# Patient Record
Sex: MALE | Race: WHITE | HISPANIC OR LATINO | Employment: OTHER | URBAN - METROPOLITAN AREA
[De-identification: names, ages, dates, MRNs, and addresses within clinical notes are randomized per-mention and may not be internally consistent; named-entity substitution may affect disease eponyms.]

---

## 2017-01-11 ENCOUNTER — ALLSCRIPTS OFFICE VISIT (OUTPATIENT)
Dept: OTHER | Facility: OTHER | Age: 72
End: 2017-01-11

## 2017-01-12 ENCOUNTER — ALLSCRIPTS OFFICE VISIT (OUTPATIENT)
Dept: OTHER | Facility: OTHER | Age: 72
End: 2017-01-12

## 2017-01-13 ENCOUNTER — ALLSCRIPTS OFFICE VISIT (OUTPATIENT)
Dept: OTHER | Facility: OTHER | Age: 72
End: 2017-01-13

## 2017-01-31 ENCOUNTER — ALLSCRIPTS OFFICE VISIT (OUTPATIENT)
Dept: OTHER | Facility: OTHER | Age: 72
End: 2017-01-31

## 2017-02-14 ENCOUNTER — APPOINTMENT (OUTPATIENT)
Dept: CARDIAC REHAB | Age: 72
End: 2017-02-14
Payer: MEDICARE

## 2017-02-14 PROCEDURE — 93797 PHYS/QHP OP CAR RHAB WO ECG: CPT

## 2017-02-20 ENCOUNTER — APPOINTMENT (OUTPATIENT)
Dept: CARDIAC REHAB | Age: 72
End: 2017-02-20
Payer: MEDICARE

## 2017-02-20 PROCEDURE — 93798 PHYS/QHP OP CAR RHAB W/ECG: CPT

## 2017-02-22 ENCOUNTER — APPOINTMENT (OUTPATIENT)
Dept: CARDIAC REHAB | Age: 72
End: 2017-02-22
Payer: MEDICARE

## 2017-02-22 PROCEDURE — 93798 PHYS/QHP OP CAR RHAB W/ECG: CPT

## 2017-02-23 ENCOUNTER — GENERIC CONVERSION - ENCOUNTER (OUTPATIENT)
Dept: OTHER | Facility: OTHER | Age: 72
End: 2017-02-23

## 2017-02-24 ENCOUNTER — APPOINTMENT (OUTPATIENT)
Dept: CARDIAC REHAB | Age: 72
End: 2017-02-24
Payer: MEDICARE

## 2017-02-24 PROCEDURE — 93798 PHYS/QHP OP CAR RHAB W/ECG: CPT

## 2017-02-27 ENCOUNTER — APPOINTMENT (OUTPATIENT)
Dept: CARDIAC REHAB | Age: 72
End: 2017-02-27
Payer: MEDICARE

## 2017-02-27 PROCEDURE — 93798 PHYS/QHP OP CAR RHAB W/ECG: CPT

## 2017-02-28 ENCOUNTER — ALLSCRIPTS OFFICE VISIT (OUTPATIENT)
Dept: OTHER | Facility: OTHER | Age: 72
End: 2017-02-28

## 2017-02-28 DIAGNOSIS — I10 ESSENTIAL (PRIMARY) HYPERTENSION: ICD-10-CM

## 2017-02-28 DIAGNOSIS — N50.812 PAIN IN LEFT TESTICLE: ICD-10-CM

## 2017-02-28 DIAGNOSIS — E11.65 TYPE 2 DIABETES MELLITUS WITH HYPERGLYCEMIA (HCC): ICD-10-CM

## 2017-03-01 ENCOUNTER — APPOINTMENT (OUTPATIENT)
Dept: CARDIAC REHAB | Age: 72
End: 2017-03-01
Payer: MEDICARE

## 2017-03-01 ENCOUNTER — ALLSCRIPTS OFFICE VISIT (OUTPATIENT)
Dept: OTHER | Facility: OTHER | Age: 72
End: 2017-03-01

## 2017-03-01 PROCEDURE — 93798 PHYS/QHP OP CAR RHAB W/ECG: CPT

## 2017-03-02 ENCOUNTER — LAB CONVERSION - ENCOUNTER (OUTPATIENT)
Dept: OTHER | Facility: OTHER | Age: 72
End: 2017-03-02

## 2017-03-02 LAB
A/G RATIO (HISTORICAL): 1.5 (CALC) (ref 1–2.5)
ALBUMIN SERPL BCP-MCNC: 4.2 G/DL (ref 3.6–5.1)
ALP SERPL-CCNC: 76 U/L (ref 40–115)
ALT SERPL W P-5'-P-CCNC: 13 U/L (ref 9–46)
AST SERPL W P-5'-P-CCNC: 16 U/L (ref 10–35)
BILIRUB SERPL-MCNC: 0.5 MG/DL (ref 0.2–1.2)
BUN SERPL-MCNC: 17 MG/DL (ref 7–25)
BUN/CREA RATIO (HISTORICAL): ABNORMAL (CALC) (ref 6–22)
CALCIUM SERPL-MCNC: 9.3 MG/DL (ref 8.6–10.3)
CHLORIDE SERPL-SCNC: 101 MMOL/L (ref 98–110)
CO2 SERPL-SCNC: 29 MMOL/L (ref 20–31)
CREAT SERPL-MCNC: 0.79 MG/DL (ref 0.7–1.25)
EGFR AFRICAN AMERICAN (HISTORICAL): 106 ML/MIN/1.73M2
EGFR-AMERICAN CALC (HISTORICAL): 92 ML/MIN/1.73M2
GAMMA GLOBULIN (HISTORICAL): 2.8 G/DL (CALC) (ref 1.9–3.7)
GLUCOSE (HISTORICAL): 140 MG/DL (ref 65–99)
HBA1C MFR BLD HPLC: 7.2 % OF TOTAL HGB
POTASSIUM SERPL-SCNC: 4.4 MMOL/L (ref 3.5–5.3)
SODIUM SERPL-SCNC: 138 MMOL/L (ref 135–146)
TOTAL PROTEIN (HISTORICAL): 7 G/DL (ref 6.1–8.1)

## 2017-03-03 ENCOUNTER — APPOINTMENT (OUTPATIENT)
Dept: CARDIAC REHAB | Age: 72
End: 2017-03-03
Payer: MEDICARE

## 2017-03-03 PROCEDURE — 93798 PHYS/QHP OP CAR RHAB W/ECG: CPT

## 2017-03-05 ENCOUNTER — GENERIC CONVERSION - ENCOUNTER (OUTPATIENT)
Dept: OTHER | Facility: OTHER | Age: 72
End: 2017-03-05

## 2017-03-06 ENCOUNTER — HOSPITAL ENCOUNTER (OUTPATIENT)
Dept: RADIOLOGY | Facility: HOSPITAL | Age: 72
Discharge: HOME | End: 2017-03-06
Payer: MEDICARE

## 2017-03-06 ENCOUNTER — APPOINTMENT (OUTPATIENT)
Dept: CARDIAC REHAB | Age: 72
End: 2017-03-06
Payer: MEDICARE

## 2017-03-06 DIAGNOSIS — N50.812 PAIN IN LEFT TESTICLE: ICD-10-CM

## 2017-03-06 PROCEDURE — 93798 PHYS/QHP OP CAR RHAB W/ECG: CPT

## 2017-03-06 PROCEDURE — 76870 US EXAM SCROTUM: CPT

## 2017-03-08 ENCOUNTER — APPOINTMENT (OUTPATIENT)
Dept: CARDIAC REHAB | Age: 72
End: 2017-03-08
Payer: MEDICARE

## 2017-03-08 PROCEDURE — 93798 PHYS/QHP OP CAR RHAB W/ECG: CPT

## 2017-03-10 ENCOUNTER — APPOINTMENT (OUTPATIENT)
Dept: CARDIAC REHAB | Age: 72
End: 2017-03-10
Payer: MEDICARE

## 2017-03-10 PROCEDURE — 93798 PHYS/QHP OP CAR RHAB W/ECG: CPT

## 2017-03-11 DIAGNOSIS — I25.10 ATHEROSCLEROTIC HEART DISEASE OF NATIVE CORONARY ARTERY WITHOUT ANGINA PECTORIS: ICD-10-CM

## 2017-03-11 DIAGNOSIS — E11.65 TYPE 2 DIABETES MELLITUS WITH HYPERGLYCEMIA (HCC): ICD-10-CM

## 2017-03-11 DIAGNOSIS — E11.9 TYPE 2 DIABETES MELLITUS WITHOUT COMPLICATIONS (HCC): ICD-10-CM

## 2017-03-13 ENCOUNTER — GENERIC CONVERSION - ENCOUNTER (OUTPATIENT)
Dept: OTHER | Facility: OTHER | Age: 72
End: 2017-03-13

## 2017-03-13 ENCOUNTER — APPOINTMENT (OUTPATIENT)
Dept: PHYSICAL THERAPY | Facility: REHABILITATION | Age: 72
End: 2017-03-13
Payer: MEDICARE

## 2017-03-13 ENCOUNTER — APPOINTMENT (OUTPATIENT)
Dept: CARDIAC REHAB | Age: 72
End: 2017-03-13
Payer: MEDICARE

## 2017-03-13 DIAGNOSIS — N50.812 PAIN IN LEFT TESTICLE: ICD-10-CM

## 2017-03-13 PROCEDURE — G8990 OTHER PT/OT CURRENT STATUS: HCPCS

## 2017-03-13 PROCEDURE — 93798 PHYS/QHP OP CAR RHAB W/ECG: CPT

## 2017-03-13 PROCEDURE — G8991 OTHER PT/OT GOAL STATUS: HCPCS

## 2017-03-13 PROCEDURE — 97110 THERAPEUTIC EXERCISES: CPT

## 2017-03-13 PROCEDURE — 97162 PT EVAL MOD COMPLEX 30 MIN: CPT

## 2017-03-15 ENCOUNTER — APPOINTMENT (OUTPATIENT)
Dept: CARDIAC REHAB | Age: 72
End: 2017-03-15
Payer: MEDICARE

## 2017-03-15 PROCEDURE — 93798 PHYS/QHP OP CAR RHAB W/ECG: CPT

## 2017-03-16 ENCOUNTER — APPOINTMENT (OUTPATIENT)
Dept: PHYSICAL THERAPY | Facility: REHABILITATION | Age: 72
End: 2017-03-16
Payer: MEDICARE

## 2017-03-16 PROCEDURE — 97112 NEUROMUSCULAR REEDUCATION: CPT

## 2017-03-16 PROCEDURE — 97110 THERAPEUTIC EXERCISES: CPT

## 2017-03-17 ENCOUNTER — APPOINTMENT (OUTPATIENT)
Dept: CARDIAC REHAB | Age: 72
End: 2017-03-17
Payer: MEDICARE

## 2017-03-17 LAB
CHOLEST SERPL-MCNC: 117 MG/DL (ref 125–200)
CHOLEST/HDLC SERPL: 2.4 (CALC)
HDLC SERPL-MCNC: 48 MG/DL
LDL CHOLESTEROL (HISTORICAL): 54 MG/DL (CALC)
NON-HDL-CHOL (CHOL-HDL) (HISTORICAL): 69 MG/DL (CALC)
TRIGL SERPL-MCNC: 74 MG/DL

## 2017-03-17 PROCEDURE — 93798 PHYS/QHP OP CAR RHAB W/ECG: CPT

## 2017-03-20 ENCOUNTER — APPOINTMENT (OUTPATIENT)
Dept: CARDIAC REHAB | Age: 72
End: 2017-03-20
Payer: MEDICARE

## 2017-03-20 PROCEDURE — 93798 PHYS/QHP OP CAR RHAB W/ECG: CPT

## 2017-03-21 ENCOUNTER — APPOINTMENT (OUTPATIENT)
Dept: PHYSICAL THERAPY | Facility: REHABILITATION | Age: 72
End: 2017-03-21
Payer: MEDICARE

## 2017-03-21 PROCEDURE — 97112 NEUROMUSCULAR REEDUCATION: CPT

## 2017-03-21 PROCEDURE — 97110 THERAPEUTIC EXERCISES: CPT

## 2017-03-22 ENCOUNTER — APPOINTMENT (OUTPATIENT)
Dept: CARDIAC REHAB | Age: 72
End: 2017-03-22
Payer: MEDICARE

## 2017-03-22 PROCEDURE — 93798 PHYS/QHP OP CAR RHAB W/ECG: CPT

## 2017-03-23 ENCOUNTER — APPOINTMENT (OUTPATIENT)
Dept: PHYSICAL THERAPY | Facility: REHABILITATION | Age: 72
End: 2017-03-23
Payer: MEDICARE

## 2017-03-23 PROCEDURE — 97110 THERAPEUTIC EXERCISES: CPT

## 2017-03-23 PROCEDURE — 97112 NEUROMUSCULAR REEDUCATION: CPT

## 2017-03-24 ENCOUNTER — APPOINTMENT (OUTPATIENT)
Dept: CARDIAC REHAB | Age: 72
End: 2017-03-24
Payer: MEDICARE

## 2017-03-24 ENCOUNTER — ALLSCRIPTS OFFICE VISIT (OUTPATIENT)
Dept: OTHER | Facility: OTHER | Age: 72
End: 2017-03-24

## 2017-03-24 PROCEDURE — 93798 PHYS/QHP OP CAR RHAB W/ECG: CPT

## 2017-03-27 ENCOUNTER — APPOINTMENT (OUTPATIENT)
Dept: CARDIAC REHAB | Age: 72
End: 2017-03-27
Payer: MEDICARE

## 2017-03-27 PROCEDURE — 93798 PHYS/QHP OP CAR RHAB W/ECG: CPT

## 2017-03-28 ENCOUNTER — APPOINTMENT (OUTPATIENT)
Dept: PHYSICAL THERAPY | Facility: REHABILITATION | Age: 72
End: 2017-03-28
Payer: MEDICARE

## 2017-03-28 PROCEDURE — 97110 THERAPEUTIC EXERCISES: CPT

## 2017-03-28 PROCEDURE — 97112 NEUROMUSCULAR REEDUCATION: CPT

## 2017-03-29 ENCOUNTER — APPOINTMENT (OUTPATIENT)
Dept: CARDIAC REHAB | Age: 72
End: 2017-03-29
Payer: MEDICARE

## 2017-03-29 PROCEDURE — 93798 PHYS/QHP OP CAR RHAB W/ECG: CPT

## 2017-03-30 ENCOUNTER — APPOINTMENT (OUTPATIENT)
Dept: PHYSICAL THERAPY | Facility: REHABILITATION | Age: 72
End: 2017-03-30
Payer: MEDICARE

## 2017-03-30 PROCEDURE — 97110 THERAPEUTIC EXERCISES: CPT

## 2017-03-31 ENCOUNTER — APPOINTMENT (OUTPATIENT)
Dept: CARDIAC REHAB | Age: 72
End: 2017-03-31
Payer: MEDICARE

## 2017-03-31 ENCOUNTER — ALLSCRIPTS OFFICE VISIT (OUTPATIENT)
Dept: OTHER | Facility: OTHER | Age: 72
End: 2017-03-31

## 2017-03-31 PROCEDURE — 93798 PHYS/QHP OP CAR RHAB W/ECG: CPT

## 2017-04-03 ENCOUNTER — APPOINTMENT (OUTPATIENT)
Dept: CARDIAC REHAB | Age: 72
End: 2017-04-03
Payer: MEDICARE

## 2017-04-03 ENCOUNTER — ALLSCRIPTS OFFICE VISIT (OUTPATIENT)
Dept: OTHER | Facility: OTHER | Age: 72
End: 2017-04-03

## 2017-04-03 PROCEDURE — 93798 PHYS/QHP OP CAR RHAB W/ECG: CPT

## 2017-04-04 ENCOUNTER — APPOINTMENT (OUTPATIENT)
Dept: PHYSICAL THERAPY | Facility: REHABILITATION | Age: 72
End: 2017-04-04
Payer: MEDICARE

## 2017-04-04 PROCEDURE — 97110 THERAPEUTIC EXERCISES: CPT

## 2017-04-04 PROCEDURE — 97112 NEUROMUSCULAR REEDUCATION: CPT

## 2017-04-05 ENCOUNTER — APPOINTMENT (OUTPATIENT)
Dept: CARDIAC REHAB | Age: 72
End: 2017-04-05
Payer: MEDICARE

## 2017-04-05 PROCEDURE — 93798 PHYS/QHP OP CAR RHAB W/ECG: CPT

## 2017-04-06 ENCOUNTER — APPOINTMENT (OUTPATIENT)
Dept: PHYSICAL THERAPY | Facility: REHABILITATION | Age: 72
End: 2017-04-06
Payer: MEDICARE

## 2017-04-06 PROCEDURE — 97112 NEUROMUSCULAR REEDUCATION: CPT

## 2017-04-06 PROCEDURE — 97110 THERAPEUTIC EXERCISES: CPT

## 2017-04-07 ENCOUNTER — APPOINTMENT (OUTPATIENT)
Dept: CARDIAC REHAB | Age: 72
End: 2017-04-07
Payer: MEDICARE

## 2017-04-07 ENCOUNTER — GENERIC CONVERSION - ENCOUNTER (OUTPATIENT)
Dept: OTHER | Facility: OTHER | Age: 72
End: 2017-04-07

## 2017-04-07 PROCEDURE — 93798 PHYS/QHP OP CAR RHAB W/ECG: CPT

## 2017-04-10 ENCOUNTER — APPOINTMENT (OUTPATIENT)
Dept: CARDIAC REHAB | Age: 72
End: 2017-04-10
Payer: MEDICARE

## 2017-04-10 PROCEDURE — 93798 PHYS/QHP OP CAR RHAB W/ECG: CPT

## 2017-04-11 ENCOUNTER — APPOINTMENT (OUTPATIENT)
Dept: PHYSICAL THERAPY | Facility: REHABILITATION | Age: 72
End: 2017-04-11
Payer: MEDICARE

## 2017-04-11 ENCOUNTER — ALLSCRIPTS OFFICE VISIT (OUTPATIENT)
Dept: OTHER | Facility: OTHER | Age: 72
End: 2017-04-11

## 2017-04-11 PROCEDURE — 97112 NEUROMUSCULAR REEDUCATION: CPT

## 2017-04-11 PROCEDURE — G8991 OTHER PT/OT GOAL STATUS: HCPCS

## 2017-04-11 PROCEDURE — 97110 THERAPEUTIC EXERCISES: CPT

## 2017-04-11 PROCEDURE — G8990 OTHER PT/OT CURRENT STATUS: HCPCS

## 2017-04-12 ENCOUNTER — APPOINTMENT (OUTPATIENT)
Dept: CARDIAC REHAB | Age: 72
End: 2017-04-12
Payer: MEDICARE

## 2017-04-12 PROCEDURE — 93798 PHYS/QHP OP CAR RHAB W/ECG: CPT

## 2017-04-13 ENCOUNTER — APPOINTMENT (OUTPATIENT)
Dept: PHYSICAL THERAPY | Facility: REHABILITATION | Age: 72
End: 2017-04-13
Payer: MEDICARE

## 2017-04-14 ENCOUNTER — APPOINTMENT (OUTPATIENT)
Dept: CARDIAC REHAB | Age: 72
End: 2017-04-14
Payer: MEDICARE

## 2017-04-14 PROCEDURE — 93798 PHYS/QHP OP CAR RHAB W/ECG: CPT

## 2017-04-17 ENCOUNTER — APPOINTMENT (OUTPATIENT)
Dept: CARDIAC REHAB | Age: 72
End: 2017-04-17
Payer: MEDICARE

## 2017-04-19 ENCOUNTER — APPOINTMENT (OUTPATIENT)
Dept: CARDIAC REHAB | Age: 72
End: 2017-04-19
Payer: MEDICARE

## 2017-04-21 ENCOUNTER — APPOINTMENT (OUTPATIENT)
Dept: CARDIAC REHAB | Age: 72
End: 2017-04-21
Payer: MEDICARE

## 2017-04-24 ENCOUNTER — APPOINTMENT (OUTPATIENT)
Dept: CARDIAC REHAB | Age: 72
End: 2017-04-24
Payer: MEDICARE

## 2017-04-26 ENCOUNTER — APPOINTMENT (OUTPATIENT)
Dept: CARDIAC REHAB | Age: 72
End: 2017-04-26
Payer: MEDICARE

## 2017-04-28 ENCOUNTER — APPOINTMENT (OUTPATIENT)
Dept: CARDIAC REHAB | Age: 72
End: 2017-04-28
Payer: MEDICARE

## 2017-05-01 ENCOUNTER — APPOINTMENT (OUTPATIENT)
Dept: CARDIAC REHAB | Age: 72
End: 2017-05-01
Payer: MEDICARE

## 2017-05-01 ENCOUNTER — GENERIC CONVERSION - ENCOUNTER (OUTPATIENT)
Dept: OTHER | Facility: OTHER | Age: 72
End: 2017-05-01

## 2017-05-01 PROCEDURE — 93798 PHYS/QHP OP CAR RHAB W/ECG: CPT

## 2017-05-02 LAB
A/G RATIO (HISTORICAL): 1.5 (CALC) (ref 1–2.5)
ALBUMIN SERPL BCP-MCNC: 4 G/DL (ref 3.6–5.1)
ALP SERPL-CCNC: 79 U/L (ref 40–115)
ALT SERPL W P-5'-P-CCNC: 19 U/L (ref 9–46)
AST SERPL W P-5'-P-CCNC: 19 U/L (ref 10–35)
BILIRUB SERPL-MCNC: 0.8 MG/DL (ref 0.2–1.2)
BUN SERPL-MCNC: 14 MG/DL (ref 7–25)
BUN/CREA RATIO (HISTORICAL): ABNORMAL (CALC) (ref 6–22)
CALCIUM SERPL-MCNC: 9.3 MG/DL (ref 8.6–10.3)
CHLORIDE SERPL-SCNC: 103 MMOL/L (ref 98–110)
CO2 SERPL-SCNC: 31 MMOL/L (ref 20–31)
CREAT SERPL-MCNC: 0.83 MG/DL (ref 0.7–1.25)
EGFR AFRICAN AMERICAN (HISTORICAL): 104 ML/MIN/1.73M2
EGFR-AMERICAN CALC (HISTORICAL): 90 ML/MIN/1.73M2
GAMMA GLOBULIN (HISTORICAL): 2.6 G/DL (CALC) (ref 1.9–3.7)
GLUCOSE (HISTORICAL): 113 MG/DL (ref 65–99)
POTASSIUM SERPL-SCNC: 4.4 MMOL/L (ref 3.5–5.3)
SODIUM SERPL-SCNC: 140 MMOL/L (ref 135–146)
TOTAL PROTEIN (HISTORICAL): 6.6 G/DL (ref 6.1–8.1)

## 2017-05-03 ENCOUNTER — APPOINTMENT (OUTPATIENT)
Dept: CARDIAC REHAB | Age: 72
End: 2017-05-03
Payer: MEDICARE

## 2017-05-03 ENCOUNTER — GENERIC CONVERSION - ENCOUNTER (OUTPATIENT)
Dept: OTHER | Facility: OTHER | Age: 72
End: 2017-05-03

## 2017-05-03 ENCOUNTER — LAB CONVERSION - ENCOUNTER (OUTPATIENT)
Dept: OTHER | Facility: OTHER | Age: 72
End: 2017-05-03

## 2017-05-03 LAB
25(OH)D3 SERPL-MCNC: 37 NG/ML (ref 30–100)
HBA1C MFR BLD HPLC: 7.3 % OF TOTAL HGB

## 2017-05-03 PROCEDURE — 93798 PHYS/QHP OP CAR RHAB W/ECG: CPT

## 2017-05-04 ENCOUNTER — APPOINTMENT (OUTPATIENT)
Dept: PHYSICAL THERAPY | Facility: REHABILITATION | Age: 72
End: 2017-05-04
Payer: MEDICARE

## 2017-05-04 PROCEDURE — 97110 THERAPEUTIC EXERCISES: CPT

## 2017-05-04 PROCEDURE — 97112 NEUROMUSCULAR REEDUCATION: CPT

## 2017-05-05 ENCOUNTER — APPOINTMENT (OUTPATIENT)
Dept: CARDIAC REHAB | Age: 72
End: 2017-05-05
Payer: MEDICARE

## 2017-05-05 PROCEDURE — 93798 PHYS/QHP OP CAR RHAB W/ECG: CPT

## 2017-05-08 ENCOUNTER — APPOINTMENT (OUTPATIENT)
Dept: CARDIAC REHAB | Age: 72
End: 2017-05-08
Payer: MEDICARE

## 2017-05-08 ENCOUNTER — ALLSCRIPTS OFFICE VISIT (OUTPATIENT)
Dept: OTHER | Facility: OTHER | Age: 72
End: 2017-05-08

## 2017-05-08 PROCEDURE — 93798 PHYS/QHP OP CAR RHAB W/ECG: CPT

## 2017-05-09 ENCOUNTER — APPOINTMENT (OUTPATIENT)
Dept: PHYSICAL THERAPY | Facility: REHABILITATION | Age: 72
End: 2017-05-09
Payer: MEDICARE

## 2017-05-09 ENCOUNTER — GENERIC CONVERSION - ENCOUNTER (OUTPATIENT)
Dept: OTHER | Facility: OTHER | Age: 72
End: 2017-05-09

## 2017-05-09 PROCEDURE — 97110 THERAPEUTIC EXERCISES: CPT

## 2017-05-09 PROCEDURE — G8992 OTHER PT/OT  D/C STATUS: HCPCS

## 2017-05-09 PROCEDURE — G8991 OTHER PT/OT GOAL STATUS: HCPCS

## 2017-05-10 ENCOUNTER — APPOINTMENT (OUTPATIENT)
Dept: CARDIAC REHAB | Age: 72
End: 2017-05-10
Payer: MEDICARE

## 2017-05-10 PROCEDURE — 93798 PHYS/QHP OP CAR RHAB W/ECG: CPT

## 2017-05-11 ENCOUNTER — APPOINTMENT (OUTPATIENT)
Dept: PHYSICAL THERAPY | Facility: REHABILITATION | Age: 72
End: 2017-05-11
Payer: MEDICARE

## 2017-05-12 ENCOUNTER — APPOINTMENT (OUTPATIENT)
Dept: CARDIAC REHAB | Age: 72
End: 2017-05-12
Payer: MEDICARE

## 2017-05-12 PROCEDURE — 93798 PHYS/QHP OP CAR RHAB W/ECG: CPT

## 2017-05-15 ENCOUNTER — APPOINTMENT (OUTPATIENT)
Dept: CARDIAC REHAB | Age: 72
End: 2017-05-15
Payer: MEDICARE

## 2017-05-15 PROCEDURE — 93798 PHYS/QHP OP CAR RHAB W/ECG: CPT

## 2017-05-17 ENCOUNTER — APPOINTMENT (OUTPATIENT)
Dept: CARDIAC REHAB | Age: 72
End: 2017-05-17
Payer: MEDICARE

## 2017-05-17 PROCEDURE — 93798 PHYS/QHP OP CAR RHAB W/ECG: CPT

## 2017-05-19 ENCOUNTER — APPOINTMENT (OUTPATIENT)
Dept: CARDIAC REHAB | Age: 72
End: 2017-05-19
Payer: MEDICARE

## 2017-05-19 PROCEDURE — 93798 PHYS/QHP OP CAR RHAB W/ECG: CPT

## 2017-05-22 ENCOUNTER — APPOINTMENT (OUTPATIENT)
Dept: CARDIAC REHAB | Age: 72
End: 2017-05-22
Payer: MEDICARE

## 2017-05-22 PROCEDURE — 93798 PHYS/QHP OP CAR RHAB W/ECG: CPT

## 2017-05-24 ENCOUNTER — APPOINTMENT (OUTPATIENT)
Dept: CARDIAC REHAB | Age: 72
End: 2017-05-24
Payer: MEDICARE

## 2017-05-24 PROCEDURE — 93798 PHYS/QHP OP CAR RHAB W/ECG: CPT

## 2017-05-26 ENCOUNTER — APPOINTMENT (OUTPATIENT)
Dept: CARDIAC REHAB | Age: 72
End: 2017-05-26
Payer: MEDICARE

## 2017-05-26 ENCOUNTER — GENERIC CONVERSION - ENCOUNTER (OUTPATIENT)
Dept: OTHER | Facility: OTHER | Age: 72
End: 2017-05-26

## 2017-06-09 ENCOUNTER — GENERIC CONVERSION - ENCOUNTER (OUTPATIENT)
Dept: OTHER | Facility: OTHER | Age: 72
End: 2017-06-09

## 2017-07-24 ENCOUNTER — ALLSCRIPTS OFFICE VISIT (OUTPATIENT)
Dept: OTHER | Facility: OTHER | Age: 72
End: 2017-07-24

## 2017-08-01 ENCOUNTER — LAB CONVERSION - ENCOUNTER (OUTPATIENT)
Dept: OTHER | Facility: OTHER | Age: 72
End: 2017-08-01

## 2017-08-01 LAB
A/G RATIO (HISTORICAL): 1.7 (CALC) (ref 1–2.5)
ALBUMIN SERPL BCP-MCNC: 4.4 G/DL (ref 3.6–5.1)
ALP SERPL-CCNC: 77 U/L (ref 40–115)
ALT SERPL W P-5'-P-CCNC: 30 U/L (ref 9–46)
AST SERPL W P-5'-P-CCNC: 25 U/L (ref 10–35)
BILIRUB SERPL-MCNC: 0.7 MG/DL (ref 0.2–1.2)
BUN SERPL-MCNC: 20 MG/DL (ref 7–25)
BUN/CREA RATIO (HISTORICAL): ABNORMAL (CALC) (ref 6–22)
CALCIUM SERPL-MCNC: 9.6 MG/DL (ref 8.6–10.3)
CHLORIDE SERPL-SCNC: 102 MMOL/L (ref 98–110)
CO2 SERPL-SCNC: 31 MMOL/L (ref 20–31)
CREAT SERPL-MCNC: 0.93 MG/DL (ref 0.7–1.18)
EGFR AFRICAN AMERICAN (HISTORICAL): 96 ML/MIN/1.73M2
EGFR-AMERICAN CALC (HISTORICAL): 83 ML/MIN/1.73M2
GAMMA GLOBULIN (HISTORICAL): 2.6 G/DL (CALC) (ref 1.9–3.7)
GLUCOSE (HISTORICAL): 142 MG/DL (ref 65–99)
POTASSIUM SERPL-SCNC: 4.6 MMOL/L (ref 3.5–5.3)
SODIUM SERPL-SCNC: 139 MMOL/L (ref 135–146)
TOTAL PROTEIN (HISTORICAL): 7 G/DL (ref 6.1–8.1)

## 2017-08-02 ENCOUNTER — LAB CONVERSION - ENCOUNTER (OUTPATIENT)
Dept: OTHER | Facility: OTHER | Age: 72
End: 2017-08-02

## 2017-08-02 LAB
25(OH)D3 SERPL-MCNC: 44 NG/ML (ref 30–100)
HBA1C MFR BLD HPLC: 6.9 % OF TOTAL HGB

## 2017-08-08 ENCOUNTER — ALLSCRIPTS OFFICE VISIT (OUTPATIENT)
Dept: OTHER | Facility: OTHER | Age: 72
End: 2017-08-08

## 2017-11-30 ENCOUNTER — ALLSCRIPTS OFFICE VISIT (OUTPATIENT)
Dept: OTHER | Facility: OTHER | Age: 72
End: 2017-11-30

## 2017-11-30 LAB — HBA1C MFR BLD HPLC: 7.4 %

## 2017-12-27 ENCOUNTER — TRANSCRIBE ORDERS (OUTPATIENT)
Dept: ADMINISTRATIVE | Facility: HOSPITAL | Age: 72
End: 2017-12-27

## 2017-12-27 DIAGNOSIS — G56.03 BILATERAL CARPAL TUNNEL SYNDROME: Primary | ICD-10-CM

## 2018-01-10 ENCOUNTER — ALLSCRIPTS OFFICE VISIT (OUTPATIENT)
Dept: OTHER | Facility: OTHER | Age: 73
End: 2018-01-10

## 2018-01-12 VITALS
BODY MASS INDEX: 22.85 KG/M2 | HEIGHT: 69 IN | SYSTOLIC BLOOD PRESSURE: 154 MMHG | RESPIRATION RATE: 16 BRPM | WEIGHT: 154.25 LBS | HEART RATE: 72 BPM | TEMPERATURE: 98.3 F | DIASTOLIC BLOOD PRESSURE: 74 MMHG

## 2018-01-12 VITALS
SYSTOLIC BLOOD PRESSURE: 120 MMHG | HEIGHT: 69 IN | HEART RATE: 64 BPM | BODY MASS INDEX: 22.7 KG/M2 | WEIGHT: 153.25 LBS | DIASTOLIC BLOOD PRESSURE: 84 MMHG

## 2018-01-12 VITALS
HEART RATE: 82 BPM | DIASTOLIC BLOOD PRESSURE: 70 MMHG | BODY MASS INDEX: 22.81 KG/M2 | HEIGHT: 69 IN | SYSTOLIC BLOOD PRESSURE: 120 MMHG | RESPIRATION RATE: 18 BRPM | TEMPERATURE: 99.1 F | WEIGHT: 154 LBS

## 2018-01-12 VITALS
HEIGHT: 69 IN | DIASTOLIC BLOOD PRESSURE: 55 MMHG | SYSTOLIC BLOOD PRESSURE: 111 MMHG | HEART RATE: 57 BPM | OXYGEN SATURATION: 96 % | WEIGHT: 156 LBS | TEMPERATURE: 97 F | BODY MASS INDEX: 23.11 KG/M2

## 2018-01-12 VITALS
HEART RATE: 80 BPM | HEIGHT: 69 IN | BODY MASS INDEX: 22.66 KG/M2 | SYSTOLIC BLOOD PRESSURE: 130 MMHG | DIASTOLIC BLOOD PRESSURE: 86 MMHG | WEIGHT: 153 LBS

## 2018-01-12 NOTE — PROGRESS NOTES
Assessment   Assessed    1  Coronary artery disease (414 00) (I25 10)   2  Diabetes mellitus type 2, uncontrolled (250 02) (E11 65)   3  Hyperlipidemia (272 4) (E78 5)   4  Mild aortic stenosis (424 1) (I35 0)    Plan   Coronary artery disease, Diabetes mellitus type 2, uncontrolled, Hyperlipidemia, Mild    aortic stenosis    · Follow-up visit in 6 months Evaluation and Treatment  Follow-up  Status: Complete     Done: [id number]   Ordered; For: Coronary artery disease, Diabetes mellitus type 2, uncontrolled, Hyperlipidemia, Mild aortic stenosis; Ordered By: Emely Rodgers Performed:  Due: 62UIM5999; Last Updated By: Melodie Molina; 1/10/2018 10:43:16 AM    Discussion/Summary   Cardiology Discussion Summary Free Text Note Form [facility]:    Pleasant 31-year-old gentleman with a history of diabetes, hypertension, coronary disease with several MIs in the past  Previously underwent stenting, and most recently December 2016 underwent bypass surgery with four-vessel bypass  LV function was low normal  He has mild aortic stenosis  coronary disease: Denies any angina  Continue aspirin  and high intensity atorvastatin  Continue current antihypertensives  No changes made today  hypertension: Blood pressure doing well with current medication doses  Hyperlipidemia: Most recent lipid profile well-controlled on current dose of atorvastatin 80 mg  Aortic stenosis: This is mild on his most recent echocardiogram In March 2016  Chief Complaint   Chief Complaint Free Text Note Form: Patient is here for a follow up  History of Present Illness   Cardiology HPI Free Text Note Form [facility]: 31-year-old gentleman presents to the office today for follow-up     has a history of coronary artery disease with prior MI in 2006 with a drug-eluting stent to the LAD at that time, subsequent MI in 2012 with stent to an RCA   In December 2016, recurrent MI in that setting was found to have multivessel coronary artery disease and subsequent four-vessel bypass surgery with LIMA to the LAD, vein grafts to the diagonal, OM1, PDA  LV function is low normal and his most recent echocardiogram did show mild inferior abnormality  There is mild aortic stenosis  This was in March 2016  does have diabetes and hypertension, these are under reasonable control  continues to exercise around the neighborhood and denies any symptoms with this  He denies any chest pain  He does notice some itching of his midline scar when it gets wet, but otherwise is not having any problems  any chest pain or shortness of breath  Gets an occasional symptom of palpitation at night, but this is quick and self-limited  To have an ultrasound of the testicle done tomorrow  Says he may need surgery  Review of Systems   Cardiology Male ROS:         Cardiac: No complaints of chest pain, no palpitations, no fainiting  Skin: No complaints of nonhealing sores or skin rash  Genitourinary: No complaints of recurrent urinary tract infections, frequent urination at night, difficult urination, blood in urine, kidney stones, loss of bladder control, no kidney or prostate problems, no erectile dysfunction  Psychological: No complaints of feeling depressed, anxiety, panic attacks, or difficulty concentrating  General: No complaints of trouble sleeping, lack of energy, fatigue, appetite changes, weight changes, fever, frequent infections, or night sweats  Respiratory: No complaints of shortness of breath, cough with sputum, or wheezing  HEENT: No complaints of serious problems, hearing problems, nose problems, throat problems, or snoring  Gastrointestinal: No complaints of liver problems, nausea, vomiting, heartburn, constipation, bloody stools, diarrhea, problems swallowing, adbominal pain, or rectal bleeding  Hematologic: No complaints of bleeding disorders, anemia, blood clots, or excessive brusing        Neurological: No complaints of numbness, tingling, dizziness, weakness, seizures, headaches, syncope or fainting, AM fatigue, daytime sleepiness, no witnessed apnea episodes  Musculoskeletal: No complaints of arthritis, back pain, or painfull swelling  ROS Reviewed:    ROS reviewed  Active Problems   Problems    1  Abdominal pain (789 00) (R10 9)   2  Acute maxillary sinusitis (461 0) (J01 00)   3  Anxiety (300 00) (F41 9)   4  Arthralgia of multiple joints (719 49) (M25 50)   5  Atherosclerotic heart disease of native coronary artery with other forms of angina pectoris     (414 01,413 9) (I25 118)   6  Balanitis (607 1) (N48 1)   7  CABG   8  Conjunctival hemorrhage (372 72) (H11 30)   9  Coronary artery disease (414 00) (I25 10)   10  Diabetes mellitus type 2, uncontrolled (250 02) (E11 65)   11  DMII (diabetes mellitus, type 2) (250 00) (E11 9)   12  Esophageal reflux (530 81) (K21 9)   13  Essential hypertriglyceridemia (272 1) (E78 1)   14  Eye discharge (379 93) (H57 8)   15  Foreign body granuloma of skin (709 4) (L92 3)   16  Gross hematuria (599 71) (R31 0)   17  Hamstring tightness of both lower extremities (728 9) (M62 9)   18  Hospital discharge follow-up (V67 59) (Z09)   19  Hyperlipidemia (272 4) (E78 5)   20  Hypertension (401 9) (I10)   21  Influenza (487 1) (J11 1)   22  Knee pain (719 46) (M25 569)   23  Mild aortic stenosis (424 1) (I35 0)   24  Mosquito-borne disease (088 9) (B88 2)   25  Multiple joint pain (719 49) (M25 50)   26  Myalgia (729 1) (M79 1)   27  Need for prophylactic vaccination and inoculation against influenza (V04 81) (Z23)   28  NSTEMI (non-ST elevated myocardial infarction) (410 70) (I21 4)   29  Numbness of upper limb (782 0) (R20 0)   30  Old myocardial infarction (412) (I25 2)   31  Organic impotence (607 84) (N52 9)   32  Pain in joint of right shoulder (719 41) (M25 511)   33  Pain, penile (607 9) (N48 89)   34  Palpitations (785 1) (R00 2)   35  Papule of skin (709 8) (R23 8)   36   Postop check (V67 00) (Z09)   37  Pulmonary nodule seen on imaging study (793 11) (R91 1)   38  S/P CABG x 4 (V45 81) (Z95 1)   39  Screening for genitourinary condition (V81 6) (Z13 89)   40  Skin cancer (173 90) (C44 90)   41  Skin lesion (709 9) (L98 9)   42  Sleep apnea (780 57) (G47 30)   43  Special screening examination for neoplasm of prostate (V76 44) (Z12 5)   44  Status post angioplasty (V45 89) (Z98 62)   45  Testicular hypogonadism (257 2) (E29 1)   46  Testicular pain, left (608 9) (N50 812)   47  Tinea cruris (110 3) (B35 6)   48  Tinea pedis (110 4) (B35 3)   49  Viral illness (079 99) (B34 9)   50  Viral syndrome (079 99) (B34 9)    Past Medical History   Problems    1  History of Diabetes Mellitus (250 00)   2  History of fatigue (V13 89) (Z87 898)   3  History of hypertension (V12 59) (Z86 79)   4  History of Joint pain (719 40) (M25 50)   5  Need for prophylactic vaccination and inoculation against influenza (V04 81) (Z23)   6  History of Palpitations (785 1) (R00 2)   7  History of Skin cancer of nose (173 30) (C44 301)   8  History of Stroke Syndrome (436)   9  History of Testicular pain, left (608 9) (B50 726)  Active Problems And Past Medical History Reviewed: The active problems and past medical history were reviewed and updated today  Surgical History   Problems    1  History of Cataract Surgery   2  History of Cath Stent Placement   3  History of Complete Colonoscopy   4  History of Umbilical Hernia Repair - Over Age 7  Surgical History Reviewed: The surgical history was reviewed and updated today  Family History   Mother    1  Family history of Heart Disease (V17 49)   2  Family history of Hypertension (V17 49)  Father    3  Family history of Nephrolithiasis   4  Family history of Renal Disease  Sister    5  Family history of Hypertension (V17 49)  Brother    6  Family history of Nephrolithiasis   7  Family history of Renal Disease  Family History    8   Family history of Microscopic hematuria (590 46) (R31 29)  Family History Reviewed: The family history was reviewed and updated today  Social History   Problems    · Denied: Alcohol   · Denied: Drug Use   · Never A Smoker   · Uses Safety Equipment - Seatbelts  Social History Reviewed: The social history was reviewed and is unchanged  Current Meds    1  Aspirin 81 MG Oral Tablet Delayed Release; ONE TAB DAILY  Requested for: 30Idt8729;     Last Rx:37Ezy9956 Ordered   2  Atorvastatin Calcium 80 MG Oral Tablet; TAKE 1 TABLET AT BEDTIME; Therapy: 42CYY9611 to (Evaluate:08Nxn3947)  Requested for: 08Aug2017; Last     Rx:08Aug2017 Ordered   3  Clotrimazole-Betamethasone 1-0 05 % External Cream; APPLY  AND RUB  IN A THIN     FILM TO AFFECTED AREAS TWICE DAILY  (AM AND PM); Therapy: 02Ecx5711 to (Ramon Herbert)  Requested for: 70SJD0358; Last     Rx:30Nov2017 Ordered   4  Glimepiride 4 MG Oral Tablet; TAKE 1 TABLET DAILY; Therapy: (Recorded:10Jan2018) to Recorded   5  Hydrocortisone 2 5 % External Cream; APPLY 2-3 TIMES DAILY TO AFFECTED AREA(S); Therapy: 41MPX1618 to (Last Rx:08Aug2017)  Requested for: 08Aug2017 Ordered   6  MetFORMIN HCl - 500 MG Oral Tablet; 2 tabs twice a day with meals; Therapy: 21OHU5399 to (Macy Del Valle)  Requested for: 80VPN1488; Last     YM:37VNX6329 Ordered   7  Metoprolol Tartrate 50 MG Oral Tablet; Take 1 tablet twice daily; Therapy: 21PLC9069 to (Evaluate:57Rto6277)  Requested for: 08Aug2017; Last     Rx:08Aug2017 Ordered   8  Omeprazole 20 MG Oral Capsule Delayed Release; TAKE 1 CAPSULE Daily Before     Meals  Requested for: 00Fgs4075; Last Rx:25Chp6124 Ordered   9  OneTouch Delica Lancets Fine Miscellaneous; 4 times USE AS DIRECTED ; Therapy: 62TKD7081 to (Macy Del Valle)  Requested for: 01DOV5516; Last     ZQ:47ZGV2908 Ordered   10  OneTouch Ultra Blue In [employer]; test 4 times daily;       Therapy: 70EVV3225 to (Evaluate:32Fpo8536)  Requested for: 81ZVW7250; Last      KW:22COM1996 Ordered   11  Tobramycin-Dexamethasone 0 3-0 1 % Ophthalmic Suspension; INSTILL 1 DROP IN      BOTH EYES BID; Therapy: 97SOS0759 to (Last Rx:30Nov2017)  Requested for: 38LHH6457 Ordered   12  Tranxene-T 7 5 MG Oral Tablet Recorded   13  Tylenol 325 MG Oral Tablet; TAKE 1 TO 2 TABLETS EVERY 6 HOURS AS NEEDED; Therapy: (Recorded:65Apt0347) to Recorded  Medication List Reviewed: The medication list was reviewed and updated today  Allergies   Medication    1  EPINEPHrine SOLN   2  Penicillins  Non-Medication    3  No Known Environmental Allergies   4  No Known Food Allergies    Vitals   Vital Signs    Recorded: 63BNY3497 10:17AM   Heart Rate 54   Systolic 635   Diastolic 70   Height 5 ft 9 in   Weight 160 lb    BMI Calculated 23 63   BSA Calculated 1 88     Physical Exam        Constitutional - General appearance: No acute distress, well appearing and well nourished  Eyes - Conjunctiva and Sclera examination: Conjunctiva pink, sclera anicteric  Neck - Normal, no JVD   Pulmonary - Respiratory effort: No signs of respiratory distress  -- Auscultation of lungs: Clear to auscultation  Cardiovascular - Auscultation of heart: Normal rate and rhythm, normal S1 and S2, no murmurs  -- Pedal pulses: Normal, 2+ bilaterally  -- Examination of extremities for edema and/or varicosities: Normal        Chest - midline scar, well healed  Abdomen - Soft  Musculoskeletal - Gait and station: Normal gait  Skin - Skin: Normal without rashes  Skin is warm and well perfused  Neurologic - Speech normal  No focal deficits  Psychiatric - Orientation to person, place, and time: Normal -- Mood and affect: Normal       Future Appointments      Date/Time Provider Specialty Site   04/03/2018 09:15 AM MARIBEL Pérez   Urology [address] [place] [address]     Signatures    Electronically signed by : Gerhardt Sabin, M D ; Larry 10 2018 11:24AM EST (Author)

## 2018-01-13 VITALS
BODY MASS INDEX: 22.44 KG/M2 | HEIGHT: 69 IN | DIASTOLIC BLOOD PRESSURE: 72 MMHG | SYSTOLIC BLOOD PRESSURE: 118 MMHG | HEART RATE: 64 BPM | WEIGHT: 151.5 LBS

## 2018-01-13 VITALS
DIASTOLIC BLOOD PRESSURE: 70 MMHG | BODY MASS INDEX: 23.11 KG/M2 | SYSTOLIC BLOOD PRESSURE: 130 MMHG | HEIGHT: 69 IN | OXYGEN SATURATION: 99 % | HEART RATE: 56 BPM | WEIGHT: 156 LBS

## 2018-01-13 VITALS
BODY MASS INDEX: 22.8 KG/M2 | RESPIRATION RATE: 16 BRPM | HEART RATE: 60 BPM | TEMPERATURE: 98.5 F | WEIGHT: 154.38 LBS | SYSTOLIC BLOOD PRESSURE: 116 MMHG | DIASTOLIC BLOOD PRESSURE: 78 MMHG

## 2018-01-13 VITALS
WEIGHT: 153 LBS | HEART RATE: 60 BPM | BODY MASS INDEX: 22.66 KG/M2 | SYSTOLIC BLOOD PRESSURE: 122 MMHG | HEIGHT: 69 IN | DIASTOLIC BLOOD PRESSURE: 70 MMHG

## 2018-01-13 VITALS
HEART RATE: 78 BPM | SYSTOLIC BLOOD PRESSURE: 130 MMHG | TEMPERATURE: 98.2 F | RESPIRATION RATE: 16 BRPM | BODY MASS INDEX: 23.55 KG/M2 | DIASTOLIC BLOOD PRESSURE: 70 MMHG | HEIGHT: 69 IN | WEIGHT: 159 LBS

## 2018-01-13 NOTE — PROGRESS NOTES
Surgery Scheduling Form   Pre-Operative Risk Assessment:      Date Last Seen: 1/10/2018      Date of Surgery: 1/18/18      Hospital: [facility]      Type of Surgery: Left scrotal orchiectomy      Surgeon Name: Dr Peterson Crowe Office Number: [phone] x [phone]  Fax Number: [phone]  Cardiac: No Cardiac Contraindication for planned surgical procedure      Anticoagulation: Yes, aspirin - ok to hold per surgeons instructions        Anesthesia: General      Patient Approved for Surgery: Yes      Clearing Doctor: Meli Bruner      Message    Date: 12 Jan 2018 3:30 PM EST, Recorded By: Rosenda Hernandez    Electronically signed by : Adam Baxter RN; Jan 12 2018  3:33PM EST                       (Author)     Electronically signed by : MARIBEL Gallagher ; Jan 12 2018  3:45PM EST                       (Author)

## 2018-01-14 VITALS
HEART RATE: 66 BPM | BODY MASS INDEX: 22.81 KG/M2 | SYSTOLIC BLOOD PRESSURE: 124 MMHG | DIASTOLIC BLOOD PRESSURE: 70 MMHG | HEIGHT: 69 IN | WEIGHT: 154 LBS

## 2018-01-14 VITALS
RESPIRATION RATE: 18 BRPM | DIASTOLIC BLOOD PRESSURE: 70 MMHG | BODY MASS INDEX: 22.81 KG/M2 | SYSTOLIC BLOOD PRESSURE: 114 MMHG | HEIGHT: 69 IN | HEART RATE: 84 BPM | TEMPERATURE: 98.5 F | WEIGHT: 154 LBS

## 2018-01-14 VITALS
HEIGHT: 69 IN | HEART RATE: 74 BPM | RESPIRATION RATE: 16 BRPM | TEMPERATURE: 98 F | SYSTOLIC BLOOD PRESSURE: 140 MMHG | BODY MASS INDEX: 22.66 KG/M2 | WEIGHT: 153 LBS | DIASTOLIC BLOOD PRESSURE: 80 MMHG

## 2018-01-15 NOTE — RESULT NOTES
Message   will d/w pt     Verified Results  (Q) LIPID PANEL WITH REFLEX TO DIRECT LDL 10KTV1960 06:46AM Orpha Less   REPORT COMMENT:  FASTING:YES     Test Name Result Flag Reference   CHOLESTEROL, TOTAL 270 mg/dL H 125-200   HDL CHOLESTEROL 48 mg/dL  > OR = 40   TRIGLICERIDES 457 mg/dL H <150   LDL-CHOLESTEROL 181 mg/dL (calc) H <130   Desirable range <100 mg/dL for patients with CHD or  diabetes and <70 mg/dL for diabetic patients with  known heart disease  CHOL/HDLC RATIO 5 6 (calc) H < OR = 5 0   NON HDL CHOLESTEROL 222 mg/dL (calc) H    Target for non-HDL cholesterol is 30 mg/dL higher than   LDL cholesterol target  (Q) HEMOGLOBIN A1c 11EUD0354 06:46AM Orpha Less   REPORT COMMENT:  FASTING:YES     Test Name Result Flag Reference   HEMOGLOBIN A1c 8 0 % of total Hgb H <5 7   According to ADA guidelines, hemoglobin A1c <7 0%  represents optimal control in non-pregnant diabetic  patients  Different metrics may apply to specific  patient populations  Standards of Medical Care in    Diabetes Care  2013;36:s11-s66     For the purpose of screening for the presence of  diabetes  <5 7%       Consistent with the absence of diabetes  5 7-6 4%    Consistent with increased risk for diabetes              (prediabetes)  >or=6 5%    Consistent with diabetes     This assay result is consistent with diabetes  mellitus  Currently, no consensus exists for use of hemoglobin  A1c for diagnosis of diabetes for children       (Q) MICROALBUMIN, RANDOM URINE (W/CREATININE) 49PEA7887 06:46AM Orpha Less   REPORT COMMENT:  FASTING:YES     Test Name Result Flag Reference   CREATININE, RANDOM URINE 155 mg/dL     MICROALBUMIN 1 0 mg/dL     Reference Range  Not established   MICROALBUMIN/CREATININE$RATIO, RANDOM URINE 6 mcg/mg creat  <30   The ADA defines abnormalities in albumin  excretion as follows:     Category         Result (mcg/mg creatinine)     Normal                    <30  Microalbuminuria    Clinical albuminuria   > OR = 300     The ADA recommends that at least two of three  specimens collected within a 3-6 month period be  abnormal before considering a patient to be  within a diagnostic category

## 2018-01-16 NOTE — RESULT NOTES
Message   A1C 7 2 almost at goal, send over f s log in 2 weeks      Verified Results  (1) COMPREHENSIVE METABOLIC PANEL 21KCU2641 58:79WX Kevin Graham     Test Name Result Flag Reference   GLUCOSE 140 mg/dL H 65-99   Fasting reference interval   UREA NITROGEN (BUN) 17 mg/dL  7-25   CREATININE 0 79 mg/dL  0 70-1 25   For patients >52years of age, the reference limit  for Creatinine is approximately 13% higher for people  identified as -American  eGFR NON-AFR  AMERICAN 92 mL/min/1 73m2  > OR = 60   eGFR AFRICAN AMERICAN 106 mL/min/1 73m2  > OR = 60   BUN/CREATININE RATIO   3-93   NOT APPLICABLE (calc)   SODIUM 138 mmol/L  135-146   POTASSIUM 4 4 mmol/L  3 5-5 3   CHLORIDE 101 mmol/L     CARBON DIOXIDE 29 mmol/L  20-31   CALCIUM 9 3 mg/dL  8 6-10 3   PROTEIN, TOTAL 7 0 g/dL  6 1-8 1   ALBUMIN 4 2 g/dL  3 6-5 1   GLOBULIN 2 8 g/dL (calc)  1 9-3 7   ALBUMIN/GLOBULIN RATIO 1 5 (calc)  1 0-2 5   BILIRUBIN, TOTAL 0 5 mg/dL  0 2-1 2   ALKALINE PHOSPHATASE 76 U/L     AST 16 U/L  10-35   ALT 13 U/L  9-46     (Q) HEMOGLOBIN A1c 10RZB3778 06:36AM Kevin Graham   REPORT COMMENT:  FASTING:YES     Test Name Result Flag Reference   HEMOGLOBIN A1c 7 2 % of total Hgb H <5 7   According to ADA guidelines, hemoglobin A1c <7 0%  represents optimal control in non-pregnant diabetic  patients  Different metrics may apply to specific  patient populations  Standards of Medical Care in  [phone]  Diabetes Care  2013;36:s11-s66     For the purpose of screening for the presence of  diabetes  <5 7%       Consistent with the absence of diabetes  5 7-6 4%    Consistent with increased risk for diabetes              (prediabetes)  >or=6 5%    Consistent with diabetes     This assay result is consistent with diabetes  mellitus  Currently, no consensus exists for use of hemoglobin  A1c for diagnosis of diabetes for children

## 2018-01-16 NOTE — MISCELLANEOUS
Provider Comments  Provider Comments:   pt was a no show for appt today      Signatures   Electronically signed by : Bassam Crowe, ; Jan 13 2017 11:15AM EST                       (Author)

## 2018-01-23 VITALS
BODY MASS INDEX: 23.7 KG/M2 | WEIGHT: 160 LBS | HEART RATE: 54 BPM | HEIGHT: 69 IN | SYSTOLIC BLOOD PRESSURE: 144 MMHG | DIASTOLIC BLOOD PRESSURE: 70 MMHG

## 2018-01-26 ENCOUNTER — TELEPHONE (OUTPATIENT)
Dept: FAMILY MEDICINE CLINIC | Facility: CLINIC | Age: 73
End: 2018-01-26

## 2018-02-08 ENCOUNTER — HOSPITAL ENCOUNTER (OUTPATIENT)
Dept: NEUROLOGY | Facility: AMBULATORY SURGERY CENTER | Age: 73
Discharge: HOME | End: 2018-02-08
Payer: MEDICARE

## 2018-02-08 DIAGNOSIS — G56.03 BILATERAL CARPAL TUNNEL SYNDROME: ICD-10-CM

## 2018-02-08 PROCEDURE — 95886 MUSC TEST DONE W/N TEST COMP: CPT | Performed by: PSYCHIATRY & NEUROLOGY

## 2018-02-08 PROCEDURE — 95911 NRV CNDJ TEST 9-10 STUDIES: CPT | Performed by: PSYCHIATRY & NEUROLOGY

## 2018-02-13 ENCOUNTER — TELEPHONE (OUTPATIENT)
Dept: FAMILY MEDICINE CLINIC | Facility: CLINIC | Age: 73
End: 2018-02-13

## 2018-02-13 NOTE — PROGRESS NOTES
Patient contacted, prefers to speak with Dr Maxine Juarez before making any decisions  Message sent to physician

## 2018-02-13 NOTE — TELEPHONE ENCOUNTER
Patient was called by Elva Oliveros with results of EMG  She explained that he should see Dr Chemo Hwang in regards to treatment  He would like to speak with you before making any decisions

## 2018-02-16 ENCOUNTER — TELEPHONE (OUTPATIENT)
Dept: CARDIOLOGY CLINIC | Facility: CLINIC | Age: 73
End: 2018-02-16

## 2018-02-16 NOTE — TELEPHONE ENCOUNTER
Pt's daughter called to let you know that the urologist has prescribed testosterone intramuscular injections for pt  Daughter wants to know if you think injections are safe from a cardiac standpoint  Please advise

## 2018-04-05 ENCOUNTER — OFFICE VISIT (OUTPATIENT)
Dept: FAMILY MEDICINE CLINIC | Facility: CLINIC | Age: 73
End: 2018-04-05
Payer: MEDICARE

## 2018-04-05 VITALS
HEART RATE: 72 BPM | RESPIRATION RATE: 14 BRPM | BODY MASS INDEX: 25.46 KG/M2 | WEIGHT: 162.2 LBS | DIASTOLIC BLOOD PRESSURE: 70 MMHG | SYSTOLIC BLOOD PRESSURE: 132 MMHG | HEIGHT: 67 IN | TEMPERATURE: 98.1 F

## 2018-04-05 DIAGNOSIS — E11.22 HYPERTENSION ASSOCIATED WITH STAGE 2 CHRONIC KIDNEY DISEASE DUE TO TYPE 2 DIABETES MELLITUS (HCC): Chronic | ICD-10-CM

## 2018-04-05 DIAGNOSIS — E55.9 VITAMIN D DEFICIENCY: ICD-10-CM

## 2018-04-05 DIAGNOSIS — N18.2 HYPERTENSION ASSOCIATED WITH STAGE 2 CHRONIC KIDNEY DISEASE DUE TO TYPE 2 DIABETES MELLITUS (HCC): Chronic | ICD-10-CM

## 2018-04-05 DIAGNOSIS — I12.9 HYPERTENSION ASSOCIATED WITH STAGE 2 CHRONIC KIDNEY DISEASE DUE TO TYPE 2 DIABETES MELLITUS (HCC): Chronic | ICD-10-CM

## 2018-04-05 DIAGNOSIS — G56.03 BILATERAL CARPAL TUNNEL SYNDROME: Primary | ICD-10-CM

## 2018-04-05 DIAGNOSIS — E11.9 TYPE 2 DIABETES MELLITUS WITHOUT COMPLICATION, WITHOUT LONG-TERM CURRENT USE OF INSULIN (HCC): ICD-10-CM

## 2018-04-05 LAB — SL AMB POCT HEMOGLOBIN AIC: ABNORMAL

## 2018-04-05 PROCEDURE — 99213 OFFICE O/P EST LOW 20 MIN: CPT | Performed by: FAMILY MEDICINE

## 2018-04-05 PROCEDURE — 83036 HEMOGLOBIN GLYCOSYLATED A1C: CPT | Performed by: FAMILY MEDICINE

## 2018-04-05 NOTE — PROGRESS NOTES
Jaida Ge [birth year] male MRN: [id number]    [facility] Medicine Follow-up Visit    ASSESSMENT/PLAN  Jaida Ge is a 81 y o  male with significant PmhX of DM type 2, HTN, HLDCAD w/ severe MIS s/p 12/16 bypass surgery w/ 4 vessel disease, 2006 drug eluting stent to the LAD presents to the office for     1)Bilateral carpal tunnel syndrome  - Patient continue to have symptoms of b/l hand weakness with pain  - 2/8 EMG consistent with B/L median neuropathy across the wrist (moderate on the right; moderate on the left)  - At this time in the office we had one sample cock up for left hand  Education given on usage at night time  Script given for right hand  - Referred to Dr Jos Larkin for steroid injections/ discussion     -     Ambulatory referral to Orthopedic Surgery; Future  -     Vitamin B12; Future  -     Cock Up Wrist Splint x1    2)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)  -BP at goal today 132/70  - Continue on current medications per cardiology  -     Basic metabolic panel; Future    3) Type 2 diabetes mellitus without complication, without long-term current use of insulin (HCC)  - A1c 7 2%  - Metformin 1000mg BID   - hx of sulfaureas with hypoglycemia  If A1c continues to increase will consider starting the patient on Januvia  - DM labs ordered  -     POCT hemoglobin A1c  -     Diabetic Shoe  -     Microalbumin / creatinine urine ratio  -     Lipid panel; Future    4) Vitamin D deficiency  - Repeat today  Will consider starting Vit d supplement if continue to be low  -     Vitamin D 25 hydroxy; Future        - Urology following: S/p Left Orchiectomy :  q 1 week Testerone  Disposition: Return to the clinic in 3 months    Future Appointments  Date Time Provider Tino Kumar   4/17/2018 11:00 AM Edwin Garces MD [facility]          SUBJECTIVE  CC: Follow-up; Diabetes; and Hypertension      HPI:  Jaida Ge is a 81 y o  male with significant PmhX of DM type 2, HTN, HLDCAD w/ severe MIS s/p 12/16 bypass surgery w/ 4 vessel disease, 2006 drug eluting stent to the LAD presents to the office for a 3 month follow up    Diabetes: Continues to take metformin 1000 mg bid  Has had a poor diet for the last month, given that he was in [place] on vacation  Patient just returned this week  The patient states that his Sugars have been 126-170s  He denies any hypoglycemic events  Denies any new visual concerns  HTN: Has upcoming appointment with Cardio  Currently has no CP/ SOB on exertion  B/L hand weakness: He states that the pain is worsening, and that he is having more numbness then before  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  When given the carpal tunnel dx  Patient was requesting to see a specialist for a cure given that it is interfering with his life  Review of Systems   Constitutional: Negative for activity change and appetite change  HENT: Negative for congestion and sore throat  Respiratory: Negative for cough, chest tightness and shortness of breath  Cardiovascular: Negative for chest pain  Gastrointestinal: Negative for abdominal distention and abdominal pain  Musculoskeletal: Negative for arthralgias and back pain  Skin: Negative for rash  Neurological: Positive for weakness (b/l handness) and numbness (b/l hands)  Negative for dizziness  All other systems reviewed and are negative        Historical Information   The patient history was reviewed as follows:    Past Medical History:   Diagnosis Date    AS (aortic stenosis)     Balanitis     Biceps tendonitis on right     CAD (coronary artery disease)     Cancer (HCC)     skin    Complete rotator cuff tear or rupture of right shoulder, not specified as traumatic     Diabetes mellitus ([facility] [address] )     Esophageal reflux     High cholesterol     Hypertension     Hypertriglyceridemia     Hypogonadism in male     Myalgia     Myocardial infarction     Palpitations  Shoulder pain     Sinus problem     Skin cancer of nose     Sleep apnea     Stroke ([facility] [address] 2047    Systolic murmur     recently found    Tinea cruris     Tinea pedis      Past Surgical History:   Procedure Laterality Date    CATARACT EXTRACTION Left     COLONOSCOPY      CORONARY ANGIOPLASTY WITH STENT PLACEMENT      CORONARY ARTERY BYPASS GRAFT N/A 12/12/2016    Procedure: INTRAOP CECILLE; BILATERAL LEG EVH; CORONARY ARTERY BYPASS GRAFT X 4 SVG TO PDA, OM1,  AND DIAGONAL1, LIMA TO LAD;  Surgeon: Rosalee Medina MD;  [facility];  Service:     EYE SURGERY      HERNIA REPAIR      NH ARTHROSCOPY SHOULDER SURGICAL BICEPS TENODESIS Right 5/6/2016    Procedure: ARTHROSCOPIC BICEPS TENODESIS;  Surgeon: Tanner eD Leon MD;  [facility];  Service: Orthopedics    NH SHLDR ARTHROSCOP,SURG,W/ROTAT CUFF REPR Right 5/6/2016    Procedure: REPAIR ROTATOR CUFF  ARTHROSCOPIC; SUBACROMIAL DECOMPRESSION; PARTIAL SYNOVECTOMY;  Surgeon: Tanner De Leon MD;  [facility];  Service: Orthopedics    UMBILICAL HERNIA REPAIR  2009    over age 13     Family History   Problem Relation Age of Onset    Heart disease Mother     Hypertension Mother     Nephrolithiasis Father     Other Father      Renal disease    Hypertension Sister     Nephrolithiasis Brother     Other Brother      Renal disease    Hematuria Family      Microscopic      Social History   History   Alcohol Use No     History   Drug Use No     History   Smoking Status    Never Smoker   Smokeless Tobacco    Never Used       Medications:     Current Outpatient Prescriptions:     acetaminophen (TYLENOL) 325 mg tablet, Take 1-2 tablets by mouth every 6 (six) hours as needed, Disp: , Rfl:     atorvastatin (LIPITOR) 80 mg tablet, daily  Atorvastatin Calcium 80 MG Oral Tablet TAKE 1 TABLET AT BEDTIME    Quantity: 90;  Refills: 1    Armond Rodriguez MD;  Started 28-Mar-2012 Active , Disp: , Rfl:     clorazepate (TRANXENE) 7 5 mg tablet, , Disp: , Rfl: 0    clotrimazole-betamethasone (LOTRISONE) 1-0 05 % cream, Apply topically 2 (two) times a day as needed, Disp: , Rfl:     dicyclomine (BENTYL) 20 mg tablet, Take 20 mg by mouth daily with breakfast, Disp: , Rfl:     fluticasone (FLONASE) 50 mcg/act nasal spray, 1 spray into each nostril daily, Disp: , Rfl:     glimepiride (AMARYL) 4 mg tablet, Take 1 tablet by mouth daily, Disp: , Rfl:     hydrocortisone 2 5 % cream, Apply topically 3 (three) times a day, Disp: , Rfl:     Insulin Glargine (LANTUS SOLOSTAR) 100 UNIT/ML SOPN, Inject 0 3 mL under the skin daily at bedtime, Disp: 1 pen, Rfl: 2    Insulin Pen Needle 32G X 4 MM MISC, Inject Lantus and Novolog as directed, Disp: 100 each, Rfl: 2    metFORMIN (GLUCOPHAGE) 500 mg tablet, , Disp: , Rfl: 0    metoprolol succinate (TOPROL-XL) 50 mg 24 hr tablet, , Disp: , Rfl: 0    metoprolol tartrate (LOPRESSOR) 25 mg tablet, Take 1 tablet by mouth every 12 (twelve) hours, Disp: 60 tablet, Rfl: 2    mirtazapine (REMERON) 15 mg tablet, , Disp: , Rfl: 0    omeprazole (PriLOSEC) 20 mg delayed release capsule, , Disp: , Rfl: 0    oxyCODONE-acetaminophen (PERCOCET) 5-325 mg per tablet, Take 1 tablet every 6 hours, as needed for moderate pain  Take 2 tablets every 6 hours, as needed for severe pain  Ongoing Therapy  , Disp: 60 tablet, Rfl: 0    tadalafil (CIALIS) 20 MG tablet, Take 20 mg by mouth daily as needed for erectile dysfunction, Disp: , Rfl:     tobramycin-dexamethasone (TOBRADEX) ophthalmic suspension, , Disp: , Rfl: 0    aspirin 325 mg tablet, Take 1 tablet by mouth daily for 30 days, Disp: 30 tablet, Rfl: 0    docusate sodium (COLACE) 100 mg capsule, Take 1 capsule by mouth 2 (two) times a day for 30 days, Disp: 60 capsule, Rfl: 0    ferrous sulfate 325 (65 Fe) mg tablet, Take 1 tablet by mouth daily with breakfast for 30 days, Disp: 30 tablet, Rfl: 0    insulin lispro (HumaLOG) 100 units/mL injection, Inject 8 Units under the skin 3 (three) times a day before meals for 30 days Dispense as quick pen, Disp: 7 2 mL, Rfl: 2    ondansetron (ZOFRAN) 4 mg tablet, Take 1 tablet by mouth every 8 (eight) hours as needed for nausea or vomiting for up to 30 days, Disp: 30 tablet, Rfl: 0    polyethylene glycol (GLYCOLAX) powder, Take 17 g by mouth daily for 30 days, Disp: 510 g, Rfl: 0    potassium chloride (K-DUR,KLOR-CON) 20 mEq tablet, Take 1 tablet by mouth daily for 7 days, Disp: 60 tablet, Rfl: 2    torsemide (DEMADEX) 20 mg tablet, Take 1 tablet by mouth daily for 7 days, Disp: 30 tablet, Rfl: 2  Allergies   Allergen Reactions    Epinephrine Hives and Anxiety    Penicillins Hives and Anxiety       OBJECTIVE    Vitals:   Vitals:    04/05/18 1356   BP: 132/70   BP Location: Left arm   Patient Position: Sitting   Cuff Size: Large   Pulse: 72   Resp: 14   Temp: 98 1 °F (36 7 °C)   TempSrc: Tympanic   Weight: 73 6 kg (162 lb 3 2 oz)   Height: 5' 7" (1 702 m)           Physical Exam   Constitutional: He is oriented to person, place, and time  He appears well-developed and well-nourished  HENT:   Head: Normocephalic and atraumatic  Eyes: Conjunctivae and EOM are normal  Pupils are equal, round, and reactive to light  Neck: Normal range of motion  Neck supple  Cardiovascular: Normal rate, regular rhythm, normal heart sounds and intact distal pulses  No murmur heard  Pulmonary/Chest: Effort normal and breath sounds normal  No respiratory distress  He has no wheezes  Abdominal: Soft  Bowel sounds are normal  He exhibits no distension  There is no tenderness  Musculoskeletal: Normal range of motion  He exhibits no edema    (+) Phalen test b/l   Neurological: He is alert and oriented to person, place, and time  Skin: Skin is warm and dry  No rash noted  Psychiatric: He has a normal mood and affect  His behavior is normal  Judgment and thought content normal    Vitals reviewed           Ciarra Champagne MD, PGY-3  [facility] Medicine   4/5/2018

## 2018-04-30 DIAGNOSIS — E11.9 TYPE 2 DIABETES MELLITUS WITHOUT COMPLICATION, WITHOUT LONG-TERM CURRENT USE OF INSULIN (HCC): Primary | ICD-10-CM

## 2018-05-08 DIAGNOSIS — I10 ESSENTIAL HYPERTENSION: Primary | ICD-10-CM

## 2018-07-02 ENCOUNTER — OFFICE VISIT (OUTPATIENT)
Dept: CARDIOLOGY CLINIC | Facility: CLINIC | Age: 73
End: 2018-07-02
Payer: MEDICARE

## 2018-07-02 VITALS
SYSTOLIC BLOOD PRESSURE: 156 MMHG | BODY MASS INDEX: 24.64 KG/M2 | HEART RATE: 44 BPM | DIASTOLIC BLOOD PRESSURE: 70 MMHG | WEIGHT: 157 LBS | HEIGHT: 67 IN

## 2018-07-02 DIAGNOSIS — N18.2 HYPERTENSION ASSOCIATED WITH STAGE 2 CHRONIC KIDNEY DISEASE DUE TO TYPE 2 DIABETES MELLITUS (HCC): Chronic | ICD-10-CM

## 2018-07-02 DIAGNOSIS — I25.10 CORONARY ARTERY DISEASE INVOLVING NATIVE CORONARY ARTERY OF NATIVE HEART WITHOUT ANGINA PECTORIS: Primary | ICD-10-CM

## 2018-07-02 DIAGNOSIS — I35.0 NON-RHEUMATIC AORTIC STENOSIS: ICD-10-CM

## 2018-07-02 DIAGNOSIS — I12.9 HYPERTENSION ASSOCIATED WITH STAGE 2 CHRONIC KIDNEY DISEASE DUE TO TYPE 2 DIABETES MELLITUS (HCC): Chronic | ICD-10-CM

## 2018-07-02 DIAGNOSIS — E11.22 HYPERTENSION ASSOCIATED WITH STAGE 2 CHRONIC KIDNEY DISEASE DUE TO TYPE 2 DIABETES MELLITUS (HCC): Chronic | ICD-10-CM

## 2018-07-02 PROCEDURE — 93000 ELECTROCARDIOGRAM COMPLETE: CPT | Performed by: INTERNAL MEDICINE

## 2018-07-02 PROCEDURE — 99214 OFFICE O/P EST MOD 30 MIN: CPT | Performed by: INTERNAL MEDICINE

## 2018-07-02 RX ORDER — ASPIRIN 81 MG/1
81 TABLET ORAL DAILY
COMMUNITY
End: 2018-07-06 | Stop reason: SDUPTHER

## 2018-07-02 RX ORDER — LOSARTAN POTASSIUM 25 MG/1
25 TABLET ORAL DAILY
Qty: 90 TABLET | Refills: 3 | Status: SHIPPED | OUTPATIENT
Start: 2018-07-02 | End: 2018-07-06 | Stop reason: SDUPTHER

## 2018-07-02 RX ORDER — METOPROLOL SUCCINATE 50 MG/1
50 TABLET, EXTENDED RELEASE ORAL DAILY
Refills: 0
Start: 2018-07-02 | End: 2018-07-06 | Stop reason: ALTCHOICE

## 2018-07-02 NOTE — PROGRESS NOTES
Cardiology Follow Up    Brisa Haywood  [birth year]  [id number]  [address] [facility]  [address] [place] [address] [address]    1  Coronary artery disease involving native coronary artery of native heart without angina pectoris     2  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)  losartan (COZAAR) 25 mg tablet    metoprolol succinate (TOPROL-XL) 50 mg 24 hr tablet   3  Non-rheumatic aortic stenosis         Discussion/Summary    1  CAD:  Without angina  Feels well  Continue current regimen  Lipid panel to be checked with upcoming labs with his PCP  2  Hypertension:  Blood pressure elevated in the office today  He is diabetic  I have started him on losartan 25 mg daily  3    Bradycardia:  Asymptomatic, but heart rates currently in the 40s  He is on 50 mg twice a day of Toprol-XL  I recommended decreasing this to 50 mg once a day and start him on losartan as noted above for better blood pressure control  Previous History:  History of coronary artery disease with MI in 2006 with a drug-eluting stent to the LAD  Subsequently with an MI in 2012 with stent to the RCA  December 2016, and other NSTEMI at that time multivessel disease and underwent 4 vessel bypass with LIMA to the LAD, vein grafts to the diagonal, OM1, PDA  Low-normal LV function with inferior wall motion abnormality  Mild aortic stenosis    Interval History:   comes today for regularly scheduled visit  Overall, he has been feeling quite well  He has undergone left orchiectomy  He is having  Testosterone injections  He was also given a prescription for Cialis, but has not started taking this because he wanted to check with us 1st whether this was safe  Has been feeling well  Denies any chest pain or shortness of breath  No dizziness or lightheadedness    No presyncope or syncope  Problem List     Biceps tendonitis on right    Complete rotator cuff tear or rupture of right shoulder, not specified as traumatic (Chronic)    NSTEMI (non-ST elevated myocardial infarction) ([place] [address] )    S/P drug eluting coronary stent placement (Chronic)    H/O non-ST elevation myocardial infarction (NSTEMI) (Chronic)    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) (Chronic)    Lab Results   Component Value Date    HGBA1C 7 2% 04/05/2018       No results for input(s): POCGLU in the last 72 hours  Blood Sugar Average: Last 72 hrs:          Polyarthralgia (Chronic)    Hematuria, microscopic (Chronic)    H/O tinea cruris (Chronic)    Erectile dysfunction associated with type 2 diabetes mellitus (HCC) (Chronic)    Lab Results   Component Value Date    HGBA1C 7 2% 04/05/2018       No results for input(s): POCGLU in the last 72 hours  Blood Sugar Average: Last 72 hrs: Anxiety    Type 2 diabetes mellitus ([place] [address] )    Lab Results   Component Value Date    HGBA1C 7 2% 04/05/2018       No results for input(s): POCGLU in the last 72 hours      Blood Sugar Average: Last 72 hrs:          Gastroesophageal reflux disease    Bilateral carpal tunnel syndrome    Non-rheumatic aortic stenosis    Coronary artery disease involving native coronary artery of native heart without angina pectoris        Past Medical History:   Diagnosis Date    AS (aortic stenosis)     Balanitis     Biceps tendonitis on right     CAD (coronary artery disease)     Cancer (HCC)     skin    Complete rotator cuff tear or rupture of right shoulder, not specified as traumatic     Diabetes mellitus ([place] [address] )     Esophageal reflux     High cholesterol     Hypertension     Hypertriglyceridemia     Hypogonadism in male     Myalgia     Myocardial infarction ([place] [address] )     Palpitations     Shoulder pain     Sinus problem     Skin cancer of nose     Sleep apnea     Stroke ([place] [address] ) 2006    Systolic murmur     recently found    Tinea cruris     Tinea pedis      Social History     Social History    Marital status: /Civil Union     Spouse name: N/A    Number of children: N/A    Years of education: N/A     Occupational History    Not on file       Social History Main Topics    Smoking status: Never Smoker    Smokeless tobacco: Never Used    Alcohol use No    Drug use: No    Sexual activity: Not on file     Other Topics Concern    Not on file     Social History Narrative    Uses [employer]      Family History   Problem Relation Age of Onset    Heart disease Mother     Hypertension Mother     Nephrolithiasis Father     Other Father         Renal disease    Hypertension Sister     Nephrolithiasis Brother     Other Brother         Renal disease    Hematuria Family         Microscopic     Past Surgical History:   Procedure Laterality Date    CATARACT EXTRACTION Left     COLONOSCOPY      CORONARY ANGIOPLASTY WITH STENT PLACEMENT      CORONARY ARTERY BYPASS GRAFT N/A 12/12/2016    Procedure: INTRAOP CECILLE; BILATERAL LEG EVH; CORONARY ARTERY BYPASS GRAFT X 4 SVG TO PDA, OM1,  AND DIAGONAL1, LIMA TO LAD;  Surgeon: Carline Askew MD;  [facility];  Service:     EYE SURGERY      HERNIA REPAIR      CT ARTHROSCOPY SHOULDER SURGICAL BICEPS TENODESIS Right 5/6/2016    Procedure: ARTHROSCOPIC BICEPS TENODESIS;  Surgeon: Dorys Pedraza MD;  [facility];  Service: Orthopedics    CT SHLDR ARTHROSCOP,SURG,W/ROTAT CUFF REPR Right 5/6/2016    Procedure: REPAIR ROTATOR CUFF  ARTHROSCOPIC; SUBACROMIAL DECOMPRESSION; PARTIAL SYNOVECTOMY;  Surgeon: Dorys Pedraza MD;  [facility];  Service: Orthopedics    UMBILICAL HERNIA REPAIR  2009    over age 13       Current Outpatient Prescriptions:     acetaminophen (TYLENOL) 325 mg tablet, Take 1-2 tablets by mouth every 6 (six) hours as needed, Disp: , Rfl:     aspirin (ECOTRIN LOW STRENGTH) 81 mg EC tablet, Take 81 mg by mouth daily, Disp: , Rfl:     atorvastatin (LIPITOR) 80 mg tablet, daily  Atorvastatin Calcium 80 MG Oral Tablet TAKE 1 TABLET AT BEDTIME  Quantity: 90;  Refills: 1    Orpha Ayanna KO;  Started 28-Mar-2012 Active , Disp: , Rfl:     clorazepate (TRANXENE) 7 5 mg tablet, , Disp: , Rfl: 0    clotrimazole-betamethasone (LOTRISONE) 1-0 05 % cream, Apply topically 2 (two) times a day as needed, Disp: , Rfl:     hydrocortisone 2 5 % cream, Apply topically 3 (three) times a day, Disp: , Rfl:     metFORMIN (GLUCOPHAGE) 500 mg tablet, take 2 tablets by mouth twice a day with meals, Disp: 360 tablet, Rfl: 3    metoprolol succinate (TOPROL-XL) 50 mg 24 hr tablet, Take 1 tablet (50 mg total) by mouth daily, Disp: , Rfl: 0    omeprazole (PriLOSEC) 20 mg delayed release capsule, , Disp: , Rfl: 0    ONE TOUCH ULTRA TEST test strip, , Disp: , Rfl: 0    ONETOUCH DELICA LANCETS FINE MISC, , Disp: , Rfl: 1    tobramycin-dexamethasone (TOBRADEX) ophthalmic suspension, , Disp: , Rfl: 0    fluticasone (FLONASE) 50 mcg/act nasal spray, 1 spray into each nostril daily, Disp: , Rfl:     losartan (COZAAR) 25 mg tablet, Take 1 tablet (25 mg total) by mouth daily, Disp: 90 tablet, Rfl: 3    mirtazapine (REMERON) 15 mg tablet, , Disp: , Rfl: 0    tadalafil (CIALIS) 20 MG tablet, Take 20 mg by mouth daily as needed for erectile dysfunction, Disp: , Rfl:   Allergies   Allergen Reactions    Epinephrine Hives and Anxiety    Penicillins Hives and Anxiety       Vitals:    07/02/18 0752   BP: 156/70   BP Location: Right arm   Patient Position: Sitting   Cuff Size: Standard   Pulse: (!) 44   Weight: 71 2 kg (157 lb)   Height: 5' 7" (1 702 m)     Vitals:    07/02/18 0752   Weight: 71 2 kg (157 lb)      Height: 5' 7" (170 2 cm)   Body mass index is 24 59 kg/m²      Physical Exam:   GENERAL: Alert, well appearing, and in no distress  HEENT:  PERRL, EOMI, no scleral icterus, no conjunctival pallor  NECK:  Supple, No elevated JVP, no thyromegaly, no carotid bruits  HEART:  Bradycardic, S1, S2  2/6 MARTHA  LUNGS:  Clear to auscultation bilaterally  ABDOMEN:  Soft, non-tender, positive bowel sounds, no rebound or guarding  EXTREMITIES:  No edema  VASCULAR:  Normal pedal pulses   NEURO: No focal deficits,  SKIN: Normal without suspicious lesions on exposed skin    ROS:  Positive for erectile dysfunction, numbness and weakness in hands,   Except as noted in HPI, is otherwise reviewed in detail and a 12 point review of systems is negative  Labs:  Lab Results   Component Value Date     08/01/2017    K 4 6 08/01/2017     08/01/2017    CREATININE 0 93 08/01/2017    BUN 20 08/01/2017    CO2 31 08/01/2017    ALT 30 08/01/2017    AST 25 08/01/2017    INR 1 40 (H) 12/12/2016    HGBA1C 7 2% 04/05/2018    WBC 10 30 (H) 12/22/2016    HGB 12 4 12/22/2016    HCT 37 9 12/22/2016     12/22/2016       Lab Results   Component Value Date    CHOL 117 (L) 03/17/2017    CHOL 183 12/06/2016    CHOL 270 (H) 03/09/2016     Lab Results   Component Value Date    LDLCALC 95 12/06/2016     Lab Results   Component Value Date    HDL 48 03/17/2017    HDL 55 12/06/2016    HDL 48 03/09/2016     Lab Results   Component Value Date    TRIG 74 03/17/2017    TRIG 166 (H) 12/06/2016    TRIG 203 (H) 03/09/2016       Testing:  Echo 12/2016:  LEFT VENTRICLE:  Systolic function was at the lower limits of normal  Ejection fraction was  estimated to be 53 %  There was severe hypokinesis of the basal inferior and  basal inferolateral wall(s)  Wall thickness was mildly to moderately increased  There was mild concentric hypertrophy      RIGHT VENTRICLE:  The size was at the upper limits of normal      LEFT ATRIUM:  The atrium was mildly dilated      MITRAL VALVE:  There was mild regurgitation      AORTIC VALVE:  There was mild stenosis  There was mild regurgitation  Systolic area of 1 7 cm squared by planimetry    Estimated aortic valve area (by VTI) was 1 97 cm squared  Aortic valve obstructive index (by Vmax) was 0 5  Estimated aortic valve area (by Vmax) was 1 73 cm squared      TRICUSPID VALVE:  There was trace regurgitation  EKG:    Sinus bradycardia  44 beats per minute

## 2018-07-03 LAB
25(OH)D3+25(OH)D2 SERPL-MCNC: 33.2 NG/ML (ref 30–100)
AMBIG ABBREV DEFAULT: NORMAL
AMBIG ABBREV DEFAULT: NORMAL
BUN SERPL-MCNC: 13 MG/DL (ref 8–27)
BUN/CREAT SERPL: 17 (ref 10–24)
CALCIUM SERPL-MCNC: 9.4 MG/DL (ref 8.6–10.2)
CHLORIDE SERPL-SCNC: 98 MMOL/L (ref 96–106)
CHOLEST SERPL-MCNC: 165 MG/DL (ref 100–199)
CO2 SERPL-SCNC: 25 MMOL/L (ref 20–29)
CREAT SERPL-MCNC: 0.75 MG/DL (ref 0.76–1.27)
GLUCOSE SERPL-MCNC: 143 MG/DL (ref 65–99)
HDLC SERPL-MCNC: 58 MG/DL
LDLC SERPL CALC-MCNC: 87 MG/DL (ref 0–99)
POTASSIUM SERPL-SCNC: 4.6 MMOL/L (ref 3.5–5.2)
SL AMB EGFR AFRICAN AMERICAN: 107 ML/MIN/1.73
SL AMB EGFR NON AFRICAN AMERICAN: 92 ML/MIN/1.73
SL AMB VLDL CHOLESTEROL CALC: 20 MG/DL (ref 5–40)
SODIUM SERPL-SCNC: 138 MMOL/L (ref 134–144)
TRIGL SERPL-MCNC: 99 MG/DL (ref 0–149)
VIT B12 SERPL-MCNC: 784 PG/ML (ref 232–1245)

## 2018-07-06 ENCOUNTER — OFFICE VISIT (OUTPATIENT)
Dept: FAMILY MEDICINE CLINIC | Facility: CLINIC | Age: 73
End: 2018-07-06
Payer: MEDICARE

## 2018-07-06 VITALS
TEMPERATURE: 98 F | SYSTOLIC BLOOD PRESSURE: 118 MMHG | HEIGHT: 66 IN | DIASTOLIC BLOOD PRESSURE: 76 MMHG | WEIGHT: 157.2 LBS | HEART RATE: 76 BPM | BODY MASS INDEX: 25.26 KG/M2 | RESPIRATION RATE: 16 BRPM

## 2018-07-06 DIAGNOSIS — G47.00 INSOMNIA, UNSPECIFIED TYPE: ICD-10-CM

## 2018-07-06 DIAGNOSIS — I12.9 HYPERTENSION ASSOCIATED WITH STAGE 2 CHRONIC KIDNEY DISEASE DUE TO TYPE 2 DIABETES MELLITUS (HCC): Chronic | ICD-10-CM

## 2018-07-06 DIAGNOSIS — E11.22 HYPERTENSION ASSOCIATED WITH STAGE 2 CHRONIC KIDNEY DISEASE DUE TO TYPE 2 DIABETES MELLITUS (HCC): Chronic | ICD-10-CM

## 2018-07-06 DIAGNOSIS — K21.9 GASTROESOPHAGEAL REFLUX DISEASE, ESOPHAGITIS PRESENCE NOT SPECIFIED: ICD-10-CM

## 2018-07-06 DIAGNOSIS — N18.2 HYPERTENSION ASSOCIATED WITH STAGE 2 CHRONIC KIDNEY DISEASE DUE TO TYPE 2 DIABETES MELLITUS (HCC): Chronic | ICD-10-CM

## 2018-07-06 DIAGNOSIS — G56.03 BILATERAL CARPAL TUNNEL SYNDROME: Primary | ICD-10-CM

## 2018-07-06 DIAGNOSIS — E11.9 TYPE 2 DIABETES MELLITUS WITHOUT COMPLICATION, WITHOUT LONG-TERM CURRENT USE OF INSULIN (HCC): ICD-10-CM

## 2018-07-06 DIAGNOSIS — J30.9 ALLERGIC RHINITIS, UNSPECIFIED SEASONALITY, UNSPECIFIED TRIGGER: ICD-10-CM

## 2018-07-06 DIAGNOSIS — E78.5 HYPERLIPIDEMIA, UNSPECIFIED HYPERLIPIDEMIA TYPE: ICD-10-CM

## 2018-07-06 LAB
CREAT UR-MCNC: 107 MG/DL
MICROALBUMIN UR-MCNC: 6.8 MG/L (ref 0–20)
MICROALBUMIN/CREAT 24H UR: 6 MG/G CREATININE (ref 0–30)
SL AMB POCT HEMOGLOBIN AIC: 7.7

## 2018-07-06 PROCEDURE — 99213 OFFICE O/P EST LOW 20 MIN: CPT | Performed by: FAMILY MEDICINE

## 2018-07-06 PROCEDURE — 82043 UR ALBUMIN QUANTITATIVE: CPT | Performed by: FAMILY MEDICINE

## 2018-07-06 PROCEDURE — 82570 ASSAY OF URINE CREATININE: CPT | Performed by: FAMILY MEDICINE

## 2018-07-06 PROCEDURE — 83036 HEMOGLOBIN GLYCOSYLATED A1C: CPT | Performed by: FAMILY MEDICINE

## 2018-07-06 RX ORDER — MIRTAZAPINE 15 MG/1
15 TABLET, FILM COATED ORAL
Qty: 90 TABLET | Refills: 0 | Status: SHIPPED | OUTPATIENT
Start: 2018-07-06 | End: 2018-09-23 | Stop reason: SDUPTHER

## 2018-07-06 RX ORDER — GLIMEPIRIDE 4 MG/1
TABLET ORAL
Refills: 0 | COMMUNITY
Start: 2018-06-29 | End: 2018-09-03 | Stop reason: SDUPTHER

## 2018-07-06 RX ORDER — OMEPRAZOLE 20 MG/1
20 CAPSULE, DELAYED RELEASE ORAL DAILY
Qty: 28 CAPSULE | Refills: 0 | Status: SHIPPED | OUTPATIENT
Start: 2018-07-06 | End: 2018-09-23 | Stop reason: SDUPTHER

## 2018-07-06 RX ORDER — LOSARTAN POTASSIUM 25 MG/1
25 TABLET ORAL DAILY
Qty: 90 TABLET | Refills: 0 | Status: SHIPPED | OUTPATIENT
Start: 2018-07-06 | End: 2018-09-23 | Stop reason: SDUPTHER

## 2018-07-06 RX ORDER — ASPIRIN 81 MG/1
81 TABLET ORAL DAILY
Qty: 90 TABLET | Refills: 0 | Status: SHIPPED | OUTPATIENT
Start: 2018-07-06 | End: 2018-08-27 | Stop reason: SDUPTHER

## 2018-07-06 RX ORDER — FLUTICASONE PROPIONATE 50 MCG
1 SPRAY, SUSPENSION (ML) NASAL DAILY
Qty: 16 G | Refills: 0 | Status: SHIPPED | OUTPATIENT
Start: 2018-07-06 | End: 2018-08-27 | Stop reason: SDUPTHER

## 2018-07-06 RX ORDER — ATORVASTATIN CALCIUM 80 MG/1
80 TABLET, FILM COATED ORAL DAILY
Qty: 90 TABLET | Refills: 0 | Status: SHIPPED | OUTPATIENT
Start: 2018-07-06 | End: 2018-09-23 | Stop reason: SDUPTHER

## 2018-07-06 NOTE — PATIENT INSTRUCTIONS
Diabetes tipo 2 en adultos   CUIDADO AMBULATORIO:   La diabetes tipo 2  es barron enfermedad que afecta la forma en que el cuerpo utiliza la glucosa (azúcar)  Generalmente, cuando el nivel de azúcar en la bren[place], el páncreas produce más insulina  La insulina ayuda al cuerpo a extraer el azúcar de la brennon con el fin de usarla seth daria de [place]  La diabetes mellitus tipo 2 se desarrolla ya sea porque el cuerpo no puede producir suficiente insulina, o es incapaz de usarla adecuadamente  Después de [employer], li páncreas podría dejar de producir insulina  Los síntomas más comunes incluyen los siguientes:   · Más hambre o sed de lo usual     · Orina con frecuencia     · Pérdida de peso involuntaria     · Visión borrosa  Llame al 911 en fouzia de presentar alguno de los siguientes:   · Usted tiene alguno de los siguientes signos de derrame cerebral:      ¨ Adormecimiento o caída de un lado de li joanne     ¨ Debilidad en un brazo o barron pierna    ¨ Confusión o debilidad para hablar    ¨ Mareos o dolor de sharath intenso, o pérdida de la visión  · Usted tiene alguno de los siguientes signos de un ataque cardíaco:      ¨ Estornudos, presión, o dolor en li pecho que dura mas de 5 minutos o regresa  ¨ Malestar o dolor en li espalda, loraine, mandíbula, abdomen, o brazo     ¨ Dificultad para respirar    ¨ Náuseas o vómito    ¨ Siente un desvanecimiento o tiene sudores fríos especialmente en el pecho o dificultad para respirar  Busque atención médica de inmediato si:   · Usted tiene dolor abdominal intenso, o el dolor se extiende a li espalda  Es posible que también esté vomitando  · Usted tiene dificultad para permanecer despierto o concentrarse  · Usted está temblando o sudando  · Usted tiene visión borrosa o doble  · Li aliento huele a fruta o komal  · Li respiración es profunda y [place], o rápida y superficial      · Li ritmo cardíaco es acelerado y débil    Pregúntele a li [employer] vitaminas y minerales son adecuados para usted  · Tiene vómitos o diarrea  · Tiene malestar estomacal y no puede ingerir los alimentos de li plan de comidas  · Usted se siente débil o más cansado de lo habitual      · Usted tiene [place], esthela de [place] o se irrita con facilidad  · Li piel está lavon, tibia, seca o inflamada  · Usted tiene barron herida que no cicatriza  · Usted tiene entumecimiento en los brazos o piernas  · Usted tiene problemas para sobrellevar li enfermedad o se siente ansioso o deprimido  · Usted tiene preguntas o inquietudes acerca de li condición o cuidado  El tratamiento para la diabetes tipo 2  incluye el mantener el azúcar en la brennon a un nivel normal  Usted debe comer los alimentos adecuados y ejercitarse regularmente  Es posible que necesite medicamentos si no puede controlar li nivel de azúcar en la brennon con nutrición y ejercicios  Es posible que también necesite un medicamento para prevenir enfermedades del corazón, barron complicación de la diabetes tipo 2  Es posible que usted necesite alguno de los siguientes:  · Pueden recetarse medicamentos hipoglucémicos o la insulina  se puede administrar para disminuir la cantidad de azúcar en li brennon  · Medicamentos para la presión arterial  podrían administrarse para disminuir li presión arterial  Li presión arterial debería estar a menos de 140/90  · El medicamento para disminuir el colesterol  podría administrarse para evitar barron enfermedad cardíaca  · Los medicamentos antiplaquetarios , seth la aspirina, [facility] a prevenir coágulos de [place]  [place] antiplaquetarios exactamente seth se le haya indicado  Estos medicamentos podrían causar mas probabilidad para sangrado y para desarrollar moretones  Si le murphy indicado el uso de aspirina, no tome acetaminofén o ibuprofeno en li lugar  · [place] medicamentos seth se le haya indicado    Consulte con li médico si usted bernard que li medicamento no le está ayudando o si presenta efectos secundarios  Infórmele si es alérgico a cualquier medicamento  Mantenga barron lista actualizada de los [place], las vitaminas y los productos herbales que jameson  Incluya los siguientes datos de los medicamentos: cantidad, frecuencia y motivo de administración  Traiga con usted la lista o los envases de la píldoras a quinton citas de seguimiento  Lleve la lista de los medicamentos con usted en fouzia de barron emergencia  Revise li nivel de azúcar en la brennon  A usted le enseñarán cómo revisar barron pequeña gota de brennon en un monitor de glucosa (glucómetro)  Usted tendrá que revisar li nivel de azúcar en la brennon por lo menos 3 veces al día si está usando insulina  Pregunte a li médico cuándo y con qué frecuencia revisarlo monica el día  Si usted revisa li nivel de azúcar en la brennon  antes de barron comida , debe estar entre 80 y 130 mg/dL  Si usted revisa li nivel de azúcar en la brennon de 1 a 2 horas  después de barron comida , debe estar por debajo de 180 mg/dL  Pregúntele a li médico si estas son buenas metas para usted  Anote quinton resultados y muéstreselos a li médico  Él podría usar los resultados para hacer cambios en quinton medicamentos, alimentación o programa de ejercicios  En fouzia que li nivel de azúcar esté demasiado bajo: Li nivel de azúcar en brennon está demasiado bajo si llega a menos de 70 mg/dL  Si el nivel es demasiado bajo, coma o rekha 15 gramos de carbohidratos de acción rápida  Estos se encuentran naturalmente en las frutas  Los carbohidratos de acción rápida aumentarán li nivel de azúcar en la brennon enseguida  Ejemplos de 15 gramos de carbohidrato de acción rápida son 4 onzas (1/2 taza) de jugo de fruta o 4 onzas de gaseosa regular  Otros ejemplos son 2 cucharadas de pasas de uva o entre 3 y 4 tabletas de glucosa  Revise li nivel de azúcar en la brennon 15 minutos más tarde   Si el nivel es todavía bajo (menos de 100 mg/dL), ingiera otros 15 gramos de carbohidratos  Cuando el nivel vuelva a 100 mg/dL, coma un refrigerio o alimento que contenga carbohidratos  [place] ayudará a evitar otra caída de li nivel de azúcar en la brennon  Siempre siga cuidadosamente las instrucciones de li médico acerca de seth tratar los niveles bajos de azúcar en la brennon  Revise quinton pies todos los días para reese si tienen llagas:  Use zapatos y calcetines que le queden [place]  No se recorte las [place] [employer]  Consulte con li médico para obtener más información acerca del cuidado de los pies  Siga li plan de comidas:  Un dietista le ayudará a diseñar un plan de alimentación para mantener estable li nivel de azúcar en la brennon  No se salte ninguna comida  Li nivel de azúcar en la brennon puede bajar demasiado si usted ha tomado medicamento para la diabetes y no ha comido  · Mantenga un registro de los carbohidratos (alimentos dulces o de almidón)  Li nivel de azúcar en la brennon puede aumentar demasiado si usted consume demasiados carbohidratos  Li dietista puede ayudarlo a diseñar comidas y meriendas que tengan la cantidad [place] de carbohidratos  · Consuma alimentos bajos en grasa , seth por ejemplo presas de kadeem sin piel y [place] baja en grasa  · Sawyer menos sodio (sal)  Limite los alimentos altos en sodio seth por ejemplo la salsa de soya, rito tostadas y sopas  No añada sal a la comida que usted prepare  Restrinja el uso de sal de [place]  Usted debe consumir menos de 2,300 mg de sodio por día  · Consuma alimentos altos en fibra seth verduras, pan integral y frijoles  · Limite el consumo de alcohol  El alcohol afecta li azúcar en la brennon y puede hacer más difícil el control de li diabetes  Limite el consumo de alcohol a 1 trago por día si usted es rayshawn  Si usted es hombre, limite el consumo de alcohol a 2 tragos por día  Un trago equivale a 12 onzas de cerveza, 5 onzas de vino o 1 onza y ½ de licor    Mantenga un peso saludable:  Consulte con li médico cuánto debería pesar  Un peso saludable puede ayudarlo a controlar li diabetes  Solicite a li médico que le ayude a crear un plan para perder peso de barron forma bernard si usted tiene sobrepeso  Juntos podrán fijar metas de pérdida de peso alcanzables  Ejercítese según indicaciones:  La actividad física puede ayudarlo a mantener el nivel de glucosa en la brennon estable, disminuir li riesgo de insuficiencia cardíaca y Thurnell Richchris a bajar de [place]  Abida Sid estiramiento antes y después de [address] [place] [employer]  Realice barron actividad física al menos 150 minutos cada semana  Reparta esta cantidad de ejercicio monica al menos 3 días a la semana  No deje de ejercitar por más de 2 días seguidos  Incluya ejercicios para fortalecer los músculos de 2 a 3 días a la semana  Los [employer] deben incluir entrenamiento del equilibrio de 2 a 3 veces a la semana  Actividades que ayudan a aumentar el equilibrio incluyen yoga y anny chi  Colabore con li médico para elaborar un plan de ejercicios  · Revise li nivel de azúcar en la brennon antes y después de hacer barron [place]  Puede que los [employer] sugieran que cambie la cantidad de insulina que usted jameson o los alimentos que consume  Si li nivel de azúcar en la brennon es alto, revise li brennon u [facility] para reese si hay cetonas y hágalo antes de ejercitarse  No arleen ejercicio si li nivel de azúcar en la brennon es alto y usted tiene cetonas  · Si li nivel de azúcar en la brennon está por debajo de los 100 mg/dL  , coma barron merienda con carbohidratos antes de hacer ejercicio  Por ejemplo 4 a 6 galletas de soda, 1/2 banano, 1 taza (8 onzas) de [place], medio vaso (4 onzas) de jugo  Pala agua o líquidos que no contengan azúcar antes, monica y [address]  Pregúntele a li dietista o médico cuáles líquidos debería kavno cuando se ejercite  · No se siente por más de 30 minutos  Si usted no puede caminar, al [employer]   Peter le ayudará a permanecer activo y mantener la circulación sanguínea  No fume:  La nicotina puede perjudicar los vasos sanguíneos e impedir que pueda controlar li diabetes  Pida información a li médico si usted actualmente fuma y necesita ayuda para dejar de fumar  No use cigarrillos electrónicos o tabaco sin humo en vez de cigarrillos o para tratar de dejar de fumar  Todos estos aún contienen nicotina  Tómese la presión arterial charlette seth le indicaron:  Pregúntele a li médico cuál debería ser li presión arterial  La mayoría de los adultos con diabetes y presión arterial chandler deben tener barron presión arterial sistólica (primer número) inferior a 140  La presión arterial diastólica (murray número) debe ser inferior a 90  Use identificación de alerta médica:  Use un brazalete o collar de alerta médica o lleve consigo barron tarjeta que indique que tiene diabetes  Pregúntele a li médico dónde conseguir estos artículos  Pregunte sobre las vacunas:  Usted corre un mayor riesgo de presentar enfermedades graves si usted contrae la gripe, neumonía o hepatitis  Pregúntele a li médico si usted debe ponerse la vacuna contra la gripe, neumonía o hepatitis B y cuándo debe vacunarse  Acuda a quinton consultas de control con li médico según le indicaron  Necesitará regresar para que le realicen el examen de hemoglobina A1c cada 3 meses  Tendrá que regresar por lo menos barron vez al año para que le revisen los pies  Necesitará de un examen de la vista barron vez al año para revisar si tiene retinopatía  También necesitará exámenes de orina anuales para revisar si hay problemas renales  Es posible que usted necesite más exámenes para determinar si hay enfermedad cardíaca seth un electrocardiograma, un examen de estrés, monitoreo de li presión arterial y exámenes de [place]  Anote quinton preguntas para que se acuerde de hacerlas monica quinton visitas     © 2017 [address] Information is for End User's use only and may not be sold, redistributed or otherwise used for commercial purposes  All illustrations and images included in CareNotes® are the copyrighted property of A D A M , Inc  or Dustin Lamar  Esta información es sólo para uso en educación  Li intención no es darle un consejo médico sobre enfermedades o tratamientos  Colsulte con li Shahrzad Every farmacéutico antes de seguir cualquier régimen médico para saber si es seguro y efectivo para usted

## 2018-07-06 NOTE — PROGRESS NOTES
Chet Retana [birth year] male MRN: [id number]    Family Medicine Follow-up Visit    ASSESSMENT/PLAN  Diagnoses and all orders for this visit:    Bilateral carpal tunnel syndrome  -     Cock Up Wrist Splint: Ordered B/L wrist splints per patient request    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)  -     metoprolol tartrate (LOPRESSOR) 25 mg tablet; Take 1 tablet (25 mg total) by mouth every 12 (twelve) hours for 90 days  -     losartan (COZAAR) 25 mg tablet; Take 1 tablet (25 mg total) by mouth daily  -     aspirin (ECOTRIN LOW STRENGTH) 81 mg EC tablet; Take 1 tablet (81 mg total) by mouth daily for 90 days    Type 2 diabetes mellitus without complication, without long-term current use of insulin ([facility])  -     POCT hemoglobin A1c: 7 7%, fair control, discussed diet improvements to lower A1C  -     Microalbumin / creatinine urine ratio  -     ONETOUCH DELICA LANCETS FINE MISC; Test blood sugar twice weekly, or as needed when symptomatic of hypoglycemia or hyperglycemia  -     ONE TOUCH ULTRA TEST test strip; Test blood sugar twice weekly, or as needed when symptomatic of hypoglycemia or hyperglycemia  -     metFORMIN (GLUCOPHAGE) 500 mg tablet; Take 1 tablet (500 mg total) by mouth 2 (two) times a day with meals for 90 days  -     glimepiride (AMARYL) 4 mg tablet; TAKE 1/2 TABLET BY MOUTH IN THE MORNING AND 1 TABLET AT NIGHT  -     Medicare DM supplies paperwork completed today and faxed to number listed on forms    Allergic rhinitis, unspecified seasonality, unspecified trigger  -     fluticasone (FLONASE) 50 mcg/act nasal spray; 1 spray into each nostril daily    Anxiety, Insomnia  -     mirtazapine (REMERON) 15 mg tablet; Take 1 tablet (15 mg total) by mouth daily at bedtime for 90 days    Gastroesophageal reflux disease, esophagitis presence not specified  -     omeprazole (PriLOSEC) 20 mg delayed release capsule;  Take 1 capsule (20 mg total) by mouth daily for 28 days    Hyperlipidemia, unspecified hyperlipidemia type  -     atorvastatin (LIPITOR) 80 mg tablet; Take 1 tablet (80 mg total) by mouth daily for 90 days    Follow up in 3 months  Future Appointments  Date Time Provider Tino Kumar   7/11/2018 2:30 PM Blessing Sullivan MD [facility]   10/8/2018 1:00 PM Rah Mae MD [facility]          SUBJECTIVE  CC: Follow-up (pt  in office for well visit and to have paperwork fill out)      HPI:  Torsten Nunn is a 72 y o  male who presents for paperwork completion for DM supplies, as well as follow up on blood pressure  Also follows with Cardiology for CAD, HTN, bradycardia  Today, BP is 118/76, HR 76, says he is currently taking metoprolol 25 mg BID (although Cardio note 7/2/18 says toprol XL 50 mg QD), cozaar 25 mg QD  Also has T2DM with metformin 500 mg BID, glimepride 4 mg tablet (2 mg in am, 4 mg QHS), no insulin, BS in am usually running in 130s, HS BS around 160s, last HbA1c 7 2% on 4/5/18, needs repeat today  Otherwise, had labs done 6/30/18 showing T  Chol 165, TG 99, HDL 58, LDL 87, Cr 0 75,   No other complaints, feels well  Asks for wrist braces for his carpal tunnel syndrome that Dr Fara Martin previously prescribed  Defers colonoscopy today  Review of Systems   Constitutional: Negative for activity change, appetite change, fatigue and fever  HENT: Negative for congestion  Eyes: Negative for visual disturbance  Respiratory: Negative for cough, chest tightness and shortness of breath  Cardiovascular: Negative for chest pain, palpitations and leg swelling  Gastrointestinal: Negative for abdominal pain, blood in stool, diarrhea, nausea and vomiting  Genitourinary: Negative for decreased urine volume and dysuria  Musculoskeletal: Negative for joint swelling  Neurological: Negative for dizziness, syncope, weakness, light-headedness and headaches     All other systems reviewed and are negative        Historical Information   The patient history was reviewed as follows:    Past Medical History:   Diagnosis Date    AS (aortic stenosis)     Balanitis     Biceps tendonitis on right     CAD (coronary artery disease)     Cancer (HCC)     skin    Complete rotator cuff tear or rupture of right shoulder, not specified as traumatic     Diabetes mellitus [facility]     Esophageal reflux     High cholesterol     Hypertension     Hypertriglyceridemia     Hypogonadism in male     Myalgia     Myocardial infarction ([facility]     Palpitations     Shoulder pain     Sinus problem     Skin cancer of nose     Sleep apnea     Stroke ([facility] [address] ) 1627    Systolic murmur     recently found    Tinea cruris     Tinea pedis      Past Surgical History:   Procedure Laterality Date    CATARACT EXTRACTION Left     COLONOSCOPY      CORONARY ANGIOPLASTY WITH STENT PLACEMENT      CORONARY ARTERY BYPASS GRAFT N/A 12/12/2016    Procedure: INTRAOP CECILLE; BILATERAL LEG EVH; CORONARY ARTERY BYPASS GRAFT X 4 SVG TO PDA, OM1,  AND DIAGONAL1, LIMA TO LAD;  Surgeon: Kelby Corona MD;  [facility];  Service:     EYE SURGERY      HERNIA REPAIR      IL ARTHROSCOPY SHOULDER SURGICAL BICEPS TENODESIS Right 5/6/2016    Procedure: ARTHROSCOPIC BICEPS TENODESIS;  Surgeon: Del Orantes MD;  Location[facility];  Service: Orthopedics    IL SHLDR ARTHROSCOP,SURG,W/ROTAT CUFF REPR Right 5/6/2016    Procedure: REPAIR ROTATOR CUFF  ARTHROSCOPIC; SUBACROMIAL DECOMPRESSION; PARTIAL SYNOVECTOMY;  Surgeon: Del Orantes MD;  [facility];  Service: Orthopedics    UMBILICAL HERNIA REPAIR  2009    over age 13     Family History   Problem Relation Age of Onset    Heart disease Mother     Hypertension Mother     Nephrolithiasis Father     Other Father         Renal disease    Hypertension Sister     Nephrolithiasis Brother     Other Brother         Renal disease    Hematuria Family         Microscopic      Social History   History   Alcohol Use No     History   Drug Use No     History   Smoking Status    Never Smoker   Smokeless Tobacco    Never Used       Medications:     Current Outpatient Prescriptions:     acetaminophen (TYLENOL) 325 mg tablet, Take 1-2 tablets by mouth every 6 (six) hours as needed, Disp: , Rfl:     aspirin (ECOTRIN LOW STRENGTH) 81 mg EC tablet, Take 81 mg by mouth daily, Disp: , Rfl:     atorvastatin (LIPITOR) 80 mg tablet, daily  Atorvastatin Calcium 80 MG Oral Tablet TAKE 1 TABLET AT BEDTIME    Quantity: 90;  Refills: 1    Inocente Frederick MD;  Started 28-Mar-2012 Active , Disp: , Rfl:     clorazepate (TRANXENE) 7 5 mg tablet, , Disp: , Rfl: 0    clotrimazole-betamethasone (LOTRISONE) 1-0 05 % cream, Apply topically 2 (two) times a day as needed, Disp: , Rfl:     fluticasone (FLONASE) 50 mcg/act nasal spray, 1 spray into each nostril daily, Disp: , Rfl:     hydrocortisone 2 5 % cream, Apply topically 3 (three) times a day, Disp: , Rfl:     losartan (COZAAR) 25 mg tablet, Take 1 tablet (25 mg total) by mouth daily, Disp: 90 tablet, Rfl: 3    metFORMIN (GLUCOPHAGE) 500 mg tablet, take 2 tablets by mouth twice a day with meals, Disp: 360 tablet, Rfl: 3    metoprolol succinate (TOPROL-XL) 50 mg 24 hr tablet, Take 1 tablet (50 mg total) by mouth daily, Disp: , Rfl: 0    mirtazapine (REMERON) 15 mg tablet, , Disp: , Rfl: 0    omeprazole (PriLOSEC) 20 mg delayed release capsule, , Disp: , Rfl: 0    ONE TOUCH ULTRA TEST test strip, , Disp: , Rfl: 0    ONETOUCH DELICA LANCETS FINE MISC, , Disp: , Rfl: 1    tadalafil (CIALIS) 20 MG tablet, Take 20 mg by mouth daily as needed for erectile dysfunction, Disp: , Rfl:     tobramycin-dexamethasone (TOBRADEX) ophthalmic suspension, , Disp: , Rfl: 0  Allergies   Allergen Reactions    Epinephrine Hives and Anxiety    Penicillins Hives and Anxiety       OBJECTIVE    Vitals:   Vitals:    07/06/18 1634   BP: 118/76   Pulse: 76   Resp: 16   Temp: 98 °F (36 7 °C)   TempSrc: Tympanic   Weight: 71 3 kg (157 lb 3 2 oz)   Height: 5' 5 7" (1 669 m)           Physical Exam   Constitutional: He is oriented to person, place, and time  He appears well-developed and well-nourished  No distress  HENT:   Head: Normocephalic and atraumatic  Right Ear: External ear normal    Left Ear: External ear normal    Nose: Nose normal    Mouth/Throat: Oropharynx is clear and moist  No oropharyngeal exudate  Eyes: Conjunctivae and EOM are normal  Pupils are equal, round, and reactive to light  Right eye exhibits no discharge  Left eye exhibits no discharge  Neck: Normal range of motion  Neck supple  Cardiovascular: Normal rate, regular rhythm and intact distal pulses  Murmur heard  Audbile AS murmur heard best at R sternal border   Pulmonary/Chest: Effort normal and breath sounds normal  No respiratory distress  Abdominal: Soft  Bowel sounds are normal  He exhibits no distension  There is no tenderness  Musculoskeletal: Normal range of motion  He exhibits no edema  Phalen test (+) B/L   Neurological: He is alert and oriented to person, place, and time  Skin: Skin is warm and dry  Psychiatric: He has a normal mood and affect  His behavior is normal    Nursing note and vitals reviewed           POCT HbA1C: 7 7 %        Miguel Figueroa MD, PGY-2  [facility]   7/6/2018

## 2018-07-09 ENCOUNTER — TELEPHONE (OUTPATIENT)
Dept: FAMILY MEDICINE CLINIC | Facility: CLINIC | Age: 73
End: 2018-07-09

## 2018-07-09 NOTE — TELEPHONE ENCOUNTER
Pt  Had an appt on 07/06/18 with Dr Satish Sears  Test Strips and Lancets were written for 2-3 weekly, pt states he tests 2-3 time a day  He also asked about the order for the colonoscopy to be able to schedule

## 2018-07-09 NOTE — TELEPHONE ENCOUNTER
I know you stated no need for even daily glucose checks with this patient  Will you want to explain to him on the phone  Please review   Thank you

## 2018-07-11 ENCOUNTER — TELEPHONE (OUTPATIENT)
Dept: FAMILY MEDICINE CLINIC | Facility: CLINIC | Age: 73
End: 2018-07-11

## 2018-07-11 ENCOUNTER — OFFICE VISIT (OUTPATIENT)
Dept: OBGYN CLINIC | Facility: HOSPITAL | Age: 73
End: 2018-07-11
Payer: MEDICARE

## 2018-07-11 VITALS
WEIGHT: 156 LBS | BODY MASS INDEX: 25.07 KG/M2 | HEART RATE: 54 BPM | DIASTOLIC BLOOD PRESSURE: 74 MMHG | SYSTOLIC BLOOD PRESSURE: 151 MMHG | HEIGHT: 66 IN

## 2018-07-11 DIAGNOSIS — G56.03 BILATERAL CARPAL TUNNEL SYNDROME: ICD-10-CM

## 2018-07-11 PROCEDURE — 99214 OFFICE O/P EST MOD 30 MIN: CPT | Performed by: ORTHOPAEDIC SURGERY

## 2018-07-11 RX ORDER — TESTOSTERONE CYPIONATE 200 MG/ML
INJECTION INTRAMUSCULAR
Refills: 0 | COMMUNITY
Start: 2018-07-09 | End: 2019-02-09 | Stop reason: HOSPADM

## 2018-07-11 NOTE — TELEPHONE ENCOUNTER
Pt walked in  requesting an update regarding his lancets and test strips  Pt had an appt with Dr Beck Gastelum on 07/06/18

## 2018-07-11 NOTE — PROGRESS NOTES
Assessment:       1  Bilateral carpal tunnel syndrome          Plan:        Explained my current clinical findings and reviewed EMG findings with Michelle Morris today  He may benefit from a trial of carpal tunnel cortisone injection and I will see him back next week for ultrasound-guided right wrist carpal tunnel injection  If he does have benefit from this, we can then perform a left-sided carpal tunnel cortisone injection  In the interim, he may continue to wear his carpal tunnel wrist braces  I did explain that if he does not have any significant improvement with these measures, he may be a candidate for carpal tunnel release surgery  Subjective:     Patient ID: Amarilis Gomes is a 72 y o  male  Chief Complaint:    HPI  Mr  Stuart Lima is a 72 yr old right-hand-dominant gentleman who is here today for evaluation of bilateral hand pain  Symptoms have been present for over an year duration  Describes pain as aching and stiffness along with intermittent numbness of bilateral hand mostly affecting the middle and ring fingers  Also reports associated nighttime pain and cramping of both his hands  Symptoms are moderate intensity in both hands     He denies any significant weakness of  strength bilaterally  There is no radiation of his hand pain and denies any significant radicular neck pain at this time  He has had an EMG study of his bilateral upper extremity in February, 2018 which revealed moderate carpal tunnel syndrome of bilateral wrists  So far, he has tried wearing bilateral carpal tunnel braces which do help him to some extent  Social History     Occupational History    Not on file  Social History Main Topics    Smoking status: Never Smoker    Smokeless tobacco: Never Used    Alcohol use No    Drug use: No    Sexual activity: Not on file      Review of Systems   Constitutional: Negative  HENT: Negative  Eyes: Negative  Respiratory: Negative  Cardiovascular: Negative  Gastrointestinal: Negative  Endocrine: Negative  Genitourinary: Negative  Skin: Negative  Allergic/Immunologic: Negative  Neurological: Positive for numbness  Psychiatric/Behavioral: Negative  Objective:     Ortho ExamPhysical Exam   Constitutional: He is oriented to person, place, and time  He appears well-developed and well-nourished  HENT:   Head: Normocephalic and atraumatic  Eyes: Conjunctivae are normal    Cardiovascular: Normal rate and regular rhythm  Pulmonary/Chest: Effort normal  No respiratory distress  Neurological: He is alert and oriented to person, place, and time  Skin: Skin is warm  No erythema  Psychiatric: He has a normal mood and affect  His behavior is normal  Judgment and thought content normal      right wrist and hand examination:  No obvious muscle wasting noted  Good  strength  Grade 5/5 thumb opposition and abduction  No significant hypoesthesia to light touch in the median nerve distribution  Positive carpal tunnel compression test and positive Phalen's test   Negative Tinel sign at the carpal tunnel    Left wrist and hand examination:  No obvious muscle wasting noted  Good  strength  Grade 5/5 thumb opposition and abduction  No significant hypoesthesia to light touch in the median nerve distribution    Positive carpal tunnel compression test and equivocal Phalen's test   Negative Tinel sign at the carpal tunnel

## 2018-07-16 NOTE — TELEPHONE ENCOUNTER
Patient still has not gotten refill on Lancets and test stript  I spoke to pharmacist at [facility] [phone], he's sending over a special Medicare form called a DWO (Detailed Written Order form) that can be duplicated for the office use  The form will require specific orders otherwise it will not be received  Apparently the last order frequency was sent over as 2 times weekly when in fact the patient uses twice daily

## 2018-07-17 ENCOUNTER — OFFICE VISIT (OUTPATIENT)
Dept: OBGYN CLINIC | Facility: OTHER | Age: 73
End: 2018-07-17
Payer: MEDICARE

## 2018-07-17 VITALS
HEIGHT: 66 IN | DIASTOLIC BLOOD PRESSURE: 67 MMHG | WEIGHT: 157 LBS | SYSTOLIC BLOOD PRESSURE: 132 MMHG | HEART RATE: 55 BPM | BODY MASS INDEX: 25.23 KG/M2

## 2018-07-17 DIAGNOSIS — G56.03 BILATERAL CARPAL TUNNEL SYNDROME: Primary | ICD-10-CM

## 2018-07-17 PROCEDURE — 99213 OFFICE O/P EST LOW 20 MIN: CPT | Performed by: ORTHOPAEDIC SURGERY

## 2018-07-17 PROCEDURE — 76942 ECHO GUIDE FOR BIOPSY: CPT | Performed by: ORTHOPAEDIC SURGERY

## 2018-07-17 PROCEDURE — 20526 THER INJECTION CARP TUNNEL: CPT | Performed by: ORTHOPAEDIC SURGERY

## 2018-07-17 RX ORDER — BETAMETHASONE SODIUM PHOSPHATE AND BETAMETHASONE ACETATE 3; 3 MG/ML; MG/ML
6 INJECTION, SUSPENSION INTRA-ARTICULAR; INTRALESIONAL; INTRAMUSCULAR; SOFT TISSUE
Status: COMPLETED | OUTPATIENT
Start: 2018-07-17 | End: 2018-07-17

## 2018-07-17 RX ORDER — LIDOCAINE HYDROCHLORIDE 10 MG/ML
1 INJECTION, SOLUTION INFILTRATION; PERINEURAL
Status: COMPLETED | OUTPATIENT
Start: 2018-07-17 | End: 2018-07-17

## 2018-07-17 RX ADMIN — BETAMETHASONE SODIUM PHOSPHATE AND BETAMETHASONE ACETATE 6 MG: 3; 3 INJECTION, SUSPENSION INTRA-ARTICULAR; INTRALESIONAL; INTRAMUSCULAR; SOFT TISSUE at 13:51

## 2018-07-17 RX ADMIN — LIDOCAINE HYDROCHLORIDE 1 ML: 10 INJECTION, SOLUTION INFILTRATION; PERINEURAL at 13:51

## 2018-07-17 NOTE — PROGRESS NOTES
Assessment:     No diagnosis found  Plan:        Explain my current clinical findings to [place] today  He also received ultrasound-guided right carpal tunnel cortisone injection on today's visit without any immediate complications  I have advised him to regularly monitor his blood sugar levels in consult his family physician if his sugar levels are very high  I will see him back in 3 weeks time for clinical reassessment and to possibly perform a left carpal tunnel cortisone injection if he does have good symptomatic relief on the right side  Subjective:     Patient ID: Brisa Haywood is a 72 y o  male  Chief Complaint:    HPI  Mr Bryson Nuñez is here today for a follow-up of his bilateral carpal tunnel syndrome and received right-sided ultrasound-guided carpal tunnel cortisone injection  He was last seen in this regard 6 days ago by myself and was instructed to monitor his blood sugar levels regularly due to his history of type 2 diabetes  Today, informs that his fasting blood sugar levels ranged around 140  His hand symptoms including tingling and pain has not changed since the last office visit  No other acute interval development of symptoms in this regard since the last visit  Social History     Occupational History    Not on file  Social History Main Topics    Smoking status: Never Smoker    Smokeless tobacco: Never Used    Alcohol use No    Drug use: No    Sexual activity: Not on file      Review of Systems   Constitutional: Negative  HENT: Negative  Eyes: Negative  Respiratory: Negative  Cardiovascular: Negative  Gastrointestinal: Negative  Endocrine: Negative  Genitourinary: Negative  Skin: Negative  Allergic/Immunologic: Negative  Neurological: Positive for numbness  Psychiatric/Behavioral: Negative  Objective:     Ortho ExamPhysical Exam   Constitutional: He is oriented to person, place, and time   He appears well-developed and well-nourished  HENT:   Head: Normocephalic and atraumatic  Eyes: Conjunctivae are normal    Cardiovascular: Normal rate and regular rhythm  Pulmonary/Chest: Effort normal  No respiratory distress  Neurological: He is alert and oriented to person, place, and time  Skin: Skin is warm  No erythema  Psychiatric: He has a normal mood and affect  His behavior is normal  Judgment and thought content normal      right hand and wrist examination:  No muscle wasting  Good  strength  Grade 5/5 thumb opposition and abduction  No significant hypoesthesia to light touch in the median nerve distribution  Positive carpal tunnel compression test and positive Phalen's test   Negative Tinel sign at the carpal tunnel      Hand/upper extremity injection  Date/Time: 7/17/2018 1:51 PM  Consent given by: patient  Site marked: site marked  Timeout: Immediately prior to procedure a time out was called to verify the correct patient, procedure, equipment, support staff and site/side marked as required   Supporting Documentation  Indications: pain and therapeutic   Procedure Details  Condition:carpal tunnel syndrome Site: R carpal tunnel   Needle size: 22 G  Ultrasound guidance: yes  Approach: ulnar  Medications administered: 1 mL lidocaine 1 %; 6 mg betamethasone acetate-betamethasone sodium phosphate 6 (3-3) mg/mL  Patient tolerance: patient tolerated the procedure well with no immediate complications  Dressing:  Sterile dressing applied

## 2018-08-07 ENCOUNTER — OFFICE VISIT (OUTPATIENT)
Dept: OBGYN CLINIC | Facility: OTHER | Age: 73
End: 2018-08-07
Payer: MEDICARE

## 2018-08-07 VITALS
DIASTOLIC BLOOD PRESSURE: 66 MMHG | SYSTOLIC BLOOD PRESSURE: 120 MMHG | WEIGHT: 160 LBS | HEART RATE: 55 BPM | BODY MASS INDEX: 26.05 KG/M2

## 2018-08-07 DIAGNOSIS — G56.03 BILATERAL CARPAL TUNNEL SYNDROME: Primary | ICD-10-CM

## 2018-08-07 PROCEDURE — 20526 THER INJECTION CARP TUNNEL: CPT | Performed by: ORTHOPAEDIC SURGERY

## 2018-08-07 PROCEDURE — 99213 OFFICE O/P EST LOW 20 MIN: CPT | Performed by: ORTHOPAEDIC SURGERY

## 2018-08-07 PROCEDURE — 76942 ECHO GUIDE FOR BIOPSY: CPT | Performed by: ORTHOPAEDIC SURGERY

## 2018-08-07 RX ORDER — BETAMETHASONE SODIUM PHOSPHATE AND BETAMETHASONE ACETATE 3; 3 MG/ML; MG/ML
6 INJECTION, SUSPENSION INTRA-ARTICULAR; INTRALESIONAL; INTRAMUSCULAR; SOFT TISSUE
Status: COMPLETED | OUTPATIENT
Start: 2018-08-07 | End: 2018-08-07

## 2018-08-07 RX ORDER — LIDOCAINE HYDROCHLORIDE 10 MG/ML
2 INJECTION, SOLUTION INFILTRATION; PERINEURAL
Status: COMPLETED | OUTPATIENT
Start: 2018-08-07 | End: 2018-08-07

## 2018-08-07 RX ADMIN — LIDOCAINE HYDROCHLORIDE 2 ML: 10 INJECTION, SOLUTION INFILTRATION; PERINEURAL at 13:38

## 2018-08-07 RX ADMIN — BETAMETHASONE SODIUM PHOSPHATE AND BETAMETHASONE ACETATE 6 MG: 3; 3 INJECTION, SUSPENSION INTRA-ARTICULAR; INTRALESIONAL; INTRAMUSCULAR; SOFT TISSUE at 13:38

## 2018-08-07 NOTE — PROGRESS NOTES
Assessment:       1  Bilateral carpal tunnel syndrome          Plan:        I explained my current clinical findings to Michelle Morris  He had good improvement with the right carpal tunnel cortisone injection and underwent a left wrist ultrasound-guided carpal tunnel injection on today's office visit with no immediate complications  He is advised not to handle heavy objects or hard objects with his left hand since he may experience some numbness of his left hand post injection  I will see him back in 6 weeks time for clinical reassessment in this regard  Subjective:     Patient ID: Amarilis Gomes is a 72 y o  male  Chief Complaint:    HPI  Michelle Morris is here today for a follow-up of his bilateral carpal tunnel syndrome  He was seen in this regard 3 weeks ago and received a right wrist ultrasound-guided carpal tunnel cortisone injection  Today he reports that his right hand and wrist pain has significantly improved and he does not complain of any further pain tingling numbness of his right hand on today's office visit  However, he does persist with his left hand and wrist symptoms of similar nature and would like to proceed with a left wrist ultrasound-guided carpal tunnel cortisone injection  It is explained to him that ultrasound guidance for needle placement does increase the accuracy of the procedure and hence the potential benefit  Social History     Occupational History    Not on file  Social History Main Topics    Smoking status: Never Smoker    Smokeless tobacco: Never Used    Alcohol use No    Drug use: No    Sexual activity: Not on file      Review of Systems   Constitutional: Negative  HENT: Negative  Eyes: Negative  Respiratory: Negative  Cardiovascular: Negative  Gastrointestinal: Negative  Endocrine: Negative  Genitourinary: Negative  Skin: Negative  Allergic/Immunologic: Negative  Neurological: Negative  Psychiatric/Behavioral: Negative  Objective:     Ortho ExamPhysical Exam   Constitutional: He is oriented to person, place, and time  He appears well-developed and well-nourished  HENT:   Head: Normocephalic and atraumatic  Eyes: Conjunctivae are normal    Cardiovascular: Normal rate and regular rhythm  Pulmonary/Chest: Effort normal  No respiratory distress  Neurological: He is alert and oriented to person, place, and time  Skin: Skin is warm  No erythema  Psychiatric: He has a normal mood and affect  His behavior is normal  Judgment and thought content normal        Right hand and wrist examination:  No swelling or deformity noted  No muscle wasting  Good  strength  Grade 5/5 thumb opposition and abduction  No hypoesthesia to light touch in the median nerve distribution  Negative carpal tunnel compression test and negative Phalen's test on today's office visit  Negative Tinel sign at the carpal tunnel    Left hand and wrist examination:  No obvious muscle wasting  Good good strength  Grade 5/5 thumb opposition and abduction  No significant hypoesthesia to light touch in the median nerve distribution  Positive carpal tunnel compression test and equivocal Phalen's test   Negative Tinel sign at the carpal tunnel    Hand/upper extremity injection  Date/Time: 8/7/2018 1:38 PM  Consent given by: patient  Site marked: site marked  Timeout: Immediately prior to procedure a time out was called to verify the correct patient, procedure, equipment, support staff and site/side marked as required   Supporting Documentation  Indications: pain and therapeutic   Procedure Details  Condition:carpal tunnel syndrome Site: L carpal tunnel   Needle size: 22 G  Ultrasound guidance: yes  Approach: ulnar (A 16 megahertz linear hockey-stick ultrasound probe was used with sterile cover for needle guidance using a 22 gauge 1 5 in needle   Sterile field was maintained both at the injection site as well as by using a sterile ultrasound gel and sterile probe cover)  Medications administered: 2 mL lidocaine 1 %; 6 mg betamethasone acetate-betamethasone sodium phosphate 6 (3-3) mg/mL  Patient tolerance: patient tolerated the procedure well with no immediate complications  Dressing:  Sterile dressing applied

## 2018-08-23 ENCOUNTER — TELEPHONE (OUTPATIENT)
Dept: FAMILY MEDICINE CLINIC | Facility: CLINIC | Age: 73
End: 2018-08-23

## 2018-08-23 DIAGNOSIS — E11.9 TYPE 2 DIABETES MELLITUS WITHOUT COMPLICATION, WITHOUT LONG-TERM CURRENT USE OF INSULIN (HCC): ICD-10-CM

## 2018-08-23 NOTE — TELEPHONE ENCOUNTER
Pt came in and said he changed his pharmacy to [facility],  I changed it in the system, he said that the pharmacist is requesting all scripts for medications to be sent there and also they would like a list of medications as well  I printed out his medication list, not sure if that is good enough for pharmacy  Pt also said he needs refill for OneTouch Delica Lancets and test strips sent to new pharmacy  The number to [facility] is [phone] and fax number is [phone]    Pt also mentioned he needs an order put in for a colonoscopy,      Thanks    Pt was last seen by Dr Brandy Elliott on 7/6/18 and is following up with her on 10/8

## 2018-08-27 DIAGNOSIS — E11.22 HYPERTENSION ASSOCIATED WITH STAGE 2 CHRONIC KIDNEY DISEASE DUE TO TYPE 2 DIABETES MELLITUS (HCC): Chronic | ICD-10-CM

## 2018-08-27 DIAGNOSIS — I12.9 HYPERTENSION ASSOCIATED WITH STAGE 2 CHRONIC KIDNEY DISEASE DUE TO TYPE 2 DIABETES MELLITUS (HCC): Chronic | ICD-10-CM

## 2018-08-27 DIAGNOSIS — N18.2 HYPERTENSION ASSOCIATED WITH STAGE 2 CHRONIC KIDNEY DISEASE DUE TO TYPE 2 DIABETES MELLITUS (HCC): Chronic | ICD-10-CM

## 2018-08-27 DIAGNOSIS — J30.9 ALLERGIC RHINITIS, UNSPECIFIED SEASONALITY, UNSPECIFIED TRIGGER: ICD-10-CM

## 2018-08-28 DIAGNOSIS — N18.2 HYPERTENSION ASSOCIATED WITH STAGE 2 CHRONIC KIDNEY DISEASE DUE TO TYPE 2 DIABETES MELLITUS (HCC): Chronic | ICD-10-CM

## 2018-08-28 DIAGNOSIS — E11.9 TYPE 2 DIABETES MELLITUS WITHOUT COMPLICATION, WITHOUT LONG-TERM CURRENT USE OF INSULIN (HCC): Primary | ICD-10-CM

## 2018-08-28 DIAGNOSIS — I12.9 HYPERTENSION ASSOCIATED WITH STAGE 2 CHRONIC KIDNEY DISEASE DUE TO TYPE 2 DIABETES MELLITUS (HCC): Chronic | ICD-10-CM

## 2018-08-28 DIAGNOSIS — E11.22 HYPERTENSION ASSOCIATED WITH STAGE 2 CHRONIC KIDNEY DISEASE DUE TO TYPE 2 DIABETES MELLITUS (HCC): Chronic | ICD-10-CM

## 2018-08-28 DIAGNOSIS — I25.10 CORONARY ARTERY DISEASE INVOLVING NATIVE CORONARY ARTERY OF NATIVE HEART WITHOUT ANGINA PECTORIS: ICD-10-CM

## 2018-08-28 RX ORDER — ASPIRIN 81 MG/1
81 TABLET ORAL DAILY
Qty: 90 TABLET | Refills: 1 | Status: SHIPPED | OUTPATIENT
Start: 2018-08-28 | End: 2019-01-24 | Stop reason: SDUPTHER

## 2018-08-28 RX ORDER — FLUTICASONE PROPIONATE 50 MCG
1 SPRAY, SUSPENSION (ML) NASAL DAILY
Qty: 16 G | Refills: 2 | Status: SHIPPED | OUTPATIENT
Start: 2018-08-28 | End: 2018-11-19 | Stop reason: SDUPTHER

## 2018-08-28 NOTE — TELEPHONE ENCOUNTER
Calling from [facility] needing new scripts for patient who now is using them ,  There will be 5 meds metoprolol mg's are unknown, asprin 81 mg, tobradex for the eyes, fluticasone nasal spray, glimepiride 4mg    Please make this his primary pharmacy

## 2018-09-03 RX ORDER — GLIMEPIRIDE 4 MG/1
TABLET ORAL
Qty: 180 TABLET | Refills: 0 | Status: SHIPPED | OUTPATIENT
Start: 2018-09-03 | End: 2018-09-23 | Stop reason: SDUPTHER

## 2018-09-11 ENCOUNTER — TRANSCRIBE ORDERS (OUTPATIENT)
Dept: LAB | Facility: HOSPITAL | Age: 73
End: 2018-09-11

## 2018-09-11 ENCOUNTER — APPOINTMENT (OUTPATIENT)
Dept: LAB | Facility: HOSPITAL | Age: 73
End: 2018-09-11
Payer: MEDICARE

## 2018-09-11 DIAGNOSIS — N18.2 HYPERTENSION ASSOCIATED WITH STAGE 2 CHRONIC KIDNEY DISEASE DUE TO TYPE 2 DIABETES MELLITUS (HCC): Chronic | ICD-10-CM

## 2018-09-11 DIAGNOSIS — G56.03 BILATERAL CARPAL TUNNEL SYNDROME: ICD-10-CM

## 2018-09-11 DIAGNOSIS — E11.9 TYPE 2 DIABETES MELLITUS WITHOUT COMPLICATION, WITHOUT LONG-TERM CURRENT USE OF INSULIN (HCC): ICD-10-CM

## 2018-09-11 DIAGNOSIS — E11.22 HYPERTENSION ASSOCIATED WITH STAGE 2 CHRONIC KIDNEY DISEASE DUE TO TYPE 2 DIABETES MELLITUS (HCC): Chronic | ICD-10-CM

## 2018-09-11 DIAGNOSIS — I12.9 HYPERTENSION ASSOCIATED WITH STAGE 2 CHRONIC KIDNEY DISEASE DUE TO TYPE 2 DIABETES MELLITUS (HCC): Chronic | ICD-10-CM

## 2018-09-11 DIAGNOSIS — F33.1 MAJOR DEPRESSIVE DISORDER, RECURRENT EPISODE, MODERATE (HCC): Primary | ICD-10-CM

## 2018-09-11 DIAGNOSIS — F33.1 MAJOR DEPRESSIVE DISORDER, RECURRENT EPISODE, MODERATE (HCC): ICD-10-CM

## 2018-09-11 DIAGNOSIS — E55.9 VITAMIN D DEFICIENCY: ICD-10-CM

## 2018-09-11 LAB
25(OH)D3 SERPL-MCNC: 30.3 NG/ML (ref 30–100)
ALBUMIN SERPL BCP-MCNC: 3.3 G/DL (ref 3.5–5)
ALP SERPL-CCNC: 64 U/L (ref 46–116)
ALT SERPL W P-5'-P-CCNC: 57 U/L (ref 12–78)
ANION GAP SERPL CALCULATED.3IONS-SCNC: 5 MMOL/L (ref 4–13)
AST SERPL W P-5'-P-CCNC: 45 U/L (ref 5–45)
BASOPHILS # BLD AUTO: 0.05 THOUSANDS/ΜL (ref 0–0.1)
BASOPHILS NFR BLD AUTO: 1 % (ref 0–1)
BILIRUB SERPL-MCNC: 0.68 MG/DL (ref 0.2–1)
BUN SERPL-MCNC: 16 MG/DL (ref 5–25)
CALCIUM SERPL-MCNC: 8.2 MG/DL (ref 8.3–10.1)
CHLORIDE SERPL-SCNC: 105 MMOL/L (ref 100–108)
CHOLEST SERPL-MCNC: 105 MG/DL (ref 50–200)
CO2 SERPL-SCNC: 28 MMOL/L (ref 21–32)
CREAT SERPL-MCNC: 0.8 MG/DL (ref 0.6–1.3)
CREAT UR-MCNC: 137 MG/DL
EOSINOPHIL # BLD AUTO: 0.21 THOUSAND/ΜL (ref 0–0.61)
EOSINOPHIL NFR BLD AUTO: 3 % (ref 0–6)
ERYTHROCYTE [DISTWIDTH] IN BLOOD BY AUTOMATED COUNT: 13.5 % (ref 11.6–15.1)
EST. AVERAGE GLUCOSE BLD GHB EST-MCNC: 154 MG/DL
FERRITIN SERPL-MCNC: 221 NG/ML (ref 8–388)
FOLATE SERPL-MCNC: 18.6 NG/ML (ref 3.1–17.5)
GFR SERPL CREATININE-BSD FRML MDRD: 90 ML/MIN/1.73SQ M
GGT SERPL-CCNC: 192 U/L (ref 5–85)
GLUCOSE P FAST SERPL-MCNC: 126 MG/DL (ref 65–99)
HAV IGM SER QL: NORMAL
HBA1C MFR BLD: 7 % (ref 4.2–6.3)
HBV CORE IGM SER QL: NORMAL
HBV SURFACE AG SER QL: NORMAL
HCT VFR BLD AUTO: 42.5 % (ref 36.5–49.3)
HCV AB SER QL: NORMAL
HDLC SERPL-MCNC: 50 MG/DL (ref 40–60)
HGB BLD-MCNC: 13.1 G/DL (ref 12–17)
IMM GRANULOCYTES # BLD AUTO: 0.03 THOUSAND/UL (ref 0–0.2)
IMM GRANULOCYTES NFR BLD AUTO: 1 % (ref 0–2)
IRON SERPL-MCNC: 49 UG/DL (ref 65–175)
LDLC SERPL CALC-MCNC: 45 MG/DL (ref 0–100)
LYMPHOCYTES # BLD AUTO: 1.94 THOUSANDS/ΜL (ref 0.6–4.47)
LYMPHOCYTES NFR BLD AUTO: 31 % (ref 14–44)
MCH RBC QN AUTO: 28.7 PG (ref 26.8–34.3)
MCHC RBC AUTO-ENTMCNC: 30.8 G/DL (ref 31.4–37.4)
MCV RBC AUTO: 93 FL (ref 82–98)
MICROALBUMIN UR-MCNC: 140 MG/L (ref 0–20)
MICROALBUMIN/CREAT 24H UR: 102 MG/G CREATININE (ref 0–30)
MONOCYTES # BLD AUTO: 0.6 THOUSAND/ΜL (ref 0.17–1.22)
MONOCYTES NFR BLD AUTO: 10 % (ref 4–12)
NEUTROPHILS # BLD AUTO: 3.41 THOUSANDS/ΜL (ref 1.85–7.62)
NEUTS SEG NFR BLD AUTO: 54 % (ref 43–75)
NONHDLC SERPL-MCNC: 55 MG/DL
NRBC BLD AUTO-RTO: 0 /100 WBCS
PLATELET # BLD AUTO: 186 THOUSANDS/UL (ref 149–390)
PMV BLD AUTO: 11.5 FL (ref 8.9–12.7)
POTASSIUM SERPL-SCNC: 4.2 MMOL/L (ref 3.5–5.3)
PROT SERPL-MCNC: 6.9 G/DL (ref 6.4–8.2)
RBC # BLD AUTO: 4.57 MILLION/UL (ref 3.88–5.62)
RETICS # AUTO: NORMAL 10*3/UL (ref 14356–105094)
RETICS # CALC: 1.7 % (ref 0.37–1.87)
SODIUM SERPL-SCNC: 138 MMOL/L (ref 136–145)
T3 SERPL-MCNC: 1.2 NG/ML (ref 0.6–1.8)
T4 FREE SERPL-MCNC: 0.9 NG/DL (ref 0.76–1.46)
TIBC SERPL-MCNC: 245 UG/DL (ref 250–450)
TRANSFERRIN SERPL-MCNC: 199 MG/DL (ref 200–400)
TRIGL SERPL-MCNC: 48 MG/DL
TSH SERPL DL<=0.05 MIU/L-ACNC: 2.06 UIU/ML (ref 0.36–3.74)
VIT B12 SERPL-MCNC: 620 PG/ML (ref 100–900)
WBC # BLD AUTO: 6.24 THOUSAND/UL (ref 4.31–10.16)

## 2018-09-11 PROCEDURE — 36415 COLL VENOUS BLD VENIPUNCTURE: CPT

## 2018-09-11 PROCEDURE — 80053 COMPREHEN METABOLIC PANEL: CPT

## 2018-09-11 PROCEDURE — 80307 DRUG TEST PRSMV CHEM ANLYZR: CPT

## 2018-09-11 PROCEDURE — 84480 ASSAY TRIIODOTHYRONINE (T3): CPT

## 2018-09-11 PROCEDURE — 82977 ASSAY OF GGT: CPT

## 2018-09-11 PROCEDURE — 82570 ASSAY OF URINE CREATININE: CPT | Performed by: FAMILY MEDICINE

## 2018-09-11 PROCEDURE — 85045 AUTOMATED RETICULOCYTE COUNT: CPT

## 2018-09-11 PROCEDURE — 82746 ASSAY OF FOLIC ACID SERUM: CPT

## 2018-09-11 PROCEDURE — 84439 ASSAY OF FREE THYROXINE: CPT

## 2018-09-11 PROCEDURE — 82607 VITAMIN B-12: CPT

## 2018-09-11 PROCEDURE — 84466 ASSAY OF TRANSFERRIN: CPT

## 2018-09-11 PROCEDURE — 82043 UR ALBUMIN QUANTITATIVE: CPT | Performed by: FAMILY MEDICINE

## 2018-09-11 PROCEDURE — 83036 HEMOGLOBIN GLYCOSYLATED A1C: CPT

## 2018-09-11 PROCEDURE — 82728 ASSAY OF FERRITIN: CPT

## 2018-09-11 PROCEDURE — 85025 COMPLETE CBC W/AUTO DIFF WBC: CPT

## 2018-09-11 PROCEDURE — 82306 VITAMIN D 25 HYDROXY: CPT

## 2018-09-11 PROCEDURE — 83540 ASSAY OF IRON: CPT

## 2018-09-11 PROCEDURE — 80074 ACUTE HEPATITIS PANEL: CPT

## 2018-09-11 PROCEDURE — 84443 ASSAY THYROID STIM HORMONE: CPT

## 2018-09-11 PROCEDURE — 80061 LIPID PANEL: CPT

## 2018-09-11 PROCEDURE — 83550 IRON BINDING TEST: CPT

## 2018-09-18 ENCOUNTER — OFFICE VISIT (OUTPATIENT)
Dept: OBGYN CLINIC | Facility: OTHER | Age: 73
End: 2018-09-18
Payer: MEDICARE

## 2018-09-18 VITALS
WEIGHT: 159 LBS | DIASTOLIC BLOOD PRESSURE: 66 MMHG | HEART RATE: 54 BPM | SYSTOLIC BLOOD PRESSURE: 143 MMHG | BODY MASS INDEX: 25.89 KG/M2

## 2018-09-18 DIAGNOSIS — M65.342 TRIGGER FINGER, LEFT RING FINGER: ICD-10-CM

## 2018-09-18 DIAGNOSIS — M25.649 STIFFNESS OF FINGER JOINT, UNSPECIFIED LATERALITY: ICD-10-CM

## 2018-09-18 DIAGNOSIS — R29.898 DECREASED GRIP STRENGTH: ICD-10-CM

## 2018-09-18 DIAGNOSIS — G56.03 BILATERAL CARPAL TUNNEL SYNDROME: Primary | ICD-10-CM

## 2018-09-18 LAB
AMPHETAMINES UR QL SCN: NEGATIVE NG/ML
BARBITURATES UR QL SCN: NEGATIVE NG/ML
BENZODIAZ UR QL SCN: POSITIVE
BZE UR QL: NEGATIVE NG/ML
CANNABINOIDS UR QL SCN: NEGATIVE NG/ML
METHADONE UR QL SCN: NEGATIVE NG/ML
OPIATES UR QL: NEGATIVE NG/ML
PCP UR QL: NEGATIVE NG/ML
PROPOXYPH UR QL: NEGATIVE NG/ML

## 2018-09-18 PROCEDURE — 99213 OFFICE O/P EST LOW 20 MIN: CPT | Performed by: STUDENT IN AN ORGANIZED HEALTH CARE EDUCATION/TRAINING PROGRAM

## 2018-09-18 NOTE — PROGRESS NOTES
Assessment/Plan:    Diagnoses and all orders for this visit:    Bilateral carpal tunnel syndrome  -     Ambulatory referral to Orthopedic Surgery; Future    Stiffness of finger joint, unspecified laterality  -     Ambulatory referral to Occupational Therapy; Future    Decreased  strength  -     Ambulatory referral to Occupational Therapy; Future    Trigger finger, left ring finger  -     Ambulatory referral to Orthopedic Surgery; Future      Elaine Corona has not had significant improvement in his bilateral carpal tunnel s/p bilateral CSIs  In addition, he did exhibit some finger stiffness, decreased  strength and triggering of 4th digit of left hand  We will refer him to orthopaedics for evaluation of carpal tunnel release and finger triggering  We will also prescribe a course of OT for his finger stiffness and  strengthening  Follow up PRN  Subjective:     HPI    Elaine Corona is a 37-year-old male that comes to the office today to follow-up on bilateral carpal tunnel  This is a 6 week follow-up after receiving bilateral CSIs of carpal tunnel bilaterally (Right 7/17/18, Left 8/7/18)  He reports that he is not experiencing any significant relief in his symptoms  He experiences pain as well as numbness and tingling when sleeping at night  He has had an EMG performed in the past which showed 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  Also reports finger stiffness and pain of the right hand over the MCP joints of the 3rd 4th and 5th digits  He is right-hand dominant  Review of Systems   Constitutional: Negative for chills and fever  HENT: Negative for congestion, rhinorrhea and sore throat  Eyes: Negative for visual disturbance  Respiratory: Negative for shortness of breath  Cardiovascular: Negative for chest pain  Gastrointestinal: Negative for abdominal pain  Musculoskeletal: Positive for arthralgias (bilateral hand pain)  Skin: Negative for rash           Objective:    Vitals: 09/18/18 1121   BP: 143/66   Pulse: (!) 54       Physical Exam   Constitutional: He is oriented to person, place, and time  He appears well-developed and well-nourished  No distress  HENT:   Head: Normocephalic and atraumatic  Right Ear: External ear normal    Left Ear: External ear normal    Nose: Nose normal    Eyes: EOM are normal  No scleral icterus  Neck: Neck supple  Pulmonary/Chest: Effort normal  No respiratory distress  Abdominal: Soft  Neurological: He is alert and oriented to person, place, and time  Skin: Skin is warm  He is not diaphoretic  Psychiatric: He has a normal mood and affect  Left Hand/Wrist Exam   Sensation: Normal    Tenderness   None    Range of Motion   Normal left wrist ROM  Wrist  Pronation: Normal  Supination: Normal  Flexion:       Extension:     Muscle Strength   Wrist Extension:   5/5  Wrist Flexion:       5/5  :                      4/5    Tests   Phalens Sign: Negative  Tinels Sign (Medial Nerve): Negative    Comments:  Negative Phalen's  Mild triggering of 4th digit  Positive cubital tunnel compression test    Right Hand/Wrist Exam   Sensation: Normal    Tenderness   The patient is experiencing tenderness in the 3rd-5th MCP joints      Range of Motion   Wrist  Pronation: Normal  Supination: Normal  Flexion:      Extension:     Muscle Strength   Wrist Extension:   5/5  Wrist Flexion:       5/5  :                      4/5    Tests   Phalens Sign: Negative  Tinels Sign (Medial Nerve): Negative    Comments:  Negative Phalen's  Positive cubital tunnel compression test

## 2018-09-20 ENCOUNTER — APPOINTMENT (OUTPATIENT)
Dept: RADIOLOGY | Facility: CLINIC | Age: 73
End: 2018-09-20
Payer: MEDICARE

## 2018-09-20 ENCOUNTER — OFFICE VISIT (OUTPATIENT)
Dept: OBGYN CLINIC | Facility: CLINIC | Age: 73
End: 2018-09-20
Payer: MEDICARE

## 2018-09-20 VITALS
BODY MASS INDEX: 26.49 KG/M2 | SYSTOLIC BLOOD PRESSURE: 156 MMHG | DIASTOLIC BLOOD PRESSURE: 62 MMHG | HEIGHT: 65 IN | HEART RATE: 52 BPM | WEIGHT: 159 LBS

## 2018-09-20 DIAGNOSIS — M79.642 PAIN IN BOTH HANDS: Primary | ICD-10-CM

## 2018-09-20 DIAGNOSIS — M65.342 TRIGGER FINGER, LEFT RING FINGER: ICD-10-CM

## 2018-09-20 DIAGNOSIS — M79.641 PAIN IN BOTH HANDS: Primary | ICD-10-CM

## 2018-09-20 DIAGNOSIS — M25.641 STIFFNESS OF RIGHT HAND JOINT: ICD-10-CM

## 2018-09-20 DIAGNOSIS — G56.03 BILATERAL CARPAL TUNNEL SYNDROME: ICD-10-CM

## 2018-09-20 DIAGNOSIS — M79.642 PAIN IN BOTH HANDS: ICD-10-CM

## 2018-09-20 DIAGNOSIS — G56.23 CUBITAL TUNNEL SYNDROME, BILATERAL: ICD-10-CM

## 2018-09-20 DIAGNOSIS — M25.642 STIFFNESS OF LEFT HAND JOINT: ICD-10-CM

## 2018-09-20 DIAGNOSIS — M79.641 PAIN IN BOTH HANDS: ICD-10-CM

## 2018-09-20 PROCEDURE — 73130 X-RAY EXAM OF HAND: CPT

## 2018-09-20 PROCEDURE — 99213 OFFICE O/P EST LOW 20 MIN: CPT | Performed by: SURGERY

## 2018-09-20 NOTE — LETTER
September 20, 2018     Alban Gould MD  [phone] [facility] [address]    Patient: Cara Palmer   YOB: 1945   Date of Visit: 9/20/2018       Dear Dr Melissa Becerril:    Thank you for referring Cara Palmer to me for evaluation  Below are my notes for this consultation  If you have questions, please do not hesitate to call me  I look forward to following your patient along with you  Sincerely,        Floridalma Naqvi MD        CC: No Recipients    CHIEF COMPLAINT:  Chief Complaint   Patient presents with    Left Wrist - Pain    Right Wrist - Pain       SUBJECTIVE:  Cara Palmer is a 72y o  year old RHD male who presents to the office today for bilateral carpal tunnel syndrome  The patient was referred to the office by Dr Melissa Becerril had given the patient bilateral carpal tunnel injection with no relief of his symptoms  The patient states he has been experiencing pain, numbness and tingling for aprox  7/8 months  The patient notes the numbness and tingling that is intermittent to both of his hands and affect all of the fingers  The patient notes worsening of his symptoms at night  The patient has been wearing bilateral wrist braces at night which the patient states gives him partial relief of his pain but states it does not help with the numbness and tingling  The patient had bilateral carpal tunnel injection which gave him no relief of his symptoms  The patient states that the numbness and tingling wakes him up from sleep at night  The patient has not tried pt/ot at this time  The patient also notes stiffness to his ring ring finger  Pt denies any clocking or locking of the fingers  I have personally reviewed all the relevant PMH, PSH, SH, FH, Medications and allergies       PAST MEDICAL HISTORY:  Past Medical History:   Diagnosis Date    AS (aortic stenosis)     Balanitis     Biceps tendonitis on right     CAD (coronary artery disease)     Cancer (HCC) skin    Complete rotator cuff tear or rupture of right shoulder, not specified as traumatic     Diabetes mellitus ([facility] [address] )     Esophageal reflux     High cholesterol     Hypertension     Hypertriglyceridemia     Hypogonadism in male     Myalgia     Myocardial infarction ([facility] [address] [facility]     Palpitations     Shoulder pain     Sinus problem     Skin cancer of nose     Sleep apnea     Stroke ([facility] [address] [facility] [address]    Systolic murmur     recently found    Tinea cruris     Tinea pedis        PAST SURGICAL HISTORY:  Past Surgical History:   Procedure Laterality Date    CATARACT EXTRACTION Left     COLONOSCOPY      CORONARY ANGIOPLASTY WITH STENT PLACEMENT      CORONARY ARTERY BYPASS GRAFT N/A 12/12/2016    Procedure: INTRAOP CECILLE; BILATERAL LEG EVH; CORONARY ARTERY BYPASS GRAFT X 4 SVG TO PDA, OM1,  AND DIAGONAL1, LIMA TO LAD;  Surgeon: Karyna Vallecillo MD;  Location: [facility];  Service:     EYE SURGERY      HERNIA REPAIR      OK ARTHROSCOPY SHOULDER SURGICAL BICEPS TENODESIS Right 5/6/2016    Procedure: ARTHROSCOPIC BICEPS TENODESIS;  Surgeon: Alta Adams MD;  Location: [facility];  Service: Orthopedics    OK SHLDR ARTHROSCOP,SURG,W/ROTAT CUFF REPR Right 5/6/2016    Procedure: REPAIR ROTATOR CUFF  ARTHROSCOPIC; SUBACROMIAL DECOMPRESSION; PARTIAL SYNOVECTOMY;  Surgeon: Alta Adams MD;  Location: [facility];  Service: Orthopedics    UMBILICAL HERNIA REPAIR  2009    over age 13       FAMILY HISTORY:  Family History   Problem Relation Age of Onset    Heart disease Mother     Hypertension Mother     Nephrolithiasis Father     Other Father         Renal disease    Hypertension Sister     Nephrolithiasis Brother     Other Brother         Renal disease    Hematuria Family         Microscopic       SOCIAL HISTORY:  Social History   Substance Use Topics    Smoking status: Never Smoker    Smokeless tobacco: Never Used    Alcohol use No       MEDICATIONS:    Current Outpatient Prescriptions:    acetaminophen (TYLENOL) 325 mg tablet, Take 1-2 tablets by mouth every 6 (six) hours as needed, Disp: , Rfl:     aspirin (ECOTRIN LOW STRENGTH) 81 mg EC tablet, Take 1 tablet (81 mg total) by mouth daily for 90 days, Disp: 90 tablet, Rfl: 1    atorvastatin (LIPITOR) 80 mg tablet, Take 1 tablet (80 mg total) by mouth daily for 90 days, Disp: 90 tablet, Rfl: 0    clorazepate (TRANXENE) 7 5 mg tablet, , Disp: , Rfl: 0    fluticasone (FLONASE) 50 mcg/act nasal spray, 1 spray into each nostril daily, Disp: 16 g, Rfl: 2    glimepiride (AMARYL) 4 mg tablet, TAKE 1/2 TABLET BY MOUTH IN THE MORNING AND 1 TABLET AT NIGHT, Disp: 180 tablet, Rfl: 0    hydrocortisone 2 5 % cream, Apply topically 3 (three) times a day, Disp: , Rfl:     losartan (COZAAR) 25 mg tablet, Take 1 tablet (25 mg total) by mouth daily, Disp: 90 tablet, Rfl: 0    metFORMIN (GLUCOPHAGE) 500 mg tablet, Take 1 tablet (500 mg total) by mouth 2 (two) times a day with meals for 90 days, Disp: 180 tablet, Rfl: 0    metoprolol tartrate (LOPRESSOR) 25 mg tablet, Take 1 tablet (25 mg total) by mouth every 12 (twelve) hours for 90 days, Disp: 180 tablet, Rfl: 0    mirtazapine (REMERON) 15 mg tablet, Take 1 tablet (15 mg total) by mouth daily at bedtime for 90 days, Disp: 90 tablet, Rfl: 0    omeprazole (PriLOSEC) 20 mg delayed release capsule, Take 1 capsule (20 mg total) by mouth daily for 28 days, Disp: 28 capsule, Rfl: 0    ONE TOUCH ULTRA TEST test strip, Test blood sugar twice daily, or as needed when symptomatic of hypoglycemia or hyperglycemia, Disp: 100 each, Rfl: 5    ONETOUCH DELICA LANCETS FINE MISC, Test blood sugar twice daily, or as needed when symptomatic of hypoglycemia or hyperglycemia, Disp: 100 each, Rfl: 5    testosterone cypionate (DEPO-TESTOSTERONE) 200 mg/mL SOLN, , Disp: , Rfl: 0    ALLERGIES:  Allergies   Allergen Reactions    Epinephrine Hives and Anxiety    Penicillins Hives and Anxiety       REVIEW OF SYSTEMS:  Review of Systems   Constitutional: Negative for chills, fever and unexpected weight change  HENT: Negative for hearing loss, nosebleeds and sore throat  Eyes: Negative for pain, redness and visual disturbance  Respiratory: Negative for cough, shortness of breath and wheezing  Cardiovascular: Negative for chest pain, palpitations and leg swelling  Gastrointestinal: Negative for abdominal pain, nausea and vomiting  Endocrine: Negative for polydipsia and polyuria  Genitourinary: Negative for difficulty urinating and hematuria  Musculoskeletal: Negative for arthralgias, joint swelling and myalgias  Skin: Negative for rash and wound  Neurological: Negative for dizziness, numbness and headaches  Psychiatric/Behavioral: Negative for decreased concentration, dysphoric mood and suicidal ideas  The patient is not nervous/anxious  VITALS:  Vitals:    09/20/18 1040   BP: 156/62   Pulse: (!) 52       LABS:  HgA1c:   Lab Results   Component Value Date    HGBA1C 7 0 (H) 09/11/2018     BMP:   Lab Results   Component Value Date    GLUCOSE 121 12/12/2016    CALCIUM 8 2 (L) 09/11/2018     09/11/2018    K 4 2 09/11/2018    CO2 28 09/11/2018     09/11/2018    BUN 16 09/11/2018    CREATININE 0 80 09/11/2018       _____________________________________________________  PHYSICAL EXAMINATION:  General: well developed and well nourished, alert, oriented times 3 and appears comfortable  Psychiatric: Normal  HEENT: Normocephalic, Atraumatic Trachea Midline, No torticollis  Pulmonary: No audible wheezing or respiratory distress   Cardiovascular: No pitting edema, 2+ radial pulse   Skin: No masses, erthema, lacerations, fluctation, ulcerations  Neurovascular: grossly intact to touch M/U/R    MUSCULOSKELETAL EXAMINATION:  Bilateral Carpal Tunnel Exam:  Negative thenar atrophy  Minimally phalen's test  Negative carpal tunnel compression   Negative tinels over median nerve at the wrist      5/5 APB strength, no thenar atrophy noted  2 point discrimination is 7mm throughout the right thumb, 5mm throughout the right index finger, 4mm throughout the right long finger, 5mm throughout the right ring finger, and 7 mm throughout the right small finger  2 point discrimination is 8mm throughout the left thumb, 4mm throughout the left index finger, 6mm throughout the left long finger, 4mm throughout the left ring finger, and 4mm throughout the left small finger  Left Ulnar Nerve Exam:  Negative intrinsic atrophy  Negative  deformity at the elbow  Near Full range of motion with flexion and extension of the elbow  Positive ulnar nerve compression test at the elbow  Positive tinels over the ulnar nerve at the elbow  Right Ulnar Nerve Exam:  Negative intrinsic atrophy  Negative  deformity at the elbow  Full range of motion with flexion and extension of the elbow  Negative ulnar nerve compression test at the elbow  Positive tinels over the ulnar nerve at the elbow  Patient can make composite fist on the left, on the right he lacks flexion of the IP joints, particularly of the long and index finger to make a tight fist    ___________________________________________________  STUDIES REVIEWED:    AP lateral and oblique radiographs are reviewed personally by me a bilateral hands unless he does demonstrate some arthritic changes at the PIP and DIP joint of most of the fingers the small finger is not involved  On the right appears that he does have osteoarthritis of the particulars of the DIP joint of the index finger and again of the small finger and all of his joints look that they have early signs of osteoarthritic changes  I have personally reviewed his EMG  He does demonstrate some changes associated with bilateral median nerves at the wrist including decreased conduction velocity and decreased amplitude  However there is no evidence of denervation or axonal loss    No evidence of ulnar nerve compression at the elbow otherwise is noted  PROCEDURES PERFORMED:  Procedures  No Procedures performed today    _____________________________________________________  ASSESSMENT/PLAN:    74-year-old male with complaints of bilateral hand pain stiffness as well as paresthesias  His clinical exam is somewhat confusing as describes paresthesias of all 5 digits which occur both during the day and night  On exam there is no muscle atrophy either of the intrinsics of the hand or of the abductor pollicis brevis bilaterally  In addition it appears that on provocative testing his ulnar nerve is more irritable at this time that his median nerve  I do think he has some underlying osteoarthritic changes would like him to start occupational therapy  In addition because he did not get any relief from carpal tunnel injections and I tried the surgery at this time would help relieve all of his symptoms  I would like him to do nerve gliding exercises particularly for the ulnar nerve bilaterally in the carpal tunnel  In addition would like him to use all modalities to increases in range of motion because I think that he is experiencing is actually stiffness which is causing him pain rather than a nerve related issue  I will see him back in 6 weeks time to re-evaluate his status  Patient cannot be started on NSAIDs due to his diabetes as well as previous cardiac surgeries and multiple medications that he is on     * Start OT 2x a week for 6 weeks with median and ulna nerve gliding as well as ROM for bilateral hand stiffness    * Follow up in 6 weeks     Diagnoses and all orders for this visit:    Pain in both hands  -     XR hand 3+ vw left;  Future  -     XR hand 3+ vw right; Future    Bilateral carpal tunnel syndrome  -     Ambulatory referral to Orthopedic Surgery    Trigger finger, left ring finger  -     Ambulatory referral to Orthopedic Surgery        Follow Up:  Return in about 6 weeks (around 11/1/2018) for Recheck  To Do Next Visit:  Re-evaluation of current issue    General Discussions:  Carpal Tunnel Syndrome: The anatomy and physiology of carpal tunnel syndrome was discussed with the patient today  Increase pressure localized under the transverse carpal ligament can cause pain, numbness, tingling, or dysesthesias within the median nerve distribution as well as feelings of fatigue, clumsiness, or awkwardness  These symptoms typically occur at night and worse in the morning upon waking  Eventually, untreated carpal tunnel syndrome can result in weakness and permanent loss of muscle within the thenar compartment of the hand  Treatment options were discussed with the patient  Conservative treatment includes nocturnal resting splints to keep the nerve in a neutral position, ergonomic changes within the work or home environment, activity modification, and tendon gliding exercises  Vitamin B6 one tablet daily over the counter may helpful to reduce symptoms  Steroid injections within the carpal canal can help a majority of patients, however this is often self-limited in a majority of patients  Surgical intervention to divide the transverse carpal ligament typically results in a long-lasting relief of the patient's complaints, with the recurrence rate of less than 1%  Cubital Tunnel Syndrome: The anatomy and physiology of cubital tunnel syndrome were discussed with the patient today in the office  Typically, increased elbow flexion activities decrease blood flow within the intraneural spaces, resulting in a feeling of numbness, tingling, weakness, or clumsiness within the hand and fingers    Occasionally, anatomic structures such as medial elbow osteophytes, the medial head of the triceps, were subluxing ulnar nerve may result in increased pressure or aggravation at the cubital tunnel  Typical signs and symptoms usually include numbness and tingling within the ring and small finger, weakness with , and weakness with pinch  Conservative treatment and includes nocturnal bracing to keep the elbow in a semi-extended position, activity modification, therapy, and avoiding excessive elbow flexion activities  Vitamin B6 one tablet daily over the counter may helpful to reduce symptoms  A majority of patients typically respond to conservative treatment over a period of approximately 3-6 months  EMG/NCV testing of the ulnar nerve at the elbow is not as reliable as carpal tunnel syndrome  Surgical intervention in the form of in situ release of the ulnar nerve at the elbow or ulnar nerve transposition may be required in up to 20% of patients  Scribe Attestation    I,:   Evette Tian am acting as a scribe while in the presence of the attending physician :        I,:   Radha Bowers MD personally performed the services described in this documentation    as scribed in my presence :                  Attestation signed by Radha Bowers MD at 9/20/2018 12:52 PM:  I saw and examined the patient personally and agree with my physician extender's note and plan   I formulated  the plan and gave  explicit instructions to the patient

## 2018-09-20 NOTE — PROGRESS NOTES
CHIEF COMPLAINT:  Chief Complaint   Patient presents with    Left Wrist - Pain    Right Wrist - Pain       SUBJECTIVE:  Meir Byrne is a 72y o  year old RHD male who presents to the office today for bilateral carpal tunnel syndrome  The patient was referred to the office by Dr Veronica Greer had given the patient bilateral carpal tunnel injection with no relief of his symptoms  The patient states he has been experiencing pain, numbness and tingling for aprox  7/8 months  The patient notes the numbness and tingling that is intermittent to both of his hands and affect all of the fingers  The patient notes worsening of his symptoms at night  The patient has been wearing bilateral wrist braces at night which the patient states gives him partial relief of his pain but states it does not help with the numbness and tingling  The patient had bilateral carpal tunnel injection which gave him no relief of his symptoms  The patient states that the numbness and tingling wakes him up from sleep at night  The patient has not tried pt/ot at this time  The patient also notes stiffness to his ring ring finger  Pt denies any clocking or locking of the fingers  I have personally reviewed all the relevant PMH, PSH, SH, FH, Medications and allergies       PAST MEDICAL HISTORY:  Past Medical History:   Diagnosis Date    AS (aortic stenosis)     Balanitis     Biceps tendonitis on right     CAD (coronary artery disease)     Cancer (HCC)     skin    Complete rotator cuff tear or rupture of right shoulder, not specified as traumatic     Diabetes mellitus ([address] )     Esophageal reflux     High cholesterol     Hypertension     Hypertriglyceridemia     Hypogonadism in male     Myalgia     Myocardial infarction ([address] )     Palpitations     Shoulder pain     Sinus problem     Skin cancer of nose     Sleep apnea     Stroke ([facility] [address] 0258    Systolic murmur     recently found    Tinea cruris     Tinea pedis PAST SURGICAL HISTORY:  Past Surgical History:   Procedure Laterality Date    CATARACT EXTRACTION Left     COLONOSCOPY      CORONARY ANGIOPLASTY WITH STENT PLACEMENT      CORONARY ARTERY BYPASS GRAFT N/A 12/12/2016    Procedure: INTRAOP CECILLE; BILATERAL LEG EVH; CORONARY ARTERY BYPASS GRAFT X 4 SVG TO PDA, OM1,  AND DIAGONAL1, LIMA TO LAD;  Surgeon: Nikolas Vega MD;  Location: [facility];  Service:     EYE SURGERY      HERNIA REPAIR      MT ARTHROSCOPY SHOULDER SURGICAL BICEPS TENODESIS Right 5/6/2016    Procedure: ARTHROSCOPIC BICEPS TENODESIS;  Surgeon: Li Pisano MD;  Location: [facility];  Service: Orthopedics    MT SHLDR ARTHROSCOP,SURG,W/ROTAT CUFF REPR Right 5/6/2016    Procedure: REPAIR ROTATOR CUFF  ARTHROSCOPIC; SUBACROMIAL DECOMPRESSION; PARTIAL SYNOVECTOMY;  Surgeon: Li Pisano MD;  Location: [facility];  Service: Orthopedics    UMBILICAL HERNIA REPAIR  2009    over age 13       FAMILY HISTORY:  Family History   Problem Relation Age of Onset    Heart disease Mother     Hypertension Mother     Nephrolithiasis Father     Other Father         Renal disease    Hypertension Sister     Nephrolithiasis Brother     Other Brother         Renal disease    Hematuria Family         Microscopic       SOCIAL HISTORY:  Social History   Substance Use Topics    Smoking status: Never Smoker    Smokeless tobacco: Never Used    Alcohol use No       MEDICATIONS:    Current Outpatient Prescriptions:     acetaminophen (TYLENOL) 325 mg tablet, Take 1-2 tablets by mouth every 6 (six) hours as needed, Disp: , Rfl:     aspirin (ECOTRIN LOW STRENGTH) 81 mg EC tablet, Take 1 tablet (81 mg total) by mouth daily for 90 days, Disp: 90 tablet, Rfl: 1    atorvastatin (LIPITOR) 80 mg tablet, Take 1 tablet (80 mg total) by mouth daily for 90 days, Disp: 90 tablet, Rfl: 0    clorazepate (TRANXENE) 7 5 mg tablet, , Disp: , Rfl: 0    fluticasone (FLONASE) 50 mcg/act nasal spray, 1 spray into each nostril daily, Disp: 16 g, Rfl: 2    glimepiride (AMARYL) 4 mg tablet, TAKE 1/2 TABLET BY MOUTH IN THE MORNING AND 1 TABLET AT NIGHT, Disp: 180 tablet, Rfl: 0    hydrocortisone 2 5 % cream, Apply topically 3 (three) times a day, Disp: , Rfl:     losartan (COZAAR) 25 mg tablet, Take 1 tablet (25 mg total) by mouth daily, Disp: 90 tablet, Rfl: 0    metFORMIN (GLUCOPHAGE) 500 mg tablet, Take 1 tablet (500 mg total) by mouth 2 (two) times a day with meals for 90 days, Disp: 180 tablet, Rfl: 0    metoprolol tartrate (LOPRESSOR) 25 mg tablet, Take 1 tablet (25 mg total) by mouth every 12 (twelve) hours for 90 days, Disp: 180 tablet, Rfl: 0    mirtazapine (REMERON) 15 mg tablet, Take 1 tablet (15 mg total) by mouth daily at bedtime for 90 days, Disp: 90 tablet, Rfl: 0    omeprazole (PriLOSEC) 20 mg delayed release capsule, Take 1 capsule (20 mg total) by mouth daily for 28 days, Disp: 28 capsule, Rfl: 0    ONE TOUCH ULTRA TEST test strip, Test blood sugar twice daily, or as needed when symptomatic of hypoglycemia or hyperglycemia, Disp: 100 each, Rfl: 5    ONETOUCH DELICA LANCETS FINE MISC, Test blood sugar twice daily, or as needed when symptomatic of hypoglycemia or hyperglycemia, Disp: 100 each, Rfl: 5    testosterone cypionate (DEPO-TESTOSTERONE) 200 mg/mL SOLN, , Disp: , Rfl: 0    ALLERGIES:  Allergies   Allergen Reactions    Epinephrine Hives and Anxiety    Penicillins Hives and Anxiety       REVIEW OF SYSTEMS:  Review of Systems   Constitutional: Negative for chills, fever and unexpected weight change  HENT: Negative for hearing loss, nosebleeds and sore throat  Eyes: Negative for pain, redness and visual disturbance  Respiratory: Negative for cough, shortness of breath and wheezing  Cardiovascular: Negative for chest pain, palpitations and leg swelling  Gastrointestinal: Negative for abdominal pain, nausea and vomiting  Endocrine: Negative for polydipsia and polyuria     Genitourinary: Negative for difficulty urinating and hematuria  Musculoskeletal: Negative for arthralgias, joint swelling and myalgias  Skin: Negative for rash and wound  Neurological: Negative for dizziness, numbness and headaches  Psychiatric/Behavioral: Negative for decreased concentration, dysphoric mood and suicidal ideas  The patient is not nervous/anxious  VITALS:  Vitals:    09/20/18 1040   BP: 156/62   Pulse: (!) 52       LABS:  HgA1c:   Lab Results   Component Value Date    HGBA1C 7 0 (H) 09/11/2018     BMP:   Lab Results   Component Value Date    GLUCOSE 121 12/12/2016    CALCIUM 8 2 (L) 09/11/2018     09/11/2018    K 4 2 09/11/2018    CO2 28 09/11/2018     09/11/2018    BUN 16 09/11/2018    CREATININE 0 80 09/11/2018       _____________________________________________________  PHYSICAL EXAMINATION:  General: well developed and well nourished, alert, oriented times 3 and appears comfortable  Psychiatric: Normal  HEENT: Normocephalic, Atraumatic Trachea Midline, No torticollis  Pulmonary: No audible wheezing or respiratory distress   Cardiovascular: No pitting edema, 2+ radial pulse   Skin: No masses, erthema, lacerations, fluctation, ulcerations  Neurovascular: grossly intact to touch M/U/R    MUSCULOSKELETAL EXAMINATION:  Bilateral Carpal Tunnel Exam:  Negative thenar atrophy  Minimally phalen's test  Negative carpal tunnel compression  Negative tinels over median nerve at the wrist      5/5 APB strength, no thenar atrophy noted  2 point discrimination is 7mm throughout the right thumb, 5mm throughout the right index finger, 4mm throughout the right long finger, 5mm throughout the right ring finger, and 7 mm throughout the right small finger  2 point discrimination is 8mm throughout the left thumb, 4mm throughout the left index finger, 6mm throughout the left long finger, 4mm throughout the left ring finger, and 4mm throughout the left small finger       Left Ulnar Nerve Exam:  Negative intrinsic atrophy  Negative  deformity at the elbow  Near Full range of motion with flexion and extension of the elbow  Positive ulnar nerve compression test at the elbow  Positive tinels over the ulnar nerve at the elbow  Right Ulnar Nerve Exam:  Negative intrinsic atrophy  Negative  deformity at the elbow  Full range of motion with flexion and extension of the elbow  Negative ulnar nerve compression test at the elbow  Positive tinels over the ulnar nerve at the elbow  Patient can make composite fist on the left, on the right he lacks flexion of the IP joints, particularly of the long and index finger to make a tight fist    ___________________________________________________  STUDIES REVIEWED:    AP lateral and oblique radiographs are reviewed personally by me a bilateral hands unless he does demonstrate some arthritic changes at the PIP and DIP joint of most of the fingers the small finger is not involved  On the right appears that he does have osteoarthritis of the particulars of the DIP joint of the index finger and again of the small finger and all of his joints look that they have early signs of osteoarthritic changes  I have personally reviewed his EMG  He does demonstrate some changes associated with bilateral median nerves at the wrist including decreased conduction velocity and decreased amplitude  However there is no evidence of denervation or axonal loss  No evidence of ulnar nerve compression at the elbow otherwise is noted  PROCEDURES PERFORMED:  Procedures  No Procedures performed today    _____________________________________________________  ASSESSMENT/PLAN:    81-year-old male with complaints of bilateral hand pain stiffness as well as paresthesias  His clinical exam is somewhat confusing as describes paresthesias of all 5 digits which occur both during the day and night    On exam there is no muscle atrophy either of the intrinsics of the hand or of the abductor pollicis brevis bilaterally  In addition it appears that on provocative testing his ulnar nerve is more irritable at this time that his median nerve  I do think he has some underlying osteoarthritic changes would like him to start occupational therapy  In addition because he did not get any relief from carpal tunnel injections and I tried the surgery at this time would help relieve all of his symptoms  I would like him to do nerve gliding exercises particularly for the ulnar nerve bilaterally in the carpal tunnel  In addition would like him to use all modalities to increases in range of motion because I think that he is experiencing is actually stiffness which is causing him pain rather than a nerve related issue  I will see him back in 6 weeks time to re-evaluate his status  Patient cannot be started on NSAIDs due to his diabetes as well as previous cardiac surgeries and multiple medications that he is on     * Start OT 2x a week for 6 weeks with median and ulna nerve gliding as well as ROM for bilateral hand stiffness    * Follow up in 6 weeks     Diagnoses and all orders for this visit:    Pain in both hands  -     XR hand 3+ vw left; Future  -     XR hand 3+ vw right; Future    Bilateral carpal tunnel syndrome  -     Ambulatory referral to Orthopedic Surgery    Trigger finger, left ring finger  -     Ambulatory referral to Orthopedic Surgery        Follow Up:  Return in about 6 weeks (around 11/1/2018) for Recheck  To Do Next Visit:  Re-evaluation of current issue    General Discussions:  Carpal Tunnel Syndrome: The anatomy and physiology of carpal tunnel syndrome was discussed with the patient today  Increase pressure localized under the transverse carpal ligament can cause pain, numbness, tingling, or dysesthesias within the median nerve distribution as well as feelings of fatigue, clumsiness, or awkwardness    These symptoms typically occur at night and worse in the morning upon waking  Eventually, untreated carpal tunnel syndrome can result in weakness and permanent loss of muscle within the thenar compartment of the hand  Treatment options were discussed with the patient  Conservative treatment includes nocturnal resting splints to keep the nerve in a neutral position, ergonomic changes within the work or home environment, activity modification, and tendon gliding exercises  Vitamin B6 one tablet daily over the counter may helpful to reduce symptoms  Steroid injections within the carpal canal can help a majority of patients, however this is often self-limited in a majority of patients  Surgical intervention to divide the transverse carpal ligament typically results in a long-lasting relief of the patient's complaints, with the recurrence rate of less than 1%  Cubital Tunnel Syndrome: The anatomy and physiology of cubital tunnel syndrome were discussed with the patient today in the office  Typically, increased elbow flexion activities decrease blood flow within the intraneural spaces, resulting in a feeling of numbness, tingling, weakness, or clumsiness within the hand and fingers  Occasionally, anatomic structures such as medial elbow osteophytes, the medial head of the triceps, were subluxing ulnar nerve may result in increased pressure or aggravation at the cubital tunnel  Typical signs and symptoms usually include numbness and tingling within the ring and small finger, weakness with , and weakness with pinch  Conservative treatment and includes nocturnal bracing to keep the elbow in a semi-extended position, activity modification, therapy, and avoiding excessive elbow flexion activities  Vitamin B6 one tablet daily over the counter may helpful to reduce symptoms    A majority of patients typically respond to conservative treatment over a period of approximately 3-6 months  EMG/NCV testing of the ulnar nerve at the elbow is not as reliable as carpal tunnel syndrome  Surgical intervention in the form of in situ release of the ulnar nerve at the elbow or ulnar nerve transposition may be required in up to 20% of patients           Scribe Attestation    I,:   Mark Marrero am acting as a scribe while in the presence of the attending physician :        I,:   Sheree Epps MD personally performed the services described in this documentation    as scribed in my presence :

## 2018-09-21 ENCOUNTER — OFFICE VISIT (OUTPATIENT)
Dept: FAMILY MEDICINE CLINIC | Facility: CLINIC | Age: 73
End: 2018-09-21
Payer: MEDICARE

## 2018-09-21 VITALS
WEIGHT: 160.6 LBS | HEART RATE: 54 BPM | TEMPERATURE: 98 F | SYSTOLIC BLOOD PRESSURE: 122 MMHG | BODY MASS INDEX: 26.76 KG/M2 | RESPIRATION RATE: 16 BRPM | DIASTOLIC BLOOD PRESSURE: 64 MMHG | HEIGHT: 65 IN

## 2018-09-21 DIAGNOSIS — E11.22 HYPERTENSION ASSOCIATED WITH STAGE 2 CHRONIC KIDNEY DISEASE DUE TO TYPE 2 DIABETES MELLITUS (HCC): Chronic | ICD-10-CM

## 2018-09-21 DIAGNOSIS — Z12.11 ENCOUNTER FOR SCREENING COLONOSCOPY: ICD-10-CM

## 2018-09-21 DIAGNOSIS — I12.9 HYPERTENSION ASSOCIATED WITH STAGE 2 CHRONIC KIDNEY DISEASE DUE TO TYPE 2 DIABETES MELLITUS (HCC): Chronic | ICD-10-CM

## 2018-09-21 DIAGNOSIS — I25.10 CORONARY ARTERY DISEASE INVOLVING NATIVE CORONARY ARTERY OF NATIVE HEART WITHOUT ANGINA PECTORIS: ICD-10-CM

## 2018-09-21 DIAGNOSIS — E78.5 HYPERLIPIDEMIA, UNSPECIFIED HYPERLIPIDEMIA TYPE: ICD-10-CM

## 2018-09-21 DIAGNOSIS — N18.2 HYPERTENSION ASSOCIATED WITH STAGE 2 CHRONIC KIDNEY DISEASE DUE TO TYPE 2 DIABETES MELLITUS (HCC): Chronic | ICD-10-CM

## 2018-09-21 DIAGNOSIS — G47.00 INSOMNIA, UNSPECIFIED TYPE: ICD-10-CM

## 2018-09-21 DIAGNOSIS — M54.50 TENDERNESS OF RIGHT LUMBAR REGION: Primary | ICD-10-CM

## 2018-09-21 DIAGNOSIS — Z23 NEED FOR INFLUENZA VACCINATION: ICD-10-CM

## 2018-09-21 DIAGNOSIS — E11.9 TYPE 2 DIABETES MELLITUS WITHOUT COMPLICATION, WITHOUT LONG-TERM CURRENT USE OF INSULIN (HCC): ICD-10-CM

## 2018-09-21 DIAGNOSIS — K21.9 GASTROESOPHAGEAL REFLUX DISEASE, ESOPHAGITIS PRESENCE NOT SPECIFIED: ICD-10-CM

## 2018-09-21 DIAGNOSIS — M62.830 SPASM OF LUMBAR PARASPINOUS MUSCLE: ICD-10-CM

## 2018-09-21 PROCEDURE — 90662 IIV NO PRSV INCREASED AG IM: CPT | Performed by: FAMILY MEDICINE

## 2018-09-21 PROCEDURE — G0008 ADMIN INFLUENZA VIRUS VAC: HCPCS | Performed by: FAMILY MEDICINE

## 2018-09-21 PROCEDURE — 99213 OFFICE O/P EST LOW 20 MIN: CPT | Performed by: FAMILY MEDICINE

## 2018-09-21 RX ORDER — HYDROCORTISONE 0.5 %
CREAM (GRAM) TOPICAL
Qty: 30 G | Refills: 0 | Status: SHIPPED | OUTPATIENT
Start: 2018-09-21 | End: 2019-02-08

## 2018-09-21 RX ORDER — METHOCARBAMOL 750 MG/1
750 TABLET, FILM COATED ORAL
Qty: 30 TABLET | Refills: 0 | Status: SHIPPED | OUTPATIENT
Start: 2018-09-21 | End: 2019-02-08

## 2018-09-21 NOTE — PROGRESS NOTES
Karma Christensen [birth year] male MRN: [id number]    Family Medicine Follow-up Visit    ASSESSMENT/PLAN  Diagnoses and all orders for this visit:    Tenderness of right lumbar region, Spasm of lumbar paraspinous muscle  -     Possibly lumbar spinal stenosis vs  muscular etiology  -     Start methocarbamol (ROBAXIN) 750 mg tablet; Take 1 tablet (750 mg total) by mouth daily at bedtime as needed for muscle spasms  -     Ambulatory referral to Physical Therapy; Future  -     Advised patient to try OTC lidocaine patches if desired  -     Discussed heating pad application for acute flares  -     Menthol-Methyl Salicylate (ELMIRA FIGUEREDO GREASELESS) 10-15 % greaseless cream; Apply topically daily at bedtime  -      Advised patient to make OMT appointment at next available  -     If back pain persists despite multi-modal treatment, or patient exhibits weight loss, night sweats, fatigue, new anemia, change in calcium or renal function, hematuria or changes to urine habits, will pursue malignancy workup    Encounter for screening colonoscopy  -     Per patient request, last colonoscopy 2013 w/ polypectomy  -     Ambulatory referral to Gastroenterology; Future    Need for influenza vaccination  -     Given today: influenza vaccine, [id number], high-dose, PF 0 5 mL, for patients 65 yr+ (FLUZONE HIGH-DOSE)    Hyperlipidemia, unspecified hyperlipidemia type  -     Refilled atorvastatin (LIPITOR) 80 mg tablet; Take 1 tablet (80 mg total) by mouth daily for 270 days    Type 2 diabetes mellitus without complication, without long-term current use of insulin (HCC)  -     Refilled glimepiride (AMARYL) 4 mg tablet; TAKE 1/2 TABLET BY MOUTH IN THE MORNING AND 1 TABLET AT NIGHT  -     Refilled metFORMIN (GLUCOPHAGE) 500 mg tablet;  Take 1 tablet (500 mg total) by mouth 2 (two) times a day with meals for 270 days    Hypertension associated with stage 1 chronic kidney disease due to type 2 diabetes mellitus ([facility] [address] ), Hx of CAD without angina pectoris  - /64, at goal today  -     Refilled losartan (COZAAR) 25 mg tablet; Take 1 tablet (25 mg total) by mouth daily for 270 days  -     Refilled metoprolol tartrate (LOPRESSOR) 25 mg tablet; Take 1 tablet (25 mg total) by mouth every 12 (twelve) hours for 270 days    Insomnia, unspecified type  -     Despite higher dose than typically used for insomnia, patient states 15 mg dose working well for him  -     Refilled mirtazapine (REMERON) 15 mg tablet; Take 1 tablet (15 mg total) by mouth daily at bedtime for 270 days    Gastroesophageal reflux disease, esophagitis presence not specified  -     Refilled omeprazole (PriLOSEC) 20 mg delayed release capsule; Take 1 capsule (20 mg total) by mouth daily for 28 days  -     Sent trial of zantac 150 mg BID PRN to pharmacy  -     Encouraged patient to consider stopping PPI in future  -     Discussed anti-reflux measures:   · Raise the head of the bed 4 to 6 inches  · Avoid excess coffee, tea or other caffeinated beverages  · Avoid spicy or acidic foods  · Avoid garments that fit tightly through the abdomen  · Avoid eating before bed  Follow up in 2 months      Future Appointments  Date Time Provider Tino Kumar   9/24/2018 9:00 AM Iram Infante OT AN MOB OT AN HOSP MOB   10/9/2018 9:30 AM Mila Schumacher DO S [facility]Com   11/1/2018 10:30 AM Dayana Gan MD ORTHO AND Practice-Ort   11/16/2018 2:20 PM Tresa Gomez MD [facility]          SUBJECTIVE  CC: Follow-up      HPI:  Kwesi Sorenson is a 72 y o  male who presents for follow up on:    · T2DM:  Lab Results   Component Value Date    HGBA1C 7 0 (H) 09/11/2018   -Last urine microalbumin: 9/11/18, abnormal, but patient requests waiting on Nephrology referral if possible  -Adherent to current treatment regimen, which includes metformin 500 mg BID and glimepiride 4 mg (2 mg QAM, 4 mg QHS); refills  -No symptoms of hypoglycemia  -Patient continues to decline referral to Ophthalmology  -Requests we defer DM foot exam today  -Reports balanced diet, limits glycemic intake; gets regular exercise  -Up to date on flu shot: Accepts flu shot today  -Received PPSV: Yes    · Back pain: R sided lumbar back pain worse for past 7-8 months, no incontinence of bowel or bladder, no urine habit changes including BPH or hematuria, no radicular symptoms or numbness  Pain is worse with sitting and better with walking and bending forward  No trauma or known injury  Taking tylenol but doesn't help much, sometimes does help pain but only slightly, no topical treatments or NSAIDs, told he can't take NSAIDs due to cardiac history    · HLD: Needs atorvastatin refill, last lipid panel 9/11/18 showed T  Chol 105, HDL 50, LDL 45, TG 48, last LFTs WNL 9/11/18    · HTN, Hx of CAD: Needs refill on losart and metoprolol, follows with Cardiology, no hypotensive episodes, last Cr 0 80 and last GFR 90 on 9/11/18    · Insomnia: Needs remeron refill, takes 15 mg but says it works well, no changes needed, declines melatonin    · HM: Asks for colonoscopy referral, last colonoscopy 2013 w/ polypectomy    Review of Systems   Constitutional: Negative for activity change, appetite change, chills, fatigue, fever and unexpected weight change  Eyes: Negative for visual disturbance  Respiratory: Negative for chest tightness and shortness of breath  Gastrointestinal: Negative for abdominal pain, anal bleeding, blood in stool, constipation, nausea and vomiting  Genitourinary: Negative for decreased urine volume, difficulty urinating, dysuria, flank pain, frequency and hematuria  Musculoskeletal: Positive for arthralgias and back pain  Negative for gait problem  Skin: Negative for rash  Neurological: Negative for seizures, syncope, weakness and numbness  Psychiatric/Behavioral: Positive for sleep disturbance  All other systems reviewed and are negative        Historical Information   The patient history was reviewed as follows:    Past Medical History:   Diagnosis Date    AS (aortic stenosis)     Balanitis     Biceps tendonitis on right     CAD (coronary artery disease)     Cancer (HCC)     skin    Complete rotator cuff tear or rupture of right shoulder, not specified as traumatic     Diabetes mellitus ([facility] [address] )     Esophageal reflux     High cholesterol     Hypertension     Hypertriglyceridemia     Hypogonadism in male     Myalgia     Myocardial infarction ([facility] [address] )     Palpitations     Shoulder pain     Sinus problem     Skin cancer of nose     Sleep apnea     [facility] 5907    Systolic murmur     recently found    Tinea cruris     Tinea pedis      Past Surgical History:   Procedure Laterality Date    CATARACT EXTRACTION Left     COLONOSCOPY      CORONARY ANGIOPLASTY WITH STENT PLACEMENT      CORONARY ARTERY BYPASS GRAFT N/A 12/12/2016    Procedure: INTRAOP CECILLE; BILATERAL LEG EVH; CORONARY ARTERY BYPASS GRAFT X 4 SVG TO PDA, OM1,  AND DIAGONAL1, LIMA TO LAD;  Surgeon: Valentina Donald MD;  Location[facility];  Service:     EYE SURGERY      HERNIA REPAIR      LA ARTHROSCOPY SHOULDER SURGICAL BICEPS TENODESIS Right 5/6/2016    Procedure: ARTHROSCOPIC BICEPS TENODESIS;  Surgeon: Lyric Tomas MD;  Location[facility];  Service: Orthopedics    LA SHLDR ARTHROSCOP,SURG,W/ROTAT CUFF REPR Right 5/6/2016    Procedure: REPAIR ROTATOR CUFF  ARTHROSCOPIC; SUBACROMIAL DECOMPRESSION; PARTIAL SYNOVECTOMY;  Surgeon: Lyric Tomas MD;  Location[facility];  Service: Orthopedics    UMBILICAL HERNIA REPAIR  2009    over age 13     Family History   Problem Relation Age of Onset    Heart disease Mother     Hypertension Mother     Nephrolithiasis Father     Other Father         Renal disease    Hypertension Sister     Nephrolithiasis Brother     Other Brother         Renal disease    Hematuria Family         Microscopic      Social History   History   Alcohol Use No     History   Drug Use No History   Smoking Status    Never Smoker   Smokeless Tobacco    Never Used       Medications:     Current Outpatient Prescriptions:     acetaminophen (TYLENOL) 325 mg tablet, Take 1-2 tablets by mouth every 6 (six) hours as needed, Disp: , Rfl:     aspirin (ECOTRIN LOW STRENGTH) 81 mg EC tablet, Take 1 tablet (81 mg total) by mouth daily for 90 days, Disp: 90 tablet, Rfl: 1    atorvastatin (LIPITOR) 80 mg tablet, Take 1 tablet (80 mg total) by mouth daily for 90 days, Disp: 90 tablet, Rfl: 0    clorazepate (TRANXENE) 7 5 mg tablet, , Disp: , Rfl: 0    fluticasone (FLONASE) 50 mcg/act nasal spray, 1 spray into each nostril daily, Disp: 16 g, Rfl: 2    glimepiride (AMARYL) 4 mg tablet, TAKE 1/2 TABLET BY MOUTH IN THE MORNING AND 1 TABLET AT NIGHT, Disp: 180 tablet, Rfl: 0    hydrocortisone 2 5 % cream, Apply topically 3 (three) times a day, Disp: , Rfl:     losartan (COZAAR) 25 mg tablet, Take 1 tablet (25 mg total) by mouth daily, Disp: 90 tablet, Rfl: 0    metFORMIN (GLUCOPHAGE) 500 mg tablet, Take 1 tablet (500 mg total) by mouth 2 (two) times a day with meals for 90 days, Disp: 180 tablet, Rfl: 0    metoprolol tartrate (LOPRESSOR) 25 mg tablet, Take 1 tablet (25 mg total) by mouth every 12 (twelve) hours for 90 days, Disp: 180 tablet, Rfl: 0    mirtazapine (REMERON) 15 mg tablet, Take 1 tablet (15 mg total) by mouth daily at bedtime for 90 days, Disp: 90 tablet, Rfl: 0    omeprazole (PriLOSEC) 20 mg delayed release capsule, Take 1 capsule (20 mg total) by mouth daily for 28 days, Disp: 28 capsule, Rfl: 0    ONE TOUCH ULTRA TEST test strip, Test blood sugar twice daily, or as needed when symptomatic of hypoglycemia or hyperglycemia, Disp: 100 each, Rfl: 5    ONETOUCH DELICA LANCETS FINE MISC, Test blood sugar twice daily, or as needed when symptomatic of hypoglycemia or hyperglycemia, Disp: 100 each, Rfl: 5    testosterone cypionate (DEPO-TESTOSTERONE) 200 mg/mL SOLN, , Disp: , Rfl: 0  Allergies   Allergen Reactions    Epinephrine Hives and Anxiety    Penicillins Hives and Anxiety       OBJECTIVE    Vitals:   Vitals:    09/21/18 1430   BP: 122/64   Pulse: (!) 54   Resp: 16   Temp: 98 °F (36 7 °C)   Weight: 72 8 kg (160 lb 9 6 oz)   Height: 5' 5" (1 651 m)     Physical Exam   Constitutional: He is oriented to person, place, and time  He appears well-developed and well-nourished  HENT:   Head: Normocephalic and atraumatic  Nose: Nose normal    Mouth/Throat: Oropharynx is clear and moist    Eyes: Conjunctivae and EOM are normal  Right eye exhibits no discharge  Neck: Normal range of motion  Neck supple  Cardiovascular: Normal rate, regular rhythm and intact distal pulses  Grade 2/6 systolic ejection murmur, unchanged from last exam   Pulmonary/Chest: Effort normal and breath sounds normal    Abdominal: Soft  Bowel sounds are normal  He exhibits no distension  There is no tenderness  Musculoskeletal: He exhibits tenderness  He exhibits no deformity  R lumbar paravertebral musculature tenderness & hypertonicity suggestive of spasm, mild tenderness L lumbar paravertebral musculature    Neurological: He is alert and oriented to person, place, and time  Skin: Skin is warm and dry  Psychiatric: He has a normal mood and affect  His behavior is normal  Judgment and thought content normal    Vitals reviewed         Britt Dhillon MD, PGY-2  [facility]   9/21/2018

## 2018-09-21 NOTE — PATIENT INSTRUCTIONS
Espasmo muscular   LO QUE NECESITA SABER:   Un espasmo muscular es barron contracción convulsiva involuntaria de cualquier músculo o de un karen de músculos  Un calambre muscular es un espasmo muscular muy doloroso  Los calambres musculares generalmente ocurren después del ejercicio intenso o monica el [facility]  Estos también pueden ser provocados por ciertos medicamentos, la deshidratación, bajos niveles de calcio o magnesio u otras condiciones de Jillian  Vaibhav José [facility]:   Medicamentos:  Usted podría  necesitar lo siguiente:  · AINEs (Analgésicos antiinflamatorios no esteroides)  pueden disminuir la inflamación y el dolor o la fiebre  [place] medicamento esta disponible con o sin barron receta médica  Los AINEs pueden causar sangrado estomacal o problemas renales en ciertas personas  Si usted jameson un medicamento anticoagulante, siempre pregúntele a li médico si los DACIA son seguros para usted  Siempre luzma la etiqueta de [place] medicamento y [place] instrucciones  · [place] quinton medicamentos seth se le haya indicado  Consulte con li médico si usted bernard que li medicamento no le está ayudando o si presenta efectos secundarios  Infórmele si es alérgico a algún medicamento  Mantenga barron lista actualizada de los [place], las vitaminas y los productos herbales que jameson  Incluya los siguientes datos de los medicamentos: cantidad, frecuencia y motivo de administración  Traiga con usted la lista o los envases de la píldoras a quinton citas de seguimiento  Lleve la lista de los medicamentos con usted en fouzia de barron emergencia  Acuda a quinton consultas de control con li médico según le indicaron  Usted puede necesitar otros exámenes o tratamientos  También es posible que lo refieran a un fisioterapeuta u otro especialista  Anote quinton preguntas para que se acuerde de hacerlas monica quinton visitas  Cuidados personales:   · El estiramiento  de li músculo ayuda a aliviar el calambre   También puede ser de Xavier Kohli mantener el músculo estirado hasta que el calambre desaparezca  · Aplique calor  para ayudar a disminuir el dolor y el espasmo muscular  Aplíquese calor en el área lesionada monica 20 a 30 minutos cada 2 horas monica la cantidad de [employer] indiquen  · Aplique hielo  para ayudar a disminuir la inflamación y el dolor  El hielo también puede contribuir a evitar el daño de los tejidos  Use un paquete de hielo o ponga hielo molido dentro de [employer]  Envuelva la compresa o bolsa con barron toalla y colóquela sobre li músculo por 15 a 20 minutos cada hora o seth se lo indicaron  · Consuma más líquidos  para ayudar a prevenir espasmos musculares causados por la deshidratación  Las bebidas atléticas pueden ayudar a reemplazar los electrolitos que pierde monica el ejercicio por la sudoración  Pregunte a li médico sobre la cantidad de líquido que necesita kavon todos los días y cuáles le recomienda  · Consuma alimentos saludables , seth frutas, verduras, granos integrales, productos lácteos bajos en grasa y proteínas bajas en grasa (carne Guadelupe Fulling, legumbres y pescado)  Si está embarazada, pregúntele a li médico qué alimentos son ricos en magnesio y [place]  Estos pueden ayudar para [employer] calambres monica el [facility]  · Dé masajes suaves sobre el músculo  para aliviar el calambre  · Respire profundo varias veces  hasta que el calambre se mejore  Acuéstese mientras respira profundo para que no sufra un mareo o desvanecimiento  Pregúntele a li Claryce Levans vitaminas y minerales son adecuados para usted  · Usted presenta signos de deshidratación, seth dolor de [place] de color amarillo oscuro, ojos o boca secos o latidos cardíacos rápidos  · Usted tiene preguntas o inquietudes acerca de li condición o cuidado  Regrese a la abraham de emergencias si:   · El músculo con el calambre está caliente al tacto, inflamado o enrojecido       · Usted sufre con frecuencia y sin alivio de calambres musculares en varios músculos diferentes  · Usted presenta calambres musculares con entumecimiento, cosquilleo y sensación quemante en quinton hunter y pies  © 2017 [address] [employer] Information is for End User's use only and may not be sold, redistributed or otherwise used for commercial purposes  All illustrations and images included in CareNotes® are the copyrighted property of [employer] Inc  or Dustin Lamar  Esta información es sólo para uso en educación  Dyson intención no es darle un consejo médico sobre enfermedades o tratamientos  Colsulte con dyson Suzen Uneeda farmacéutico antes de seguir cualquier régimen médico para saber si es seguro y efectivo para usted

## 2018-09-22 DIAGNOSIS — E11.9 TYPE 2 DIABETES MELLITUS WITHOUT COMPLICATION, WITHOUT LONG-TERM CURRENT USE OF INSULIN (HCC): ICD-10-CM

## 2018-09-22 RX ORDER — GLIMEPIRIDE 4 MG/1
TABLET ORAL
Qty: 180 TABLET | Refills: 3 | Status: CANCELLED | OUTPATIENT
Start: 2018-09-22

## 2018-09-23 RX ORDER — OMEPRAZOLE 20 MG/1
20 CAPSULE, DELAYED RELEASE ORAL DAILY
Qty: 28 CAPSULE | Refills: 0 | Status: SHIPPED | OUTPATIENT
Start: 2018-09-23 | End: 2018-10-09 | Stop reason: SDUPTHER

## 2018-09-23 RX ORDER — ATORVASTATIN CALCIUM 80 MG/1
80 TABLET, FILM COATED ORAL DAILY
Qty: 90 TABLET | Refills: 2 | Status: SHIPPED | OUTPATIENT
Start: 2018-09-23 | End: 2019-03-15 | Stop reason: SDUPTHER

## 2018-09-23 RX ORDER — MIRTAZAPINE 15 MG/1
15 TABLET, FILM COATED ORAL
Qty: 90 TABLET | Refills: 2 | Status: SHIPPED | OUTPATIENT
Start: 2018-09-23 | End: 2019-03-15 | Stop reason: SDUPTHER

## 2018-09-23 RX ORDER — GLIMEPIRIDE 4 MG/1
TABLET ORAL
Qty: 180 TABLET | Refills: 2 | Status: SHIPPED | OUTPATIENT
Start: 2018-09-23 | End: 2019-03-15 | Stop reason: SDUPTHER

## 2018-09-23 RX ORDER — RANITIDINE 150 MG/1
150 CAPSULE ORAL 2 TIMES DAILY PRN
Qty: 120 CAPSULE | Refills: 0 | Status: SHIPPED | OUTPATIENT
Start: 2018-09-23 | End: 2018-11-14 | Stop reason: SDUPTHER

## 2018-09-23 RX ORDER — LOSARTAN POTASSIUM 25 MG/1
25 TABLET ORAL DAILY
Qty: 90 TABLET | Refills: 2 | Status: ON HOLD | OUTPATIENT
Start: 2018-09-23 | End: 2019-02-09

## 2018-09-24 ENCOUNTER — EVALUATION (OUTPATIENT)
Dept: OCCUPATIONAL THERAPY | Facility: CLINIC | Age: 73
End: 2018-09-24
Payer: MEDICARE

## 2018-09-24 DIAGNOSIS — M25.649 STIFFNESS OF FINGER JOINT, UNSPECIFIED LATERALITY: ICD-10-CM

## 2018-09-24 DIAGNOSIS — R29.898 DECREASED GRIP STRENGTH: ICD-10-CM

## 2018-09-24 PROCEDURE — G8991 OTHER PT/OT GOAL STATUS: HCPCS | Performed by: OCCUPATIONAL THERAPIST

## 2018-09-24 PROCEDURE — 97140 MANUAL THERAPY 1/> REGIONS: CPT | Performed by: OCCUPATIONAL THERAPIST

## 2018-09-24 PROCEDURE — 97166 OT EVAL MOD COMPLEX 45 MIN: CPT | Performed by: OCCUPATIONAL THERAPIST

## 2018-09-24 PROCEDURE — G8990 OTHER PT/OT CURRENT STATUS: HCPCS | Performed by: OCCUPATIONAL THERAPIST

## 2018-09-24 NOTE — PROGRESS NOTES
Daily Note     Today's date: 2018  Patient name: Ofe Montoya  :   MRN: [id number]  Referring provider: Juan José Ceballos MD  Dx:   Encounter Diagnosis     ICD-10-CM    1  Stiffness of finger joint, unspecified laterality M25 649 Ambulatory referral to Occupational Therapy   2  Decreased  strength R29 048 Ambulatory referral to Occupational Therapy                  Subjective: See eval        Objective: See treatment diary below      Assessment: Tolerated treatment fair  Patient would benefit from continued OT      Plan: Continue per plan of care       Specialty Daily Treatment Diary     Manual         IASTM R hand flexor tendons/ R RF FDS 10'       Supine MNG        Supine SARAH                            Exercise Diary         CARRILLO Lamar Issued                                                                                                                                                                   Modalities        MHP R hand

## 2018-09-24 NOTE — PROGRESS NOTES
OT Evaluation     Today's date: 2018  Patient name: Sacha Fay  :   MRN: [id number]  Referring provider: Jada Dow MD  Dx:   Encounter Diagnosis     ICD-10-CM    1  Stiffness of finger joint, unspecified laterality M25 649 Ambulatory referral to Occupational Therapy   2  Decreased  strength R29 898 Ambulatory referral to Occupational Therapy                  Assessment  Impairments: abnormal or restricted ROM, activity intolerance, impaired physical strength, lacks appropriate home exercise program and pain with function    Assessment details: Referred with a formal diagnosis of B/L CTS (moderate per EMG)  He also experiences hand stiffness and pain (primarily in the R hand)  He demonstrates 75% composite fist in the R hand, and 90% fist in the L hand  His passive motion is > than active motion  Pain is localized to the palmar area in the R hand, close to the A1 pulley  There is no evidence of a trigger finger in the ring finger  There is neural tension bilaterally, however UE ROM is impaired by shoulder deficits  See below for a detailed assessment  Understanding of Dx/Px/POC: fair   Prognosis: good    Goals  STG: Patient will be compliant with home exercise program in 2 weeks  STG: Pain in the R hand will be reduced by 25% in 4 weeks  STG: Subjective reports of n/t are improved by 25% in 4 weeks  STG: Range of motion of R hand will be improved by 25% in 4 weeks  STG: Strength will be improved by 25% in 4 weeks  LTG: Range of motion of R hand hand will be improved by 75% in 6-8 weeks  LTG: Strength will be improved by 50% in 6-8 weeks  LTG: Performance in ADLs and IADLS will be improved to prior level of function with the affected extremity within 6 weeks  LTG: FOTO score increase by 15 points within 6 weeks           Plan  Patient would benefit from: skilled OT and OT eval  Planned modality interventions: thermotherapy: hydrocollator packs and thermotherapy: paraffin bath  Planned therapy interventions: functional ROM exercises, home exercise program, joint mobilization, manual therapy, therapeutic exercise, patient education, sensory integrative techniques and strengthening  Other planned therapy interventions: Nerve gliding   Frequency: 2x week  Duration in weeks: 6  Treatment plan discussed with: patient  Plan details: Treatment to include modalities, manual therapy, PRE's, HEP, and orthotics as appropriate  Subjective Evaluation    History of Present Illness  Mechanism of injury: Referred for B/L hand stiffness, pain, and numbness and tingling  Reports that numbness in the digits is worse at night  He has been using wrist braces to manage this which provide some moderate relief  States that stiffness and pain are worse in the R hand and have persisted for >6 months  Recurrent probem    Pain  At worst pain ratin  Location: R palm, centralized to the RF  Quality: sharp    Hand dominance: right      Diagnostic Tests  X-ray: abnormal (OA in IP's)  EMG: abnormal (Moderate CTS B/L)  Treatments  Previous treatment: injection treatment  Patient Goals  Patient goals for therapy: decreased pain, increased motion, increased strength and independence with ADLs/IADLs          Objective     Observations     Left Wrist/Hand   Negative for atrophy  Right Wrist/Hand   Negative for atrophy  Palpation     Right Tenderness of the flexor digitorum superficialis  Right Wrist/Hand Comments  Right flexor digitorum superficialis: to ring finger  Additional Palpation Details  No locking or clicking of the R RF       Neurological Testing     Sensation     Wrist/Hand   Left   Diminished: light touch  Paresthesia: light touch    Right   Diminished: light touch  Paresthesia: light touch    Additional Neurological Details  R:  Thumb=4 31  IF=3 61  All others=2 83  L:  Thumb=3 61  All others=2 83    Active Range of Motion   Left Shoulder   Flexion: with pain    Right Shoulder   Flexion: with pain    Left Digits    Flexion   Index     MCP: 60    PIP: 92  Middle     MCP: 75    PIP: 95  Ring     MCP: 82    PIP: 100  Little     MCP: 80    PIP: 93    Right Digits   Flexion   Index     MCP: 65    PIP: 90  Middle     MCP: 70    PIP: 92  Ring     MCP: 70    PIP: 90  Little     MCP: 68    PIP: 80    Additional Active Range of Motion Details  Previous R RTC surgery  Strength/Myotome Testing     Left Wrist/Hand      (2nd hand position)     Trial 1: 55    Thumb Strength  Key/Lateral Pinch     [place] 1: 11  Palmar/Three-Point Pinch     Trial 1: 9    Right Wrist/Hand      (2nd hand position)     Trial 1: 40    Thumb Strength   Key/Lateral Pinch     Trial 1: 11  Palmar/Three-Point Pinch     Trial 1: 11    Tests     Left Shoulder   Positive ULTT1  Right Shoulder   Positive ULTT1  Left Elbow   Negative Tinel's sign (cubital tunnel)  Right Elbow   Positive Tinel's sign (cubital tunnel)  Left Wrist/Hand   Negative intrinsic muscle tightness, oblique retinacular ligament tightness, Phalen's sign and Tinel's sign (medial nerve)  Right Wrist/Hand   Negative intrinsic muscle tightness, oblique retinacular ligament tightness, Phalen's sign and Tinel's sign (medial nerve)

## 2018-09-27 ENCOUNTER — OFFICE VISIT (OUTPATIENT)
Dept: OCCUPATIONAL THERAPY | Facility: CLINIC | Age: 73
End: 2018-09-27
Payer: MEDICARE

## 2018-09-27 DIAGNOSIS — M25.649 STIFFNESS OF FINGER JOINT, UNSPECIFIED LATERALITY: Primary | ICD-10-CM

## 2018-09-27 PROCEDURE — 97140 MANUAL THERAPY 1/> REGIONS: CPT | Performed by: OCCUPATIONAL THERAPIST

## 2018-09-27 PROCEDURE — 97530 THERAPEUTIC ACTIVITIES: CPT | Performed by: OCCUPATIONAL THERAPIST

## 2018-09-27 PROCEDURE — 97110 THERAPEUTIC EXERCISES: CPT | Performed by: OCCUPATIONAL THERAPIST

## 2018-09-27 NOTE — PROGRESS NOTES
Daily Note     Today's date: 2018  Patient name: Jose Hinojosa  :   MRN: [id number]  Referring provider: Clearance Bosworth, MD  Dx:   Encounter Diagnosis     ICD-10-CM    1  Stiffness of finger joint, unspecified laterality M25 649                   Subjective: "I'm better, my finger bends more"      Objective: See treatment diary below      Assessment: IF MP the most stiff on the R hand  Neural tension present with shoulders abducted beyond 90 degrees  R>L  Tolerated treatment fair  Patient would benefit from continued OT  Plan: Continue per plan of care       Specialty Daily Treatment Diary     Manual        IASTM R hand flexor tendons/ R RF FDS 10' 5'      Supine MNG  10'      Supine SARAH        Digit Stretching R  5'                  Exercise Diary        HEP: CARRILLO Girard Issued       Velcro pinch board R  1x      Digiflex R  Red/green 30x each       BPW grasping R  30x       Dowels in putty pressing  Medium and small dowel, orange putty, 20x each       Pinch ring   R/G 1x                                                                                                                          Modalities       MHP R hand  R hand 10'

## 2018-10-09 DIAGNOSIS — K21.9 GASTROESOPHAGEAL REFLUX DISEASE, ESOPHAGITIS PRESENCE NOT SPECIFIED: ICD-10-CM

## 2018-10-10 RX ORDER — OMEPRAZOLE 20 MG/1
CAPSULE, DELAYED RELEASE ORAL
Qty: 90 CAPSULE | Refills: 3 | Status: SHIPPED | OUTPATIENT
Start: 2018-10-10 | End: 2019-02-08

## 2018-10-26 DIAGNOSIS — K21.9 GASTROESOPHAGEAL REFLUX DISEASE, ESOPHAGITIS PRESENCE NOT SPECIFIED: ICD-10-CM

## 2018-10-28 RX ORDER — OMEPRAZOLE 20 MG/1
20 CAPSULE, DELAYED RELEASE ORAL DAILY
Qty: 28 CAPSULE | Refills: 1 | Status: SHIPPED | OUTPATIENT
Start: 2018-10-28 | End: 2019-01-07 | Stop reason: SDUPTHER

## 2018-11-14 DIAGNOSIS — K21.9 GASTROESOPHAGEAL REFLUX DISEASE, ESOPHAGITIS PRESENCE NOT SPECIFIED: ICD-10-CM

## 2018-11-14 RX ORDER — RANITIDINE 150 MG/1
150 CAPSULE ORAL 2 TIMES DAILY PRN
Qty: 120 CAPSULE | Refills: 2 | Status: SHIPPED | OUTPATIENT
Start: 2018-11-14 | End: 2019-02-09 | Stop reason: HOSPADM

## 2018-11-19 DIAGNOSIS — J30.9 ALLERGIC RHINITIS, UNSPECIFIED SEASONALITY, UNSPECIFIED TRIGGER: ICD-10-CM

## 2018-11-20 RX ORDER — FLUTICASONE PROPIONATE 50 MCG
1 SPRAY, SUSPENSION (ML) NASAL DAILY
Qty: 1 BOTTLE | Refills: 4 | Status: SHIPPED | OUTPATIENT
Start: 2018-11-20 | End: 2019-03-01 | Stop reason: SDUPTHER

## 2018-11-28 DIAGNOSIS — E11.9 TYPE 2 DIABETES MELLITUS WITHOUT COMPLICATION, WITHOUT LONG-TERM CURRENT USE OF INSULIN (HCC): ICD-10-CM

## 2018-11-28 RX ORDER — BLOOD-GLUCOSE METER
EACH MISCELLANEOUS
Qty: 100 EACH | Refills: 11 | Status: SHIPPED | OUTPATIENT
Start: 2018-11-28 | End: 2019-02-08

## 2018-12-05 ENCOUNTER — OFFICE VISIT (OUTPATIENT)
Dept: FAMILY MEDICINE CLINIC | Facility: CLINIC | Age: 73
End: 2018-12-05
Payer: MEDICARE

## 2018-12-05 VITALS
HEART RATE: 78 BPM | BODY MASS INDEX: 27.16 KG/M2 | SYSTOLIC BLOOD PRESSURE: 122 MMHG | RESPIRATION RATE: 14 BRPM | DIASTOLIC BLOOD PRESSURE: 80 MMHG | TEMPERATURE: 98.1 F | HEIGHT: 65 IN | WEIGHT: 163 LBS

## 2018-12-05 DIAGNOSIS — G56.03 BILATERAL CARPAL TUNNEL SYNDROME: ICD-10-CM

## 2018-12-05 DIAGNOSIS — Z86.19 H/O TINEA CRURIS: Primary | Chronic | ICD-10-CM

## 2018-12-05 DIAGNOSIS — H40.9 GLAUCOMA, UNSPECIFIED GLAUCOMA TYPE, UNSPECIFIED LATERALITY: ICD-10-CM

## 2018-12-05 PROCEDURE — 99213 OFFICE O/P EST LOW 20 MIN: CPT | Performed by: FAMILY MEDICINE

## 2018-12-05 RX ORDER — CLOTRIMAZOLE AND BETAMETHASONE DIPROPIONATE 10; .64 MG/G; MG/G
CREAM TOPICAL 2 TIMES DAILY
Qty: 30 G | Refills: 0 | Status: SHIPPED | OUTPATIENT
Start: 2018-12-05 | End: 2018-12-15 | Stop reason: SDUPTHER

## 2018-12-05 RX ORDER — TOBRAMYCIN AND DEXAMETHASONE 3; 1 MG/ML; MG/ML
1 SUSPENSION/ DROPS OPHTHALMIC 2 TIMES DAILY
Qty: 5 ML | Refills: 0 | Status: SHIPPED | OUTPATIENT
Start: 2018-12-05 | End: 2018-12-24 | Stop reason: SDUPTHER

## 2018-12-05 NOTE — ASSESSMENT & PLAN NOTE
Currently using night splints and Voltaren gel  No resolution of pain and numbness  EMG (2/2018): There is electrophysiological evidence consistent with bilateral median neuropathy across the wrist (moderate on the right; moderate on the left)  These electrophysiological findings are consistent with a clinical diagnosis of carpal tunnel syndrome       Treatment: Injections:   July and August of 2018

## 2018-12-05 NOTE — PROGRESS NOTES
Jeremy Snow [birth year] male MRN: [id number]    Family Medicine Acute Visit    ASSESSMENT/PLAN   Problem List Items Addressed This Visit     H/O tinea cruris - Primary (Chronic)     Refilled Lotrisone     1  Discuss PCP         Relevant Medications    clotrimazole-betamethasone (LOTRISONE) 1-0 05 % cream    Bilateral carpal tunnel syndrome     Currently using night splints and Voltaren gel  No resolution of pain and numbness  EMG (2/2018): There is electrophysiological evidence consistent with bilateral median neuropathy across the wrist (moderate on the right; moderate on the left)  These electrophysiological findings are consistent with a clinical diagnosis of carpal tunnel syndrome  Treatment: Injections:   July and August of 2018         Relevant Orders    Ambulatory referral to Hand Surgery    Ambulatory referral to Hand Surgery    Glaucoma     Refilled tobradex    Discuss with PCP         Relevant Medications    tobramycin-dexamethasone (TOBRADEX) ophthalmic suspension            Future Appointments  Date Time Provider Tino Kumar   1/14/2019 2:20 PM Riley Arevalo MD [facility]   3/6/2019 9:20 AM DO CHETNA [facility]dez [facility]          SUBJECTIVE  CC: Hand Pain      HPI:  Jeremy Snow is a 72 y o  male who presents for acute visit:      1  Bilateral hand pain with known carpel tunnel  Complaints of continued digit numbness and pain of both hands  Was going to therapy in September: Occupational therapy for finger stiffness and decreased  strength  Was seen 2x but did not follow up bc of family emergency and had to travel  Returns today wanting a new referral to orthopedics  Currently using night splints and Voltaren gel  No resolution of pain and numbness  EMG (2/2018): There is electrophysiological evidence consistent with bilateral median neuropathy across the wrist (moderate on the right; moderate on the left)   These electrophysiological findings are consistent with a clinical diagnosis of carpal tunnel syndrome  Treatment: Injections:   July and August of 2018      Review of Systems   Constitutional: Positive for activity change and fatigue  Negative for appetite change, chills, diaphoresis, fever and unexpected weight change  HENT: Negative for congestion  Eyes: Negative for visual disturbance  Respiratory: Negative for cough, choking, chest tightness, shortness of breath, wheezing and stridor  Cardiovascular: Negative for chest pain, palpitations and leg swelling  Gastrointestinal: Negative for abdominal pain, diarrhea, nausea and vomiting  Genitourinary: Negative for difficulty urinating  Musculoskeletal: Negative for arthralgias  Skin: Negative for color change  Allergic/Immunologic: Negative for immunocompromised state  Neurological: Negative for dizziness, tremors, facial asymmetry, weakness, light-headedness and headaches  Hematological: Negative for adenopathy  Does not bruise/bleed easily  Psychiatric/Behavioral: The patient is not nervous/anxious          Historical Information   The patient history was reviewed and updated as follows:  Past Medical History:   Diagnosis Date    AS (aortic stenosis)     Balanitis     Biceps tendonitis on right     CAD (coronary artery disease)     Cancer (HCC)     skin    Complete rotator cuff tear or rupture of right shoulder, not specified as traumatic     Diabetes mellitus ([address] )     Esophageal reflux     High cholesterol     Hypertension     Hypertriglyceridemia     Hypogonadism in male     Myalgia     Myocardial infarction ([address] )     Palpitations     Shoulder pain     Sinus problem     Skin cancer of nose     Sleep apnea     Stroke ([address] ) 1888    Systolic murmur     recently found    Tinea cruris     Tinea pedis          Past Surgical History:   Procedure Laterality Date    CATARACT EXTRACTION Left     COLONOSCOPY      CORONARY ANGIOPLASTY WITH STENT PLACEMENT  CORONARY ARTERY BYPASS GRAFT N/A 12/12/2016    Procedure: INTRAOP CECILLE; BILATERAL LEG EVH; CORONARY ARTERY BYPASS GRAFT X 4 SVG TO PDA, OM1,  AND DIAGONAL1, LIMA TO LAD;  Surgeon: Froy Colorado MD;  [facility]: [facility];  Service:     EYE SURGERY      HERNIA REPAIR      VA ARTHROSCOPY SHOULDER SURGICAL BICEPS TENODESIS Right 5/6/2016    Procedure: ARTHROSCOPIC BICEPS TENODESIS;  Surgeon: Harlene Kanner, MD;  [facility];  Service: Orthopedics    VA SHLDR ARTHROSCOP,SURG,W/ROTAT CUFF REPR Right 5/6/2016    Procedure: REPAIR ROTATOR CUFF  ARTHROSCOPIC; SUBACROMIAL DECOMPRESSION; PARTIAL SYNOVECTOMY;  Surgeon: Harlene Kanner, MD;  Location[facility];  Service: Orthopedics    UMBILICAL HERNIA REPAIR  2009    over age 13     Family History   Problem Relation Age of Onset    Heart disease Mother     Hypertension Mother     Nephrolithiasis Father     Other Father         Renal disease    Hypertension Sister     Nephrolithiasis Brother     Other Brother         Renal disease    Hematuria Family         Microscopic      Social History   History   Alcohol Use No     History   Drug Use No     History   Smoking Status    Never Smoker   Smokeless Tobacco    Never Used       Medications:     Current Outpatient Prescriptions:     acetaminophen (TYLENOL) 325 mg tablet, Take 1-2 tablets by mouth every 6 (six) hours as needed, Disp: , Rfl:     atorvastatin (LIPITOR) 80 mg tablet, Take 1 tablet (80 mg total) by mouth daily for 270 days, Disp: 90 tablet, Rfl: 2    clorazepate (TRANXENE) 7 5 mg tablet, , Disp: , Rfl: 0    EASY COMFORT LANCETS MISC, TEST BLOOD SUGAR TWICE DAILY OR AS NEEDED, Disp: 100 each, Rfl: 11    fluticasone (FLONASE) 50 mcg/act nasal spray, 1 spray into each nostril daily, Disp: 1 Bottle, Rfl: 4    glimepiride (AMARYL) 4 mg tablet, TAKE 1/2 TABLET BY MOUTH IN THE MORNING AND 1 TABLET AT NIGHT, Disp: 180 tablet, Rfl: 2    losartan (COZAAR) 25 mg tablet, Take 1 tablet (25 mg total) by mouth daily for 270 days, Disp: 90 tablet, Rfl: 2    Menthol-Methyl Salicylate (ELMIRA FIGUEREDO GREASELESS) 10-15 % greaseless cream, Apply topically daily at bedtime, Disp: 30 g, Rfl: 0    metFORMIN (GLUCOPHAGE) 500 mg tablet, Take 1 tablet (500 mg total) by mouth 2 (two) times a day with meals for 270 days, Disp: 180 tablet, Rfl: 2    methocarbamol (ROBAXIN) 750 mg tablet, Take 1 tablet (750 mg total) by mouth daily at bedtime as needed for muscle spasms, Disp: 30 tablet, Rfl: 0    metoprolol tartrate (LOPRESSOR) 25 mg tablet, Take 1 tablet (25 mg total) by mouth every 12 (twelve) hours for 270 days, Disp: 180 tablet, Rfl: 2    mirtazapine (REMERON) 15 mg tablet, Take 1 tablet (15 mg total) by mouth daily at bedtime for 270 days, Disp: 90 tablet, Rfl: 2    omeprazole (PriLOSEC) 20 mg delayed release capsule, take 1 capsule by mouth once daily BEFORE A MEAL, Disp: 90 capsule, Rfl: 3    ONE TOUCH ULTRA TEST test strip, Test blood sugar twice daily, or as needed when symptomatic of hypoglycemia or hyperglycemia, Disp: 100 each, Rfl: 5    ranitidine (ZANTAC) 150 MG capsule, Take 1 capsule (150 mg total) by mouth 2 (two) times a day as needed for indigestion or heartburn, Disp: 120 capsule, Rfl: 2    testosterone cypionate (DEPO-TESTOSTERONE) 200 mg/mL SOLN, , Disp: , Rfl: 0    aspirin (ECOTRIN LOW STRENGTH) 81 mg EC tablet, Take 1 tablet (81 mg total) by mouth daily for 90 days, Disp: 90 tablet, Rfl: 1    clotrimazole-betamethasone (LOTRISONE) 1-0 05 % cream, Apply topically 2 (two) times a day, Disp: 30 g, Rfl: 0    omeprazole (PriLOSEC) 20 mg delayed release capsule, Take 1 capsule (20 mg total) by mouth daily for 28 days, Disp: 28 capsule, Rfl: 1    tobramycin-dexamethasone (TOBRADEX) ophthalmic suspension, Administer 1 drop to both eyes 2 (two) times a day, Disp: 5 mL, Rfl: 0    Allergies   Allergen Reactions    Epinephrine Hives and Anxiety    Penicillins Hives and Anxiety OBJECTIVE  Vitals:   Vitals:    18 1524   BP: 122/80   BP Location: Left arm   Patient Position: Sitting   Cuff Size: Large   Pulse: 78   Resp: 14   Temp: 98 1 °F (36 7 °C)   TempSrc: Tympanic   Weight: 73 9 kg (163 lb)   Height: 5' 5" (1 651 m)         Physical Exam   Constitutional: He is oriented to person, place, and time  He appears well-developed and well-nourished  No distress  HENT:   Head: Normocephalic  Eyes: Pupils are equal, round, and reactive to light  Neck: Normal range of motion  Cardiovascular: Normal rate  Pulmonary/Chest: Effort normal    Abdominal: Soft  Musculoskeletal:   B/L hands:    Ph[place] +   Neurological: He is alert and oriented to person, place, and time  Skin: Skin is warm  No rash noted  He is not diaphoretic  No erythema  No pallor  Psychiatric: He has a normal mood and affect   His behavior is normal  Judgment and thought content normal           Roy Apgar, MD, PGY-3  [facility]   2018

## 2018-12-05 NOTE — PATIENT INSTRUCTIONS
Ejercicios para el síndrome del túnel saharaiano   LO QUE NECESITA SABER:   ¿Qué necesito saber sobre los ejercicios para síndrome del túnel carpiano? Los ejercicios para el síndrome del túnel carpiano podrían ayudar a aliviar el dolor y aumentar lisa arco de movilidad  Lisa médico o terapeuta físico le puede indicar con que frecuencia necesita hacer los ejercicios  ¿Qué ejercicios puedo hacer? · Extensión de los dedos:  Junte el pulgar y el rika de quinton dedos  Manténgalos rectos  Coloque barron goma elástica alrededor [employer] dedos y pulgar  Separe los dedos  Luego júntelos lentamente sin permitir que la goma elástica se caiga  Repita 40 veces  · Estiramiento del flexor de la philip:  Mantenga el [place] recto  Agarre quinton dedos con la otra mano  Lentamente doble los dedos hacia atrás (con la adams hacia fuera) hasta que sienta un estiramiento en la [place] [address]  Sostenga está posición por 10 segundos  Repita 5 veces  · Estiramiento del extensor de la philip:  Mantenga el [place] recto  Agarre quinton dedos con la otra mano  Lentamente doble los dedos y la mano hacia abajo (con la adams hacia usted) hasta que siente el estiramiento encima de la [place]  Sostenga está posición por 10 segundos  Repita 5 veces  · Rotación de la philip sin usar pesas:  Siéntese en barron silla con lisa antebrazo apoyado sobre lisa muslo o sobre barron blankenship  Con la adams de lisa mano hacia abajo, flexione la [place] [address] 3 pulgadas hacia arriba y luego bájela lentamente  Voltee el antebrazo y repita con la adams [place] arriba  Ulises cada ejercicio 20 veces  · Encogimiento de hombros:  Párese con los brazos a los lados  Levante quinton hombros hacia quinton oídos y sosténgalos monica 1 murray  Luego mueva quinton hombros hacia atrás, seth si fuera a juntar los omóplatos  Mantenga esta posición monica 1 murray  Luego relaje los hombros  Repita 20 veces  ACUERDOS SOBRE LISA CUIDADO:   Usted tiene el derecho de ayudar a planear lisa cuidado   Jahaira Bent todo lo que pueda sobre dyson condición y seth darle tratamiento  Discuta quinton opciones de tratamiento con quinton médicos para decidir el cuidado que usted desea recibir  Usted siempre tiene el derecho de rechazar el tratamiento  Esta información es sólo para uso en educación  Dyson intención no es darle un consejo médico sobre enfermedades o tratamientos  Colsulte con dyson [employer] farmacéutico antes de seguir cualquier régimen médico para saber si es seguro y efectivo para usted  © 2017 [address] Information is for End User's use only and may not be sold, redistributed or otherwise used for commercial purposes  All illustrations and images included in CareNotes® are the copyrighted property of A D A M , Inc  or Dustin Lamar  Ejercicios para el síndrome del túnel carpiano   CUIDADO AMBULATORIO:   Lo que usted necesita saber sobre los ejercicios para el síndrome del túnel carpiano:  Los ejercicios para el síndrome del túnel carpiano podrían ayudar a aliviar el dolor y aumentar dyson arco de movilidad  Dyson médico o terapeuta físico le puede indicar con que frecuencia necesita hacer los ejercicios  Los ejercicios para síndrome del túnel carpiano:   · Extensión de los dedos:  Junte el pulgar y el rika de quinton dedos  Manténgalos rectos  Coloque barron goma elástica alredjung [employer] dedos y pulgar  Separe los dedos  Luego júntelos lentamente sin permitir que la goma elástica se caiga  Repita 40 veces  · Estiramiento del flexor de la philip:  Mantenga el Jade Jules recto  Agarre quinton dedos con la otra mano  Lentamente doble los dedos hacia atrás (con la adams hacia fuera) hasta que sienta un estiramiento en la [place] [address]  Sostenga está posición por 10 segundos  Repita 5 veces  · Estiramiento del extensor de la philip:  Mantenga el Jade Jules recto  Agarre quinton dedos con la otra mano   Lentamente doble los dedos y la mano hacia abajo (con la adams hacia usted) hasta que siente el estiramiento encima de la mano  Sostenga está posición por 10 segundos  Repita 5 veces  · Rotación de la philip sin usar pesas:  Siéntese en barron silla con dyson antebrazo apoyado sobre dyson muslo o sobre barron blankenship  Con la adams de dyson mano hacia abajo, flexione la [place] 3 pulgadas hacia arriba y luego bájela lentamente  [place] el antebrazo y repita con la adams [employer] arriba  Ulises cada ejercicio 20 veces  · Encogimiento de hombros:  Párese con los brazos a los lados  Levante quinton hombros hacia quinton oídos y sosténgalos monica 1 murray  Luego mueva quinton hombros hacia atrás, seth si fuera a juntar los omóplatos  Mantenga esta posición monica 1 murray  Luego relaje los hombros  Repita 20 veces  © 2017 [address] Information is for End User's use only and may not be sold, redistributed or otherwise used for commercial purposes  All illustrations and images included in CareNotes® are the copyrighted property of A D A M , Inc  or Dustin Lamar  Esta información es sólo para uso en educación  Dyson intención no es darle un consejo médico sobre enfermedades o tratamientos  Colsulte con dyson [facility] farmacéutico antes de seguir cualquier régimen médico para saber si es seguro y efectivo para usted  Síndrome del túnel carpiano   LO QUE USTED DEBE SABER:   El síndrome de túnel carpiano (STC) es barron condición que ocurre cuando hay un aumento de la presión sobre el nervio mediano en la [place] [address]  El nervio mediano controla los músculos y la sensación en la [place]  El uso excesivo e inflamación en los ligamentos en la philip podría ser barron causa de la presión  INSTRUCCIONES:   Medicamentos:   · Medicamento antiinflamatorio no esteroideo (DACIA): Estos medicamentos reducen la inflamación y el dolor  Los [place] están disponibles sin [place]  Consulte con dyson médico para determinar cuál medicamento y qué dosis es [place] para usted  Tómelos seth es indicado   Los [employer] causar sangrado estomacal o problemas renales si no se mike correctamente  · [place] quinton medicamentos seth se le haya indicado  Llame a li proveedor de jillian si piensa que li medicamento no le está ayudando o tiene efectos secundarios  Infórmele si es alérgico a algún medicamento  Mantenga barron lista de quinton medicamentos, vitaminas, y hierbas que está tomando  Incluya la cantidad que jameson, la [place], y por qué las jameson  Traiga la lista o las botellas de las píldoras a quinton visitas de seguimiento  Lleve siempre consigo barron lista de quinton medicamentos en fouzia de emergencia  Programe barron monty con li proveedor de [employer] se le haya indicado: Anote quinton preguntas para que se acuerde de hacerlas monica quinton visitas  Cómo manejar quinton síntomas:   · Utilice hielo:  El hielo ayuda a reducir la inflamación y el dolor en li philip  El hielo también podría ayudar a prevenir daño al tejido  Utilice barron compresa de hielo o ponga hielo claudia en barron bolsa de plástico  Cubra la compresa de hielo con barron toalla y colóquela sobre li philip por 15 a 20 minutos cada hora  · Arleth Sandifer y ocupacional:  Un terapeuta físico le demostrará maneras de realizar ejercicios y fortalecer li philip  Un terapeuta ocupacional le demostrará maneras seguras de usar li philip mientras realiza quinton [place]  · Descanse quinton hunter:  Permita que quinton hunter descansen por un tiempo cuando hace movimientos repetitivos seth la mecanografía  Suspenda lo que está haciendo cuando usted sienta dolor en li philip y suavemente realice un masaje en li philip y [place]  · Use barron férula para la philip: Esta mantiene li philip recta o en barron posición ligeramente flexionada  Barron férula para la philip reduce la presión en el nervio mediano y de esta manera descansa la [place] [address]  Es probable que usted necesite utilizar barron férula por un marie de 8 semanas  Podría ser necesario usar la férula para la philip en la noche      · Evalúe quinton hábitos laborales: Pida información acerca de maneras que usted puede modificar dyson trabajo para ayudar a reducir janice síntomas  Comuníquese con dyson médico de cabecera si:   · Janice síntomas empeoran  · Usted tiene preguntas o inquietudes acerca de dyson condición o cuidado  Regrese a la abraham de emergencia si:   · Repentinamente usted pierde la sensibilidad en dyson mano o dedos y no puede moverlos  · Repentinamente dyson mano cambia de color  © 2014 [address] is for End User's use only and may not be sold, redistributed or otherwise used for commercial purposes  All illustrations and images included in CareNotes® are the copyrighted property of A D A M , Inc  or Dustin Lamar  Esta información es sólo para uso en educación  Dyson intención no es darle un consejo médico sobre enfermedades o tratamientos  Colsulte con dyson Mayen Espino farmacéutico antes de seguir cualquier régimen médico para saber si es seguro y efectivo para usted  Cirugía del túnel carpiano   CUIDADO AMBULATORIO:   Lo que usted necesita saber acerca de la cirugía del túnel carpiano:  La cirugía del túnel carpiano, o descompresión, se [place] para quitar la presión del nervio mediano de dyson philip  El nervio mediano controla los músculos y la sensación en la [place]  La cirugía podría realizarse a través de barron abertura en dyson adams  A esto se le llama cirugía abierta  O en dyson lugar, el cirujano podría colocar un endoscopio e instrumentos en 1 o 2 incisiones de dyson philip o de dyson adams  A esto se le conoce seth cirugía endoscópica  La forma para prepararse para la cirugía: Dyson médico le indicará cómo prepararse para la [place]  Le puede indicar que no consuma ningún alimento ni bebida después de la medianoche del día de la [facility]  Nicolás Jr cuáles medicamentos kavon y cuáles no kavon en el día de dyson cirugía  Ulises arreglos para que alguien lo lleve a dyson casa después de la [facility]   Es posible que le administren antibióticos antes de la cirugía para evitar barron infección  Informe a dyson médico si usted alguna vez tuvo barron reacción alérgica a los antibióticos  Qué sucederá monica la cirugía:   · Es posible que le administren anestesia local o regional para ayudarlo a evitar el dolor monica la [facility]  La anestesia local entumecerá solamente dyson philip  La anestesia regional entumecerá dyson Corey Hortencia y Karishma Basket  Es posible que en cambio le administren anestesia general para mantenerlo dormido y sin dolor  Usted podría necesitar esta anestesia si dyson cirujano piensa que la [facility] va a durar mucho tiempo o si involucra barron parte manisha de dyson [place] [address]  · Para barron cirugía abierta, dyson cirujano hará barron incisión en la adams de dyson mano  La incisión podría extenderse hacia dyson philip  Se cortará un ligamento para liberar la presión del nervio  Galilea ligamento es barron irwin de tejido que conecta a las articulaciones de dyson philip  Dyson cirujano también podría quitar el tejido cicatricial o cualquier cosa que pudiera estar presionando el nervio  · Para barron cirugía endoscópica, dyson cirujano hará 1 o 2 incisiones en dyson philip o adams  Él introducirá el endoscopio con la cámara a través de barron incisión para ayudar a guiarlo monica la cirugía  Se podrían usar instrumentos en dyson philip para ayudar a [employer] nervios  Después él cortará el ligamento que está presionando el nervio  · La incisión será cerrada con puntos de sutura y [place] con vendajes  Qué sucederá después de la cirugía:  Es posible que le coloquen barron férula en dyson philip para evitar que se [place]  A usted lo llevarán a barron habitación donde descansará hasta que esté completamente despierto y tenga sensibilidad en dyson Karishma Basket  Dyson médico podría pedirle que mueva quinton dedos poco después de la [place]  No  intente levantarse de la cama hasta que dyson médico se lo autorice  Riesgos de [facility] del lois dunne:  Usted podría sangrar más de lo esperado o contraer barron infección   Dyson piel podría presentar moretones  Podría formarse barron cicatriz gruesa y dolorosa en donde le hicieron la cirugía  Usted podría desarrollar dedo en gatillo (dedos bloqueados en posición doblada)  La cirugía podría provocar entumecimiento o debilidad a abran plazo en janice dedos, mano o philip  Janice síntomas podrían no desaparecer, y usted podría necesitar cirugía nuevamente  Busque atención médica de inmediato si:   · Se desprenden los puntos de sutura  · La brennon empapa el vendaje  · Usted no puede sentir o  janice hunter o dedos  · Usted siente un bulto o inflamación en li philip  Comuníquese con li médico o especialista de la mano si:   · Usted tiene fiebre o escalofríos  · Usted se siente débil o dolorido  · Usted tiene dolor, aun después de kavon [place]  · Usted tiene inflamación, rigidez o entumecimiento en los dedos de las hunter  · Los dedos de janice hunter se quedan rígidos en la misma posición  · Usted tiene preguntas o inquietudes acerca de li condición o cuidado  Medicamentos:  Es posible que usted necesite alguno de los siguientes:  · Antibióticos  sirven para prevenir o combatir la infección bacteriana  · Un medicamento con receta para el dolor  podrían ser Kathy Alexandra  Pregunte cómo kavon estos medicamentos de barron forma bernard  · Spicer janice medicamentos seth se le haya indicado  Consulte con li médico si usted bernard que li medicamento no le está ayudando o si presenta efectos secundarios  Infórmele si es alérgico a cualquier medicamento  Mantenga barron lista actualizada de los [place], las vitaminas y los productos herbales que jameson  Incluya los siguientes datos de los medicamentos: cantidad, frecuencia y motivo de administración  Traiga con usted la lista o los envases de la píldoras a janice citas de seguimiento  Lleve la lista de los medicamentos con usted en fouzia de barron emergencia    Acuda a terapia física u ocupacional, si se lo indican:  Un fisioterapeuta puede enseñarle ejercicios para ayudar a mejorar el movimiento y la fuerza  La fisioterapia también puede ayudar a disminuir el dolor o pérdida de función  Un terapeuta ocupacional puede ayudarlo a encontrar maneras de hacer trabajo y Mabeline Neth actividades para reducir la tensión en dyson philip  Aplique hielo en dyson philip:  El hielo ayuda a disminuir la inflamación y el dolor  El hielo también puede contribuir a evitar el daño de los tejidos  Use barron bolsa con hielo o ponga hielo triturado en barron bolsa de plástico  Anna Nadeem el paquete o bolsa con hielo con barron toalla  Colóquela en dyson hpilip por 15 a 20 minutos cada hora, o seth se le indique  Limite quinton actividades según le indicaron:  No jale, levante ni mueva objetos pesados hasta que dyson médico le indique que puede [place]  Pregunte cuándo puede regresar al [place]  Tómese tiempo para descansar dyson mano  Si usted trabaja con barron computadora, descanse quinton hunter con frecuencia  Es posible que usted necesite elevar dyson brazo varias veces al día  Mertzon ayuda a disminuir el dolor y la inflamación  Programe barron monty con dyson médico o especialista de la mano seth se le indique:  Es posible que usted necesite regresar para que le quiten los [facility] [place]  Pregunte por cuánto tiempo usted necesita usar la férula  Anote quinton preguntas para que se acuerde de hacerlas monica quinton visitas  © 2017 [address] Information is for End User's use only and may not be sold, redistributed or otherwise used for commercial purposes  All illustrations and images included in CareNotes® are the copyrighted property of A D A M , Inc  or Dustin Willard  Esta información es sólo para uso en educación  Dyson intención no es darle un consejo médico sobre enfermedades o tratamientos  Colsulte con dyson Yoselin Bjornstad farmacéutico antes de seguir cualquier régimen médico para saber si es seguro y efectivo para usted

## 2018-12-11 ENCOUNTER — TELEPHONE (OUTPATIENT)
Dept: FAMILY MEDICINE CLINIC | Facility: CLINIC | Age: 73
End: 2018-12-11

## 2018-12-15 DIAGNOSIS — Z86.19 H/O TINEA CRURIS: Chronic | ICD-10-CM

## 2018-12-18 ENCOUNTER — TELEPHONE (OUTPATIENT)
Dept: FAMILY MEDICINE CLINIC | Facility: CLINIC | Age: 73
End: 2018-12-18

## 2018-12-18 NOTE — TELEPHONE ENCOUNTER
SHAMEKA I HAD DONE A TELEPHONE ENCOUNTER FOR THE SAME REASON, IS  [facility] ABLE TO RESPOND AT THIS TIME  [place] PREVIOUS MESSAGE WAS SENT TO HER  [place] PATIENT IS [place] SPEAKING AND PREFERS TO BE CALLED IN [place] IF POSSIBLE

## 2018-12-19 DIAGNOSIS — G56.03 BILATERAL CARPAL TUNNEL SYNDROME: Primary | ICD-10-CM

## 2018-12-19 RX ORDER — CLOTRIMAZOLE AND BETAMETHASONE DIPROPIONATE 10; .64 MG/G; MG/G
CREAM TOPICAL 2 TIMES DAILY
Qty: 30 G | Refills: 0 | Status: SHIPPED | OUTPATIENT
Start: 2018-12-19 | End: 2018-12-28 | Stop reason: SDUPTHER

## 2018-12-21 NOTE — TELEPHONE ENCOUNTER
Called patient and explained the situation  Patient understood  I gave him the central scheduling number to call  Referral will be sent via mail

## 2018-12-24 DIAGNOSIS — H40.9 GLAUCOMA, UNSPECIFIED GLAUCOMA TYPE, UNSPECIFIED LATERALITY: ICD-10-CM

## 2018-12-28 DIAGNOSIS — Z86.19 H/O TINEA CRURIS: Chronic | ICD-10-CM

## 2018-12-28 RX ORDER — TOBRAMYCIN AND DEXAMETHASONE 3; 1 MG/ML; MG/ML
1 SUSPENSION/ DROPS OPHTHALMIC 2 TIMES DAILY
Qty: 5 ML | Refills: 0 | Status: SHIPPED | OUTPATIENT
Start: 2018-12-28 | End: 2019-01-09 | Stop reason: SDUPTHER

## 2018-12-28 RX ORDER — CLOTRIMAZOLE AND BETAMETHASONE DIPROPIONATE 10; .64 MG/G; MG/G
CREAM TOPICAL 2 TIMES DAILY
Qty: 30 G | Refills: 0 | Status: SHIPPED | OUTPATIENT
Start: 2018-12-28 | End: 2019-01-09 | Stop reason: SDUPTHER

## 2018-12-31 DIAGNOSIS — Z86.19 H/O TINEA CRURIS: Chronic | ICD-10-CM

## 2019-01-07 DIAGNOSIS — Z86.19 H/O TINEA CRURIS: Chronic | ICD-10-CM

## 2019-01-07 DIAGNOSIS — K21.9 GASTROESOPHAGEAL REFLUX DISEASE, ESOPHAGITIS PRESENCE NOT SPECIFIED: ICD-10-CM

## 2019-01-08 PROBLEM — F33.1 MAJOR DEPRESSIVE DISORDER, RECURRENT EPISODE, MODERATE (HCC): Status: ACTIVE | Noted: 2019-01-08

## 2019-01-08 RX ORDER — OMEPRAZOLE 20 MG/1
20 CAPSULE, DELAYED RELEASE ORAL DAILY
Qty: 28 CAPSULE | Refills: 1 | Status: SHIPPED | OUTPATIENT
Start: 2019-01-08 | End: 2019-02-23 | Stop reason: SDUPTHER

## 2019-01-09 DIAGNOSIS — Z86.19 H/O TINEA CRURIS: Chronic | ICD-10-CM

## 2019-01-09 DIAGNOSIS — H40.9 GLAUCOMA, UNSPECIFIED GLAUCOMA TYPE, UNSPECIFIED LATERALITY: ICD-10-CM

## 2019-01-09 RX ORDER — CLOTRIMAZOLE AND BETAMETHASONE DIPROPIONATE 10; .64 MG/G; MG/G
CREAM TOPICAL 2 TIMES DAILY
Qty: 30 G | Refills: 0 | Status: SHIPPED | OUTPATIENT
Start: 2019-01-09 | End: 2019-01-24 | Stop reason: SDUPTHER

## 2019-01-09 RX ORDER — TOBRAMYCIN AND DEXAMETHASONE 3; 1 MG/ML; MG/ML
1 SUSPENSION/ DROPS OPHTHALMIC 2 TIMES DAILY
Qty: 5 ML | Refills: 3 | Status: SHIPPED | OUTPATIENT
Start: 2019-01-09 | End: 2019-03-15 | Stop reason: SDUPTHER

## 2019-01-10 DIAGNOSIS — H40.9 GLAUCOMA, UNSPECIFIED GLAUCOMA TYPE, UNSPECIFIED LATERALITY: ICD-10-CM

## 2019-01-12 DIAGNOSIS — E11.9 TYPE 2 DIABETES MELLITUS WITHOUT COMPLICATION, WITHOUT LONG-TERM CURRENT USE OF INSULIN (HCC): Primary | ICD-10-CM

## 2019-01-12 RX ORDER — UBIQUINOL 100 MG
CAPSULE ORAL
Qty: 100 EACH | Refills: 3 | Status: SHIPPED | OUTPATIENT
Start: 2019-01-12 | End: 2019-02-08

## 2019-01-14 ENCOUNTER — OFFICE VISIT (OUTPATIENT)
Dept: FAMILY MEDICINE CLINIC | Facility: CLINIC | Age: 74
End: 2019-01-14

## 2019-01-14 VITALS
BODY MASS INDEX: 27.39 KG/M2 | HEIGHT: 65 IN | TEMPERATURE: 98.8 F | SYSTOLIC BLOOD PRESSURE: 150 MMHG | WEIGHT: 164.4 LBS | DIASTOLIC BLOOD PRESSURE: 90 MMHG | HEART RATE: 52 BPM | RESPIRATION RATE: 16 BRPM

## 2019-01-14 DIAGNOSIS — E11.9 TYPE 2 DIABETES MELLITUS WITHOUT COMPLICATION, WITHOUT LONG-TERM CURRENT USE OF INSULIN (HCC): Primary | ICD-10-CM

## 2019-01-14 DIAGNOSIS — Z87.891 HISTORY OF TOBACCO USE: ICD-10-CM

## 2019-01-14 DIAGNOSIS — K02.9 DENTAL CARIES: ICD-10-CM

## 2019-01-14 LAB — SL AMB POCT HEMOGLOBIN AIC: 7.8 (ref ?–6.5)

## 2019-01-14 PROCEDURE — G0439 PPPS, SUBSEQ VISIT: HCPCS | Performed by: FAMILY MEDICINE

## 2019-01-14 PROCEDURE — 83036 HEMOGLOBIN GLYCOSYLATED A1C: CPT | Performed by: FAMILY MEDICINE

## 2019-01-14 NOTE — PROGRESS NOTES
Last Medicare Wellness visit information reviewed, patient interviewed and updates made to the record today  Health Risk Assessment:  Patient rates overall health as very good  Patient feels that their physical health rating is Much better  Eyesight was rated as Slightly worse  Hearing was rated as Same  Pain experienced by patient in the last 7 days has been A lot  Patient's pain rating has been 8/10  Patient states that he has experienced weight loss or gain in last 6 months  Broken Bones/Falls: Fall Risk Assessment:    In the past year, patient has experienced: No history of falling in past year          Bladder/Bowel:  Patient has not leaked urine accidently in the last six months  Patient reports no loss of bowel control  Immunizations:  Patient has had a flu vaccination within the last year  Patient has received a pneumonia shot  Patient has received a shingles shot  Patient has received tetanus/diphtheria shot  Date of tetanus/diphtheria shot: 10/12/2018    Home Safety:  Patient does not have trouble with stairs inside or outside of their home  Patient currently reports that there are no safety hazards present in home, working smoke alarms, working carbon monoxide detectors  Preventative Screenings:   no prostate cancer screen performed, no colon cancer screen completed, cholesterol screen completed, no glaucoma eye exam completed(Additional Comments: Last eye exam 5 years ago, colonoscopy scheduled March 6, 2019)    Nutrition:  Current diet: Diabetic, Low Cholesterol, Low Saturated Fat, Low Carb and Limited junk food with servings of the following:    Medications:  Patient is not currently taking any over-the-counter supplements  Patient is able to manage medications  Lifestyle Choices:  Patient reports no tobacco use  Patient has not smoked or used tobacco in the past   Patient reports no alcohol use  Patient drives a vehicle  Patient wears seat belt          Activities of Daily Living:  Can get out of bed by his or her self, able to dress self, able to make own meals, able to do own shopping, able to bathe self, can do own laundry/housekeeping, unable to manage own money and other related tasksAdditional Comments: Daughter takes care of managing bills  Previous Hospitalizations:  No hospitalization or ED visit in past 12 months        Preventative Screening/Counseling:      Cardiovascular:      General: Screening Current      Counseling: Healthy Diet, Healthy Weight and Improve Blood Pressure      Comments: Lipid panel 9/2018 WNL, EKG up to date  12/6/16 Echo EF 53%, mild LVH, severe hypokinesis of basal inferior and inferolateral wall  12/7/16 Echo limited: Mild AS w/ SIMON 1 73, mild AR, trace TR   -Follows with Cardiology, jhas appt with Dr Kelli Gamboa 1/16/19        Diabetes:      General: Screening Current      Counseling: Healthy Diet, Healthy Weight and Improve Physical Activity      Comments: Last HbA1C 7 0% 9/11/18, takes metformin and amaryl  Repeat HbA1C 7 8% today        Colorectal Cancer:      General: Risks and Benefits Discussed      Counseling: high fiber diet      Due for studies: Colonoscopy - Low Risk      Comments: Colonoscopy scheduled 3/2019 with Dr Lydia Silva:      General: Risks and Benefits Discussed and Screening Not Indicated          Osteoporosis:      General: Risks and Benefits Discussed and Patient Declines      Counseling: Calcium and Vitamin D Intake and Regular Weightbearing Exercise          AAA:  Male patient with age over [de-identified] years        General: Risks and Benefits Discussed      Study: Screening US      Comments: Smoked 21 years ago, not since then, patient accepts screening        Glaucoma:      General: Risks and Benefits Discussed      Referrals: Ophthalmology      Comments: Needs referral to new Ophthalmologist         HIV:      General: Patient Declines and Risks and Benefits Discussed          Hepatitis C:      General: Screening Current      Additional Comments: HCV ab neg 9/2018    Advanced Directives:   has durable POA for healthcare, Additional Comments: Daughter, Aarti Joseph, is a RN at [employer] in [place], apparently she is POA with paperwork per patient    Immunizations:  Patient reviewed and up to date      Influenza: Influenza UTD This Year and Influenza Recommended Annually      Pneumococcal: Lifetime Vaccine Completed      Hepatitis B (Low risk patients): Series Not Indicated      Zostavax: Zostavax Vaccine UTD  Additional Comments: Tdap reportedly given in [place] when patient cut his leg October 2017 per patient    Other Preventative Counseling (Non-Medicare):  Alcohol Use, Fall Prevention, Increase physical activity, Nutrition Counseling, Car/seat belt/driving safety reviewed, Skin self-exam, Sunscreen use and Weight reduction discussed  Additional Comments: Denies any gait problems or falls  Repeat HbA1C 7 8% today, slightly above goal, no medication changes at this time, counseled patient on healthy diet with limited carb intake    Referrals:  Referral(s) to: Dentist  Additional Comments: Needs dental referral for multiple dental caries (asymptomatic but patient noticed them in the mirror)       MARIBEL Zuniga  PGY-2  [facility]

## 2019-01-14 NOTE — PATIENT INSTRUCTIONS
Visita de bienestar para los adultos   CUIDADO AMBULATORIO:   Barron visita de bienestar  es cuando usted acude con un médico para que le sascha exámenes de detección de problemas de [place]  También puede obtener asesoramiento sobre cómo mantenerse saludable  Anote quinton preguntas para que se acuerde de hacerlas  Pregunte a li médico con qué frecuencia debería realizarse barron visita de bienestar  Lo que sucede en barron visita de bienestar:  Li médico le preguntará sobre li jillian y li historia familiar [address]  [place] incluye presión arterial chandler, enfermedades del corazón y cáncer  El médico le preguntará si tiene síntomas que le preocupen, si [facility] y [place] de ánimo  También se le preguntará acerca del uso de medicamentos, suplementos, alimentos y alcohol  Es posible que le sascha cualquiera de lo siguiente:  · Li peso  será revisado  Es posible que [facility] midan li altura para calcular li índice de masa corporal [facility]  [facility] indica si tiene un peso saludable  · Se verificarán li presión arterial  y frecuencia cardíaca  También pueden revisar li temperatura  · Exámenes de [place] y [facility]  se podría realizar  Se podrían realizar exámenes de brennon para revisar los niveles de [facility]  Los niveles anormales de colesterol aumentan el riesgo de enfermedad del corazón y accidente cerebrovascular  Puede que también necesite barron prueba de brennon u orina para revisar si tiene diabetes si usted está en mayor riesgo  Las pruebas de orina pueden hacerse para buscar signos de barron infección o barron enfermedad renal      · Un examen físico  incluye la comprobación de quinton latidos del corazón y los pulmones con un estetoscopio  Li médico también podría revisarle la piel para buscar daños causados por el sol  · Pruebas de detección  podría recomendarse  Se realiza un examen de detección para detectar enfermedades que pueden no causar síntomas   Los exámenes de detección que necesite dependen de li edad, género, antecedentes familiares y hábitos de ana  Por ejemplo, podrían recomendarle la exploración selectiva colorrectal si tiene 48 años o más  Si es [place], necesita los siguientes exámenes de detección:   · El examen de Papanicolaou  se utiliza para detectar cáncer de loraine uterino  El examen del Papanicolaou por lo general se realiza entre 3 a 5 años dependiendo de li edad  Puede necesitarlo más a menudo si usted ha tenido [employer] de la prueba de Papanicolaou en el pasado  Pregunte a li médico con qué frecuencia debería realizarse un examen de Papanicolaou  · Barron mamografía  es barron radiografía de quinton senos para detectar cáncer de mama  Los expertos recomiendan [address] cada 2 años empezando a los 48 años de [place]  Es probable que usted necesite [place] [address] a los 52 años o antes si tiene riesgo alto de cáncer de seno  Hable con li médico sobre cuándo debe empezar con quinton mamografías y con cuánta frecuencia las necesita  Vacunas que podría necesitar:   · Debe recibir barron vacuna contra la influenza  todos los [place]  La vacuna contra la influenza protege de la gripe  Varios tipos de virus causan la influenza  Debido a que los virus [place] con el [place], es necesaria la producción de nuevas vacunas cada año  · Debe recibir [facility] vacuna de refuerzo contra el tétanos-difteria (Td)  cada 10 años  Esta vacuna protege contra el tétanos y la difteria  El tétanos es barron infección severa que puede causar trismo y espasmos musculares dolorosos  La difteria es barron infección bacterial grave que produce barron cubierta gruesa en la parte de atrás de la boca y garganta  · Debe recibir la vacuna contra el virus del papiloma humano (VPH)  si usted es rayshawn y Corona 19 y 32 años o es hombre y [place] 23 y 24 años y nunca la recibió  Esta vacuna protege contra la infección por VPH   El virus del papiloma humano o VPH es la infección más común que se transmite por contacto sexual  El virus del papiloma humano también podría provocar cáncer vaginal, del pene y del ano  · Debe recibir la vacuna antineumocócica  si tiene más de 72 años  La vacuna antineumocócica es barron inyección que se administra para protegerlo contra barron enfermedad neumocócica  La enfermedad neumocócica es barron infección causada por la bacteria neumocócica  La infección puede causar neumonía, meningitis o barron infección del oído  · Debe recibir la vacuna contra la culebrilla  si tiene [address] [place] [address],[address] [place] [address] o [place], incluso si murphy tenido culebrilla antes  La vacuna contra la culebrilla (herpes zóster) es barron inyección usada para proteger contra el virus zóster que causa la varicela  [place] es el mismo virus que causa la varicela  La culebrilla es un sarpullido doloroso que se desarrolla en personas que tuvieron varicela o murphy estado expuestas al virus  Cómo comer saludable:  [place] es un modelo para planear comidas sanas  Muestra los tipos y cantidades de alimentos que deberían ir en un plato  Mateo Rudy y verduras representan alrededor de la mitad de li plato y los granos y proteínas representan la otra mitad  Se incluye barron porción de productos lácteos al lado del plato  La cantidad de calorías y [address] de las porciones que usted necesita dependen de li edad, [place], peso y altura  Los ejemplos de alimentos saludables son:  · Consuma barron variedad de verduras  seth las de color luz oscuro, plasencia y [place]  Usted también puede incluir verduras enlatadas bajas en sodio (sal) y verduras congeladas sin mantequilla ni salsas KHEPHPKA  · Consuma barron variedad de fruta frescas , las frutas enlatadas en 100% de jugo , fruta [place] y juanito secos  · Incluya granos enteros  Por lo menos la mitad de los granos que usted consume deben ser granos de anthony integral  Por ejemplo, panes de grano entero, pasta integral, arroz integral y cereales de grano entero seth la mack      · Consuma barron variedad de alimentos con proteínas seth mariscos (pescado y crustáceos), carne magra y carne de ave sin piel (pavo y kadeem)  Ejemplos de tamica magras incluyen pierna, paleta o lomo de puerco y leah, solomillo o, lomo de res y carne [facility] de res extra [place]  Otros alimentos ricos en proteínas son los huevos y sustitutos de [place], frijoles, chícharos, productos de soya, nueces y semillas  · Elija productos lácteos bajos en grasa [employer] o del 1% o yogur, queso y requesón bajos en grasa  · Limite las grasas poco saludables,  seth la [place], la margarina dura y la [place]  Ejercicio:  Realice barron actividad física por lo menos 30 minutos al día, la mayoría de los días de la [place]  Algunos de los ejercicios incluyen caminar, montar en bicicleta, bailar y nadar  Usted también puede realizar más actividad física usando las escaleras en vez de los ascensores o estacionarse más lejos cuando [place] a las tiendas  Incluya ejercicios para fortalecer los músculos 2 días a la semana  El ejercicio regular proporciona muchos beneficios para la jillian  Donnia Griffins a controlar li peso y [employer] riesgo de diabetes tipo 2, presión arterial chandler, enfermedad del corazón y accidente cerebrovascular  El ejercicio [employer] puede ayudar a mejorar li estado de ánimo  Pregunte a li médico acerca del mejor plan de ejercicio para usted  Pautas generales de jillian y seguridad:   · No fume  La nicotina y otras sustancias químicas que contienen los cigarrillos y cigarros pueden dañar los pulmones  Pida información a li médico si usted actualmente fuma y necesita ayuda para dejar de fumar  Los cigarrillos electrónicos o tabaco sin humo todavía contienen nicotina  Consulte con li médico antes de [employer]  · Limite el consumo de alcohol  Un trago equivale a 12 onzas de cerveza, 5 onzas de vino o 1 onza y ½ de licor  · Baje de peso, si es necesario  El sobrepeso aumenta el riesgo de ciertas condiciones de [place]   Estos incluyen enfermedad del corazón, presión arterial chandler, diabetes tipo 2 y ciertos tipos de cáncer  · Proteja li piel  No tome el sol ni use jules de bronceado  Use protector solar con un SPF 13 o mayor  Aplíquese el bloqueador por lo menos 15 minutos antes de que vaya a estar al [employer]  Vuelva a aplicarse la crema solar cada 2 horas  Use ropa protectora, sombrero y lentes para el sol cuando se encuentre afuera  · Conduzca con seguridad  Use siempre li cinturón de seguridad  Asegúrese que todos en el amina usan el cinturón de seguridad  Un cinturón de seguridad puede salvar li ana en fouzia de un accidente automovilístico  No use el celular cuando esté manejando  [place] puede hacer que se distraiga y causar un accidente  Es mejor que pare y se orille si necesita hacer barron Guadlupe Dire un texto  · Practique el sexo seguro  Use condones de látex si es sexualmente [place] y tiene más de [place]  Li médico puede recomendar exámenes de detección de infecciones de transmisión sexual (ITS)  · Use un rosales, un chaleco salvavidas y unos implementos de protección  Siempre use un rosales al [employer] en bicicleta o motocicleta, esquiar o jugar deportes que podrían causar barron lesión en la sharath  Use implementos de protección cuando practique deportes  Use un chaleco salvavidas cuando esté en un bote o practicando actividades acuáticas  © 2017 [address] [employer] Information is for End User's use only and may not be sold, redistributed or otherwise used for commercial purposes  All illustrations and images included in CareNotes® are the copyrighted property of [employer]  or Dustin Lamar  Esta información es sólo para uso en educación  Li intención no es darle un consejo médico sobre enfermedades o tratamientos  Colsulte con li Marcia Seals farmacéutico antes de seguir cualquier régimen médico para saber si es seguro y efectivo para usted

## 2019-01-15 PROBLEM — Z95.1 S/P CABG (CORONARY ARTERY BYPASS GRAFT): Status: ACTIVE | Noted: 2019-01-15

## 2019-01-16 ENCOUNTER — OFFICE VISIT (OUTPATIENT)
Dept: CARDIOLOGY CLINIC | Facility: CLINIC | Age: 74
End: 2019-01-16
Payer: MEDICARE

## 2019-01-16 VITALS
DIASTOLIC BLOOD PRESSURE: 72 MMHG | WEIGHT: 163 LBS | BODY MASS INDEX: 27.16 KG/M2 | SYSTOLIC BLOOD PRESSURE: 140 MMHG | HEART RATE: 53 BPM | HEIGHT: 65 IN

## 2019-01-16 DIAGNOSIS — I25.10 CORONARY ARTERY DISEASE INVOLVING NATIVE CORONARY ARTERY OF NATIVE HEART WITHOUT ANGINA PECTORIS: Primary | ICD-10-CM

## 2019-01-16 DIAGNOSIS — I35.0 MILD AORTIC STENOSIS: ICD-10-CM

## 2019-01-16 DIAGNOSIS — I25.2 H/O NON-ST ELEVATION MYOCARDIAL INFARCTION (NSTEMI): Chronic | ICD-10-CM

## 2019-01-16 DIAGNOSIS — Z95.1 S/P CABG (CORONARY ARTERY BYPASS GRAFT): ICD-10-CM

## 2019-01-16 DIAGNOSIS — E11.9 TYPE 2 DIABETES MELLITUS WITHOUT COMPLICATION, WITHOUT LONG-TERM CURRENT USE OF INSULIN (HCC): ICD-10-CM

## 2019-01-16 DIAGNOSIS — Z95.5 S/P DRUG ELUTING CORONARY STENT PLACEMENT: Chronic | ICD-10-CM

## 2019-01-16 PROCEDURE — 99214 OFFICE O/P EST MOD 30 MIN: CPT | Performed by: INTERNAL MEDICINE

## 2019-01-16 NOTE — PROGRESS NOTES
Cardiology Follow Up    Abdi Grant  [birth year]  [id number]  [address] [facility]  [address] [place] [address] [address]    1  Coronary artery disease involving native coronary artery of native heart without angina pectoris     2  Mild aortic stenosis     3  S/P CABG (coronary artery bypass graft)     4  S/P drug eluting coronary stent placement     5  H/O non-ST elevation myocardial infarction (NSTEMI)     6  Type 2 diabetes mellitus without complication, without long-term current use of insulin ([facility])         Discussion/Summary    1  CAD:  No angina  I do not suspect that his symptoms of diaphoresis at night are related to a cardiac etiology  Review testing which was done in September  Continue current medication regimen  2  Hypertension:  Continue current medication regimen as ordered  3    Bradycardia:  Heart rate still in the 50s, but no symptoms  Continue the current dose of metoprolol, which was decreased from before  Previously, heart rate was in the 40s  Previous History:  History of coronary artery disease with MI in 2006 with a drug-eluting stent to the LAD  Subsequently with an MI in 2012 with stent to the RCA  December 2016, and other NSTEMI at that time multivessel disease and underwent 4 vessel bypass with LIMA to the LAD, vein grafts to the diagonal, OM1, PDA  Low-normal LV function with inferior wall motion abnormality  Mild aortic stenosis    Interval History:  Comes for regular follow-up  Overall, he has been feeling pretty well, but for the last 2 months he has been noticing at times that he wakes up in the middle of the night feeling sweaty  Takes his blood pressure and glucose at these times, it has been [employer]  When he stands up, sweating goes away  No other associated symptoms with this    He has no shortness of breath, PND, orthopnea, leg edema, chest pain, dizziness or lightheadedness  Does not have the symptoms during the day  Has been less active in the colder weather, but during the summer was exerting himself without any symptoms  Problem List     Biceps tendonitis on right    Complete rotator cuff tear or rupture of right shoulder, not specified as traumatic (Chronic)    NSTEMI (non-ST elevated myocardial infarction) ([facility] )    S/P drug eluting coronary stent placement (Chronic)    H/O non-ST elevation myocardial infarction (NSTEMI) (Chronic)    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) (Chronic)    Lab Results   Component Value Date    HGBA1C 7 8 (A) 01/14/2019       No results for input(s): POCGLU in the last 72 hours  Blood Sugar Average: Last 72 hrs:          Polyarthralgia (Chronic)    Hematuria, microscopic (Chronic)    H/O tinea cruris (Chronic)    Erectile dysfunction associated with type 2 diabetes mellitus (HCC) (Chronic)    Lab Results   Component Value Date    HGBA1C 7 8 (A) 01/14/2019       No results for input(s): POCGLU in the last 72 hours  Blood Sugar Average: Last 72 hrs: Anxiety    Type 2 diabetes mellitus (HCC)    Lab Results   Component Value Date    HGBA1C 7 8 (A) 01/14/2019       No results for input(s): POCGLU in the last 72 hours      Blood Sugar Average: Last 72 hrs:          Gastroesophageal reflux disease    Bilateral carpal tunnel syndrome    Non-rheumatic aortic stenosis    Coronary artery disease involving native coronary artery of native heart without angina pectoris        Past Medical History:   Diagnosis Date    AS (aortic stenosis)     Balanitis     Biceps tendonitis on right     CAD (coronary artery disease)     Cancer (HCC)     skin    Complete rotator cuff tear or rupture of right shoulder, not specified as traumatic     Diabetes mellitus ([place] [id number] )     Esophageal reflux     High cholesterol     Hypertension     Hypertriglyceridemia     Hypogonadism in male  Myalgia     Myocardial infarction (HCC)     Palpitations     Shoulder pain     Sinus problem     Skin cancer of nose     Sleep apnea     Stroke [facility]) 1679    Systolic murmur     recently found    Tinea cruris     Tinea pedis      Social History     Social History    Marital status: /Civil Union     Spouse name: N/A    Number of children: N/A    Years of education: N/A     Occupational History    Not on file       Social History Main Topics    Smoking status: Never Smoker    Smokeless tobacco: Never Used    Alcohol use No    Drug use: No    Sexual activity: Not on file     Other Topics Concern    Not on file     Social History Narrative    Uses [employer]      Family History   Problem Relation Age of Onset    Heart disease Mother     Hypertension Mother     Nephrolithiasis Father     Other Father         Renal disease    Hypertension Sister     Nephrolithiasis Brother     Other Brother         Renal disease    Hematuria Family         Microscopic     Past Surgical History:   Procedure Laterality Date    CATARACT EXTRACTION Left     COLONOSCOPY      CORONARY ANGIOPLASTY WITH STENT PLACEMENT      CORONARY ARTERY BYPASS GRAFT N/A 12/12/2016    Procedure: INTRAOP CECILLE; BILATERAL LEG EVH; CORONARY ARTERY BYPASS GRAFT X 4 SVG TO PDA, OM1,  AND DIAGONAL1, LIMA TO LAD;  Surgeon: Savana Davis MD;  [facility];  Service:     EYE SURGERY      HERNIA REPAIR      DE ARTHROSCOPY SHOULDER SURGICAL BICEPS TENODESIS Right 5/6/2016    Procedure: ARTHROSCOPIC BICEPS TENODESIS;  Surgeon: Rosy Brown MD;  [facility];  Service: Orthopedics    DE SHLDR ARTHROSCOP,SURG,W/ROTAT CUFF REPR Right 5/6/2016    Procedure: REPAIR ROTATOR CUFF  ARTHROSCOPIC; SUBACROMIAL DECOMPRESSION; PARTIAL SYNOVECTOMY;  Surgeon: Rosy Brown MD;  [facility];  Service: Orthopedics    UMBILICAL HERNIA REPAIR  2009    over age 13       Current Outpatient Prescriptions:     acetaminophen (TYLENOL) 325 mg tablet, Take 1-2 tablets by mouth every 6 (six) hours as needed, Disp: , Rfl:     Alcohol Swabs (ALCOHOL PREP) 70 % PADS, USE TWICE DAILY, Disp: 100 each, Rfl: 3    aspirin (ECOTRIN LOW STRENGTH) 81 mg EC tablet, Take 1 tablet (81 mg total) by mouth daily for 90 days, Disp: 90 tablet, Rfl: 1    atorvastatin (LIPITOR) 80 mg tablet, Take 1 tablet (80 mg total) by mouth daily for 270 days, Disp: 90 tablet, Rfl: 2    clorazepate (TRANXENE) 7 5 mg tablet, , Disp: , Rfl: 0    clotrimazole-betamethasone (LOTRISONE) 1-0 05 % cream, Apply topically 2 (two) times a day, Disp: 30 g, Rfl: 0    EASY COMFORT LANCETS MISC, TEST BLOOD SUGAR TWICE DAILY OR AS NEEDED, Disp: 100 each, Rfl: 11    fluticasone (FLONASE) 50 mcg/act nasal spray, 1 spray into each nostril daily, Disp: 1 Bottle, Rfl: 4    glimepiride (AMARYL) 4 mg tablet, TAKE 1/2 TABLET BY MOUTH IN THE MORNING AND 1 TABLET AT NIGHT, Disp: 180 tablet, Rfl: 2    hydrocortisone 2 5 % cream, Apply topically 3 (three) times a day, Disp: 30 g, Rfl: 1    losartan (COZAAR) 25 mg tablet, Take 1 tablet (25 mg total) by mouth daily for 270 days, Disp: 90 tablet, Rfl: 2    Menthol-Methyl Salicylate (ELMIRA FIGUEREDO GREASELESS) 10-15 % greaseless cream, Apply topically daily at bedtime, Disp: 30 g, Rfl: 0    metFORMIN (GLUCOPHAGE) 500 mg tablet, Take 1 tablet (500 mg total) by mouth 2 (two) times a day with meals for 270 days, Disp: 180 tablet, Rfl: 2    metoprolol tartrate (LOPRESSOR) 25 mg tablet, Take 1 tablet (25 mg total) by mouth every 12 (twelve) hours for 270 days, Disp: 180 tablet, Rfl: 2    mirtazapine (REMERON) 15 mg tablet, Take 1 tablet (15 mg total) by mouth daily at bedtime for 270 days, Disp: 90 tablet, Rfl: 2    omeprazole (PriLOSEC) 20 mg delayed release capsule, Take 1 capsule (20 mg total) by mouth daily for 28 days, Disp: 28 capsule, Rfl: 1    testosterone cypionate (DEPO-TESTOSTERONE) 200 mg/mL SOLN, , Disp: , Rfl: 0    tobramycin-dexamethasone (TOBRADEX) ophthalmic suspension, Administer 1 drop to both eyes 2 (two) times a day, Disp: 5 mL, Rfl: 3    methocarbamol (ROBAXIN) 750 mg tablet, Take 1 tablet (750 mg total) by mouth daily at bedtime as needed for muscle spasms (Patient not taking: Reported on 1/16/2019 ), Disp: 30 tablet, Rfl: 0    omeprazole (PriLOSEC) 20 mg delayed release capsule, take 1 capsule by mouth once daily BEFORE A MEAL, Disp: 90 capsule, Rfl: 3    ONE TOUCH ULTRA TEST test strip, Test blood sugar twice daily, or as needed when symptomatic of hypoglycemia or hyperglycemia, Disp: 100 each, Rfl: 5    ranitidine (ZANTAC) 150 MG capsule, Take 1 capsule (150 mg total) by mouth 2 (two) times a day as needed for indigestion or heartburn (Patient not taking: Reported on 1/14/2019 ), Disp: 120 capsule, Rfl: 2  Allergies   Allergen Reactions    Epinephrine Hives and Anxiety    Penicillins Hives and Anxiety       Vitals:    01/16/19 0811   BP: 140/72   BP Location: Right arm   Patient Position: Sitting   Cuff Size: Large   Pulse: (!) 53   Weight: 73 9 kg (163 lb)   Height: 5' 5" (1 651 m)     Vitals:    01/16/19 0811   Weight: 73 9 kg (163 lb)      Height: 5' 5" (165 1 cm)   Body mass index is 27 12 kg/m²      Physical Exam:  GEN: Genny Lee appears well, alert and oriented x 3, pleasant and cooperative   HEENT: pupils equal, round, and reactive to light; extraocular muscles intact  NECK: supple, no carotid bruits   HEART: Bradycardic, normal S1 and S2, 2/6 MARTHA,  LUNGS: clear to auscultation bilaterally; no wheezes, rales, or rhonchi   ABDOMEN: normal bowel sounds, soft, no tenderness, no distention  EXTREMITIES: peripheral pulses normal; no clubbing, cyanosis, or edema  NEURO: no focal findings   SKIN: normal without suspicious lesions on exposed skin    ROS:  Positive for erectile dysfunction, numbness and weakness in hands,   Except as noted in HPI, is otherwise reviewed in detail and a 12 point review of systems is negative  Labs:  Lab Results   Component Value Date     08/01/2017    K 4 2 09/11/2018     09/11/2018    CREATININE 0 80 09/11/2018    BUN 16 09/11/2018    CO2 28 09/11/2018    ALT 57 09/11/2018    AST 45 09/11/2018    INR 1 40 (H) 12/12/2016    GLUF 126 (H) 09/11/2018    HGBA1C 7 8 (A) 01/14/2019    WBC 6 24 09/11/2018    HGB 13 1 09/11/2018    HCT 42 5 09/11/2018     09/11/2018       Lab Results   Component Value Date    CHOL 117 (L) 03/17/2017    CHOL 270 (H) 03/09/2016    CHOL 176 03/10/2014     Lab Results   Component Value Date    LDLCALC 45 09/11/2018    [address] 12/06/2016     Lab Results   Component Value Date    HDL 50 09/11/2018    HDL 58 06/30/2018    HDL 48 03/17/2017     Lab Results   Component Value Date    TRIG 48 09/11/2018    TRIG 99 06/30/2018    TRIG 74 03/17/2017       Testing:  Echo 12/2016:  LEFT VENTRICLE:  Systolic function was at the lower limits of normal  Ejection fraction was  estimated to be 53 %  There was severe hypokinesis of the basal inferior and  basal inferolateral wall(s)  Wall thickness was mildly to moderately increased  There was mild concentric hypertrophy      RIGHT VENTRICLE:  The size was at the upper limits of normal      LEFT ATRIUM:  The atrium was mildly dilated      MITRAL VALVE:  There was mild regurgitation      AORTIC VALVE:  There was mild stenosis  There was mild regurgitation  Systolic area of 1 7 cm squared by planimetry  Estimated aortic valve area (by VTI) was 1 97 cm squared  Aortic valve obstructive index (by Vmax) was 0 5  Estimated aortic valve area (by Vmax) was 1 73 cm squared      TRICUSPID VALVE:  There was trace regurgitation

## 2019-01-20 RX ORDER — TOBRAMYCIN AND DEXAMETHASONE 3; 1 MG/ML; MG/ML
1 SUSPENSION/ DROPS OPHTHALMIC 2 TIMES DAILY
Qty: 5 ML | Refills: 1 | Status: SHIPPED | OUTPATIENT
Start: 2019-01-20 | End: 2019-02-08

## 2019-01-23 ENCOUNTER — HOSPITAL ENCOUNTER (OUTPATIENT)
Dept: RADIOLOGY | Facility: HOSPITAL | Age: 74
Discharge: HOME | End: 2019-01-23
Payer: MEDICARE

## 2019-01-23 DIAGNOSIS — Z87.891 HISTORY OF TOBACCO USE: ICD-10-CM

## 2019-01-23 PROCEDURE — 76706 US ABDL AORTA SCREEN AAA: CPT

## 2019-01-24 DIAGNOSIS — E11.22 HYPERTENSION ASSOCIATED WITH STAGE 2 CHRONIC KIDNEY DISEASE DUE TO TYPE 2 DIABETES MELLITUS (HCC): Chronic | ICD-10-CM

## 2019-01-24 DIAGNOSIS — I12.9 HYPERTENSION ASSOCIATED WITH STAGE 2 CHRONIC KIDNEY DISEASE DUE TO TYPE 2 DIABETES MELLITUS (HCC): Chronic | ICD-10-CM

## 2019-01-24 DIAGNOSIS — N18.2 HYPERTENSION ASSOCIATED WITH STAGE 2 CHRONIC KIDNEY DISEASE DUE TO TYPE 2 DIABETES MELLITUS (HCC): Chronic | ICD-10-CM

## 2019-01-24 DIAGNOSIS — Z86.19 H/O TINEA CRURIS: Chronic | ICD-10-CM

## 2019-01-26 DIAGNOSIS — Z86.19 H/O TINEA CRURIS: Chronic | ICD-10-CM

## 2019-01-28 RX ORDER — ASPIRIN 81 MG/1
81 TABLET ORAL DAILY
Qty: 90 TABLET | Refills: 3 | Status: SHIPPED | OUTPATIENT
Start: 2019-01-28 | End: 2019-07-13 | Stop reason: SDUPTHER

## 2019-01-28 RX ORDER — CLOTRIMAZOLE AND BETAMETHASONE DIPROPIONATE 10; .64 MG/G; MG/G
CREAM TOPICAL 2 TIMES DAILY
Qty: 30 G | Refills: 2 | Status: SHIPPED | OUTPATIENT
Start: 2019-01-28 | End: 2019-02-06 | Stop reason: ALTCHOICE

## 2019-01-29 DIAGNOSIS — Z86.19 H/O TINEA CRURIS: Chronic | ICD-10-CM

## 2019-01-30 ENCOUNTER — OFFICE VISIT (OUTPATIENT)
Dept: OBGYN CLINIC | Facility: HOSPITAL | Age: 74
End: 2019-01-30
Payer: MEDICARE

## 2019-01-30 VITALS
HEART RATE: 62 BPM | SYSTOLIC BLOOD PRESSURE: 127 MMHG | WEIGHT: 168.2 LBS | DIASTOLIC BLOOD PRESSURE: 78 MMHG | HEIGHT: 65 IN | BODY MASS INDEX: 28.02 KG/M2

## 2019-01-30 DIAGNOSIS — M65.331 TRIGGER FINGER OF ALL DIGITS OF BOTH HANDS: Primary | ICD-10-CM

## 2019-01-30 DIAGNOSIS — M65.342 TRIGGER FINGER OF ALL DIGITS OF BOTH HANDS: Primary | ICD-10-CM

## 2019-01-30 DIAGNOSIS — M65.351 TRIGGER FINGER OF ALL DIGITS OF BOTH HANDS: Primary | ICD-10-CM

## 2019-01-30 DIAGNOSIS — M65.312 TRIGGER FINGER OF ALL DIGITS OF BOTH HANDS: Primary | ICD-10-CM

## 2019-01-30 DIAGNOSIS — M65.352 TRIGGER FINGER OF ALL DIGITS OF BOTH HANDS: Primary | ICD-10-CM

## 2019-01-30 DIAGNOSIS — M65.332 TRIGGER FINGER OF ALL DIGITS OF BOTH HANDS: Primary | ICD-10-CM

## 2019-01-30 DIAGNOSIS — M65.321 TRIGGER FINGER OF ALL DIGITS OF BOTH HANDS: Primary | ICD-10-CM

## 2019-01-30 DIAGNOSIS — M65.322 TRIGGER FINGER OF ALL DIGITS OF BOTH HANDS: Primary | ICD-10-CM

## 2019-01-30 DIAGNOSIS — G56.03 BILATERAL CARPAL TUNNEL SYNDROME: ICD-10-CM

## 2019-01-30 DIAGNOSIS — M65.341 TRIGGER FINGER OF ALL DIGITS OF BOTH HANDS: Primary | ICD-10-CM

## 2019-01-30 DIAGNOSIS — M65.311 TRIGGER FINGER OF ALL DIGITS OF BOTH HANDS: Primary | ICD-10-CM

## 2019-01-30 PROCEDURE — 99214 OFFICE O/P EST MOD 30 MIN: CPT | Performed by: ORTHOPAEDIC SURGERY

## 2019-01-30 RX ORDER — CEFAZOLIN SODIUM 2 G/50ML
2000 SOLUTION INTRAVENOUS ONCE
Status: CANCELLED | OUTPATIENT
Start: 2019-01-30 | End: 2019-01-30

## 2019-01-30 RX ORDER — CLOTRIMAZOLE AND BETAMETHASONE DIPROPIONATE 10; .64 MG/G; MG/G
CREAM TOPICAL 2 TIMES DAILY
Qty: 30 G | Refills: 1 | Status: SHIPPED | OUTPATIENT
Start: 2019-01-30 | End: 2019-03-05 | Stop reason: SDUPTHER

## 2019-01-30 NOTE — H&P
ASSESSMENT/PLAN:    Assessment:   Symptomatic Trigger fingers of all digits except the left index and thumb and right thumb  Bilateral carpal tunnel syndrome    Plan:   Given severity of symptoms, failure to improve with tendon gliding exercises and failure of corticosteroid injections to his bilateral carpal tunnels, as well as medical morbidity of undergoing corticosteroid injections, will schedule patient for Right endoscopic carpal tunnel release; trigger finger release of index, long, ring, small digits of right hand with IV sedation and local injection to be given intra-operatively  Risks, benefits, alternatives, complications explained to patient in Slovenian and he is agreeable to scheduling surgery  He will pursue surgery for his left side after he recovers form his right sided surgery which would involve left endoscopic carpal tunnel release and trigger finger releases of the left long, ring, and small finger    To Do Next Visit:  Suture removal post-oepratively    General Discussions:     Carpal Tunnel Syndrome: The anatomy and physiology of carpal tunnel syndrome was discussed with the patient today  Increase pressure localized under the transverse carpal ligament can cause pain, numbness, tingling, or dysesthesias within the median nerve distribution as well as feelings of fatigue, clumsiness, or awkwardness  These symptoms typically occur at night and worse in the morning upon waking  Eventually, untreated carpal tunnel syndrome can result in weakness and permanent loss of muscle within the thenar compartment of the hand  Treatment options were discussed with the patient  Conservative treatment includes nocturnal resting splints to keep the nerve in a neutral position, ergonomic changes within the work or home environment, activity modification, and tendon gliding exercises    Steroid injections within the carpal canal can help a majority of patients, however this is often self-limited in a majority of patients  Surgical intervention to divide the transverse carpal ligament typically results in a long-lasting relief of the patient's complaints, with the recurrence rate of less than 1%      _____________________________________________________  CHIEF COMPLAINT:  Chief Complaint   Patient presents with    Left Hand - Pain    Right Hand - Pain         SUBJECTIVE:  Meir Christian is a 72y o  year old retired male who presents with pain of both hands and triggering of all digits except his left index and thumb  He has significant difficulty making a fist   He states that his pain is worse and the A1 pulley area of his digits in the morning  He states that the numbness and tingling in his radial 3 digits of bilateral upper extremities wakes him up from sleep and he has also begun to drop objects  He has had an EMG of bilateral upper extremities which confirms findings of median nerve neuropathy  He had corticosteroid injections to his bilateral carpal tunnels which provided minimal relief of symptoms  He does seem to be bothered more today by the Pain of the trigger fingers rather than the numbness and tingling in his radial 3 digits bilaterally  He did have a significant spike in his blood glucose up to the 300s with injections to the carpal tunnels  He was taught home with tendon gliding exercises by Hand therapy for trigger fingers which she states did not improve his symptoms  He has not had his trigger fingers injected with corticosteroid at this time      PAST MEDICAL HISTORY:  Past Medical History:   Diagnosis Date    AS (aortic stenosis)     Balanitis     Biceps tendonitis on right     CAD (coronary artery disease)     Cancer (HCC)     skin    Complete rotator cuff tear or rupture of right shoulder, not specified as traumatic     Diabetes mellitus ([facility] [address] )     Esophageal reflux     High cholesterol     Hypertension     Hypertriglyceridemia     Hypogonadism in male     Myalgia     Myocardial infarction (HCC)     Palpitations     Shoulder pain     Sinus problem     Skin cancer of nose     Sleep apnea     Stroke ([facility] [address] 3439    Systolic murmur     recently found    Tinea cruris     Tinea pedis        PAST SURGICAL HISTORY:  Past Surgical History:   Procedure Laterality Date    CATARACT EXTRACTION Left     COLONOSCOPY      CORONARY ANGIOPLASTY WITH STENT PLACEMENT      CORONARY ARTERY BYPASS GRAFT N/A 12/12/2016    Procedure: INTRAOP CECILLE; BILATERAL LEG EVH; CORONARY ARTERY BYPASS GRAFT X 4 SVG TO PDA, OM1,  AND DIAGONAL1, LIMA TO LAD;  Surgeon: Keagan Mendez MD;  [facility];  Service:     EYE SURGERY      HERNIA REPAIR      NM ARTHROSCOPY SHOULDER SURGICAL BICEPS TENODESIS Right 5/6/2016    Procedure: ARTHROSCOPIC BICEPS TENODESIS;  Surgeon: Anna Rios MD;  [facility];  Service: Orthopedics    NM SHLDR ARTHROSCOP,SURG,W/ROTAT CUFF REPR Right 5/6/2016    Procedure: REPAIR ROTATOR CUFF  ARTHROSCOPIC; SUBACROMIAL DECOMPRESSION; PARTIAL SYNOVECTOMY;  Surgeon: Anna Rios MD;  [facility];  Service: Orthopedics    UMBILICAL HERNIA REPAIR  2009    over age 13       FAMILY HISTORY:  Family History   Problem Relation Age of Onset    Heart disease Mother     Hypertension Mother     Nephrolithiasis Father     Other Father         Renal disease    Hypertension Sister     Nephrolithiasis Brother     Other Brother         Renal disease    Hematuria Family         Microscopic       SOCIAL HISTORY:  Social History   Substance Use Topics    Smoking status: Never Smoker    Smokeless tobacco: Never Used    Alcohol use No       MEDICATIONS:    Current Outpatient Prescriptions:     acetaminophen (TYLENOL) 325 mg tablet, Take 1-2 tablets by mouth every 6 (six) hours as needed, Disp: , Rfl:     Alcohol Swabs (ALCOHOL PREP) 70 % PADS, USE TWICE DAILY, Disp: 100 each, Rfl: 3    aspirin (ECOTRIN LOW STRENGTH) 81 mg EC tablet, Take 1 tablet (81 mg total) by mouth daily for 90 days, Disp: 90 tablet, Rfl: 3    atorvastatin (LIPITOR) 80 mg tablet, Take 1 tablet (80 mg total) by mouth daily for 270 days, Disp: 90 tablet, Rfl: 2    clorazepate (TRANXENE) 7 5 mg tablet, , Disp: , Rfl: 0    clotrimazole-betamethasone (LOTRISONE) 1-0 05 % cream, Apply topically 2 (two) times a day, Disp: 30 g, Rfl: 2    EASY COMFORT LANCETS MISC, TEST BLOOD SUGAR TWICE DAILY OR AS NEEDED, Disp: 100 each, Rfl: 11    fluticasone (FLONASE) 50 mcg/act nasal spray, 1 spray into each nostril daily, Disp: 1 Bottle, Rfl: 4    glimepiride (AMARYL) 4 mg tablet, TAKE 1/2 TABLET BY MOUTH IN THE MORNING AND 1 TABLET AT NIGHT, Disp: 180 tablet, Rfl: 2    hydrocortisone 2 5 % cream, Apply topically 3 (three) times a day, Disp: 30 g, Rfl: 0    losartan (COZAAR) 25 mg tablet, Take 1 tablet (25 mg total) by mouth daily for 270 days, Disp: 90 tablet, Rfl: 2    Menthol-Methyl Salicylate (ELMIRA FIGUEREDO GREASELESS) 10-15 % greaseless cream, Apply topically daily at bedtime, Disp: 30 g, Rfl: 0    metFORMIN (GLUCOPHAGE) 500 mg tablet, Take 1 tablet (500 mg total) by mouth 2 (two) times a day with meals for 270 days, Disp: 180 tablet, Rfl: 2    methocarbamol (ROBAXIN) 750 mg tablet, Take 1 tablet (750 mg total) by mouth daily at bedtime as needed for muscle spasms, Disp: 30 tablet, Rfl: 0    metoprolol tartrate (LOPRESSOR) 25 mg tablet, Take 1 tablet (25 mg total) by mouth every 12 (twelve) hours for 270 days, Disp: 180 tablet, Rfl: 2    mirtazapine (REMERON) 15 mg tablet, Take 1 tablet (15 mg total) by mouth daily at bedtime for 270 days, Disp: 90 tablet, Rfl: 2    omeprazole (PriLOSEC) 20 mg delayed release capsule, take 1 capsule by mouth once daily BEFORE A MEAL, Disp: 90 capsule, Rfl: 3    omeprazole (PriLOSEC) 20 mg delayed release capsule, Take 1 capsule (20 mg total) by mouth daily for 28 days, Disp: 28 capsule, Rfl: 1    ONE TOUCH ULTRA TEST test strip, Test blood sugar twice daily, or as needed when symptomatic of hypoglycemia or hyperglycemia, Disp: 100 each, Rfl: 5    ranitidine (ZANTAC) 150 MG capsule, Take 1 capsule (150 mg total) by mouth 2 (two) times a day as needed for indigestion or heartburn, Disp: 120 capsule, Rfl: 2    testosterone cypionate (DEPO-TESTOSTERONE) 200 mg/mL SOLN, , Disp: , Rfl: 0    tobramycin-dexamethasone (TOBRADEX) ophthalmic suspension, Administer 1 drop to both eyes 2 (two) times a day, Disp: 5 mL, Rfl: 3    tobramycin-dexamethasone (TOBRADEX) ophthalmic suspension, Administer 1 drop to both eyes 2 (two) times a day, Disp: 5 mL, Rfl: 1    ALLERGIES:  Allergies   Allergen Reactions    Epinephrine Hives and Anxiety    Penicillins Hives and Anxiety       REVIEW OF SYSTEMS:  Pertinent items are noted in HPI  A comprehensive review of systems was negative      LABS:  HgA1c:   Lab Results   Component Value Date    HGBA1C 7 8 (A) 01/14/2019     BMP:   Lab Results   Component Value Date    GLUCOSE 121 12/12/2016    CALCIUM 8 2 (L) 09/11/2018     08/01/2017    K 4 2 09/11/2018    CO2 28 09/11/2018     09/11/2018    BUN 16 09/11/2018    CREATININE 0 80 09/11/2018       _____________________________________________________  PHYSICAL EXAMINATION:  General: well developed and well nourished, alert, oriented times 3 and appears comfortable  Psychiatric: Normal  HEENT: Trachea Midline, No torticollis  Cardiovascular: No discernable arrhythmia  Pulmonary: No wheezing or stridor  Skin: No masses, erthema, lacerations, fluctation, ulcerations  Neurovascular: Sensation Intact to the Median, Ulnar, Radial Nerve, Motor Intact to the Median, Ulnar, Radial Nerve and Pulses Intact    MUSCULOSKELETAL EXAMINATION:  Extremities:  Bilateral hands:  Tenderness to the A1 pulley area of all digits except left index and thumb and right thumb, crepitus felt at the tendon area worst in right index, long, ring digits  Positive Durkan's compression test bilaterally producing numbness in the radial 3 digits bilaterally  Positive tinel's at carpal tunnel bilaterally  4/5 thumb opposition strength bilaterally  5/5 hand intrinsic strength  Hand warm well perfused  No thenar atrophy noted    Left hand small finger click  Thumb and index on left hand have nodules but no tenderness      _____________________________________________________  STUDIES REVIEWED:  EMG of bilateral uppe rextremities reviewed in ofice today and reveal bilateral median nerve neuropathy      PROCEDURES PERFORMED:  Procedures  No Procedures performed today    Rafia Delfino  01/30/19      I interviewed, took the history and examined the patient  I discuss the case with the resident and reviewed the resident's note  I supervised the resident and I agree with the resident management plan as it was presented to me  I was present in the clinic and examined the patient

## 2019-01-30 NOTE — PROGRESS NOTES
ASSESSMENT/PLAN:    Assessment:   Symptomatic Trigger fingers of all digits except the left index and thumb and right thumb  Bilateral carpal tunnel syndrome    Plan:   Given severity of symptoms, failure to improve with tendon gliding exercises and failure of corticosteroid injections to his bilateral carpal tunnels, as well as medical morbidity of undergoing corticosteroid injections, will schedule patient for Right endoscopic carpal tunnel release; trigger finger release of index, long, ring, small digits of right hand with IV sedation and local injection to be given intra-operatively  Risks, benefits, alternatives, complications explained to patient in Croatian and he is agreeable to scheduling surgery  He will pursue surgery for his left side after he recovers form his right sided surgery which would involve left endoscopic carpal tunnel release and trigger finger releases of the left long, ring, and small finger    To Do Next Visit:  Suture removal post-oepratively    General Discussions:     Carpal Tunnel Syndrome: The anatomy and physiology of carpal tunnel syndrome was discussed with the patient today  Increase pressure localized under the transverse carpal ligament can cause pain, numbness, tingling, or dysesthesias within the median nerve distribution as well as feelings of fatigue, clumsiness, or awkwardness  These symptoms typically occur at night and worse in the morning upon waking  Eventually, untreated carpal tunnel syndrome can result in weakness and permanent loss of muscle within the thenar compartment of the hand  Treatment options were discussed with the patient  Conservative treatment includes nocturnal resting splints to keep the nerve in a neutral position, ergonomic changes within the work or home environment, activity modification, and tendon gliding exercises    Steroid injections within the carpal canal can help a majority of patients, however this is often self-limited in a majority of patients  Surgical intervention to divide the transverse carpal ligament typically results in a long-lasting relief of the patient's complaints, with the recurrence rate of less than 1%      _____________________________________________________  CHIEF COMPLAINT:  Chief Complaint   Patient presents with    Left Hand - Pain    Right Hand - Pain         SUBJECTIVE:  Breana Jon is a 72y o  year old retired male who presents with pain of both hands and triggering of all digits except his left index and thumb  He has significant difficulty making a fist   He states that his pain is worse and the A1 pulley area of his digits in the morning  He states that the numbness and tingling in his radial 3 digits of bilateral upper extremities wakes him up from sleep and he has also begun to drop objects  He has had an EMG of bilateral upper extremities which confirms findings of median nerve neuropathy  He had corticosteroid injections to his bilateral carpal tunnels which provided minimal relief of symptoms  He does seem to be bothered more today by the Pain of the trigger fingers rather than the numbness and tingling in his radial 3 digits bilaterally  He did have a significant spike in his blood glucose up to the 300s with injections to the carpal tunnels  He was taught home with tendon gliding exercises by Hand therapy for trigger fingers which she states did not improve his symptoms  He has not had his trigger fingers injected with corticosteroid at this time      PAST MEDICAL HISTORY:  Past Medical History:   Diagnosis Date    AS (aortic stenosis)     Balanitis     Biceps tendonitis on right     CAD (coronary artery disease)     Cancer (HCC)     skin    Complete rotator cuff tear or rupture of right shoulder, not specified as traumatic     Diabetes mellitus ([facility] [address] )     Esophageal reflux     High cholesterol     Hypertension     Hypertriglyceridemia     Hypogonadism in male     Myalgia     Myocardial infarction (HCC)     Palpitations     Shoulder pain     Sinus problem     Skin cancer of nose     Sleep apnea     Stroke ([facility] [address] 9543    Systolic murmur     recently found    Tinea cruris     Tinea pedis        PAST SURGICAL HISTORY:  Past Surgical History:   Procedure Laterality Date    CATARACT EXTRACTION Left     COLONOSCOPY      CORONARY ANGIOPLASTY WITH STENT PLACEMENT      CORONARY ARTERY BYPASS GRAFT N/A 12/12/2016    Procedure: INTRAOP CECILLE; BILATERAL LEG EVH; CORONARY ARTERY BYPASS GRAFT X 4 SVG TO PDA, OM1,  AND DIAGONAL1, LIMA TO LAD;  Surgeon: Toya Adams MD;  [facility];  Service:     EYE SURGERY      HERNIA REPAIR      LA ARTHROSCOPY SHOULDER SURGICAL BICEPS TENODESIS Right 5/6/2016    Procedure: ARTHROSCOPIC BICEPS TENODESIS;  Surgeon: Neeta Craig MD;  [facility];  Service: Orthopedics    LA SHLDR ARTHROSCOP,SURG,W/ROTAT CUFF REPR Right 5/6/2016    Procedure: REPAIR ROTATOR CUFF  ARTHROSCOPIC; SUBACROMIAL DECOMPRESSION; PARTIAL SYNOVECTOMY;  Surgeon: Neeta Craig MD;  [facility];  Service: Orthopedics    UMBILICAL HERNIA REPAIR  2009    over age 13       FAMILY HISTORY:  Family History   Problem Relation Age of Onset    Heart disease Mother     Hypertension Mother     Nephrolithiasis Father     Other Father         Renal disease    Hypertension Sister     Nephrolithiasis Brother     Other Brother         Renal disease    Hematuria Family         Microscopic       SOCIAL HISTORY:  Social History   Substance Use Topics    Smoking status: Never Smoker    Smokeless tobacco: Never Used    Alcohol use No       MEDICATIONS:    Current Outpatient Prescriptions:     acetaminophen (TYLENOL) 325 mg tablet, Take 1-2 tablets by mouth every 6 (six) hours as needed, Disp: , Rfl:     Alcohol Swabs (ALCOHOL PREP) 70 % PADS, USE TWICE DAILY, Disp: 100 each, Rfl: 3    aspirin (ECOTRIN LOW STRENGTH) 81 mg EC tablet, Take 1 tablet (81 mg total) by mouth daily for 90 days, Disp: 90 tablet, Rfl: 3    atorvastatin (LIPITOR) 80 mg tablet, Take 1 tablet (80 mg total) by mouth daily for 270 days, Disp: 90 tablet, Rfl: 2    clorazepate (TRANXENE) 7 5 mg tablet, , Disp: , Rfl: 0    clotrimazole-betamethasone (LOTRISONE) 1-0 05 % cream, Apply topically 2 (two) times a day, Disp: 30 g, Rfl: 2    EASY COMFORT LANCETS MISC, TEST BLOOD SUGAR TWICE DAILY OR AS NEEDED, Disp: 100 each, Rfl: 11    fluticasone (FLONASE) 50 mcg/act nasal spray, 1 spray into each nostril daily, Disp: 1 Bottle, Rfl: 4    glimepiride (AMARYL) 4 mg tablet, TAKE 1/2 TABLET BY MOUTH IN THE MORNING AND 1 TABLET AT NIGHT, Disp: 180 tablet, Rfl: 2    hydrocortisone 2 5 % cream, Apply topically 3 (three) times a day, Disp: 30 g, Rfl: 0    losartan (COZAAR) 25 mg tablet, Take 1 tablet (25 mg total) by mouth daily for 270 days, Disp: 90 tablet, Rfl: 2    Menthol-Methyl Salicylate (ELMIRA FIGUEREDO GREASELESS) 10-15 % greaseless cream, Apply topically daily at bedtime, Disp: 30 g, Rfl: 0    metFORMIN (GLUCOPHAGE) 500 mg tablet, Take 1 tablet (500 mg total) by mouth 2 (two) times a day with meals for 270 days, Disp: 180 tablet, Rfl: 2    methocarbamol (ROBAXIN) 750 mg tablet, Take 1 tablet (750 mg total) by mouth daily at bedtime as needed for muscle spasms, Disp: 30 tablet, Rfl: 0    metoprolol tartrate (LOPRESSOR) 25 mg tablet, Take 1 tablet (25 mg total) by mouth every 12 (twelve) hours for 270 days, Disp: 180 tablet, Rfl: 2    mirtazapine (REMERON) 15 mg tablet, Take 1 tablet (15 mg total) by mouth daily at bedtime for 270 days, Disp: 90 tablet, Rfl: 2    omeprazole (PriLOSEC) 20 mg delayed release capsule, take 1 capsule by mouth once daily BEFORE A MEAL, Disp: 90 capsule, Rfl: 3    omeprazole (PriLOSEC) 20 mg delayed release capsule, Take 1 capsule (20 mg total) by mouth daily for 28 days, Disp: 28 capsule, Rfl: 1    ONE TOUCH ULTRA TEST test strip, Test blood sugar twice daily, or as needed when symptomatic of hypoglycemia or hyperglycemia, Disp: 100 each, Rfl: 5    ranitidine (ZANTAC) 150 MG capsule, Take 1 capsule (150 mg total) by mouth 2 (two) times a day as needed for indigestion or heartburn, Disp: 120 capsule, Rfl: 2    testosterone cypionate (DEPO-TESTOSTERONE) 200 mg/mL SOLN, , Disp: , Rfl: 0    tobramycin-dexamethasone (TOBRADEX) ophthalmic suspension, Administer 1 drop to both eyes 2 (two) times a day, Disp: 5 mL, Rfl: 3    tobramycin-dexamethasone (TOBRADEX) ophthalmic suspension, Administer 1 drop to both eyes 2 (two) times a day, Disp: 5 mL, Rfl: 1    ALLERGIES:  Allergies   Allergen Reactions    Epinephrine Hives and Anxiety    Penicillins Hives and Anxiety       REVIEW OF SYSTEMS:  Pertinent items are noted in HPI  A comprehensive review of systems was negative      LABS:  HgA1c:   Lab Results   Component Value Date    HGBA1C 7 8 (A) 01/14/2019     BMP:   Lab Results   Component Value Date    GLUCOSE 121 12/12/2016    CALCIUM 8 2 (L) 09/11/2018     08/01/2017    K 4 2 09/11/2018    CO2 28 09/11/2018     09/11/2018    BUN 16 09/11/2018    CREATININE 0 80 09/11/2018       _____________________________________________________  PHYSICAL EXAMINATION:  General: well developed and well nourished, alert, oriented times 3 and appears comfortable  Psychiatric: Normal  HEENT: Trachea Midline, No torticollis  Cardiovascular: No discernable arrhythmia  Pulmonary: No wheezing or stridor  Skin: No masses, erthema, lacerations, fluctation, ulcerations  Neurovascular: Sensation Intact to the Median, Ulnar, Radial Nerve, Motor Intact to the Median, Ulnar, Radial Nerve and Pulses Intact    MUSCULOSKELETAL EXAMINATION:  Extremities:  Bilateral hands:  Tenderness to the A1 pulley area of all digits except left index and thumb and right thumb, crepitus felt at the tendon area worst in right index, long, ring digits  Positive Durkan's compression test bilaterally producing numbness in the radial 3 digits bilaterally  Positive tinel's at carpal tunnel bilaterally  4/5 thumb opposition strength bilaterally  5/5 hand intrinsic strength  Hand warm well perfused  No thenar atrophy noted    Left hand small finger click  Thumb and index on left hand have nodules but no tenderness      _____________________________________________________  STUDIES REVIEWED:  EMG of bilateral uppe rextremities reviewed in ofice today and reveal bilateral median nerve neuropathy      PROCEDURES PERFORMED:  Procedures  No Procedures performed today    Blaire Dia  01/30/19      I interviewed, took the history and examined the patient  I discuss the case with the resident and reviewed the resident's note  I supervised the resident and I agree with the resident management plan as it was presented to me  I was present in the clinic and examined the patient

## 2019-01-31 DIAGNOSIS — Z86.19 H/O TINEA CRURIS: Chronic | ICD-10-CM

## 2019-02-01 DIAGNOSIS — Z86.19 H/O TINEA CRURIS: Chronic | ICD-10-CM

## 2019-02-06 DIAGNOSIS — Z86.19 H/O TINEA CRURIS: Chronic | ICD-10-CM

## 2019-02-08 ENCOUNTER — HOSPITAL ENCOUNTER (OUTPATIENT)
Facility: HOSPITAL | Age: 74
Setting detail: OBSERVATION
Discharge: HOME | End: 2019-02-09
Attending: EMERGENCY MEDICINE | Admitting: FAMILY MEDICINE
Payer: MEDICARE

## 2019-02-08 ENCOUNTER — APPOINTMENT (EMERGENCY)
Dept: RADIOLOGY | Facility: HOSPITAL | Age: 74
End: 2019-02-08
Payer: MEDICARE

## 2019-02-08 DIAGNOSIS — Z95.5 S/P DRUG ELUTING CORONARY STENT PLACEMENT: Chronic | ICD-10-CM

## 2019-02-08 DIAGNOSIS — R42 DIZZINESS: ICD-10-CM

## 2019-02-08 DIAGNOSIS — N18.2 HYPERTENSION ASSOCIATED WITH STAGE 2 CHRONIC KIDNEY DISEASE DUE TO TYPE 2 DIABETES MELLITUS (HCC): Chronic | ICD-10-CM

## 2019-02-08 DIAGNOSIS — I12.9 HYPERTENSION ASSOCIATED WITH STAGE 2 CHRONIC KIDNEY DISEASE DUE TO TYPE 2 DIABETES MELLITUS (HCC): Chronic | ICD-10-CM

## 2019-02-08 DIAGNOSIS — I35.0 MILD AORTIC STENOSIS: ICD-10-CM

## 2019-02-08 DIAGNOSIS — E11.22 HYPERTENSION ASSOCIATED WITH STAGE 2 CHRONIC KIDNEY DISEASE DUE TO TYPE 2 DIABETES MELLITUS (HCC): Chronic | ICD-10-CM

## 2019-02-08 DIAGNOSIS — I25.2 H/O NON-ST ELEVATION MYOCARDIAL INFARCTION (NSTEMI): Chronic | ICD-10-CM

## 2019-02-08 DIAGNOSIS — I10 HYPERTENSION: Primary | ICD-10-CM

## 2019-02-08 DIAGNOSIS — Z95.1 S/P CABG (CORONARY ARTERY BYPASS GRAFT): ICD-10-CM

## 2019-02-08 LAB
ALBUMIN SERPL BCP-MCNC: 3.7 G/DL (ref 3.5–5)
ALP SERPL-CCNC: 65 U/L (ref 46–116)
ALT SERPL W P-5'-P-CCNC: 37 U/L (ref 12–78)
ANION GAP SERPL CALCULATED.3IONS-SCNC: 4 MMOL/L (ref 4–13)
AST SERPL W P-5'-P-CCNC: 19 U/L (ref 5–45)
ATRIAL RATE: 61 BPM
BASOPHILS # BLD AUTO: 0.06 THOUSANDS/ΜL (ref 0–0.1)
BASOPHILS NFR BLD AUTO: 1 % (ref 0–1)
BILIRUB SERPL-MCNC: 0.64 MG/DL (ref 0.2–1)
BUN SERPL-MCNC: 17 MG/DL (ref 5–25)
CALCIUM SERPL-MCNC: 8.9 MG/DL (ref 8.3–10.1)
CHLORIDE SERPL-SCNC: 106 MMOL/L (ref 100–108)
CO2 SERPL-SCNC: 27 MMOL/L (ref 21–32)
CREAT SERPL-MCNC: 0.83 MG/DL (ref 0.6–1.3)
EOSINOPHIL # BLD AUTO: 0.13 THOUSAND/ΜL (ref 0–0.61)
EOSINOPHIL NFR BLD AUTO: 2 % (ref 0–6)
ERYTHROCYTE [DISTWIDTH] IN BLOOD BY AUTOMATED COUNT: 13.2 % (ref 11.6–15.1)
GFR SERPL CREATININE-BSD FRML MDRD: 89 ML/MIN/1.73SQ M
GLUCOSE SERPL-MCNC: 144 MG/DL (ref 65–140)
GLUCOSE SERPL-MCNC: 150 MG/DL (ref 65–140)
GLUCOSE SERPL-MCNC: 156 MG/DL (ref 65–140)
HCT VFR BLD AUTO: 41.3 % (ref 36.5–49.3)
HGB BLD-MCNC: 13.3 G/DL (ref 12–17)
IMM GRANULOCYTES # BLD AUTO: 0.02 THOUSAND/UL (ref 0–0.2)
IMM GRANULOCYTES NFR BLD AUTO: 0 % (ref 0–2)
LYMPHOCYTES # BLD AUTO: 2.23 THOUSANDS/ΜL (ref 0.6–4.47)
LYMPHOCYTES NFR BLD AUTO: 35 % (ref 14–44)
MCH RBC QN AUTO: 29.3 PG (ref 26.8–34.3)
MCHC RBC AUTO-ENTMCNC: 32.2 G/DL (ref 31.4–37.4)
MCV RBC AUTO: 91 FL (ref 82–98)
MONOCYTES # BLD AUTO: 0.59 THOUSAND/ΜL (ref 0.17–1.22)
MONOCYTES NFR BLD AUTO: 9 % (ref 4–12)
NEUTROPHILS # BLD AUTO: 3.35 THOUSANDS/ΜL (ref 1.85–7.62)
NEUTS SEG NFR BLD AUTO: 53 % (ref 43–75)
NRBC BLD AUTO-RTO: 0 /100 WBCS
P AXIS: 42 DEGREES
PLATELET # BLD AUTO: 191 THOUSANDS/UL (ref 149–390)
PMV BLD AUTO: 11.2 FL (ref 8.9–12.7)
POTASSIUM SERPL-SCNC: 4.2 MMOL/L (ref 3.5–5.3)
PR INTERVAL: 150 MS
PROT SERPL-MCNC: 6.9 G/DL (ref 6.4–8.2)
QRS AXIS: 41 DEGREES
QRSD INTERVAL: 92 MS
QT INTERVAL: 440 MS
QTC INTERVAL: 442 MS
RBC # BLD AUTO: 4.54 MILLION/UL (ref 3.88–5.62)
SODIUM SERPL-SCNC: 137 MMOL/L (ref 136–145)
T WAVE AXIS: 96 DEGREES
TROPONIN I SERPL-MCNC: 0.02 NG/ML
TROPONIN I SERPL-MCNC: 0.02 NG/ML
TROPONIN I SERPL-MCNC: 0.03 NG/ML
TROPONIN I SERPL-MCNC: 0.04 NG/ML
VENTRICULAR RATE: 61 BPM
WBC # BLD AUTO: 6.38 THOUSAND/UL (ref 4.31–10.16)

## 2019-02-08 PROCEDURE — 84484 ASSAY OF TROPONIN QUANT: CPT | Performed by: FAMILY MEDICINE

## 2019-02-08 PROCEDURE — 99223 1ST HOSP IP/OBS HIGH 75: CPT | Performed by: INTERNAL MEDICINE

## 2019-02-08 PROCEDURE — 71046 X-RAY EXAM CHEST 2 VIEWS: CPT

## 2019-02-08 PROCEDURE — 82948 REAGENT STRIP/BLOOD GLUCOSE: CPT

## 2019-02-08 PROCEDURE — 85025 COMPLETE CBC W/AUTO DIFF WBC: CPT | Performed by: EMERGENCY MEDICINE

## 2019-02-08 PROCEDURE — 99285 EMERGENCY DEPT VISIT HI MDM: CPT

## 2019-02-08 PROCEDURE — 93010 ELECTROCARDIOGRAM REPORT: CPT | Performed by: INTERNAL MEDICINE

## 2019-02-08 PROCEDURE — 93005 ELECTROCARDIOGRAM TRACING: CPT

## 2019-02-08 PROCEDURE — 36415 COLL VENOUS BLD VENIPUNCTURE: CPT | Performed by: EMERGENCY MEDICINE

## 2019-02-08 PROCEDURE — 80053 COMPREHEN METABOLIC PANEL: CPT | Performed by: EMERGENCY MEDICINE

## 2019-02-08 PROCEDURE — 94762 N-INVAS EAR/PLS OXIMTRY CONT: CPT

## 2019-02-08 PROCEDURE — 84484 ASSAY OF TROPONIN QUANT: CPT | Performed by: EMERGENCY MEDICINE

## 2019-02-08 RX ORDER — LOSARTAN POTASSIUM 25 MG/1
25 TABLET ORAL DAILY
Status: CANCELLED | OUTPATIENT
Start: 2019-02-08

## 2019-02-08 RX ORDER — FLUTICASONE PROPIONATE 50 MCG
1 SPRAY, SUSPENSION (ML) NASAL DAILY
Status: DISCONTINUED | OUTPATIENT
Start: 2019-02-09 | End: 2019-02-09 | Stop reason: HOSPADM

## 2019-02-08 RX ORDER — TOBRAMYCIN AND DEXAMETHASONE 3; 1 MG/ML; MG/ML
1 SUSPENSION/ DROPS OPHTHALMIC 2 TIMES DAILY
Status: DISCONTINUED | OUTPATIENT
Start: 2019-02-08 | End: 2019-02-09 | Stop reason: HOSPADM

## 2019-02-08 RX ORDER — ATORVASTATIN CALCIUM 80 MG/1
80 TABLET, FILM COATED ORAL DAILY
Status: DISCONTINUED | OUTPATIENT
Start: 2019-02-09 | End: 2019-02-09 | Stop reason: HOSPADM

## 2019-02-08 RX ORDER — ASPIRIN 81 MG/1
81 TABLET ORAL DAILY
Status: DISCONTINUED | OUTPATIENT
Start: 2019-02-09 | End: 2019-02-09 | Stop reason: HOSPADM

## 2019-02-08 RX ORDER — PANTOPRAZOLE SODIUM 40 MG/1
40 TABLET, DELAYED RELEASE ORAL
Status: DISCONTINUED | OUTPATIENT
Start: 2019-02-09 | End: 2019-02-09 | Stop reason: HOSPADM

## 2019-02-08 RX ORDER — MIRTAZAPINE 15 MG/1
15 TABLET, FILM COATED ORAL
Status: DISCONTINUED | OUTPATIENT
Start: 2019-02-08 | End: 2019-02-09 | Stop reason: HOSPADM

## 2019-02-08 RX ORDER — LORAZEPAM 0.5 MG/1
0.5 TABLET ORAL
Status: DISCONTINUED | OUTPATIENT
Start: 2019-02-08 | End: 2019-02-09 | Stop reason: HOSPADM

## 2019-02-08 RX ORDER — LOSARTAN POTASSIUM 50 MG/1
50 TABLET ORAL DAILY
Status: DISCONTINUED | OUTPATIENT
Start: 2019-02-08 | End: 2019-02-09 | Stop reason: HOSPADM

## 2019-02-08 RX ADMIN — TOBRAMYCIN AND DEXAMETHASONE 1 DROP: 3; 1 SUSPENSION/ DROPS OPHTHALMIC at 22:10

## 2019-02-08 RX ADMIN — LOSARTAN POTASSIUM 50 MG: 50 TABLET, FILM COATED ORAL at 22:11

## 2019-02-08 RX ADMIN — MIRTAZAPINE 15 MG: 15 TABLET, FILM COATED ORAL at 21:29

## 2019-02-08 NOTE — ASSESSMENT & PLAN NOTE
Episode of Nausea w/o vomiting yesterday  - Continue PTA protonix daily  - Patient no longer takes Zantac

## 2019-02-08 NOTE — ASSESSMENT & PLAN NOTE
Stable  - likely 2/2 elevated blood pressures  - Onset of 6 days without syncope  - self resolved  - No focal neuro deficits  - Cardiology consulted  - neuro checks q4  - Consider noncon CT of head if dizziness returns with accompanying neuro deficits

## 2019-02-08 NOTE — ASSESSMENT & PLAN NOTE
Stable  Patient denies angina or MOORE  Troponin: neg x1  Follow up with repeat troponin  EKG: NSR w/o ST elevations, occasional PVC's, no change from previous  CXR: Findings suggest mild/moderate CHF   Consider echo

## 2019-02-08 NOTE — ASSESSMENT & PLAN NOTE
Lab Results   Component Value Date    HGBA1C 7 8 (A) 01/14/2019       No results for input(s): POCGLU in the last 72 hours      Blood Sugar Average: Last 72 hrs:  - Hold metformin and glimiperide  - start on SSI

## 2019-02-08 NOTE — CONSULTS
Consultation - Cardiology Team One  Meir Arriaza 72 y o  male MRN: [id number]  Unit/Bed#: ED 21 Encounter: [id number]    Inpatient consult to Cardiology  Consult performed by: Canelo Driscoll ordered by: Alonzo Downing        Physician Requesting Consult: Jovanni Boyer MD  Reason for Consult / Principal Problem:  Hypertension, dizziness    Assessment:  Hypertension:  Uncontrolled, with pressures in the 752-698 systolic x1 week with 1 episode of dizziness  Last blood pressure 145/65 without any intervention  Home regimen includes losartan 25 mg daily and Lopressor 25 mg b i d  Dizziness:  1 episode on Saturday with no recurrence in the setting of elevated blood pressure  CAD: s/p CABG x4 LIMA-lad, SVG-diagonal, SVG-OM1, SVG-PDA   · MI 2006 status post EYAL to LAD  MI 2012 EYAL to RCA  · On aspirin, statin and low-dose beta-blocker  Preserved Biventricular systolic function:  EF 70% on echo from 2016  Diabetes:  A1c 7 8 in January 2019  Management per primary team  Bradycardia:  Has a history of asymptomatic bradycardia  Heart rates known to be in the 50s on low-dose beta-blocker  Currently heart rate remains in the 50s however patient to double dose of beta-blocker last evening and this morning in order to improve blood pressure  Remains asymptomatic with heart rates in the 50s  NSVT:  One 3 beat run of nonsustained VT noted on telemetry       Plan/Recommendations:  · Continue to monitor on telemetry  · Continue metoprolol at 25 mg b i d  · Up titrate losartan to 50 mg daily  · Continue aspirin and statin  · Echocardiogram to evaluate heart function and valvular status  · EKG    _____________________________________________________    CC:  Hypertension, dizziness    History of Present Illness  Daughter at bedside translated    HPI: Meir Arriaza is a 72y o  year old Serbian-speaking male who has a history of hypertension, diabetes, bradycardia, CAD with MI in 2006 status post EYAL to the LAD  He subsequently had an MI in 2012 with stenting to the RCA  12/2016 patient had an NSTEMI and was noted to have multivessel disease and underwent CABG x4 with LIMA-lad, SVG-diet, SVG-OM1, SVG-PDA who follows with cardiologist Dr Zachary Valdez  He was last seen 01/16/2019 at which point he had complained of waking up in the middle night feeling sweaty  This was not felt to be cardiac  His heart rates were in the 50s in the office and he was advised to continue on his current dose of metoprolol tartrate 25 mg b i d  Along with losartan 25 mg daily, aspirin and atorvastatin  He now presents to the emergency room with complaints of high blood pressure x1 week and 1 episode of dizziness that occurred Saturday  He has noted systolic blood pressures in the 180-190  He had doubled up on his beta-blocker dose last evening and this morning in attempts to improve his blood pressure  As blood pressures did not improve he presented to the emergency room  In the ER his blood pressure was noted be 181/73  His heart rates are in the 50s  Chest x-ray showed mild-moderate CHF  Labs revealed stable CBC and CMP with negative troponin x1  He denies any chest pain, shortness of breath, palpitation, lightheadedness, current dizziness, syncope, near syncope, orthopnea, PND or lower extremity edema  He had 1 single episode of dizziness that occurred on Saturday and nothing since  Limited Echocardiogram 12/2016:  Mild mitral regurgitation, mild aortic stenosis, mild aortic insufficiency, trace tricuspid regurgitation  EKG reviewed personally: Will order EKG      Telemetry reviewed personally:  Sinus bradycardia with heart rates 47-50's  One 3 beat run of NSVT and PVCs noted  Review of Systems   Constitution: Negative  Negative for chills and weakness  Cardiovascular: Negative for chest pain, dyspnea on exertion, leg swelling, near-syncope, orthopnea, palpitations and paroxysmal nocturnal dyspnea  Respiratory: Negative  Negative for shortness of breath  Gastrointestinal: Negative for diarrhea, nausea and vomiting  Neurological: Positive for dizziness  Psychiatric/Behavioral: Negative for altered mental status  All other systems reviewed and are negative      Historical Information   Past Medical History:   Diagnosis Date    AS (aortic stenosis)     Balanitis     Biceps tendonitis on right     CAD (coronary artery disease)     Cancer (HCC)     skin    Complete rotator cuff tear or rupture of right shoulder, not specified as traumatic     Diabetes mellitus [facility]     Esophageal reflux     High cholesterol     Hypertension     Hypertriglyceridemia     Hypogonadism in male     Myalgia     Myocardial infarction ([facility]     Palpitations     Shoulder pain     Sinus problem     Skin cancer of nose     Sleep apnea     [facility] [address]    Systolic murmur     recently found    Tinea cruris     Tinea pedis      Past Surgical History:   Procedure Laterality Date    CATARACT EXTRACTION Left     COLONOSCOPY      CORONARY ANGIOPLASTY WITH STENT PLACEMENT      CORONARY ARTERY BYPASS GRAFT N/A 12/12/2016    Procedure: INTRAOP CECILLE; BILATERAL LEG EVH; CORONARY ARTERY BYPASS GRAFT X 4 SVG TO PDA, OM1,  AND DIAGONAL1, LIMA TO LAD;  Surgeon: Savana Davis MD;  [facility];  Service:     EYE SURGERY      HERNIA REPAIR      CO ARTHROSCOPY SHOULDER SURGICAL BICEPS TENODESIS Right 5/6/2016    Procedure: ARTHROSCOPIC BICEPS TENODESIS;  Surgeon: Rosy Brown MD;  [facility];  Service: Orthopedics    CO SHLDR ARTHROSCOP,SURG,W/ROTAT CUFF REPR Right 5/6/2016    Procedure: REPAIR ROTATOR CUFF  ARTHROSCOPIC; SUBACROMIAL DECOMPRESSION; PARTIAL SYNOVECTOMY;  Surgeon: Rosy Brown MD;  [facility];  Service: Orthopedics    UMBILICAL HERNIA REPAIR  2009    over age 13     History   Alcohol Use No     History   Drug Use No     History   Smoking Status    Never Smoker Smokeless Tobacco    Never Used     Family History:   Family History   Problem Relation Age of Onset    Heart disease Mother     Hypertension Mother     Nephrolithiasis Father     Other Father         Renal disease    Hypertension Sister     Nephrolithiasis Brother     Other Brother         Renal disease    Hematuria Family         Microscopic       Meds/Allergies   all current active meds have been reviewed, current meds:   No current facility-administered medications for this encounter  and PTA meds:   Prior to Admission Medications   Prescriptions Last Dose Informant Patient Reported?  Taking?   aspirin (ECOTRIN LOW STRENGTH) 81 mg EC tablet   No Yes   Sig: Take 1 tablet (81 mg total) by mouth daily for 90 days   atorvastatin (LIPITOR) 80 mg tablet  Self No Yes   Sig: Take 1 tablet (80 mg total) by mouth daily for 270 days   clorazepate (TRANXENE) 7 5 mg tablet  Self Yes Yes   clotrimazole-betamethasone (LOTRISONE) 1-0 05 % cream   No Yes   Sig: Apply topically 2 (two) times a day   fluticasone (FLONASE) 50 mcg/act nasal spray  Self No Yes   Si spray into each nostril daily   glimepiride (AMARYL) 4 mg tablet  Self No Yes   Sig: TAKE 1/2 TABLET BY MOUTH IN THE MORNING AND 1 TABLET AT NIGHT   hydrocortisone 2 5 % cream   No Yes   Sig: Apply topically 3 (three) times a day   losartan (COZAAR) 25 mg tablet  Self No Yes   Sig: Take 1 tablet (25 mg total) by mouth daily for 270 days   metFORMIN (GLUCOPHAGE) 500 mg tablet  Self No Yes   Sig: Take 1 tablet (500 mg total) by mouth 2 (two) times a day with meals for 270 days   metoprolol tartrate (LOPRESSOR) 25 mg tablet  Self No Yes   Sig: Take 1 tablet (25 mg total) by mouth every 12 (twelve) hours for 270 days   mirtazapine (REMERON) 15 mg tablet  Self No Yes   Sig: Take 1 tablet (15 mg total) by mouth daily at bedtime for 270 days   omeprazole (PriLOSEC) 20 mg delayed release capsule  Self No Yes   Sig: Take 1 capsule (20 mg total) by mouth daily for 28 days   ranitidine (ZANTAC) 150 MG capsule  Self No Yes   Sig: Take 1 capsule (150 mg total) by mouth 2 (two) times a day as needed for indigestion or heartburn   testosterone cypionate (DEPO-TESTOSTERONE) 200 mg/mL SOLN  Self Yes Yes   tobramycin-dexamethasone (TOBRADEX) ophthalmic suspension  Self No Yes   Sig: Administer 1 drop to both eyes 2 (two) times a day      Facility-Administered Medications: None       No current facility-administered medications for this encounter  Allergies   Allergen Reactions    Epinephrine Hives and Anxiety    Penicillins Hives and Anxiety       Objective   Vitals: Blood pressure 145/65, pulse (!) 47, temperature 97 9 °F (36 6 °C), temperature source Oral, resp  rate 16, height 5' 5" (1 651 m), weight 77 kg (169 lb 12 1 oz), SpO2 96 %  ,     Body mass index is 28 25 kg/m²  ,     Systolic (58FHU), AKC:804 , Min:133 , MARILEE:568     Diastolic (54PAZ), RY:12, Min:63, Max:73      No intake or output data in the 24 hours ending 02/08/19 1519  Weight (last 2 days)     Date/Time   Weight    02/08/19 1014  77 (169 75)            Invasive Devices     Peripheral Intravenous Line            Peripheral IV 12/22/16 Left Antecubital 778 days              Physical Exam   Constitutional: He is oriented to person, place, and time  He appears well-developed and well-nourished  HENT:   Head: Normocephalic and atraumatic  Neck: Normal range of motion  Neck supple  No JVD present  Cardiovascular: Normal rate, regular rhythm and normal heart sounds  Pulmonary/Chest: Effort normal and breath sounds normal  No respiratory distress  He has no wheezes  He has no rales  Abdominal: Soft  Bowel sounds are normal    Musculoskeletal: Normal range of motion  He exhibits no edema  Neurological: He is alert and oriented to person, place, and time  Skin: Skin is warm and dry  Psychiatric: He has a normal mood and affect   His behavior is normal  Judgment and thought content normal    Nursing note and vitals reviewed        LABORATORY RESULTS:    Results from last 7 days  Lab Units 19  1144   TROPONIN I ng/mL 0 02     CBC with diff:   Results from last 7 days  Lab Units 19  1144   WBC Thousand/uL 6 38   HEMOGLOBIN g/dL 13 3   HEMATOCRIT % 41 3   MCV fL 91   PLATELETS Thousands/uL 191   MCH pg 29 3   MCHC g/dL 32 2   RDW % 13 2   MPV fL 11 2   NRBC AUTO /100 WBCs 0     CMP:  Results from last 7 days  Lab Units 19  1144   POTASSIUM mmol/L 4 2   CHLORIDE mmol/L 106   CO2 mmol/L 27   BUN mg/dL 17   CREATININE mg/dL 0 83   CALCIUM mg/dL 8 9   AST U/L 19   ALT U/L 37   ALK PHOS U/L 65   EGFR ml/min/1 73sq m 89       BMP:  Results from last 7 days  Lab Units 19  1144   POTASSIUM mmol/L 4 2   CHLORIDE mmol/L 106   CO2 mmol/L 27   BUN mg/dL 17   CREATININE mg/dL 0 83   CALCIUM mg/dL 8 9        No results found for: NTBNP        Lipid Profile:   Lab Results   Component Value Date    CHOL 117 (L) 2017    CHOL 270 (H) 2016    CHOL 176 03/10/2014     Lab Results   Component Value Date    HDL 50 2018    HDL 58 2018    HDL 48 2017     Lab Results   Component Value Date    LDLCALC 45 2018    [facility] 95 2016     Lab Results   Component Value Date    TRIG 48 2018    TRIG 99 2018    TRIG 74 2017       Cardiac testing:   Results for orders placed during the hospital encounter of 16   Echo complete with contrast if indicated    Narrative [address]  [phone]    Transthoracic Echocardiogram  2D, M-mode, Doppler, and Color Doppler    Study date:  08-Mar-2016    Patient: Sonya Morrison  MR number: [id number]  Account number: [de-identified]  : 1945  Age: 78 years  Gender: Male  Status: Outpatient  [facility] [address] [place] [address] [facility]  Height: 69 in  Weight: 163 lb  BP: 146/ 78 mmHg    Indications: Coronary artery disease    Diagnoses: I25 10 - Atherosclerotic heart disease of native coronary artery  without angina pectoris    Sonographer:  Camelia Costa  Primary Physician:  Cristi Kathleen DO  Referring Physician:  Teodora Dubin, MD  Group:  [facility]  Cardiology Fellow:  Lucas Wang MD  Interpreting Physician:  John Barry MD    SUMMARY    LEFT VENTRICLE:  Systolic function was normal  Ejection fraction was estimated to be 60 %  Although no diagnostic regional wall motion abnormality was identified, this  possibility cannot be completely excluded on the basis of this study  Wall thickness was mildly increased  There was mild concentric hypertrophy  AORTIC VALVE:  There was probably very mild stenosis versus sclerosis  There was mild regurgitation  Valve mean gradient was 8 mmHg  TRICUSPID VALVE:  The findings suggest mild pulmonary hypertension  COMPARISONS:  The previous study was not available for direct comparison, however, there have  been changes from the report of that study  Aortic valve velocities have not  changed significantly, but there is now mild pulmonary HTN  Comparison was made  with the previous study of 23-Apr-2013  HISTORY: PRIOR HISTORY: Hypertension, hyperlipidemia, CAD, MI, stent, stroke  syndrome, DM2, reflux    PROCEDURE: The study was performed in the [address] [facility] [place]  This was a routine study  The transthoracic approach was used  The study  included complete 2D imaging, M-mode, complete spectral Doppler, and color  Doppler  Image quality was adequate  LEFT VENTRICLE: Size was normal  Systolic function was normal  Ejection  fraction was estimated to be 60 %  Although no diagnostic regional wall motion  abnormality was identified, this possibility cannot be completely excluded on  the basis of this study  Wall thickness was mildly increased  There was mild  concentric hypertrophy   DOPPLER: The ratio of early ventricular filling to  atrial contraction velocities was within the normal range  RIGHT VENTRICLE: The size was normal  Systolic function was normal  Wall  thickness was normal     LEFT ATRIUM: The atrium was mildly dilated  RIGHT ATRIUM: Size was normal     MITRAL VALVE: There was mild annular calcification  Valve structure was normal   There was mild diffuse thickening  There was normal leaflet separation  DOPPLER: The transmitral velocity was within the normal range  There was no  evidence for stenosis  There was mild regurgitation  AORTIC VALVE: The valve was trileaflet  Leaflets exhibited mildly increased  thickness, mild calcification, and normal cuspal separation  DOPPLER: There was  probably very mild stenosis versus sclerosis  There was mild regurgitation  TRICUSPID VALVE: The valve structure was normal  There was normal leaflet  separation  DOPPLER: The transtricuspid velocity was within the normal range  There was no evidence for stenosis  There was mild regurgitation  Estimated  peak PA pressure was 40 mmHg  The findings suggest mild pulmonary hypertension  PULMONIC VALVE: Not well visualized  DOPPLER: The transpulmonic velocity was  within the normal range  There was trace regurgitation  PERICARDIUM: There was no pericardial effusion  AORTA: The root exhibited normal size and fibrocalcific change  SYSTEMIC VEINS: IVC: The inferior vena cava was normal in size and course    Respirophasic changes were normal     MEASUREMENT TABLES    2D MEASUREMENTS  LVOT   (Reference normals)  Diam   20 mm   (--)    DOPPLER MEASUREMENTS  LVOT   (Reference normals)  Peak kristian   110 cm/s   (--)  VTI   27 cm   (--)  Stroke vol   84 82 ml   (--)  Stroke index   0 52 ml/m squared   (--)  Aortic valve   (Reference normals)  Peak kristian   200 cm/s   (--)  VTI   48 cm   (--)  Mean gradient   8 mmHg   (--)  Obstr index, VTI   0 56    (--)  Obstr index, Vmax   0 55    (--)    SYSTEM MEASUREMENT TABLES    2D  %FS: 27 58 %  AV Diam: 3 36 cm  EDV(Teich): 115 56 ml  EF(Cube): 62 02 %  EF(Teich): 53 36 %  ESV(Cube): 46 08 ml  ESV(Teich): 53 9 ml  IVSd: 1 24 cm  LA Area: 21 61 cm2  LA Diam: 4 39 cm  LVEDV MOD A4C: 160 84 ml  LVEF MOD A4C: 53 95 %  LVESV MOD A4C: 74 07 ml  LVIDd: 4 95 cm  LVIDs: 3 59 cm  LVLd A4C: 7 73 cm  LVLs A4C: 6 57 cm  LVOT Diam: 2 21 cm  LVPWd: 1 21 cm  RA Area: 16 81 cm2  RV Diam : 4 06 cm  SV MOD A4C: 86 77 ml  SV(Cube): 75 26 ml  SV(Teich): 61 66 ml    CW  AR Dec [place]: 1 9 m/s2  AR Dec Time: 2189 05 ms  AR PHT: 634 82 ms  AR Vmax: 4 17 m/s  AR maxP 45 mmHg  AV Env  Ti: 370 92 ms  AV SV: 411 88 ml  AV VTI: 46 37 cm  AV Vmax: 2 m/s  AV Vmean: 1 25 m/s  AV maxPG: 15 99 mmHg  AV meanP 37 mmHg  TR Vmax: 2 94 m/s  TR maxP 68 mmHg    MM  TAPSE: 1 85 cm    PW  SIMON (VTI): 2 32 cm2  SIMON Vmax: 2 16 cm2  E': 0 04 m/s  E/E': 20 26  LVOT Env  Ti: 396 8 ms  LVOT VTI: 27 89 cm  LVOT Vmax: 1 12 m/s  LVOT Vmean: 0 7 m/s  LVOT maxP 05 mmHg  LVOT meanP 37 mmHg  LVSV Dopp: 107 4 ml  MV A Tai: 0 6 m/s  MV Dec [place]: 4 96 m/s2  MV DecT: 180 49 ms  MV E Tai: 0 9 m/s  MV E/A Ratio: 1 48    [employer] Accredited Echocardiography Laboratory    Prepared and electronically signed by    Carl Okeefe MD  Signed 08-Mar-2016 15:29:11       Imaging: I have personally reviewed pertinent reports  Us Abdominal Aorta Screening Aaa    Result Date: 2019  Narrative: ABDOMINAL AORTIC ULTRASOUND INDICATION:   [id number]: Personal history of nicotine dependence  COMPARISON: CT dated 2015 TECHNIQUE: Ultrasound of the abdominal aorta was performed in longitudinal and transverse planes with a curvilinear transducer  FINDINGS: Proximal aorta:  2 0 x 2 2 cm Mid aorta:    1 8 x 1 8 cm Distal aorta:    1 5 x 1 5 cm Right common iliac origin:  0 7 x 0 8 cm Left common iliac origin:  0 7 x 0 8 cm Scattered calcified atherosclerotic plaque  No periaortic collections or adenopathy detected  Impression: No evidence for abdominal aortic aneurysm  Workstation performed: [id number]     Counseling / Coordination of Care  Total floor / unit time spent today 45 minutes  Greater than 50% of total time was spent with the patient and / or family counseling and / or coordination of care  A description of the counseling / coordination of care: Review of history, current assessment, development of a plan  Code Status: Prior    ** Please Note: Dragon 360 Dictation voice to text software may have been used in the creation of this document   **

## 2019-02-08 NOTE — ED ATTENDING ATTESTATION
Ana María Nye MD, saw and evaluated the patient  I have discussed the patient with the resident/non-physician practitioner and agree with the resident's/non-physician practitioner's findings, Plan of Care, and MDM as documented in the resident's/non-physician practitioner's note, except where noted  All available labs and Radiology studies were reviewed  At this point I agree with the current assessment done in the Emergency Department  I have conducted an independent evaluation of this patient a history and physical is as follows: This is evaluation of a 74-year-old male who presents with elevated blood pressures x1 week at home  Patient does have a significant cardiac history and states that over the past week he noted that his blood pressure was elevated with systolics to [address]  Patient states that he initially had some dizziness at onset of symptoms which is since resolved  No unsteadiness or lightheadedness at this time  He has continue to check his blood pressure over the past week and has remained elevated  Last night the patient noted that he was extremely diaphoretic and nauseous however he denies any associated chest pain or shortness of breath  Patient did take a double dose of his metoprolol both last night and this morning in an attempt to see if this would help his symptoms  He has not called his cardiologist or his PCP in regard to the symptoms  He states he has never had issues with elevated blood pressures like this in the past   He otherwise denies any recent travel change in medication or illnesses  On physical exam patient is currently in no acute distress and resting comfortably  His current blood pressure is within normal limits while in the room it is 130s over 60s  HEENT is normocephalic and atraumatic  Pupils equal round reactive to light no hemotympanum  Heart is regular rate  Lungs are clear auscultation bilaterally    Abdomen is soft nontender nondistended with positive bowel sounds and no rebound or guarding  No lower extremity edema or calf tenderness to palpation  Intact distal pulses and capillary refill less than 2 seconds  Awake alert oriented  Assessment and plan given history obtained during my evaluation of hypertension with nausea and diaphoresis will eval with cardiac workup  Will do EKG cardiac labs monitor, re-evaluate all and treat accordingly  Portions of the record may have been created with voice recognition software  Occasional wrong word or sound-a-like" substitutions may have occurred due to the inherent limitations of voice recognition software  Review chart carefully and recognize, using context, where substitutions have occurred      Critical Care Time  Procedures

## 2019-02-08 NOTE — ED PROVIDER NOTES
History  Chief Complaint   Patient presents with    High Blood Pressure     patient comes to ER with report of having high blood pressure  HPI  53-year-old man presents for evaluation high blood pressure  He has got significant medical history notable for quadruple bypass  He follows with Cardiology  He states that he has been having high systolic blood pressures in the 180s to 190s  Denies any chest pain however he does endorse intermittent dizziness x1 week which she describes as vertigo  Last night, he was in bed when he had acute onset of diaphoresis  At this time, patient is asymptomatic  Patient has taken extra dose of his metoprolol today  He otherwise denies fevers chills chest pain abdominal pain nausea vomiting  Prior to Admission Medications   Prescriptions Last Dose Informant Patient Reported?  Taking?   aspirin (ECOTRIN LOW STRENGTH) 81 mg EC tablet   No Yes   Sig: Take 1 tablet (81 mg total) by mouth daily for 90 days   atorvastatin (LIPITOR) 80 mg tablet  Self No Yes   Sig: Take 1 tablet (80 mg total) by mouth daily for 270 days   clorazepate (TRANXENE) 7 5 mg tablet  Self Yes Yes   clotrimazole-betamethasone (LOTRISONE) 1-0 05 % cream   No Yes   Sig: Apply topically 2 (two) times a day   fluticasone (FLONASE) 50 mcg/act nasal spray  Self No Yes   Si spray into each nostril daily   glimepiride (AMARYL) 4 mg tablet  Self No Yes   Sig: TAKE 1/2 TABLET BY MOUTH IN THE MORNING AND 1 TABLET AT NIGHT   hydrocortisone 2 5 % cream   No Yes   Sig: Apply topically 3 (three) times a day   losartan (COZAAR) 25 mg tablet  Self No Yes   Sig: Take 1 tablet (25 mg total) by mouth daily for 270 days   metFORMIN (GLUCOPHAGE) 500 mg tablet  Self No Yes   Sig: Take 1 tablet (500 mg total) by mouth 2 (two) times a day with meals for 270 days   metoprolol tartrate (LOPRESSOR) 25 mg tablet  Self No Yes   Sig: Take 1 tablet (25 mg total) by mouth every 12 (twelve) hours for 270 days   mirtazapine (REMERON) 15 mg tablet  Self No Yes   Sig: Take 1 tablet (15 mg total) by mouth daily at bedtime for 270 days   omeprazole (PriLOSEC) 20 mg delayed release capsule  Self No Yes   Sig: Take 1 capsule (20 mg total) by mouth daily for 28 days   ranitidine (ZANTAC) 150 MG capsule  Self No Yes   Sig: Take 1 capsule (150 mg total) by mouth 2 (two) times a day as needed for indigestion or heartburn   testosterone cypionate (DEPO-TESTOSTERONE) 200 mg/mL SOLN  Self Yes Yes   tobramycin-dexamethasone (TOBRADEX) ophthalmic suspension  Self No Yes   Sig: Administer 1 drop to both eyes 2 (two) times a day      Facility-Administered Medications: None       Past Medical History:   Diagnosis Date    AS (aortic stenosis)     Balanitis     Biceps tendonitis on right     CAD (coronary artery disease)     Cancer (HCC)     skin    Complete rotator cuff tear or rupture of right shoulder, not specified as traumatic     Diabetes mellitus (HCC)     Esophageal reflux     High cholesterol     Hypertension     Hypertriglyceridemia     Hypogonadism in male     Myalgia     Myocardial infarction ([facility] [address] )     Palpitations     Shoulder pain     Sinus problem     Skin cancer of nose     Sleep apnea     Stroke ([facility] [address] ) 0217    Systolic murmur     recently found    Tinea cruris     Tinea pedis        Past Surgical History:   Procedure Laterality Date    CATARACT EXTRACTION Left     COLONOSCOPY      CORONARY ANGIOPLASTY WITH STENT PLACEMENT      CORONARY ARTERY BYPASS GRAFT N/A 12/12/2016    Procedure: INTRAOP CECILLE; BILATERAL LEG EVH; CORONARY ARTERY BYPASS GRAFT X 4 SVG TO PDA, OM1,  AND DIAGONAL1, LIMA TO LAD;  Surgeon: Ivanna Worrell MD;  [facility]: [facility];  Service:     EYE SURGERY      HERNIA REPAIR      WI ARTHROSCOPY SHOULDER SURGICAL BICEPS TENODESIS Right 5/6/2016    Procedure: ARTHROSCOPIC BICEPS TENODESIS;  Surgeon: Viridiana Jenkins MD;  [facility]: [facility];  Service: Orthopedics    WI SHLDR ARTHROSCOP,SURG,W/ROTAT CUFF REPR Right 5/6/2016    Procedure: REPAIR ROTATOR CUFF  ARTHROSCOPIC; SUBACROMIAL DECOMPRESSION; PARTIAL SYNOVECTOMY;  Surgeon: Jeri Irby MD;  Location: [facility];  Service: Orthopedics    UMBILICAL HERNIA REPAIR  2009    over age 13       Family History   Problem Relation Age of Onset    Heart disease Mother     Hypertension Mother     Nephrolithiasis Father     Other Father         Renal disease    Hypertension Sister     Nephrolithiasis Brother     Other Brother         Renal disease    Hematuria Family         Microscopic     I have reviewed and agree with the history as documented  Social History   Substance Use Topics    Smoking status: Never Smoker    Smokeless tobacco: Never Used    Alcohol use No        Review of Systems   Constitutional: Negative  Negative for chills and fever  HENT: Negative  Negative for ear pain and sore throat  Eyes: Negative  Negative for pain and discharge  Respiratory: Negative  Negative for chest tightness and shortness of breath  Cardiovascular: Negative  Negative for chest pain and palpitations  Gastrointestinal: Negative  Negative for abdominal pain, nausea and vomiting  Endocrine: Negative  Negative for polyphagia and polyuria  Genitourinary: Negative  Negative for dysuria and flank pain  Musculoskeletal: Negative  Negative for arthralgias and back pain  Skin: Negative  Negative for color change and wound  Allergic/Immunologic: Negative  Negative for food allergies and immunocompromised state  Neurological: Positive for dizziness  Negative for weakness and headaches  Hematological: Negative  Negative for adenopathy  Does not bruise/bleed easily  Psychiatric/Behavioral: Negative  Negative for suicidal ideas  The patient is not nervous/anxious          Physical Exam  ED Triage Vitals [02/08/19 1014]   Temperature Pulse Respirations Blood Pressure SpO2   97 9 °F (36 6 °C) 66 18 (!) 181/73 99 %      Temp Source Heart Rate Source Patient Position - Orthostatic VS BP Location FiO2 (%)   Oral Monitor Sitting Left arm --      Pain Score       No Pain           Orthostatic Vital Signs  Vitals:    02/08/19 1455 02/08/19 1545 02/08/19 1704 02/08/19 1715   BP: 145/65  120/60 120/60   Pulse: (!) 47 (!) 54 (!) 50 (!) 50   Patient Position - Orthostatic VS:   Lying        Physical Exam   Constitutional: He is oriented to person, place, and time  He appears well-developed and well-nourished  No distress  HENT:   Head: Normocephalic and atraumatic  Right Ear: External ear normal    Left Ear: External ear normal    Mouth/Throat: Oropharynx is clear and moist    Eyes: Pupils are equal, round, and reactive to light  Conjunctivae and EOM are normal  Right eye exhibits no discharge  Left eye exhibits no discharge  No scleral icterus  Neck: Normal range of motion  Neck supple  No tracheal deviation present  No thyromegaly present  Cardiovascular: Normal rate, regular rhythm and intact distal pulses  Exam reveals no gallop and no friction rub  No murmur heard  Pulmonary/Chest: Effort normal and breath sounds normal  No stridor  No respiratory distress  He has no wheezes  He has no rales  Abdominal: Soft  Bowel sounds are normal  He exhibits no distension  There is no tenderness  There is no rebound and no guarding  Musculoskeletal: Normal range of motion  He exhibits no edema or deformity  Neurological: He is alert and oriented to person, place, and time  No cranial nerve deficit  Skin: Skin is warm and dry  No rash noted  He is not diaphoretic  No erythema  Psychiatric: He has a normal mood and affect  His behavior is normal  Thought content normal    Nursing note and vitals reviewed        ED Medications  Medications   aspirin (ECOTRIN LOW STRENGTH) EC tablet 81 mg (not administered)   atorvastatin (LIPITOR) tablet 80 mg (not administered)   fluticasone (FLONASE) 50 mcg/act nasal spray 1 spray (not administered)   metoprolol tartrate (LOPRESSOR) tablet 25 mg (not administered)   mirtazapine (REMERON) tablet 15 mg (not administered)   pantoprazole (PROTONIX) EC tablet 40 mg (not administered)   tobramycin-dexamethasone (TOBRADEX) 0 3-0 1 % ophthalmic suspension 1 drop (not administered)   enoxaparin (LOVENOX) subcutaneous injection 40 mg (not administered)   insulin lispro (HumaLOG) 100 units/mL subcutaneous injection 1-6 Units (not administered)   losartan (COZAAR) tablet 50 mg (not administered)   LORazepam (ATIVAN) tablet 0 5 mg (not administered)       Diagnostic Studies  Results Reviewed     Procedure Component Value Units Date/Time    Troponin I [[id number]]  (Normal) Collected:  02/08/19 1552    Lab Status:  Final result Specimen:  Blood from Arm, Left Updated:  02/08/19 1630     Troponin I 0 02 ng/mL     Troponin I [[id number]]     Lab Status:  No result Specimen:  Blood     Troponin I [[id number]]  (Normal) Collected:  02/08/19 1144    Lab Status:  Final result Specimen:  Blood from Arm, Left Updated:  02/08/19 1215     Troponin I 0 02 ng/mL     Comprehensive metabolic panel [[id number]]  (Abnormal) Collected:  02/08/19 1144    Lab Status:  Final result Specimen:  Blood from Arm, Left Updated:  02/08/19 1214     Sodium 137 mmol/L      Potassium 4 2 mmol/L      Chloride 106 mmol/L      CO2 27 mmol/L      ANION GAP 4 mmol/L      BUN 17 mg/dL      Creatinine 0 83 mg/dL      Glucose 144 (H) mg/dL      Calcium 8 9 mg/dL      AST 19 U/L      ALT 37 U/L      Alkaline Phosphatase 65 U/L      Total Protein 6 9 g/dL      Albumin 3 7 g/dL      Total Bilirubin 0 64 mg/dL      eGFR 89 ml/min/1 73sq m     Narrative:         National Kidney Disease Education Program recommendations are as follows:  GFR calculation is accurate only with a steady state creatinine  Chronic Kidney disease less than 60 ml/min/1 73 sq  meters  Kidney failure less than 15 ml/min/1 73 sq  meters      CBC and differential [[id number]] Collected:  02/08/19 1144    Lab Status: Final result Specimen:  Blood from Arm, Left Updated:  02/08/19 1155     WBC 6 38 Thousand/uL      RBC 4 54 Million/uL      Hemoglobin 13 3 g/dL      Hematocrit 41 3 %      MCV 91 fL      MCH 29 3 pg      MCHC 32 2 g/dL      RDW 13 2 %      MPV 11 2 fL      Platelets 314 Thousands/uL      nRBC 0 /100 WBCs      Neutrophils Relative 53 %      Immat GRANS % 0 %      Lymphocytes Relative 35 %      Monocytes Relative 9 %      Eosinophils Relative 2 %      Basophils Relative 1 %      Neutrophils Absolute 3 35 Thousands/µL      Immature Grans Absolute 0 02 Thousand/uL      Lymphocytes Absolute 2 23 Thousands/µL      Monocytes Absolute 0 59 Thousand/µL      Eosinophils Absolute 0 13 Thousand/µL      Basophils Absolute 0 06 Thousands/µL                  XR chest 2 views   Final Result by Aubrey Francisco MD (02/08 8151)      Findings suggest mild/moderate CHF  The study was marked in [facility] for immediate notification  Workstation performed: [id number]               Procedures  Procedures      Phone Consults  ED Phone Contact    ED Course         HEART Risk Score      Most Recent Value   History  0 Filed at: 02/08/2019 1243   ECG  1 Filed at: 02/08/2019 1243   Age  2 Filed at: 02/08/2019 1243   Risk Factors  2 Filed at: 02/08/2019 1243   Troponin  0 Filed at: 02/08/2019 1243   Heart Score Risk Calculator   History  0 Filed at: 02/08/2019 1243   ECG  1 Filed at: 02/08/2019 1243   Age  2 Filed at: 02/08/2019 1243   Risk Factors  2 Filed at: 02/08/2019 1243   Troponin  0 Filed at: 02/08/2019 1243   HEART Score  5 Filed at: 02/08/2019 1243   HEART Score  5 Filed at: 02/08/2019 1243        Identification of Seniors at Risk      Most Recent Value   (ISAR) Identification of Seniors at Risk   Before the illness or injury that brought you to the Emergency, did you need someone to help you on a regular basis?   0 Filed at: 02/08/2019 1015   In the last 24 hours, have you needed more help than usual?  0 Filed at: 02/08/2019 1015 Have you been hospitalized for one or more nights during the past 6 months? 0 Filed at: 02/08/2019 1015   In general, do you see well?  0 Filed at: 02/08/2019 1015   In general, do you have serious problems with your memory? 0 Filed at: 02/08/2019 1015   Do you take more than three different medications every day? 1 Filed at: 02/08/2019 1015   ISAR Score  1 Filed at: 02/08/2019 1015                          MDM  Number of Diagnoses or Management Options  Dizziness:   Hypertension:   Diagnosis management comments: Senna 5year-old man presents with dizziness concern for high blood pressure  Patient heart score 5  Will get a cardiac evaluation, and Will admit the patient        Disposition  Final diagnoses:   Hypertension   Dizziness     Time reflects when diagnosis was documented in both MDM as applicable and the Disposition within this note     Time User Action Codes Description Comment    2/8/2019  2:05 PM Charles Girt Add [I10] Hypertension     2/8/2019  2:05 PM Charles Girt Add [R42] Dizziness     2/8/2019  3:09 PM Sakdiponephong, Kevin Add [Z95 5] S/P drug eluting coronary stent placement     2/8/2019  3:09 PM Sakdiponephong, Kevin Modify [Z95 5] S/P drug eluting coronary stent placement     2/8/2019  3:09 PM Sakdiponephong, Kevin Modify [Z95 5] S/P drug eluting coronary stent placement     2/8/2019  3:09 PM Sakdiponephong, Kevin Add [I25 2] H/O non-ST elevation myocardial infarction (NSTEMI)     2/8/2019  3:09 PM Sakdiponephong, Kevin Modify [I25 2] H/O non-ST elevation myocardial infarction (NSTEMI)     2/8/2019  3:09 PM Sakdiponephong, Kevin Modify [I25 2] H/O non-ST elevation myocardial infarction (NSTEMI)     2/8/2019  3:09 PM Sakdiponephong, Kevin Add [Z95 1] S/P CABG (coronary artery bypass graft)     2/8/2019  3:09 PM Sakdiponephong, Kevin Modify [Z95 1] S/P CABG (coronary artery bypass graft)       ED Disposition     ED Disposition Condition Date/Time Comment    Admit  Fri Feb 8, 2019  2:06 PM Case was discussed with FM and the patient's admission status was agreed to be Admission Status: observation status to the service of Dr Adelso Corral   Follow-up Information    None         Patient's Medications   Discharge Prescriptions    No medications on file     No discharge procedures on file  ED Provider  Attending physically available and evaluated Radha Hudson I managed the patient along with the ED Attending      Electronically Signed by         Boston Arias MD  02/08/19 9236

## 2019-02-08 NOTE — ASSESSMENT & PLAN NOTE
Improving  - 6 days of elevated pressures of 887-290 systolic   - Pt checks bp BID and takes medications as prescribed  - self increased dose of metoprolol to 50mg BID for past 2 days  - Continue PTA Metoprolol Tartrate 25mg and Losartan 25mg daily   - Cardiology consult

## 2019-02-08 NOTE — H&P
H&P- Simone Dillon [birth year], 72 y o  male MRN: [id number]    Unit/Bed#: ED 21 Encounter: [id number]    Primary Care Provider: Vern Steve MD   Date and time admitted to hospital: 2/8/2019 10:17 AM        Essential hypertension   Assessment & Plan    Improving  - 6 days of elevated pressures of 412-714 systolic   - Pt checks bp BID and takes medications as prescribed  - self increased dose of metoprolol to 50mg BID for past 2 days  - Continue PTA Metoprolol Tartrate 25mg and Losartan 25mg daily   - Cardiology consult     * Dizziness   Assessment & Plan    Stable  - likely 2/2 elevated blood pressures  - Onset of 6 days without syncope  - self resolved  - No focal neuro deficits  - Cardiology consulted  - neuro checks q4  - Consider noncon CT of head if dizziness returns with accompanying neuro deficits  Coronary artery disease involving native coronary artery of native heart without angina pectoris   Assessment & Plan    Stable  Patient denies angina or MOORE  Troponin: neg x1  Follow up with repeat troponin  EKG: NSR w/o ST elevations, occasional PVC's, no change from previous  CXR: Findings suggest mild/moderate CHF  Consider echo     Gastroesophageal reflux disease   Assessment & Plan    Episode of Nausea w/o vomiting yesterday  - Continue PTA protonix daily  - Patient no longer takes Zantac     Type 2 diabetes mellitus ([facility] [address] )   Assessment & Plan    Lab Results   Component Value Date    HGBA1C 7 8 (A) 01/14/2019       No results for input(s): POCGLU in the last 72 hours  Blood Sugar Average: Last 72 hrs:  - Hold metformin and glimiperide  - start on SSI     Anxiety   Assessment & Plan    Continue ativan 0 5mg qHS PRN         DATE: 2/8/2019  TIME: 4:52 PM  Primary Care Physician: Dr Raymond Warner  Admitting Provider: Larry Patel MD    Diet: carb control #2  DVT Prophylaxis: SCD's B/l with Lovenox  Code: Level 1 full code  Dispo: discussed patient with attending, Dr Crystal Garcia, and Senior resident   Will admit patient to observation, med surg with telemetry for ACS rule out and dizziness work up  History of Present Illness     HPI:  Gladis Lazo is a 72 y o  male with PMH of CAD status post stent x2 and CABG, hypertension, T2DM, and anxiety who presents with 6 days of elevated blood pressures of 404W to 498 systolic and 1 day of dizziness, diaphoresis, and nausea  Patient follows with cardiologist,  Dr Guille Lynch  Patient denies unsteadiness or lightheadedness without syncopal episodes  Patient also denies chest pain or pressure, shortness of breath, dyspnea on exertion, cough, fever, nausea, vomiting, diarrhea, or any other complaints  Course high blood pressure, patient has been taking double his dose of metoprolol the past 2 days  Patient has a history of bradycardia in the 50s without symptoms  These elevated blood pressures are new to him and he has never had this before in the past     ED Management:  Troponin, chest x-ray, EKG, CBC, CMP    Review of Systems   Constitutional: Negative for activity change, chills, diaphoresis, fatigue and fever  HENT: Negative for congestion, sinus pain and sore throat  Eyes: Negative for visual disturbance  Respiratory: Negative for cough, chest tightness, shortness of breath and wheezing  Cardiovascular: Negative for chest pain, palpitations and leg swelling  Gastrointestinal: Negative for abdominal pain, blood in stool, constipation, diarrhea, nausea and vomiting  Genitourinary: Negative for difficulty urinating, dysuria, flank pain, frequency and hematuria  Musculoskeletal: Negative for back pain and neck pain  Neurological: Negative for dizziness, syncope, light-headedness, numbness and headaches         Historical Information   Past Medical History:   Diagnosis Date    AS (aortic stenosis)     Balanitis     Biceps tendonitis on right     CAD (coronary artery disease)     Cancer (HCC)     skin    Complete rotator cuff tear or rupture of right shoulder, not specified as traumatic     Diabetes mellitus ([facility] [address] )     Esophageal reflux     High cholesterol     Hypertension     Hypertriglyceridemia     Hypogonadism in male     Myalgia     Myocardial infarction (HCC)     Palpitations     Shoulder pain     Sinus problem     Skin cancer of nose     Sleep apnea     Stroke ([facility] [address] ) 4962    Systolic murmur     recently found    Tinea cruris     Tinea pedis      Past Surgical History:   Procedure Laterality Date    CATARACT EXTRACTION Left     COLONOSCOPY      CORONARY ANGIOPLASTY WITH STENT PLACEMENT      CORONARY ARTERY BYPASS GRAFT N/A 12/12/2016    Procedure: INTRAOP CECILLE; BILATERAL LEG EVH; CORONARY ARTERY BYPASS GRAFT X 4 SVG TO PDA, OM1,  AND DIAGONAL1, LIMA TO LAD;  Surgeon: Froy Colorado MD;  Location: [facility];  Service:     EYE SURGERY      HERNIA REPAIR      MS ARTHROSCOPY SHOULDER SURGICAL BICEPS TENODESIS Right 5/6/2016    Procedure: ARTHROSCOPIC BICEPS TENODESIS;  Surgeon: Harlene Kanner, MD;  Location: [facility];  Service: Orthopedics    MS SHLDR ARTHROSCOP,SURG,W/ROTAT CUFF REPR Right 5/6/2016    Procedure: REPAIR ROTATOR CUFF  ARTHROSCOPIC; SUBACROMIAL DECOMPRESSION; PARTIAL SYNOVECTOMY;  Surgeon: Harlene Kanner, MD;  Location: [facility];  Service: Orthopedics    UMBILICAL HERNIA REPAIR  2009    over age 13     Social History   History   Alcohol Use No     History   Drug Use No     History   Smoking Status    Never Smoker   Smokeless Tobacco    Never Used     Family History:   Family History   Problem Relation Age of Onset    Heart disease Mother     Hypertension Mother     Nephrolithiasis Father     Other Father         Renal disease    Hypertension Sister     Nephrolithiasis Brother     Other Brother         Renal disease    Hematuria Family         Microscopic       Meds/Allergies   PTA meds:   Prior to Admission Medications   Prescriptions Last Dose Informant Patient Reported?  Taking?   aspirin (Chana Holy LOW STRENGTH) 81 mg EC tablet   No Yes   Sig: Take 1 tablet (81 mg total) by mouth daily for 90 days   atorvastatin (LIPITOR) 80 mg tablet  Self No Yes   Sig: Take 1 tablet (80 mg total) by mouth daily for 270 days   clorazepate (TRANXENE) 7 5 mg tablet  Self Yes Yes   clotrimazole-betamethasone (LOTRISONE) 1-0 05 % cream   No Yes   Sig: Apply topically 2 (two) times a day   fluticasone (FLONASE) 50 mcg/act nasal spray  Self No Yes   Si spray into each nostril daily   glimepiride (AMARYL) 4 mg tablet  Self No Yes   Sig: TAKE 1/2 TABLET BY MOUTH IN THE MORNING AND 1 TABLET AT NIGHT   hydrocortisone 2 5 % cream   No Yes   Sig: Apply topically 3 (three) times a day   losartan (COZAAR) 25 mg tablet  Self No Yes   Sig: Take 1 tablet (25 mg total) by mouth daily for 270 days   metFORMIN (GLUCOPHAGE) 500 mg tablet  Self No Yes   Sig: Take 1 tablet (500 mg total) by mouth 2 (two) times a day with meals for 270 days   metoprolol tartrate (LOPRESSOR) 25 mg tablet  Self No Yes   Sig: Take 1 tablet (25 mg total) by mouth every 12 (twelve) hours for 270 days   mirtazapine (REMERON) 15 mg tablet  Self No Yes   Sig: Take 1 tablet (15 mg total) by mouth daily at bedtime for 270 days   omeprazole (PriLOSEC) 20 mg delayed release capsule  Self No Yes   Sig: Take 1 capsule (20 mg total) by mouth daily for 28 days   ranitidine (ZANTAC) 150 MG capsule  Self No Yes   Sig: Take 1 capsule (150 mg total) by mouth 2 (two) times a day as needed for indigestion or heartburn   testosterone cypionate (DEPO-TESTOSTERONE) 200 mg/mL SOLN  Self Yes Yes   tobramycin-dexamethasone (TOBRADEX) ophthalmic suspension  Self No Yes   Sig: Administer 1 drop to both eyes 2 (two) times a day      Facility-Administered Medications: None     Allergies   Allergen Reactions    Epinephrine Hives and Anxiety    Penicillins Hives and Anxiety       Objective   Vitals: Blood pressure 145/65, pulse (!) 54, temperature 97 9 °F (36 6 °C), temperature source Oral, resp  rate (!) 27, height 5' 5" (1 651 m), weight 77 kg (169 lb 12 1 oz), SpO2 94 %  No intake or output data in the 24 hours ending 02/08/19 1652    Invasive Devices     Peripheral Intravenous Line            Peripheral IV 12/22/16 Left Antecubital 778 days                Physical Exam   Constitutional: He is oriented to person, place, and time  He appears well-developed and well-nourished  No distress  HENT:   Head: Normocephalic and atraumatic  Mouth/Throat: Oropharynx is clear and moist  No oropharyngeal exudate  Eyes: Pupils are equal, round, and reactive to light  Conjunctivae and EOM are normal  No scleral icterus  Neck: Normal range of motion  Neck supple  No JVD present  No tracheal deviation present  No thyromegaly present  Cardiovascular: Normal rate, regular rhythm and intact distal pulses  Exam reveals no gallop and no friction rub  Murmur heard  Pulmonary/Chest: Breath sounds normal  No respiratory distress  He has no wheezes  He has no rales  He exhibits no tenderness  Abdominal: Soft  Bowel sounds are normal  He exhibits no distension  There is no tenderness  There is no rebound and no guarding  Musculoskeletal: He exhibits no edema  Neurological: He is alert and oriented to person, place, and time  No cranial nerve deficit  He exhibits normal muscle tone  Coordination normal    Skin: Skin is warm and dry  He is not diaphoretic  No erythema  Psychiatric: He has a normal mood and affect  His behavior is normal  Thought content normal        Lab Results:   I have personally reviewed pertinent reports        Recent Results (from the past 24 hour(s))   CBC and differential    Collection Time: 02/08/19 11:44 AM   Result Value Ref Range    WBC 6 38 4 31 - 10 16 Thousand/uL    RBC 4 54 3 88 - 5 62 Million/uL    Hemoglobin 13 3 12 0 - 17 0 g/dL    Hematocrit 41 3 36 5 - 49 3 %    MCV 91 82 - 98 fL    MCH 29 3 26 8 - 34 3 pg    MCHC 32 2 31 4 - 37 4 g/dL    RDW 13 2 11 6 - 15 1 % MPV 11 2 8 9 - 12 7 fL    Platelets 963 129 - 476 Thousands/uL    nRBC 0 /100 WBCs    Neutrophils Relative 53 43 - 75 %    Immat GRANS % 0 0 - 2 %    Lymphocytes Relative 35 14 - 44 %    Monocytes Relative 9 4 - 12 %    Eosinophils Relative 2 0 - 6 %    Basophils Relative 1 0 - 1 %    Neutrophils Absolute 3 35 1 85 - 7 62 Thousands/µL    Immature Grans Absolute 0 02 0 00 - 0 20 Thousand/uL    Lymphocytes Absolute 2 23 0 60 - 4 47 Thousands/µL    Monocytes Absolute 0 59 0 17 - 1 22 Thousand/µL    Eosinophils Absolute 0 13 0 00 - 0 61 Thousand/µL    Basophils Absolute 0 06 0 00 - 0 10 Thousands/µL   Comprehensive metabolic panel    Collection Time: 02/08/19 11:44 AM   Result Value Ref Range    Sodium 137 136 - 145 mmol/L    Potassium 4 2 3 5 - 5 3 mmol/L    Chloride 106 100 - 108 mmol/L    CO2 27 21 - 32 mmol/L    ANION GAP 4 4 - 13 mmol/L    BUN 17 5 - 25 mg/dL    Creatinine 0 83 0 60 - 1 30 mg/dL    Glucose 144 (H) 65 - 140 mg/dL    Calcium 8 9 8 3 - 10 1 mg/dL    AST 19 5 - 45 U/L    ALT 37 12 - 78 U/L    Alkaline Phosphatase 65 46 - 116 U/L    Total Protein 6 9 6 4 - 8 2 g/dL    Albumin 3 7 3 5 - 5 0 g/dL    Total Bilirubin 0 64 0 20 - 1 00 mg/dL    eGFR 89 ml/min/1 73sq m   Troponin I    Collection Time: 02/08/19 11:44 AM   Result Value Ref Range    Troponin I 0 02 <=0 04 ng/mL   Troponin I    Collection Time: 02/08/19  3:52 PM   Result Value Ref Range    Troponin I 0 02 <=0 04 ng/mL     Blood Culture: No results found for: BLOODCX,   Urinalysis:   Lab Results   Component Value Date    COLORU Yellow 12/06/2016    COLORU Yellow 09/22/2016    CLARITYU Clear 12/06/2016    CLARITYU Transparent 09/22/2016    SPECGRAV 1 009 12/06/2016    SPECGRAV 1 020 09/22/2016    PHUR 6 5 12/06/2016    PHUR 5 09/22/2016    LEUKOCYTESUR Negative 12/06/2016    LEUKOCYTESUR trace 09/22/2016    NITRITE Negative 12/06/2016    NITRITE - 09/22/2016    PROTEINUA - 09/22/2016    GLUCOSEU Negative 12/06/2016    KETONESU Negative 12/06/2016 Jael Lola - 09/22/2016    BILIRUBINUR Negative 12/06/2016    BILIRUBINUR - 07/14/2016    BLOODU Negative 12/06/2016    BLOODU - 09/22/2016   ,   Urine Culture:   Lab Results   Component Value Date    URINECX No Growth <1000 cfu/mL 12/05/2016   ,   Wound Culure: No results found for: WOUNDCULT    Imaging:   EKG, Pathology, and Other Studies: I have personally reviewed pertinent reports  EKG: unchanged from previous tracings, nonspecific ST and T waves changes, frequent PVC's noted      Code Status: Level 1 - Full Code  POLST:    VTE Prophylaxis: Enoxaparin (Lovenox) sequential compression device  Admission Status: [address], [address] [facility]  PGY-1

## 2019-02-09 VITALS
OXYGEN SATURATION: 95 % | HEART RATE: 53 BPM | HEIGHT: 65 IN | TEMPERATURE: 98.1 F | BODY MASS INDEX: 28.28 KG/M2 | RESPIRATION RATE: 18 BRPM | WEIGHT: 169.75 LBS | DIASTOLIC BLOOD PRESSURE: 50 MMHG | SYSTOLIC BLOOD PRESSURE: 110 MMHG

## 2019-02-09 PROBLEM — R42 DIZZINESS: Status: RESOLVED | Noted: 2019-02-08 | Resolved: 2019-02-09

## 2019-02-09 LAB
ANION GAP SERPL CALCULATED.3IONS-SCNC: 3 MMOL/L (ref 4–13)
ATRIAL RATE: 48 BPM
BUN SERPL-MCNC: 13 MG/DL (ref 5–25)
CALCIUM SERPL-MCNC: 8.9 MG/DL (ref 8.3–10.1)
CHLORIDE SERPL-SCNC: 106 MMOL/L (ref 100–108)
CO2 SERPL-SCNC: 30 MMOL/L (ref 21–32)
CREAT SERPL-MCNC: 0.9 MG/DL (ref 0.6–1.3)
GFR SERPL CREATININE-BSD FRML MDRD: 86 ML/MIN/1.73SQ M
GLUCOSE SERPL-MCNC: 106 MG/DL (ref 65–140)
GLUCOSE SERPL-MCNC: 110 MG/DL (ref 65–140)
GLUCOSE SERPL-MCNC: 178 MG/DL (ref 65–140)
MAGNESIUM SERPL-MCNC: 2.1 MG/DL (ref 1.6–2.6)
P AXIS: 9 DEGREES
PHOSPHATE SERPL-MCNC: 4 MG/DL (ref 2.3–4.1)
POTASSIUM SERPL-SCNC: 4.3 MMOL/L (ref 3.5–5.3)
PR INTERVAL: 166 MS
QRS AXIS: 6 DEGREES
QRSD INTERVAL: 106 MS
QT INTERVAL: 446 MS
QTC INTERVAL: 398 MS
SODIUM SERPL-SCNC: 139 MMOL/L (ref 136–145)
T WAVE AXIS: 98 DEGREES
TROPONIN I SERPL-MCNC: 0.04 NG/ML
TROPONIN I SERPL-MCNC: 0.05 NG/ML
VENTRICULAR RATE: 48 BPM

## 2019-02-09 PROCEDURE — NC001 PR NO CHARGE: Performed by: FAMILY MEDICINE

## 2019-02-09 PROCEDURE — 94762 N-INVAS EAR/PLS OXIMTRY CONT: CPT

## 2019-02-09 PROCEDURE — 99235 HOSP IP/OBS SAME DATE MOD 70: CPT | Performed by: FAMILY MEDICINE

## 2019-02-09 PROCEDURE — 84484 ASSAY OF TROPONIN QUANT: CPT | Performed by: FAMILY MEDICINE

## 2019-02-09 PROCEDURE — 84100 ASSAY OF PHOSPHORUS: CPT | Performed by: FAMILY MEDICINE

## 2019-02-09 PROCEDURE — 93010 ELECTROCARDIOGRAM REPORT: CPT | Performed by: INTERNAL MEDICINE

## 2019-02-09 PROCEDURE — 93005 ELECTROCARDIOGRAM TRACING: CPT

## 2019-02-09 PROCEDURE — 80048 BASIC METABOLIC PNL TOTAL CA: CPT | Performed by: FAMILY MEDICINE

## 2019-02-09 PROCEDURE — 83735 ASSAY OF MAGNESIUM: CPT | Performed by: FAMILY MEDICINE

## 2019-02-09 PROCEDURE — 82948 REAGENT STRIP/BLOOD GLUCOSE: CPT

## 2019-02-09 RX ORDER — LOSARTAN POTASSIUM 25 MG/1
50 TABLET ORAL DAILY
Qty: 90 TABLET | Refills: 0 | Status: SHIPPED | OUTPATIENT
Start: 2019-02-09 | End: 2019-03-09 | Stop reason: SDUPTHER

## 2019-02-09 RX ADMIN — TOBRAMYCIN AND DEXAMETHASONE 1 DROP: 3; 1 SUSPENSION/ DROPS OPHTHALMIC at 09:13

## 2019-02-09 RX ADMIN — PANTOPRAZOLE SODIUM 40 MG: 40 TABLET, DELAYED RELEASE ORAL at 06:33

## 2019-02-09 RX ADMIN — FLUTICASONE PROPIONATE 1 SPRAY: 50 SPRAY, METERED NASAL at 09:14

## 2019-02-09 RX ADMIN — LOSARTAN POTASSIUM 50 MG: 50 TABLET, FILM COATED ORAL at 09:13

## 2019-02-09 RX ADMIN — ATORVASTATIN CALCIUM 80 MG: 80 TABLET, FILM COATED ORAL at 09:14

## 2019-02-09 RX ADMIN — ASPIRIN 81 MG: 81 TABLET, COATED ORAL at 09:14

## 2019-02-09 RX ADMIN — METOPROLOL TARTRATE 25 MG: 25 TABLET, FILM COATED ORAL at 12:39

## 2019-02-09 NOTE — DISCHARGE INSTRUCTIONS

## 2019-02-09 NOTE — ASSESSMENT & PLAN NOTE
Lab Results   Component Value Date    HGBA1C 7 8 (A) 01/14/2019       Recent Labs      02/08/19   1823  02/08/19   2113  02/09/19   0637   POCGLU  150*  156*  110       Blood Sugar Average: Last 72 hrs:  Restart home dose metformin and glimiperide after discharge

## 2019-02-09 NOTE — ASSESSMENT & PLAN NOTE
Pt presents with dizziness likely secondary to eleveated bp  Pt denies any symptoms at this time  Neuro checks q4 overnight unremarkable  Pt does not have any focal neurological deficits

## 2019-02-09 NOTE — DISCHARGE SUMMARY
Discharge- John Cabot 5/03/1854, 72 y o  male MRN: [id number]    [facility]counter: [id number]    Primary Care Provider: Shaan Dennis MD   Date and time admitted to hospital: 2/8/2019 10:17 AM        * Dizziness   Assessment & Plan    Pt presents with dizziness likely secondary to eleveated bp  Pt denies any symptoms at this time  Neuro checks q4 overnight unremarkable  Pt does not have any focal neurological deficits  Coronary artery disease involving native coronary artery of native heart without angina pectoris   Assessment & Plan    Stable, pt denies any chest pain at this time  Troponin 0 02 -> 0 02-> 0 04-> 0 05 -> 0 04  EKG: NSR w/o significant change from EKG in 12/22/16  Echo cardiogram outpatient     Gastroesophageal reflux disease   Assessment & Plan    Continue PTA Protonix      Type 2 diabetes mellitus [facility])   Assessment & Plan    Lab Results   Component Value Date    HGBA1C 7 8 (A) 01/14/2019       Recent Labs      02/08/19   1823  02/08/19   2113  02/09/19   0637   POCGLU  150*  156*  110       Blood Sugar Average: Last 72 hrs:  Restart home dose metformin and glimiperide after discharge     Anxiety   Assessment & Plan    Continue ativan 0 5mg qHS PRN     Essential hypertension   Assessment & Plan    Improving  -bp 114/47 - 146/61, most currently 114/47 this morning  -continue checking bp twice a day  -continue metoprolol 25mg BID and increase Losartan to 50mg daily per cardiology recommendation    -f/u outpatient                 Resolved Problems  Date Reviewed: 1/15/2019    None          Admission Date:   Admission Orders     Ordered        02/08/19 1407  Inpatient Admission (expected length of stay for this patient Order details is greater than two midnights)  Once               Admitting Diagnosis: Dizziness [R42]  High blood pressure [I10]  Hypertension [I10]  S/P CABG (coronary artery bypass graft) [Z95 1]  H/O non-ST elevation myocardial infarction (NSTEMI) [I25 2]  S/P drug eluting coronary stent placement [Z95 5]    HPI and Summary of Hospital Course: 73 yo male presents for 6 day history of dizziness with elevated blood pressure with sbp in 180s to 190s  Pt has PMHx significant for CAD status post stent x2, CABG, htn, T2DM and anxiety  Pt follows with cardiology outpatient with Dr Rose  In addition to elevated BP and dizziness, pt also experienced 1 day history of diaphoresis and nausea  Pt denied any unsteadiness, lightheadedness or syncopal episodes  Pt did not have any chest pain or pressure, sob, dypsnea on exertion, cough, fever, diarrhea or any other symptoms  Pt doubled his dose of PTA  Metoprolol (baseline 25mg BID) due to his elevated bp  PT has never had problem with elevated blood pressure in the past  Pt was admitted for ACS rule out with treatment for elevated bp and cardiac work up  Troponin initially was 0 02 but did trend up to 0 05 at one point  Afterwards, troponin plateau at 7 05  Pt's EKG did not show any significant change from EKG done in 12/22/16  Cardiology was consulted and recommendation was given to continue PTA medication with metoprolol 25mg BID and increase dose of losartan to 50mg daily  Per cardiology, a routine echocardiogram can be completed outpatient  Pt was seen on the day of discharge  The  phone was used since pt is predominantly [place] speaking  On day of discharge, pt states he is feeling at his baseline  Pt denies any chest pain, shortness or breath or dizziness  Pt is in good spirit and states he is ready to go home  The plan to discharge pt home with the above mentioned change in medication and out patient echocardiogram was discussed with the patient and his family member who were present  Pt verbalizes understanding and agrees with the current plan      Significant Findings, Care, Treatment and Services Provided:   EKG 2/8/19: sinus rhythm with T wave abnormality, consider lateral ischemia, no significant change compared to ECG on 12/22/16  CXR 2/8/19: findings suggest mild/moderate CHF    Physical Exam   Constitutional: He appears well-developed and well-nourished  No distress  HENT:   Head: Normocephalic and atraumatic  Eyes: EOM are normal  Right eye exhibits no discharge  Left eye exhibits no discharge  Neck: Neck supple  Cardiovascular: Exam reveals no gallop and no friction rub  Murmur heard  Bradycardic at 54 bpm  Systolic murmur appreciated   Pulmonary/Chest: Effort normal and breath sounds normal  No respiratory distress  He has no wheezes  He has no rales  Abdominal: Soft  Bowel sounds are normal  He exhibits no distension  There is no tenderness  There is no guarding  Musculoskeletal: He exhibits no edema, tenderness or deformity  Neurological: He is alert  Skin: Skin is warm and dry  Capillary refill takes less than 2 seconds  He is not diaphoretic  No erythema  No pallor  Psychiatric: He has a normal mood and affect  Complications: none    Condition at Discharge: good     Discharge instructions/Information to patient and family:   See after visit summary for information provided to patient and family  Provisions for Follow-Up Care:  See after visit summary for information related to follow-up care and any pertinent home health orders  PCP: Tucker Scott MD    Disposition: Home    Planned Readmission: No    Discharge Statement   I spent 30 minutes discharging the patient  This time was spent on the day of discharge  I had direct contact with the patient on the day of discharge  Additional documentation is required if more than 30 minutes were spent on discharge  Discharge Medications:  See after visit summary for reconciled discharge medications provided to patient and family  MARIBEL Conn    Family Medicine, PGY-1

## 2019-02-09 NOTE — PHYSICIAN ADVISOR
Current patient class: Observation  The patient is currently on Hospital Day: 2 at [facility]      The patient was admitted to the hospital  on 2/8/19 at 1407 for the following diagnosis:  Dizziness [R42]  High blood pressure [I10]  Hypertension [I10]  S/P CABG (coronary artery bypass graft) [Z95 1]  H/O non-ST elevation myocardial infarction (NSTEMI) [I25 2]  S/P drug eluting coronary stent placement [Z95 5]     After review of the relevant documentation, labs, vital signs and test results, the patient  is most appropriate for OBSERVATION STATUS  In this particular case the patient was admitted to the hospital as an inpatient  The patient however would fail to satisfy the 2 midnight benchmark and closer scrutiny of the case ws warranted as observation was intended  After review of the patient presentation and relevant labs the patient was most appropriate for observation status on admission  Rationale is as follows: The patient is a 53-year-old male who presented to the hospital on February 8, 2019 with elevated blood pressure, diaphoresis, and dizziness  The plan was for hospital observation to control blood pressure and exclude acute coronary syndrome  A less than two midnight hospitalization was anticipated  An inpatient order was placed in error  Utilization Management discussed this with the family medicine team     The patient was evaluated by Cardiology and his medications were adjusted  The patient stabilized for discharge within 26 hours  I agree that observation level care was most appropriate for this patient, as was intended  The order was corrected to observation care prior to discharge      The patients vitals on arrival were ED Triage Vitals [02/08/19 1014]   Temperature Pulse Respirations Blood Pressure SpO2   97 9 °F (36 6 °C) 66 18 (!) 181/73 99 %      Temp Source Heart Rate Source Patient Position - Orthostatic VS BP Location FiO2 (%) Oral Monitor Sitting Left arm --      Pain Score       No Pain           Past Medical History:   Diagnosis Date    AS (aortic stenosis)     Balanitis     Biceps tendonitis on right     CAD (coronary artery disease)     Cancer (HCC)     skin    Complete rotator cuff tear or rupture of right shoulder, not specified as traumatic     [facility] mellitus [facility]     Esophageal reflux     High cholesterol     Hypertension     Hypertriglyceridemia     Hypogonadism in male     Myalgia     Myocardial infarction [facility]     Palpitations     Shoulder pain     Sinus problem     Skin cancer of nose     Sleep apnea     Stroke ([facility] ) 2613    Systolic murmur     recently found    Tinea cruris     Tinea pedis      Past Surgical History:   Procedure Laterality Date    CATARACT EXTRACTION Left     COLONOSCOPY      CORONARY ANGIOPLASTY WITH STENT PLACEMENT      CORONARY ARTERY BYPASS GRAFT N/A 12/12/2016    Procedure: INTRAOP CECILLE; BILATERAL LEG EVH; CORONARY ARTERY BYPASS GRAFT X 4 SVG TO PDA, OM1,  AND DIAGONAL1, LIMA TO LAD;  Surgeon: Patel Flores MD;  [facility];  Service:     EYE SURGERY      HERNIA REPAIR      WA ARTHROSCOPY SHOULDER SURGICAL BICEPS TENODESIS Right 5/6/2016    Procedure: ARTHROSCOPIC BICEPS TENODESIS;  Surgeon: Jrei Irby MD;  [facility];  Service: Orthopedics    [place] SHLDR ARTHROSCOP,SURG,W/ROTAT CUFF REPR Right 5/6/2016    Procedure: REPAIR ROTATOR CUFF  ARTHROSCOPIC; SUBACROMIAL DECOMPRESSION; PARTIAL SYNOVECTOMY;  Surgeon: Jeri Irby MD;  [facility];  Service: Orthopedics    [facility] 71  2009    over age 13           Consults have been placed to:   IP CONSULT TO CARDIOLOGY    Vitals:    02/09/19 0303 02/09/19 0738 02/09/19 0753 02/09/19 1203   BP: 146/61  (!) 114/47 110/50   BP Location: Left arm  Left arm Left arm   Pulse: (!) 50  60 (!) 53   Resp: 18  18    Temp:       TempSrc:       SpO2: 97% 96% 95%    Weight:       Height: Most recent labs:    Recent Labs      02/08/19   1144   02/09/19   0253  02/09/19   0529   WBC  6 38   --    --    --    HGB  13 3   --    --    --    HCT  41 3   --    --    --    PLT  191   --    --    --    K  4 2   --    --   4 3   CALCIUM  8 9   --    --   8 9   BUN  17   --    --   13   CREATININE  0 83   --    --   0 90   TROPONINI  0 02   < >  0 04   --    AST  19   --    --    --    ALT  37   --    --    --    ALKPHOS  65   --    --    --     < > = values in this interval not displayed  Scheduled Meds:  Current Facility-Administered Medications:  aspirin 81 mg Oral Daily Michaelle Quirk, DO   atorvastatin 80 mg Oral Daily Michaelle Quirk, DO   enoxaparin 40 mg Subcutaneous Daily Kevin Phandiponephocamilla, DO   fluticasone 1 spray Nasal Daily Michaelle Quirgeovani, DO   insulin lispro 1-6 Units Subcutaneous TID AC Kevinsebas Choudhuryponephong, DO   LORazepam 0 5 mg Oral HS PRN Kevin Choudhuryponephong, DO   losartan 50 mg Oral Daily Catalina Walsh PA-C   metoprolol tartrate 25 mg Oral Q12H Michaelle Quirk, DO   mirtazapine 15 mg Oral HS Kevin Choudhuryponephong, DO   pantoprazole 40 mg Oral Early Morning Keivn Choudhuryponephong, DO   tobramycin-dexamethasone 1 drop Both Eyes BID Kevin Choudhuryponephocamilla, DO     Continuous Infusions:   PRN Meds:  LORazepam    Surgical procedures (if appropriate):

## 2019-02-09 NOTE — UTILIZATION REVIEW
Initial Clinical Review    Admission:  2/8/19 @ 1407     Orders Placed This Encounter   Procedures    Inpatient Admission (expected length of stay for this patient Order details is greater than two midnights)     Standing Status:   Standing     Number of Occurrences:   1     Order Specific Question:   Admitting Physician     Answer:   Gisselle No     Order Specific Question:   Level of Care     Answer:   Level 2 Stepdown / HOT [14]     Order Specific Question:   Estimated length of stay     Answer:   More than 2 Midnights     Order Specific Question:   Certification     Answer:   I certify that inpatient services are medically necessary for this patient for a duration of greater than two midnights  See H&P and MD Progress Notes for additional information about the patient's course of treatment  ED: Date/Time/Mode of Arrival:   ED Arrival Information     Expected Arrival Acuity Means of Arrival Escorted By Service Admission Type    - 2/8/2019 10:10 Urgent Walk-In Self General Medicine Urgent    Arrival Complaint    High Blood Pressure        Chief Complaint:   Chief Complaint   Patient presents with    High Blood Pressure     patient comes to ER with report of having high blood pressure  History of Illness: 72 y o  male with PMH of CAD status post stent x2 and CABG, hypertension, T2DM, and anxiety who presents with 6 days of elevated blood pressures of 379J to 878 systolic and 1 day of dizziness, diaphoresis, and nausea     - Pt checks bp BID and takes medications as prescribed  - self increased dose of metoprolol to 50mg BID for past 2 days  - history of bradycardia in the 50s without symptoms       02/08/19 1100  --   52  17  133/63  95 %  --  --   02/08/19 1024  --  60  16  148/70  98 %  --  --   02/08/19 1014  97 9 °F (36 6 °C)  66  18   181/73  99 %  None (Room air)       77 kg (169 lb 12 1 oz)    Pertinent Labs/Diagnostic Test Results:  Serum glucose  144  Trop  0 02   0 03   0 04   0 05 0 04  ekg - SR w/T wave abnormality    cxr - Interstitial prominence noted with fullness of the hilar vasculature, new from the prior exam   Findings suggest mild/moderate CHF    Past Medical/Surgical History:    Active Ambulatory Problems     Diagnosis Date Noted    Biceps tendonitis on right     Complete rotator cuff tear or rupture of right shoulder, not specified as traumatic     S/P drug eluting coronary stent placement 12/06/2016    H/O non-ST elevation myocardial infarction (NSTEMI) 12/06/2016    Essential hypertension 12/06/2016    Polyarthralgia 12/06/2016    Hematuria, microscopic 12/06/2016    H/O tinea cruris 12/06/2016    [facility] associated with [facility] 12/06/2016    Anxiety 12/13/2016    Type 2 diabetes mellitus [facility] 12/13/2016    Gastroesophageal reflux disease 12/13/2016    Bilateral carpal tunnel syndrome 04/05/2018    Mild aortic stenosis 07/02/2018    Coronary artery disease involving native coronary artery of native heart without angina pectoris 07/02/2018    Pain in both hands 09/20/2018    Glaucoma 12/05/2018    Major depressive disorder, recurrent episode, moderate ([facility] 01/08/2019    History of tobacco use 01/14/2019    Dental caries 01/14/2019    S/P CABG (coronary artery bypass graft) 01/15/2019    Trigger finger of all digits of both hands 01/30/2019     Resolved Ambulatory Problems     Diagnosis Date Noted    NSTEMI (non-ST elevated [facility] infarction) [facility] 12/06/2016     Past Medical History:   Diagnosis Date    AS (aortic stenosis)     Balanitis     Biceps tendonitis on right     CAD (coronary artery disease)     Cancer (HCC)     Complete rotator cuff tear or rupture of right shoulder, not specified as traumatic     Diabetes mellitus ([facility]     Esophageal reflux     High cholesterol     Hypertension     Hypertriglyceridemia     Hypogonadism in male     Myalgia     Myocardial infarction ([facility]     Palpitations  Shoulder pain     Sinus problem     Skin cancer of nose     Sleep apnea     Stroke ([facility] 1806    Systolic murmur     Tinea cruris     Tinea pedis      Admitting Diagnosis: Dizziness [R42]  High blood pressure [I10]  Hypertension [I10]  S/P CABG (coronary artery bypass graft) [Z95 1]  H/O non-ST elevation myocardial infarction (NSTEMI) [I25 2]  S/P drug eluting coronary stent placement [Z95 5]    Assessment/Plan:  HTN  -  Continue PTA Metoprolol Tartrate 25mg and Losartan 25mg daily   - Cardiology consult  Dizziness, likely related to HTN, no syncope -  Neuro cks q 4 hr;  Neuro consult  CAD , Stable -  Serial trops  GERD -  Cont protonix  T2DM -  Hold metformin and glimiperide, start sliding scale insulin w/accucks qid  Anxiety -  Cont ativan prn       Admission Orders:  Admit telemetry w/continuous pulse ox  Serial trops  Neuro cks q 4 hr  Lo carb diet  Out of bed as tolerated  Sequential compression device to b/l LE  accucks w/sliding scale insulin  Cardiology consult    2/8  CARDIOLOGY  Hypertension:  Continue same dose of metoprolol at 25 mg twice daily  Cannot up titrate further due to borderline heart rates    Can increase losartan to 50 daily  Coronary artery disease:  Prior PCI/stenting as well as CABG -  continue aspirin and statin and current dose of beta-blocker    Diabetes and dyslipidemia:  Last A1c of 7 8, LDL in the 40s, continue high potency statin    Murmur of aortic stenosis:  Was mild in 2016, can perform a routine echocardiogram which can be done as an outpatient also      2/9/2019  98 1   47 - 50    18    132/53    146/61     Scheduled Meds:   Current Facility-Administered Medications:  aspirin 81 mg Oral Daily Otmichael Lefort, DO   atorvastatin 80 mg Oral Daily Othel Lefort, DO   enoxaparin 40 mg Subcutaneous Daily Kevin Sakvivekponephocamilla, DO   fluticasone 1 spray Nasal Daily Othel Lefort, DO   insulin lispro 1-6 Units Subcutaneous TID AC Kevin Emiliediponephong, DO   LORazepam 0 5 mg Oral HS PRN Kevin Sakdiponephong, DO   losartan 50 mg Oral Daily Marlin Young PA-C   metoprolol tartrate 25 mg Oral Q12H Quentin De Santiago DO   mirtazapine 15 mg Oral HS Kevin Sakdiponephong, DO   pantoprazole 40 mg Oral Early Morning Kevin Sakdiponephong, DO   tobramycin-dexamethasone 1 drop Both Eyes BID Kevin Keiponephong, DO

## 2019-02-09 NOTE — PROGRESS NOTES
Admitted patient to the floor, at the moment he is asymptomatic with sinus bradycardia, running in the 40's  Blood pressure has been coming down now in the 130's/50's    He states that its not uncommon for him, he is laying restful, with call bell within reach  Will continue to monitor

## 2019-02-09 NOTE — ASSESSMENT & PLAN NOTE
Improving  -bp 114/47 - 146/61, most currently 114/47 this morning  -continue checking bp twice a day  -continue metoprolol 25mg BID and increase Losartan to 50mg daily per cardiology recommendation    -f/u outpatient

## 2019-02-09 NOTE — ASSESSMENT & PLAN NOTE
Stable, pt denies any chest pain at this time  Troponin 0 02 -> 0 02-> 0 04-> 0 05 -> 0 04  EKG: NSR w/o significant change from EKG in 12/22/16  Echo cardiogram outpatient

## 2019-02-11 ENCOUNTER — TRANSITIONAL CARE MANAGEMENT (OUTPATIENT)
Dept: FAMILY MEDICINE CLINIC | Facility: CLINIC | Age: 74
End: 2019-02-11

## 2019-02-11 LAB
ATRIAL RATE: 51 BPM
P AXIS: 66 DEGREES
PR INTERVAL: 158 MS
QRS AXIS: 29 DEGREES
QRSD INTERVAL: 104 MS
QT INTERVAL: 456 MS
QTC INTERVAL: 420 MS
T WAVE AXIS: 90 DEGREES
VENTRICULAR RATE: 51 BPM

## 2019-02-11 PROCEDURE — 93010 ELECTROCARDIOGRAM REPORT: CPT | Performed by: INTERNAL MEDICINE

## 2019-02-16 DIAGNOSIS — Z86.19 H/O TINEA CRURIS: Chronic | ICD-10-CM

## 2019-02-23 DIAGNOSIS — K21.9 GASTROESOPHAGEAL REFLUX DISEASE, ESOPHAGITIS PRESENCE NOT SPECIFIED: ICD-10-CM

## 2019-02-24 RX ORDER — OMEPRAZOLE 20 MG/1
20 CAPSULE, DELAYED RELEASE ORAL DAILY
Qty: 28 CAPSULE | Refills: 1 | Status: SHIPPED | OUTPATIENT
Start: 2019-02-24 | End: 2019-03-01 | Stop reason: SDUPTHER

## 2019-02-25 DIAGNOSIS — K21.9 GASTROESOPHAGEAL REFLUX DISEASE, ESOPHAGITIS PRESENCE NOT SPECIFIED: ICD-10-CM

## 2019-02-26 DIAGNOSIS — Z86.19 H/O TINEA CRURIS: Chronic | ICD-10-CM

## 2019-02-26 RX ORDER — RANITIDINE 150 MG/1
150 CAPSULE ORAL 2 TIMES DAILY PRN
Qty: 120 CAPSULE | Refills: 2 | Status: SHIPPED | OUTPATIENT
Start: 2019-02-26 | End: 2019-03-06 | Stop reason: CLARIF

## 2019-03-01 DIAGNOSIS — J30.9 ALLERGIC RHINITIS, UNSPECIFIED SEASONALITY, UNSPECIFIED TRIGGER: ICD-10-CM

## 2019-03-01 DIAGNOSIS — K21.9 GASTROESOPHAGEAL REFLUX DISEASE, ESOPHAGITIS PRESENCE NOT SPECIFIED: ICD-10-CM

## 2019-03-03 RX ORDER — FLUTICASONE PROPIONATE 50 MCG
1 SPRAY, SUSPENSION (ML) NASAL DAILY
Qty: 1 BOTTLE | Refills: 4 | Status: SHIPPED | OUTPATIENT
Start: 2019-03-03 | End: 2019-06-22 | Stop reason: SDUPTHER

## 2019-03-03 RX ORDER — OMEPRAZOLE 20 MG/1
20 CAPSULE, DELAYED RELEASE ORAL DAILY
Qty: 28 CAPSULE | Refills: 1 | Status: SHIPPED | OUTPATIENT
Start: 2019-03-03 | End: 2019-05-06 | Stop reason: SDUPTHER

## 2019-03-05 DIAGNOSIS — Z86.19 H/O TINEA CRURIS: Chronic | ICD-10-CM

## 2019-03-05 DIAGNOSIS — H40.9 GLAUCOMA, UNSPECIFIED GLAUCOMA TYPE, UNSPECIFIED LATERALITY: ICD-10-CM

## 2019-03-05 RX ORDER — CLOTRIMAZOLE AND BETAMETHASONE DIPROPIONATE 10; .64 MG/G; MG/G
CREAM TOPICAL 2 TIMES DAILY
Qty: 30 G | Refills: 1 | Status: SHIPPED | OUTPATIENT
Start: 2019-03-05 | End: 2019-09-13

## 2019-03-05 RX ORDER — TOBRAMYCIN AND DEXAMETHASONE 3; 1 MG/ML; MG/ML
1 SUSPENSION/ DROPS OPHTHALMIC 2 TIMES DAILY
Qty: 5 ML | Refills: 1 | Status: SHIPPED | OUTPATIENT
Start: 2019-03-05 | End: 2019-09-13

## 2019-03-06 ENCOUNTER — OFFICE VISIT (OUTPATIENT)
Dept: GASTROENTEROLOGY | Facility: CLINIC | Age: 74
End: 2019-03-06
Payer: MEDICARE

## 2019-03-06 VITALS
HEIGHT: 65 IN | DIASTOLIC BLOOD PRESSURE: 69 MMHG | WEIGHT: 168 LBS | BODY MASS INDEX: 27.99 KG/M2 | TEMPERATURE: 96.6 F | HEART RATE: 45 BPM | SYSTOLIC BLOOD PRESSURE: 140 MMHG

## 2019-03-06 DIAGNOSIS — K21.9 GASTROESOPHAGEAL REFLUX DISEASE, ESOPHAGITIS PRESENCE NOT SPECIFIED: Primary | ICD-10-CM

## 2019-03-06 DIAGNOSIS — K63.5 POLYP OF COLON, UNSPECIFIED PART OF COLON, UNSPECIFIED TYPE: ICD-10-CM

## 2019-03-06 DIAGNOSIS — Z11.59 NEED FOR HEPATITIS C SCREENING TEST: ICD-10-CM

## 2019-03-06 PROCEDURE — 99204 OFFICE O/P NEW MOD 45 MIN: CPT | Performed by: INTERNAL MEDICINE

## 2019-03-06 NOTE — PROGRESS NOTES
[facility] - Outpatient Consultation  Mary Beth Godwin 72 y o  male MRN: [id number]  Encounter: [id number]          ASSESSMENT AND PLAN:      Mr Boston Melendez was seen today as a referral from his PCP  1  Polyp of colon, unspecified part of colon, - The patients last colonoscopy was in July 2013 by Hector Teran  One polyp was found, I do not have the pathology report  I will schedule him for a colonoscopy  He is asymptomatic  Risks include but not limited to bleeding, perforation, missed lesion discussed  He is agreeable to the procedure  Bowel prep instructions were given  2 Gastroesophageal reflux disease, esophagitis presence not specified- The patients GERD symptoms are well controlled with omeprazole 20 mg  I advised him to continue this, he has had GERD for 2 years  3  Hepatitis C screening- Due to the patients birth year I advised Hepatitis C antibody test     Follow up as needed  ______________________________________________________________________    HPI:  Mary Beth Godwin is a 72 y o  male with type II diabetes which is controlled with Metformin 500 mg  is here as a new patient regarding colon cancer screening  His last colonoscopy was in July 2013 and removed 1 polyp by Dr Vivian Childs  Today he feeling well and his GERD is well controlled with omeprazole 20 mg  He has been on omeprazole for 3-4 years  His stools are loose which may be due to Metformin  He is not a drinker or smoker  He has looser stools which may be a side effect of Metformin 500 mg  Consider taking patient off  REVIEW OF SYSTEMS:    CONSTITUTIONAL: Denies any fever, chills, rigors, and weight loss  HEENT: No earache or tinnitus  Denies hearing loss or visual disturbances  CARDIOVASCULAR: No chest pain or palpitations  RESPIRATORY: Denies any cough, hemoptysis, shortness of breath or dyspnea on exertion  GASTROINTESTINAL: As noted in the History of Present Illness     GENITOURINARY: No problems with urination  Denies any hematuria or dysuria  NEUROLOGIC: No dizziness or vertigo, denies headaches  MUSCULOSKELETAL: Denies any muscle or joint pain  SKIN: Denies skin rashes or itching  ENDOCRINE: Denies excessive thirst  Denies intolerance to heat or cold  PSYCHOSOCIAL: Denies depression or anxiety  Denies any recent memory loss         Historical Information   Past Medical History:   Diagnosis Date    AS (aortic stenosis)     Balanitis     Biceps tendonitis on right     CAD (coronary artery disease)     Cancer (HCC)     skin    Complete rotator cuff tear or rupture of right shoulder, not specified as traumatic     Diabetes mellitus [facility]     Esophageal reflux     High cholesterol     Hypertension     Hypertriglyceridemia     Hypogonadism in male     Myalgia     Myocardial infarction ([facility]     Palpitations     Shoulder pain     Sinus problem     Skin cancer of nose     Sleep apnea     [facility] 5604    Systolic murmur     recently found    Tinea cruris     Tinea pedis      Past Surgical History:   Procedure Laterality Date    CATARACT EXTRACTION Left     COLONOSCOPY      CORONARY ANGIOPLASTY WITH STENT PLACEMENT      CORONARY ARTERY BYPASS GRAFT N/A 12/12/2016    Procedure: INTRAOP CECILLE; BILATERAL LEG EVH; CORONARY ARTERY BYPASS GRAFT X 4 SVG TO PDA, OM1,  AND DIAGONAL1, LIMA TO LAD;  Surgeon: Belen Hernández MD;  [facility];  Service:     EYE SURGERY      HERNIA REPAIR      [place] ARTHROSCOPY SHOULDER SURGICAL BICEPS TENODESIS Right 5/6/2016    Procedure: ARTHROSCOPIC BICEPS TENODESIS;  Surgeon: Kei Mar MD;  [facility];  Service: Orthopedics    [place] SHLDR ARTHROSCOP,SURG,W/ROTAT CUFF REPR Right 5/6/2016    Procedure: REPAIR ROTATOR CUFF  ARTHROSCOPIC; SUBACROMIAL DECOMPRESSION; PARTIAL SYNOVECTOMY;  Surgeon: Kei Mar MD;  [facility];  Service: Orthopedics    UMBILICAL HERNIA REPAIR  2009    over age 13     Social History   Social History     Substance and Sexual Activity   Alcohol Use No     Social History     Substance and Sexual Activity   Drug Use No     Social History     Tobacco Use   Smoking Status Never Smoker   Smokeless Tobacco Never Used     Family History   Problem Relation Age of Onset    Heart disease Mother     Hypertension Mother     Nephrolithiasis Father     Other Father         Renal disease    Hypertension Sister     Nephrolithiasis Brother     Other Brother         Renal disease    Hematuria Family         Microscopic       Meds/Allergies       Current Outpatient Medications:     aspirin (ECOTRIN LOW STRENGTH) 81 mg EC tablet    atorvastatin (LIPITOR) 80 mg tablet    clorazepate (TRANXENE) 7 5 mg tablet    clotrimazole-betamethasone (LOTRISONE) 1-0 05 % cream    fluticasone (FLONASE) 50 mcg/act nasal spray    glimepiride (AMARYL) 4 mg tablet    hydrocortisone 2 5 % cream    losartan (COZAAR) 25 mg tablet    metFORMIN (GLUCOPHAGE) 500 mg tablet    metoprolol tartrate (LOPRESSOR) 25 mg tablet    mirtazapine (REMERON) 15 mg tablet    omeprazole (PriLOSEC) 20 mg delayed release capsule    ranitidine (ZANTAC) 150 MG capsule    tobramycin-dexamethasone (TOBRADEX) ophthalmic suspension    tobramycin-dexamethasone (TOBRADEX) ophthalmic suspension    Allergies   Allergen Reactions    Epinephrine Hives and Anxiety    Penicillins Hives and Anxiety           Objective     Blood pressure 140/69, pulse (!) 45, temperature (!) 96 6 °F (35 9 °C), temperature source Tympanic, height 5' 5" (1 651 m), weight 76 2 kg (168 lb)  Body mass index is 27 96 kg/m²  PHYSICAL EXAM:      General Appearance:   Alert, cooperative, no distress   HEENT:   Normocephalic, atraumatic, anicteric      Neck:  Supple, symmetrical, trachea midline   Lungs:   Clear to auscultation bilaterally; no rales, rhonchi or wheezing; respirations unlabored    Heart[de-identified]   Regular rate and rhythm; no murmur, rub, or gallop     Abdomen:   Soft, non-tender, non-distended; normal bowel sounds; no masses, no organomegaly    Genitalia:   Deferred    Rectal:   Deferred    Extremities:  No cyanosis, clubbing or edema    Pulses:  2+ and symmetric    Skin:  No jaundice, rashes, or lesions    Lymph nodes:  No palpable cervical lymphadenopathy        Lab Results:   No visits with results within 1 Day(s) from this visit     Latest known visit with results is:   Admission on 02/08/2019, Discharged on 02/09/2019   Component Date Value    WBC 02/08/2019 6 38     RBC 02/08/2019 4 54     Hemoglobin 02/08/2019 13 3     Hematocrit 02/08/2019 41 3     MCV 02/08/2019 91     MCH 02/08/2019 29 3     MCHC 02/08/2019 32 2     RDW 02/08/2019 13 2     MPV 02/08/2019 11 2     Platelets 75/47/1113 191     nRBC 02/08/2019 0     Neutrophils Relative 02/08/2019 53     Immat GRANS % 02/08/2019 0     Lymphocytes Relative 02/08/2019 35     Monocytes Relative 02/08/2019 9     Eosinophils Relative 02/08/2019 2     Basophils Relative 02/08/2019 1     Neutrophils Absolute 02/08/2019 3 35     Immature Grans Absolute 02/08/2019 0 02     Lymphocytes Absolute 02/08/2019 2 23     Monocytes Absolute 02/08/2019 0 59     Eosinophils Absolute 02/08/2019 0 13     Basophils Absolute 02/08/2019 0 06     Sodium 02/08/2019 137     Potassium 02/08/2019 4 2     Chloride 02/08/2019 106     CO2 02/08/2019 27     ANION GAP 02/08/2019 4     BUN 02/08/2019 17     Creatinine 02/08/2019 0 83     Glucose 02/08/2019 144*    Calcium 02/08/2019 8 9     AST 02/08/2019 19     ALT 02/08/2019 37     Alkaline Phosphatase 02/08/2019 65     Total Protein 02/08/2019 6 9     Albumin 02/08/2019 3 7     Total Bilirubin 02/08/2019 0 64     eGFR 02/08/2019 89     Troponin I 02/08/2019 0 02     Troponin I 02/08/2019 0 02     Troponin I 02/08/2019 0 03     Ventricular Rate 02/08/2019 61     Atrial Rate 02/08/2019 61     [place] Interval 02/08/2019 150     QRSD Interval 02/08/2019 92     QT Interval 02/08/2019 440     QTC Interval 02/08/2019 442     P Axis 02/08/2019 42     QRS Axis 02/08/2019 41     T Wave Axis 02/08/2019 96     POC Glucose 02/08/2019 150*    Troponin I 02/08/2019 0 04     POC Glucose 02/08/2019 156*    Troponin I 02/08/2019 0 05*    Troponin I 02/09/2019 0 04     Sodium 02/09/2019 139     Potassium 02/09/2019 4 3     Chloride 02/09/2019 106     CO2 02/09/2019 30     ANION GAP 02/09/2019 3*    BUN 02/09/2019 13     Creatinine 02/09/2019 0 90     Glucose 02/09/2019 106     Calcium 02/09/2019 8 9     eGFR 02/09/2019 86     Magnesium 02/09/2019 2 1     Phosphorus 02/09/2019 4 0     POC Glucose 02/09/2019 110     Ventricular Rate 02/09/2019 48     Atrial Rate 02/09/2019 48     [place] Interval 02/09/2019 166     QRSD Interval 02/09/2019 106     QT Interval 02/09/2019 446     QTC Interval 02/09/2019 398     P Axis 02/09/2019 9     QRS Axis 02/09/2019 6     T Wave Axis 02/09/2019 98     POC Glucose 02/09/2019 178*    Ventricular Rate 02/08/2019 51     Atrial Rate 02/08/2019 51     NV Interval 02/08/2019 158     QRSD Interval 02/08/2019 104     QT Interval 02/08/2019 456     QTC Interval 02/08/2019 420     P Axis 02/08/2019 66     QRS Axis 02/08/2019 29     T Wave Axis 02/08/2019 90          Radiology Results:   Xr Chest 2 Views    Result Date: 2/8/2019  Narrative: CHEST INDICATION:   Diaphoresis  COMPARISON:  Chest x-ray 12/22/2016 EXAM PERFORMED/VIEWS:  XR CHEST PA & LATERAL  The frontal view was performed utilizing dual energy radiographic technique  FINDINGS:  There has been prior sternotomy and CABG  There is moderate cardiomegaly, stable  Interstitial prominence noted with fullness of the hilar vasculature, new from the prior exam   Findings suggest mild/moderate CHF  No pneumothorax or pleural effusion  Osseous structures appear within normal limits for patient age  Impression: Findings suggest mild/moderate CHF   The study was marked in [facility] for immediate notification  Workstation performed: [id number]     Attestation:   By signing my name below, Blaire Jennings, attest that this documentation has been prepared under the direction and in the presence of Ella Chatman MD  Electronically Signed: Sunil Emmanuel  3/6/19       I, Ella Chatman, personally performed the services described in this documentation  All medical record entries made by the scribe were at my direction and in my presence  I have reviewed the chart and discharge instructions and agree that the record reflects my personal performance and is accurate and complete   Ella Chatman MD  3/6/19

## 2019-03-06 NOTE — PATIENT INSTRUCTIONS
Colon scheduled 5/30/19 at [address] with Vasquez Figueroa instructions given in office  Must be 11am  Diabetic   Not clinic block

## 2019-03-09 DIAGNOSIS — I12.9 HYPERTENSION ASSOCIATED WITH STAGE 2 CHRONIC KIDNEY DISEASE DUE TO TYPE 2 DIABETES MELLITUS (HCC): Chronic | ICD-10-CM

## 2019-03-09 DIAGNOSIS — N18.2 HYPERTENSION ASSOCIATED WITH STAGE 2 CHRONIC KIDNEY DISEASE DUE TO TYPE 2 DIABETES MELLITUS (HCC): Chronic | ICD-10-CM

## 2019-03-09 DIAGNOSIS — E11.22 HYPERTENSION ASSOCIATED WITH STAGE 2 CHRONIC KIDNEY DISEASE DUE TO TYPE 2 DIABETES MELLITUS (HCC): Chronic | ICD-10-CM

## 2019-03-13 DIAGNOSIS — Z86.19 H/O TINEA CRURIS: Chronic | ICD-10-CM

## 2019-03-13 LAB
LEFT EYE DIABETIC RETINOPATHY: NORMAL
RIGHT EYE DIABETIC RETINOPATHY: NORMAL

## 2019-03-14 DIAGNOSIS — I12.9 HYPERTENSION ASSOCIATED WITH STAGE 2 CHRONIC KIDNEY DISEASE DUE TO TYPE 2 DIABETES MELLITUS (HCC): Chronic | ICD-10-CM

## 2019-03-14 DIAGNOSIS — E11.22 HYPERTENSION ASSOCIATED WITH STAGE 2 CHRONIC KIDNEY DISEASE DUE TO TYPE 2 DIABETES MELLITUS (HCC): Chronic | ICD-10-CM

## 2019-03-14 DIAGNOSIS — N18.2 HYPERTENSION ASSOCIATED WITH STAGE 2 CHRONIC KIDNEY DISEASE DUE TO TYPE 2 DIABETES MELLITUS (HCC): Chronic | ICD-10-CM

## 2019-03-14 RX ORDER — LOSARTAN POTASSIUM 50 MG/1
50 TABLET ORAL DAILY
Qty: 90 TABLET | Refills: 2 | Status: SHIPPED | OUTPATIENT
Start: 2019-03-14 | End: 2019-07-03

## 2019-03-14 RX ORDER — LOSARTAN POTASSIUM 25 MG/1
50 TABLET ORAL DAILY
Qty: 90 TABLET | Refills: 0 | OUTPATIENT
Start: 2019-03-14 | End: 2019-12-09

## 2019-03-15 ENCOUNTER — OFFICE VISIT (OUTPATIENT)
Dept: FAMILY MEDICINE CLINIC | Facility: CLINIC | Age: 74
End: 2019-03-15

## 2019-03-15 VITALS
SYSTOLIC BLOOD PRESSURE: 144 MMHG | WEIGHT: 167.4 LBS | BODY MASS INDEX: 27.89 KG/M2 | HEART RATE: 48 BPM | TEMPERATURE: 98.1 F | RESPIRATION RATE: 18 BRPM | HEIGHT: 65 IN | DIASTOLIC BLOOD PRESSURE: 72 MMHG

## 2019-03-15 DIAGNOSIS — E78.5 HYPERLIPIDEMIA, UNSPECIFIED HYPERLIPIDEMIA TYPE: ICD-10-CM

## 2019-03-15 DIAGNOSIS — E11.9 TYPE 2 DIABETES MELLITUS WITHOUT COMPLICATION, WITHOUT LONG-TERM CURRENT USE OF INSULIN (HCC): ICD-10-CM

## 2019-03-15 DIAGNOSIS — Z23 VACCINE FOR VZV (VARICELLA-ZOSTER VIRUS): ICD-10-CM

## 2019-03-15 DIAGNOSIS — G47.00 INSOMNIA, UNSPECIFIED TYPE: ICD-10-CM

## 2019-03-15 DIAGNOSIS — E11.22 HYPERTENSION ASSOCIATED WITH STAGE 2 CHRONIC KIDNEY DISEASE DUE TO TYPE 2 DIABETES MELLITUS (HCC): Primary | Chronic | ICD-10-CM

## 2019-03-15 DIAGNOSIS — I12.9 HYPERTENSION ASSOCIATED WITH STAGE 2 CHRONIC KIDNEY DISEASE DUE TO TYPE 2 DIABETES MELLITUS (HCC): Primary | Chronic | ICD-10-CM

## 2019-03-15 DIAGNOSIS — H40.9 GLAUCOMA, UNSPECIFIED GLAUCOMA TYPE, UNSPECIFIED LATERALITY: ICD-10-CM

## 2019-03-15 DIAGNOSIS — I25.10 CORONARY ARTERY DISEASE INVOLVING NATIVE CORONARY ARTERY OF NATIVE HEART WITHOUT ANGINA PECTORIS: ICD-10-CM

## 2019-03-15 DIAGNOSIS — N18.2 HYPERTENSION ASSOCIATED WITH STAGE 2 CHRONIC KIDNEY DISEASE DUE TO TYPE 2 DIABETES MELLITUS (HCC): Primary | Chronic | ICD-10-CM

## 2019-03-15 PROCEDURE — 99213 OFFICE O/P EST LOW 20 MIN: CPT | Performed by: FAMILY MEDICINE

## 2019-03-15 RX ORDER — GLIMEPIRIDE 4 MG/1
TABLET ORAL
Qty: 180 TABLET | Refills: 2 | Status: SHIPPED | OUTPATIENT
Start: 2019-03-15 | End: 2020-03-12 | Stop reason: SDUPTHER

## 2019-03-15 RX ORDER — ATORVASTATIN CALCIUM 80 MG/1
80 TABLET, FILM COATED ORAL DAILY
Qty: 90 TABLET | Refills: 2 | Status: SHIPPED | OUTPATIENT
Start: 2019-03-15 | End: 2019-08-15

## 2019-03-15 RX ORDER — MIRTAZAPINE 15 MG/1
15 TABLET, FILM COATED ORAL
Qty: 90 TABLET | Refills: 2 | Status: SHIPPED | OUTPATIENT
Start: 2019-03-15 | End: 2019-05-30

## 2019-03-15 RX ORDER — TOBRAMYCIN AND DEXAMETHASONE 3; 1 MG/ML; MG/ML
1 SUSPENSION/ DROPS OPHTHALMIC 2 TIMES DAILY
Qty: 5 ML | Refills: 3 | Status: SHIPPED | OUTPATIENT
Start: 2019-03-15 | End: 2019-05-30 | Stop reason: SDUPTHER

## 2019-03-15 NOTE — ASSESSMENT & PLAN NOTE
Lab Results   Component Value Date    HGBA1C 7 8 (A) 01/14/2019   -Continue current regimen of metformin 500 mg BID, Amaryl 2 mg QAM & 4 mg QHS  -Foot exam at next visit; diabetic shoe Rx given today  -Up to date on urine microalbumin and Ophthalmology visits  -Return for HbA1C after 4/19/19

## 2019-03-15 NOTE — ASSESSMENT & PLAN NOTE
-/72, slightly above goal today   -Due to patient's asymptomatic bradycardia (HR 48 today) with HTN, CAD, and bradycardia managed by Dr Elan Villagran (Cardiology), defer increasing losartan today, recheck BP in 1 month after he returns from [place]  -Suspect patient may have persistently elevated BP due to his 3 cups of coffee per day   -Counseled on eliminating/reducing caffeine intake as much as possible  -Advised patient on symptoms of hypotension & severe HTN (SBP>180)  -Limit salt-intake & caffeine in diet as noted above, minimize stress level  -DASH diet handout given via AVS (in Latvian)   -Discussed importance of treating HTN to prevent ASCVD, CVA; ASA refilled

## 2019-03-15 NOTE — PATIENT INSTRUCTIONS
Plan de alimentación con "enfoque dietético para detener la hipertensión (DASH, por quinton siglas en inglés)   CUIDADO AMBULATORIO:   El plan de alimentación DASH (enfoques dietéticos para detener la hipertensión)  está diseñado para ayudar a prevenir o disminuir la presión arterial chandler  También puede ayudar a bajar el colesterol hermila (colesterol LDL) y disminuír li riesgo de enfermedad cardíaca  El plan es bajo en sodio, azúcar, grasas dañinas, y grasas en li totalidad  Es alto en potasio, calcio, magnesio y [place]  Estos nutrientes se agregan al consumir más frutas, vegetales y granos enteros  Li límite de sodio cada día: Li dietista le indicará la cantidad de sodio que usted debe consumir a diario  La gente que tiene la presión arterial chandler debe consumir de 1,500 a 2,300 mg de sodio al día seth marie  Barron cucharadita (cdta) de sal tiene 2,300 mg de sodio  [place] puede parecer seth barron meta difícil, aarti pequeños cambios en los alimentos que usted consume pueden hacer barron gran diferencia  Li médico o dietista puede ayudarlo a crear un plan alimenticio que cumpla li límite de sodio  Formas de limitar el sodio:   · Ronda las etiquetas de los alimentos  Las etiquetas pueden ayudarle a escoger alimentos bajos en sodio  La cantidad de sodio está incluida en miligramos (mg)  La columna del porcentaje de valor diario indica la cantidad de necesidades diarias satisfechas con 1 porción del alimento para cada nutriente en la lista  Escoja alimentos que tengan menos de 5% del porcentaje diario de [place]  Estos alimentos se consideran bajos en sodio  Los alimentos que tienen 20% o más del porcentaje diario de sodio se consideran alimentos altos en sodio  Evite alimentos que tengan más de 300 mg de sodio por porción  Escoja alimentos Rodrick Sinning diga que son bajos en sodio, con sodio reducido, o sin sal agregada             · Evite la sal   No le agregue sal a la comida cuando se siente a la blankenship a comer, y agregue muy poca sal a los alimentos monica la cocción  Use hierbas y condimentos, seth cebollas, ajo y especias sin sal para agregar sabor a los alimentos  Use jugo de lima, randall o vinagre para darle a los alimentos un sabor ácido  Use chiles picantes o barron cantidad pequeña de salsa picante para agregar un sabor picante a los alimentos  · Pregunte sobre los sustitutos para la sal   Pregúntele a li médico si es posible usar sustitutos de la sal  Algunos sustitutos de la sal vienen con ingredientes que pueden ser dañinos si usted tiene ciertos padecimientos médicos  · Escoja los alimentos cuidadosamente cuando sale a comer a restaurantes:  las comidas de los restaurantes, sobre todo restaurantes de comida rápida, kam siempre son altas en sodio  Algunos restaurantes ofrecen información nutricional que indica la cantidad de sodio en quinton alimentos  Pida que preparen quinton comidas con menos sal o sin sal   Lo que necesita saber acerca de las grasas:   · Incluya grasas saludables  Las grasas insaturadas y los ácidos grasos omega-3 son ejemplos de grasas saludables  Las grasas insaturadas se encuentran en los aceites de soja, [place], [place] o [place] y la margarina [facility] y [facility]  Los ácidos grasos [place] 3 se encuentran en el pescado con grasa, seth el salmón, el atún, la caballa y las roberto  También se le puede encontrar en el aceita de linaza y en la linaza molida  · Evite las grasas insalubres  No consuma grasas dañinas, seth grasas saturadas y grasas trans  Las grasas saturadas se encuentran en alimentos que contienen grasa animal  Ashlee Hallman son Rodriguez rubio, la [place], la [place], la crema y otros productos lácteos  Además se pueden encontrar en la [place], la margarina en surendra, el aceite de adams y el aceite de yogesh  Las grasas trans se encuentran en comidas fritas, galletas estilo soda, tortillitas tostadas, y alimentos horneados hechos con Willena Chill    Alimentos que puede incluir:  Con el plan de alimentación DASH, usted necesita consumir un número específico de porciones de cada karen de alimentos  M[place]by le ayudará a consumir las cantidades suficientes de ciertos nutrientes y limitar otros  La cantidad de porciones que usted debe comer depende de la cantidad de calorías que usted necesita  Dyson dietista le informará sobre cuántas calorías necesita usted  El número de porciones que viene en la lista al lado de los grupos alimenticios a continuación es para las personas que necesitan aproximadamente 2,000 calorías al día    · Granos:  6 a 8 porciones (3 de estas porciones deben ser de alimentos de granos enteros o integrales)    ¨ 1 tajada de pan integral     ¨ 1 onza de cereal seco    ¨ ½ taza de cereal cocinado, pasta, o arroz integral    · Verduras y frutas:  4 a 5 porciones de frutas y 4 a 5 porciones de vegetales    ¨ 1 fruta mediana    ¨ ½ taza de vegetales congelados, enlatados (sin sal adicional), o vegetales frescos y picados     ¨ ½ taza de fruta fresca, congelada, seca o enlatada (enlatada en sirope liviano o jugo de fruta)    ¨ ½ taza de jugo de verduras o frutas    · Productos lácteos:  2 a 3 porciones    ¨ 1 taza de [employer] o leche 1%    ¨ 1½ onzas de queso descremado o bajo en grasas y bajo en sodio    ¨ 6 onzas de yogurt descremado o bajo en grasas    · Naun gonzalez, naun yaw y pescado magros:  6 onzas o menos    ¨ Naun yaw (kadeem, pavo) sin piel    ¨ Pescado (sobre todo pescado con grasa, seth salmón, atún fresco o DURAN)    ¨ Carne de res o de cerdo magra (lomo, carne molida extra [place])    ¨ Claras de huevo y sustitutos del huevo    · [place] du sac, semillas y legumbres:  4 a 5 porciones por semana    ¨ ½ taza de frijoles y arbejas cocinadas    ¨ 1½ onzas de nueces sin sal    ¨ 2 cucharadas de mantequilla de maní o semillas    · Dulces y azúcares adicionales:  5 o menos por semana    ¨ 1 cucharada de azúcar, jalea o mermelada    ¨ ½ taza de sorbeto o gelatina    ¨ 1 taza de limonada    · Grasas:  2 a 3 porciones por semana    ¨ 1 cucharadita de margarina suave o aceite vegetal    ¨ 1 cucharada de [facility]    ¨ 2 cucharadas de aderezo para ensaladas  Alimentos que se deben evitar:   · Granos:      ¨ Postres horneados o fritos seth donas, pastelitos, galletas, y quequitos (altos en grasas y azúcar)    ¨ Mezclas para hacer pan de maíz y pasteles, alimentos empacados, seth rellenos para pavo, mezclas para hacer arroz y pasta, macarrones con [place], y cereales instantáneos (altos en sodio)    · Frutas y verduras:      ¨ Vegetales regulares enlatados (altos en sodio)    ¨ Repollo preparado en vinagre, vegetales en vinagre, y otros alimentos preparados en escabeche (altos en sodio)    ¨ Vegetales fritos o vegetales en mantequilla o salsas altas en grasas    ¨ Frutas en crema o salsa de mantequilla (altas en grasas)    · Productos lácteos:      ¨ Leche entera, leche semi descremada (2%), y crema (chandler en grasas)    ¨ Queso regular y queso procesado (alto en grasa y sodio)    · Naun y alimentos con proteínas:      ¨ Naun ahumadas o curadas, seth carne de ceci en conserva, tocino, jamón, perros calientes, y salchicha (chandler en grasas y sodio)    ¨ Frijoles enlatados y naun enlatadas o naun en pasta, seth naun en conserva, roberto, anchoas y [address] de imitación (altos en sodio)    ¨ Naun para emparedados, seth [place], [place], Jose Elias, y carne de res en rebanada (altos en sodio)    ¨ Naun altas en grasas (bistec estilo T-bone, carne molida para hamburguesas, costillas)    ¨ Huevos enteros y yemas de huevo (altos en grasas)    · Otros:      ¨ Condimentos hechos con sal, seth sal de ajo, sal de apio, sal de cebolla, sal condimentada, suavizantes para naun, y glutamato de monosodio (MSG, por quinton siglas en inglés)    ¨ Sopa Miso y mezclas para sopa enlatadas o secas (altas en sodio)    ¨ Salsa de soya regular, salsa de [place], salsa [place], salsa para bistec, [employer], y [address] [place] [address] Ave  vinagres con sabor (altas en sodio)    ¨ Condimentos regulares, seth [place], salsa de tomate y aderezos para ensalada (altos en sodio)    ¨ Salsa para tamica y salsas en general, seth salsa Gonzalez o de [place] (altas en sodio y [place])    ¨ Bebidas altas en azúcar, seth bebidas gaseosas o jugos de frutas    ¨ Alimentos para [address], seth rito tostadas, palomitas de [place], pretzels, piel de cerdo, [address] de soda [facility], y nueces saladas    ¨ Alimentos congelados, seth cenas preparadas, platos principales, vegetales con salsas, y tamica cubiertas en pan (altas en sodio)  Otras pautas que debe seguir:   · Mantenga un peso saludable  Li riesgo de enfermedad cardíaca es aún más alto si usted tiene sobrepeso  Li médico podría sugerirle que adelgace si tiene sobrepeso  Usted puede perder peso si se propone consumir menos calorías y alimentos que tengan azúcar y grasas agregadas  El plan de alimentación DASH puede ayudarle a lograrlo  Lilia buena forma de disminuir el consumo de calorías es consumiendo porciones más pequeñas en cada comida y menos meriendas entre comidas  Consulte a li médico para obtener más información sobre cómo adelgazar  · Realice actividad física con regularidad  El ejercicio regular puede ayudarle a alcanzar o mantener un peso saludable  El ejercicio regular también puede ayudarle a disminuir li presión arterial y mejorar quinton niveles de colesterol  Ulises ejercicios moderados por 30 minutos o más todos los días de la [place]  Para bajar peso, asegúrese de ejercitarse por lo menos 60 minutos  Consulte con li médico sobre un programa de ejercicio adecuado para usted  · Limite el consumo de alcohol  Las mujeres deberían limitar el consumo de alcohol a 1 bebida por día  Los hombres deberían limitar el consumo de alcohol a 2 tragos al día  Un trago equivale a 12 onzas de cerveza, 5 onzas de vino o 1 onza y ½ de licor    © 2017 [address] Information is for End User's use only and may not be sold, redistributed or otherwise used for commercial purposes  All illustrations and images included in CareNotes® are the copyrighted property of A D A M , Inc  or Dustin Lamar  Esta información es sólo para uso en educación  Li intención no es darle un consejo médico sobre enfermedades o tratamientos  Colsulte con li Laruth Deepak farmacéutico antes de seguir cualquier régimen médico para saber si es seguro y efectivo para usted

## 2019-03-15 NOTE — PROGRESS NOTES
Po Bonilla 1945 male MRN: [id number]    Family Medicine Follow-up Visit    ASSESSMENT/PLAN  Problem List Items Addressed This Visit        Endocrine    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) - Primary (Chronic)     -/72, slightly above goal today   -Due to patient's asymptomatic bradycardia (HR 48 today) with HTN, CAD, and bradycardia managed by Dr Blaise Goyal (Cardiology), defer increasing losartan today, reche[facility] BP in 1 [facility]  -Suspect patient may have persistently elevated BP due to his 3 cups of coffee per day   -Counseled on eliminating/reducing caffeine intake as much as possible  -Advised patient on symptoms of hypotension & severe HTN (SBP>180)  -Limit salt-intake & caffeine in diet as noted above, minimize stress level  -DASH diet handout given via AVS (in Icelandic)   -Discussed importance of treating HTN to prevent ASCVD, CVA; ASA refilled         Relevant Medications    glimepiride (AMARYL) 4 mg tablet    metFORMIN (GLUCOPHAGE) 500 mg tablet    metoprolol tartrate (LOPRESSOR) 25 mg tablet    Type 2 diabetes mellitus (HCC)     Lab Results   Component Value Date    HGBA1C 7 8 (A) 01/14/2019   -Continue current regimen of metformin 500 mg BID, Amaryl 2 mg QAM & 4 mg QHS  -Foot exam at next visit; diabetic shoe Rx given today  -Up to date on urine microalbumin and Ophthalmology visits  -Return for HbA1C after 4/19/19         Relevant Medications    glimepiride (AMARYL) 4 mg tablet    metFORMIN (GLUCOPHAGE) 500 mg tablet    Other Relevant Orders    Diabetic Shoe       Cardiovascular and Mediastinum    Coronary artery disease involving native coronary artery of native heart without angina pectoris     -Refilled metoprolol 25 mg Q12H, patient follows with Cardiology as noted above  -Patient continues to be exhibiting asymptomatic bradycardia (HR 48 today)  -Will reach out to Dr Blaise Goyal regarding whether metoprolol dose may be decreased further         Relevant Medications    metoprolol tartrate (LOPRESSOR) 25 mg tablet       Other    Glaucoma    Relevant Medications    tobramycin-dexamethasone (TOBRADEX) ophthalmic suspension    Vaccine for VZV (varicella-zoster virus)    Relevant Orders    Zoster Vaccine Recombinant IM    Insomnia     -Continue remeron 15 mg QD, no issues with medication  -Refills sent to pharmacy         Relevant Medications    mirtazapine (REMERON) 15 mg tablet    Hyperlipidemia     -Refilled lipitor 80 mg QD  -Up to date on lipid panel (9/2018)         Relevant Medications    atorvastatin (LIPITOR) 80 mg tablet          Return in 1 month after 4/19/19 for HbA1C, DM foot exam     Future Appointments   Date Time Provider Tino Kumar   4/29/2019  1:20 PM MD RADHIKA Wolff FP [facility]   5/15/2019  4:20 PM MD RADHIKA Wolff BE [facility]   7/5/2019  7:30 AM Howard Herrera MD ORTHO [facility]          SUBJECTIVE  CC: Follow-up      HPI:  Yusra Chandra is a 72 y o  male who presents for refills on his medications, as patient states he will be going to [place] on 3/18/19 for his mother's illness, for approximately 3 weeks  · Hospital follow up: Patient admitted 2/8/19-2/9/19 for accelerated HTN, has BP machine at home and says his BP is usually running 160s-170s/70s-80s at times but SBP usually 150s  Today /72  Patient admits to drinks coffee x3 cups per day, denies eating excess salt in his diet    · HM: Patient seen 1/14/19 for wellness visit, AAA screening negative 1/23/19  Says he was told by his pharmacist he needs the 2nd shingrix, 1st dose given 8/18/18     · CAD/HTN/bradycardia: Saw Dr Michael Forte (Cardiology) 1/16/19 for CAD, AS, HTN, & asymptomatic bradycardia, who recommended he continue taking metoprolol 25 mg Q12H  He also needs refills on losartan 50 mg, lipitor 80 mg, which are well-tolerated by patient  Up to date on lipid panel (9/2018)  No known episodes or symptoms of hypotension  Denies chest pain/tightness on exertion, SOB, MOORE, headache, vision changes, dizziness, lightheadedness, vertigo, syncope, pedal edema  · T2DM: Due for HbA1C 4/19/19, needs refills on amaryl and metformin, no issues with meds  Up to date on microalbumin/Cr ratio (7/2018)  Asks for a Rx for diabetic shoes today  · Insomnia: Needs refill on remeron 15 mg, no issues, sleeping well on medication  · Poor dentition: Patient is currently taking clindamycin for SBE ppx due for dental work done today (partial crown), no issues, he is just making me aware  · Carpal tunnel syndrome: Scheduled for surgery by Ortho July 2019    Review of Systems   Constitutional: Negative for chills, fatigue and fever  Eyes: Negative for visual disturbance  Respiratory: Negative for chest tightness and shortness of breath  Cardiovascular: Negative for chest pain, palpitations and leg swelling  Gastrointestinal: Negative for abdominal pain, diarrhea, nausea and vomiting  Genitourinary: Negative for dysuria  Musculoskeletal: Negative for gait problem  Foot discomfort   Skin: Negative for rash  Neurological: Negative for syncope, weakness and light-headedness  Psychiatric/Behavioral: Negative for agitation, sleep disturbance and suicidal ideas  All other systems reviewed and are negative        Historical Information   The patient history was reviewed as follows:    Past Medical History:   Diagnosis Date    AS (aortic stenosis)     Balanitis     Biceps tendonitis on right     CAD (coronary artery disease)     Cancer (HCC)     skin    Complete rotator cuff tear or rupture of right shoulder, not specified as traumatic     Diabetes mellitus ([facility] [address] )     Esophageal reflux     High cholesterol     Hypertension     Hypertriglyceridemia     Hypogonadism in male     Myalgia     Myocardial infarction (HCC)     Palpitations     Shoulder pain     Sinus problem     Skin cancer of nose     Sleep apnea     Stroke [facility]) [address]    Systolic murmur     recently found    Tinea cruris     Tinea pedis      Past Surgical History:   Procedure Laterality Date    CATARACT EXTRACTION Left     COLONOSCOPY      CORONARY ANGIOPLASTY WITH STENT PLACEMENT      CORONARY ARTERY BYPASS GRAFT N/A 12/12/2016    Procedure: INTRAOP CECILLE; BILATERAL LEG EVH; CORONARY ARTERY BYPASS GRAFT X 4 SVG TO PDA, OM1,  AND DIAGONAL1, LIMA TO LAD;  Surgeon: Jay Prasad MD;  [facility];  Service:     EYE SURGERY      HERNIA REPAIR      AZ ARTHROSCOPY SHOULDER SURGICAL BICEPS TENODESIS Right 5/6/2016    Procedure: ARTHROSCOPIC BICEPS TENODESIS;  Surgeon: Bruno Reyes MD;  [facility];  Service: Orthopedics    AZ SHLDR ARTHROSCOP,SURG,W/ROTAT CUFF REPR Right 5/6/2016    Procedure: REPAIR ROTATOR CUFF  ARTHROSCOPIC; SUBACROMIAL DECOMPRESSION; PARTIAL SYNOVECTOMY;  Surgeon: Bruno Reyes MD;  Location[facility];  Service: Orthopedics    UMBILICAL HERNIA REPAIR  2009    over age 13     Family History   Problem Relation Age of Onset    Heart disease Mother     Hypertension Mother     Nephrolithiasis Father     Other Father         Renal disease    Hypertension Sister     Nephrolithiasis Brother     Other Brother         Renal disease    Hematuria Family         Microscopic      Social History   Social History     Substance and Sexual Activity   Alcohol Use No     Social History     Substance and Sexual Activity   Drug Use No     Social History     Tobacco Use   Smoking Status Never Smoker   Smokeless Tobacco Never Used       Medications:     Current Outpatient Medications:     aspirin (ECOTRIN LOW STRENGTH) 81 mg EC tablet, Take 1 tablet (81 mg total) by mouth daily for 90 days, Disp: 90 tablet, Rfl: 3    atorvastatin (LIPITOR) 80 mg tablet, Take 1 tablet (80 mg total) by mouth daily for 270 days, Disp: 90 tablet, Rfl: 2    clorazepate (TRANXENE) 7 5 mg tablet, , Disp: , Rfl: 0    clotrimazole-betamethasone (LOTRISONE) 1-0 05 % cream, Apply topically 2 (two) times a day, Disp: 30 g, Rfl: 1    fluticasone (FLONASE) 50 mcg/act nasal spray, 1 spray into each nostril daily, Disp: 1 Bottle, Rfl: 4    glimepiride (AMARYL) 4 mg tablet, TAKE 1/2 TABLET BY MOUTH IN THE MORNING AND 1 TABLET AT NIGHT, Disp: 180 tablet, Rfl: 2    hydrocortisone 2 5 % cream, Apply topically 3 (three) times a day, Disp: 30 g, Rfl: 1    losartan (COZAAR) 50 mg tablet, Take 1 tablet (50 mg total) by mouth daily for 90 days, Disp: 90 tablet, Rfl: 2    metFORMIN (GLUCOPHAGE) 500 mg tablet, Take 1 tablet (500 mg total) by mouth 2 (two) times a day with meals for 270 days, Disp: 180 tablet, Rfl: 2    metoprolol tartrate (LOPRESSOR) 25 mg tablet, Take 1 tablet (25 mg total) by mouth every 12 (twelve) hours for 270 days, Disp: 180 tablet, Rfl: 2    mirtazapine (REMERON) 15 mg tablet, Take 1 tablet (15 mg total) by mouth daily at bedtime for 270 days, Disp: 90 tablet, Rfl: 2    omeprazole (PriLOSEC) 20 mg delayed release capsule, Take 1 capsule (20 mg total) by mouth daily for 28 days, Disp: 28 capsule, Rfl: 1    tobramycin-dexamethasone (TOBRADEX) ophthalmic suspension, Administer 1 drop to both eyes 2 (two) times a day, Disp: 5 mL, Rfl: 3    tobramycin-dexamethasone (TOBRADEX) ophthalmic suspension, Administer 1 drop to both eyes 2 (two) times a day, Disp: 5 mL, Rfl: 1    Na Sulfate-K Sulfate-Mg Sulf (SUPREP BOWEL PREP KIT) 17 5-3 13-1 6 GM/177ML SOLN, Take 1 Bottle by mouth once for 1 dose, Disp: 1 Bottle, Rfl: 0  Allergies   Allergen Reactions    Epinephrine Hives and Anxiety    Penicillins Hives and Anxiety       OBJECTIVE    Vitals:   Vitals:    03/15/19 1359   BP: 144/72   BP Location: Left arm   Patient Position: Sitting   Cuff Size: Large   Pulse: (!) 48   Resp: 18   Temp: 98 1 °F (36 7 °C)   TempSrc: Tympanic   Weight: 75 9 kg (167 lb 6 4 oz)   Height: 5' 5" (1 651 m)     Physical Exam   Constitutional: He is oriented to person, place, and time  He appears well-developed and well-nourished  No distress  HENT:   Head: Normocephalic and atraumatic  Eyes: Conjunctivae and EOM are normal  Right eye exhibits no discharge  Left eye exhibits no discharge  Neck: Normal range of motion  Neck supple  Cardiovascular: Normal rate, regular rhythm and intact distal pulses  Murmur heard  2/6 MARTHA   Pulmonary/Chest: Effort normal and breath sounds normal  No respiratory distress  Abdominal: Soft  Bowel sounds are normal  There is no tenderness  Musculoskeletal: Normal range of motion  He exhibits no edema or deformity  Neurological: He is alert and oriented to person, place, and time  Skin: Skin is warm and dry  Capillary refill takes less than 2 seconds  No rash noted  Psychiatric: He has a normal mood and affect  His behavior is normal    Vitals reviewed           Tucker Scott MD, PGY-2  [facility]   3/15/2019

## 2019-03-15 NOTE — ASSESSMENT & PLAN NOTE
-Refilled metoprolol 25 mg Q12H, patient follows with Cardiology as noted above  -Patient continues to be exhibiting asymptomatic bradycardia (HR 48 today)  -Will reach out to Dr Mary Mustafa regarding whether metoprolol dose may be decreased further

## 2019-03-30 DIAGNOSIS — E11.9 TYPE 2 DIABETES MELLITUS WITHOUT COMPLICATION, WITHOUT LONG-TERM CURRENT USE OF INSULIN (HCC): ICD-10-CM

## 2019-03-31 RX ORDER — UBIQUINOL 100 MG
CAPSULE ORAL
Qty: 100 EACH | Refills: 3 | Status: SHIPPED | OUTPATIENT
Start: 2019-03-31 | End: 2019-09-03 | Stop reason: SDUPTHER

## 2019-04-13 DIAGNOSIS — E11.9 TYPE 2 DIABETES MELLITUS WITHOUT COMPLICATION, WITHOUT LONG-TERM CURRENT USE OF INSULIN (HCC): ICD-10-CM

## 2019-04-25 DIAGNOSIS — Z86.19 H/O TINEA CRURIS: Chronic | ICD-10-CM

## 2019-04-29 DIAGNOSIS — Z86.19 H/O TINEA CRURIS: Chronic | ICD-10-CM

## 2019-05-02 DIAGNOSIS — Z86.19 H/O TINEA CRURIS: Chronic | ICD-10-CM

## 2019-05-06 DIAGNOSIS — K21.9 GASTROESOPHAGEAL REFLUX DISEASE, ESOPHAGITIS PRESENCE NOT SPECIFIED: ICD-10-CM

## 2019-05-07 RX ORDER — CLOTRIMAZOLE AND BETAMETHASONE DIPROPIONATE 10; .64 MG/G; MG/G
CREAM TOPICAL 2 TIMES DAILY
Qty: 30 G | Refills: 1 | Status: SHIPPED | OUTPATIENT
Start: 2019-05-07 | End: 2019-05-31 | Stop reason: SDUPTHER

## 2019-05-07 RX ORDER — OMEPRAZOLE 20 MG/1
20 CAPSULE, DELAYED RELEASE ORAL DAILY
Qty: 28 CAPSULE | Refills: 1 | Status: SHIPPED | OUTPATIENT
Start: 2019-05-07 | End: 2019-06-24 | Stop reason: SDUPTHER

## 2019-05-15 ENCOUNTER — OFFICE VISIT (OUTPATIENT)
Dept: FAMILY MEDICINE CLINIC | Facility: CLINIC | Age: 74
End: 2019-05-15

## 2019-05-15 VITALS
WEIGHT: 163 LBS | HEART RATE: 66 BPM | RESPIRATION RATE: 18 BRPM | SYSTOLIC BLOOD PRESSURE: 122 MMHG | HEIGHT: 65 IN | TEMPERATURE: 97.6 F | DIASTOLIC BLOOD PRESSURE: 70 MMHG | BODY MASS INDEX: 27.16 KG/M2

## 2019-05-15 DIAGNOSIS — I10 ESSENTIAL HYPERTENSION: Primary | ICD-10-CM

## 2019-05-15 PROCEDURE — 99213 OFFICE O/P EST LOW 20 MIN: CPT | Performed by: FAMILY MEDICINE

## 2019-05-25 DIAGNOSIS — Z86.19 H/O TINEA CRURIS: Chronic | ICD-10-CM

## 2019-05-28 ENCOUNTER — APPOINTMENT (OUTPATIENT)
Dept: LAB | Facility: HOSPITAL | Age: 74
End: 2019-05-28
Payer: MEDICARE

## 2019-05-28 ENCOUNTER — OFFICE VISIT (OUTPATIENT)
Dept: LAB | Facility: HOSPITAL | Age: 74
End: 2019-05-28
Payer: MEDICARE

## 2019-05-28 DIAGNOSIS — G56.03 BILATERAL CARPAL TUNNEL SYNDROME: ICD-10-CM

## 2019-05-28 LAB
ANION GAP SERPL CALCULATED.3IONS-SCNC: 5 MMOL/L (ref 4–13)
ATRIAL RATE: 47 BPM
BASOPHILS # BLD AUTO: 0.05 THOUSANDS/ΜL (ref 0–0.1)
BASOPHILS NFR BLD AUTO: 1 % (ref 0–1)
BUN SERPL-MCNC: 24 MG/DL (ref 5–25)
CALCIUM SERPL-MCNC: 9 MG/DL (ref 8.3–10.1)
CHLORIDE SERPL-SCNC: 104 MMOL/L (ref 100–108)
CO2 SERPL-SCNC: 27 MMOL/L (ref 21–32)
CREAT SERPL-MCNC: 0.91 MG/DL (ref 0.6–1.3)
EOSINOPHIL # BLD AUTO: 0.2 THOUSAND/ΜL (ref 0–0.61)
EOSINOPHIL NFR BLD AUTO: 3 % (ref 0–6)
ERYTHROCYTE [DISTWIDTH] IN BLOOD BY AUTOMATED COUNT: 12.2 % (ref 11.6–15.1)
EST. AVERAGE GLUCOSE BLD GHB EST-MCNC: 180 MG/DL
GFR SERPL CREATININE-BSD FRML MDRD: 84 ML/MIN/1.73SQ M
GLUCOSE P FAST SERPL-MCNC: 154 MG/DL (ref 65–99)
HBA1C MFR BLD: 7.9 % (ref 4.2–6.3)
HCT VFR BLD AUTO: 41.8 % (ref 36.5–49.3)
HGB BLD-MCNC: 13.3 G/DL (ref 12–17)
IMM GRANULOCYTES # BLD AUTO: 0.03 THOUSAND/UL (ref 0–0.2)
IMM GRANULOCYTES NFR BLD AUTO: 0 % (ref 0–2)
LYMPHOCYTES # BLD AUTO: 2.55 THOUSANDS/ΜL (ref 0.6–4.47)
LYMPHOCYTES NFR BLD AUTO: 35 % (ref 14–44)
MCH RBC QN AUTO: 29.3 PG (ref 26.8–34.3)
MCHC RBC AUTO-ENTMCNC: 31.8 G/DL (ref 31.4–37.4)
MCV RBC AUTO: 92 FL (ref 82–98)
MONOCYTES # BLD AUTO: 0.63 THOUSAND/ΜL (ref 0.17–1.22)
MONOCYTES NFR BLD AUTO: 9 % (ref 4–12)
NEUTROPHILS # BLD AUTO: 3.85 THOUSANDS/ΜL (ref 1.85–7.62)
NEUTS SEG NFR BLD AUTO: 52 % (ref 43–75)
NRBC BLD AUTO-RTO: 0 /100 WBCS
P AXIS: 36 DEGREES
PLATELET # BLD AUTO: 170 THOUSANDS/UL (ref 149–390)
PMV BLD AUTO: 11.5 FL (ref 8.9–12.7)
POTASSIUM SERPL-SCNC: 4.2 MMOL/L (ref 3.5–5.3)
PR INTERVAL: 162 MS
QRS AXIS: 30 DEGREES
QRSD INTERVAL: 96 MS
QT INTERVAL: 492 MS
QTC INTERVAL: 435 MS
RBC # BLD AUTO: 4.54 MILLION/UL (ref 3.88–5.62)
SODIUM SERPL-SCNC: 136 MMOL/L (ref 136–145)
T WAVE AXIS: 77 DEGREES
VENTRICULAR RATE: 47 BPM
WBC # BLD AUTO: 7.31 THOUSAND/UL (ref 4.31–10.16)

## 2019-05-28 PROCEDURE — 36415 COLL VENOUS BLD VENIPUNCTURE: CPT

## 2019-05-28 PROCEDURE — 83036 HEMOGLOBIN GLYCOSYLATED A1C: CPT

## 2019-05-28 PROCEDURE — 93010 ELECTROCARDIOGRAM REPORT: CPT | Performed by: INTERNAL MEDICINE

## 2019-05-28 PROCEDURE — 80048 BASIC METABOLIC PNL TOTAL CA: CPT

## 2019-05-28 PROCEDURE — 85025 COMPLETE CBC W/AUTO DIFF WBC: CPT

## 2019-05-28 PROCEDURE — 93005 ELECTROCARDIOGRAM TRACING: CPT

## 2019-05-30 ENCOUNTER — ANESTHESIA (OUTPATIENT)
Dept: GASTROENTEROLOGY | Facility: HOSPITAL | Age: 74
End: 2019-05-30

## 2019-05-30 ENCOUNTER — ANESTHESIA EVENT (OUTPATIENT)
Dept: GASTROENTEROLOGY | Facility: HOSPITAL | Age: 74
End: 2019-05-30

## 2019-05-30 ENCOUNTER — HOSPITAL ENCOUNTER (OUTPATIENT)
Dept: GASTROENTEROLOGY | Facility: HOSPITAL | Age: 74
Setting detail: OUTPATIENT SURGERY
Discharge: HOME | End: 2019-05-30
Attending: INTERNAL MEDICINE | Admitting: INTERNAL MEDICINE
Payer: MEDICARE

## 2019-05-30 VITALS
RESPIRATION RATE: 18 BRPM | OXYGEN SATURATION: 95 % | TEMPERATURE: 98.5 F | BODY MASS INDEX: 25.43 KG/M2 | DIASTOLIC BLOOD PRESSURE: 65 MMHG | WEIGHT: 162 LBS | HEIGHT: 67 IN | HEART RATE: 50 BPM | SYSTOLIC BLOOD PRESSURE: 152 MMHG

## 2019-05-30 DIAGNOSIS — K63.5 POLYP OF COLON: ICD-10-CM

## 2019-05-30 LAB — GLUCOSE SERPL-MCNC: 121 MG/DL (ref 65–140)

## 2019-05-30 PROCEDURE — 82948 REAGENT STRIP/BLOOD GLUCOSE: CPT

## 2019-05-30 PROCEDURE — G0121 COLON CA SCRN NOT HI RSK IND: HCPCS | Performed by: INTERNAL MEDICINE

## 2019-05-30 RX ORDER — PROPOFOL 10 MG/ML
INJECTION, EMULSION INTRAVENOUS CONTINUOUS PRN
Status: DISCONTINUED | OUTPATIENT
Start: 2019-05-30 | End: 2019-05-30 | Stop reason: SURG

## 2019-05-30 RX ORDER — PROPOFOL 10 MG/ML
INJECTION, EMULSION INTRAVENOUS AS NEEDED
Status: DISCONTINUED | OUTPATIENT
Start: 2019-05-30 | End: 2019-05-30 | Stop reason: SURG

## 2019-05-30 RX ORDER — LIDOCAINE HYDROCHLORIDE 10 MG/ML
INJECTION, SOLUTION INFILTRATION; PERINEURAL AS NEEDED
Status: DISCONTINUED | OUTPATIENT
Start: 2019-05-30 | End: 2019-05-30 | Stop reason: SURG

## 2019-05-30 RX ORDER — SODIUM CHLORIDE 9 MG/ML
INJECTION, SOLUTION INTRAVENOUS CONTINUOUS PRN
Status: DISCONTINUED | OUTPATIENT
Start: 2019-05-30 | End: 2019-05-30 | Stop reason: SURG

## 2019-05-30 RX ADMIN — PROPOFOL 100 MCG/KG/MIN: 10 INJECTION, EMULSION INTRAVENOUS at 13:10

## 2019-05-30 RX ADMIN — PROPOFOL 100 MG: 10 INJECTION, EMULSION INTRAVENOUS at 13:10

## 2019-05-30 RX ADMIN — SODIUM CHLORIDE: 0.9 INJECTION, SOLUTION INTRAVENOUS at 12:50

## 2019-05-30 RX ADMIN — LIDOCAINE HYDROCHLORIDE 50 MG: 10 INJECTION, SOLUTION INFILTRATION; PERINEURAL at 13:10

## 2019-05-31 DIAGNOSIS — Z86.19 H/O TINEA CRURIS: Chronic | ICD-10-CM

## 2019-06-01 RX ORDER — CLOTRIMAZOLE AND BETAMETHASONE DIPROPIONATE 10; .64 MG/G; MG/G
CREAM TOPICAL 2 TIMES DAILY
Qty: 30 G | Refills: 1 | Status: SHIPPED | OUTPATIENT
Start: 2019-06-01 | End: 2019-06-11 | Stop reason: SDUPTHER

## 2019-06-06 DIAGNOSIS — Z86.19 H/O TINEA CRURIS: Chronic | ICD-10-CM

## 2019-06-11 DIAGNOSIS — Z86.19 H/O TINEA CRURIS: Chronic | ICD-10-CM

## 2019-06-12 RX ORDER — CLOTRIMAZOLE AND BETAMETHASONE DIPROPIONATE 10; .64 MG/G; MG/G
CREAM TOPICAL 2 TIMES DAILY
Qty: 30 G | Refills: 1 | Status: SHIPPED | OUTPATIENT
Start: 2019-06-12 | End: 2019-06-20

## 2019-06-17 DIAGNOSIS — E11.9 TYPE 2 DIABETES MELLITUS WITHOUT COMPLICATION, WITHOUT LONG-TERM CURRENT USE OF INSULIN (HCC): Primary | ICD-10-CM

## 2019-06-17 DIAGNOSIS — E11.9 TYPE 2 DIABETES MELLITUS WITHOUT COMPLICATION, WITHOUT LONG-TERM CURRENT USE OF INSULIN (HCC): ICD-10-CM

## 2019-06-21 ENCOUNTER — TELEPHONE (OUTPATIENT)
Dept: OBGYN CLINIC | Facility: HOSPITAL | Age: 74
End: 2019-06-21

## 2019-06-22 DIAGNOSIS — J30.9 ALLERGIC RHINITIS, UNSPECIFIED SEASONALITY, UNSPECIFIED TRIGGER: ICD-10-CM

## 2019-06-22 RX ORDER — FLUTICASONE PROPIONATE 50 MCG
1 SPRAY, SUSPENSION (ML) NASAL DAILY
Qty: 1 BOTTLE | Refills: 4 | Status: SHIPPED | OUTPATIENT
Start: 2019-06-22 | End: 2019-12-18 | Stop reason: SDUPTHER

## 2019-06-24 DIAGNOSIS — K21.9 GASTROESOPHAGEAL REFLUX DISEASE, ESOPHAGITIS PRESENCE NOT SPECIFIED: ICD-10-CM

## 2019-06-26 RX ORDER — OMEPRAZOLE 20 MG/1
20 CAPSULE, DELAYED RELEASE ORAL DAILY
Qty: 28 CAPSULE | Refills: 1 | Status: SHIPPED | OUTPATIENT
Start: 2019-06-26 | End: 2019-07-20 | Stop reason: SDUPTHER

## 2019-06-28 ENCOUNTER — HOSPITAL ENCOUNTER (OUTPATIENT)
Dept: NON INVASIVE DIAGNOSTICS | Facility: HOSPITAL | Age: 74
Discharge: HOME | End: 2019-06-28
Payer: MEDICARE

## 2019-06-28 ENCOUNTER — TRANSCRIBE ORDERS (OUTPATIENT)
Dept: LAB | Facility: HOSPITAL | Age: 74
End: 2019-06-28

## 2019-06-28 ENCOUNTER — APPOINTMENT (OUTPATIENT)
Dept: LAB | Facility: HOSPITAL | Age: 74
End: 2019-06-28
Payer: MEDICARE

## 2019-06-28 DIAGNOSIS — Z79.899 ENCOUNTER FOR LONG-TERM (CURRENT) USE OF OTHER MEDICATIONS: ICD-10-CM

## 2019-06-28 DIAGNOSIS — F41.1 GENERALIZED ANXIETY DISORDER: ICD-10-CM

## 2019-06-28 DIAGNOSIS — F33.1 MAJOR DEPRESSIVE DISORDER, RECURRENT EPISODE, MODERATE (HCC): ICD-10-CM

## 2019-06-28 DIAGNOSIS — I10 HYPERTENSION: ICD-10-CM

## 2019-06-28 DIAGNOSIS — R42 DIZZINESS: ICD-10-CM

## 2019-06-28 DIAGNOSIS — Z95.5 S/P DRUG ELUTING CORONARY STENT PLACEMENT: Chronic | ICD-10-CM

## 2019-06-28 DIAGNOSIS — Z79.899 ENCOUNTER FOR LONG-TERM (CURRENT) USE OF OTHER MEDICATIONS: Primary | ICD-10-CM

## 2019-06-28 DIAGNOSIS — I35.0 MILD AORTIC STENOSIS: ICD-10-CM

## 2019-06-28 LAB
25(OH)D3 SERPL-MCNC: 31.7 NG/ML (ref 30–100)
ALBUMIN SERPL BCP-MCNC: 3.6 G/DL (ref 3.5–5)
ALP SERPL-CCNC: 65 U/L (ref 46–116)
ALT SERPL W P-5'-P-CCNC: 36 U/L (ref 12–78)
ANION GAP SERPL CALCULATED.3IONS-SCNC: 6 MMOL/L (ref 4–13)
AST SERPL W P-5'-P-CCNC: 24 U/L (ref 5–45)
BASOPHILS # BLD AUTO: 0.03 THOUSANDS/ΜL (ref 0–0.1)
BASOPHILS NFR BLD AUTO: 1 % (ref 0–1)
BILIRUB SERPL-MCNC: 0.61 MG/DL (ref 0.2–1)
BUN SERPL-MCNC: 17 MG/DL (ref 5–25)
CALCIUM SERPL-MCNC: 8.7 MG/DL (ref 8.3–10.1)
CHLORIDE SERPL-SCNC: 105 MMOL/L (ref 100–108)
CHOLEST SERPL-MCNC: 158 MG/DL (ref 50–200)
CO2 SERPL-SCNC: 27 MMOL/L (ref 21–32)
CREAT SERPL-MCNC: 0.85 MG/DL (ref 0.6–1.3)
EOSINOPHIL # BLD AUTO: 0.22 THOUSAND/ΜL (ref 0–0.61)
EOSINOPHIL NFR BLD AUTO: 3 % (ref 0–6)
ERYTHROCYTE [DISTWIDTH] IN BLOOD BY AUTOMATED COUNT: 12.2 % (ref 11.6–15.1)
FERRITIN SERPL-MCNC: 254 NG/ML (ref 8–388)
FOLATE SERPL-MCNC: >20 NG/ML (ref 3.1–17.5)
GFR SERPL CREATININE-BSD FRML MDRD: 87 ML/MIN/1.73SQ M
GLUCOSE P FAST SERPL-MCNC: 131 MG/DL (ref 65–99)
HCT VFR BLD AUTO: 40.2 % (ref 36.5–49.3)
HDLC SERPL-MCNC: 50 MG/DL (ref 40–60)
HGB BLD-MCNC: 12.4 G/DL (ref 12–17)
IMM GRANULOCYTES # BLD AUTO: 0.02 THOUSAND/UL (ref 0–0.2)
IMM GRANULOCYTES NFR BLD AUTO: 0 % (ref 0–2)
IRON SERPL-MCNC: 88 UG/DL (ref 65–175)
LDLC SERPL CALC-MCNC: 93 MG/DL (ref 0–100)
LYMPHOCYTES # BLD AUTO: 2.78 THOUSANDS/ΜL (ref 0.6–4.47)
LYMPHOCYTES NFR BLD AUTO: 43 % (ref 14–44)
MCH RBC QN AUTO: 28.4 PG (ref 26.8–34.3)
MCHC RBC AUTO-ENTMCNC: 30.8 G/DL (ref 31.4–37.4)
MCV RBC AUTO: 92 FL (ref 82–98)
MONOCYTES # BLD AUTO: 0.54 THOUSAND/ΜL (ref 0.17–1.22)
MONOCYTES NFR BLD AUTO: 8 % (ref 4–12)
NEUTROPHILS # BLD AUTO: 2.87 THOUSANDS/ΜL (ref 1.85–7.62)
NEUTS SEG NFR BLD AUTO: 45 % (ref 43–75)
NONHDLC SERPL-MCNC: 108 MG/DL
NRBC BLD AUTO-RTO: 0 /100 WBCS
PLATELET # BLD AUTO: 164 THOUSANDS/UL (ref 149–390)
PMV BLD AUTO: 11.2 FL (ref 8.9–12.7)
POTASSIUM SERPL-SCNC: 3.9 MMOL/L (ref 3.5–5.3)
PROT SERPL-MCNC: 6.9 G/DL (ref 6.4–8.2)
RBC # BLD AUTO: 4.36 MILLION/UL (ref 3.88–5.62)
RETICS # AUTO: NORMAL 10*3/UL (ref 14356–105094)
RETICS # CALC: 1.39 % (ref 0.37–1.87)
SODIUM SERPL-SCNC: 138 MMOL/L (ref 136–145)
TIBC SERPL-MCNC: 262 UG/DL (ref 250–450)
TRANSFERRIN SERPL-MCNC: 225 MG/DL (ref 200–400)
TRIGL SERPL-MCNC: 76 MG/DL
TSH SERPL DL<=0.05 MIU/L-ACNC: 2.34 UIU/ML (ref 0.36–3.74)
VIT B12 SERPL-MCNC: 657 PG/ML (ref 100–900)
WBC # BLD AUTO: 6.46 THOUSAND/UL (ref 4.31–10.16)

## 2019-06-28 PROCEDURE — 85045 AUTOMATED RETICULOCYTE COUNT: CPT

## 2019-06-28 PROCEDURE — 36415 COLL VENOUS BLD VENIPUNCTURE: CPT

## 2019-06-28 PROCEDURE — 83550 IRON BINDING TEST: CPT

## 2019-06-28 PROCEDURE — 84466 ASSAY OF TRANSFERRIN: CPT

## 2019-06-28 PROCEDURE — 84443 ASSAY THYROID STIM HORMONE: CPT

## 2019-06-28 PROCEDURE — 83540 ASSAY OF IRON: CPT

## 2019-06-28 PROCEDURE — 85025 COMPLETE CBC W/AUTO DIFF WBC: CPT

## 2019-06-28 PROCEDURE — 82607 VITAMIN B-12: CPT

## 2019-06-28 PROCEDURE — 80307 DRUG TEST PRSMV CHEM ANLYZR: CPT

## 2019-06-28 PROCEDURE — 82306 VITAMIN D 25 HYDROXY: CPT

## 2019-06-28 PROCEDURE — 82728 ASSAY OF FERRITIN: CPT

## 2019-06-28 PROCEDURE — 80061 LIPID PANEL: CPT

## 2019-06-28 PROCEDURE — 80053 COMPREHEN METABOLIC PANEL: CPT

## 2019-06-28 PROCEDURE — 93306 TTE W/DOPPLER COMPLETE: CPT

## 2019-06-28 PROCEDURE — 82746 ASSAY OF FOLIC ACID SERUM: CPT

## 2019-06-28 PROCEDURE — 93306 TTE W/DOPPLER COMPLETE: CPT | Performed by: INTERNAL MEDICINE

## 2019-06-29 DIAGNOSIS — Z86.19 H/O TINEA CRURIS: Chronic | ICD-10-CM

## 2019-07-03 ENCOUNTER — OFFICE VISIT (OUTPATIENT)
Dept: CARDIOLOGY CLINIC | Facility: CLINIC | Age: 74
End: 2019-07-03
Payer: MEDICARE

## 2019-07-03 VITALS
BODY MASS INDEX: 25.74 KG/M2 | DIASTOLIC BLOOD PRESSURE: 70 MMHG | OXYGEN SATURATION: 95 % | HEIGHT: 67 IN | HEART RATE: 57 BPM | SYSTOLIC BLOOD PRESSURE: 160 MMHG | WEIGHT: 164 LBS

## 2019-07-03 DIAGNOSIS — Z95.1 S/P CABG (CORONARY ARTERY BYPASS GRAFT): ICD-10-CM

## 2019-07-03 DIAGNOSIS — E11.9 TYPE 2 DIABETES MELLITUS WITHOUT COMPLICATION, WITHOUT LONG-TERM CURRENT USE OF INSULIN (HCC): ICD-10-CM

## 2019-07-03 DIAGNOSIS — I10 ESSENTIAL HYPERTENSION: ICD-10-CM

## 2019-07-03 DIAGNOSIS — I25.10 CORONARY ARTERY DISEASE INVOLVING NATIVE CORONARY ARTERY OF NATIVE HEART WITHOUT ANGINA PECTORIS: Primary | ICD-10-CM

## 2019-07-03 DIAGNOSIS — I35.0 MILD AORTIC STENOSIS: ICD-10-CM

## 2019-07-03 PROCEDURE — 99214 OFFICE O/P EST MOD 30 MIN: CPT | Performed by: INTERNAL MEDICINE

## 2019-07-03 RX ORDER — DULOXETIN HYDROCHLORIDE 30 MG/1
30 CAPSULE, DELAYED RELEASE ORAL EVERY MORNING
Refills: 0 | COMMUNITY
Start: 2019-06-22 | End: 2020-11-18

## 2019-07-03 RX ORDER — LISINOPRIL 40 MG/1
40 TABLET ORAL DAILY
Qty: 90 TABLET | Refills: 3 | Status: SHIPPED | OUTPATIENT
Start: 2019-07-03 | End: 2019-08-15

## 2019-07-03 RX ORDER — CLINDAMYCIN HYDROCHLORIDE 300 MG/1
CAPSULE ORAL
Refills: 0 | COMMUNITY
Start: 2019-07-02 | End: 2019-09-13 | Stop reason: ALTCHOICE

## 2019-07-03 NOTE — PROGRESS NOTES
Cardiology Follow Up    Natalie Olvera  1945  [id number]  [address] [facility]  [address] [place] [address] [address]    1  Coronary artery disease involving native coronary artery of native heart without angina pectoris  Lipid Panel with Direct LDL reflex   2  Mild aortic stenosis     3  Essential hypertension  lisinopril (ZESTRIL) 40 mg tablet   4  Type 2 diabetes mellitus without complication, without long-term current use of insulin ([address] )     5  S/P CABG (coronary artery bypass graft)         Discussion/Summary    1  CAD:  Stable  His LDL was increased compared to Sept  He tells me he misses taking atorvastatin doses sometimes  Recheck prior to next visit  If remains elevated, change to 40 mg of Crestor  2  Hypertension:  Blood pressure elevated  He is not want to be on losartan and more  I would like him to be on an Ace inhibitor given his diabetes  Tells me that he was on enalapril previously and did not have side effects or cough  Start lisinopril 40 mg  Monitor blood pressure  Adjustments as needed  3    Bradycardia:  Heart rate remains in the 50s  He is asymptomatic  Continue low-dose metoprolol given his CAD and low normal LV function  Will not titrate up given bradycardia  Previous History:  History of coronary artery disease with MI in 2006 with a drug-eluting stent to the LAD  Subsequently with an MI in 2012 with stent to the RCA  December 2016, and other NSTEMI at that time multivessel disease and underwent 4 vessel bypass with LIMA to the LAD, vein grafts to the diagonal, OM1, PDA  Low-normal LV function with inferior wall motion abnormality  Mild aortic stenosis    Interval History:  Since last visit, some issues with blood pressure  He was admitted to the hospital and his losartan was increased  He has concerns about losartan because of reported recalls    He tells me that he is taking this regularly, but blood pressure was high in the office today  Denies any chest pain or shortness of breath  Echocardiogram recently done showed no significant changes when compared to prior  Aortic stenosis is still mild  Low normal LV function  Problem List     Biceps tendonitis on right    Complete rotator cuff tear or rupture of right shoulder, not specified as traumatic (Chronic)    NSTEMI (non-ST elevated myocardial infarction) ([facility] )    S/P drug eluting coronary stent placement (Chronic)    H/O non-ST elevation myocardial infarction (NSTEMI) (Chronic)    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) (Chronic)    Lab Results   Component Value Date    HGBA1C 7 9 (H) 05/28/2019       No results for input(s): POCGLU in the last 72 hours  Blood Sugar Average: Last 72 hrs:          Polyarthralgia (Chronic)    Hematuria, microscopic (Chronic)    H/O tinea cruris (Chronic)    Erectile dysfunction associated with type 2 diabetes mellitus (HCC) (Chronic)    Lab Results   Component Value Date    HGBA1C 7 9 (H) 05/28/2019       No results for input(s): POCGLU in the last 72 hours  Blood Sugar Average: Last 72 hrs: Anxiety    Type 2 diabetes mellitus (HCC)    Lab Results   Component Value Date    HGBA1C 7 9 (H) 05/28/2019       No results for input(s): POCGLU in the last 72 hours      Blood Sugar Average: Last 72 hrs:          Gastroesophageal reflux disease    Bilateral carpal tunnel syndrome    Non-rheumatic aortic stenosis    Coronary artery disease involving native coronary artery of native heart without angina pectoris        Past Medical History:   Diagnosis Date    AS (aortic stenosis)     Balanitis     Biceps tendonitis on right     CAD (coronary artery disease)     Cancer (HCC)     skin    Complete rotator cuff tear or rupture of right shoulder, not specified as traumatic     Diabetes mellitus ([facility] )     Esophageal reflux  High cholesterol     Hypertension     Hypertriglyceridemia     Hypogonadism in male     Myalgia     Myocardial infarction (HCC)     Palpitations     Shoulder pain     Sinus problem     Skin cancer of nose     Sleep apnea     Stroke ([facility] ) 5197    Systolic murmur     recently found    Tinea cruris     Tinea pedis      Social History     Tobacco Use    Smoking status: Former Smoker    Smokeless tobacco: Never Used    Tobacco comment: smoked for 3 years from 17 y/o - 23 yrs old   Substance Use Topics    Alcohol use: No     Family History   Problem Relation Age of Onset    Heart disease Mother     Hypertension Mother     Nephrolithiasis Father     Other Father         Renal disease    Hypertension Sister     Nephrolithiasis Brother     Other Brother         Renal disease    Hematuria Family         Microscopic     Past Surgical History:   Procedure Laterality Date    CATARACT EXTRACTION Left     COLONOSCOPY      CORONARY ANGIOPLASTY WITH STENT PLACEMENT      CORONARY ARTERY BYPASS GRAFT N/A 12/12/2016    Procedure: INTRAOP CECILLE; BILATERAL LEG EVH; CORONARY ARTERY BYPASS GRAFT X 4 SVG TO PDA, OM1,  AND DIAGONAL1, LIMA TO LAD;  Surgeon: Balwinder Gonzalez MD;  [facility];  Service:     EYE SURGERY      HERNIA REPAIR      KS ARTHROSCOPY SHOULDER SURGICAL BICEPS TENODESIS Right 5/6/2016    Procedure: ARTHROSCOPIC BICEPS TENODESIS;  Surgeon: Pastor Yost MD;  [facility];  Service: Orthopedics    KS SHLDR ARTHROSCOP,SURG,W/ROTAT CUFF REPR Right 5/6/2016    Procedure: REPAIR ROTATOR CUFF  ARTHROSCOPIC; SUBACROMIAL DECOMPRESSION; PARTIAL SYNOVECTOMY;  Surgeon: Pastor Yost MD;  [facility];  Service: Orthopedics    [address] [place] [address]  2009    over age 13       Current Outpatient Medications:     Alcohol Swabs (ALCOHOL PREP) 70 % PADS, USE TWICE DAILY, Disp: 100 each, Rfl: 3    aspirin (ECOTRIN LOW STRENGTH) 81 mg EC tablet, Take 1 tablet (81 mg total) by mouth daily for 90 days, Disp: 90 tablet, Rfl: 3    atorvastatin (LIPITOR) 80 mg tablet, Take 1 tablet (80 mg total) by mouth daily for 270 days, Disp: 90 tablet, Rfl: 2    clorazepate (TRANXENE) 7 5 mg tablet, Take 7 5 mg by mouth 2 (two) times a day , Disp: , Rfl: 0    clotrimazole-betamethasone (LOTRISONE) 1-0 05 % cream, Apply topically 2 (two) times a day, Disp: 30 g, Rfl: 1    fluticasone (FLONASE) 50 mcg/act nasal spray, 1 spray into each nostril daily, Disp: 1 Bottle, Rfl: 4    glimepiride (AMARYL) 4 mg tablet, TAKE 1/2 TABLET BY MOUTH IN THE MORNING AND 1 TABLET AT NIGHT, Disp: 180 tablet, Rfl: 2    glucose blood (GLUCOSE METER TEST) test strip, 1 each by Other route 2 (two) times a day Use as instructed, Disp: 100 each, Rfl: 5    hydrocortisone 2 5 % cream, Apply topically 3 (three) times a day, Disp: 30 g, Rfl: 1    metFORMIN (GLUCOPHAGE) 500 mg tablet, Take 1 tablet (500 mg total) by mouth 2 (two) times a day with meals for 270 days, Disp: 180 tablet, Rfl: 2    metoprolol tartrate (LOPRESSOR) 25 mg tablet, Take 1 tablet (25 mg total) by mouth every 12 (twelve) hours for 270 days, Disp: 180 tablet, Rfl: 2    omeprazole (PriLOSEC) 20 mg delayed release capsule, Take 1 capsule (20 mg total) by mouth daily for 28 days, Disp: 28 capsule, Rfl: 1    ONETOUCH DELICA LANCETS FINE MISC, Test blood sugar twice daily, or as needed when symptomatic of hypoglycemia or hyperglycemia, Disp: 100 each, Rfl: 5    ONETOUCH DELICA LANCETS FINE MISC, Test blood sugar twice daily, or as needed when symptomatic of hypoglycemia or hyperglycemia, Disp: 100 each, Rfl: 5    tobramycin-dexamethasone (TOBRADEX) ophthalmic suspension, Administer 1 drop to both eyes 2 (two) times a day, Disp: 5 mL, Rfl: 1    clindamycin (CLEOCIN) 300 MG capsule, TAKE 2 CAPSULES BY MOUTH 1 HOURS PRIOR TO THE PROCEDURE, Disp: , Rfl: 0    DULoxetine (CYMBALTA) 30 mg delayed release capsule, Take 30 mg by mouth every morning, Disp: , Rfl: 0    lisinopril (ZESTRIL) 40 mg tablet, Take 1 tablet (40 mg total) by mouth daily, Disp: 90 tablet, Rfl: 3  Allergies   Allergen Reactions    Epinephrine Hives and Anxiety    Penicillins Hives and Anxiety       Vitals:    07/03/19 1109   BP: 160/70   BP Location: Right arm   Patient Position: Sitting   Cuff Size: Standard   Pulse: 57   SpO2: 95%   Weight: 74 4 kg (164 lb)   Height: 5' 7" (1 702 m)     Vitals:    07/03/19 1109   Weight: 74 4 kg (164 lb)      Height: 5' 7" (170 2 cm)   Body mass index is 25 69 kg/m²  Physical Exam:  GEN: Arnaud Pena appears well, alert and oriented x 3, pleasant and cooperative   HEENT: pupils equal, round, and reactive to light; extraocular muscles intact  NECK: supple, no carotid bruits   HEART: regular rhythm, normal S1 and S2, 2/6 MARTHA   LUNGS: clear to auscultation bilaterally; no wheezes, rales, or rhonchi   ABDOMEN: normal bowel sounds, soft, no tenderness, no distention  EXTREMITIES: peripheral pulses normal; no clubbing, cyanosis, or edema  NEURO: no focal findings   SKIN: normal without suspicious lesions on exposed skin    ROS:  Positive for erectile dysfunction, numbness and weakness in hands,   Except as noted in HPI, is otherwise reviewed in detail and a 12 point review of systems is negative    ROS reviewed and is unchanged    Labs:  Lab Results   Component Value Date     08/01/2017    K 3 9 06/28/2019     06/28/2019    CREATININE 0 85 06/28/2019    BUN 17 06/28/2019    CO2 27 06/28/2019    ALT 36 06/28/2019    AST 24 06/28/2019    INR 1 40 (H) 12/12/2016    GLUF 131 (H) 06/28/2019    HGBA1C 7 9 (H) 05/28/2019    WBC 6 46 06/28/2019    HGB 12 4 06/28/2019    HCT 40 2 06/28/2019     06/28/2019       Lab Results   Component Value Date    CHOL 117 (L) 03/17/2017    CHOL 270 (H) 03/09/2016    CHOL 176 03/10/2014     Lab Results   Component Value Date    LDLCALC 93 06/28/2019    LDLCALC 45 09/11/2018    LDLCALC 95 12/06/2016     Lab Results   Component Value Date    HDL 50 06/28/2019    HDL 50 09/11/2018    HDL 58 06/30/2018     Lab Results   Component Value Date    TRIG 76 06/28/2019    TRIG 48 09/11/2018    TRIG 99 06/30/2018       Testing:  Echo 6/28/19:  LEFT VENTRICLE:  Systolic function was at the lower limits of normal  Ejection fraction was estimated to be 50 %  There was hypokinesis of the apical wall(s)  Wall thickness was mildly increased  Features were consistent with a pseudonormal left ventricular filling pattern, with concomitant abnormal relaxation and increased filling pressure (grade 2 diastolic dysfunction)      LEFT ATRIUM:  The atrium was mildly dilated      MITRAL VALVE:  There was mild annular calcification  There was mild regurgitation      AORTIC VALVE:  Transaortic velocity was increased due to valvular stenosis  There was mild stenosis  The calculated aortic valve area was 1 8 cm^2  There was mild regurgitation      TRICUSPID VALVE:  There was mild regurgitation  Pulmonary artery systolic pressure was within the normal range  Echo 12/2016:  LEFT VENTRICLE:  Systolic function was at the lower limits of normal  Ejection fraction was  estimated to be 53 %  There was severe hypokinesis of the basal inferior and  basal inferolateral wall(s)  Wall thickness was mildly to moderately increased  There was mild concentric hypertrophy      RIGHT VENTRICLE:  The size was at the upper limits of normal      LEFT ATRIUM:  The atrium was mildly dilated      MITRAL VALVE:  There was mild regurgitation      AORTIC VALVE:  There was mild stenosis  There was mild regurgitation  Systolic area of 1 7 cm squared by planimetry  Estimated aortic valve area (by VTI) was 1 97 cm squared  Aortic valve obstructive index (by Vmax) was 0 5  Estimated aortic valve area (by Vmax) was 1 73 cm squared      TRICUSPID VALVE:  There was trace regurgitation

## 2019-07-04 LAB
AMPHETAMINES UR QL SCN: NEGATIVE NG/ML
BARBITURATES UR QL SCN: NEGATIVE NG/ML
BENZODIAZ UR QL: POSITIVE
BZE UR QL: NEGATIVE NG/ML
CANNABINOIDS UR QL SCN: NEGATIVE NG/ML
METHADONE UR QL SCN: NEGATIVE NG/ML
OPIATES UR QL: NEGATIVE NG/ML
PCP UR QL: NEGATIVE NG/ML
PROPOXYPH UR QL SCN: NEGATIVE NG/ML

## 2019-07-09 DIAGNOSIS — Z86.19 H/O TINEA CRURIS: Chronic | ICD-10-CM

## 2019-07-09 RX ORDER — CLOTRIMAZOLE AND BETAMETHASONE DIPROPIONATE 10; .64 MG/G; MG/G
CREAM TOPICAL 2 TIMES DAILY
Qty: 30 G | Refills: 1 | Status: SHIPPED | OUTPATIENT
Start: 2019-07-09 | End: 2019-08-06 | Stop reason: SDUPTHER

## 2019-07-13 DIAGNOSIS — N18.2 HYPERTENSION ASSOCIATED WITH STAGE 2 CHRONIC KIDNEY DISEASE DUE TO TYPE 2 DIABETES MELLITUS (HCC): Chronic | ICD-10-CM

## 2019-07-13 DIAGNOSIS — Z86.19 H/O TINEA CRURIS: Chronic | ICD-10-CM

## 2019-07-13 DIAGNOSIS — E11.22 HYPERTENSION ASSOCIATED WITH STAGE 2 CHRONIC KIDNEY DISEASE DUE TO TYPE 2 DIABETES MELLITUS (HCC): Chronic | ICD-10-CM

## 2019-07-13 DIAGNOSIS — I12.9 HYPERTENSION ASSOCIATED WITH STAGE 2 CHRONIC KIDNEY DISEASE DUE TO TYPE 2 DIABETES MELLITUS (HCC): Chronic | ICD-10-CM

## 2019-07-14 RX ORDER — ASPIRIN 81 MG/1
81 TABLET ORAL DAILY
Qty: 90 TABLET | Refills: 3 | Status: SHIPPED | OUTPATIENT
Start: 2019-07-14 | End: 2019-12-17 | Stop reason: SDUPTHER

## 2019-07-15 NOTE — PATIENT INSTRUCTIONS
Plan de alimentación con "enfoque dietético para detener la hipertensión (DASH, por quinton siglas en inglés)   CUIDADO AMBULATORIO:   El plan de alimentación DASH (enfoques dietéticos para detener la hipertensión)  está diseñado para ayudar a prevenir o disminuir la presión arterial chandler  También puede ayudar a bajar el colesterol hermila (colesterol LDL) y disminuír li riesgo de enfermedad cardíaca  El plan es bajo en sodio, azúcar, grasas dañinas, y grasas en li totalidad  Es alto en potasio, calcio, magnesio y [place]  Estos nutrientes se agregan al consumir más frutas, vegetales y granos enteros  Li límite de sodio cada día: Li dietista le indicará la cantidad de sodio que usted debe consumir a diario  La gente que tiene la presión arterial chandler debe consumir de 1,500 a 2,300 mg de sodio al día seth marie  Barron cucharadita (cdta) de sal tiene 2,300 mg de sodio  Gilgo puede parecer seth barron meta difícil, aarti pequeños cambios en los alimentos que usted consume pueden hacer barron gran diferencia  Li médico o dietista puede ayudarlo a crear un plan alimenticio que cumpla li límite de sodio  Formas de limitar el sodio:   · Ronda las etiquetas de los alimentos  Las etiquetas pueden ayudarle a escoger alimentos bajos en sodio  La cantidad de sodio está incluida en miligramos (mg)  La columna del porcentaje de valor diario indica la cantidad de necesidades diarias satisfechas con 1 porción del alimento para cada nutriente en la lista  Escoja alimentos que tengan menos de 5% del porcentaje diario de [place]  Estos alimentos se consideran bajos en sodio  Los alimentos que tienen 20% o más del porcentaje diario de sodio se consideran alimentos altos en sodio  Evite alimentos que tengan más de 300 mg de sodio por porción  Escoja alimentos Roman Mad diga que son bajos en sodio, con sodio reducido, o sin sal agregada             · Evite la sal   No le agregue sal a la comida cuando se siente a la blankenship a comer, y agregue muy poca sal a los alimentos monica la cocción  Use hierbas y condimentos, seth cebollas, ajo y especias sin sal para agregar sabor a los alimentos  Use jugo de lima, randall o vinagre para darle a los alimentos un sabor ácido  Use chiles picantes o barron cantidad pequeña de salsa picante para agregar un sabor picante a los alimentos  · Pregunte sobre los sustitutos para la sal   Pregúntele a li médico si es posible usar sustitutos de la sal  Algunos sustitutos de la sal vienen con ingredientes que pueden ser dañinos si usted tiene ciertos padecimientos médicos  · Escoja los alimentos cuidadosamente cuando sale a comer a restaurantes:  las comidas de los restaurantes, sobre todo restaurantes de comida rápida, kam siempre son altas en sodio  Algunos restaurantes ofrecen información nutricional que indica la cantidad de sodio en quinton alimentos  Pida que preparen quinton comidas con menos sal o sin sal   Lo que necesita saber acerca de las grasas:   · Incluya grasas saludables  Las grasas insaturadas y los ácidos grasos omega-3 son ejemplos de grasas saludables  Las grasas insaturadas se encuentran en los aceites de soja, [place], [place] o [place] y la margarina [facility] y [facility]  Los ácidos grasos [place] 3 se encuentran en el pescado con grasa, seth el salmón, el atún, la caballa y las robetro  También se le puede encontrar en el aceita de linaza y en la linaza molida  · Evite las grasas insalubres  No consuma grasas dañinas, seth grasas saturadas y grasas trans  Las grasas saturadas se encuentran en alimentos que contienen grasa animal  Julianne Weir son Ottoniel Guera grasosas, la [place], la [place], la crema y otros productos lácteos  Además se pueden encontrar en la [place], la margarina en surendra, el aceite de adams y el aceite de yogesh  Las grasas trans se encuentran en comidas fritas, galletas estilo soda, tortillitas tostadas, y alimentos horneados hechos con Kiara Harmon    Alimentos que puede incluir:  Con el plan de alimentación DASH, usted necesita consumir un número específico de porciones de cada karen de alimentos  [place] le ayudará a consumir las cantidades suficientes de ciertos nutrientes y limitar otros  La cantidad de porciones que usted debe comer depende de la cantidad de calorías que usted necesita  Dyson dietista le informará sobre cuántas calorías necesita usted  El número de porciones que viene en la lista al lado de los grupos alimenticios a continuación es para las personas que necesitan aproximadamente 2,000 calorías al día    · Granos:  6 a 8 porciones (3 de estas porciones deben ser de alimentos de granos enteros o integrales)    ¨ 1 tajada de pan integral     ¨ 1 onza de cereal seco    ¨ ½ taza de cereal cocinado, pasta, o arroz integral    · Verduras y frutas:  4 a 5 porciones de frutas y 4 a 5 porciones de vegetales    ¨ 1 fruta mediana    ¨ ½ taza de vegetales congelados, enlatados (sin sal adicional), o vegetales frescos y picados     ¨ ½ taza de fruta fresca, congelada, seca o enlatada (enlatada en sirope liviano o jugo de fruta)    ¨ ½ taza de jugo de verduras o frutas    · Productos lácteos:  2 a 3 porciones    ¨ 1 taza de [employer] o leche 1%    ¨ 1½ onzas de queso descremado o bajo en grasas y bajo en sodio    ¨ 6 onzas de yogurt descremado o bajo en grasas    · Naun gonzalez, naun yaw y pescado magros:  6 onzas o menos    ¨ Naun yaw (kadeem, pavo) sin piel    ¨ Pescado (sobre todo pescado con grasa, seth salmón, atún fresco o DURAN)    ¨ Carne de res o de cerdo magra (lomo, carne molida extra [place])    ¨ Claras de huevo y sustitutos del huevo    · Maverick du sac, semillas y legumbres:  4 a 5 porciones por semana    ¨ ½ taza de frijoles y arbejas cocinadas    ¨ 1½ onzas de nueces sin sal    ¨ 2 cucharadas de mantequilla de maní o semillas    · Dulces y azúcares adicionales:  5 o menos por semana    ¨ 1 cucharada de azúcar, jalea o mermelada    ¨ ½ taza de sorbeto o gelatina    ¨ 1 taza de limonada    · Grasas:  2 a 3 porciones por semana    ¨ 1 cucharadita de margarina suave o aceite vegetal    ¨ 1 cucharada de [place]    ¨ 2 cucharadas de aderezo para ensaladas  Alimentos que se deben evitar:   · Granos:      ¨ Postres horneados o fritos seth donas, pastelitos, galletas, y quequitos (altos en grasas y azúcar)    ¨ Mezclas para hacer pan de maíz y pasteles, alimentos empacados, seth rellenos para pavo, mezclas para hacer arroz y pasta, macarrones con [place], y cereales instantáneos (altos en sodio)    · Frutas y verduras:      ¨ Vegetales regulares enlatados (altos en sodio)    ¨ Repollo preparado en vinagre, vegetales en vinagre, y otros alimentos preparados en escabeche (altos en sodio)    ¨ Vegetales fritos o vegetales en mantequilla o salsas altas en grasas    ¨ Frutas en crema o salsa de mantequilla (altas en grasas)    · Productos lácteos:      ¨ Leche entera, leche semi descremada (2%), y crema (chandler en grasas)    ¨ Queso regular y queso procesado (alto en grasa y sodio)    · Naun y alimentos con proteínas:      ¨ Naun ahumadas o curadas, seth carne de ceci en conserva, tocino, jamón, perros calientes, y salchicha (chandler en grasas y sodio)    ¨ Frijoles enlatados y naun enlatadas o naun en pasta, seth naun en conserva, roberto, anchoas y [place] de imitación (altos en sodio)    ¨ Naun para emparedados, seth [place], [place], [place], y carne de res en rebanada (altos en sodio)    ¨ Naun altas en grasas (biste estilo [place], carne molida para hamburguesas, costillas)    ¨ Huevos enteros y yemas de huevo (altos en grasas)    · Otros:      ¨ Condimentos hechos con sal, seth sal de ajo, sal de apio, sal de cebolla, sal condimentada, suavizantes para naun, y glutamato de monosodio (MSG, por quinton siglas en inglés)    ¨ Sopa Miso y mezclas para sopa enlatadas o secas (altas en sodio)    ¨ Salsa de soya regular, salsa de [place], salsa [place], salsa para bistec, [place], y [address] [place] [address] Ave  vinagres con sabor (altas en sodio)    ¨ Condimentos regulares, seth [place], salsa de tomate y aderezos para ensalada (altos en sodio)    ¨ Salsa para tamica y salsas en general, seth salsa Gonzalez o de [place]retchen-barre (altas en sodio y [place])    ¨ Bebidas altas en azúcar, seth bebidas gaseosas o jugos de frutas    ¨ Alimentos para [address], seth rito tostadas, palomitas de [place], pretzels, piel de cerdo, [address] de soda [facility], y nueces saladas    ¨ Alimentos congelados, seth cenas preparadas, platos principales, vegetales con salsas, y tamica cubiertas en pan (altas en sodio)  Otras pautas que debe seguir:   · Mantenga un peso saludable  Li riesgo de enfermedad cardíaca es aún más alto si usted tiene sobrepeso  Li médico podría sugerirle que adelgace si tiene sobrepeso  Usted puede perder peso si se propone consumir menos calorías y alimentos que tengan azúcar y grasas agregadas  El plan de alimentación DASH puede ayudarle a lograrlo  Lilia buena forma de disminuir el consumo de calorías es consumiendo porciones más pequeñas en cada comida y menos meriendas entre comidas  Consulte a li médico para obtener más información sobre cómo adelgazar  · Realice actividad física con regularidad  El ejercicio regular puede ayudarle a alcanzar o mantener un peso saludable  El ejercicio regular también puede ayudarle a disminuir li presión arterial y mejorar quinton niveles de colesterol  Ulises ejercicios moderados por 30 minutos o más todos los días de la [place]  Para bajar peso, asegúrese de ejercitarse por lo menos 60 minutos  Consulte con li médico sobre un programa de ejercicio adecuado para usted  · Limite el consumo de alcohol  Las mujeres deberían limitar el consumo de alcohol a 1 bebida por día  Los hombres deberían limitar el consumo de alcohol a 2 tragos al día  Un trago equivale a 12 onzas de cerveza, 5 onzas de vino o 1 onza y ½ de licor    © 2017 [address] Information is for End User's use only and may not be sold, redistributed or otherwise used for commercial purposes  All illustrations and images included in CareNotes® are the copyrighted property of A D A M , Inc  or Dustin Lamar  Esta información es sólo para uso en educación  Li intención no es darle un consejo médico sobre enfermedades o tratamientos  Colsulte con li Cherylyn Plough farmacéutico antes de seguir cualquier régimen médico para saber si es seguro y efectivo para usted

## 2019-07-16 ENCOUNTER — OFFICE VISIT (OUTPATIENT)
Dept: FAMILY MEDICINE CLINIC | Facility: CLINIC | Age: 74
End: 2019-07-16

## 2019-07-16 VITALS
WEIGHT: 161.4 LBS | BODY MASS INDEX: 25.33 KG/M2 | HEIGHT: 67 IN | RESPIRATION RATE: 16 BRPM | HEART RATE: 50 BPM | SYSTOLIC BLOOD PRESSURE: 150 MMHG | DIASTOLIC BLOOD PRESSURE: 70 MMHG | TEMPERATURE: 98.6 F

## 2019-07-16 DIAGNOSIS — R80.9 MICROALBUMINURIA: ICD-10-CM

## 2019-07-16 DIAGNOSIS — I10 ESSENTIAL HYPERTENSION: ICD-10-CM

## 2019-07-16 DIAGNOSIS — E11.9 TYPE 2 DIABETES MELLITUS WITHOUT COMPLICATION, WITHOUT LONG-TERM CURRENT USE OF INSULIN (HCC): Primary | ICD-10-CM

## 2019-07-16 PROCEDURE — 82043 UR ALBUMIN QUANTITATIVE: CPT | Performed by: FAMILY MEDICINE

## 2019-07-16 PROCEDURE — 99213 OFFICE O/P EST LOW 20 MIN: CPT | Performed by: FAMILY MEDICINE

## 2019-07-16 PROCEDURE — 82570 ASSAY OF URINE CREATININE: CPT | Performed by: FAMILY MEDICINE

## 2019-07-16 NOTE — ASSESSMENT & PLAN NOTE
Lab Results   Component Value Date    HGBA1C 7 9 (H) 05/28/2019   -No refill needed on metformin 500 mg BID & amaryl 2 mg QAM/2 mg QHS today  -Tolerating current medication regimen well, no hypoglycemic episodes  -Encouraged patient to always take medication daily, not only after eating a large meal  -Foot exam performed today  -Urine microalbumin sent out today  -Lisinopril 40 mg QD started by Cardiology  -Follow up with Ophthalmology (no referral needed)

## 2019-07-16 NOTE — ASSESSMENT & PLAN NOTE
-Follows with Cardiology (Dr Malini Karimi), last appointment 7/3/19  -/70 today, above goal; reported consistent SBP>150 at home per patient with frequent readings SBP>160   -Lisinopril 40 mg started by Dr Malini Karimi on 7/3/19  -Continue metoprolol 25 mg BID  -Benefits of B-blocker outweigh risks of bradycardia per Cardiology, as patient is asymptomatic  Will reach out to Cardiology regarding whether adding hydralazine 10 mg TID may be an option  -DASH diet handout given via AVS, counseled on limiting salt/caffeine in diet

## 2019-07-16 NOTE — PROGRESS NOTES
Assessment/Plan:     Problem List Items Addressed This Visit        Endocrine    Type 2 diabetes mellitus ([facility] [address] ) - Primary     Lab Results   Component Value Date    HGBA1C 7 9 (H) 05/28/2019   -No refill needed on metformin 500 mg BID & amaryl 2 mg QAM/2 mg QHS today  -Tolerating current medication regimen well, no hypoglycemic episodes  -Encouraged patient to always take medication daily, not only after eating a large meal  -Foot exam performed today  -Urine microalbumin sent out today  -Lisinopril 40 mg QD started by Cardiology  -Follow up with Ophthalmology (no referral needed)         Relevant Orders    Microalbumin / creatinine urine ratio (Completed)       Cardiovascular and Mediastinum    Essential hypertension     -Follows with Cardiology (Dr Mike Encarnacion), last appointment 7/3/19  -/70 today, above goal; reported consistent SBP>150 at home per patient with frequent readings SBP>160   -Lisinopril 40 mg started by Dr Mike Encarnacion on 7/3/19  -Continue metoprolol 25 mg BID  -Benefits of B-blocker outweigh risks of bradycardia per Cardiology, as patient is asymptomatic  -Will reach out to Cardiology regarding whether adding low-dose hydralazine TID may be an option  -DASH diet handout given via AVS, counseled on limiting salt/caffeine in diet           Other Visit Diagnoses     Microalbuminuria        Relevant Orders    Microalbumin / creatinine urine ratio (Completed)            Follow up in 2 months for repeat HbA1C  Subjective:      Patient ID: Dulcie Goldmann is a 69 y o  male  Mr Sue Muro is here to follow up on his hypertension and diabetes  He feels his diabetes is well controlled and when he checks his sugars they are usually around 100, but sometiems increase to the 140s  He has been taking the glimepiride as prescribed, but he does not take his metformin daily  He says he only takes it with meals when he feels that his sugar will go high   We discussed that it is important to take this twice a day for it to work correctly  He denies hypoglycemia, fainting or dizziness, pain or tingling in his legs, or loss of sensation  He does get diarrhea with the metformin  He is compliant with diet and has reduced carbohydrates and oil to help with his blood sugar and hypertension  His blood pressure has remained high  He feels sweaty when it gets to the 160s  He says it never gets above the 160s when he checks at home  He was recently switched to lisinopril from losartan by his cardiologist and he says his blood pressures are still bad on the lisinopril  He has not noted any side effects such as cough from the lisinopril  He denies any chest pain, shortness of breath, orthopnea, headaches, palpitations or edema  He has surgery for carpal tunnel at the end of this month on the right hand  The following portions of the patient's history were reviewed and updated as appropriate: allergies, current medications, past family history, past medical history, past social history, past surgical history and problem list     Review of Systems   Constitutional: Positive for diaphoresis  Negative for activity change, appetite change, chills, fatigue and fever  HENT: Negative for congestion, hearing loss, rhinorrhea and sore throat  Eyes: Negative for pain, redness, itching and visual disturbance  Respiratory: Negative for apnea, cough, chest tightness, shortness of breath, wheezing and stridor  Cardiovascular: Negative for chest pain, palpitations and leg swelling  Gastrointestinal: Positive for diarrhea (With metformin)  Negative for abdominal distention, abdominal pain, anal bleeding, blood in stool, constipation and nausea  Musculoskeletal: Positive for back pain (Pain that starts in his left back and goes down his left leg when he stands for too long  )  Skin: Negative for rash  Neurological: Negative for dizziness, syncope, weakness, light-headedness, numbness and headaches           Objective:      /70 (BP Location: Left arm, Patient Position: Sitting, Cuff Size: Standard)   Pulse (!) 50   Temp 98 6 °F (37 °C) (Tympanic)   Resp 16   Ht 5' 7" (1 702 m)   Wt 73 2 kg (161 lb 6 4 oz)   BMI 25 28 kg/m²          Physical Exam   Constitutional: He is oriented to person, place, and time  He appears well-developed and well-nourished  HENT:   Head: Normocephalic and atraumatic  Eyes: Conjunctivae are normal    Neck: Normal range of motion  Cardiovascular: Normal rate, regular rhythm and intact distal pulses  Exam reveals no gallop and no friction rub  Pulses are no weak pulses  Murmur heard  Pulses:       Dorsalis pedis pulses are 2+ on the right side, and 2+ on the left side  Posterior tibial pulses are 2+ on the right side, and 2+ on the left side  Pulmonary/Chest: Effort normal and breath sounds normal  No stridor  No respiratory distress  He has no wheezes  He has no rales  He exhibits no tenderness  Abdominal: Soft  Bowel sounds are normal  He exhibits no distension and no mass  There is no tenderness  There is no rebound and no guarding  No hernia  Musculoskeletal: He exhibits no edema  Feet:    Feet:   Right Foot:   Skin Integrity: Negative for ulcer, skin breakdown, erythema, warmth, callus or dry skin  Left Foot:   Skin Integrity: Negative for ulcer, skin breakdown, erythema, warmth, callus or dry skin  Neurological: He is alert and oriented to person, place, and time  Skin: Skin is warm  Capillary refill takes less than 2 seconds  Psychiatric: He has a normal mood and affect  His behavior is normal          Patient's shoes and socks removed  Right Foot/Ankle   Right Foot Inspection  Skin Exam: skin normal and skin intact no dry skin, no warmth, no callus, no erythema, no maceration, no abnormal color, no pre-ulcer, no ulcer and no callus                          Toe Exam: ROM and strength within normal limits  Sensory       Monofilament testing: intact  Vascular  Capillary refills: < 3 seconds  The right DP pulse is 2+  The right PT pulse is 2+  Left Foot/Ankle  Left Foot Inspection  Skin Exam: skin normal and skin intactno dry skin, no warmth, no erythema, no maceration, normal color, no pre-ulcer, no ulcer and no callus                         Toe Exam: ROM and strength within normal limits                   Sensory       Monofilament: intact  Vascular  Capillary refills: < 3 seconds  The left DP pulse is 2+  The left PT pulse is 2+  Assign Risk Category:  No deformity present; No loss of protective sensation;  No weak pulses       Risk: 0

## 2019-07-17 LAB
CREAT UR-MCNC: 132 MG/DL
MICROALBUMIN UR-MCNC: 10.7 MG/L (ref 0–20)
MICROALBUMIN/CREAT 24H UR: 8 MG/G CREATININE (ref 0–30)

## 2019-07-18 ENCOUNTER — HOSPITAL ENCOUNTER (EMERGENCY)
Facility: HOSPITAL | Age: 74
Discharge: HOME | End: 2019-07-18
Attending: EMERGENCY MEDICINE
Payer: MEDICARE

## 2019-07-18 VITALS
HEART RATE: 49 BPM | BODY MASS INDEX: 24.9 KG/M2 | OXYGEN SATURATION: 95 % | WEIGHT: 158.95 LBS | SYSTOLIC BLOOD PRESSURE: 118 MMHG | RESPIRATION RATE: 18 BRPM | DIASTOLIC BLOOD PRESSURE: 58 MMHG | TEMPERATURE: 97.8 F

## 2019-07-18 DIAGNOSIS — I10 HYPERTENSION: Primary | ICD-10-CM

## 2019-07-18 LAB
ANION GAP SERPL CALCULATED.3IONS-SCNC: 5 MMOL/L (ref 4–13)
BUN SERPL-MCNC: 15 MG/DL (ref 5–25)
CALCIUM SERPL-MCNC: 8.7 MG/DL (ref 8.3–10.1)
CHLORIDE SERPL-SCNC: 106 MMOL/L (ref 100–108)
CO2 SERPL-SCNC: 25 MMOL/L (ref 21–32)
CREAT SERPL-MCNC: 0.91 MG/DL (ref 0.6–1.3)
GFR SERPL CREATININE-BSD FRML MDRD: 84 ML/MIN/1.73SQ M
GLUCOSE SERPL-MCNC: 145 MG/DL (ref 65–140)
POTASSIUM SERPL-SCNC: 4 MMOL/L (ref 3.5–5.3)
SODIUM SERPL-SCNC: 136 MMOL/L (ref 136–145)

## 2019-07-18 PROCEDURE — 36415 COLL VENOUS BLD VENIPUNCTURE: CPT | Performed by: EMERGENCY MEDICINE

## 2019-07-18 PROCEDURE — 99283 EMERGENCY DEPT VISIT LOW MDM: CPT

## 2019-07-18 PROCEDURE — 80048 BASIC METABOLIC PNL TOTAL CA: CPT | Performed by: EMERGENCY MEDICINE

## 2019-07-18 PROCEDURE — 99281 EMR DPT VST MAYX REQ PHY/QHP: CPT | Performed by: EMERGENCY MEDICINE

## 2019-07-18 NOTE — ED PROVIDER NOTES
History  Chief Complaint   Patient presents with    High Blood Pressure     Pt  reports he checked his BP last night and it was 012Y systolic, this morning BP systolic was 494  Patient reports he took his BP medications this morning  Denies SOB/CP  HPI     70-year-old gentleman with past medical history of CAD status post stent placement hypertension hyperlipidemia presenting with asymptomatic hypertension  Patient has been titrated on his antihypertensives by Cardiology  Patient is concerned because the blood pressure was 170/90 today  He started lisinopril 2 weeks ago  Patient denies other symptoms  Patient denies chest pain palpitations shortness of breath cough pleurisy urinary symptoms  Patient denies other symptoms  Patient is here because of elevated blood pressure  Patient denies fever chills rigors neck pain neck stiffness chest pain palpitations shortness of breath cough pleurisy hemoptysis abdominal pain nausea vomiting diarrhea constipation urinary symptoms motor weakness numbness and tingling  Prior to Admission Medications   Prescriptions Last Dose Informant Patient Reported? Taking?    Alcohol Swabs (ALCOHOL PREP) 70 % PADS  Self No No   Sig: USE TWICE DAILY   DULoxetine (CYMBALTA) 30 mg delayed release capsule   Yes No   Sig: Take 30 mg by mouth every morning   ONETOUCH DELICA LANCETS FINE MISC  Self No No   Sig: Test blood sugar twice daily, or as needed when symptomatic of hypoglycemia or hyperglycemia   ONETOUCH DELICA LANCETS FINE MISC   No No   Sig: Test blood sugar twice daily, or as needed when symptomatic of hypoglycemia or hyperglycemia   aspirin (ECOTRIN LOW STRENGTH) 81 mg EC tablet   No No   Sig: TAKE 1 TABLET (81 MG TOTAL) BY MOUTH DAILY FOR 90 DAYS   atorvastatin (LIPITOR) 80 mg tablet  Self No No   Sig: Take 1 tablet (80 mg total) by mouth daily for 270 days   clindamycin (CLEOCIN) 300 MG capsule   Yes No   Sig: TAKE 2 CAPSULES BY MOUTH 1 HOURS PRIOR TO THE PROCEDURE clorazepate (TRANXENE) 7 5 mg tablet  Self Yes No   Sig: Take 7 5 mg by mouth 2 (two) times a day    clotrimazole-betamethasone (LOTRISONE) 1-0 05 % cream  Self No No   Sig: Apply topically 2 (two) times a day   clotrimazole-betamethasone (LOTRISONE) 1-0 05 % cream   No No   Sig: Apply topically 2 (two) times a day   fluticasone (FLONASE) 50 mcg/act nasal spray   No No   Si spray into each nostril daily   glimepiride (AMARYL) 4 mg tablet  Self No No   Sig: TAKE 1/2 TABLET BY MOUTH IN THE MORNING AND 1 TABLET AT NIGHT   glucose blood (GLUCOSE METER TEST) test strip   No No   Si each by Other route 2 (two) times a day Use as instructed   hydrocortisone 2 5 % cream   No No   Sig: APPLY TOPICALLY 3 (THREE) TIMES A DAY   lisinopril (ZESTRIL) 40 mg tablet   No No   Sig: Take 1 tablet (40 mg total) by mouth daily   metFORMIN (GLUCOPHAGE) 500 mg tablet  Self No No   Sig: Take 1 tablet (500 mg total) by mouth 2 (two) times a day with meals for 270 days   metoprolol tartrate (LOPRESSOR) 25 mg tablet  Self No No   Sig: Take 1 tablet (25 mg total) by mouth every 12 (twelve) hours for 270 days   omeprazole (PriLOSEC) 20 mg delayed release capsule   No No   Sig: Take 1 capsule (20 mg total) by mouth daily for 28 days   tobramycin-dexamethasone (TOBRADEX) ophthalmic suspension  Self No No   Sig: Administer 1 drop to both eyes 2 (two) times a day      Facility-Administered Medications: None       Past Medical History:   Diagnosis Date    AS (aortic stenosis)     Balanitis     Biceps tendonitis on right     CAD (coronary artery disease)     Cancer (HCC)     skin    Complete rotator cuff tear or rupture of right shoulder, not specified as traumatic     Diabetes mellitus (HCC)     Esophageal reflux     High cholesterol     Hypertension     Hypertriglyceridemia     Hypogonadism in male     Myalgia     Myocardial infarction (HCC)     Palpitations     Shoulder pain     Sinus problem     Skin cancer of nose  Sleep apnea     Stroke [facility]) [address]    Systolic murmur     recently found    Tinea cruris     Tinea pedis        Past Surgical History:   Procedure Laterality Date    CATARACT EXTRACTION Left     COLONOSCOPY      CORONARY ANGIOPLASTY WITH STENT PLACEMENT      CORONARY ARTERY BYPASS GRAFT N/A 12/12/2016    Procedure: INTRAOP CECILLE; BILATERAL LEG EVH; CORONARY ARTERY BYPASS GRAFT X 4 SVG TO PDA, OM1,  AND DIAGONAL1, LIMA TO LAD;  Surgeon: Chloe Callejas MD;  [facility]: [facility];  Service:     EYE SURGERY      HERNIA REPAIR      UT ARTHROSCOPY SHOULDER SURGICAL BICEPS TENODESIS Right 5/6/2016    Procedure: ARTHROSCOPIC BICEPS TENODESIS;  Surgeon: Thanh Vera MD;  [facility];  Service: Orthopedics    UT SHLDR ARTHROSCOP,SURG,W/ROTAT CUFF REPR Right 5/6/2016    Procedure: REPAIR ROTATOR CUFF  ARTHROSCOPIC; SUBACROMIAL DECOMPRESSION; PARTIAL SYNOVECTOMY;  Surgeon: Thanh Vera MD;  [facility];  Service: Orthopedics    TESTICLE SURGERY      UMBILICAL HERNIA REPAIR  2009    over age 13       Family History   Problem Relation Age of Onset    Heart disease Mother     Hypertension Mother     Nephrolithiasis Father     Other Father         Renal disease    Hypertension Sister     Nephrolithiasis Brother     Other Brother         Renal disease    Hematuria Family         Microscopic     I have reviewed and agree with the history as documented  Social History     Tobacco Use    Smoking status: Former Smoker    Smokeless tobacco: Never Used    Tobacco comment: smoked for 3 years from 17 y/o - 23 yrs old   Substance Use Topics    Alcohol use: No    Drug use: No        Review of Systems   Constitutional: Negative for activity change, appetite change, chills, diaphoresis, fatigue, fever and unexpected weight change     HENT: Negative for congestion, ear discharge, ear pain, facial swelling, hearing loss, nosebleeds, postnasal drip, rhinorrhea, sinus pressure, sneezing, sore throat, tinnitus and trouble swallowing  Eyes: Negative for photophobia, pain, redness, itching and visual disturbance  Respiratory: Negative for cough, chest tightness, shortness of breath, wheezing and stridor  Cardiovascular: Negative for chest pain, palpitations and leg swelling  Gastrointestinal: Negative for abdominal distention, abdominal pain, blood in stool, constipation, diarrhea, nausea and vomiting  Endocrine: Negative for polydipsia and polyuria  Genitourinary: Negative for decreased urine volume, difficulty urinating, dysuria, enuresis, flank pain, frequency, hematuria and urgency  Musculoskeletal: Negative for arthralgias, back pain, gait problem, myalgias, neck pain and neck stiffness  Skin: Negative for rash and wound  Allergic/Immunologic: Negative for immunocompromised state  Neurological: Negative for dizziness, seizures, syncope, facial asymmetry, speech difficulty, weakness, light-headedness, numbness and headaches  Hematological: Negative for adenopathy  Psychiatric/Behavioral: Negative for agitation, behavioral problems, confusion, dysphoric mood, hallucinations, self-injury and suicidal ideas  The patient is not nervous/anxious and is not hyperactive  Physical Exam  Physical Exam   Constitutional: He is oriented to person, place, and time  He appears well-developed and well-nourished  No distress  HENT:   Head: Normocephalic and atraumatic  Right Ear: External ear normal    Left Ear: External ear normal    Nose: Nose normal    Mouth/Throat: Oropharynx is clear and moist  No oropharyngeal exudate  Eyes: Pupils are equal, round, and reactive to light  Conjunctivae and EOM are normal  Right eye exhibits no discharge  Left eye exhibits no discharge  No scleral icterus  Neck: Normal range of motion  Neck supple  No JVD present  No tracheal deviation present  No thyromegaly present     Cardiovascular: Normal rate, regular rhythm, normal heart sounds and intact distal pulses  No murmur heard  Pulmonary/Chest: Effort normal and breath sounds normal  No stridor  No respiratory distress  He has no wheezes  He has no rales  Abdominal: Soft  Bowel sounds are normal  He exhibits no distension and no mass  There is no tenderness  There is no rebound and no guarding  Musculoskeletal: Normal range of motion  He exhibits no edema, tenderness or deformity  Lymphadenopathy:     He has no cervical adenopathy  Neurological: He is alert and oriented to person, place, and time  No cranial nerve deficit  He exhibits normal muscle tone  Coordination normal    Skin: Skin is warm and dry  No rash noted  He is not diaphoretic  No erythema  Psychiatric: He has a normal mood and affect  His behavior is normal  Judgment and thought content normal    Nursing note and vitals reviewed        Vital Signs  ED Triage Vitals [07/18/19 0934]   Temperature Pulse Respirations Blood Pressure SpO2   97 8 °F (36 6 °C) 56 18 168/74 95 %      Temp Source Heart Rate Source Patient Position - Orthostatic VS BP Location FiO2 (%)   Oral Monitor Sitting Right arm --      Pain Score       No Pain           Vitals:    07/18/19 0934 07/18/19 1000   BP: 168/74 118/58   Pulse: 56 (!) 49   Patient Position - Orthostatic VS: Sitting Lying         Visual Acuity  Visual Acuity      Most Recent Value   L Pupil Size (mm)  3   R Pupil Size (mm)  3          ED Medications  Medications - No data to display    Diagnostic Studies  Results Reviewed     Procedure Component Value Units Date/Time    Basic metabolic panel [[id number]]  (Abnormal) Collected:  07/18/19 0956    Lab Status:  Final result Specimen:  Blood from Arm, Left Updated:  07/18/19 1021     Sodium 136 mmol/L      Potassium 4 0 mmol/L      Chloride 106 mmol/L      CO2 25 mmol/L      ANION GAP 5 mmol/L      BUN 15 mg/dL      Creatinine 0 91 mg/dL      Glucose 145 mg/dL      Calcium 8 7 mg/dL      eGFR 84 ml/min/1 73sq m     Narrative:       Consolidated [employer] guidelines for Chronic Kidney Disease (CKD):     Stage 1 with normal or high GFR (GFR > 90 mL/min/1 73 square meters)    Stage 2 Mild CKD (GFR = 60-89 mL/min/1 73 square meters)    Stage 3A Moderate CKD (GFR = 45-59 mL/min/1 73 square meters)    Stage 3B Moderate CKD (GFR = 30-44 mL/min/1 73 square meters)    Stage 4 Severe CKD (GFR = 15-29 mL/min/1 73 square meters)    Stage 5 End Stage CKD (GFR <15 mL/min/1 73 square meters)  Note: GFR calculation is accurate only with a steady state creatinine                 No orders to display              Procedures  Procedures       ED Course  ED Course as of Jul 19 1517   Thu Jul 18, 2019   1022 Pulse(!): 49   1022 History of  Asymptomatic  Pulse: 56   1023 Sodium: 136   1023 Potassium: 4 0   1023 Chloride: 106   1023 CO2: 25   1023 BUN: 15   1023 Anion Gap: 5   1023 Creatinine: 0 91   1023 Glucose, Random(!): 145   1023 Calcium: 8 7   1023 eGFR: 84   1023 Blood Pressure: 118/58   1023 Pulse(!): 49   1023 Respirations: 18   1023 SpO2: 95 %                             MDM  Number of Diagnoses or Management Options  Hypertension:   Diagnosis management comments: 42-year-old gentleman presenting with asymptomatic hypertension     Patient is on antihypertensive  Patient does not symptoms  Creatinine within normal limits  Patient will follow up with PCP cardiology  ED return precautions discussed she demonstrates understanding  Patient agrees to follow-up care next 1-2 days  Patient trending of blood pressures  Patient understands this  Patient discharged into care of himself and wife         Amount and/or Complexity of Data Reviewed  Clinical lab tests: ordered and reviewed  Tests in the radiology section of CPT®: ordered and reviewed  Tests in the medicine section of CPT®: ordered  Discussion of test results with the performing providers: yes  Decide to obtain previous medical records or to obtain history from someone other than the patient: yes  Obtain history from someone other than the patient: yes    Risk of Complications, Morbidity, and/or Mortality  Presenting problems: moderate  Diagnostic procedures: moderate  Management options: moderate    Patient Progress  Patient progress: stable      Disposition  Final diagnoses:   Hypertension     Time reflects when diagnosis was documented in both MDM as applicable and the Disposition within this note     Time User Action Codes Description Comment    7/18/2019 10:24 AM Rahat Sinclair Add [I10] Hypertension       ED Disposition     ED Disposition Condition Date/Time Comment    Discharge Stable Thu Jul 18, 2019 10:24 AM Nakul Boone discharge to home/self care  Return precautions were discussed with patient  Patient understands when to return to  Emergency department  Patient agrees to discharge plan and follow up care               Follow-up Information     Follow up With Specialties Details Why Contact Info Additional [address] in 3 days  [address] [phone]  [address] [facility] Emergency Department Emergency Medicine Go to  As needed, If symptoms worsen [address]  [phone]  ED, [address] [place] [address], [facility]    Chai Del Cid MD Cardiology, Cardiology Imaging, Multidisciplinary Go in 3 days  [facility] [address]  [phone]             Discharge Medication List as of 7/18/2019 10:25 AM      CONTINUE these medications which have NOT CHANGED    Details   Alcohol Swabs (ALCOHOL PREP) 70 % PADS USE TWICE DAILY, Normal      aspirin (ECOTRIN LOW STRENGTH) 81 mg EC tablet TAKE 1 TABLET (81 MG TOTAL) BY MOUTH DAILY FOR 90 DAYS, Starting Sun 7/14/2019, Until Sat 10/12/2019, Normal      atorvastatin (LIPITOR) 80 mg tablet Take 1 tablet (80 mg total) by mouth daily for 270 days, Starting Fri 3/15/2019, Until Tue 12/10/2019, Normal      clindamycin (CLEOCIN) 300 MG capsule TAKE 2 CAPSULES BY MOUTH 1 HOURS PRIOR TO THE PROCEDURE, Historical Med      clorazepate (TRANXENE) 7 5 mg tablet Take 7 5 mg by mouth 2 (two) times a day , Starting Wed 3/28/2018, Historical Med      !! clotrimazole-betamethasone (LOTRISONE) 1-0 05 % cream Apply topically 2 (two) times a day, Starting Tue 3/5/2019, Normal      !! clotrimazole-betamethasone (LOTRISONE) 1-0 05 % cream Apply topically 2 (two) times a day, Starting Tue 7/9/2019, Normal      DULoxetine (CYMBALTA) 30 mg delayed release capsule Take 30 mg by mouth every morning, Starting Sat 6/22/2019, Historical Med      fluticasone (FLONASE) 50 mcg/act nasal spray 1 spray into each nostril daily, Starting Sat 6/22/2019, Normal      glimepiride (AMARYL) 4 mg tablet TAKE 1/2 TABLET BY MOUTH IN THE MORNING AND 1 TABLET AT NIGHT, Normal      glucose blood (GLUCOSE METER TEST) test strip 1 each by Other route 2 (two) times a day Use as instructed, Starting Sat 6/22/2019, Normal      hydrocortisone 2 5 % cream APPLY TOPICALLY 3 (THREE) TIMES A DAY, Starting Sun 7/14/2019, Normal      lisinopril (ZESTRIL) 40 mg tablet Take 1 tablet (40 mg total) by mouth daily, Starting Wed 7/3/2019, Normal      metFORMIN (GLUCOPHAGE) 500 mg tablet Take 1 tablet (500 mg total) by mouth 2 (two) times a day with meals for 270 days, Starting Fri 3/15/2019, Until Tue 12/10/2019, Normal      metoprolol tartrate (LOPRESSOR) 25 mg tablet Take 1 tablet (25 mg total) by mouth every 12 (twelve) hours for 270 days, Starting Fri 3/15/2019, Until Tue 12/10/2019, Normal      omeprazole (PriLOSEC) 20 mg delayed release capsule Take 1 capsule (20 mg total) by mouth daily for 28 days, Starting Wed 6/26/2019, Until Wed 7/24/2019, Normal      !! ONETOUCH DELICA LANCETS FINE MISC Test blood sugar twice daily, or as needed when symptomatic of hypoglycemia or hyperglycemia, Normal      !! ONETOUCH DELICA LANCETS FINE MISC Test blood sugar twice daily, or as needed when symptomatic of hypoglycemia or hyperglycemia, Normal      tobramycin-dexamethasone (TOBRADEX) ophthalmic suspension Administer 1 drop to both eyes 2 (two) times a day, Starting Tue 3/5/2019, Normal       !! - Potential duplicate medications found  Please discuss with provider  No discharge procedures on file      ED Provider  Electronically Signed by           Yesi Osuna [address] [place] [address],   07/19/19 6807

## 2019-07-19 ENCOUNTER — TELEPHONE (OUTPATIENT)
Dept: CARDIOLOGY CLINIC | Facility: CLINIC | Age: 74
End: 2019-07-19

## 2019-07-19 ENCOUNTER — TELEPHONE (OUTPATIENT)
Dept: NON INVASIVE DIAGNOSTICS | Facility: HOSPITAL | Age: 74
End: 2019-07-19

## 2019-07-19 DIAGNOSIS — I10 HYPERTENSION, UNSPECIFIED TYPE: Primary | ICD-10-CM

## 2019-07-19 RX ORDER — NIFEDIPINE 30 MG/1
30 TABLET, FILM COATED, EXTENDED RELEASE ORAL DAILY
Qty: 30 TABLET | Refills: 5 | Status: SHIPPED | OUTPATIENT
Start: 2019-07-19 | End: 2019-07-24

## 2019-07-19 NOTE — TELEPHONE ENCOUNTER
Pt last seen on 7/3  Losartan was changed to Lisinopril 40 mg daily  Also taking lopressor 25 mg bid  Received a call from [place] [id number] daughter, Darrin Snyder went to the ER yesterday, with c/o hypertension, headache and diaphoresis  Daughter said he told her his BP was in the 945'Z systolic at that time  On arrival in ER, BP was 168/74  HR 56  No med changes in ER  No documentation of diaphoresis on ER visit  Before d/c, BP recorded at 118/58, HR 49  Steve said his BP since change to lisinopril has mostly been in the 150's, 160's  Neo Snyder was given a f/u appt with you for 8/15  Steve asked if you wanted to make any changes sooner  Please advise      DB # [phone]

## 2019-07-20 DIAGNOSIS — K21.9 GASTROESOPHAGEAL REFLUX DISEASE, ESOPHAGITIS PRESENCE NOT SPECIFIED: ICD-10-CM

## 2019-07-20 DIAGNOSIS — Z86.19 H/O TINEA CRURIS: Chronic | ICD-10-CM

## 2019-07-20 RX ORDER — OMEPRAZOLE 20 MG/1
20 CAPSULE, DELAYED RELEASE ORAL DAILY
Qty: 28 CAPSULE | Refills: 1 | Status: SHIPPED | OUTPATIENT
Start: 2019-07-20 | End: 2019-08-05 | Stop reason: SDUPTHER

## 2019-07-24 ENCOUNTER — TELEPHONE (OUTPATIENT)
Dept: FAMILY MEDICINE CLINIC | Facility: CLINIC | Age: 74
End: 2019-07-24

## 2019-07-24 ENCOUNTER — OFFICE VISIT (OUTPATIENT)
Dept: FAMILY MEDICINE CLINIC | Facility: CLINIC | Age: 74
End: 2019-07-24

## 2019-07-24 VITALS
TEMPERATURE: 97.6 F | WEIGHT: 160.8 LBS | DIASTOLIC BLOOD PRESSURE: 80 MMHG | BODY MASS INDEX: 25.18 KG/M2 | HEART RATE: 52 BPM | RESPIRATION RATE: 16 BRPM | SYSTOLIC BLOOD PRESSURE: 140 MMHG

## 2019-07-24 DIAGNOSIS — I10 HYPERTENSION, UNSPECIFIED TYPE: Primary | ICD-10-CM

## 2019-07-24 PROCEDURE — 99213 OFFICE O/P EST LOW 20 MIN: CPT | Performed by: PHYSICIAN ASSISTANT

## 2019-07-24 RX ORDER — RANITIDINE 150 MG/1
CAPSULE ORAL
Refills: 2 | COMMUNITY
Start: 2019-07-09 | End: 2019-08-05 | Stop reason: ALTCHOICE

## 2019-07-24 RX ORDER — NIFEDIPINE 60 MG/1
60 TABLET, FILM COATED, EXTENDED RELEASE ORAL DAILY
Qty: 30 TABLET | Refills: 2 | Status: SHIPPED | OUTPATIENT
Start: 2019-07-24 | End: 2019-10-09 | Stop reason: SDUPTHER

## 2019-07-24 RX ORDER — MIRTAZAPINE 15 MG/1
TABLET, FILM COATED ORAL
Refills: 2 | COMMUNITY
Start: 2019-07-09 | End: 2020-02-11

## 2019-07-24 NOTE — PROGRESS NOTES
Assessment/Plan:    Hypertension  Increase Adalat to 60mg qd  Inform pt NOT to take extra dose of Metoprolol d/t bradycardia  Continue Metoprolol 25mg bid  Recommended limiting bp checks to once daily or when not feeling himself  Maintain scheduled follow-up appointment with cardiologist  Call in Bp readings in 1 week      Diagnoses and all orders for this visit:    Hypertension, unspecified type  -     NIFEdipine ER (ADALAT CC) 60 MG 24 hr tablet; Take 1 tablet (60 mg total) by mouth daily    Other orders  -     ranitidine (ZANTAC) 150 MG capsule; TAKE 1 CAPSULE (150 MG TOTAL) BY MOUTH 2 (TWO) TIMES A DAY AS NEEDED FOR INDIGESTION OR HEARTBURN  -     mirtazapine (REMERON) 15 mg tablet; TAKE 1 TABLET (15 MG TOTAL) BY MOUTH DAILY AT BEDTIME  DAYS        Subjective:      Patient ID: Larissa Horner is a 69 y o  male  HPI   Here today for elevated BP  BP at 6am 153/78  He reports his ears feeling clogged and weakness in his knees  Checked  /78  Took an extra dose of metoprolol 25mg around 1-130pm   BP currently 145/73 P- 53( pts machine)136/66- taken by writer  No further weakness in knees or ears feeling clogged  Adalat 30mg qd added by cardiologist on 7/19/2019  Denies any adverse effects  Checking his BP 3-4 times/day readings ranging from 170s/80s-low 140s/70s-90s  Has follow-up appointment with cardiologist scheduled for 08/15/2019  The following portions of the patient's history were reviewed and updated as appropriate: allergies, current medications, past family history, past medical history, past social history, past surgical history and problem list     Review of Systems   Constitutional: Negative for chills, fatigue and fever  Respiratory: Negative for cough, chest tightness, shortness of breath and wheezing  Cardiovascular: Negative for chest pain, palpitations and leg swelling  Gastrointestinal: Negative  Musculoskeletal: Negative            Objective:      /80 (BP Location: Left arm, Patient Position: Sitting, Cuff Size: Standard)   Pulse (!) 52   Temp 97 6 °F (36 4 °C) (Tympanic)   Resp 16   Wt 72 9 kg (160 lb 12 8 oz)   BMI 25 18 kg/m²          Physical Exam   Constitutional: He is oriented to person, place, and time  He appears well-developed and well-nourished  He appears distressed  HENT:   Right Ear: External ear normal    Left Ear: External ear normal    Cardiovascular: Regular rhythm  Bradycardia present  Murmur heard  Pulmonary/Chest: Effort normal and breath sounds normal  No respiratory distress  He has no wheezes  Neurological: He is alert and oriented to person, place, and time  Skin: Skin is warm and dry

## 2019-07-24 NOTE — ASSESSMENT & PLAN NOTE
Increase Adalat to 60mg qd  Inform pt NOT to take extra dose of Metoprolol d/t bradycardia  Continue Metoprolol 25mg bid  Recommended limiting bp checks to once daily or when not feeling himself     Maintain scheduled follow-up appointment with cardiologist  Call in Bp readings in 1 week

## 2019-07-24 NOTE — TELEPHONE ENCOUNTER
[facility][phone][facility] They received prescription today for Adalat CC 60mg, patient is also on Metoprolol 25mg BID  Should he be on both?

## 2019-07-29 ENCOUNTER — TELEPHONE (OUTPATIENT)
Dept: OBGYN CLINIC | Facility: HOSPITAL | Age: 74
End: 2019-07-29

## 2019-07-30 ENCOUNTER — OFFICE VISIT (OUTPATIENT)
Dept: FAMILY MEDICINE CLINIC | Facility: CLINIC | Age: 74
End: 2019-07-30

## 2019-07-30 VITALS
RESPIRATION RATE: 16 BRPM | WEIGHT: 158.6 LBS | SYSTOLIC BLOOD PRESSURE: 138 MMHG | BODY MASS INDEX: 24.84 KG/M2 | TEMPERATURE: 99.2 F | HEART RATE: 54 BPM | DIASTOLIC BLOOD PRESSURE: 84 MMHG

## 2019-07-30 DIAGNOSIS — N18.2 HYPERTENSION ASSOCIATED WITH STAGE 2 CHRONIC KIDNEY DISEASE DUE TO TYPE 2 DIABETES MELLITUS (HCC): Primary | Chronic | ICD-10-CM

## 2019-07-30 DIAGNOSIS — E11.22 HYPERTENSION ASSOCIATED WITH STAGE 2 CHRONIC KIDNEY DISEASE DUE TO TYPE 2 DIABETES MELLITUS (HCC): Primary | Chronic | ICD-10-CM

## 2019-07-30 DIAGNOSIS — I12.9 HYPERTENSION ASSOCIATED WITH STAGE 2 CHRONIC KIDNEY DISEASE DUE TO TYPE 2 DIABETES MELLITUS (HCC): Primary | Chronic | ICD-10-CM

## 2019-07-30 PROCEDURE — 99213 OFFICE O/P EST LOW 20 MIN: CPT | Performed by: FAMILY MEDICINE

## 2019-07-30 NOTE — PATIENT INSTRUCTIONS
Crisis hipertensiva   LO QUE NECESITA SABER:   La crisis hipertensiva es un aumento repentino en la presión sanguínea de 180 / 80 o más  También se conoce seth hipertensión [place]  Maggie Fara crisis hipertensiva es barron emergencia médica  Podría ocasionar daño a órganos o ser potencialmente mortal    [facility]ack[facility]:   Medicamentos:  A usted le pueden  prescribir los siguientes medicamentos:  · Medicamentos para la presión arterial  se administra para disminuir li presión arterial  Existen muchos tipos diferentes de medicamentos para la presión arterial y es probable que usted necesite más de un tipo  · Diuréticos  ayudan a eliminar el exceso de líquido que se acumula en quinton vasos sanguíneos  [place] reduce la presión arterial al disminuir la presión en las arterias  Los diuréticos se conocen también seth píldoras de [place]  Es posible que orine más seguido mientras jameson carolina medicamento  · Bryce quinton medicamentos seth se le haya indicado  Consulte con li médico si usted bernard que li medicamento no le está ayudando o si presenta efectos secundarios  Infórmele si es alérgico a algún medicamento  Mantenga barron lista actualizada de los [employer], las vitaminas y los productos herbales que jameson  Incluya los siguientes datos de los medicamentos: cantidad, frecuencia y motivo de administración  Traiga con usted la lista o los envases de la píldoras a quinton citas de seguimiento  Lleve la lista de los medicamentos con usted en fouzia de barron emergencia  Programe barron monty con li médico para dentro de 1 a 5 días o según indicaciones dadas:  Usted tendrá que regresar a que le tomen la presión arterial y también para realizarse otras pruebas  Li médico podría también remitirlo a un cardiólogo  Anote quinton preguntas para que se acuerde de hacerlas monica quinton visitas  Tómese la presión arterial en casa:  Tómese li presión arterial mientras está sentado   Tómese la presión por lo iJoule al día, por ejemplo en las mañanas y las noches  Mantenga un control de las lecturas de li presión arterial y llévelo a quinton citas  Ayude a prevenir otra crisis hipertensiva:   · Controle otras afecciones de [place] [employer] diabetes, la enfermedad de la tiroides o problemas de la glándula suprarrenal  Estos padecimientos pueden causar o empeorar barron crisis hipertensiva  · Consuma alimentos saludables y variados  incluyendo frutas, verduras, panes integrales, productos lácteos bajos en grasa, frijoles, tamica magras y pescado  Usted tendrá que limitar la cantidad de sodio y grasas que consume  Además podría necesitar consumir más potasio  Pregunte si necesita seguir barron dieta especial  Los cambios en li [employer] a bajar li presión arterial      · Pregúntele a li médico si li peso es ashlee  Solicite que li médico le ayude a crear un plan para adelgazar si usted tiene sobrepeso  Incluso un poco de pérdida de peso puede ayudar a bajar li presión arterial      · Pregunte a li médico acerca del mejor plan de ejercicio para usted  El ejercicio podría ayudar a bajar li presión arterial      · Limite el consumo de alcohol  a 1 bebida al día si usted es rayshawn  Los hombres deben limitar el consumo de alcohol a 2 bebidas al día  Un trago equivale a 12 onzas de cerveza, 5 onzas de vino o 1 onza y ½ de licor  · No fume  El fumado causa enfermedad cardíaca y podría también elevar li presión arterial  Si usted fuma, nunca es demasiado tarde para dejar de hacerlo  Solicite a li médico más información si usted necesita ayuda para dejar de fumar  Pregúntele a li [employer] vitaminas y minerales son adecuados para usted  · Se le acaba el medicamento  · Usted tiene preguntas o inquietudes acerca de li condición o cuidado  Regrese a la abraham de emergencias si:   · Usted se jameson la presión arterial y está en 180 / 110 o más chandler  · Usted tiene un hugo dolor de sharath  · Usted tiene dolor en el pecho o falta de aire      · Usted siente debilidad o adormecimiento en la joanne, brazos o piernas  · Usted no puede reese o hablar galo zachery seth usualmente lo hace  © 2017 [address] Information is for End User's use only and may not be sold, redistributed or otherwise used for commercial purposes  All illustrations and images included in CareNotes® are the copyrighted property of A D A M , Inc  or Dustin Lamar  Esta información es sólo para uso en educación  Dsyon intención no es darle un consejo médico sobre enfermedades o tratamientos  Colsulte con dyson [place] farmacéutico antes de seguir cualquier régimen médico para saber si es seguro y efectivo para usted

## 2019-07-30 NOTE — ASSESSMENT & PLAN NOTE
Stable  · Reported wrist BP of 197/82 this am  · Today BP is 138/84 which seems to be his average  · Patient is asymptomatic in the office today but appears anxious  · Counseled that if his BP was above 180/100 that he should go to the ER, especially if he has symptoms    · Hand out on hypertensive urgency given in Pashto  · Patient has follow up with cardiology 8/15/19  · Follow up PRN

## 2019-08-01 DIAGNOSIS — K21.9 GASTROESOPHAGEAL REFLUX DISEASE, ESOPHAGITIS PRESENCE NOT SPECIFIED: ICD-10-CM

## 2019-08-01 DIAGNOSIS — Z86.19 H/O TINEA CRURIS: Chronic | ICD-10-CM

## 2019-08-02 DIAGNOSIS — E11.9 TYPE 2 DIABETES MELLITUS WITHOUT COMPLICATION, WITHOUT LONG-TERM CURRENT USE OF INSULIN (HCC): ICD-10-CM

## 2019-08-05 RX ORDER — OMEPRAZOLE 20 MG/1
20 CAPSULE, DELAYED RELEASE ORAL DAILY
Qty: 90 CAPSULE | Refills: 1 | Status: SHIPPED | OUTPATIENT
Start: 2019-08-05 | End: 2020-03-02

## 2019-08-05 RX ORDER — RANITIDINE 150 MG/1
150 CAPSULE ORAL 2 TIMES DAILY PRN
Qty: 120 CAPSULE | Refills: 2 | OUTPATIENT
Start: 2019-08-05

## 2019-08-06 DIAGNOSIS — Z86.19 H/O TINEA CRURIS: Chronic | ICD-10-CM

## 2019-08-08 RX ORDER — CLOTRIMAZOLE AND BETAMETHASONE DIPROPIONATE 10; .64 MG/G; MG/G
CREAM TOPICAL 2 TIMES DAILY
Qty: 30 G | Refills: 1 | Status: SHIPPED | OUTPATIENT
Start: 2019-08-08 | End: 2019-09-17 | Stop reason: SDUPTHER

## 2019-08-13 ENCOUNTER — APPOINTMENT (OUTPATIENT)
Dept: LAB | Facility: HOSPITAL | Age: 74
End: 2019-08-13
Attending: INTERNAL MEDICINE
Payer: MEDICARE

## 2019-08-13 DIAGNOSIS — I25.10 CORONARY ARTERY DISEASE INVOLVING NATIVE CORONARY ARTERY OF NATIVE HEART WITHOUT ANGINA PECTORIS: ICD-10-CM

## 2019-08-13 DIAGNOSIS — Z11.59 NEED FOR HEPATITIS C SCREENING TEST: ICD-10-CM

## 2019-08-13 LAB
CHOLEST SERPL-MCNC: 188 MG/DL (ref 50–200)
HCV AB SER QL: NORMAL
HDLC SERPL-MCNC: 54 MG/DL (ref 40–60)
LDLC SERPL CALC-MCNC: 113 MG/DL (ref 0–100)
TRIGL SERPL-MCNC: 106 MG/DL

## 2019-08-13 PROCEDURE — 80061 LIPID PANEL: CPT

## 2019-08-13 PROCEDURE — 86803 HEPATITIS C AB TEST: CPT

## 2019-08-13 PROCEDURE — 36415 COLL VENOUS BLD VENIPUNCTURE: CPT

## 2019-08-15 ENCOUNTER — OFFICE VISIT (OUTPATIENT)
Dept: CARDIOLOGY CLINIC | Facility: CLINIC | Age: 74
End: 2019-08-15
Payer: MEDICARE

## 2019-08-15 VITALS
WEIGHT: 163.4 LBS | SYSTOLIC BLOOD PRESSURE: 140 MMHG | HEART RATE: 64 BPM | DIASTOLIC BLOOD PRESSURE: 60 MMHG | BODY MASS INDEX: 25.59 KG/M2 | OXYGEN SATURATION: 96 %

## 2019-08-15 DIAGNOSIS — I35.0 MILD AORTIC STENOSIS: ICD-10-CM

## 2019-08-15 DIAGNOSIS — I25.10 CORONARY ARTERY DISEASE INVOLVING NATIVE CORONARY ARTERY OF NATIVE HEART WITHOUT ANGINA PECTORIS: ICD-10-CM

## 2019-08-15 DIAGNOSIS — N18.2 HYPERTENSION ASSOCIATED WITH STAGE 2 CHRONIC KIDNEY DISEASE DUE TO TYPE 2 DIABETES MELLITUS (HCC): Primary | Chronic | ICD-10-CM

## 2019-08-15 DIAGNOSIS — I10 ESSENTIAL HYPERTENSION: ICD-10-CM

## 2019-08-15 DIAGNOSIS — H40.9 GLAUCOMA, UNSPECIFIED GLAUCOMA TYPE, UNSPECIFIED LATERALITY: ICD-10-CM

## 2019-08-15 DIAGNOSIS — I12.9 HYPERTENSION ASSOCIATED WITH STAGE 2 CHRONIC KIDNEY DISEASE DUE TO TYPE 2 DIABETES MELLITUS (HCC): Primary | Chronic | ICD-10-CM

## 2019-08-15 DIAGNOSIS — E11.22 HYPERTENSION ASSOCIATED WITH STAGE 2 CHRONIC KIDNEY DISEASE DUE TO TYPE 2 DIABETES MELLITUS (HCC): Primary | Chronic | ICD-10-CM

## 2019-08-15 PROCEDURE — 99214 OFFICE O/P EST MOD 30 MIN: CPT | Performed by: INTERNAL MEDICINE

## 2019-08-15 PROCEDURE — 1124F ACP DISCUSS-NO DSCNMKR DOCD: CPT | Performed by: INTERNAL MEDICINE

## 2019-08-15 RX ORDER — ROSUVASTATIN CALCIUM 40 MG/1
40 TABLET, COATED ORAL DAILY
Qty: 90 TABLET | Refills: 3 | Status: SHIPPED | OUTPATIENT
Start: 2019-08-15 | End: 2020-05-25

## 2019-08-15 RX ORDER — LISINOPRIL 20 MG/1
20 TABLET ORAL DAILY
Qty: 90 TABLET | Refills: 3 | Status: SHIPPED | OUTPATIENT
Start: 2019-08-15 | End: 2020-05-25

## 2019-08-15 NOTE — PATIENT INSTRUCTIONS
Decrease lisinopril to 20mg daily  Continue procardia 60mg    Continue metoprolol 25mg twice a day  Stop atorvastatin (lipitor)  Start Crestor 40mg instead

## 2019-08-15 NOTE — PROGRESS NOTES
Cardiology Follow Up    Clint Marroquin  1945  [id number]  [address] [facility]  [address] [place] [address] [address]    1  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)  rosuvastatin (CRESTOR) 40 MG tablet   2  Coronary artery disease involving native coronary artery of native heart without angina pectoris  rosuvastatin (CRESTOR) 40 MG tablet   3  Mild aortic stenosis     4  Essential hypertension  lisinopril (ZESTRIL) 20 mg tablet       Discussion/Summary    1  CAD:  Stable  Reviewed his lipid panel  Will change to 40 of Crestor  Discussed blood pressure as noted below  No angina  2  Hypertension:  Reviewed his regimen extensively  I do not think he is taking lisinopril regularly  He is pleased with the Procardia  Continue 60 mg Procardia  I think he should be on lisinopril given his diabetes  Will reduce the dose to 20 mg and advised him to take this regularly and he can decrease the frequency of him taking his blood pressure to just once a day and I would advise him against doing hold parameters at home because I am not sure his blood pressure cuff is actually accurate  Previous History:  History of coronary artery disease with MI in 2006 with a drug-eluting stent to the LAD  Subsequently with an MI in 2012 with stent to the RCA  December 2016, and other NSTEMI at that time multivessel disease and underwent 4 vessel bypass with LIMA to the LAD, vein grafts to the diagonal, OM1, PDA  Low-normal LV function with inferior wall motion abnormality  Mild aortic stenosis    Interval History:  Since last visit, we started him on Procardia  This was increased from 30-60 mg thereafter  He has had problems with blood pressure regulation  I think some of the she was that his blood pressure cuff at home is not well calibrated    The other issue is that is somewhat difficult to understand what medication he is actually taking  The patient tells me that his daughter is a nurse, and he takes his blood pressure in the morning and then takes some of his medication  He takes his blood pressure few hours later, and if his blood pressure is lower than he does not take some other medications  He seems to think that lisinopril is not appropriately controlling his blood pressure, but Procardia is doing a good job  His daughter is a nurse, and so was her boyfriend  He is present at today's visit and tries to help tease out some of the history as well  Ultimately, I think that he has not taken his lisinopril regularly since starting on the Procardia which patient thinks is more effective  Continues to have episodes of sweating which last for a few minutes  Not associated with any chest pain, shortness of breath or lightheadedness/presyncope  Checks blood pressure and sugar at these times of these are typically well controlled  Problem List     Biceps tendonitis on right    Complete rotator cuff tear or rupture of right shoulder, not specified as traumatic (Chronic)    NSTEMI (non-ST elevated myocardial infarction) ([facility] )    S/P drug eluting coronary stent placement (Chronic)    H/O non-ST elevation myocardial infarction (NSTEMI) (Chronic)    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) (Chronic)    Lab Results   Component Value Date    HGBA1C 7 9 (H) 05/28/2019       No results for input(s): POCGLU in the last 72 hours  Blood Sugar Average: Last 72 hrs:          Polyarthralgia (Chronic)    Hematuria, microscopic (Chronic)    H/O tinea cruris (Chronic)    Erectile dysfunction associated with type 2 diabetes mellitus (HCC) (Chronic)    Lab Results   Component Value Date    HGBA1C 7 9 (H) 05/28/2019       No results for input(s): POCGLU in the last 72 hours  Blood Sugar Average: Last 72 hrs:           Anxiety Type 2 diabetes mellitus (HCC)    Lab Results   Component Value Date    HGBA1C 7 9 (H) 05/28/2019       No results for input(s): POCGLU in the last 72 hours      Blood Sugar Average: Last 72 hrs:          Gastroesophageal reflux disease    Bilateral carpal tunnel syndrome    Non-rheumatic aortic stenosis    Coronary artery disease involving native coronary artery of native heart without angina pectoris        Past Medical History:   Diagnosis Date    AS (aortic stenosis)     Balanitis     Biceps tendonitis on right     CAD (coronary artery disease)     Cancer (HCC)     skin    Complete rotator cuff tear or rupture of right shoulder, not specified as traumatic     Diabetes mellitus ([facility] )     Esophageal reflux     High cholesterol     Hypertension     Hypertriglyceridemia     Hypogonadism in male     Myalgia     Myocardial infarction ([facility] )     Palpitations     Shoulder pain     Sinus problem     Skin cancer of nose     Sleep apnea     Stroke ([facility] ) 9639    Systolic murmur     recently found    Tinea cruris     Tinea pedis      Social History     Tobacco Use    Smoking status: Former Smoker    Smokeless tobacco: Never Used    Tobacco comment: smoked for 3 years from 19 y/o - 23 yrs old   Substance Use Topics    Alcohol use: No     Family History   Problem Relation Age of Onset    Heart disease Mother     Hypertension Mother     Nephrolithiasis Father     Other Father         Renal disease    Hypertension Sister     Nephrolithiasis Brother     Other Brother         Renal disease    Hematuria Family         Microscopic     Past Surgical History:   Procedure Laterality Date    CATARACT EXTRACTION Left     COLONOSCOPY      CORONARY ANGIOPLASTY WITH STENT PLACEMENT      CORONARY ARTERY BYPASS GRAFT N/A 12/12/2016    Procedure: INTRAOP CECILLE; BILATERAL LEG EVH; CORONARY ARTERY BYPASS GRAFT X 4 SVG TO PDA, OM1,  AND DIAGONAL1, LIMA TO LAD;  Surgeon: Randell Garcia MD;  Location: [facility]; Service:     EYE SURGERY      HERNIA REPAIR      VA ARTHROSCOPY SHOULDER SURGICAL BICEPS TENODESIS Right 5/6/2016    Procedure: ARTHROSCOPIC BICEPS TENODESIS;  Surgeon: Pastor Yost MD;  Location: [facility];  Service: Orthopedics    VA SHLDR ARTHROSCOP,SURG,W/ROTAT CUFF REPR Right 5/6/2016    Procedure: REPAIR ROTATOR CUFF  ARTHROSCOPIC; SUBACROMIAL DECOMPRESSION; PARTIAL SYNOVECTOMY;  Surgeon: Pastor Yost MD;  Location: [facility];  Service: Orthopedics    [address] [place] [address]  2009    over age 13       Current Outpatient Medications:     Alcohol Swabs (ALCOHOL PREP) 70 % PADS, USE TWICE DAILY, Disp: 100 each, Rfl: 3    aspirin (ECOTRIN LOW STRENGTH) 81 mg EC tablet, TAKE 1 TABLET (81 MG TOTAL) BY MOUTH DAILY FOR 90 DAYS, Disp: 90 tablet, Rfl: 3    clorazepate (TRANXENE) 7 5 mg tablet, Take 7 5 mg by mouth 2 (two) times a day , Disp: , Rfl: 0    clotrimazole-betamethasone (LOTRISONE) 1-0 05 % cream, Apply topically 2 (two) times a day, Disp: 30 g, Rfl: 1    DULoxetine (CYMBALTA) 30 mg delayed release capsule, Take 30 mg by mouth every morning, Disp: , Rfl: 0    fluticasone (FLONASE) 50 mcg/act nasal spray, 1 spray into each nostril daily, Disp: 1 Bottle, Rfl: 4    glimepiride (AMARYL) 4 mg tablet, TAKE 1/2 TABLET BY MOUTH IN THE MORNING AND 1 TABLET AT NIGHT, Disp: 180 tablet, Rfl: 2    glucose blood (GLUCOSE METER TEST) test strip, 1 each by Other route 2 (two) times a day Use as instructed, Disp: 100 each, Rfl: 5    hydrocortisone 2 5 % cream, Apply topically 3 (three) times a day, Disp: 30 g, Rfl: 3    lisinopril (ZESTRIL) 20 mg tablet, Take 1 tablet (20 mg total) by mouth daily, Disp: 90 tablet, Rfl: 3    metFORMIN (GLUCOPHAGE) 500 mg tablet, Take 1 tablet (500 mg total) by mouth 2 (two) times a day with meals for 270 days, Disp: 180 tablet, Rfl: 2    metoprolol tartrate (LOPRESSOR) 25 mg tablet, Take 1 tablet (25 mg total) by mouth every 12 (twelve) hours for 270 days, Disp: 180 tablet, Rfl: 2    NIFEdipine ER (ADALAT CC) 60 MG 24 hr tablet, Take 1 tablet (60 mg total) by mouth daily, Disp: 30 tablet, Rfl: 2    omeprazole (PriLOSEC) 20 mg delayed release capsule, Take 1 capsule (20 mg total) by mouth daily, Disp: 90 capsule, Rfl: 1    ONETOUCH DELICA LANCETS FINE MISC, Test blood sugar twice daily, or as needed when symptomatic of hypoglycemia or hyperglycemia, Disp: 100 each, Rfl: 5    ONETOUCH DELICA LANCETS FINE MISC, Test blood sugar twice daily, or as needed when symptomatic of hypoglycemia or hyperglycemia, Disp: 100 each, Rfl: 5    tobramycin-dexamethasone (TOBRADEX) ophthalmic suspension, Administer 1 drop to both eyes 2 (two) times a day, Disp: 5 mL, Rfl: 1    clindamycin (CLEOCIN) 300 MG capsule, TAKE 2 CAPSULES BY MOUTH 1 HOURS PRIOR TO THE PROCEDURE, Disp: , Rfl: 0    clotrimazole-betamethasone (LOTRISONE) 1-0 05 % cream, APPLY TOPICALLY 2 (TWO) TIMES A DAY, Disp: 30 g, Rfl: 1    metFORMIN (GLUCOPHAGE) 500 mg tablet, TAKE 1 TABLET (500 MG TOTAL) BY MOUTH 2 (TWO) TIMES A DAY WITH MEALS  DAYS (Patient not taking: Reported on 8/15/2019), Disp: 180 tablet, Rfl: 2    mirtazapine (REMERON) 15 mg tablet, TAKE 1 TABLET (15 MG TOTAL) BY MOUTH DAILY AT BEDTIME  DAYS, Disp: , Rfl: 2    rosuvastatin (CRESTOR) 40 MG tablet, Take 1 tablet (40 mg total) by mouth daily, Disp: 90 tablet, Rfl: 3  Allergies   Allergen Reactions    Epinephrine Hives and Anxiety    Penicillins Hives and Anxiety       Vitals:    08/15/19 1545   BP: 140/60   BP Location: Right arm   Patient Position: Sitting   Cuff Size: Standard   Pulse: 64   SpO2: 96%   Weight: 74 1 kg (163 lb 6 4 oz)     Vitals:    08/15/19 1545   Weight: 74 1 kg (163 lb 6 4 oz)          Body mass index is 25 59 kg/m²      Physical Exam:  GEN: Tobi Siddiqi appears well, alert and oriented x 3, pleasant and cooperative   HEENT: pupils equal, round, and reactive to light; extraocular muscles intact  NECK: supple, no carotid bruits   HEART: regular  2/6 MARTHA  LUNGS: clear to auscultation bilaterally; no wheezes, rales, or rhonchi   ABDOMEN: normal bowel sounds, soft, no tenderness, no distention  EXTREMITIES: peripheral pulses normal; no clubbing, cyanosis, or edema  NEURO: no focal findings   SKIN: normal without suspicious lesions on exposed skin    ROS:  Positive for erectile dysfunction, numbness and weakness in hands  Sweating episodes  Except as noted in HPI, is otherwise reviewed in detail and a 12 point review of systems is negative  ROS reviewed and is unchanged    Labs:  Lab Results   Component Value Date     08/01/2017    K 4 0 07/18/2019     07/18/2019    CREATININE 0 91 07/18/2019    BUN 15 07/18/2019    CO2 25 07/18/2019    ALT 36 06/28/2019    AST 24 06/28/2019    INR 1 40 (H) 12/12/2016    GLUF 131 (H) 06/28/2019    HGBA1C 7 9 (H) 05/28/2019    WBC 6 46 06/28/2019    HGB 12 4 06/28/2019    HCT 40 2 06/28/2019     06/28/2019       Lab Results   Component Value Date    CHOL 117 (L) 03/17/2017    CHOL 270 (H) 03/09/2016    CHOL 176 03/10/2014     Lab Results   Component Value Date    LDLCALC 113 (H) 08/13/2019    LDLCALC 93 06/28/2019    LDLCALC 45 09/11/2018     Lab Results   Component Value Date    HDL 54 08/13/2019    HDL 50 06/28/2019    HDL 50 09/11/2018     Lab Results   Component Value Date    TRIG 106 08/13/2019    TRIG 76 06/28/2019    TRIG 48 09/11/2018     Testing:  Echo 6/28/19:  LEFT VENTRICLE:  Systolic function was at the lower limits of normal  Ejection fraction was estimated to be 50 %  There was hypokinesis of the apical wall(s)  Wall thickness was mildly increased    Features were consistent with a pseudonormal left ventricular filling pattern, with concomitant abnormal relaxation and increased filling pressure (grade 2 diastolic dysfunction)      LEFT ATRIUM:  The atrium was mildly dilated      MITRAL VALVE:  There was mild annular calcification  There was mild regurgitation      AORTIC VALVE:  Transaortic velocity was increased due to valvular stenosis  There was mild stenosis  The calculated aortic valve area was 1 8 cm^2  There was mild regurgitation      TRICUSPID VALVE:  There was mild regurgitation  Pulmonary artery systolic pressure was within the normal range  Echo 12/2016:  LEFT VENTRICLE:  Systolic function was at the lower limits of normal  Ejection fraction was  estimated to be 53 %  There was severe hypokinesis of the basal inferior and  basal inferolateral wall(s)  Wall thickness was mildly to moderately increased  There was mild concentric hypertrophy      RIGHT VENTRICLE:  The size was at the upper limits of normal      LEFT ATRIUM:  The atrium was mildly dilated      MITRAL VALVE:  There was mild regurgitation      AORTIC VALVE:  There was mild stenosis  There was mild regurgitation  Systolic area of 1 7 cm squared by planimetry  Estimated aortic valve area (by VTI) was 1 97 cm squared  Aortic valve obstructive index (by Vmax) was 0 5  Estimated aortic valve area (by Vmax) was 1 73 cm squared      TRICUSPID VALVE:  There was trace regurgitation

## 2019-08-16 RX ORDER — TOBRAMYCIN AND DEXAMETHASONE 3; 1 MG/ML; MG/ML
1 SUSPENSION/ DROPS OPHTHALMIC 2 TIMES DAILY
Qty: 5 ML | Refills: 3 | Status: SHIPPED | OUTPATIENT
Start: 2019-08-16 | End: 2019-10-25 | Stop reason: SDUPTHER

## 2019-08-27 ENCOUNTER — OFFICE VISIT (OUTPATIENT)
Dept: FAMILY MEDICINE CLINIC | Facility: CLINIC | Age: 74
End: 2019-08-27

## 2019-08-27 VITALS
BODY MASS INDEX: 25.43 KG/M2 | HEIGHT: 67 IN | SYSTOLIC BLOOD PRESSURE: 100 MMHG | RESPIRATION RATE: 16 BRPM | TEMPERATURE: 98.5 F | DIASTOLIC BLOOD PRESSURE: 70 MMHG | HEART RATE: 80 BPM | WEIGHT: 162 LBS

## 2019-08-27 DIAGNOSIS — M54.42 ACUTE LEFT-SIDED LOW BACK PAIN WITH LEFT-SIDED SCIATICA: Primary | ICD-10-CM

## 2019-08-27 DIAGNOSIS — L98.9 SKIN LESION OF FACE: ICD-10-CM

## 2019-08-27 PROBLEM — M54.9 ACUTE LEFT-SIDED BACK PAIN: Status: ACTIVE | Noted: 2019-08-27

## 2019-08-27 PROCEDURE — 99213 OFFICE O/P EST LOW 20 MIN: CPT | Performed by: FAMILY MEDICINE

## 2019-08-27 RX ORDER — SENNOSIDES 8.6 MG
650 CAPSULE ORAL EVERY 8 HOURS PRN
Qty: 30 TABLET | Refills: 1 | Status: SHIPPED | OUTPATIENT
Start: 2019-08-27 | End: 2019-09-13 | Stop reason: SDUPTHER

## 2019-08-27 RX ORDER — CYCLOBENZAPRINE HCL 5 MG
5 TABLET ORAL 3 TIMES DAILY PRN
Qty: 30 TABLET | Refills: 1 | Status: SHIPPED | OUTPATIENT
Start: 2019-08-27 | End: 2019-09-02 | Stop reason: SDUPTHER

## 2019-08-27 NOTE — PROGRESS NOTES
Assessment/Plan:    Acute left-sided back pain  New  -Likely back muscle spasm  -Negative straight leg test, L lumbar region tenderness to palpation on physical exam   -Flexeril TID prescribed at this visit and tylenol 650 mg PRN  Due to extensive cardiovascular hx will avoid NSAIDs at this time  -OMT referral at this visit  Patient agrees with the plan    Skin lesion of face  2 new lesions in the setting of previous skin cancer removed   -Likely squamous cell carcinoma  -Ambulatory referral to dermatology given at this visit  Give information about [facility] via AVS given patient had problems in the past with insurance  -Picture in haiku of both lesions for future reference   -Will continue to follow up        Subjective:      Patient ID: Bernard Green is a 69 y o  male  Patient is a 70 yo M with PMH of GERD, hypertension, type 2 diabetes mellitus, skin cancer, CAD, mild aortic stenosis, who presents to the office for an acute visit due to lower back pain  Patient reports this pain is new, started one month and a half  He reports pain radiates to L thigh and refers the pain is 9/10 intensity of pain  He has taken tylenol for pain with mild relief  Pain is worse when standing for long periods of time, decrease when sitting  He denies urinary or bowel incontinence, denies numbness  He also reports 2 new skin lesions  One in the tip of the nose, where previously had cancer lesion removed and another one on the post-auricular area that feels more like a bump  He reports that on the last one noticed it can bleed         The following portions of the patient's history were reviewed and updated as appropriate:   He  has a past medical history of AS (aortic stenosis), Balanitis, Biceps tendonitis on right, CAD (coronary artery disease), Cancer ([address] ), Complete rotator cuff tear or rupture of right shoulder, not specified as traumatic, Diabetes mellitus ([address] ), Esophageal reflux, High cholesterol, Hypertension, Hypertriglyceridemia, Hypogonadism in male, Myalgia, Myocardial infarction [facility]), Palpitations, Shoulder pain, Sinus problem, Skin cancer of nose, Sleep apnea, Stroke ([facility] ) (5586), Systolic murmur, Tinea cruris, and Tinea pedis  He  has a past surgical history that includes Hernia repair; Coronary angioplasty with stent; Eye surgery; pr shldr arthroscop,surg,w/rotat cuff repr (Right, 5/6/2016); pr arthroscopy shoulder surgical biceps tenodesis (Right, 5/6/2016); Cataract extraction (Left); Colonoscopy; Umbilical hernia repair (2009); Testicle surgery; and Coronary artery bypass graft (N/A, 12/12/2016)    Current Outpatient Medications on File Prior to Visit   Medication Sig    Alcohol Swabs (ALCOHOL PREP) 70 % PADS USE TWICE DAILY    aspirin (ECOTRIN LOW STRENGTH) 81 mg EC tablet TAKE 1 TABLET (81 MG TOTAL) BY MOUTH DAILY FOR 90 DAYS    clindamycin (CLEOCIN) 300 MG capsule TAKE 2 CAPSULES BY MOUTH 1 HOURS PRIOR TO THE PROCEDURE    clorazepate (TRANXENE) 7 5 mg tablet Take 7 5 mg by mouth 2 (two) times a day     clotrimazole-betamethasone (LOTRISONE) 1-0 05 % cream Apply topically 2 (two) times a day    clotrimazole-betamethasone (LOTRISONE) 1-0 05 % cream APPLY TOPICALLY 2 (TWO) TIMES A DAY    DULoxetine (CYMBALTA) 30 mg delayed release capsule Take 30 mg by mouth every morning    fluticasone (FLONASE) 50 mcg/act nasal spray 1 spray into each nostril daily    glucose blood (GLUCOSE METER TEST) test strip 1 each by Other route 2 (two) times a day Use as instructed    hydrocortisone 2 5 % cream Apply topically 3 (three) times a day    lisinopril (ZESTRIL) 20 mg tablet Take 1 tablet (20 mg total) by mouth daily    metFORMIN (GLUCOPHAGE) 500 mg tablet Take 1 tablet (500 mg total) by mouth 2 (two) times a day with meals for 270 days    metFORMIN (GLUCOPHAGE) 500 mg tablet TAKE 1 TABLET (500 MG TOTAL) BY MOUTH 2 (TWO) TIMES A DAY WITH MEALS  DAYS    metoprolol tartrate (LOPRESSOR) 25 mg tablet Take 1 tablet (25 mg total) by mouth every 12 (twelve) hours for 270 days    mirtazapine (REMERON) 15 mg tablet TAKE 1 TABLET (15 MG TOTAL) BY MOUTH DAILY AT BEDTIME  DAYS    NIFEdipine ER (ADALAT CC) 60 MG 24 hr tablet Take 1 tablet (60 mg total) by mouth daily    omeprazole (PriLOSEC) 20 mg delayed release capsule Take 1 capsule (20 mg total) by mouth daily    ONETOUCH DELICA LANCETS FINE MISC Test blood sugar twice daily, or as needed when symptomatic of hypoglycemia or hyperglycemia    ONETOUCH DELICA LANCETS FINE MISC Test blood sugar twice daily, or as needed when symptomatic of hypoglycemia or hyperglycemia    rosuvastatin (CRESTOR) 40 MG tablet Take 1 tablet (40 mg total) by mouth daily    tobramycin-dexamethasone (TOBRADEX) ophthalmic suspension Administer 1 drop to both eyes 2 (two) times a day    tobramycin-dexamethasone (TOBRADEX) ophthalmic suspension ADMINISTER 1 DROP TO BOTH EYES 2 (TWO) TIMES A DAY    glimepiride (AMARYL) 4 mg tablet TAKE 1/2 TABLET BY MOUTH IN THE MORNING AND 1 TABLET AT NIGHT     No current facility-administered medications on file prior to visit  He is allergic to epinephrine and penicillins       Review of Systems   Constitutional: Negative for appetite change, diaphoresis, fatigue and fever  HENT: Negative for congestion, rhinorrhea and sinus pain  Respiratory: Negative for cough, chest tightness and shortness of breath  Cardiovascular: Negative for chest pain, palpitations and leg swelling  Gastrointestinal: Negative for abdominal distention, abdominal pain, constipation, diarrhea, nausea and vomiting  Genitourinary: Negative for difficulty urinating, frequency and urgency  Musculoskeletal: Positive for back pain  Negative for neck pain  Skin:        One red skin lesion on the tip of the nose and one "bump" behind R ear  Neurological: Negative for weakness, light-headedness, numbness and headaches     Psychiatric/Behavioral: Negative for agitation and behavioral problems  The patient is not nervous/anxious  Objective:      /70   Pulse 80   Temp 98 5 °F (36 9 °C)   Resp 16   Ht 5' 7" (1 702 m)   Wt 73 5 kg (162 lb)   BMI 25 37 kg/m²          Physical Exam   Constitutional: He is oriented to person, place, and time  He appears well-developed and well-nourished  No distress  HENT:   Head: Normocephalic and atraumatic  Eyes: Pupils are equal, round, and reactive to light  EOM are normal    Neck: Normal range of motion  Neck supple  No thyromegaly present  Cardiovascular: Normal rate, regular rhythm and intact distal pulses  Exam reveals no gallop and no friction rub  Murmur heard  Systolic murmur is present with a grade of 3/6  Pulmonary/Chest: Effort normal and breath sounds normal  No respiratory distress  He has no wheezes  He has no rales  He exhibits no tenderness  Abdominal: Soft  Bowel sounds are normal  He exhibits no distension and no mass  There is no tenderness  There is no rebound and no guarding  Musculoskeletal: Normal range of motion  He exhibits no edema, tenderness or deformity  Negative straight leg test     Lymphadenopathy:     He has no cervical adenopathy  Neurological: He is alert and oriented to person, place, and time  Skin: Skin is warm and dry  No rash noted  He is not diaphoretic  No erythema  Red, irreglar, 0 5 cm macular lesion on the tip of the nose  Second lesion: Papular squamous, indurated, 1 5 cm lesion in the right post-auricular area  Psychiatric: He has a normal mood and affect  His behavior is normal  Judgment and thought content normal    Vitals reviewed

## 2019-08-28 PROBLEM — L98.9 SKIN LESION OF FACE: Status: ACTIVE | Noted: 2019-08-28

## 2019-08-28 NOTE — ASSESSMENT & PLAN NOTE
2 new lesions in the setting of previous skin cancer removed   -Likely squamous cell carcinoma  -Ambulatory referral to dermatology given at this visit  Give information about [facility] via AVS given patient had problems in the past with insurance     -Picture in haiku of both lesions for future reference   -Will continue to follow up

## 2019-08-28 NOTE — ASSESSMENT & PLAN NOTE
New  -Likely back muscle spasm  -Negative straight leg test, L lumbar region tenderness to palpation on physical exam   -Flexeril TID prescribed at this visit and tylenol 650 mg PRN  Due to extensive cardiovascular hx will avoid NSAIDs at this time  -OMT referral at this visit   Patient agrees with the plan

## 2019-08-29 ENCOUNTER — OFFICE VISIT (OUTPATIENT)
Dept: FAMILY MEDICINE CLINIC | Facility: CLINIC | Age: 74
End: 2019-08-29

## 2019-08-29 ENCOUNTER — TELEPHONE (OUTPATIENT)
Dept: OBGYN CLINIC | Facility: HOSPITAL | Age: 74
End: 2019-08-29

## 2019-08-29 VITALS — HEIGHT: 67 IN | WEIGHT: 161.4 LBS | BODY MASS INDEX: 25.33 KG/M2

## 2019-08-29 DIAGNOSIS — M99.02 SOMATIC DYSFUNCTION OF THORACIC REGION: ICD-10-CM

## 2019-08-29 DIAGNOSIS — M99.03 SOMATIC DYSFUNCTION OF LUMBAR REGION: Primary | ICD-10-CM

## 2019-08-29 NOTE — TELEPHONE ENCOUNTER
Pts daughter called to confirm surgery and follow up appointment  I tried to reach her but there was no answer and vm was full

## 2019-08-29 NOTE — PROGRESS NOTES
The Assessment/Plan        Problem List Items Addressed This Visit     None      Visit Diagnoses     Somatic dysfunction of lumbar region    -  Primary    Somatic dysfunction of thoracic region               This is a 69 y o  male who presents for an initial OMT visit  1  Patient tolerated OMT well for the above problems, advised stretching and heat application for continued relief  2  OMT Follow up in 2 weeks  Ashley Dinero is a 69 y o  male and is here for an initial OMT visit  The patient reports for the past 2 months he has had left-sided low back pain, denies any initial injury or trauma  He states that the back pain radiates down his buttock into the back of his leg and then down into the top of his foot  He denies any numbness or weakness of the legs, denies any bowel or bladder incontinence  He states the pain seems to be better when he is sitting, worse when he is bending, twisting or standing for a long period of time  Is the patient taking Pain medication? no  Has the patient completed physical therapy for this condition? no  Did Patient symptoms improve from last OMT appointment? N/A    The following portions of the patient's history were reviewed and updated as appropriate: allergies, current medications, past family history, past medical history, past social history, past surgical history and problem list     Review of Systems  Review of Systems   Genitourinary: Negative for difficulty urinating  Musculoskeletal: Positive for back pain  Negative for gait problem  As in HPI   Neurological: Negative for weakness and numbness           Objective     OMT Exam   Thoracic T1-4:   Somatic Dysfunction:  Tissue Texture Changes, Asymmetry , Restriction and Tenderness  Severity :  2  Osteopathic Findings: Paraspinal hypertonicity, myofascial restriction  Treatment Method: ST, MFR and direct inhibition  Response: improved  Thoracic T5-9:   Somatic Dysfunction:  Tissue Texture Changes, Asymmetry , Restriction and Tenderness  Severity :  2  Osteopathic Findings: Paraspinal hypertonicity, myofascial restriction  Treatment Method: ST and MFR  Response: improved  Thoracic T10-12:  Somatic Dysfunction:  Tissue Texture Changes, Asymmetry , Restriction and Tenderness  Severity :  2  Osteopathic Findings: Paraspinal hypertonicity, myofascial restriction  Treatment Method: ST, MFR and direct inhibition  Response: improved  Lumbar:  Somatic Dysfunction:  Tissue Texture Changes, Asymmetry , Restriction and Tenderness  Severity :  3  Osteopathic Findings: Paraspinal hypertonicity L>R, myofascial restriction, 2 tender points L quadratus lumborum  Treatment Method: ST, MFR, CS and direct inhbition  Response: improved    Carry Gosselin, DO PGY-3  Seth Escobar

## 2019-09-02 DIAGNOSIS — M54.42 ACUTE LEFT-SIDED LOW BACK PAIN WITH LEFT-SIDED SCIATICA: ICD-10-CM

## 2019-09-02 RX ORDER — CYCLOBENZAPRINE HCL 5 MG
5 TABLET ORAL 3 TIMES DAILY PRN
Qty: 30 TABLET | Refills: 1 | Status: SHIPPED | OUTPATIENT
Start: 2019-09-02 | End: 2019-09-13

## 2019-09-03 DIAGNOSIS — E11.9 TYPE 2 DIABETES MELLITUS WITHOUT COMPLICATION, WITHOUT LONG-TERM CURRENT USE OF INSULIN (HCC): ICD-10-CM

## 2019-09-04 RX ORDER — BLOOD SUGAR DIAGNOSTIC
STRIP MISCELLANEOUS
Qty: 100 EACH | Refills: 3 | Status: SHIPPED | OUTPATIENT
Start: 2019-09-04 | End: 2020-04-14

## 2019-09-09 ENCOUNTER — TELEPHONE (OUTPATIENT)
Dept: CARDIOLOGY CLINIC | Facility: CLINIC | Age: 74
End: 2019-09-09

## 2019-09-09 NOTE — TELEPHONE ENCOUNTER
Patient wants to know if he can take cyclobenzaprine 5 mg TID for back pain in combination with his heart meds

## 2019-09-13 ENCOUNTER — DOCUMENTATION (OUTPATIENT)
Dept: FAMILY MEDICINE CLINIC | Facility: CLINIC | Age: 74
End: 2019-09-13

## 2019-09-13 ENCOUNTER — OFFICE VISIT (OUTPATIENT)
Dept: FAMILY MEDICINE CLINIC | Facility: CLINIC | Age: 74
End: 2019-09-13

## 2019-09-13 VITALS
HEIGHT: 67 IN | TEMPERATURE: 98.3 F | SYSTOLIC BLOOD PRESSURE: 110 MMHG | HEART RATE: 72 BPM | WEIGHT: 161.6 LBS | BODY MASS INDEX: 25.36 KG/M2 | DIASTOLIC BLOOD PRESSURE: 60 MMHG | RESPIRATION RATE: 14 BRPM

## 2019-09-13 DIAGNOSIS — N18.2 HYPERTENSION ASSOCIATED WITH STAGE 2 CHRONIC KIDNEY DISEASE DUE TO TYPE 2 DIABETES MELLITUS (HCC): Primary | Chronic | ICD-10-CM

## 2019-09-13 DIAGNOSIS — E11.22 HYPERTENSION ASSOCIATED WITH STAGE 2 CHRONIC KIDNEY DISEASE DUE TO TYPE 2 DIABETES MELLITUS (HCC): Primary | Chronic | ICD-10-CM

## 2019-09-13 DIAGNOSIS — E11.9 TYPE 2 DIABETES MELLITUS WITHOUT COMPLICATION, WITHOUT LONG-TERM CURRENT USE OF INSULIN (HCC): ICD-10-CM

## 2019-09-13 DIAGNOSIS — I10 ESSENTIAL HYPERTENSION: ICD-10-CM

## 2019-09-13 DIAGNOSIS — M54.42 ACUTE LEFT-SIDED LOW BACK PAIN WITH LEFT-SIDED SCIATICA: ICD-10-CM

## 2019-09-13 DIAGNOSIS — I12.9 HYPERTENSION ASSOCIATED WITH STAGE 2 CHRONIC KIDNEY DISEASE DUE TO TYPE 2 DIABETES MELLITUS (HCC): Primary | Chronic | ICD-10-CM

## 2019-09-13 PROCEDURE — 99213 OFFICE O/P EST LOW 20 MIN: CPT | Performed by: FAMILY MEDICINE

## 2019-09-13 RX ORDER — CYCLOBENZAPRINE HCL 5 MG
10 TABLET ORAL 3 TIMES DAILY PRN
Qty: 30 TABLET | Refills: 1 | Status: SHIPPED | OUTPATIENT
Start: 2019-09-13 | End: 2019-09-26

## 2019-09-13 RX ORDER — RANITIDINE 150 MG/1
150 CAPSULE ORAL
Refills: 2 | Status: ON HOLD | COMMUNITY
Start: 2019-08-31 | End: 2019-10-15

## 2019-09-13 RX ORDER — SENNOSIDES 8.6 MG
650 CAPSULE ORAL EVERY 8 HOURS PRN
Qty: 30 TABLET | Refills: 1 | Status: ON HOLD | OUTPATIENT
Start: 2019-09-13 | End: 2019-10-15 | Stop reason: SDUPTHER

## 2019-09-13 NOTE — ASSESSMENT & PLAN NOTE
Not improving after therapy  -Negative straight leg test and alarm signs on physical exam  -Ordered X-ray on lumbar spine   Will follow up results  -Continue OMT sessions given improvement of symptoms with therapy  -Increased Flexeril 5 to 10 mg TID and continue tylenol 650 mg scheduled TID  -Avoid NSAIDs given extensive cardiac history    -If X-ray negative and symptoms don't improve will give referral to physical therapy at next visit

## 2019-09-13 NOTE — ASSESSMENT & PLAN NOTE
Blood Pressure: 110/60   Well controlled  At goal  -Continue lisinopril 20 mg daily and Procardia 60 mg daily

## 2019-09-13 NOTE — PROGRESS NOTES
This note was discussed with Dr Ivette Choudhary of todays visit with the clinical pharmacist: medication reconciliation, medication education, polypharmacy consult  Mr  Ronak Smith had an appointment with Dr Eric Arevalo before meeting with the pharmacist for follow up  Patient reports not taking clindamycin 300 mg capsule at this time  He reports that this was before having a dental procedure done  Metformin 500 mg PO is also taken BID with meals  Patient however still experiences diarrhea from time to time  Patient takes omeprazole 20 mg PO once daily for heartburn symptoms  He also takes ranitidine 150 mg capsule as needed for heartburn was well  Patient says these two medications help him but still experiences GERD symptoms about once per month  Patient denies any problems regarding medications  Recent ED visit(s):  19: HTN    Exercise: Goes for 1 hr walks with wife   Diet: Eats portioned meals, with small amounts of rice  Vegetables, beef, chicken, and fish are the main composition of the diet  Patient understands bad sources of carbohydrates and stays away from those things   Adherence to medication regimen: 100% adherence per patient with help from wife     Objective   Labs:    19  BP: 112/60, A1C: 7 1%    19  BP: 100/70, HR: 80    19  TC: 188, T, HDL: 54, LDL: 113    19   SCr: 0 91, eGFR: 84    Assessment/Plan    1  The following medication related items were identified:   New medications: none     Discontinued medications:   Clindamycin 300 mg capsule     Clinically significant drug interactions identified: none     Side effects from medication(s) reported: none    2   Counseling was provided to the patient on the following:   Diet and exercise  Appropriate blood glucose levels, hypoglycemia   Hypotension and prevention of falls     Ronak Quiroz, PharmD Candidate   Grady Bach, JohnD

## 2019-09-13 NOTE — PROGRESS NOTES
Assessment/Plan:    Acute left-sided back pain  Not improving after therapy  -Negative straight leg test and alarm signs on physical exam  -Ordered X-ray on lumbar spine  Will follow up results  -Continue OMT sessions given improvement of symptoms with therapy  -Increased Flexeril 5 to 10 mg TID and continue tylenol 650 mg scheduled TID  -Avoid NSAIDs given extensive cardiac history    -If X-ray negative and symptoms don't improve will give referral to physical therapy at next visit    Type 2 diabetes mellitus ([facility] )  Lab Results   Component Value Date    HGBA1C 7 9 (H) 05/28/2019       No results for input(s): POCGLU in the last 72 hours  Blood Sugar Average: Last 72 hrs:  Improving  At goal  -A1c 7 1 at this visit  -Continue current regimen with metformin 500 mg BID  Hypertension  Blood Pressure: 110/60   Well controlled  At goal  -Continue lisinopril 20 mg daily and Procardia 60 mg daily  Subjective:      Patient ID: Marco Roberts is a 69 y o  male  Patient is 37-year-old male with past medical history of GERD, type 2 diabetes, hypertension, CKD, mild aortic stenosis, CAD status post drug-eluting stent, anxiety who presents to the office for follow-up on back pain  Patient reports back pain still moderate to severe, shooting pain in the left side that goes down to his left feet  Patient used appointment with OMT yesterday and would like to be referred back because he reported mild improvement of symptoms  Reports Flexeril and Tylenol is not helping with pain, but NSAIDs currently not recommended due to extensive cardiac history  He follows Dr Parisa Kelly, cardiology closely          The following portions of the patient's history were reviewed and updated as appropriate:   He  has a past medical history of AS (aortic stenosis), Balanitis, Biceps tendonitis on right, CAD (coronary artery disease), Cancer ([facility] ), Complete rotator cuff tear or rupture of right shoulder, not specified as traumatic, [facility] mellitus ([facility] ), Esophageal reflux, High cholesterol, Hypertension, Hypertriglyceridemia, Hypogonadism in male, Myalgia, Myocardial infarction [facility]), Palpitations, Shoulder pain, Sinus problem, Skin cancer of nose, Sleep apnea, Stroke ([facility] ) (2085), Systolic murmur, Tinea cruris, and Tinea pedis    Current Outpatient Medications   Medication Sig Dispense Refill    acetaminophen (TYLENOL) 650 mg CR tablet Take 1 tablet (650 mg total) by mouth every 8 (eight) hours as needed for mild pain 30 tablet 1    Alcohol Swabs (ALCOHOL PADS) 70 % PADS USE TWICE DAILY 100 each 3    aspirin (ECOTRIN LOW STRENGTH) 81 mg EC tablet TAKE 1 TABLET (81 MG TOTAL) BY MOUTH DAILY FOR 90 DAYS 90 tablet 3    clindamycin (CLEOCIN) 300 MG capsule TAKE 2 CAPSULES BY MOUTH 1 HOURS PRIOR TO THE PROCEDURE  0    clorazepate (TRANXENE) 7 5 mg tablet Take 7 5 mg by mouth 2 (two) times a day   0    clotrimazole-betamethasone (LOTRISONE) 1-0 05 % cream APPLY TOPICALLY 2 (TWO) TIMES A DAY 30 g 1    cyclobenzaprine (FLEXERIL) 5 mg tablet Take 2 tablets (10 mg total) by mouth 3 (three) times a day as needed for muscle spasms 30 tablet 1    DULoxetine (CYMBALTA) 30 mg delayed release capsule Take 30 mg by mouth every morning  0    fluticasone (FLONASE) 50 mcg/act nasal spray 1 spray into each nostril daily 1 Bottle 4    glimepiride (AMARYL) 4 mg tablet TAKE 1/2 TABLET BY MOUTH IN THE MORNING AND 1 TABLET AT NIGHT 180 tablet 2    glucose blood (GLUCOSE METER TEST) test strip 1 each by Other route 2 (two) times a day Use as instructed 100 each 5    hydrocortisone 2 5 % cream Apply topically 3 (three) times a day 30 g 3    lisinopril (ZESTRIL) 20 mg tablet Take 1 tablet (20 mg total) by mouth daily 90 tablet 3    metFORMIN (GLUCOPHAGE) 500 mg tablet Take 1 tablet (500 mg total) by mouth 2 (two) times a day with meals for 270 days 180 tablet 2    metFORMIN (GLUCOPHAGE) 500 mg tablet TAKE 1 TABLET (500 MG TOTAL) BY MOUTH 2 (TWO) TIMES A DAY WITH MEALS  DAYS 180 tablet 2    metoprolol tartrate (LOPRESSOR) 25 mg tablet Take 1 tablet (25 mg total) by mouth every 12 (twelve) hours for 270 days 180 tablet 2    mirtazapine (REMERON) 15 mg tablet TAKE 1 TABLET (15 MG TOTAL) BY MOUTH DAILY AT BEDTIME  DAYS  2    NIFEdipine ER (ADALAT CC) 60 MG 24 hr tablet Take 1 tablet (60 mg total) by mouth daily 30 tablet 2    omeprazole (PriLOSEC) 20 mg delayed release capsule Take 1 capsule (20 mg total) by mouth daily 90 capsule 1    ONETOUCH DELICA LANCETS FINE MISC Test blood sugar twice daily, or as needed when symptomatic of hypoglycemia or hyperglycemia 100 each 5    rosuvastatin (CRESTOR) 40 MG tablet Take 1 tablet (40 mg total) by mouth daily 90 tablet 3    tobramycin-dexamethasone (TOBRADEX) ophthalmic suspension ADMINISTER 1 DROP TO BOTH EYES 2 (TWO) TIMES A DAY 5 mL 3    ranitidine (ZANTAC) 150 MG capsule TAKE 1 CAPSULE (150 MG TOTAL) BY MOUTH 2 (TWO) TIMES A DAY AS NEEDED FOR INDIGESTION OR HEARTBURN  2     No current facility-administered medications for this visit  He is allergic to epinephrine and penicillins       Review of Systems   Constitutional: Negative for appetite change, diaphoresis, fatigue and fever  HENT: Negative for congestion, rhinorrhea and sinus pain  Respiratory: Negative for cough, chest tightness and shortness of breath  Cardiovascular: Negative for chest pain, palpitations and leg swelling  Gastrointestinal: Negative for abdominal distention, abdominal pain, constipation, diarrhea, nausea and vomiting  No bowel incontinence    Genitourinary: Negative for difficulty urinating, frequency and urgency  No urinary incontinence   Musculoskeletal: Positive for back pain  Negative for neck pain  Neurological: Negative for weakness, light-headedness, numbness and headaches  Psychiatric/Behavioral: Negative for agitation and behavioral problems  The patient is not nervous/anxious  Objective:      /60 (BP Location: Left arm, Patient Position: Sitting, Cuff Size: Large)   Pulse 72   Temp 98 3 °F (36 8 °C) (Tympanic)   Resp 14   Ht 5' 7" (1 702 m)   Wt 73 3 kg (161 lb 9 6 oz)   BMI 25 31 kg/m²          Physical Exam   Constitutional: He is oriented to person, place, and time  He appears well-developed and well-nourished  No distress  HENT:   Head: Normocephalic and atraumatic  Neck: Normal range of motion  Neck supple  No thyromegaly present  Cardiovascular: Normal rate, regular rhythm, normal heart sounds and intact distal pulses  Exam reveals no gallop and no friction rub  No murmur heard  Pulmonary/Chest: Effort normal and breath sounds normal  No respiratory distress  He has no wheezes  He has no rales  He exhibits no tenderness  Abdominal: Soft  Bowel sounds are normal  He exhibits no distension and no mass  There is no tenderness  There is no rebound and no guarding  Musculoskeletal: Normal range of motion  He exhibits no edema, tenderness or deformity  Negative straight leg test  No tenderness to palpation on paraspinal muscles   Lymphadenopathy:     He has no cervical adenopathy  Neurological: He is alert and oriented to person, place, and time  Skin: Skin is warm and dry  No rash noted  He is not diaphoretic  No erythema  Psychiatric: He has a normal mood and affect  His behavior is normal  Judgment and thought content normal    Vitals reviewed

## 2019-09-13 NOTE — ASSESSMENT & PLAN NOTE
Lab Results   Component Value Date    HGBA1C 7 9 (H) 05/28/2019       No results for input(s): POCGLU in the last 72 hours  Blood Sugar Average: Last 72 hrs:  Improving  At goal  -A1c 7 1 at this visit  -Continue current regimen with metformin 500 mg BID

## 2019-09-16 ENCOUNTER — HOSPITAL ENCOUNTER (OUTPATIENT)
Dept: RADIOLOGY | Facility: HOSPITAL | Age: 74
Discharge: HOME | End: 2019-09-16
Payer: MEDICARE

## 2019-09-16 ENCOUNTER — TRANSCRIBE ORDERS (OUTPATIENT)
Dept: RADIOLOGY | Facility: HOSPITAL | Age: 74
End: 2019-09-16

## 2019-09-16 DIAGNOSIS — M54.42 ACUTE LEFT-SIDED LOW BACK PAIN WITH LEFT-SIDED SCIATICA: ICD-10-CM

## 2019-09-16 PROCEDURE — 72110 X-RAY EXAM L-2 SPINE 4/>VWS: CPT

## 2019-09-17 ENCOUNTER — HOSPITAL ENCOUNTER (EMERGENCY)
Facility: HOSPITAL | Age: 74
Discharge: HOME | End: 2019-09-17
Attending: EMERGENCY MEDICINE | Admitting: EMERGENCY MEDICINE
Payer: MEDICARE

## 2019-09-17 ENCOUNTER — NURSE TRIAGE (OUTPATIENT)
Dept: PHYSICAL THERAPY | Facility: OTHER | Age: 74
End: 2019-09-17

## 2019-09-17 VITALS
SYSTOLIC BLOOD PRESSURE: 132 MMHG | BODY MASS INDEX: 25.22 KG/M2 | HEART RATE: 57 BPM | OXYGEN SATURATION: 98 % | RESPIRATION RATE: 18 BRPM | DIASTOLIC BLOOD PRESSURE: 67 MMHG | WEIGHT: 161 LBS | TEMPERATURE: 98.1 F

## 2019-09-17 DIAGNOSIS — M54.32 SCIATICA OF LEFT SIDE: Primary | ICD-10-CM

## 2019-09-17 DIAGNOSIS — Z86.19 H/O TINEA CRURIS: Chronic | ICD-10-CM

## 2019-09-17 DIAGNOSIS — M54.42 ACUTE LEFT-SIDED LOW BACK PAIN WITH LEFT-SIDED SCIATICA: Primary | ICD-10-CM

## 2019-09-17 PROCEDURE — 99284 EMERGENCY DEPT VISIT MOD MDM: CPT | Performed by: EMERGENCY MEDICINE

## 2019-09-17 PROCEDURE — 96372 THER/PROPH/DIAG INJ SC/IM: CPT

## 2019-09-17 PROCEDURE — 99283 EMERGENCY DEPT VISIT LOW MDM: CPT

## 2019-09-17 RX ORDER — LIDOCAINE 50 MG/G
1 PATCH TOPICAL DAILY
Qty: 30 PATCH | Refills: 0 | Status: ON HOLD | OUTPATIENT
Start: 2019-09-17 | End: 2019-10-15

## 2019-09-17 RX ORDER — ACETAMINOPHEN 325 MG/1
975 TABLET ORAL ONCE
Status: COMPLETED | OUTPATIENT
Start: 2019-09-17 | End: 2019-09-17

## 2019-09-17 RX ORDER — KETOROLAC TROMETHAMINE 30 MG/ML
15 INJECTION, SOLUTION INTRAMUSCULAR; INTRAVENOUS ONCE
Status: COMPLETED | OUTPATIENT
Start: 2019-09-17 | End: 2019-09-17

## 2019-09-17 RX ORDER — LIDOCAINE 50 MG/G
1 PATCH TOPICAL ONCE
Status: DISCONTINUED | OUTPATIENT
Start: 2019-09-17 | End: 2019-09-17 | Stop reason: HOSPADM

## 2019-09-17 RX ADMIN — KETOROLAC TROMETHAMINE 15 MG: 30 INJECTION, SOLUTION INTRAMUSCULAR at 08:15

## 2019-09-17 RX ADMIN — ACETAMINOPHEN 975 MG: 325 TABLET ORAL at 08:15

## 2019-09-17 RX ADMIN — LIDOCAINE 1 PATCH: 50 PATCH CUTANEOUS at 08:14

## 2019-09-17 NOTE — ED ATTENDING ATTESTATION
Salo Oswald MD, saw and evaluated the patient  I have discussed the patient with the resident/non-physician practitioner and agree with the resident's/non-physician practitioner's findings, Plan of Care, and MDM as documented in the resident's/non-physician practitioner's note, except where noted  All available labs and Radiology studies were reviewed  I was present for key portions of any procedure(s) performed by the resident/non-physician practitioner and I was immediately available to provide assistance  At this point I agree with the current assessment done in the Emergency Department  I have conducted an independent evaluation of this patient a history and physical is as follows: The patient presents for complaints of left lower back pain is been present for over a month he has been seen by his family doctor and has had imaging   Patient denies abdominal pain, denies fever chills, denies urinary symptoms, denies bowel or bladder difficulty, denies paresthesias or groin numbness  He has no weakness and he has no difficulty walking    No IV drug abuse no history cancer  Exam well-appearing male he is in no acute distress lungs clear heart regular abdomen soft nontender no masses no bruits normal pulses Back exam is tender in the left sciatic area no skin rash noted negative straight leg raise deep tendon reflexes intact motor strength is normal sensory is normal gait is normal  Impression:  Sciatica    Critical Care Time  Procedures

## 2019-09-17 NOTE — TELEPHONE ENCOUNTER
Background - Initial Assessment  Clinical complaint: Acute left sided low back/hip pain that does go down his left leg, down to his foot  Pt stated no known injury  Pt was given Lidoderm patch and Tylenol  Pt stated that this is the first time that he has ever had back pain like this  Pt does sit to help relieve the pain, and stated that standing still is worse, and walking does help with the pain  Date of onset: 1 5 months  Frequency of pain: constant  Quality of pain: sharp and stabbing, goes down his entire leg  Protocols used: [facility] COMPREHENSIVE SPINE PROGRAM PROTOCOL     #[id number]    This nurse did review in detail the comp spine program and what we can provide for the pt for their back pain  Pt is agreeable to being triaged by this nurse and would like to have physical therapy  Referrals were placed to the following:  Physical Therapy at the [facility] site  Pt was transferred to  to make evaluation appointment    No further questions voiced at this time and Pt did state understanding of the referral that was placed

## 2019-09-17 NOTE — ED PROVIDER NOTES
History  Chief Complaint   Patient presents with    Back Pain     Pt  reports back pain for the past month and a half on his lower left side that travels down left leg  Patient reports he was seen at the clinic but continues to have too much pain  Patient denies loss of bowel/bladder  Denies numbness/tingling  Patient is a 59-year-old  Male who presents for left-sided lumbar back and leg pain  Patient has had the pain for the past month and a half  He has been seen by his family doctor and was diagnosed with sciatica  Patient was given Flexeril and Tylenol she says not helping with the pain  He says the pain is exactly the same as it has been over a little more intense  He denies any numbness in his legs, saddle anesthesia, bowel or bladder incontinence, urinary retention, fevers, chills, weight loss  Patient says he is able to ambulate on his leg  He describes as a sharp sensation that goes from his lower back down into his foot  He does admit to some tingling on the top of his foot  He denies any trauma to his back or leg  Patient had x-rays done as an outpatient yesterday          Prior to Admission Medications   Prescriptions Last Dose Informant Patient Reported? Taking?    Alcohol Swabs (ALCOHOL PADS) 70 % PADS  Self No No   Sig: USE TWICE DAILY   DULoxetine (CYMBALTA) 30 mg delayed release capsule  Self Yes No   Sig: Take 30 mg by mouth every morning   NIFEdipine ER (ADALAT CC) 60 MG 24 hr tablet  Self No No   Sig: Take 1 tablet (60 mg total) by mouth daily   ONETOUCH DELICA LANCETS FINE MISC  Self No No   Sig: Test blood sugar twice daily, or as needed when symptomatic of hypoglycemia or hyperglycemia   acetaminophen (TYLENOL) 650 mg CR tablet   No No   Sig: Take 1 tablet (650 mg total) by mouth every 8 (eight) hours as needed for mild pain   aspirin (ECOTRIN LOW STRENGTH) 81 mg EC tablet  Self No No   Sig: TAKE 1 TABLET (81 MG TOTAL) BY MOUTH DAILY FOR 90 DAYS   clorazepate (TRANXENE) 7 5 mg tablet  Self Yes No   Sig: Take 7 5 mg by mouth 2 (two) times a day    clotrimazole-betamethasone (LOTRISONE) 1-0 05 % cream  Self No No   Sig: APPLY TOPICALLY 2 (TWO) TIMES A DAY   cyclobenzaprine (FLEXERIL) 5 mg tablet   No No   Sig: Take 2 tablets (10 mg total) by mouth 3 (three) times a day as needed for muscle spasms   fluticasone (FLONASE) 50 mcg/act nasal spray  Self No No   Si spray into each nostril daily   glimepiride (AMARYL) 4 mg tablet  Self No No   Sig: TAKE 1/2 TABLET BY MOUTH IN THE MORNING AND 1 TABLET AT NIGHT   glucose blood (GLUCOSE METER TEST) test strip  Self No No   Si each by Other route 2 (two) times a day Use as instructed   hydrocortisone 2 5 % cream  Self No No   Sig: Apply topically 3 (three) times a day   lisinopril (ZESTRIL) 20 mg tablet  Self No No   Sig: Take 1 tablet (20 mg total) by mouth daily   metFORMIN (GLUCOPHAGE) 500 mg tablet  Self No No   Sig: TAKE 1 TABLET (500 MG TOTAL) BY MOUTH 2 (TWO) TIMES A DAY WITH MEALS  DAYS   metoprolol tartrate (LOPRESSOR) 25 mg tablet  Self No No   Sig: Take 1 tablet (25 mg total) by mouth every 12 (twelve) hours for 270 days   mirtazapine (REMERON) 15 mg tablet  Self Yes No   Sig: TAKE 1 TABLET (15 MG TOTAL) BY MOUTH DAILY AT BEDTIME  DAYS   omeprazole (PriLOSEC) 20 mg delayed release capsule  Self No No   Sig: Take 1 capsule (20 mg total) by mouth daily   ranitidine (ZANTAC) 150 MG capsule   Yes No   Sig: TAKE 1 CAPSULE (150 MG TOTAL) BY MOUTH 2 (TWO) TIMES A DAY AS NEEDED FOR INDIGESTION OR HEARTBURN   rosuvastatin (CRESTOR) 40 MG tablet  Self No No   Sig: Take 1 tablet (40 mg total) by mouth daily   tobramycin-dexamethasone (TOBRADEX) ophthalmic suspension  Self No No   Sig: ADMINISTER 1 DROP TO BOTH EYES 2 (TWO) TIMES A DAY      Facility-Administered Medications: None       Past Medical History:   Diagnosis Date    AS (aortic stenosis)     Balanitis     Biceps tendonitis on right     CAD (coronary artery disease)  [facility]     skin    Complete rotator cuff tear or rupture of right shoulder, not specified as traumatic     Diabetes mellitus [facility]     Esophageal reflux     High cholesterol     Hypertension     Hypertriglyceridemia     Hypogonadism in male     Myalgia     Myocardial infarction (HCC)     Palpitations     Shoulder pain     Sinus problem     Skin cancer of nose     Sleep apnea     Stroke [facility] 4527    Systolic murmur     recently found    Tinea cruris     Tinea pedis        Past Surgical History:   Procedure Laterality Date    CATARACT EXTRACTION Left     COLONOSCOPY      CORONARY ANGIOPLASTY WITH STENT PLACEMENT      CORONARY ARTERY BYPASS GRAFT N/A 12/12/2016    Procedure: INTRAOP CECILLE; BILATERAL LEG EVH; CORONARY ARTERY BYPASS GRAFT X 4 SVG TO PDA, OM1,  AND DIAGONAL1, LIMA TO LAD;  Surgeon: Jae Glaser MD;  Location: [facility];  Service:     EYE SURGERY      HERNIA REPAIR      MD ARTHROSCOPY SHOULDER SURGICAL BICEPS TENODESIS Right 5/6/2016    Procedure: ARTHROSCOPIC BICEPS TENODESIS;  Surgeon: Cira Newell MD;  Location: [facility];  Service: Orthopedics    MD SHLDR ARTHROSCOP,SURG,W/ROTAT CUFF REPR Right 5/6/2016    Procedure: REPAIR ROTATOR CUFF  ARTHROSCOPIC; SUBACROMIAL DECOMPRESSION; PARTIAL SYNOVECTOMY;  Surgeon: Cira Newell MD;  Location: [facility];  Service: Orthopedics    TESTICLE SURGERY      UMBILICAL HERNIA REPAIR  2009    over age 13       Family History   Problem Relation Age of Onset    Heart disease Mother     Hypertension Mother     Nephrolithiasis Father     Other Father         Renal disease    Hypertension Sister     Nephrolithiasis Brother     Other Brother         Renal disease    Hematuria Family         Microscopic     I have reviewed and agree with the history as documented      Social History     Tobacco Use    Smoking status: Former Smoker    Smokeless tobacco: Never Used    Tobacco comment: smoked for 3 years from 17 y/o - 23 yrs old   Substance Use Topics    Alcohol use: No    Drug use: No        Review of Systems   Constitutional: Negative for chills, fever and unexpected weight change  HENT: Negative for congestion, sore throat and trouble swallowing  Eyes: Negative for pain, discharge and itching  Respiratory: Negative for cough, chest tightness, shortness of breath and wheezing  Cardiovascular: Negative for chest pain, palpitations and leg swelling  Gastrointestinal: Negative for abdominal pain, blood in stool, diarrhea, nausea and vomiting  Endocrine: Negative for polyuria  Genitourinary: Negative for difficulty urinating, dysuria, frequency and hematuria  Musculoskeletal: Positive for back pain  Negative for arthralgias  Neurological: Negative for dizziness, syncope, weakness, light-headedness and headaches  Physical Exam  ED Triage Vitals [09/17/19 0739]   Temperature Pulse Respirations Blood Pressure SpO2   98 1 °F (36 7 °C) 57 18 132/67 98 %      Temp Source Heart Rate Source Patient Position - Orthostatic VS BP Location FiO2 (%)   Tympanic Monitor Sitting Right arm --      Pain Score       Worst Possible Pain             Orthostatic Vital Signs  Vitals:    09/17/19 0739   BP: 132/67   Pulse: 57   Patient Position - Orthostatic VS: Sitting       Physical Exam   Constitutional: He is oriented to person, place, and time  He appears well-developed and well-nourished  No distress  HENT:   Head: Normocephalic and atraumatic  Right Ear: External ear normal    Left Ear: External ear normal    Eyes: Pupils are equal, round, and reactive to light  Conjunctivae and EOM are normal    Neck: Normal range of motion  Cardiovascular: Normal rate, regular rhythm, normal heart sounds and intact distal pulses  Exam reveals no gallop and no friction rub  No murmur heard  Pulmonary/Chest: Effort normal and breath sounds normal  No respiratory distress  He has no wheezes  He has no rales  Abdominal: Soft   Bowel sounds are normal  He exhibits no distension  There is no tenderness  There is no guarding  Musculoskeletal: Normal range of motion  He exhibits tenderness ( SI joint on left)  He exhibits no edema or deformity  Lymphadenopathy:     He has no cervical adenopathy  Neurological: He is alert and oriented to person, place, and time  No cranial nerve deficit or sensory deficit  He exhibits normal muscle tone  2+ patellar and Achilles reflexes in left leg   sensation intact in left leg   5/5 muscle strength in left leg   patient able to ambulate in the department without difficulty   Skin: Skin is warm and dry  Psychiatric: He has a normal mood and affect  His behavior is normal    Nursing note and vitals reviewed  ED Medications  Medications   lidocaine (LIDODERM) 5 % patch 1 patch (1 patch Topical Medication Applied 9/17/19 0814)   ketorolac (TORADOL) injection 15 mg (15 mg Intramuscular Given 9/17/19 0815)   acetaminophen (TYLENOL) tablet 975 mg (975 mg Oral Given 9/17/19 0815)       Diagnostic Studies  Results Reviewed     None                 No orders to display         Procedures  Procedures        ED Course                               MDM  Number of Diagnoses or Management Options  Sciatica of left side:   Diagnosis management comments:  72-year-old male presenting for sciatica to left leg  Has been dealing with the pain for the last month and a half and seems family doctor  No new symptoms, no red flag symptoms  Pain is the same as it has been for the past month and a half  No focal neurologic deficits  Patient able to ambulate  Will give Toradol, Lidoderm patch and Tylenol  Patient given script for Lidoderm patches  Given ambulatory referral to physical therapy        Disposition  Final diagnoses:   Sciatica of left side     Time reflects when diagnosis was documented in both MDM as applicable and the Disposition within this note     Time User Action Codes Description Comment    9/17/2019 9:11 AM Joaquín Javed Add [M54 32] Sciatica of left side       ED Disposition     ED Disposition Condition Date/Time Comment    Discharge Stable Tue Sep 17, 2019  [address] discharge to home/self care              Follow-up Information     Follow up With Specialties Details Why Contact Info    your PCP  Schedule an appointment as soon as possible for a visit  For follow up of symptoms           Discharge Medication List as of 9/17/2019  9:12 AM      START taking these medications    Details   lidocaine (LIDODERM) 5 % Apply 1 patch topically daily Remove & Discard patch within 12 hours or as directed by MD, Starting Tue 9/17/2019, Print         CONTINUE these medications which have NOT CHANGED    Details   acetaminophen (TYLENOL) 650 mg CR tablet Take 1 tablet (650 mg total) by mouth every 8 (eight) hours as needed for mild pain, Starting Fri 9/13/2019, Normal      Alcohol Swabs (ALCOHOL PADS) 70 % PADS USE TWICE DAILY, Normal      aspirin (ECOTRIN LOW STRENGTH) 81 mg EC tablet TAKE 1 TABLET (81 MG TOTAL) BY MOUTH DAILY FOR 90 DAYS, Starting Sun 7/14/2019, Until Sat 10/12/2019, Normal      clorazepate (TRANXENE) 7 5 mg tablet Take 7 5 mg by mouth 2 (two) times a day , Starting Wed 3/28/2018, Historical Med      clotrimazole-betamethasone (LOTRISONE) 1-0 05 % cream APPLY TOPICALLY 2 (TWO) TIMES A DAY, Starting Thu 8/8/2019, Normal      cyclobenzaprine (FLEXERIL) 5 mg tablet Take 2 tablets (10 mg total) by mouth 3 (three) times a day as needed for muscle spasms, Starting Fri 9/13/2019, Normal      DULoxetine (CYMBALTA) 30 mg delayed release capsule Take 30 mg by mouth every morning, Starting Sat 6/22/2019, Historical Med      fluticasone (FLONASE) 50 mcg/act nasal spray 1 spray into each nostril daily, Starting Sat 6/22/2019, Normal      glimepiride (AMARYL) 4 mg tablet TAKE 1/2 TABLET BY MOUTH IN THE MORNING AND 1 TABLET AT NIGHT, Normal      glucose blood (GLUCOSE METER TEST) test strip 1 each by Other route 2 (two) times a day Use as instructed, Starting Sat 6/22/2019, Normal      hydrocortisone 2 5 % cream Apply topically 3 (three) times a day, Starting Mon 8/5/2019, Normal      lisinopril (ZESTRIL) 20 mg tablet Take 1 tablet (20 mg total) by mouth daily, Starting Thu 8/15/2019, Normal      metFORMIN (GLUCOPHAGE) 500 mg tablet TAKE 1 TABLET (500 MG TOTAL) BY MOUTH 2 (TWO) TIMES A DAY WITH MEALS  DAYS, Starting Mon 8/5/2019, Until Fri 5/1/2020, Normal      metoprolol tartrate (LOPRESSOR) 25 mg tablet Take 1 tablet (25 mg total) by mouth every 12 (twelve) hours for 270 days, Starting Fri 3/15/2019, Until Tue 12/10/2019, Normal      mirtazapine (REMERON) 15 mg tablet TAKE 1 TABLET (15 MG TOTAL) BY MOUTH DAILY AT BEDTIME  DAYS, Historical Med      NIFEdipine ER (ADALAT CC) 60 MG 24 hr tablet Take 1 tablet (60 mg total) by mouth daily, Starting Wed 7/24/2019, Normal      omeprazole (PriLOSEC) 20 mg delayed release capsule Take 1 capsule (20 mg total) by mouth daily, Starting Mon 8/5/2019, Normal      ONETOUCH DELICA LANCETS FINE MISC Test blood sugar twice daily, or as needed when symptomatic of hypoglycemia or hyperglycemia, Normal      ranitidine (ZANTAC) 150 MG capsule TAKE 1 CAPSULE (150 MG TOTAL) BY MOUTH 2 (TWO) TIMES A DAY AS NEEDED FOR INDIGESTION OR HEARTBURN, Historical Med      rosuvastatin (CRESTOR) 40 MG tablet Take 1 tablet (40 mg total) by mouth daily, Starting Thu 8/15/2019, Normal      tobramycin-dexamethasone (TOBRADEX) ophthalmic suspension ADMINISTER 1 DROP TO BOTH EYES 2 (TWO) TIMES A DAY, Starting Fri 8/16/2019, Normal               ED Provider  Attending physically available and evaluated [facility]  I managed the patient along with the ED Attending      Electronically Signed by         Ramon Wells DO  09/17/19 6442

## 2019-09-23 ENCOUNTER — TELEPHONE (OUTPATIENT)
Dept: FAMILY MEDICINE CLINIC | Facility: CLINIC | Age: 74
End: 2019-09-23

## 2019-09-23 ENCOUNTER — EVALUATION (OUTPATIENT)
Dept: PHYSICAL THERAPY | Facility: REHABILITATION | Age: 74
End: 2019-09-23
Payer: MEDICARE

## 2019-09-23 VITALS — HEART RATE: 60 BPM | TEMPERATURE: 98.8 F | SYSTOLIC BLOOD PRESSURE: 148 MMHG | DIASTOLIC BLOOD PRESSURE: 82 MMHG

## 2019-09-23 DIAGNOSIS — M54.42 ACUTE LEFT-SIDED LOW BACK PAIN WITH LEFT-SIDED SCIATICA: Primary | ICD-10-CM

## 2019-09-23 PROCEDURE — 97110 THERAPEUTIC EXERCISES: CPT | Performed by: PHYSICAL THERAPIST

## 2019-09-23 PROCEDURE — 97161 PT EVAL LOW COMPLEX 20 MIN: CPT | Performed by: PHYSICAL THERAPIST

## 2019-09-23 NOTE — PROGRESS NOTES
PT Evaluation     Today's date: 2019  Patient name: Natalie Olvera  :   MRN: [id number]  Referring provider: Brady Salinas MD  Dx:   Encounter Diagnosis     ICD-10-CM    1  Acute left-sided low back pain with left-sided sciatica M54 42 Ambulatory referral to PT spine                  Assessment  Assessment details: Patient is a 69 y o  male referred to PT via the comprehensive spine program with complaints of L sided LB and LE  Patient reports onset of pain complaints occurred 2 months ago for no apparent reason  The pain complaints are aggravated by walking and sitting  Clinical examination does demonstrate a favorable response to repeated movement testing specifically extension  He denies bowel and bladder abnormalities and displays no evidence of neurologically mediated weakness  No other referral appears necessary at this time  Impairments: abnormal or restricted ROM, abnormal movement, activity intolerance, lacks appropriate home exercise program, pain with function, weight-bearing intolerance and poor posture   Functional limitations: IADls; Gait; Recreational activities  Symptom irritability: lowBarriers to therapy: none  Understanding of Dx/Px/POC: good   Prognosis: good    Goals  Short Term goals - 4 weeks  1  Patient will be independent and compliant with a HEP  2   Patient will report a 50% decrease in pain complaints  Long Term goals - 8 weeks  1  Patient will report elimination of pain complaints  2   Patient will return to all IADls without restriction  3   Patient will return to all recreational activities without restriction        Plan  Patient would benefit from: skilled physical therapy  Planned modality interventions: cryotherapy  Planned therapy interventions: joint mobilization, manual therapy, abdominal trunk stabilization, neuromuscular re-education, patient education, postural training, therapeutic exercise and home exercise program  Frequency: 2x week  Duration in visits: 12  Duration in weeks: 6  Plan of Care beginning date: 2019  Plan of Care expiration date: 2019  Treatment plan discussed with: patient        Subjective Evaluation    History of Present Illness  Date of onset: 2019  Mechanism of injury: Insidious onset of L sided LB and LE pain           Not a recurrent problem   Quality of life: good    Pain  Current pain rating: 3  At best pain ratin  At worst pain ratin  Location: L LB/LE L4/5 pattern  Quality: discomfort and pressure  Relieving factors: change in position  Aggravating factors: walking and sitting  Progression: no change    Social Support    Employment status: not working  Treatments  Previous treatment: medication  Current treatment: physical therapy  Patient Goals  Patient goals for therapy: decreased pain, increased motion, increased strength, independence with ADLs/IADLs and return to sport/leisure activities          Objective     Concurrent Complaints  Negative for night pain, disturbed sleep, bladder dysfunction, bowel dysfunction and saddle (S4) numbness    Postural Observations  Seated posture: fair  Standing posture: fair  Correction of posture: makes symptoms better        Neurological Testing     Sensation     Lumbar   Left   Intact: light touch    Right   Intact: light touch    Reflexes   Left   Patellar (L4): normal (2+)  Achilles (S1): normal (2+)    Right   Patellar (L4): normal (2+)  Achilles (S1): trace (1+)    Active Range of Motion     Lumbar   Flexion:  Restriction level: minimal  Extension:  Restriction level: minimal    Additional Active Range of Motion Details  L side glide - 50% p!   R side glide - 25%   Mechanical Assessment    Cervical      Thoracic      Lumbar    Standing flexion: repeated movements   Pain location:no change  Pain level: increased  Lying flexion: repeated movements  Pain location: no change  Lying extension: repeated movements  Pain intensity: better  Pain level: abolished    Tests     Lumbar   Negative SIJ compression, sacroiliac distraction and sacral spring   Left   Positive passive SLR and slump test    Negative crossed SLR  Right   Negative crossed SLR, passive SLR and slump test      Left Hip   Negative HILLARY       Additional Tests Details  -L hip quadrant      Flowsheet Rows      Most Recent Value   PT/OT G-Codes   Current Score  33   Projected Score  52             Precautions: HTN; DM; CABG      Manual              Ext mob in prone                                                                     Exercise Diary              Treadmill             Prone press up             Slump sliders                                                                                                                                                                                                                                              Modalities

## 2019-09-25 ENCOUNTER — OFFICE VISIT (OUTPATIENT)
Dept: PHYSICAL THERAPY | Facility: REHABILITATION | Age: 74
End: 2019-09-25
Payer: MEDICARE

## 2019-09-25 DIAGNOSIS — K21.9 GASTROESOPHAGEAL REFLUX DISEASE, ESOPHAGITIS PRESENCE NOT SPECIFIED: ICD-10-CM

## 2019-09-25 DIAGNOSIS — M54.42 ACUTE LEFT-SIDED LOW BACK PAIN WITH LEFT-SIDED SCIATICA: Primary | ICD-10-CM

## 2019-09-25 DIAGNOSIS — M54.42 ACUTE LEFT-SIDED LOW BACK PAIN WITH LEFT-SIDED SCIATICA: ICD-10-CM

## 2019-09-25 PROCEDURE — 97110 THERAPEUTIC EXERCISES: CPT | Performed by: PHYSICAL THERAPIST

## 2019-09-25 PROCEDURE — 97140 MANUAL THERAPY 1/> REGIONS: CPT | Performed by: PHYSICAL THERAPIST

## 2019-09-25 RX ORDER — ACETAMINOPHEN 650 MG/1
TABLET, FILM COATED, EXTENDED RELEASE ORAL
Qty: 30 TABLET | Refills: 1 | OUTPATIENT
Start: 2019-09-25

## 2019-09-25 NOTE — PROGRESS NOTES
Daily Note     Today's date: 2019  Patient name: Ace Nunez  :   MRN: [id number]  Referring provider: Kamila Marcial MD  Dx:   Encounter Diagnosis     ICD-10-CM    1  Acute left-sided low back pain with left-sided sciatica M54 42                   Subjective: No pain upon entering but continues to have LE pain      Objective: See treatment diary below      Assessment: Tolerated treatment well  Patient exhibited good technique with therapeutic exercises      Plan: Continue per plan of care        Precautions: HTN; DM; CABG      Manual              Ext mob in prone ALEJANDRO 10'            L hip PROM 5'            90/90 sciatic nerve glides ALEJANDRO x20                                          Exercise Diary              Recumbent bike 10'            Prone press up 2x10            Slump sliders 20            LTR 5s x10                                                                                                                                                                                                                                Modalities

## 2019-09-26 ENCOUNTER — OFFICE VISIT (OUTPATIENT)
Dept: FAMILY MEDICINE CLINIC | Facility: CLINIC | Age: 74
End: 2019-09-26

## 2019-09-26 VITALS
DIASTOLIC BLOOD PRESSURE: 70 MMHG | SYSTOLIC BLOOD PRESSURE: 128 MMHG | BODY MASS INDEX: 25.11 KG/M2 | HEIGHT: 67 IN | RESPIRATION RATE: 16 BRPM | WEIGHT: 160 LBS | TEMPERATURE: 100 F | HEART RATE: 70 BPM

## 2019-09-26 DIAGNOSIS — E11.9 TYPE 2 DIABETES MELLITUS WITHOUT COMPLICATION, WITHOUT LONG-TERM CURRENT USE OF INSULIN (HCC): ICD-10-CM

## 2019-09-26 DIAGNOSIS — H04.123 DRY EYE SYNDROME OF BOTH EYES: Primary | ICD-10-CM

## 2019-09-26 DIAGNOSIS — M54.32 SCIATICA OF LEFT SIDE: ICD-10-CM

## 2019-09-26 DIAGNOSIS — Z01.818 PREOPERATIVE EXAMINATION: ICD-10-CM

## 2019-09-26 PROCEDURE — 99213 OFFICE O/P EST LOW 20 MIN: CPT | Performed by: FAMILY MEDICINE

## 2019-09-26 RX ORDER — CLINDAMYCIN HYDROCHLORIDE 300 MG/1
CAPSULE ORAL
Refills: 0 | COMMUNITY
Start: 2019-09-17 | End: 2020-03-12 | Stop reason: SDUPTHER

## 2019-09-26 RX ORDER — GABAPENTIN 100 MG/1
100 CAPSULE ORAL
Qty: 30 CAPSULE | Refills: 0 | Status: SHIPPED | OUTPATIENT
Start: 2019-09-26 | End: 2019-10-10 | Stop reason: SDUPTHER

## 2019-09-26 NOTE — PROGRESS NOTES
[facility] PRE-OPERATIVE EXAMINATION  Bernard Green  1945    Subjective  HPI  Bernard Green is a 69 y o  male with chronic dry eye and irritation OU who is planning to undergo B/L eye probing of nasal lacrimal duct +/- irrigation with insertion of tube or stent under unspecified anesthesia by Dr Sneha Rico on 10/29/19  Patient has not had complications with anesthesia in the past     Also complaining of L side sciatica acting up, asks to start a medication if possible due to shooting pain down his L leg from his lower back  ROS:   Chest pain: no   Shortness of breath: no  Shortness of breath with exertion: no  Orthopnea: no, uses 1 pillow only  Dizziness: no  Unexplained weight change: no  Wt Readings from Last 3 Encounters:   09/26/19 72 6 kg (160 lb)   09/17/19 73 kg (161 lb)   09/13/19 73 3 kg (161 lb 9 6 oz)     PMH:  CAD: yes, MI in 2006 s/p cath with EYAL to LAD, MI in 2012 with stent to RCA, and NSTEMI 2016 with multivessel disease, s/p CABG (4 vessel bypass with LIMA to the LAD, vein grafts to the diagonal, OM1, PDA); echo 6/28/19 showed low-normal LV function with inferior wall motion abnormality, G2DD, and mild aortic stenosis; follows with Dr Troy Giraldo (Cardiology), taking lopressor 25 mg BID, crestor 40 mg QD  Currently asymptomatic  HTN: yes, taking lisinopril and nifedipine ER   CKD: no  DM: yes on insulin: no (taking metformin and amaryl only, last HbA1C 7 9% 5/28/19)  History of CVA: no    Social Hx: He reports that he quit smoking several years ago  He has never used smokeless tobacco  He reports that he does not drink alcohol or use drugs  Objective  /70 (BP Location: Left arm, Patient Position: Sitting, Cuff Size: Standard)   Pulse 70   Temp 100 °F (37 8 °C) (Tympanic)   Resp 16   Ht 5' 7" (1 702 m)   Wt 72 6 kg (160 lb)   BMI 25 06 kg/m²   Physical Exam   Constitutional: He is oriented to person, place, and time  He appears well-developed and well-nourished     HENT:   Head: Normocephalic and atraumatic  Eyes: Conjunctivae are normal  Right eye exhibits no discharge  Left eye exhibits no discharge  Neck: Normal range of motion  Neck supple  Cardiovascular: Normal rate, regular rhythm and intact distal pulses  Murmur heard  Pulmonary/Chest: Effort normal and breath sounds normal    Abdominal: Soft  Bowel sounds are normal  He exhibits no distension  Musculoskeletal: He exhibits no edema  Mild tenderness of L>R lumbar paravertebral musculature without hypertonicity   Neurological: He is alert and oriented to person, place, and time  Motor strength 5/5 B/L UE/LE, normal sensation   Skin: Skin is warm  Capillary refill takes less than 2 seconds  Psychiatric: He has a normal mood and affect  His behavior is normal  Judgment and thought content normal    Vitals reviewed      Revised Cardiac Risk Index (RCRI) for Pre-Operative Risk   (estimates risk of cardiac complications after noncardiac surgery)    · High-risk surgery: No 0 / Yes +1  · Intraperitoneal, intrathoracic, suprainguinal vascular  · History of ischemic heart disease: No 0 / Yes +1  · Hx of MI, (+) exercise test, current chest pain considered due to myocardial ischemia, use of nitrate therapy or ECG with pathological Q waves)  · History of CHF: No 0 / Yes +1  · Pulmonary edema, B/L rales or S3 gallop; MOORE, orthopnea, PND, CXR showing pulmonary vascular redistribution)  · History of cerebrovascular disease: No 0 / Yes +1  · Prior TIA or stroke  · Pre-operative treatment with insulin: No 0 / Yes +1  · Pre-operative creatinine >2 mg/dL: No 0 / Yes +1    RCRI Scoring:  · 0 points: Class I Risk, 3 9% Risk of Major Cardiac Event  · 1 point: Class II Risk, 6 0% Risk of Major Cardiac Event  · 2 points: Class III Risk, 10 1% Risk of Major Cardiac Event  · 3 points: Class IV Risk, 15% Risk of Major Cardiac Event  · 4 points: Class IV Risk, 15% Risk of Major Cardiac Event  · 5 points: Class IV Risk, 15% Risk of Major Cardiac Event  · 6 points: Class IV Risk, 15% Risk of Major Cardiac Event    Lab Results   Component Value Date    CREATININE 0 91 07/18/2019     POCT EKG: Sinus bradycardia, unchanged from previous EKG 5/28/2019    Assessment/Plan:  Aaron Hawley was seen today for pre-op exam     Diagnoses and all orders for this visit:    Dry eye syndrome of both eyes  -     NT-BNP PRO; Future    Preoperative examination  -     NT-BNP PRO; Future    Type 2 diabetes mellitus without complication, without long-term current use of insulin (HCC)  -     HEMOGLOBIN A1C W/ EAG ESTIMATION; Future    Sciatica of left side  -     gabapentin (NEURONTIN) 100 mg capsule; Take 1 capsule (100 mg total) by mouth daily at bedtime  -     diclofenac sodium (VOLTAREN) 1 %; Apply 2 g topically 4 (four) times a day      Recommendations:  Kale Ingram is undergoing an elective Low Risk surgery, B/L eye probing of nasal lacrimal duct +/- irrigation with insertion of tube or stent  He is RCRI class III risk (2 points for CAD/CHF) with 10 1% risk for major adverse cardiac event (MACE)  Due to his extensive cardiac history, he is intermediate risk for this low-risk surgery  EKG unchanged today  He may proceed with surgery as planned without further workup, but NT-proBNP is recommended due to age>65 and RCRI>2 per 2016 [facility] Perioperative guidelines  Lab slip given to patient, along with repeat HbA1C  Pre-operative form completed and faxed today to office as requested  Discussed with MARIBEL Coleman  PGY-3  [place] [id number]

## 2019-09-26 NOTE — PATIENT INSTRUCTIONS
Ciática   LO QUE NECESITA SABER:   ¿Qué es la ciática? La ciática es barron condición que causa dolor en el nervio ciático  El nervio ciático sale de la smita dorsal y corre por ambos lados de los glúteos  Luego baja por la parte posterior del muslo, la parte inferior de la pierna y el pie  El nervio ciático puede estar comprimido, [place], irritado o estirado en alguna parte y causar síntomas  ¿Qué causa la ciática? La ciática puede estar relacionada con ciertas actividades, suzanne postura y tensión física o psicológica  Cualquiera de los factores siguientes puede causar o incrementar li riesgo de tener ciática:  · Problema con los discos:  La causa más común de la ciática es barron hernia de disco (tejido acolchado que se encuentra entre los huesos de la columna)  Es posible que el disco ponga presión en el nervio ciático  Puede que un hueso de la columna se deslice sobre otro, o que se haya estrechado el espacio en los huesos de la columna  · Lesión muscular:  [place] puede suceder después de torcerse o levantar un objeto pesado  La inflamación que resulta del esguince o irritación muscular en los glúteos, los muslos o las piernas pone presión en el nervio ciático     · Obesidad o embarazo:  El exceso de peso pone más presión en la espalda y las piernas  · Trauma:  Los golpes [employer], los muslos o las piernas, los accidentes automovilísticos o las caídas pueden lesionar el nervio ciático     · Enfermedades de la columna vertebral:  La artritis, osteoporosis, cáncer o infección de la columna también pueden afectar el nervio ciático   ¿Cuáles son los signos y síntomas de la ciática?   La ciática puede tener síntomas a corto o a abran plazo:  · Dolor que comienza en la parte inferior de la espalda y baja por los glúteos y la parte posterior del muslo    · Pérdida de la sensación u hormigueo en los glúteos y las piernas    · Debilidad muscular, dificultad para moverse o para controlar la pierna o el pie    · Dolor en las piernas que aumenta cuando está de pie, sentado o en cuclillas  ¿Cómo se diagnostica la ciática? Li médico le preguntará sobre quinton otras condiciones médicas  Puede que le pregunte acerca de li [place], li historial de dolor en la espalda y las enfermedades o cirugías que mccartney tenido  Marval [place] y [employer] las piernas para comprobar qué hace que el dolor aumente  Es posible que también necesite alguno de los siguientes tratamientos:  · Radiografía: Son imágenes de los huesos y los tejidos de li espalda, caderas, muslos o piernas  Es posible que esta prueba muestre otros [facility], seth fracturas (Adolm [place])  · Tomografía computarizada:  Galilea examen también se conoce seth escán TAC  Celesta Mash de arthur x [place] barron computadora para kavon imágenes de quinton caderas, muslos y piernas  Puede que las imágenes muestren li nervio ciático, músculos y vasos sanguíneos  Es posible que le administren un medio de contraste antes de kavon las imágenes para que los médicos las puedan reese con mayor claridad  Dígale al médico si usted alguna vez ha tenido barron reacción alérgica al tinte de [place]  · Imágenes por resonancia magnética (IRM):  En galilea estudio se utilizan imanes potentes y Carolene Glaze computadora para kavon imágenes de las caderas, muslos y piernas  Puede que la IRM muestre si se ha dañado los nervios, músculos, huesos y vasos sanguíneos  Le podrían administrar un tinte para ayudar a que las imágenes se vean mejor  Dígale al médico si usted alguna vez ha tenido barron reacción alérgica al tinte de [place]  No entre a la abraham donde se realiza la resonancia magnética con algo de metal  El metal puede causar lesiones serias  Dígale al médico si usted tiene algo de metal por dentro o sobre li cuerpo  · Alvena Axon (EMG)  es un examen que mide la actividad eléctrica de quinton músculos en descanso y en movimiento      · Pruebas de conducción nerviosa:  Estos estudios comprueban la manera en que los nervios superficiales y los músculos relacionados reaccionan a la estimulación  Se colocan electrodos con cables o agujas diminutas en ciertas áreas, seth los glúteos y las piernas  ¿Cuál es el tratamiento para la ciática? · AINEs (analgésicos antiinflamatorios no esteroides):  Estos medicamentos disminuyen la inflamación y el dolor  Los AINEs se pueden obtener sin receta médica  Consulte con li médico cuál medicamento es el adecuado para usted  Pregunte cuánto kavon y cuándo  Tómelos seth se le indique  Cuando no se mike de la [employer], los medicamentos antiinflamatorios no esteroides pueden causar sangrado estomacal o problemas renales  · Acetaminofeno:  Galilea medicamento disminuye el dolor  El acetaminofeno puede obtener sin receta médica  Pregunte la cantidad y la frecuencia con que debe tomarlos  [id number]  El acetaminofén puede causar daño en el hígado cuando no se jameson de forma correcta  · Relajantes musculares  ayudan a reducir dolor y espasmos musculares  · Medicamento esteroide epidural:  Puede incluir tanto un anestésico (medicamento para adormecer) seth un esteroide, que puede bajar la inflamación y calmar el dolor  Se administra en forma de inyección cerca de la smita dorsal, en el área donde siente el dolor  · Quimionucleólisis:  Se administra lilia inyección en el disco afectado para ablandarlo o encogerlo  · Cirugía:  Es posible que se realice lilia cirugía para corregir problemas seth un disco dañado o un tumor en la columna  Se puede realizar para disminuir la presión en el nervio ciático  Los médicos también pueden liberar los músculos que están presionando el nervio ciático   ¿Cómo puedo ayudar a controlar la ciática? · Terapia de ultrasonido:  Lilia máquina emplea ondas sonoras para aliviar el dolor  Es posible que se use además un medicamento tópico para contribuir a calmar el dolor y la inflamación      · Fisioterapia:  Ashleigh Bernal fisioterapeuta le puede enseñar ejercicios para ayudarle a mejorar el movimiento y la fuerza, y para disminuir el dolor  Un terapeuta ocupacional le enseña habilidades para ayudarlo con quinton actividades diarias  · Dispositivos de asistencia:  Es posible que deba usar un soporte para la espalda, seth barron Ish Gosselin  Puede que necesite muletas, un bastón o un andador para disminuir la presión en los músculos de la parte inferior de la espalda y las piernas  Pregunte a li médico por más [employer] dispositivos de asistencia y cómo se usan de forma correcta  ¿Cómo se puede prevenir la ciática? · Evite la presión en la espalda y las piernas:  No  levante objetos pesados, ni esté de pie o sentado por períodos prolongados de tiempo  · Levante los objetos de Irma bernard: Mantenga la [place] y doble las rodillas para alzar un objeto  No doble ni tuerza la espalda cuando levante algo  · Mantenga un peso saludable:  Consulte con li médico cuánto debería pesar  Pida que le ayude a crear un plan para bajar de peso si usted tiene sobrepeso  · Ejercicio:  Pídale a li médico que le recomiende el programa de estiramiento, calentamiento y ejercicio más apropiado para usted  ¿Cuáles son los riesgos de la ciática? Si no se administra correctamente, la inyección epidural de esteroide puede resultar en trastornos de dolor o parálisis  También puede causar dolor de sharath, dolor en la pierna y bloqueo del flujo sanguíneo a la médula negron  Puede sangrar o contraer barron infección seth resultado de Beauty Dilling  Si no recibe tratamiento, quinton músculos y nervios se podrían dañar de forma permanente  Es posible que tenga menos fuerza  Es posible que no pueda  la pierna o controlar cuándo orina o [employer] intestinos  ¿Cuándo esme comunicarme con mi médico?   · Le duele la parte inferior de la espalda por la noche o cuando descansa      · Le duele la parte inferior de la espalda y se le adormece la pierna por debajo de la rodilla  · Tiene debilidad en barron pierna solamente  · Usted tiene preguntas o inquietudes acerca de lisa condición o cuidado  ¿Cuándo esme buscar atención inmediata o llamar al 911? · Tiene dificultad para contener la orina o las evacuaciones intestinales  · Tiene debilidad en ambas piernas  · Pierde la sensación en la allison o los glúteos  ACUERDOS SOBRE LISA CUIDADO:   Usted tiene el derecho de ayudar a planear lisa cuidado  Aprenda todo lo que pueda sobre lisa condición y seth darle tratamiento  Discuta quinton opciones de tratamiento con quinton médicos para decidir el cuidado que usted desea recibir  Usted siempre tiene el derecho de rechazar el tratamiento  Esta información es sólo para uso en educación  Lisa intención no es darle un consejo médico sobre enfermedades o tratamientos  Colsulte con lisa Oksana Wilberto farmacéutico antes de seguir cualquier régimen médico para saber si es seguro y efectivo para usted  © 2017 [address] Information is for End User's use only and may not be sold, redistributed or otherwise used for commercial purposes  All illustrations and images included in CareNotes® are the copyrighted property of A D A M , Inc  or Dustin Lamar

## 2019-10-01 ENCOUNTER — APPOINTMENT (OUTPATIENT)
Dept: LAB | Facility: HOSPITAL | Age: 74
End: 2019-10-01
Payer: MEDICARE

## 2019-10-01 ENCOUNTER — APPOINTMENT (OUTPATIENT)
Dept: PHYSICAL THERAPY | Facility: REHABILITATION | Age: 74
End: 2019-10-01
Payer: MEDICARE

## 2019-10-01 ENCOUNTER — TELEPHONE (OUTPATIENT)
Dept: FAMILY MEDICINE CLINIC | Facility: CLINIC | Age: 74
End: 2019-10-01

## 2019-10-01 DIAGNOSIS — Z01.818 PREOPERATIVE EXAMINATION: ICD-10-CM

## 2019-10-01 DIAGNOSIS — H04.123 DRY EYE SYNDROME OF BOTH EYES: ICD-10-CM

## 2019-10-01 DIAGNOSIS — E11.9 TYPE 2 DIABETES MELLITUS WITHOUT COMPLICATION, WITHOUT LONG-TERM CURRENT USE OF INSULIN (HCC): ICD-10-CM

## 2019-10-01 LAB
EST. AVERAGE GLUCOSE BLD GHB EST-MCNC: 166 MG/DL
HBA1C MFR BLD: 7.4 % (ref 4.2–6.3)
NT-PROBNP SERPL-MCNC: 414 PG/ML

## 2019-10-01 PROCEDURE — 36415 COLL VENOUS BLD VENIPUNCTURE: CPT

## 2019-10-01 PROCEDURE — 83880 ASSAY OF NATRIURETIC PEPTIDE: CPT

## 2019-10-01 PROCEDURE — 83036 HEMOGLOBIN GLYCOSYLATED A1C: CPT

## 2019-10-02 ENCOUNTER — APPOINTMENT (OUTPATIENT)
Dept: PHYSICAL THERAPY | Facility: REHABILITATION | Age: 74
End: 2019-10-02
Payer: MEDICARE

## 2019-10-03 ENCOUNTER — OFFICE VISIT (OUTPATIENT)
Dept: PHYSICAL THERAPY | Facility: REHABILITATION | Age: 74
End: 2019-10-03
Payer: MEDICARE

## 2019-10-03 ENCOUNTER — OFFICE VISIT (OUTPATIENT)
Dept: FAMILY MEDICINE CLINIC | Facility: CLINIC | Age: 74
End: 2019-10-03

## 2019-10-03 VITALS — HEIGHT: 67 IN | BODY MASS INDEX: 24.99 KG/M2 | WEIGHT: 159.2 LBS

## 2019-10-03 DIAGNOSIS — M54.42 ACUTE LEFT-SIDED LOW BACK PAIN WITH LEFT-SIDED SCIATICA: Primary | ICD-10-CM

## 2019-10-03 DIAGNOSIS — M54.32 SCIATICA OF LEFT SIDE: ICD-10-CM

## 2019-10-03 DIAGNOSIS — M99.03 SOMATIC DYSFUNCTION OF LUMBAR REGION: Primary | ICD-10-CM

## 2019-10-03 PROCEDURE — 97140 MANUAL THERAPY 1/> REGIONS: CPT | Performed by: PHYSICAL THERAPIST

## 2019-10-03 PROCEDURE — 97110 THERAPEUTIC EXERCISES: CPT | Performed by: PHYSICAL THERAPIST

## 2019-10-03 NOTE — PROGRESS NOTES
Assessment/Plan     This is a 69 y o  male who presents for OMT follow-up for:  1  Somatic dysfunction of lumbar region      Patient tolerated OMT well  Follow up in 2 weeks   2  Sciatica of left side      patient currently doing PT  If symptoms do not improve, will likely need MRI for further evaluation       Plan:   1  Patient tolerated OMT well for the above problems,  advised patient to drink fluids and can use NSAID for soreness after treatment     2  OMT Follow up in 2 weeks  Ashley Estes is a 69 y o  male and is here for a OMT follow up  The patient reports lower back pain with radiculapathy down left leg  He had PT today  Feels much better  He goes to PT twice a week for the past 2 weeks  Denies bladder or bowel incontinence  No saddle anesthesia or lower extremity weakness  Is the patient taking Pain medication? yes Gabapentin  Has the patient completed physical therapy for this condition? yes  Did Patient symptoms improve from last OMT appointment? no    The following portions of the patient's history were reviewed and updated as appropriate: allergies, current medications, past family history, past medical history, past social history, past surgical history and problem list     Review of Systems  Do you have pain that bothers you in your daily life? yes  Review of Systems   Constitutional: Negative for chills, diaphoresis, fatigue and fever  Respiratory: Negative for cough, chest tightness, shortness of breath and wheezing  Cardiovascular: Negative for chest pain, palpitations and leg swelling  Gastrointestinal: Negative for constipation, diarrhea, nausea and vomiting  Genitourinary: Negative for dysuria, flank pain and frequency  Musculoskeletal: Positive for back pain  Negative for arthralgias, gait problem, joint swelling, myalgias and neck pain  Skin: Negative for pallor and rash     Neurological: Negative for dizziness, syncope, weakness, light-headedness, numbness and headaches         Objective     OMT Exam   Cervical:   Somatic Dysfunction:  Tissue Texture Changes, Asymmetry , Restriction and Tenderness  Severity :  1  Osteopathic Findings: hypertonic paraspinal muscles b/l  Treatment Method: ST  Response: stayed the same  Lumbar:  Somatic Dysfunction:  Tissue Texture Changes, Asymmetry , Restriction and Tenderness  Severity :  3  Osteopathic Findings: quadratus lumborum spasm on Left  Treatment Method: ST and MFR  Response: improved  Sacrum:  Somatic Dysfunction:  Tissue Texture Changes, Asymmetry , Restriction and Tenderness  Severity :  2  Osteopathic Findings: anterior R on R sacral torsion  Treatment Method: ME  Response: stayed the same  Left Lower Extremity:  Somatic Dysfunction:  Tissue Texture Changes, Asymmetry , Restriction and Tenderness  Severity :  2  Osteopathic Findings: Left piriformis spasm  Treatment Method: ST and CS  Response: stayed the same

## 2019-10-03 NOTE — PROGRESS NOTES
Daily Note     Today's date: 10/3/2019  Patient name: Kirstie Dunham  :   MRN: [id number]  Referring provider: Jagdeep Jenkins MD  Dx:   Encounter Diagnosis     ICD-10-CM    1  Acute left-sided low back pain with left-sided sciatica M54 44                   Subjective: No new complaints      Objective: See treatment diary below      Assessment: L side glide did reproduce LE pain without making it worse  NO pain provocation while walking on tmill       Plan: Continue per plan of care        Precautions: HTN; DM; CABG      Manual  9/25 10/3           Ext mob in prone ALEJANDRO 10' ALEJANDRO 10'           L hip PROM 5' 5'           90/90 sciatic nerve glides ALEJANDRO x20 ALEJANDRO x20                                         Exercise Diary  9/25 10/3           Recumbent bike 10' 10'           Prone press up 2x10 2x10           Slump sliders 20 20           LTR 5s x10 5s x10                                                  Treadmill  10'                                                                                                                                                                           Modalities

## 2019-10-04 ENCOUNTER — OFFICE VISIT (OUTPATIENT)
Dept: PHYSICAL THERAPY | Facility: REHABILITATION | Age: 74
End: 2019-10-04
Payer: MEDICARE

## 2019-10-04 DIAGNOSIS — M54.42 ACUTE LEFT-SIDED LOW BACK PAIN WITH LEFT-SIDED SCIATICA: Primary | ICD-10-CM

## 2019-10-04 PROCEDURE — 97140 MANUAL THERAPY 1/> REGIONS: CPT | Performed by: PHYSICAL THERAPIST

## 2019-10-04 PROCEDURE — 97110 THERAPEUTIC EXERCISES: CPT | Performed by: PHYSICAL THERAPIST

## 2019-10-07 ENCOUNTER — OFFICE VISIT (OUTPATIENT)
Dept: PHYSICAL THERAPY | Facility: REHABILITATION | Age: 74
End: 2019-10-07
Payer: MEDICARE

## 2019-10-07 DIAGNOSIS — M54.42 ACUTE LEFT-SIDED LOW BACK PAIN WITH LEFT-SIDED SCIATICA: Primary | ICD-10-CM

## 2019-10-07 PROCEDURE — 97110 THERAPEUTIC EXERCISES: CPT | Performed by: PHYSICAL THERAPIST

## 2019-10-07 PROCEDURE — 97140 MANUAL THERAPY 1/> REGIONS: CPT | Performed by: PHYSICAL THERAPIST

## 2019-10-07 NOTE — PROGRESS NOTES
Daily Note     Today's date: 10/7/2019  Patient name: Yosef Jerome  : [id number]  MRN: [id number]  Referring provider: Arabella Wade MD  Dx:   Encounter Diagnosis     ICD-10-CM    1  Acute left-sided low back pain with left-sided sciatica M54 42                   Subjective:Patient reports he is improving  Less pain upon waking       Objective: See treatment diary below      Assessment: Pain free throughout treatment       Plan: Continue per plan of care        Precautions: HTN; DM; CABG      Manual  9/25 10/3 10/4 10/7         Ext mob in prone ALEJANDRO 10' ALEJANDRO 10' ALEJANDRO 10' ALEJANDRO 10'         L hip PROM 5' 5' 5' 5'         90/90 sciatic nerve glides ALEJANDRO x20 Josefine Nanette x20                                       Exercise Diary  9/25 10/3 10/4 10/7         Recumbent bike 10' 10' 10' NP         Prone press up 2x10 2x10 2x10 2x10         Slump sliders 20 20 20 20         LTR 5s x10 5s x10 5s x20 5s x20         Standing hip abd    2x10                                   Treadmill  10' 10' 12'                                                                                                                                                                         Modalities

## 2019-10-09 ENCOUNTER — TELEPHONE (OUTPATIENT)
Dept: FAMILY MEDICINE CLINIC | Facility: CLINIC | Age: 74
End: 2019-10-09

## 2019-10-09 DIAGNOSIS — I25.10 CORONARY ARTERY DISEASE INVOLVING NATIVE CORONARY ARTERY OF NATIVE HEART WITHOUT ANGINA PECTORIS: ICD-10-CM

## 2019-10-09 DIAGNOSIS — Z86.19 H/O TINEA CRURIS: Chronic | ICD-10-CM

## 2019-10-09 DIAGNOSIS — I10 HYPERTENSION, UNSPECIFIED TYPE: ICD-10-CM

## 2019-10-09 DIAGNOSIS — I12.9 HYPERTENSION ASSOCIATED WITH STAGE 2 CHRONIC KIDNEY DISEASE DUE TO TYPE 2 DIABETES MELLITUS (HCC): Chronic | ICD-10-CM

## 2019-10-09 DIAGNOSIS — E11.22 HYPERTENSION ASSOCIATED WITH STAGE 2 CHRONIC KIDNEY DISEASE DUE TO TYPE 2 DIABETES MELLITUS (HCC): Chronic | ICD-10-CM

## 2019-10-09 DIAGNOSIS — N18.2 HYPERTENSION ASSOCIATED WITH STAGE 2 CHRONIC KIDNEY DISEASE DUE TO TYPE 2 DIABETES MELLITUS (HCC): Chronic | ICD-10-CM

## 2019-10-09 RX ORDER — NIFEDIPINE 60 MG/1
60 TABLET, FILM COATED, EXTENDED RELEASE ORAL DAILY
Qty: 90 TABLET | Refills: 0 | Status: SHIPPED | OUTPATIENT
Start: 2019-10-09 | End: 2019-11-04

## 2019-10-09 NOTE — TELEPHONE ENCOUNTER
[facility] called today stating patients Pre-op clearance was done to soon 09/26 date of surgery is 10/29/19, please inform the   if you would like patient to return for a 2nd pre-op clearance appointment   I have also placed a blank clearance form in Dr Suazo folder in triage  Thank you in advance

## 2019-10-10 ENCOUNTER — OFFICE VISIT (OUTPATIENT)
Dept: FAMILY MEDICINE CLINIC | Facility: CLINIC | Age: 74
End: 2019-10-10

## 2019-10-10 VITALS
WEIGHT: 162.6 LBS | DIASTOLIC BLOOD PRESSURE: 62 MMHG | TEMPERATURE: 98.7 F | SYSTOLIC BLOOD PRESSURE: 120 MMHG | RESPIRATION RATE: 16 BRPM | HEART RATE: 72 BPM | BODY MASS INDEX: 25.52 KG/M2 | HEIGHT: 67 IN

## 2019-10-10 DIAGNOSIS — H04.123 DRY EYE SYNDROME OF BOTH EYES: Primary | ICD-10-CM

## 2019-10-10 DIAGNOSIS — M54.32 SCIATICA OF LEFT SIDE: ICD-10-CM

## 2019-10-10 DIAGNOSIS — Z01.818 PREOPERATIVE EXAMINATION: ICD-10-CM

## 2019-10-10 PROCEDURE — 99213 OFFICE O/P EST LOW 20 MIN: CPT | Performed by: FAMILY MEDICINE

## 2019-10-10 RX ORDER — GABAPENTIN 300 MG/1
300 CAPSULE ORAL
Qty: 60 CAPSULE | Refills: 0 | Status: SHIPPED | OUTPATIENT
Start: 2019-10-10 | End: 2019-11-22 | Stop reason: SDUPTHER

## 2019-10-12 RX ORDER — CLOTRIMAZOLE AND BETAMETHASONE DIPROPIONATE 10; .64 MG/G; MG/G
CREAM TOPICAL 2 TIMES DAILY
Qty: 30 G | Refills: 1 | Status: SHIPPED | OUTPATIENT
Start: 2019-10-12 | End: 2019-11-07 | Stop reason: SDUPTHER

## 2019-10-12 RX ORDER — RANITIDINE 150 MG/1
150 CAPSULE ORAL 2 TIMES DAILY PRN
Qty: 120 CAPSULE | Refills: 2 | OUTPATIENT
Start: 2019-10-12

## 2019-10-12 NOTE — PROGRESS NOTES
[place] WELLNESS PRE-OPERATIVE EXAMINATION  Jacqueline Jaime  1945     Subjective  HPI  Jacqueline Jaime is a 69 y o  male with chronic dry eye and irritation OU who is planning to undergo B/L eye probing of nasal lacrimal duct +/- irrigation with insertion of tube or stent under unspecified anesthesia by Dr Alda Mayo on 10/29/19  Patient has not had complications with anesthesia in the past  He had preoperative examination 9/26/19, but needs additional visit due to preoperative exam conducted >30d prior to surgery      Also complaining of persistent L side sciatica/pain, gabapentin helping slightly but still very bothered by the shooting pain down his L leg from his lower back  Using voltaren gel as well  Referred to PT previously and had initial evaluation 9/23/19, currently going 2d/week     ROS:   Chest pain: no   Shortness of breath: no  Shortness of breath with exertion: no  Orthopnea: no, uses 1 pillow only  Dizziness: no  Unexplained weight change: no  Wt Readings from Last 3 Encounters:   10/10/19 73 8 kg (162 lb 9 6 oz)   10/03/19 72 2 kg (159 lb 3 2 oz)   09/26/19 72 6 kg (160 lb)     PMH:  CAD: yes, MI in 2006 s/p cath with EYAL to LAD, MI in 2012 with stent to RCA, and NSTEMI 2016 with multivessel disease, s/p CABG (4 vessel bypass with LIMA to the LAD, vein grafts to the diagonal, OM1, PDA); echo 6/28/19 showed low-normal LV function with inferior wall motion abnormality, G2DD, and mild aortic stenosis; follows with Dr Patrice Kate (Cardiology), taking lopressor 25 mg BID, crestor 40 mg QD  Remains asymptomatic currently  HTN: yes, taking lisinopril and nifedipine ER   CKD: no, last Cr 0 91/eGFR 84 (7/18/19)  DM: yes; on insulin: no (taking metformin and amaryl only, last HbA1C 7 4% 10/1/19)  History of CVA: no     Social Hx: He reports that he quit smoking several years ago   He has never used smokeless tobacco  He reports that he does not drink alcohol or use drugs      Objective  /62 (BP Location: Left arm, Patient Position: Sitting, Cuff Size: Large)   Pulse 72   Temp 98 7 °F (37 1 °C) (Tympanic)   Resp 16   Ht 5' 7" (1 702 m)   Wt 73 8 kg (162 lb 9 6 oz)   BMI 25 47 kg/m²     Physical Exam   Constitutional: He is oriented to person, place, and time  He appears well-developed and well-nourished  HENT:   Head: Normocephalic and atraumatic  Eyes: Conjunctivae are normal  Right eye exhibits no discharge  Left eye exhibits no discharge  Neck: Normal range of motion  Neck supple  Cardiovascular: Normal rate, regular rhythm and intact distal pulses  Murmur heard  Pulmonary/Chest: Effort normal and breath sounds normal    Abdominal: Soft  Bowel sounds are normal  He exhibits no distension  Musculoskeletal: He exhibits no edema  Mild tenderness of L>R lumbar paravertebral musculature without hypertonicity   Neurological: He is alert and oriented to person, place, and time  Motor strength 5/5 B/L UE/LE, normal sensation   Skin: Skin is warm  Capillary refill takes less than 2 seconds  Psychiatric: He has a normal mood and affect  His behavior is normal  Judgment and thought content normal    Vitals reviewed      Revised Cardiac Risk Index (RCRI) for Pre-Operative Risk   (estimates risk of cardiac complications after noncardiac surgery)     · High-risk surgery: No 0 / Yes +1  ? Intraperitoneal, intrathoracic, suprainguinal vascular  · History of ischemic heart disease: No 0 / Yes +1  ? Hx of MI, (+) exercise test, current chest pain considered due to myocardial ischemia, use of nitrate therapy or ECG with pathological Q waves)  · History of CHF: No 0 / Yes +1  ? Pulmonary edema, B/L rales or S3 gallop; MOORE, orthopnea, PND, CXR showing pulmonary vascular redistribution)  · History of cerebrovascular disease: No 0 / Yes +1  ?  Prior TIA or stroke  · Pre-operative treatment with insulin: No 0 / Yes +1  · Pre-operative creatinine >2 mg/dL: No 0 / Yes +1     RCRI Scoring:  · 0 points: Class I Risk, 3 9% Risk of Major Cardiac Event  · 1 point: Class II Risk, 6 0% Risk of Major Cardiac Event  · 2 points: Class III Risk, 10 1% Risk of Major Cardiac Event  · 3 points: Class IV Risk, 15% Risk of Major Cardiac Event  · 4 points: Class IV Risk, 15% Risk of Major Cardiac Event  · 5 points: Class IV Risk, 15% Risk of Major Cardiac Event  · 6 points: Class IV Risk, 15% Risk of Major Cardiac Event           Lab Results   Component Value Date     CREATININE 0 91 07/18/2019      Ref Range & Units 10/1/19  7:21 AM   NT-proBNP <125 pg/mL 414              POCT EKG 9/26/19: Sinus bradycardia, unchanged from previous EKG 5/28/2019     Assessment/Plan:  Charla Hardy was seen today for pre-op exam      Diagnoses and all orders for this visit:     Dry eye syndrome of both eyes     Preoperative examination     Sciatica of left side  -     Continue physical therapy as directed   -     Plan for at least 6 weeks of physical therapy before re-evaluating need for possible imaging/sports medicine referral  -     Advised on topical modalities such as lidocaine patches  -     Increase gabapentin dose to 300 mg QHS; continue voltaren  -     gabapentin (NEURONTIN) 300 mg capsule; Take 1 capsule (300 mg total) by mouth daily at bedtime  -     diclofenac sodium (VOLTAREN) 1 %; Apply 2 g topically 4 (four) times a day  -     If patient remains symptomatic of sciatica despite PT and treatments noted above, may consider medrol dosepak once he is fully recovered from surgery; however, would not initiate PO steroids without prior discussion with patient's Cardiologist, Dr Saige Curiel  Will consider as indicated pending patient's clinical course         Recommendations:  Michi Neal is undergoing an elective Low Risk surgery, B/L eye probing of nasal lacrimal duct +/- irrigation with insertion of tube or stent   He is RCRI class III risk (2 points for CAD/CHF) with 10 1% 30-day risk of death, non-fatal MI, or cardiac arrest  Due to his extensive cardiac history, he is intermediate risk for this low-risk surgery  EKG at last visit was unchanged from previous, and repeat EKG not obtained today due to patient remaining asymptomatic  As NT-proBNP recommended due to age>65 and RCRI>2 per 2016 [facility] Perioperative guidelines, it was ordered and obtained since last visit  Although NT pro-BNP slightly above 300 (414), EKG and troponin monitoring in the post-operative period likely not medically necessary, given low-risk surgery, and lack of previous NT pro-BNP value available for comparison; 414 likely represents patient's baseline value when not in exacerbation from his HFpEF/G2DD, as patient asymptomatic without any evidence of fluid overload on exam  Accordingly, he may proceed with surgery as planned without further workup at this time   Pre-operative form completed and faxed today to office as requested      Discussed with Dr Ronak Gross at time of visit   MARIBEL Whitt  PGY-3  [place] [address]

## 2019-10-14 ENCOUNTER — OFFICE VISIT (OUTPATIENT)
Dept: PHYSICAL THERAPY | Facility: REHABILITATION | Age: 74
End: 2019-10-14
Payer: MEDICARE

## 2019-10-14 ENCOUNTER — ANESTHESIA EVENT (OUTPATIENT)
Dept: PERIOP | Facility: HOSPITAL | Age: 74
End: 2019-10-14
Payer: MEDICARE

## 2019-10-14 DIAGNOSIS — M54.42 ACUTE LEFT-SIDED LOW BACK PAIN WITH LEFT-SIDED SCIATICA: Primary | ICD-10-CM

## 2019-10-14 PROCEDURE — 97140 MANUAL THERAPY 1/> REGIONS: CPT | Performed by: PHYSICAL THERAPIST

## 2019-10-14 PROCEDURE — 97110 THERAPEUTIC EXERCISES: CPT | Performed by: PHYSICAL THERAPIST

## 2019-10-14 NOTE — PROGRESS NOTES
Daily Note     Today's date: 10/14/2019  Patient name: Sabiha Aldana  : [birth year]  MRN: [id number]  Referring provider: Rosmery Christie MD  Dx:   Encounter Diagnosis     ICD-10-CM    1  Acute left-sided low back pain with left-sided sciatica M54 42                   Subjective: No new complaints      Objective: See treatment diary below      Assessment: L hip PROM near full  NO pain provocation    Plan: Continue per plan of care        Precautions: HTN; DM; CABG      Manual  9/25 10/3 10/4 10/7 10/14        Ext mob in prone ALEJANDRO 10' ALEJANDRO 10' ALEJANDRO 10' ALEJANDRO 10' ALEJANDRO 10'        L hip PROM 5' 5' 5' 5' 5'        90/ sciatic nerve glides ALEJANDRO x20 Marisa Franc x20                                      Exercise Diary  9/25 10/3 10/4 10/7 10/14        Recumbent bike 10' 10' 10' NP         Prone press up 2x10 2x10 2x10 2x10 2x`15        Slump sliders 20 20 20 20 20        LTR 5s x10 5s x10 5s x20 5s x20 5s x20        Standing hip abd    2x10 2x15                                  Treadmill  10' 10' 12' 12'                                                                                                                                                                        Modalities

## 2019-10-14 NOTE — ANESTHESIA PREPROCEDURE EVALUATION
Review of Systems/Medical History          Cardiovascular  EKG reviewed, Exercise tolerance (METS): >4,  Hyperlipidemia, Hypertension , Valvular heart disease (Mild) , aortic valve stenosis, aortic insufficiency and mitral regurgitation, No valve repair , Past MI , CAD , History of CABG, Cardiac stents drug eluting   Comment: EF-50%-6/28/19 with Apical Hypokinesis  ,  Pulmonary  Smoker ex-smoker  , Sleep apnea ,        GI/Hepatic    GERD well controlled,             Endo/Other  Diabetes ,      GYN       Hematology   Musculoskeletal       Neurology    CVA ,    Psychology   Anxiety, Depression ,              Physical Exam    Airway    Mallampati score: II  TM Distance: >3 FB  Neck ROM: full     Dental   No notable dental hx     Cardiovascular      Pulmonary      Other Findings        Anesthesia Plan  ASA Score- 3     Anesthesia Type- IV sedation with anesthesia with ASA Monitors  Additional Monitors:   Airway Plan:         Plan Factors-Patient not instructed to abstain from smoking on day of procedure  Patient did not smoke on day of surgery  Induction- intravenous  Postoperative Plan-     Informed Consent- Anesthetic plan and risks discussed with patient and daughter  I personally reviewed this patient with the CRNA  Discussed and agreed on the Anesthesia Plan with the CRNA             Lab Results   Component Value Date    GLUC 145 (H) 07/18/2019    GLUF 131 (H) 06/28/2019    ALT 36 06/28/2019    AST 24 06/28/2019    BUN 15 07/18/2019    CALCIUM 8 7 07/18/2019     07/18/2019    CHOL 117 (L) 03/17/2017    CO2 25 07/18/2019    CREATININE 0 91 07/18/2019    HDL 54 08/13/2019    INR 1 40 (H) 12/12/2016    HCT 40 2 06/28/2019    HGB 12 4 06/28/2019    PROT 7 0 08/01/2017    HGBA1C 7 4 (H) 10/01/2019    MG 2 1 02/09/2019    PHOS 4 0 02/09/2019     06/28/2019    K 4 0 07/18/2019     08/01/2017    TRIG 106 08/13/2019    WBC 6 46 06/28/2019

## 2019-10-15 ENCOUNTER — ANESTHESIA (OUTPATIENT)
Dept: PERIOP | Facility: HOSPITAL | Age: 74
End: 2019-10-15
Payer: MEDICARE

## 2019-10-15 ENCOUNTER — HOSPITAL ENCOUNTER (OUTPATIENT)
Facility: HOSPITAL | Age: 74
Setting detail: OUTPATIENT SURGERY
Discharge: HOME | End: 2019-10-15
Attending: ORTHOPAEDIC SURGERY | Admitting: ORTHOPAEDIC SURGERY
Payer: MEDICARE

## 2019-10-15 VITALS
WEIGHT: 162 LBS | HEIGHT: 67 IN | TEMPERATURE: 97.1 F | SYSTOLIC BLOOD PRESSURE: 124 MMHG | DIASTOLIC BLOOD PRESSURE: 54 MMHG | HEART RATE: 53 BPM | RESPIRATION RATE: 16 BRPM | BODY MASS INDEX: 25.43 KG/M2 | OXYGEN SATURATION: 95 %

## 2019-10-15 DIAGNOSIS — M65.321 TRIGGER FINGER OF ALL DIGITS OF BOTH HANDS: ICD-10-CM

## 2019-10-15 DIAGNOSIS — M65.332 TRIGGER FINGER OF ALL DIGITS OF BOTH HANDS: ICD-10-CM

## 2019-10-15 DIAGNOSIS — G56.03 BILATERAL CARPAL TUNNEL SYNDROME: Primary | ICD-10-CM

## 2019-10-15 DIAGNOSIS — M65.341 TRIGGER FINGER OF ALL DIGITS OF BOTH HANDS: ICD-10-CM

## 2019-10-15 DIAGNOSIS — M65.311 TRIGGER FINGER OF ALL DIGITS OF BOTH HANDS: ICD-10-CM

## 2019-10-15 DIAGNOSIS — M65.322 TRIGGER FINGER OF ALL DIGITS OF BOTH HANDS: ICD-10-CM

## 2019-10-15 DIAGNOSIS — M65.351 TRIGGER FINGER OF ALL DIGITS OF BOTH HANDS: ICD-10-CM

## 2019-10-15 DIAGNOSIS — M65.312 TRIGGER FINGER OF ALL DIGITS OF BOTH HANDS: ICD-10-CM

## 2019-10-15 DIAGNOSIS — M65.331 TRIGGER FINGER OF ALL DIGITS OF BOTH HANDS: ICD-10-CM

## 2019-10-15 DIAGNOSIS — M54.42 ACUTE LEFT-SIDED LOW BACK PAIN WITH LEFT-SIDED SCIATICA: ICD-10-CM

## 2019-10-15 DIAGNOSIS — M65.352 TRIGGER FINGER OF ALL DIGITS OF BOTH HANDS: ICD-10-CM

## 2019-10-15 DIAGNOSIS — M65.342 TRIGGER FINGER OF ALL DIGITS OF BOTH HANDS: ICD-10-CM

## 2019-10-15 LAB
GLUCOSE SERPL-MCNC: 146 MG/DL (ref 65–140)
GLUCOSE SERPL-MCNC: 161 MG/DL (ref 65–140)

## 2019-10-15 PROCEDURE — 99024 POSTOP FOLLOW-UP VISIT: CPT | Performed by: ORTHOPAEDIC SURGERY

## 2019-10-15 PROCEDURE — 26055 INCISE FINGER TENDON SHEATH: CPT | Performed by: ORTHOPAEDIC SURGERY

## 2019-10-15 PROCEDURE — 82948 REAGENT STRIP/BLOOD GLUCOSE: CPT

## 2019-10-15 PROCEDURE — 29848 WRIST ENDOSCOPY/SURGERY: CPT | Performed by: ORTHOPAEDIC SURGERY

## 2019-10-15 RX ORDER — EPHEDRINE SULFATE 50 MG/ML
INJECTION INTRAVENOUS AS NEEDED
Status: DISCONTINUED | OUTPATIENT
Start: 2019-10-15 | End: 2019-10-15 | Stop reason: SURG

## 2019-10-15 RX ORDER — NAPROXEN SODIUM 220 MG
220 TABLET ORAL 2 TIMES DAILY WITH MEALS
Qty: 10 TABLET | Refills: 0 | Status: SHIPPED | OUTPATIENT
Start: 2019-10-15 | End: 2019-10-16 | Stop reason: SDUPTHER

## 2019-10-15 RX ORDER — LIDOCAINE HYDROCHLORIDE AND EPINEPHRINE 10; 10 MG/ML; UG/ML
INJECTION, SOLUTION INFILTRATION; PERINEURAL AS NEEDED
Status: DISCONTINUED | OUTPATIENT
Start: 2019-10-15 | End: 2019-10-15 | Stop reason: HOSPADM

## 2019-10-15 RX ORDER — PROPOFOL 10 MG/ML
INJECTION, EMULSION INTRAVENOUS AS NEEDED
Status: DISCONTINUED | OUTPATIENT
Start: 2019-10-15 | End: 2019-10-15 | Stop reason: SURG

## 2019-10-15 RX ORDER — DEXAMETHASONE SODIUM PHOSPHATE 10 MG/ML
INJECTION, SOLUTION INTRAMUSCULAR; INTRAVENOUS AS NEEDED
Status: DISCONTINUED | OUTPATIENT
Start: 2019-10-15 | End: 2019-10-15 | Stop reason: SURG

## 2019-10-15 RX ORDER — FENTANYL CITRATE/PF 50 MCG/ML
50 SYRINGE (ML) INJECTION
Status: DISCONTINUED | OUTPATIENT
Start: 2019-10-15 | End: 2019-10-15 | Stop reason: HOSPADM

## 2019-10-15 RX ORDER — HYDROCODONE BITARTRATE AND ACETAMINOPHEN 5; 325 MG/1; MG/1
1 TABLET ORAL EVERY 6 HOURS PRN
Qty: 10 TABLET | Refills: 0 | Status: SHIPPED | OUTPATIENT
Start: 2019-10-15 | End: 2019-12-19

## 2019-10-15 RX ORDER — SENNOSIDES 8.6 MG
650 CAPSULE ORAL EVERY 8 HOURS
Qty: 15 TABLET | Refills: 0 | Status: SHIPPED | OUTPATIENT
Start: 2019-10-15 | End: 2019-10-16 | Stop reason: SDUPTHER

## 2019-10-15 RX ORDER — FENTANYL CITRATE 50 UG/ML
INJECTION, SOLUTION INTRAMUSCULAR; INTRAVENOUS AS NEEDED
Status: DISCONTINUED | OUTPATIENT
Start: 2019-10-15 | End: 2019-10-15 | Stop reason: SURG

## 2019-10-15 RX ORDER — LIDOCAINE HYDROCHLORIDE 10 MG/ML
1 INJECTION, SOLUTION EPIDURAL; INFILTRATION; INTRACAUDAL; PERINEURAL ONCE
Status: DISCONTINUED | OUTPATIENT
Start: 2019-10-15 | End: 2019-10-15 | Stop reason: HOSPADM

## 2019-10-15 RX ORDER — SODIUM CHLORIDE, SODIUM LACTATE, POTASSIUM CHLORIDE, CALCIUM CHLORIDE 600; 310; 30; 20 MG/100ML; MG/100ML; MG/100ML; MG/100ML
125 INJECTION, SOLUTION INTRAVENOUS CONTINUOUS
Status: DISCONTINUED | OUTPATIENT
Start: 2019-10-15 | End: 2019-10-15 | Stop reason: HOSPADM

## 2019-10-15 RX ORDER — HYDROCODONE BITARTRATE AND ACETAMINOPHEN 5; 325 MG/1; MG/1
1 TABLET ORAL ONCE AS NEEDED
Status: DISCONTINUED | OUTPATIENT
Start: 2019-10-15 | End: 2019-10-15 | Stop reason: HOSPADM

## 2019-10-15 RX ORDER — MAGNESIUM HYDROXIDE 1200 MG/15ML
LIQUID ORAL AS NEEDED
Status: DISCONTINUED | OUTPATIENT
Start: 2019-10-15 | End: 2019-10-15 | Stop reason: HOSPADM

## 2019-10-15 RX ORDER — PROPOFOL 10 MG/ML
INJECTION, EMULSION INTRAVENOUS CONTINUOUS PRN
Status: DISCONTINUED | OUTPATIENT
Start: 2019-10-15 | End: 2019-10-15 | Stop reason: SURG

## 2019-10-15 RX ORDER — ONDANSETRON 2 MG/ML
INJECTION INTRAMUSCULAR; INTRAVENOUS AS NEEDED
Status: DISCONTINUED | OUTPATIENT
Start: 2019-10-15 | End: 2019-10-15 | Stop reason: SURG

## 2019-10-15 RX ORDER — SODIUM CHLORIDE, SODIUM LACTATE, POTASSIUM CHLORIDE, CALCIUM CHLORIDE 600; 310; 30; 20 MG/100ML; MG/100ML; MG/100ML; MG/100ML
INJECTION, SOLUTION INTRAVENOUS CONTINUOUS PRN
Status: DISCONTINUED | OUTPATIENT
Start: 2019-10-15 | End: 2019-10-15 | Stop reason: SURG

## 2019-10-15 RX ORDER — KETAMINE HYDROCHLORIDE 50 MG/ML
INJECTION, SOLUTION, CONCENTRATE INTRAMUSCULAR; INTRAVENOUS AS NEEDED
Status: DISCONTINUED | OUTPATIENT
Start: 2019-10-15 | End: 2019-10-15 | Stop reason: SURG

## 2019-10-15 RX ORDER — SODIUM CHLORIDE, SODIUM LACTATE, POTASSIUM CHLORIDE, CALCIUM CHLORIDE 600; 310; 30; 20 MG/100ML; MG/100ML; MG/100ML; MG/100ML
100 INJECTION, SOLUTION INTRAVENOUS CONTINUOUS
Status: DISCONTINUED | OUTPATIENT
Start: 2019-10-15 | End: 2019-10-15 | Stop reason: HOSPADM

## 2019-10-15 RX ORDER — ONDANSETRON 2 MG/ML
4 INJECTION INTRAMUSCULAR; INTRAVENOUS ONCE AS NEEDED
Status: DISCONTINUED | OUTPATIENT
Start: 2019-10-15 | End: 2019-10-15 | Stop reason: HOSPADM

## 2019-10-15 RX ORDER — CLINDAMYCIN PHOSPHATE 600 MG/50ML
INJECTION INTRAVENOUS AS NEEDED
Status: DISCONTINUED | OUTPATIENT
Start: 2019-10-15 | End: 2019-10-15 | Stop reason: SURG

## 2019-10-15 RX ADMIN — EPHEDRINE SULFATE 10 MG: 50 INJECTION, SOLUTION INTRAVENOUS at 11:16

## 2019-10-15 RX ADMIN — DEXAMETHASONE SODIUM PHOSPHATE 4 MG: 10 INJECTION, SOLUTION INTRAMUSCULAR; INTRAVENOUS at 11:17

## 2019-10-15 RX ADMIN — PROPOFOL 20 MG: 10 INJECTION, EMULSION INTRAVENOUS at 10:52

## 2019-10-15 RX ADMIN — SODIUM CHLORIDE, SODIUM LACTATE, POTASSIUM CHLORIDE, AND CALCIUM CHLORIDE: .6; .31; .03; .02 INJECTION, SOLUTION INTRAVENOUS at 10:43

## 2019-10-15 RX ADMIN — FENTANYL CITRATE 25 MCG: 50 INJECTION, SOLUTION INTRAMUSCULAR; INTRAVENOUS at 10:49

## 2019-10-15 RX ADMIN — SODIUM CHLORIDE, SODIUM LACTATE, POTASSIUM CHLORIDE, AND CALCIUM CHLORIDE 125 ML/HR: .6; .31; .03; .02 INJECTION, SOLUTION INTRAVENOUS at 10:04

## 2019-10-15 RX ADMIN — KETAMINE HYDROCHLORIDE 30 MG: 50 INJECTION, SOLUTION INTRAMUSCULAR; INTRAVENOUS at 10:48

## 2019-10-15 RX ADMIN — PROPOFOL 60 MG: 10 INJECTION, EMULSION INTRAVENOUS at 11:01

## 2019-10-15 RX ADMIN — PROPOFOL 80 MCG/KG/MIN: 10 INJECTION, EMULSION INTRAVENOUS at 10:46

## 2019-10-15 RX ADMIN — FENTANYL CITRATE 25 MCG: 50 INJECTION, SOLUTION INTRAMUSCULAR; INTRAVENOUS at 11:40

## 2019-10-15 RX ADMIN — CLINDAMYCIN PHOSPHATE 600 MG: 600 INJECTION, SOLUTION INTRAVENOUS at 10:51

## 2019-10-15 RX ADMIN — FENTANYL CITRATE 25 MCG: 50 INJECTION, SOLUTION INTRAMUSCULAR; INTRAVENOUS at 11:08

## 2019-10-15 RX ADMIN — PROPOFOL 20 MG: 10 INJECTION, EMULSION INTRAVENOUS at 10:57

## 2019-10-15 RX ADMIN — ONDANSETRON 4 MG: 2 INJECTION INTRAMUSCULAR; INTRAVENOUS at 11:17

## 2019-10-15 NOTE — H&P
H&P Exam - Hilario Corbin 69 y o  male MRN: [id number]  Unit/Bed#:     Assessment/Plan   Assessment:  R CTS and R thumb, index, long, ring and small TFs  Plan:  R ECTR and R thumb, index, long, ring and small TFR today  Patient to start formal therapy approximately 3-6 days after surgery    History of Present Illness   HPI:  Irving Cabezas is a 69 y o  male who presents with continued c/o R hand n/t in the radial 3 digits  Describes weakness and nocturnal symptoms  Steroid injx provided minimal relief  Pt also with triggering of all fingers on this hand now including the thumb  Has difficulty making a fist  Tendon glide exercises did not provide relief      Historical Information  Review Of Systems:   · Skin: Normal  · Neuro: See HPI  · Musculoskeletal: See HPI  · 14 point review of systems negative except as stated above     Past Medical History:   Past Medical History:   Diagnosis Date    AS (aortic stenosis)     Balanitis     Biceps tendonitis on right     CAD (coronary artery disease)     Cancer (HCC)     skin    Complete rotator cuff tear or rupture of right shoulder, not specified as traumatic     Diabetes mellitus ([facility] [address] )     Esophageal reflux     High cholesterol     Hypertension     Hypertriglyceridemia     Hypogonadism in male     Myalgia     Myocardial infarction ([facility] [address] )     Palpitations     Shoulder pain     Sinus problem     Skin cancer of nose     Sleep apnea     Stroke ([facility] [address] ) [id number]    Systolic murmur     recently found    Tinea cruris     Tinea pedis        Past Surgical History:   Past Surgical History:   Procedure Laterality Date    CATARACT EXTRACTION Left     COLONOSCOPY      CORONARY ANGIOPLASTY WITH STENT PLACEMENT      CORONARY ARTERY BYPASS GRAFT N/A 12/12/2016    Procedure: INTRAOP CECILLE; BILATERAL LEG EVH; CORONARY ARTERY BYPASS GRAFT X 4 SVG TO PDA, OM1,  AND DIAGONAL1, LIMA TO LAD;  Surgeon: Alexander Watkins MD;  Location[facility];  Service:    [place] EYE SURGERY  HERNIA REPAIR      MS ARTHROSCOPY SHOULDER SURGICAL BICEPS TENODESIS Right 5/6/2016    Procedure: ARTHROSCOPIC BICEPS TENODESIS;  Surgeon: Dana Stafford MD;  [facility];  Service: Orthopedics    MS SHLDR ARTHROSCOP,SURG,W/ROTAT CUFF REPR Right 5/6/2016    Procedure: REPAIR ROTATOR CUFF  ARTHROSCOPIC; SUBACROMIAL DECOMPRESSION; PARTIAL SYNOVECTOMY;  Surgeon: Dana Stafford MD;  [facility];  Service: Orthopedics    TESTICLE SURGERY      UMBILICAL HERNIA REPAIR  2009    over age 13       Family History:  Family history reviewed and non-contributory  Family History   Problem Relation Age of Onset    Heart disease Mother     Hypertension Mother     Nephrolithiasis Father     Other Father         Renal disease    Hypertension Sister     Nephrolithiasis Brother     Other Brother         Renal disease    Hematuria Family         Microscopic       Social History:  Social History     Socioeconomic History    Marital status: /Civil Union     Spouse name: None    Number of children: None    Years of education: None    Highest education level: None   Occupational History    None   Social Needs    Financial resource strain: None    Food insecurity:     Worry: None     Inability: None    Transportation needs:     Medical: None     Non-medical: None   Tobacco Use    Smoking status: Former Smoker    Smokeless tobacco: Never Used    Tobacco comment: smoked for 3 years from 17 y/o - 23 yrs old   Substance and Sexual Activity    Alcohol use: No    Drug use: No    Sexual activity: None   Lifestyle    Physical activity:     Days per week: None     Minutes per session: None    Stress: None   Relationships    Social connections:     Talks on phone: None     Gets together: None     Attends Baptism service: None     Active member of club or organization: None     Attends meetings of clubs or organizations: None     Relationship status: None    Intimate partner violence: Fear of current or ex partner: None     Emotionally abused: None     Physically abused: None     Forced sexual activity: None   Other Topics Concern    None   Social History Narrative    Uses Safety Equipment Seatbelts       Allergies: Allergies   Allergen Reactions    Epinephrine Hives and Anxiety    Penicillins Hives and Anxiety           Labs:  0   Lab Value Date/Time    HCT 40 2 06/28/2019 0643    HCT 41 8 05/28/2019 0648    HCT 41 3 02/08/2019 1144    HCT 40 3 10/21/2015 0728    HCT 39 9 09/22/2014 1304    HGB 12 4 06/28/2019 0643    HGB 13 3 05/28/2019 0648    HGB 13 3 02/08/2019 1144    HGB 13 3 10/21/2015 0728    HGB 14 3 01/01/2015 1632    HGB 12 8 09/22/2014 1304    HGB 13 3 02/18/2014 1127    INR 1 40 (H) 12/12/2016 2137    WBC 6 46 06/28/2019 0643    WBC 7 31 05/28/2019 0648    WBC 6 38 02/08/2019 1144    WBC 6 44 10/21/2015 0728    WBC 7 97 09/22/2014 1304    ESR 7 10/21/2015 0721    CRP <3 0 10/21/2015 0721       Meds:  No current facility-administered medications for this encounter       Current Outpatient Medications:     acetaminophen (TYLENOL) 650 mg CR tablet, Take 1 tablet (650 mg total) by mouth every 8 (eight) hours as needed for mild pain, Disp: 30 tablet, Rfl: 1    Alcohol Swabs (ALCOHOL PADS) 70 % PADS, USE TWICE DAILY, Disp: 100 each, Rfl: 3    aspirin (ECOTRIN LOW STRENGTH) 81 mg EC tablet, TAKE 1 TABLET (81 MG TOTAL) BY MOUTH DAILY FOR 90 DAYS, Disp: 90 tablet, Rfl: 3    clindamycin (CLEOCIN) 300 MG capsule, TAKE 2 CAPSULES BY MOUTH 1 HOUR PRIOR TO PROCEDURE, Disp: , Rfl: 0    clorazepate (TRANXENE) 7 5 mg tablet, Take 7 5 mg by mouth 2 (two) times a day , Disp: , Rfl: 0    clotrimazole-betamethasone (LOTRISONE) 1-0 05 % cream, APPLY TOPICALLY 2 (TWO) TIMES A DAY, Disp: 30 g, Rfl: 1    diclofenac sodium (VOLTAREN) 1 %, Apply 2 g topically 4 (four) times a day, Disp: 100 g, Rfl: 3    DULoxetine (CYMBALTA) 30 mg delayed release capsule, Take 30 mg by mouth every morning, Disp: , Rfl: 0    fluticasone (FLONASE) 50 mcg/act nasal spray, 1 spray into each nostril daily, Disp: 1 Bottle, Rfl: 4    gabapentin (NEURONTIN) 300 mg capsule, Take 1 capsule (300 mg total) by mouth daily at bedtime, Disp: 60 capsule, Rfl: 0    glimepiride (AMARYL) 4 mg tablet, TAKE 1/2 TABLET BY MOUTH IN THE MORNING AND 1 TABLET AT NIGHT, Disp: 180 tablet, Rfl: 2    glucose blood (GLUCOSE METER TEST) test strip, 1 each by Other route 2 (two) times a day Use as instructed, Disp: 100 each, Rfl: 5    hydrocortisone 2 5 % cream, APPLY TOPICALLY 3 (THREE) TIMES A DAY, Disp: 28 g, Rfl: 2    lidocaine (LIDODERM) 5 %, Apply 1 patch topically daily Remove & Discard patch within 12 hours or as directed by MD, Disp: 30 patch, Rfl: 0    lisinopril (ZESTRIL) 20 mg tablet, Take 1 tablet (20 mg total) by mouth daily, Disp: 90 tablet, Rfl: 3    metFORMIN (GLUCOPHAGE) 500 mg tablet, TAKE 1 TABLET (500 MG TOTAL) BY MOUTH 2 (TWO) TIMES A DAY WITH MEALS  DAYS, Disp: 180 tablet, Rfl: 2    metoprolol tartrate (LOPRESSOR) 25 mg tablet, Take 1 tablet (25 mg total) by mouth every 12 (twelve) hours for 270 days, Disp: 180 tablet, Rfl: 2    metoprolol tartrate (LOPRESSOR) 25 mg tablet, TAKE 1 TABLET (25 MG TOTAL) BY MOUTH EVERY 12 (TWELVE) HOURS  DAYS, Disp: 180 tablet, Rfl: 2    mirtazapine (REMERON) 15 mg tablet, TAKE 1 TABLET (15 MG TOTAL) BY MOUTH DAILY AT BEDTIME  DAYS, Disp: , Rfl: 2    NIFEdipine ER (ADALAT CC) 60 MG 24 hr tablet, TAKE 1 TABLET (60 MG TOTAL) BY MOUTH DAILY, Disp: 90 tablet, Rfl: 0    omeprazole (PriLOSEC) 20 mg delayed release capsule, Take 1 capsule (20 mg total) by mouth daily, Disp: 90 capsule, Rfl: 1    ONETOUCH DELICA LANCETS FINE MISC, Test blood sugar twice daily, or as needed when symptomatic of hypoglycemia or hyperglycemia, Disp: 100 each, Rfl: 5    ranitidine (ZANTAC) 150 MG capsule, TAKE 1 CAPSULE (150 MG TOTAL) BY MOUTH 2 (TWO) TIMES A DAY AS NEEDED FOR INDIGESTION OR HEARTBURN, Disp: , Rfl: 2    rosuvastatin (CRESTOR) 40 MG tablet, Take 1 tablet (40 mg total) by mouth daily, Disp: 90 tablet, Rfl: 3    tobramycin-dexamethasone (TOBRADEX) ophthalmic suspension, ADMINISTER 1 DROP TO BOTH EYES 2 (TWO) TIMES A DAY, Disp: 5 mL, Rfl: 3    Blood Culture:   No results found for: BLOODCX    Wound Culture:   No results found for: WOUNDCULT    Ins and Outs:  No intake/output data recorded  Physical Exam  There were no vitals taken for this visit  There were no vitals taken for this visit  Gen: Alert and oriented to person, place, time  HEENT: EOMI, eyes clear, moist mucus membranes, hearing intact  Respiratory: Bilateral chest rise  No audible wheezing found  Cardiovascular: Regular Rate and Rhythm  Abdomen: soft nontender/nondistended  Ortho Exam: TTP A1 pulleys of R thumb, index, long, ring and small fingers with + crepitation and nodule formation with clicking upon range of motion    Neuro Exam: R carpal tunnel with + Tinel's, + Durkan's compression test and APB weakness    Lab Results: Reviewed  Imaging: Reviewed

## 2019-10-15 NOTE — ANESTHESIA POSTPROCEDURE EVALUATION
Post-Op Assessment Note    CV Status:  Stable    Pain management: adequate     Mental Status:  Alert and awake   Hydration Status:  Euvolemic   PONV Controlled:  Controlled   Airway Patency:  Patent   Post Op Vitals Reviewed: Yes      Staff: AnesthesiologistNELSON           /66 (10/15/19 1204)    Temp (!) 97 2 °F (36 2 °C) (10/15/19 1204)    Pulse 69 (10/15/19 1204)   Resp 20 (10/15/19 1204)    SpO2 98 % (10/15/19 1204)

## 2019-10-15 NOTE — DISCHARGE INSTRUCTIONS
Post Operative Instructions    You have had surgery on your arm today, please read and follow the information below:  · Elevate your hand above your elbow during the next 24-48 hours to help with swelling  · Place your hand and arm over your head with motion at your shoulder three times a day  · Do not apply any cream/ointment/oil to your incisions including antibiotics  · Do not soak your hands in standing water (dishwater, tubs, Jacuzzi's, pools, etc ) until given permission (typically 2-3 weeks after injury)    Call the office if you notice any:  · Increased numbness or tingling of your hand or fingers that is not relieved with elevation  · Increasing pain that is not controlled with medication  · Difficulty chewing, breathing, swallowing  · Chest pains or shortness of breath  · Fever over 101 4 degrees  Bandage: Your therapist will remove your bandage at your first therapy appointment  Motion: Move fingers into a fist 5 times a day, DO NOT move any splinted fingers  Please make sure to go to your hand therapy appointment scheduled for Friday/Monday and perform the home exercise program your therapist teaches you  Weight bearing status: Avoid heavy lifting (>5 pounds) with the extremity that was operated on until follow up appointment  Normal activities of daily living are OK  Ice: Ice for 10 minutes every hour as needed for swelling x 24 hours  Sling: No sling necessary  Medications:   Naproxen 220 mg two times a day   Tylenol Extended Release 650 mg every 8 hours  Norco/Hydrocodone one tab every 6 hours AS NEEDED for pain     Follow-up Appointment: 7-10 days  Please call the office if you have any questions or concerns regarding your post-operative care

## 2019-10-15 NOTE — OP NOTE
OPERATIVE REPORT  PATIENT NAME: Sabiha Aldana  :  1945  MRN: [id number]  Pt Location: [facility]    SURGERY DATE: 10/15/19    Surgeon(s) and Role:     * Rosmery Christie MD - Primary     * Octavia Hankins MD - Assisting    Pre-Op Diagnosis:  Bilateral carpal tunnel syndrome [G56 03]  Trigger finger of all digits of both hands [M65 321, M65 331, M65 341, M65 311, M65 351, M65 322, M65 312, M65 332, M65 342, M65 352]    Post-Op Diagnosis Codes:     * Bilateral carpal tunnel syndrome [G56 03]     * Trigger finger of all digits of both hands [M65 321, M65 331, M65 341, M65 311, M65 351, M65 322, M65 312, M65 332, M65 342, M65 352]    Procedure(s):  ENDOSCOPIC CARPAL TUNNEL RELEASE (Right)  TRIGGER FINGER RELEASE INDEX, LONG, RING, SMALL, THUMB FINGERS (Right)    Specimen(s):  * No orders in the log *    Estimated Blood Loss:   Minimal      Anesthesia Type:   IV Sedation with Anesthesia    Operative Indications: The patient has a history of right hand carpal tunnel and trigger fingers to right thumb, index, long, ring, and small finger that was recalcitrant to conservative management  The decision was made to bring the patient to the operating room for endoscopic carpal tunnel release and trigger finger release of the right thumb, index, long, ring, and small finger     Risks of the procedure were explained which include, but are not limited to bleeding; infection; damage to nerves, arteries,veins, tendons; scar; pain; need for reoperation; failure to give desired result; and risks of anaesthesia  All questions were answered to satisfaction and they were willing to proceed  Operative Findings:  Carpal tunnel syndrome right hand  Trigger finger right thumb  Trigger finger right index, long, ring, and small finger with extremely proximal division of flexor digitorum superficialis into two split tendons      Complications:   None    Procedure and Technique:  After the patient, site, and procedure were identified, the patient was brought into the operating room in a supine position  General anaesthesia and local medication were provided  A well padded tourniquet was applied to the extremity, set at 250 mmHg  The  right upper extremity was then prepped and drapped in a normal, sterile, orthopedic fashion  After reconfirmation of the patient, site, and surgical procedure, which was agreed upon by the entire surgical team, attention was turned to the right wrist   The sites of the proximal and distal incisions were marked  The geoff of the proximal incision was placed horizontally at the midline of the wrist   The distal incision geoff was longitudinal extending distally from the point of intersection of the line between the long finger and ring finger and the line along the distal border of the fully abducted thumb  The proximal incision was performed  Subcutaneous tissues were dissected  Then the transverse volar antebrachial fascia was perforated with a scalpel  The edges of the skin incision where retracted and the forearm fascia was incised for approximately 1 5 cm proximally with care taken to identify and protect the median nerve  Retractors were used to inspect the transverse carpal ligament distally  A curved Escobar dissector was used to glide under the transverse carpal ligament and superficial to the median nerve with confirmation via the washboard feeling  Then the curved Escobar was pushed into the palm toward the distal incision site  When the location of the distal skin geoff was adequate, the distal incision was made  Then with retraction of the skin, further dissection and perforation of the palmar fascia was performed with the use of tenotomy scissors  The curved Escobar was guided from proximal to distal out the distal incisions without any twisting to allow for dilation of the tract    The curved Escobar was removed, and the cannula for the camera was inserted along the same tract, making sure to keep the alignment post on the cannula perpendicular to the plane of the hand without twisting  Then while keeping the wrist in extension, and holding the cannula of the camera in place, the wrist was placed on the hyperextension board  The scope was inserted distally, and a cotton-tip applicator was used proximally to clean the tract as well as the scope  A curved cutting knife was introduced from proximal to distal while keeping visualization with the use of the camera  Without twisting of the canula, the knife was used to cut the transverse carpal ligament completely, making sure there were no remnant fibers  Then after this was accomplished, the hand was removed from the extension block  Three maneuvers were used to confirm the full release of the transverse carpal ligament  First, the ease of twisting the trocar of the camera confirmed the release of the ligament  Second, the curved Escobar was introduced to make sure there were no remnant fibers that could be felt palmarly  Third, the scope was introduced again to visualize that the whole ligament was released proximally to distally  Additional confirmation of full release included retraction and inspection in the distal and proximal incisions to make sure there were no remnant fibers distally or proximally respectively  After the patient, site, and procedure were once again identified, attention was turned to the right small finger  An incision was made over the flexor tendon sheath at the level of the A1 pulley  Dissection was carried out in-line with the flexor tendon sheath and the radial and ulnar digital artery and nerve were protected  The A1 pulley was identified at the base of the incision  Under direct visualization, the A1 pulley was divided along the midline in its entirety with care taken to avoid injury to the underlying tendon    The tendons were examined to ensure that no further catching, popping, clicking or locking occurred with motion of the finger  The above mentioned procedure was then replicated on the right index finger, long finger, ring finger and thumb  At the completion of the procedure, hemostasis was obtained with cautery and direct pressure  The wounds were copiously irrigated with sterile solution  The wounds were closed with Prolene  Sterile dressings were applied, including Xeroform, gauze, tweeners, webril, ACE  Please note, all sponge, needle, and instrument counts were correct prior to closure  Loupe magnification was utilized  The patient tolerated the procedure well       I was present for the entire procedure    Patient Disposition:  PACU , hemodynamically stable and extubated and stable    SIGNATURE: Feliz Calero MD  DATE: 10/15/19  TIME: 11:44 AM

## 2019-10-16 ENCOUNTER — OFFICE VISIT (OUTPATIENT)
Dept: PHYSICAL THERAPY | Facility: REHABILITATION | Age: 74
End: 2019-10-16
Payer: MEDICARE

## 2019-10-16 DIAGNOSIS — M65.332 TRIGGER FINGER OF ALL DIGITS OF BOTH HANDS: ICD-10-CM

## 2019-10-16 DIAGNOSIS — M65.321 TRIGGER FINGER OF ALL DIGITS OF BOTH HANDS: ICD-10-CM

## 2019-10-16 DIAGNOSIS — M54.42 ACUTE LEFT-SIDED LOW BACK PAIN WITH LEFT-SIDED SCIATICA: ICD-10-CM

## 2019-10-16 DIAGNOSIS — M65.351 TRIGGER FINGER OF ALL DIGITS OF BOTH HANDS: ICD-10-CM

## 2019-10-16 DIAGNOSIS — G56.03 BILATERAL CARPAL TUNNEL SYNDROME: ICD-10-CM

## 2019-10-16 DIAGNOSIS — M65.331 TRIGGER FINGER OF ALL DIGITS OF BOTH HANDS: ICD-10-CM

## 2019-10-16 DIAGNOSIS — M65.342 TRIGGER FINGER OF ALL DIGITS OF BOTH HANDS: ICD-10-CM

## 2019-10-16 DIAGNOSIS — M65.341 TRIGGER FINGER OF ALL DIGITS OF BOTH HANDS: ICD-10-CM

## 2019-10-16 DIAGNOSIS — M65.322 TRIGGER FINGER OF ALL DIGITS OF BOTH HANDS: ICD-10-CM

## 2019-10-16 DIAGNOSIS — M65.312 TRIGGER FINGER OF ALL DIGITS OF BOTH HANDS: ICD-10-CM

## 2019-10-16 DIAGNOSIS — M54.42 ACUTE LEFT-SIDED LOW BACK PAIN WITH LEFT-SIDED SCIATICA: Primary | ICD-10-CM

## 2019-10-16 DIAGNOSIS — M65.352 TRIGGER FINGER OF ALL DIGITS OF BOTH HANDS: ICD-10-CM

## 2019-10-16 DIAGNOSIS — M65.311 TRIGGER FINGER OF ALL DIGITS OF BOTH HANDS: ICD-10-CM

## 2019-10-16 PROCEDURE — 97140 MANUAL THERAPY 1/> REGIONS: CPT | Performed by: PHYSICAL THERAPIST

## 2019-10-16 PROCEDURE — 97110 THERAPEUTIC EXERCISES: CPT | Performed by: PHYSICAL THERAPIST

## 2019-10-16 RX ORDER — NAPROXEN SODIUM 220 MG
220 TABLET ORAL 2 TIMES DAILY WITH MEALS
Qty: 10 TABLET | Refills: 0 | Status: SHIPPED | OUTPATIENT
Start: 2019-10-16 | End: 2019-10-19 | Stop reason: SDUPTHER

## 2019-10-16 RX ORDER — ACETAMINOPHEN 650 MG/1
TABLET, FILM COATED, EXTENDED RELEASE ORAL
Qty: 15 TABLET | Refills: 0 | Status: SHIPPED | OUTPATIENT
Start: 2019-10-16 | End: 2019-10-19 | Stop reason: SDUPTHER

## 2019-10-16 NOTE — PROGRESS NOTES
Daily Note     Today's date: 10/16/2019  Patient name: Maria R Velazquez  : [birth year]  MRN: [id number]  Referring provider: Radha Hannon MD  Dx:   Encounter Diagnosis     ICD-10-CM    1  Acute left-sided low back pain with left-sided sciatica M54 42                   Subjective: Pt reports continued pain radiating from left lumbar spine down his left LE to his foot  Pt notes pain relief when laying supine in bed  Pt underwent R CTR and trigger finger release of all fingers yesterday and is scheduled for OT next week  Objective: See treatment diary below  Press ups deferred due to hand surgery  Assessment: Tolerated treatment well  Patient requires cueing for correct technique with slump sliders  Pt notes mild pain lateral left hip post session, but no radicular pain in his LE  Plan: Continue per plan of care        Precautions: HTN; DM; CABG      Manual  9/25 10/3 10/4 10/7 10/14 10/16       Ext mob in prone ALEJANDRO 10' ALEJANDRO 10' ALEJANDRO 10' ALEJANDRO 10' ALEJANDRO 10' TP 5'       L hip PROM 5' 5' 5' 5' 5' TP x5'       90/90 sciatic nerve glides ALEJANDRO x20 ALEJANDRO x20 ALEJANDRO x20 ALEJANDRO x20 ALEJANDRO x20 TP x20       L LE long axis distraction      TP 60"x5                        Exercise Diary  9/25 10/3 10/4 10/7 10/14 10/16       Recumbent bike 10' 10' 10' NP  10'       Prone press up 2x10 2x10 2x10 2x10 2x`15 np       Slump sliders 20 20 20 20 20 20       LTR 5s x10 5s x10 5s x20 5s x20 5s x20 5"x20       Standing hip abd    2x10 2x15 2x15                                 Treadmill  10' 10' 12' 12' np                                                                                                                                                                       Modalities

## 2019-10-17 ENCOUNTER — OFFICE VISIT (OUTPATIENT)
Dept: FAMILY MEDICINE CLINIC | Facility: CLINIC | Age: 74
End: 2019-10-17

## 2019-10-17 VITALS — BODY MASS INDEX: 24.74 KG/M2 | HEIGHT: 67 IN | WEIGHT: 157.6 LBS

## 2019-10-17 DIAGNOSIS — M54.32 SCIATICA OF LEFT SIDE: ICD-10-CM

## 2019-10-17 DIAGNOSIS — M99.04 SOMATIC DYSFUNCTION OF SACRAL REGION: Primary | ICD-10-CM

## 2019-10-17 NOTE — PATIENT INSTRUCTIONS
Ejercicios para el síndrome de la irwin iliotibial   CUIDADO AMBULATORIO:   Síndrome de la irwin iliotibial (SBIT) , conocido también seth rodilla de chung, se produce cuando la irwin iliotibial (ITB) se lesiona y causa dolor  La ITB es barron irwin larga de tejido  Se extiende desde la parte exterior de la pelvis (hueso de la cadera) hacia el exterior de la tibia (hueso de la canilla)  Contribuye a mantener la rodilla en la posición correcta cuando usted se pone de pie o se mueve  El síndrome de la irwin iliotibial se produce con mayor frecuencia en los corredores de Sydelle Heck y los ciclistas  Lo que necesita saber acerca de los ejercicios para la ITB:  Los ejercicios para la ITB ayudan a fortalecer los músculos que rodean la rodilla y la cadera  Unos músculos jonna pueden ayudar a reducir el dolor y disminuir el riesgo de sufrir barron lesión en el futuro  Pregúntele a li Lurene Ruder vitaminas y minerales son adecuados para usted  · Tiene dolor byron cuando hace ejercicio o está en reposo  · Tiene preguntas o inquietudes acerca de los estiramientos o los ejercicios  Alivie el dolor y la inflamación:   · Aplique hielo  en la cadera, la rodilla o el muslo monica 15 a 20 minutos cada hora o seth se le indique  Use un paquete de hielo o ponga hielo molido dentro de [employer]  Cúbrala con barron toalla  El hielo ayuda a evitar daño al tejido y a disminuir la inflamación y el dolor  · La aplicación de calor  en la cadera, la rodilla o el muslo moniac 20 a 30 minutos antes de estirar o hacer ejercicio  Use un paquete de calor o humedezca barron toalla pequeña y colóquela en el horno de microondas monica 15 segundos  El calor alcira el dolor y permite estirar los músculos con mayor facilidad  · Masajee el área dolorida seth le indiquen  Use un rodillo de espuma para masajear con cuidado las [employer] Dom[employer]  Coloque el rodillo sobre barron superficie plana   Tiéndase de lado con Mary Specking contra la pierna dolorida  Mueva el cuerpo de modo que ruede [place] y [place], desde la cadera hasta encima de la rodilla  No se acueste con esto contra la parte exterior de li rótula  Ejercicio seguro: No empiece ningún programa de ejercicios antes de hablar con li médico  Deberá esperar hasta que la hinchazón y el dolor se vayan para comenzar a ejercitarse  · Muévase lenta y suavemente  Evite movimientos rápidos o bruscos  [place] ayudará a evitar otra lesión  · Respire normalmente  No contenga la respiración  Es importante inspirar y exhalar para no tensionarse monica el ejercicio  La tensión le podría impedir  la articulación en un rango completo de movimiento  · Realice los ejercicios y el estiramiento con las dos piernas  Hacga esto para que ambas ITB sigan siendo jonna y flexibles  · Deténgase si siente dolor byron o aumento del dolor  Suspenda el ejercicio y póngase en contacto con li médico si se presentan estos síntomas  Es normal sentir Isidro Aura, seth dolor sordo, monica el ejercicio  Practicar los ejercicios con regularidad ayudará a disminuir li incomodidad con el paso del Abi  · Entre en calor y estírese antes de hacer ejercicio  [place] ayudará a evitar barron lesión  Camine o joana en bicicleta fija monica 5 a 10 minutos  Cómo se realizan los estiramientos de la ITB:  Siempre realice los ejercicios de estiramiento antes y después de hacer cualquier ejercicio de fuerza  Mantenga cada estiramiento monica 30 segundos a 1 minuto  Repita cada estiramiento 2 a 3 veces o según lo indicado  · [employer], elongación de la ITB:  [employer] con la pierna lesionada detrás de la otra pierna  Cruce la pierna delantera sobre la pierna [place]  Dóblese para un lado hacia la cadera que no está lesionada  Deténgase cuando sienta un estiramiento en la cadera de li pierna lesionada  Repita en el otro lado  · Acostado, elongación de la ITB:  Acuéstese boca arriba  Doble la rodilla de la pierna lesionada hacia el pecho  Colóquese la [employer] parte exterior de li muslo  Tire lentamente la rodilla a través de li cuerpo  Deténgase cuando sienta un estiramiento en li cadera y en la parte exterior del muslo  Repita en el otro lado  · Estiramiento de cadera:  Recuéstese en el suelo  Ponga las hunter en la espinilla de barron pierna  Tire de la [place]  Repita en el otro lado  · Estiramiento de los cuádriceps de pie:  [employer], coloque barron mano contra barron pared, o agárrese del espaldar de barron silla para mantener el equilibrio  Cargue el peso de li cuerpo en barron pierna, flexione la rodilla de la otra pierna y tómese del tobillo  Acerque el tobillo a las nalgas  · Sentado, estiramiento del tendón de la corva, parte posterior del muslo:  Siéntese en barron superficie plana en el piso con las dos piernas enfrente de usted  Coloque las isaias de quinton hunter en el piso y deslice quinton hunter hacia adelante hasta que usted sienta barron resistencia o un estiramiento leve  Si es posible, tómese los dedos del pie  Debe mantener la espalda derecha  ¿Cómo realizo ejercicios de estiramiento para la ITB? Li médico le indicará con qué frecuencia hacer los siguientes ejercicios  · Medias sentadillas de pie:  Párese con los pies a la distancia de quinton hombros  Lleve li espalda contra barron pared o agárrese del espaldar de barron silla para mantener el equilibrio, si es necesario  [id number] y baje unas 10 pulgadas lentamente, seth si fuera a sentarse en barron silla  Ponga la mayor parte de li peso en los [place]  [address] sentadillas por 5 segundos, luego regrese a la posición inicial  Realice 3 series de 10 sentadillas  · Sentado, levantamiento de pierna:  Siéntese en barron silla con los pies planos sobre el piso  Despacio enderece y levante barron pierna  Contraiga el músculo de li muslo y sostenga por 5 segundos   Relaje y regrese li pie al piso  Realice 3 series de 10 levantamientos con cada pierna  · Sentadilla con barron pierna:  Párese sobre la pierna [place], entre 2 luciana  Los respaldos de las luciana deben estar hacia usted  Coloque barron mano en cada silla  Enderece la pierna que no está lesionada y levántela del suelo  Use las luciana para soportar algo del peso de li cuerpo  Flexione la rodilla de li pierna lesionada  Baje lentamente li cuerpo hacia el piso unas pocas pulgadas  Li peso debería [employer] talón  Sostenga está posición por 5 segundos  Luego regrese [employer] volver a quedar de pie  Realice 3 series de 10 con cada pierna  · De pie flexión de los músculos isquiotibiales:  Colóquese mirando a barron pared con ambas isaias sobre la pared  En li lugar, puede sostenerse del respaldo de barron silla para el equilibrio  Cargue el peso de li cuerpo en barron pierna, levante el otro pie tan cerca de los glúteos seth pueda  Sostenga por 5 segundos y baje li pierna  Realice 3 series de 10 flexiones con cada pierna  · Aducción de cadera:  Acuéstese del lado de li pierna lesionada  Cruce la parte superior de la pierna sobre la pierna lesionada  [place] li pie en el piso enfrente suyo  Eleve la pierna [employer] toque la otra pierna  Despacio baje la pierna al piso  Realice 3 series de 10 con cada pierna  · Abducción de cadera:  Acuéstese sobre el lado que no está lesionado  Extienda quinton piernas  Lentamente levante la pierna lesionada tan alto seth pueda  Mantenga li pie apuntando derecho  Sostenga por 5 segundos y baje li pierna lentamente  Realice 3 series de 10 con cada pierna  Acuda a quinton consultas de control con li médico según le indicaron  Anote quinton preguntas para que se acuerde de hacerlas monica quinton visitas  © 2017 [address] Information is for End User's use only and may not be sold, redistributed or otherwise used for commercial purposes   All illustrations and images included in [facility] are the copyrighted property of A D A M , Inc  or Dustin Lamar  Esta información es sólo para uso en educación  Li intención no es darle un consejo médico sobre enfermedades o tratamientos  Colsulte con li Mayra Ludwig farmacéutico antes de seguir cualquier régimen médico para saber si es seguro y efectivo para usted

## 2019-10-19 DIAGNOSIS — M65.341 TRIGGER FINGER OF ALL DIGITS OF BOTH HANDS: ICD-10-CM

## 2019-10-19 DIAGNOSIS — M65.351 TRIGGER FINGER OF ALL DIGITS OF BOTH HANDS: ICD-10-CM

## 2019-10-19 DIAGNOSIS — M65.312 TRIGGER FINGER OF ALL DIGITS OF BOTH HANDS: ICD-10-CM

## 2019-10-19 DIAGNOSIS — M65.352 TRIGGER FINGER OF ALL DIGITS OF BOTH HANDS: ICD-10-CM

## 2019-10-19 DIAGNOSIS — G56.03 BILATERAL CARPAL TUNNEL SYNDROME: ICD-10-CM

## 2019-10-19 DIAGNOSIS — M65.322 TRIGGER FINGER OF ALL DIGITS OF BOTH HANDS: ICD-10-CM

## 2019-10-19 DIAGNOSIS — M65.311 TRIGGER FINGER OF ALL DIGITS OF BOTH HANDS: ICD-10-CM

## 2019-10-19 DIAGNOSIS — M65.342 TRIGGER FINGER OF ALL DIGITS OF BOTH HANDS: ICD-10-CM

## 2019-10-19 DIAGNOSIS — M65.332 TRIGGER FINGER OF ALL DIGITS OF BOTH HANDS: ICD-10-CM

## 2019-10-19 DIAGNOSIS — M54.42 ACUTE LEFT-SIDED LOW BACK PAIN WITH LEFT-SIDED SCIATICA: ICD-10-CM

## 2019-10-19 DIAGNOSIS — M65.321 TRIGGER FINGER OF ALL DIGITS OF BOTH HANDS: ICD-10-CM

## 2019-10-19 DIAGNOSIS — M65.331 TRIGGER FINGER OF ALL DIGITS OF BOTH HANDS: ICD-10-CM

## 2019-10-19 RX ORDER — NAPROXEN SODIUM 220 MG
220 TABLET ORAL 2 TIMES DAILY WITH MEALS
Qty: 10 TABLET | Refills: 0 | Status: SHIPPED | OUTPATIENT
Start: 2019-10-19 | End: 2019-10-22 | Stop reason: SDUPTHER

## 2019-10-19 RX ORDER — ACETAMINOPHEN 650 MG/1
TABLET, FILM COATED, EXTENDED RELEASE ORAL
Qty: 15 TABLET | Refills: 0 | Status: ON HOLD | OUTPATIENT
Start: 2019-10-19 | End: 2020-03-03

## 2019-10-21 ENCOUNTER — EVALUATION (OUTPATIENT)
Dept: PHYSICAL THERAPY | Facility: REHABILITATION | Age: 74
End: 2019-10-21
Payer: MEDICARE

## 2019-10-21 ENCOUNTER — APPOINTMENT (OUTPATIENT)
Dept: PHYSICAL THERAPY | Facility: REHABILITATION | Age: 74
End: 2019-10-21
Payer: MEDICARE

## 2019-10-21 DIAGNOSIS — Z98.890 S/P ENDOSCOPIC CARPAL TUNNEL RELEASE: Primary | ICD-10-CM

## 2019-10-21 DIAGNOSIS — M54.32 SCIATICA OF LEFT SIDE: ICD-10-CM

## 2019-10-21 PROCEDURE — 97140 MANUAL THERAPY 1/> REGIONS: CPT | Performed by: PHYSICAL THERAPIST

## 2019-10-21 PROCEDURE — 97164 PT RE-EVAL EST PLAN CARE: CPT | Performed by: PHYSICAL THERAPIST

## 2019-10-21 RX ORDER — GABAPENTIN 100 MG/1
100 CAPSULE ORAL
Qty: 30 CAPSULE | Refills: 0 | OUTPATIENT
Start: 2019-10-21

## 2019-10-21 NOTE — PROGRESS NOTES
PT Evaluation     Today's date: 10/21/2019  Patient name: Yosef Jerome  :   MRN: [id number]  Referring provider: Arabella Wade MD  Dx: No diagnosis found  Assessment  Assessment details: Patient underwent endoscopic CTR and trigger finger release x5 of the R hand  Presents to PT with decreased ROM and increased swelling throughout the R hand  Patient will benefit from skilled PT 2x weekly for 4 weeks  Impairments: abnormal or restricted ROM and pain with function  Other impairment: edema  Functional limitations: IADls  Symptom irritability: lowUnderstanding of Dx/Px/POC: good   Prognosis: good    Goals  Short Term goals - 4 weeks  1  Patient will be independent and compliant with a HEP  2   Patient will report a 50% decrease in pain complaints  Long Term goals - 8 weeks  1  Patient will report elimination of pain complaints  2   Patient will return to all IADLs without restriction  3   Patient will return to all recreational activities without restriction        Plan  Patient would benefit from: skilled physical therapy  Planned modality interventions: cryotherapy  Planned therapy interventions: joint mobilization, manual therapy, neuromuscular re-education, patient education, therapeutic exercise and home exercise program  Frequency: 2x week  Duration in visits: 8  Duration in weeks: 4  Treatment plan discussed with: patient        Subjective Evaluation    History of Present Illness  Date of surgery: 10/17/2019  Mechanism of injury: surgery  Mechanism of injury: Underwent surgical intervention for CTR and trigger finger release x5  Quality of life: good    Pain  Current pain ratin  At best pain ratin  At worst pain ratin  Progression: improved    Hand dominance: right    Treatments  Current treatment: physical therapy  Patient Goals  Patient goals for therapy: decreased edema, decreased pain, increased motion, increased strength and independence with ADLs/IADLs          Objective     Passive Range of Motion     Left Wrist   Normal passive range of motion    Right Wrist   Wrist flexion: 20 degrees   Wrist extension: 50 degrees     Right Thumb   Flexion     MP: 90    DIP: 10  Extension     CMC: -10    MP: 0    DIP: 0    Right Digits   Flexion   Index     MCP: 70    PIP: 50    DIP: 12  Middle     MCP: 80    PIP: 90    DIP: 15  Ring     PIP: 90    DIP: 10  Little     PIP: 80    DIP: 30  Extension   Index     PIP: 0    DIP: 0  Middle     PIP: 0    DIP: 0  Ring     PIP: 0    DIP: 0  Little     PIP: 0    DIP: 0

## 2019-10-22 DIAGNOSIS — M65.352 TRIGGER FINGER OF ALL DIGITS OF BOTH HANDS: ICD-10-CM

## 2019-10-22 DIAGNOSIS — M65.342 TRIGGER FINGER OF ALL DIGITS OF BOTH HANDS: ICD-10-CM

## 2019-10-22 DIAGNOSIS — M65.341 TRIGGER FINGER OF ALL DIGITS OF BOTH HANDS: ICD-10-CM

## 2019-10-22 DIAGNOSIS — M65.331 TRIGGER FINGER OF ALL DIGITS OF BOTH HANDS: ICD-10-CM

## 2019-10-22 DIAGNOSIS — M65.351 TRIGGER FINGER OF ALL DIGITS OF BOTH HANDS: ICD-10-CM

## 2019-10-22 DIAGNOSIS — M65.332 TRIGGER FINGER OF ALL DIGITS OF BOTH HANDS: ICD-10-CM

## 2019-10-22 DIAGNOSIS — M65.321 TRIGGER FINGER OF ALL DIGITS OF BOTH HANDS: ICD-10-CM

## 2019-10-22 DIAGNOSIS — M65.322 TRIGGER FINGER OF ALL DIGITS OF BOTH HANDS: ICD-10-CM

## 2019-10-22 DIAGNOSIS — M65.311 TRIGGER FINGER OF ALL DIGITS OF BOTH HANDS: ICD-10-CM

## 2019-10-22 DIAGNOSIS — G56.03 BILATERAL CARPAL TUNNEL SYNDROME: ICD-10-CM

## 2019-10-22 DIAGNOSIS — M65.312 TRIGGER FINGER OF ALL DIGITS OF BOTH HANDS: ICD-10-CM

## 2019-10-22 RX ORDER — NAPROXEN SODIUM 220 MG
220 TABLET ORAL 2 TIMES DAILY WITH MEALS
Qty: 10 TABLET | Refills: 0 | Status: SHIPPED | OUTPATIENT
Start: 2019-10-22 | End: 2019-10-25 | Stop reason: SDUPTHER

## 2019-10-23 ENCOUNTER — OFFICE VISIT (OUTPATIENT)
Dept: PHYSICAL THERAPY | Facility: REHABILITATION | Age: 74
End: 2019-10-23
Payer: MEDICARE

## 2019-10-23 ENCOUNTER — APPOINTMENT (OUTPATIENT)
Dept: PHYSICAL THERAPY | Facility: REHABILITATION | Age: 74
End: 2019-10-23
Payer: MEDICARE

## 2019-10-23 DIAGNOSIS — Z98.890 S/P ENDOSCOPIC CARPAL TUNNEL RELEASE: Primary | ICD-10-CM

## 2019-10-23 PROCEDURE — 97530 THERAPEUTIC ACTIVITIES: CPT | Performed by: PHYSICAL THERAPIST

## 2019-10-23 NOTE — PROGRESS NOTES
Daily Note     Today's date: 10/23/2019  Patient name: Soledad Boyce  : [birth year]  MRN: [id number]  Referring provider: Marie Nickerson MD  Dx: No diagnosis found  Subjective: Patient seen for CTR and trigger release - came to appt on wrong day  Objective: See treatment diary below  Inspection of incisions look good  Swelling improved  Patient instructed in tendon gliding for PIP and DIP  Assessment: Tolerated treatment well  Patient demonstrated fatigue post treatment      Plan: Continue per plan of care

## 2019-10-24 ENCOUNTER — OFFICE VISIT (OUTPATIENT)
Dept: PHYSICAL THERAPY | Facility: REHABILITATION | Age: 74
End: 2019-10-24
Payer: MEDICARE

## 2019-10-24 DIAGNOSIS — Z98.890 S/P ENDOSCOPIC CARPAL TUNNEL RELEASE: Primary | ICD-10-CM

## 2019-10-24 DIAGNOSIS — M54.42 ACUTE LEFT-SIDED LOW BACK PAIN WITH LEFT-SIDED SCIATICA: ICD-10-CM

## 2019-10-24 DIAGNOSIS — H40.9 GLAUCOMA, UNSPECIFIED GLAUCOMA TYPE, UNSPECIFIED LATERALITY: ICD-10-CM

## 2019-10-24 PROCEDURE — 97140 MANUAL THERAPY 1/> REGIONS: CPT | Performed by: PHYSICAL THERAPIST

## 2019-10-24 PROCEDURE — 97110 THERAPEUTIC EXERCISES: CPT | Performed by: PHYSICAL THERAPIST

## 2019-10-24 PROCEDURE — 97530 THERAPEUTIC ACTIVITIES: CPT | Performed by: PHYSICAL THERAPIST

## 2019-10-24 NOTE — PROGRESS NOTES
Daily Note     Today's date: 10/24/2019  Patient name: Lux Anderson  : [birth year]  MRN: [id number]  Referring provider: Kal Kenny MD  Dx:   Encounter Diagnosis     ICD-10-CM    1  S/P endoscopic carpal tunnel release Z98 890    2  Acute left-sided low back pain with left-sided sciatica M54 42                   Subjective: Pt reports no pain in his hand and less edema  Pt continues to experience intermittnet radicular pain t/o L LE into his foot  Objective: See treatment diary below  Manual  9/25 10/3 10/4 10/7 10/14 10/16  10/24         Ext mob in prone ALEJANDRO 10' ALEJANDRO 10' ALEJANDRO 10' ALEJANDRO 10' ALEJANDRO 10' TP 5'  TP x5'         L hip PROM 5' 5' 5' 5' 5' TP x5'  TP x5'         90/90 sciatic nerve glides ALEJANDRO x20 ALEJANDRO x20 ALEJANDRO x20 ALEJANDRO x20 ALEJANDRO x20 TP x20  TP x20         L LE long axis distraction           TP 60"x5  TP 60"x5                                       Exercise Diary  9/25 10/3 10/4 10/7 10/14 10/16  10/24         Recumbent bike 10' 10' 10' NP   10'  10'         Prone press up 2x10 2x10 2x10 2x10 2x`15 np  np         Slump sliders 20 20 20 20 20 20  20         LTR 5s x10 5s x10 5s x20 5s x20 5s x20 5"x20  5"x20         Standing hip abd       2x10 2x15 2x15  2x15                                                         Treadmill   10' 10' 12' 12' np  np          R tendon gliding PIP and DIP              x20ea                                                                                                                                                                                                                                                                                       Modalities                                                                                                      Assessment: Tolerated treatment well  No radicular pain noted this session  Finger ROM improving with tendon glides  Patient would benefit from continued PT      Plan: Continue per plan of care

## 2019-10-25 ENCOUNTER — OFFICE VISIT (OUTPATIENT)
Dept: OBGYN CLINIC | Facility: HOSPITAL | Age: 74
End: 2019-10-25

## 2019-10-25 VITALS
HEIGHT: 67 IN | BODY MASS INDEX: 25.11 KG/M2 | DIASTOLIC BLOOD PRESSURE: 56 MMHG | SYSTOLIC BLOOD PRESSURE: 111 MMHG | WEIGHT: 160 LBS | HEART RATE: 52 BPM

## 2019-10-25 DIAGNOSIS — M65.342 TRIGGER FINGER OF ALL DIGITS OF BOTH HANDS: Primary | ICD-10-CM

## 2019-10-25 DIAGNOSIS — M65.331 TRIGGER FINGER OF ALL DIGITS OF BOTH HANDS: ICD-10-CM

## 2019-10-25 DIAGNOSIS — M65.332 TRIGGER FINGER OF ALL DIGITS OF BOTH HANDS: Primary | ICD-10-CM

## 2019-10-25 DIAGNOSIS — M65.342 TRIGGER FINGER OF ALL DIGITS OF BOTH HANDS: ICD-10-CM

## 2019-10-25 DIAGNOSIS — G56.03 BILATERAL CARPAL TUNNEL SYNDROME: ICD-10-CM

## 2019-10-25 DIAGNOSIS — M65.332 TRIGGER FINGER OF ALL DIGITS OF BOTH HANDS: ICD-10-CM

## 2019-10-25 DIAGNOSIS — M65.322 TRIGGER FINGER OF ALL DIGITS OF BOTH HANDS: Primary | ICD-10-CM

## 2019-10-25 DIAGNOSIS — M65.321 TRIGGER FINGER OF ALL DIGITS OF BOTH HANDS: Primary | ICD-10-CM

## 2019-10-25 DIAGNOSIS — M65.312 TRIGGER FINGER OF ALL DIGITS OF BOTH HANDS: Primary | ICD-10-CM

## 2019-10-25 DIAGNOSIS — M65.331 TRIGGER FINGER OF ALL DIGITS OF BOTH HANDS: Primary | ICD-10-CM

## 2019-10-25 DIAGNOSIS — M65.351 TRIGGER FINGER OF ALL DIGITS OF BOTH HANDS: ICD-10-CM

## 2019-10-25 DIAGNOSIS — M65.341 TRIGGER FINGER OF ALL DIGITS OF BOTH HANDS: ICD-10-CM

## 2019-10-25 DIAGNOSIS — M65.311 TRIGGER FINGER OF ALL DIGITS OF BOTH HANDS: Primary | ICD-10-CM

## 2019-10-25 DIAGNOSIS — M65.351 TRIGGER FINGER OF ALL DIGITS OF BOTH HANDS: Primary | ICD-10-CM

## 2019-10-25 DIAGNOSIS — M65.312 TRIGGER FINGER OF ALL DIGITS OF BOTH HANDS: ICD-10-CM

## 2019-10-25 DIAGNOSIS — M65.352 TRIGGER FINGER OF ALL DIGITS OF BOTH HANDS: ICD-10-CM

## 2019-10-25 DIAGNOSIS — M65.321 TRIGGER FINGER OF ALL DIGITS OF BOTH HANDS: ICD-10-CM

## 2019-10-25 DIAGNOSIS — M65.311 TRIGGER FINGER OF ALL DIGITS OF BOTH HANDS: ICD-10-CM

## 2019-10-25 DIAGNOSIS — M65.341 TRIGGER FINGER OF ALL DIGITS OF BOTH HANDS: Primary | ICD-10-CM

## 2019-10-25 DIAGNOSIS — M65.352 TRIGGER FINGER OF ALL DIGITS OF BOTH HANDS: Primary | ICD-10-CM

## 2019-10-25 DIAGNOSIS — M65.322 TRIGGER FINGER OF ALL DIGITS OF BOTH HANDS: ICD-10-CM

## 2019-10-25 PROCEDURE — 99024 POSTOP FOLLOW-UP VISIT: CPT | Performed by: ORTHOPAEDIC SURGERY

## 2019-10-25 RX ORDER — TOBRAMYCIN AND DEXAMETHASONE 3; 1 MG/ML; MG/ML
1 SUSPENSION/ DROPS OPHTHALMIC 2 TIMES DAILY
Qty: 5 ML | Refills: 3 | Status: SHIPPED | OUTPATIENT
Start: 2019-10-25 | End: 2019-12-17 | Stop reason: SDUPTHER

## 2019-10-25 RX ORDER — NAPROXEN SODIUM 220 MG
220 TABLET ORAL 2 TIMES DAILY PRN
Qty: 10 TABLET | Refills: 0 | Status: SHIPPED | OUTPATIENT
Start: 2019-10-25 | End: 2020-02-11

## 2019-10-25 NOTE — PROGRESS NOTES
Assessment:   S/p R ECTR and thumb, index, long, ring and small trigger finger release on 10/15/2019    Plan: It was discussed with the patient that they may now begin to wash their incision site with soap and water  They were instructed to let the steri-strips fall off on their own at this point in time and when they do they may begin to apply lotion to their incision site  Lotion enriched in vitamin E, coca butter, scarzone and maderma was discussed with the patient  He was advised to continue with formal therapy for edema control and ROM  When he feels that his right hand is healed we will consider proceeding with a left ECTR and TFR of all digits  Follow Up:  6  week(s)    To Do Next Visit:         CHIEF COMPLAINT:  Chief Complaint   Patient presents with    Left Hand - Post-op         SUBJECTIVE:  Marah Pavon is a 69 y o  male who presents for follow up after S/p R ECTR and thumb, index, long, ring and small trigger finger release on 10/15/2019  Today patient has Stiffness/LROM  Patient states that his numbness and tingling is greatly improved since surgery and his triggering has resolved since surgery  He has been in formal physical therapy since 10/21/2019        PHYSICAL EXAMINATION:  Vital signs: /56   Pulse (!) 52   Ht 5' 7" (1 702 m)   Wt 72 6 kg (160 lb)   BMI 25 06 kg/m²   General: well developed and well nourished, alert, oriented times 3 and appears comfortable  Psychiatric: Normal    MUSCULOSKELETAL EXAMINATION:  Incision: Clean, dry, intact  Range of Motion: Limited due to stiffness  Neurovascular status: Neuro intact, good cap refill  Activity Restrictions: No restrictions  Done today: Sutures out and Steri strips applied      STUDIES REVIEWED:  No Studies to review      PROCEDURES PERFORMED:  Procedures  No Procedures performed today   Scribe Attestation    I,:   Rosita Kelly am acting as a scribe while in the presence of the attending physician :        I,:   Bora Palomares Whit Painter MD personally performed the services described in this documentation    as scribed in my presence :

## 2019-10-28 ENCOUNTER — OFFICE VISIT (OUTPATIENT)
Dept: PHYSICAL THERAPY | Facility: REHABILITATION | Age: 74
End: 2019-10-28
Payer: MEDICARE

## 2019-10-28 DIAGNOSIS — M54.42 ACUTE LEFT-SIDED LOW BACK PAIN WITH LEFT-SIDED SCIATICA: Primary | ICD-10-CM

## 2019-10-28 DIAGNOSIS — Z98.890 S/P ENDOSCOPIC CARPAL TUNNEL RELEASE: ICD-10-CM

## 2019-10-28 PROCEDURE — 97140 MANUAL THERAPY 1/> REGIONS: CPT | Performed by: PHYSICAL THERAPIST

## 2019-10-28 PROCEDURE — 97110 THERAPEUTIC EXERCISES: CPT | Performed by: PHYSICAL THERAPIST

## 2019-10-28 NOTE — PROGRESS NOTES
Daily Note     Today's date: 10/28/2019  Patient name: Mari Morales  :   MRN: [id number]  Referring provider: Vandy Runner, MD  Dx:   No diagnosis found  Subjective: Pt reports no pain in his hand and less edema  Pt continues to experience intermittnet radicular pain t/o L LE into his foot  Objective: See treatment diary below  Manual  9/25 10/3 10/4 10/7 10/14 10/16  10/24 10/28       Ext mob in prone ALEJANDRO 10' ALEJANDRO 10' ALEJANDRO 10' ALEJANDRO 10' ALEJANDRO 10' TP 5'  TP x5' ALEJANDRO x5'       L hip PROM 5' 5' 5' 5' 5' TP x5'  TP x5'  ALEJANDRO 5'       90/90 sciatic nerve glides ALEJANDRO x20 ALEJANDRO x20 ALEJANDRO x20 ALEJANDRO x20 ALEJANDRO x20 TP x20  TP x20  ALEJANDRO x20       L LE long axis distraction           TP 60"x5  TP 60"x5  ALEJANDRO 60"x5        R hand PROM -                15'             Exercise Diary  9/25 10/3 10/4 10/7 10/14 10/16  10/24  10/28       Recumbent bike 10' 10' 10' NP   10'  10' NP       Prone press up 2x10 2x10 2x10 2x10 2x`15 np  np         Slump sliders 20 20 20 20 20 20  20  20       LTR 5s x10 5s x10 5s x20 5s x20 5s x20 5"x20  5"x20  5"x20       Standing hip abd       2x10 2x15 2x15  2x15  NP                                                       Treadmill   10' 10' 12' 12' np  np  10'        R tendon gliding PIP and DIP              x20ea                                                                                                                                                                                                                                                                                       Modalities                                                                                                      Assessment: Hand moving much better than previously noted  Plan: Continue per plan of care

## 2019-10-29 DIAGNOSIS — M65.321 TRIGGER FINGER OF ALL DIGITS OF BOTH HANDS: ICD-10-CM

## 2019-10-29 DIAGNOSIS — M65.322 TRIGGER FINGER OF ALL DIGITS OF BOTH HANDS: ICD-10-CM

## 2019-10-29 DIAGNOSIS — M65.351 TRIGGER FINGER OF ALL DIGITS OF BOTH HANDS: ICD-10-CM

## 2019-10-29 DIAGNOSIS — M65.341 TRIGGER FINGER OF ALL DIGITS OF BOTH HANDS: ICD-10-CM

## 2019-10-29 DIAGNOSIS — M65.331 TRIGGER FINGER OF ALL DIGITS OF BOTH HANDS: ICD-10-CM

## 2019-10-29 DIAGNOSIS — M65.312 TRIGGER FINGER OF ALL DIGITS OF BOTH HANDS: ICD-10-CM

## 2019-10-29 DIAGNOSIS — M65.332 TRIGGER FINGER OF ALL DIGITS OF BOTH HANDS: ICD-10-CM

## 2019-10-29 DIAGNOSIS — M65.352 TRIGGER FINGER OF ALL DIGITS OF BOTH HANDS: ICD-10-CM

## 2019-10-29 DIAGNOSIS — M65.342 TRIGGER FINGER OF ALL DIGITS OF BOTH HANDS: ICD-10-CM

## 2019-10-29 DIAGNOSIS — M65.311 TRIGGER FINGER OF ALL DIGITS OF BOTH HANDS: ICD-10-CM

## 2019-10-29 DIAGNOSIS — G56.03 BILATERAL CARPAL TUNNEL SYNDROME: ICD-10-CM

## 2019-10-30 ENCOUNTER — OFFICE VISIT (OUTPATIENT)
Dept: PHYSICAL THERAPY | Facility: REHABILITATION | Age: 74
End: 2019-10-30
Payer: MEDICARE

## 2019-10-30 DIAGNOSIS — M54.42 ACUTE LEFT-SIDED LOW BACK PAIN WITH LEFT-SIDED SCIATICA: Primary | ICD-10-CM

## 2019-10-30 DIAGNOSIS — Z98.890 S/P ENDOSCOPIC CARPAL TUNNEL RELEASE: ICD-10-CM

## 2019-10-30 PROCEDURE — 97110 THERAPEUTIC EXERCISES: CPT | Performed by: PHYSICAL THERAPIST

## 2019-10-30 NOTE — PROGRESS NOTES
Daily Note     Today's date: 10/30/2019  Patient name: Nel Hernandez  :   MRN: [id number]  Referring provider: Cindi Hammer MD  Dx:   Encounter Diagnosis     ICD-10-CM    1  Acute left-sided low back pain with left-sided sciatica M54 42    2  S/P endoscopic carpal tunnel release [id number]                   Subjective: Pt reports no pain in his hand and less edema  Pt continues to experience intermittnet radicular pain t/o L LE into his foot  Objective: See treatment diary below  Manual  9/25 10/3 10/4 10/7 10/14 10/16  10/24 10/28  10/30     Ext mob in prone ALEJANDRO 10' ALEJANDRO 10' ALEJANDRO 10' ALEJANDRO 10' ALEJANDRO 10' TP 5'  TP x5' ALEJANDRO x5'  ALEJANDRO 5'     L hip PROM 5' 5' 5' 5' 5' TP x5'  TP x5'  ALEJANDRO 5'  ALEJANDRO 5"     90/90 sciatic nerve glides ALEJANDRO x20 ALEJANDRO x20 ALEJANDRO x20 ALEJANDRO x20 ALEJANDRO x20 TP x20  TP x20  ALEJANDOR x20  ALEJANDRO x20     L LE long axis distraction           TP 60"x5  TP 60"x5  ALEJANDRO 60"x5  ALEJANDRO 60"x5      R hand PROM -                15'  15'           Exercise Diary  9/25 10/3 10/4 10/7 10/14 10/16  10/24  10/28  10/30     Recumbent bike 10' 10' 10' NP   10'  10' NP  10'     Prone press up 2x10 2x10 2x10 2x10 2x`15 np  np         Slump sliders 20 20 20 20 20 20  20  20  20     LTR 5s x10 5s x10 5s x20 5s x20 5s x20 5"x20  5"x20  5"x20  5"x20     Standing hip abd       2x10 2x15 2x15  2x15  NP  2x10                                                     Treadmill   10' 10' 12' 12' np  np  10'  10'      R tendon gliding PIP and DIP              x20ea    x20                                                                                                                                                                                                                                                                                   Modalities                                                                                                      Assessment: Able to progress all aspects of PT as hand progress has been good  Plan: Continue per plan of care

## 2019-11-04 ENCOUNTER — OFFICE VISIT (OUTPATIENT)
Dept: PHYSICAL THERAPY | Facility: REHABILITATION | Age: 74
End: 2019-11-04
Payer: MEDICARE

## 2019-11-04 ENCOUNTER — OFFICE VISIT (OUTPATIENT)
Dept: CARDIOLOGY CLINIC | Facility: CLINIC | Age: 74
End: 2019-11-04
Payer: MEDICARE

## 2019-11-04 VITALS
DIASTOLIC BLOOD PRESSURE: 58 MMHG | BODY MASS INDEX: 25.27 KG/M2 | HEART RATE: 51 BPM | HEIGHT: 67 IN | WEIGHT: 161 LBS | SYSTOLIC BLOOD PRESSURE: 112 MMHG | OXYGEN SATURATION: 98 %

## 2019-11-04 DIAGNOSIS — M54.42 ACUTE LEFT-SIDED LOW BACK PAIN WITH LEFT-SIDED SCIATICA: Primary | ICD-10-CM

## 2019-11-04 DIAGNOSIS — I25.10 CORONARY ARTERY DISEASE INVOLVING NATIVE CORONARY ARTERY OF NATIVE HEART WITHOUT ANGINA PECTORIS: Primary | ICD-10-CM

## 2019-11-04 DIAGNOSIS — I35.0 MILD AORTIC STENOSIS: ICD-10-CM

## 2019-11-04 DIAGNOSIS — I10 ESSENTIAL HYPERTENSION: ICD-10-CM

## 2019-11-04 DIAGNOSIS — Z95.1 S/P CABG (CORONARY ARTERY BYPASS GRAFT): ICD-10-CM

## 2019-11-04 DIAGNOSIS — M54.32 SCIATICA OF LEFT SIDE: ICD-10-CM

## 2019-11-04 DIAGNOSIS — Z95.5 S/P DRUG ELUTING CORONARY STENT PLACEMENT: Chronic | ICD-10-CM

## 2019-11-04 PROCEDURE — 99214 OFFICE O/P EST MOD 30 MIN: CPT | Performed by: INTERNAL MEDICINE

## 2019-11-04 PROCEDURE — 97140 MANUAL THERAPY 1/> REGIONS: CPT | Performed by: PHYSICAL THERAPIST

## 2019-11-04 PROCEDURE — 97110 THERAPEUTIC EXERCISES: CPT | Performed by: PHYSICAL THERAPIST

## 2019-11-04 RX ORDER — NEOMYCIN SULFATE, POLYMYXIN B SULFATE AND DEXAMETHASONE 3.5; 10000; 1 MG/ML; [USP'U]/ML; MG/ML
SUSPENSION/ DROPS OPHTHALMIC
Refills: 2 | COMMUNITY
Start: 2019-10-28 | End: 2020-11-16 | Stop reason: ALTCHOICE

## 2019-11-04 RX ORDER — NIFEDIPINE 30 MG/1
30 TABLET, FILM COATED, EXTENDED RELEASE ORAL 2 TIMES DAILY
Qty: 60 TABLET | Refills: 11 | Status: SHIPPED | OUTPATIENT
Start: 2019-11-04 | End: 2020-08-14

## 2019-11-04 NOTE — PROGRESS NOTES
Daily Note     Today's date: 2019  Patient name: Tay Ashley  :   MRN: [id number]  Referring provider: Storm Crooks MD  Dx:   No diagnosis found  Subjective: Some return of LB and LE pain yesterday - otherwise doing well      Objective: See treatment diary below  Manual  9/25 10/3 10/4 10/7 10/14 10/16  10/24 10/28  10/30 11/4   Ext mob in prone ALEJANDRO 10' ALEJANDRO 10' ALEJANDRO 10' ALEJANDRO 10' ALEJANDRO 10' TP 5'  TP x5' ALEJANDRO x5'  ALEJANDRO 5'  ALEJANDRO 5'   L hip PROM 5' 5' 5' 5' 5' TP x5'  TP x5'  ALEJANDRO 5'  ALEJANDRO 5"     90/90 sciatic nerve glides ALEJANDRO x20 ALEJANDRO x20 ALEJANDRO x20 ALEJANDRO x20 ALEJANDRO x20 TP x20  TP x20  ALEJANDRO x20  ALEJANDRO x20  ALEJANDRO x20   L LE long axis distraction           TP 60"x5  TP 60"x5  ALEJANDRO 60"x5  ALEJANDRO 60"x5  ALEJANDRO 60"x5    R hand PROM -                15'  15'  15'         Exercise Diary  9/25 10/3 10/4 10/7 10/14 10/16  10/24  10/28  10/30  11/4   Recumbent bike 10' 10' 10' NP   10'  10' NP  10'  10'   Prone press up 2x10 2x10 2x10 2x10 2x`15 np  np         Slump sliders 20 20 20 20 20 20  20  20  20  20   LTR 5s x10 5s x10 5s x20 5s x20 5s x20 5"x20  5"x20  5"x20  5"x20  5" x20   Standing hip abd       2x10 2x15 2x15  2x15  NP  2x10  2x10                                                   Treadmill   10' 10' 12' 12' np  np  10'  10'  10'    R tendon gliding PIP and DIP              x20ea    x20  x20                                                                                                                                                                                                                                                                                 Modalities                                                                                                      Assessment: More swelling in hand noted today      Plan: Continue per plan of care

## 2019-11-04 NOTE — PROGRESS NOTES
Cardiology Follow Up    Kaylee Pittman  1945  [id number]  [address] [facility]  [address] [place] [address] [address]    1  Coronary artery disease involving native coronary artery of native heart without angina pectoris  Lipid Panel with Direct LDL reflex   2  Essential hypertension  NIFEdipine ER (ADALAT CC) 30 MG 24 hr tablet   3  Mild aortic stenosis     4  S/P CABG (coronary artery bypass graft)     5  S/P drug eluting coronary stent placement         Discussion/Summary    1  CAD:  Stable  No angina  I had changed his lipid lowering therapy to 40mg Crestor at the last visit  Repeat lipid panel to assess effect  2  Hypertension:  Seems to be better with his BP regimen now  Well controlled  He prefers to take 30 mg of nifedipine twice a day, and he is cutting the pill  Prescribed 30mg pills to make it easier for him  3  AS: Mild in 6/2019  Surveillance in the future  Previous History:  History of coronary artery disease with MI in 2006 with a drug-eluting stent to the LAD  Subsequently with an MI in 2012 with stent to the RCA  December 2016, and other NSTEMI at that time multivessel disease and underwent 4 vessel bypass with LIMA to the LAD, vein grafts to the diagonal, OM1, PDA  Low-normal LV function with inferior wall motion abnormality  Mild aortic stenosis in 6/2019    Interval History:  Returns today  Had carpal tunnel surgery  Recovering well  Seems to be more regular with his medications now  However, he is cutting his procardia in half and taking 30 in AM, and 30 mg later in the day  He feels well  Sweating episodes are improved  No significant dizziness or lightheadedness  No chest pain      Problem List     Biceps tendonitis on right    Complete rotator cuff tear or rupture of right shoulder, not specified as traumatic (Chronic)    NSTEMI (non-ST elevated myocardial infarction) ([facility] [address] ) S/P drug eluting coronary stent placement (Chronic)    H/O non-ST elevation myocardial infarction (NSTEMI) (Chronic)    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) (Chronic)    Lab Results   Component Value Date    HGBA1C 7 4 (H) 10/01/2019       No results for input(s): POCGLU in the last 72 hours  Blood Sugar Average: Last 72 hrs:          Polyarthralgia (Chronic)    Hematuria, microscopic (Chronic)    H/O tinea cruris (Chronic)    Erectile dysfunction associated with type 2 diabetes mellitus (HCC) (Chronic)    Lab Results   Component Value Date    HGBA1C 7 4 (H) 10/01/2019       No results for input(s): POCGLU in the last 72 hours  Blood Sugar Average: Last 72 hrs: Anxiety    Type 2 diabetes mellitus (HCC)    Lab Results   Component Value Date    HGBA1C 7 4 (H) 10/01/2019       No results for input(s): POCGLU in the last 72 hours      Blood Sugar Average: Last 72 hrs:          Gastroesophageal reflux disease    Bilateral carpal tunnel syndrome    Non-rheumatic aortic stenosis    Coronary artery disease involving native coronary artery of native heart without angina pectoris        Past Medical History:   Diagnosis Date    AS (aortic stenosis)     Balanitis     Biceps tendonitis on right     CAD (coronary artery disease)     Cancer (HCC)     skin    Complete rotator cuff tear or rupture of right shoulder, not specified as traumatic     Diabetes mellitus ([facility] [address] )     Esophageal reflux     High cholesterol     Hypertension     Hypertriglyceridemia     Hypogonadism in male     Myalgia     Myocardial infarction ([facility] [address] )     Palpitations     Shoulder pain     Sinus problem     Skin cancer of nose     Sleep apnea     Stroke ([facility] [address] ) 1958    Systolic murmur     recently found    Tinea cruris     Tinea pedis      Social History     Tobacco Use    Smoking status: Former Smoker    Smokeless tobacco: Never Used    Tobacco comment: smoked for 3 years from 19 y/o - 23 yrs old   Substance Use Topics    Alcohol use: No     Family History   Problem Relation Age of Onset    Heart disease Mother     Hypertension Mother     Nephrolithiasis Father     Other Father         Renal disease    Hypertension Sister     Nephrolithiasis Brother     Other Brother         Renal disease    Hematuria Family         Microscopic     Past Surgical History:   Procedure Laterality Date    CATARACT EXTRACTION Left     COLONOSCOPY      CORONARY ANGIOPLASTY WITH STENT PLACEMENT      10-15-19 - pt denies stent implant    CORONARY ARTERY BYPASS GRAFT N/A 12/12/2016    Procedure: INTRAOP CECILLE; BILATERAL LEG EVH; CORONARY ARTERY BYPASS GRAFT X 4 SVG TO PDA, OM1,  AND DIAGONAL1, LIMA TO LAD;  Surgeon: Meg Hood MD;  [facility];  Service:     EYE SURGERY      HERNIA REPAIR      LA ARTHROSCOPY SHOULDER SURGICAL BICEPS TENODESIS Right 5/6/2016    Procedure: ARTHROSCOPIC BICEPS TENODESIS;  Surgeon: Chris Muñiz MD;  [facility];  Service: Orthopedics    LA INCISE FINGER TENDON SHEATH Right 10/15/2019    Procedure: TRIGGER FINGER RELEASE INDEX, LONG, RING, SMALL, THUMB FINGERS;  Surgeon: Samantha Whitmore MD;  [facility];  Service: Orthopedics    LA SHLDR ARTHROSCOP,SURG,W/ROTAT CUFF REPR Right 5/6/2016    Procedure: REPAIR ROTATOR CUFF  ARTHROSCOPIC; SUBACROMIAL DECOMPRESSION; PARTIAL SYNOVECTOMY;  Surgeon: Chris Muñiz MD;  [facility];  Service: Orthopedics    LA WRIST [place] LIG Right 10/15/2019    Procedure: ENDOSCOPIC CARPAL TUNNEL RELEASE;  Surgeon: Samantha Whitmore MD;  [facility];  Service: Orthopedics    [address] [place] [address]  2009    over age 13       Current Outpatient Medications:     Alcohol Swabs (ALCOHOL PADS) 70 % PADS, USE TWICE DAILY, Disp: 100 each, Rfl: 3    aspirin (ECOTRIN LOW STRENGTH) 81 mg EC tablet, TAKE 1 TABLET (81 MG TOTAL) BY MOUTH DAILY FOR 90 DAYS (Patient taking differently: Take 81 mg by mouth daily Take 1 tablet every other day), Disp: 90 tablet, Rfl: 3    clindamycin (CLEOCIN) 300 MG capsule, TAKE 2 CAPSULES BY MOUTH 1 HOUR PRIOR TO PROCEDURE, Disp: , Rfl: 0    clorazepate (TRANXENE) 7 5 mg tablet, Take 7 5 mg by mouth 2 (two) times a day , Disp: , Rfl: 0    clotrimazole-betamethasone (LOTRISONE) 1-0 05 % cream, APPLY TOPICALLY 2 (TWO) TIMES A DAY, Disp: 30 g, Rfl: 1    diclofenac sodium (VOLTAREN) 1 %, Apply 2 g topically 4 (four) times a day, Disp: 100 g, Rfl: 3    DULoxetine (CYMBALTA) 30 mg delayed release capsule, Take 30 mg by mouth every morning, Disp: , Rfl: 0    fluticasone (FLONASE) 50 mcg/act nasal spray, 1 spray into each nostril daily, Disp: 1 Bottle, Rfl: 4    gabapentin (NEURONTIN) 300 mg capsule, Take 1 capsule (300 mg total) by mouth daily at bedtime, Disp: 60 capsule, Rfl: 0    glimepiride (AMARYL) 4 mg tablet, TAKE 1/2 TABLET BY MOUTH IN THE MORNING AND 1 TABLET AT NIGHT (Patient taking differently: 4 mg daily TAKE 1/2 TABLET BY MOUTH IN THE MORNING AND 1 TABLET AT NIGHT), Disp: 180 tablet, Rfl: 2    glucose blood (GLUCOSE METER TEST) test strip, 1 each by Other route 2 (two) times a day Use as instructed, Disp: 100 each, Rfl: 5    HYDROcodone-acetaminophen (NORCO) 5-325 mg per tablet, Take 1 tablet by mouth every 6 (six) hours as needed for pain for up to 10 dosesMax Daily Amount: 4 tablets, Disp: 10 tablet, Rfl: 0    hydrocortisone 2 5 % cream, APPLY TOPICALLY 3 (THREE) TIMES A DAY, Disp: 28 g, Rfl: 2    MAPAP ARTHRITIS PAIN 650 MG CR tablet, TAKE 1 TABLET (650 MG TOTAL) BY MOUTH EVERY 8 (EIGHT) HOURS FOR 5 DAYS, Disp: 15 tablet, Rfl: 0    metFORMIN (GLUCOPHAGE) 500 mg tablet, TAKE 1 TABLET (500 MG TOTAL) BY MOUTH 2 (TWO) TIMES A DAY WITH MEALS  DAYS, Disp: 180 tablet, Rfl: 2    metoprolol tartrate (LOPRESSOR) 25 mg tablet, Take 1 tablet (25 mg total) by mouth every 12 (twelve) hours for 270 days, Disp: 180 tablet, Rfl: 2    mirtazapine (REMERON) 15 mg tablet, TAKE 1 TABLET (15 MG TOTAL) BY MOUTH DAILY AT BEDTIME  DAYS, Disp: , Rfl: 2    omeprazole (PriLOSEC) 20 mg delayed release capsule, Take 1 capsule (20 mg total) by mouth daily, Disp: 90 capsule, Rfl: 1    ONETOUCH DELICA LANCETS FINE MISC, Test blood sugar twice daily, or as needed when symptomatic of hypoglycemia or hyperglycemia, Disp: 100 each, Rfl: 5    rosuvastatin (CRESTOR) 40 MG tablet, Take 1 tablet (40 mg total) by mouth daily, Disp: 90 tablet, Rfl: 3    tobramycin-dexamethasone (TOBRADEX) ophthalmic suspension, ADMINISTER 1 DROP TO BOTH EYES 2 (TWO) TIMES A DAY, Disp: 5 mL, Rfl: 3    lisinopril (ZESTRIL) 20 mg tablet, Take 1 tablet (20 mg total) by mouth daily, Disp: 90 tablet, Rfl: 3    naproxen sodium (ALEVE) 220 MG tablet, Take 1 tablet (220 mg total) by mouth 2 (two) times a day as needed for mild pain for up to 5 days, Disp: 10 tablet, Rfl: 0    neomycin-polymyxin-dexamethasone (MAXITROL) ophthalmic suspension, INSTILL 1 DROP INTO EACH EYE FOUR TIMES A DAY AFTER SURGERY , Disp: , Rfl: 2    NIFEdipine ER (ADALAT CC) 30 MG 24 hr tablet, Take 1 tablet (30 mg total) by mouth 2 (two) times a day, Disp: 60 tablet, Rfl: 11  Allergies   Allergen Reactions    Epinephrine Hives and Anxiety    Penicillins Hives and Anxiety       Vitals:    11/04/19 1342   BP: 112/58   BP Location: Right arm   Patient Position: Sitting   Cuff Size: Standard   Pulse: (!) 51   SpO2: 98%   Weight: 73 kg (161 lb)   Height: 5' 7" (1 702 m)     Vitals:    11/04/19 1342   Weight: 73 kg (161 lb)      Height: 5' 7" (170 2 cm)   Body mass index is 25 22 kg/m²      Physical Exam:  GEN: Mark Sarmiento appears well, alert and oriented x 3, pleasant and cooperative   HEENT: pupils equal, round, and reactive to light; extraocular muscles intact  NECK: supple, no carotid bruits   HEART: regular rhythm, normal S1 and S2, 2/6 MARTHA   LUNGS: clear to auscultation bilaterally; no wheezes, rales, or rhonchi   ABDOMEN: normal bowel sounds, soft, no tenderness, no distention  EXTREMITIES: R hand post surgical   NEURO: no focal findings   SKIN: normal without suspicious lesions on exposed skin    ROS:  Positive for erectile dysfunction,   Except as noted in HPI, is otherwise reviewed in detail and a 12 point review of systems is negative  ROS reviewed and is unchanged    Labs:  Lab Results   Component Value Date     08/01/2017    K 4 0 07/18/2019     07/18/2019    CREATININE 0 91 07/18/2019    BUN 15 07/18/2019    CO2 25 07/18/2019    ALT 36 06/28/2019    AST 24 06/28/2019    INR 1 40 (H) 12/12/2016    GLUF 131 (H) 06/28/2019    HGBA1C 7 4 (H) 10/01/2019    WBC 6 46 06/28/2019    HGB 12 4 06/28/2019    HCT 40 2 06/28/2019     06/28/2019       Lab Results   Component Value Date    CHOL 117 (L) 03/17/2017    CHOL 270 (H) 03/09/2016    CHOL 176 03/10/2014     Lab Results   Component Value Date    LDLCALC 113 (H) 08/13/2019    LDLCALC 93 06/28/2019    LDLCALC 45 09/11/2018     Lab Results   Component Value Date    HDL 54 08/13/2019    HDL 50 06/28/2019    HDL 50 09/11/2018     Lab Results   Component Value Date    TRIG 106 08/13/2019    TRIG 76 06/28/2019    TRIG 48 09/11/2018     Testing:  Echo 6/28/19:  LEFT VENTRICLE:  Systolic function was at the lower limits of normal  Ejection fraction was estimated to be 50 %  There was hypokinesis of the apical wall(s)  Wall thickness was mildly increased  Features were consistent with a pseudonormal left ventricular filling pattern, with concomitant abnormal relaxation and increased filling pressure (grade 2 diastolic dysfunction)      LEFT ATRIUM:  The atrium was mildly dilated      MITRAL VALVE:  There was mild annular calcification  There was mild regurgitation      AORTIC VALVE:  Transaortic velocity was increased due to valvular stenosis  There was mild stenosis   The calculated aortic valve area was 1 8 cm^2  There was mild regurgitation      TRICUSPID VALVE:  There was mild regurgitation  Pulmonary artery systolic pressure was within the normal range  Echo 12/2016:  LEFT VENTRICLE:  Systolic function was at the lower limits of normal  Ejection fraction was  estimated to be 53 %  There was severe hypokinesis of the basal inferior and  basal inferolateral wall(s)  Wall thickness was mildly to moderately increased  There was mild concentric hypertrophy      RIGHT VENTRICLE:  The size was at the upper limits of normal      LEFT ATRIUM:  The atrium was mildly dilated      MITRAL VALVE:  There was mild regurgitation      AORTIC VALVE:  There was mild stenosis  There was mild regurgitation  Systolic area of 1 7 cm squared by planimetry  Estimated aortic valve area (by VTI) was 1 97 cm squared  Aortic valve obstructive index (by Vmax) was 0 5  Estimated aortic valve area (by Vmax) was 1 73 cm squared      TRICUSPID VALVE:  There was trace regurgitation

## 2019-11-06 ENCOUNTER — OFFICE VISIT (OUTPATIENT)
Dept: PHYSICAL THERAPY | Facility: REHABILITATION | Age: 74
End: 2019-11-06
Payer: MEDICARE

## 2019-11-06 DIAGNOSIS — Z98.890 S/P ENDOSCOPIC CARPAL TUNNEL RELEASE: ICD-10-CM

## 2019-11-06 DIAGNOSIS — M54.42 ACUTE LEFT-SIDED LOW BACK PAIN WITH LEFT-SIDED SCIATICA: Primary | ICD-10-CM

## 2019-11-06 PROCEDURE — 97110 THERAPEUTIC EXERCISES: CPT | Performed by: PHYSICAL THERAPIST

## 2019-11-06 PROCEDURE — 97140 MANUAL THERAPY 1/> REGIONS: CPT | Performed by: PHYSICAL THERAPIST

## 2019-11-06 NOTE — PROGRESS NOTES
Daily Note     Today's date: 2019  Patient name: Joan Oneal  :   MRN: [id number]  Referring provider: Mohini Ortiz MD  Dx:   Encounter Diagnosis     ICD-10-CM    1  Acute left-sided low back pain with left-sided sciatica M54 42    2   S/P endoscopic carpal tunnel release Z98 [id number]                   Subjective: Some return of LB and LE pain yesterday - otherwise doing well      Objective: See treatment diary below  Manual  9/25 10/3 10/4 10/7 10/14 10/16  10/24 10/28  10/30 11/4 11   Ext mob in prone ALEJANDRO 10' ALEJANDRO 10' ALEJANDRO 10' ALEJANDRO 10' ALEJANDRO 10' TP 5'  TP x5' ALEJANDRO x5'  ALEJANDRO 5'  ALEJANDRO 5' ALEJANDRO 5'   L hip PROM 5' 5' 5' 5' 5' TP x5'  TP x5'  ALEJANDRO 5'  ALEJANDRO 5"      90/90 sciatic nerve glides ALEJANDRO x20 ALEJANDRO x20 ALEJANDRO x20 ALEJANDRO x20 ALEJANDRO x20 TP x20  TP x20  ALEJANDRO x20  ALEJANDRO x20  ALEJANDRO x20 ALEJANDRO x20   L LE long axis distraction           TP 60"x5  TP 60"x5  ALEJANDRO 60"x5  ALEJANDRO 60"x5  ALEJANDRO 60"x5 ALEJANDRO 60 x6    R hand PROM -                13'  15'  15' 15'   L hip STM - glut           5'         Exercise Diary  9/25 10/3 10/4 10/7 10/14 10/16  10/24  10/28  10/30  11/4 116   Recumbent bike 10' 10' 10' NP   10'  10' NP  10'  10'    Prone press up 2x10 2x10 2x10 2x10 2x`15 np  np          Slump sliders 20 20 20 20 20 20  20  20  20  20    LTR 5s x10 5s x10 5s x20 5s x20 5s x20 5"x20  5"x20  5"x20  5"x20  5" x20    Standing hip abd       2x10 2x15 2x15  2x15  NP  2x10  2x10                                                      Treadmill   10' 10' 12' 12' np  np  10'  10'  10' 10'    R tendon gliding PIP and DIP              x20ea    x20  x20 x20    digiflex                     20                                                                                                                                                                                                                                                                   Modalities                                                                                                      Assessment: Significant tenderness noted L glut med  Plan: Continue per plan of care

## 2019-11-07 DIAGNOSIS — Z86.19 H/O TINEA CRURIS: Chronic | ICD-10-CM

## 2019-11-08 RX ORDER — CLOTRIMAZOLE AND BETAMETHASONE DIPROPIONATE 10; .64 MG/G; MG/G
CREAM TOPICAL 2 TIMES DAILY
Qty: 30 G | Refills: 1 | Status: SHIPPED | OUTPATIENT
Start: 2019-11-08 | End: 2019-12-30 | Stop reason: SDUPTHER

## 2019-11-11 ENCOUNTER — OFFICE VISIT (OUTPATIENT)
Dept: PHYSICAL THERAPY | Facility: REHABILITATION | Age: 74
End: 2019-11-11
Payer: MEDICARE

## 2019-11-11 DIAGNOSIS — M54.42 ACUTE LEFT-SIDED LOW BACK PAIN WITH LEFT-SIDED SCIATICA: Primary | ICD-10-CM

## 2019-11-11 DIAGNOSIS — Z98.890 S/P ENDOSCOPIC CARPAL TUNNEL RELEASE: ICD-10-CM

## 2019-11-11 PROCEDURE — 97140 MANUAL THERAPY 1/> REGIONS: CPT | Performed by: PHYSICAL THERAPIST

## 2019-11-11 PROCEDURE — 97110 THERAPEUTIC EXERCISES: CPT | Performed by: PHYSICAL THERAPIST

## 2019-11-11 NOTE — PROGRESS NOTES
Daily Note     Today's date: 2019  Patient name: Padma Browne  :   MRN: [id number]  Referring provider: Rebekah Rob MD  Dx:   Encounter Diagnosis     ICD-10-CM    1  Acute left-sided low back pain with left-sided sciatica M54 42    2   S/P endoscopic carpal tunnel release [id number]                   Subjective: Reports hand is doing well and L LE pain has increased to its initial state      Objective: See treatment diary below  Manual  9/25 10/3 10/4 10/7 10/14 10/16  10/24 10/28  10/30 11/4 11/6 11/11   Ext mob in prone ALEJANDRO 10' ALEJANDRO 10' ALEJANDRO 10' ALEJANDRO 10' ALEJANDRO 10' TP 5'  TP x5' ALEJANDRO x5'  ALEJANDRO 5'  ALEJANDRO 5' ALEJANDRO 5' ALEJANDRO 5'   L hip PROM 5' 5' 5' 5' 5' TP x5'  TP x5'  ALEJANDRO 5'  ALEJANDRO 5"       90/90 sciatic nerve glides ALEJANDRO x20 ALEJANDRO x20 ALEJANDRO x20 ALEJANDRO x20 ALEJANDRO x20 TP x20  TP x20  ALEJANDRO x20  ALEJANDRO x20  ALEJANDRO x20 ALEJANDRO x20 ALEJANDRO x20   L LE long axis distraction           TP 60"x5  TP 60"x5  ALEJANDRO 60"x5  ALEJANDRO 60"x5  ALEJANDRO 60"x5 ALEJANDRO 60 x6 ALEJANDRO 60 x6    R hand PROM -                13'  15'  15' 15' 15'   L hip STM - glut           5'          Exercise Diary  9/25 10/3 10/4 10/7 10/14 10/16  10/24  10/28  10/30  11/4 11/6 11/11   Recumbent bike 10' 10' 10' NP   10'  10' NP  10'  10'  10'   Prone press up 2x10 2x10 2x10 2x10 2x`15 np  np           Slump sliders 20 20 20 20 20 20  20  20  20  20  20   LTR 5s x10 5s x10 5s x20 5s x20 5s x20 5"x20  5"x20  5"x20  5"x20  5" x20  5" x20   Standing hip abd       2x10 2x15 2x15  2x15  NP  2x10  2x10  2x10                                                       Treadmill   10' 10' 12' 12' np  np  10'  10'  10' 10' 10'    R tendon gliding PIP and DIP              x20ea    x20  x20 x20 x20    digiflex                     20                                                                                                                                                                                                                                                                              Modalities                                                                                                      Assessment: Referring to pain management for L LE pain - LB vs Hip    Plan: Continue per plan of care

## 2019-11-13 ENCOUNTER — OFFICE VISIT (OUTPATIENT)
Dept: PHYSICAL THERAPY | Facility: REHABILITATION | Age: 74
End: 2019-11-13
Payer: MEDICARE

## 2019-11-13 DIAGNOSIS — M54.42 ACUTE LEFT-SIDED LOW BACK PAIN WITH LEFT-SIDED SCIATICA: Primary | ICD-10-CM

## 2019-11-13 DIAGNOSIS — Z98.890 S/P ENDOSCOPIC CARPAL TUNNEL RELEASE: ICD-10-CM

## 2019-11-13 PROCEDURE — 97140 MANUAL THERAPY 1/> REGIONS: CPT | Performed by: PHYSICAL THERAPIST

## 2019-11-13 PROCEDURE — 97110 THERAPEUTIC EXERCISES: CPT | Performed by: PHYSICAL THERAPIST

## 2019-11-13 NOTE — PROGRESS NOTES
Daily Note     Today's date: 2019  Patient name: Bobbi Monge  : [birth year]  MRN: [id number]  Referring provider: Yobani Diamond MD  Dx:   Encounter Diagnosis     ICD-10-CM    1  Acute left-sided low back pain with left-sided sciatica M54 42    2  S/P endoscopic carpal tunnel release Z98 [id number]                   Subjective:  To see pain management       Objective: See treatment diary below  Manual  9/25 10/3 10/4 10/7 10/14 10/16  10/24 10/28  10/30 11/4 11/6 11/11 11/13   Ext mob in prone ALEJANDRO 10' ALEJANDRO 10' ALEJANDRO 10' ALEJANDRO 10' ALEJANDRO 10' TP 5'  TP x5' ALEJANDRO x5'  ALEJANDRO 5'  ALEJANDRO 5' ALEJANDRO 5' ALEJANDRO 5' ALEJANDRO 5'   L hip PROM 5' 5' 5' 5' 5' TP x5'  TP x5'  ALEJANDRO 5'  ALEJANDRO 5"        90/90 sciatic nerve glides ALEJANDRO x20 ALEJANDRO x20 ALEJANDRO x20 ALEJANDRO x20 ALEJANDRO x20 TP x20  TP x20  ALEJANDRO x20  ALEJANDRO x20  ALEJANDRO x20 ALEJANDRO x20 Minus Laya x20   L LE long axis distraction           TP 60"x5  TP 60"x5  ALEJANDRO 60"x5  ALEJANDRO 60"x5  ALEJANDRO 60"x5 ALEJANDRO 60 x6 ALEJANDRO 60 x6 ALEJANDRO 60s x5    R hand PROM -                13'  15'  15' 15' 15' 15'   L hip STM - glut           5'  5'         Exercise Diary  9/25 10/3 10/4 10/7 10/14 10/16  10/24  10/28  10/30  11/4 11/6 11/11 11/13   Recumbent bike 10' 10' 10' NP   10'  10' NP  10'  10'  10'    Prone press up 2x10 2x10 2x10 2x10 2x`15 np  np            Slump sliders 20 20 20 20 20 20  20  20  20  20  20    LTR 5s x10 5s x10 5s x20 5s x20 5s x20 5"x20  5"x20  5"x20  5"x20  5" x20  5" x20    Standing hip abd       2x10 2x15 2x15  2x15  NP  2x10  2x10  2x10                                                          Treadmill   10' 10' 12' 12' np  np  10'  10'  10' 10' 10' 10'    R tendon gliding PIP and DIP              x20ea    x20  x20 x20 x20 x20    digiflex                     20  20                                                                                                                                                                                                                                                                                       Modalities                                                                                                      Assessment: R hand progressing well  Plan: Continue per plan of care

## 2019-11-14 ENCOUNTER — APPOINTMENT (OUTPATIENT)
Dept: LAB | Facility: HOSPITAL | Age: 74
End: 2019-11-14
Attending: INTERNAL MEDICINE
Payer: MEDICARE

## 2019-11-14 DIAGNOSIS — I25.10 CORONARY ARTERY DISEASE INVOLVING NATIVE CORONARY ARTERY OF NATIVE HEART WITHOUT ANGINA PECTORIS: ICD-10-CM

## 2019-11-14 DIAGNOSIS — G47.00 INSOMNIA, UNSPECIFIED TYPE: ICD-10-CM

## 2019-11-14 LAB
CHOLEST SERPL-MCNC: 153 MG/DL (ref 50–200)
HDLC SERPL-MCNC: 64 MG/DL
LDLC SERPL CALC-MCNC: 70 MG/DL (ref 0–100)
TRIGL SERPL-MCNC: 97 MG/DL

## 2019-11-14 PROCEDURE — 36415 COLL VENOUS BLD VENIPUNCTURE: CPT

## 2019-11-14 PROCEDURE — 80061 LIPID PANEL: CPT

## 2019-11-15 RX ORDER — MIRTAZAPINE 15 MG/1
15 TABLET, FILM COATED ORAL
Qty: 90 TABLET | Refills: 0 | Status: SHIPPED | OUTPATIENT
Start: 2019-11-15 | End: 2020-02-24

## 2019-11-18 ENCOUNTER — OFFICE VISIT (OUTPATIENT)
Dept: PHYSICAL THERAPY | Facility: REHABILITATION | Age: 74
End: 2019-11-18
Payer: MEDICARE

## 2019-11-18 DIAGNOSIS — Z98.890 S/P ENDOSCOPIC CARPAL TUNNEL RELEASE: ICD-10-CM

## 2019-11-18 DIAGNOSIS — M54.42 ACUTE LEFT-SIDED LOW BACK PAIN WITH LEFT-SIDED SCIATICA: Primary | ICD-10-CM

## 2019-11-18 PROCEDURE — 97140 MANUAL THERAPY 1/> REGIONS: CPT | Performed by: PHYSICAL THERAPIST

## 2019-11-18 PROCEDURE — 97110 THERAPEUTIC EXERCISES: CPT | Performed by: PHYSICAL THERAPIST

## 2019-11-18 NOTE — PROGRESS NOTES
Daily Note     Today's date: 2019  Patient name: Darya Galvan  :   MRN: [id number]  Referring provider: Yamile Pizano MD  Dx:   Encounter Diagnosis     ICD-10-CM    1  Acute left-sided low back pain with left-sided sciatica M54 42    2  S/P endoscopic carpal tunnel release [id number] [id number]                   Subjective: No hip pain upon entering PT       Objective: See treatment diary below  Manual  9/25 10/3 10/4 10/7 10/14 10/16  10/24 10/28  10/30 11/4 11/6 11/11 11/13 11/18   Ext mob in prone ALEJANDRO 10' ALEJANDRO 10' ALEJANDRO 10' ALEJANDRO 10' ALEJANDRO 10' TP 5'  TP x5' ALEJANDRO x5'  ALEJANDRO 5'  ALEJANDRO 5' ALEJANDRO 5' ALEJANDRO 5' ALEJANDRO 5' ALEJANDRO 5'   L hip PROM 5' 5' 5' 5' 5' TP x5'  TP x5'  ALEJANDRO 5'  ALEJANDRO 5"         90/90 sciatic nerve glides ALEJANDRO x20 ALEJANDRO x20 ALEJANDRO x20 ALEJANDRO x20 ALEJANDRO x20 TP x20  TP x20  ALEJANDRO x20  ALEJANDRO x20  ALEJANDRO x20 Lonn Carcamo x20   L LE long axis distraction           TP 60"x5  TP 60"x5  ALEJANDRO 60"x5  ALEJANDRO 60"x5  ALEJANDRO 60"x5 ALEJANDRO 60 x6 ALEJANDRO 60 x6 ALEJANDRO 60s x5 ALEJANDRO 60s x5    R hand PROM -                13'  15'  15' 15' 15' 15' 10'   L hip STM - glut           5'  5' 5'         Exercise Diary  9/25 10/3 10/4 10/7 10/14 10/16  10/24  10/28  10/30  11/4 11/6 11/11 11/13 11/18   Recumbent bike 10' 10' 10' NP   10'  10' NP  10'  10'  10'  10'   Prone press up 2x10 2x10 2x10 2x10 2x`15 np  np             Slump sliders 20 20 20 20 20 20  20  20  20  20  20     LTR 5s x10 5s x10 5s x20 5s x20 5s x20 5"x20  5"x20  5"x20  5"x20  5" x20  5" x20  5s x20   Standing hip abd       2x10 2x15 2x15  2x15  NP  2x10  2x10  2x10  2x10    bridge                        3s x15                               Treadmill   10' 10' 12' 12' np  np  10'  10'  10' 10' 10' 10'     R tendon gliding PIP and DIP              x20ea    x20  x20 x20 x20 x20     digiflex                     20  20                                                                                                                                                                                                                                                                                            Modalities                                                                                                      Assessment: Able to perform more exercise due to less overall pain in L hip    Plan: Continue per plan of care

## 2019-11-19 ENCOUNTER — TELEPHONE (OUTPATIENT)
Dept: CARDIOLOGY CLINIC | Facility: CLINIC | Age: 74
End: 2019-11-19

## 2019-11-19 NOTE — TELEPHONE ENCOUNTER
Spoke with patient regarding test results  ----- Message from Graciela Hernandez MD sent at 11/15/2019 11:39 AM EST -----  Please let patient know lipid panel is improved with new medication

## 2019-11-21 ENCOUNTER — OFFICE VISIT (OUTPATIENT)
Dept: PHYSICAL THERAPY | Facility: REHABILITATION | Age: 74
End: 2019-11-21
Payer: MEDICARE

## 2019-11-21 DIAGNOSIS — Z98.890 S/P ENDOSCOPIC CARPAL TUNNEL RELEASE: ICD-10-CM

## 2019-11-21 DIAGNOSIS — M54.42 ACUTE LEFT-SIDED LOW BACK PAIN WITH LEFT-SIDED SCIATICA: Primary | ICD-10-CM

## 2019-11-21 PROCEDURE — 97110 THERAPEUTIC EXERCISES: CPT | Performed by: PHYSICAL THERAPIST

## 2019-11-21 PROCEDURE — 97140 MANUAL THERAPY 1/> REGIONS: CPT | Performed by: PHYSICAL THERAPIST

## 2019-11-21 PROCEDURE — 97112 NEUROMUSCULAR REEDUCATION: CPT | Performed by: PHYSICAL THERAPIST

## 2019-11-21 NOTE — PROGRESS NOTES
Daily Note     Today's date: 2019  Patient name: William Abreu  :   MRN: [id number]  Referring provider: Rios Bonilla MD  Dx:   Encounter Diagnosis     ICD-10-CM    1  Acute left-sided low back pain with left-sided sciatica M54 42    2   S/P endoscopic carpal tunnel release [id number]                   Subjective: Reports doing well    Objective: See treatment diary below  Manual  9/25 10/3 10/4 10/7 10/14 10/16  10/24 10/28  10/30 11/4 11/6 11/11 11/13 11/18 11/21   Ext mob in prone ALEJANDRO 10' ALEJANDRO 10' ALEJANDRO 10' ALEJANDRO 10' ALEJANDRO 10' TP 5'  TP x5' ALEJANDRO x5'  ALEJANDRO 5'  ALEJANDRO 5' ALEJANDRO 5' ALEJANDRO 5' ALEJANDRO 5' ALEJANDRO 5' ALEJANDRO 5'   L hip PROM 5' 5' 5' 5' 5' TP x5'  TP x5'  ALEJANDRO 5'  ALEJANDRO 5"          90/90 sciatic nerve glides ALEJANDRO x20 ALEJANDRO x20 ALEJANDRO x20 ALEJANDRO x20 ALEJANDRO x20 TP x20  TP x20  ALEJANDRO x20  ALEJANDRO x20  ALEJANDRO Alpheus Halon x20   L LE long axis distraction           TP 60"x5  TP 60"x5  ALEJANDRO 60"x5  ALEJANDRO 60"x5  ALEJANDRO 60"x5 ALEJANDRO 60 x6 ALEJANDRO 60 x6 ALEJANDRO 60s x5 ALEJANDRO 60s x5 ALEJANDRO 60s x5    R hand PROM -                13'  15'  15' 15' 15' 15' 10' 10'   L hip STM - glut           5'  5' 5' 5'         Exercise Diary  9/25 10/3 10/4 10/7 10/14 10/16  10/24  10/28  10/30  11/4 11/6 11/11 11/13 11/18 11/21   Recumbent bike 10' 10' 10' NP   10'  10' NP  10'  10'  10'  10' 10'   Prone press up 2x10 2x10 2x10 2x10 2x`15 np  np              Slump sliders 20 20 20 20 20 20  20  20  20  20  20      LTR 5s x10 5s x10 5s x20 5s x20 5s x20 5"x20  5"x20  5"x20  5"x20  5" x20  5" x20  5s x20 5s x20   Standing hip abd       2x10 2x15 2x15  2x15  NP  2x10  2x10  2x10  2x10 2x10    bridge                        3s x15 3s x20                                Treadmill   10' 10' 12' 12' np  np  10'  10'  10' 10' 10' 10'  10'    R tendon gliding PIP and DIP              x20ea    x20  x20 x20 x20 x20  x30    digiflex                     20  20  3'                                                                                                                                                                                                                                                                                                           Modalities                                                                                                      Assessment: Will d/c after next week    Plan: Continue per plan of care

## 2019-11-22 ENCOUNTER — CONSULT (OUTPATIENT)
Dept: PAIN MEDICINE | Facility: CLINIC | Age: 74
End: 2019-11-22
Payer: MEDICARE

## 2019-11-22 VITALS
HEIGHT: 67 IN | HEART RATE: 48 BPM | TEMPERATURE: 98 F | BODY MASS INDEX: 25.63 KG/M2 | DIASTOLIC BLOOD PRESSURE: 64 MMHG | WEIGHT: 163.3 LBS | SYSTOLIC BLOOD PRESSURE: 121 MMHG

## 2019-11-22 DIAGNOSIS — M54.16 LUMBAR RADICULOPATHY: Primary | ICD-10-CM

## 2019-11-22 DIAGNOSIS — M54.32 SCIATICA OF LEFT SIDE: ICD-10-CM

## 2019-11-22 PROCEDURE — 99204 OFFICE O/P NEW MOD 45 MIN: CPT | Performed by: ANESTHESIOLOGY

## 2019-11-22 RX ORDER — ASPIRIN 81 MG/1
TABLET ORAL
Refills: 3 | COMMUNITY
Start: 2019-11-14 | End: 2020-02-11

## 2019-11-22 RX ORDER — GABAPENTIN 300 MG/1
300 CAPSULE ORAL 3 TIMES DAILY
Qty: 90 CAPSULE | Refills: 1 | Status: SHIPPED | OUTPATIENT
Start: 2019-11-22 | End: 2020-02-11

## 2019-11-22 NOTE — PROGRESS NOTES
Assessment  1  Lumbar radiculopathy    2  Sciatica of left side        Plan  82-year-old male referred by Dr Maggie Villagran, presenting for initial consultation regarding a 3 month history of lumbosacral back pain with radiculopathy in the L5-S1 distribution of the left lower extremity without any trauma or inciting event  X-ray of the lumbar spine reveals degenerative disc disease and spondylosis particularly at L5-S1  He has completed 1 month of physical therapy without any relief of his symptoms  He is currently taking gabapentin 300 mg q h s , naproxen p r n , and Tylenol p r n  With minimal relief  The patient's low back pain seems to be multifactorial including myofascial and facet mediated components  The patient's radicular symptoms are likely stemming from stenosis, fortunately reflexes and strength are preserved  1  I will order an MRI of the lumbar spine without contrast  2  I advised the patient to increase his gabapentin to 300 mg t i d  And he was given titration instructions at today's visit  3  Patient may continue with naproxen p r n  As ordered  4  Patient will continue with physical therapy  5  I will follow up the patient in 2 months and will call him with results of MRI once received and our recommendations based upon those results     My impressions and treatment recommendations were discussed in detail with the patient who verbalized understanding and had no further questions  Discharge instructions were provided  I personally saw and examined the patient and I agree with the above discussed plan of care  No orders of the defined types were placed in this encounter      New Medications Ordered This Visit   Medications    aspirin (ECOTRIN LOW STRENGTH) 81 mg EC tablet     Sig: TAKE 1 TABLET (81 MG TOTAL) BY MOUTH DAILY FOR 90 DAYS     Refill:  3       History of Present Illness    Antonella Clifford is a 69 y o  male  referred by Dr Maggie Villagran, presenting for initial consultation regarding a 3 month history of lumbosacral back pain with radiation into the posterolateral aspect of the left lower extremity without any trauma or inciting event  He does have some paresthesias but denies any numbness or subjective weakness  He denies any right lower extremity symptoms, bladder bowel incontinence, or saddle anesthesia  X-ray of the lumbar spine reveals degenerative disc disease and spondylosis particularly at L5-S1  He has completed 1 month of physical therapy without any relief of his symptoms  He is currently taking gabapentin 300 mg q h s , naproxen p r n , and Tylenol p r n  With minimal relief  The patient rates his pain a 10/10 on the pain is constant  The pain does not follow any particular pattern throughout the day  The pain is described as pins and needles  The pain is increased with standing, walking, exercise, relaxation, and bowel movements  The pain is alleviated with leaning forward  I have personally reviewed and/or updated the patient's past medical history, past surgical history, family history, social history, current medications, allergies, and vital signs today  Other than as stated above, the patient denies any interval changes in medications, medical condition, mental condition, symptoms, or allergies since the last office visit  Review of Systems   Constitutional: Positive for fever  Negative for unexpected weight change  HENT: Negative for trouble swallowing  Eyes: Positive for pain, redness and visual disturbance  Respiratory: Negative for shortness of breath and wheezing  Cardiovascular: Negative for chest pain and palpitations  Gastrointestinal: Negative for constipation, diarrhea, nausea and vomiting  Endocrine: Negative for cold intolerance, heat intolerance and polydipsia  Genitourinary: Negative for difficulty urinating and frequency  Musculoskeletal: Positive for joint swelling and myalgias  Negative for arthralgias and gait problem     Skin: Negative for rash  Neurological: Positive for dizziness  Negative for seizures, syncope, weakness and headaches  Hematological: Does not bruise/bleed easily  Psychiatric/Behavioral: Negative for dysphoric mood  All other systems reviewed and are negative        Patient Active Problem List   Diagnosis    Biceps tendonitis on right    Complete rotator cuff tear or rupture of right shoulder, not specified as traumatic    NSTEMI (non-ST elevated myocardial infarction) ([facility] )    S/P drug eluting coronary stent placement    H/O non-ST elevation myocardial infarction (NSTEMI)    Essential hypertension    Polyarthralgia    Hematuria, microscopic    H/O tinea cruris    Erectile dysfunction associated with type 2 diabetes mellitus ([facility] )    Anxiety    Type 2 diabetes mellitus ([facility])    Gastroesophageal reflux disease    Bilateral carpal tunnel syndrome    Mild aortic stenosis    Coronary artery disease involving native coronary artery of native heart without angina pectoris    Pain in both hands    Glaucoma    Major depressive disorder, recurrent episode, moderate ([facility])    History of tobacco use    Dental caries    S/P CABG (coronary artery bypass graft)    Trigger finger of all digits of both hands    Polyp of colon    Vaccine for VZV (varicella-zoster virus)    Hypertension associated with stage 2 chronic kidney disease due to type 2 diabetes mellitus ([facility] )    Insomnia    Hyperlipidemia    Acute left-sided back pain    Skin lesion of face    Essential hypertriglyceridemia    Pulmonary nodule seen on imaging study    Skin cancer    Sleep apnea    Testicular hypogonadism    Sciatica of left side       Past Medical History:   Diagnosis Date    AS (aortic stenosis)     Balanitis     Biceps tendonitis on right     CAD (coronary artery disease)     Cancer (HCC)     skin    Complete rotator cuff tear or rupture of right shoulder, not specified as traumatic     Diabetes mellitus ([facility] )  Esophageal reflux     High cholesterol     Hypertension     Hypertriglyceridemia     Hypogonadism in male     Myalgia     Myocardial infarction (HCC)     Palpitations     Shoulder pain     Sinus problem     Skin cancer of nose     Sleep apnea     Stroke ([facility] ) 4985    Systolic murmur     recently found    Tinea cruris     Tinea pedis        Past Surgical History:   Procedure Laterality Date    CATARACT EXTRACTION Left     COLONOSCOPY      CORONARY ANGIOPLASTY WITH STENT PLACEMENT      10-15-19 - pt denies stent implant    CORONARY ARTERY BYPASS GRAFT N/A 12/12/2016    Procedure: INTRAOP CECILLE; BILATERAL LEG EVH; CORONARY ARTERY BYPASS GRAFT X 4 SVG TO PDA, OM1,  AND DIAGONAL1, LIMA TO LAD;  Surgeon: Joe Sapp MD;  [facility];  Service:     EYE SURGERY      HERNIA REPAIR      [place] ARTHROSCOPY SHOULDER SURGICAL BICEPS TENODESIS Right 5/6/2016    Procedure: ARTHROSCOPIC BICEPS TENODESIS;  Surgeon: Edu Sifuentes MD;  [facility];  Service: Orthopedics    [place] INCISE FINGER TENDON SHEATH Right 10/15/2019    Procedure: TRIGGER FINGER RELEASE INDEX, LONG, RING, SMALL, THUMB FINGERS;  Surgeon: Spring Lima MD;  [facility];  Service: Orthopedics    [place] SHLDR ARTHROSCOP,SURG,W/ROTAT CUFF REPR Right 5/6/2016    Procedure: REPAIR ROTATOR CUFF  ARTHROSCOPIC; SUBACROMIAL DECOMPRESSION; PARTIAL SYNOVECTOMY;  Surgeon: Edu Sifuentes MD;  [facility];  Service: Orthopedics    [place] WRIST Eddie Sole LIG Right 10/15/2019    Procedure: ENDOSCOPIC CARPAL TUNNEL RELEASE;  Surgeon: Spring Lima MD;  [facility];  Service: Orthopedics    TESTICLE SURGERY      UMBILICAL HERNIA REPAIR  2009    over age 13       Family History   Problem Relation Age of Onset    Heart disease Mother     Hypertension Mother     Nephrolithiasis Father     Other Father         Renal disease    Hypertension Sister     Nephrolithiasis Brother     Other Brother         Renal disease    Hematuria Family         Microscopic       Social History     Occupational History    Not on file   Tobacco Use    Smoking status: Former Smoker    Smokeless tobacco: Never Used    Tobacco comment: smoked for 3 years from 17 y/o - 23 yrs old   Substance and Sexual Activity    Alcohol use: No    Drug use: No    Sexual activity: Not on file       Current Outpatient Medications on File Prior to Visit   Medication Sig    Alcohol Swabs (ALCOHOL PADS) 70 % PADS USE TWICE DAILY    aspirin (ECOTRIN LOW STRENGTH) 81 mg EC tablet TAKE 1 TABLET (81 MG TOTAL) BY MOUTH DAILY FOR 90 DAYS    clindamycin (CLEOCIN) 300 MG capsule TAKE 2 CAPSULES BY MOUTH 1 HOUR PRIOR TO PROCEDURE    clorazepate (TRANXENE) 7 5 mg tablet Take 7 5 mg by mouth 2 (two) times a day     clotrimazole-betamethasone (LOTRISONE) 1-0 05 % cream APPLY TOPICALLY 2 (TWO) TIMES A DAY    diclofenac sodium (VOLTAREN) 1 % APPLY 2 G TOPICALLY 4 (FOUR) TIMES A DAY    DULoxetine (CYMBALTA) 30 mg delayed release capsule Take 30 mg by mouth every morning    fluticasone (FLONASE) 50 mcg/act nasal spray 1 spray into each nostril daily    gabapentin (NEURONTIN) 300 mg capsule Take 1 capsule (300 mg total) by mouth daily at bedtime    glimepiride (AMARYL) 4 mg tablet TAKE 1/2 TABLET BY MOUTH IN THE MORNING AND 1 TABLET AT NIGHT (Patient taking differently: 4 mg daily TAKE 1/2 TABLET BY MOUTH IN THE MORNING AND 1 TABLET AT NIGHT)    glucose blood (GLUCOSE METER TEST) test strip 1 each by Other route 2 (two) times a day Use as instructed    HYDROcodone-acetaminophen (NORCO) 5-325 mg per tablet Take 1 tablet by mouth every 6 (six) hours as needed for pain for up to 10 dosesMax Daily Amount: 4 tablets    hydrocortisone 2 5 % cream APPLY TOPICALLY 3 (THREE) TIMES A DAY    lisinopril (ZESTRIL) 20 mg tablet Take 1 tablet (20 mg total) by mouth daily    MAPAP ARTHRITIS PAIN 650 MG CR tablet TAKE 1 TABLET (650 MG TOTAL) BY MOUTH EVERY 8 (EIGHT) HOURS FOR 5 DAYS    metFORMIN (GLUCOPHAGE) 500 mg tablet TAKE 1 TABLET (500 MG TOTAL) BY MOUTH 2 (TWO) TIMES A DAY WITH MEALS  DAYS    metoprolol tartrate (LOPRESSOR) 25 mg tablet Take 1 tablet (25 mg total) by mouth every 12 (twelve) hours for 270 days    mirtazapine (REMERON) 15 mg tablet TAKE 1 TABLET (15 MG TOTAL) BY MOUTH DAILY AT BEDTIME  DAYS    mirtazapine (REMERON) 15 mg tablet TAKE 1 TABLET (15 MG TOTAL) BY MOUTH DAILY AT BEDTIME  DAYS    neomycin-polymyxin-dexamethasone (MAXITROL) ophthalmic suspension INSTILL 1 DROP INTO EACH EYE FOUR TIMES A DAY AFTER SURGERY   NIFEdipine ER (ADALAT CC) 30 MG 24 hr tablet Take 1 tablet (30 mg total) by mouth 2 (two) times a day    omeprazole (PriLOSEC) 20 mg delayed release capsule Take 1 capsule (20 mg total) by mouth daily    ONETOUCH DELICA LANCETS FINE MISC Test blood sugar twice daily, or as needed when symptomatic of hypoglycemia or hyperglycemia    rosuvastatin (CRESTOR) 40 MG tablet Take 1 tablet (40 mg total) by mouth daily    tobramycin-dexamethasone (TOBRADEX) ophthalmic suspension ADMINISTER 1 DROP TO BOTH EYES 2 (TWO) TIMES A DAY    aspirin (ECOTRIN LOW STRENGTH) 81 mg EC tablet TAKE 1 TABLET (81 MG TOTAL) BY MOUTH DAILY FOR 90 DAYS (Patient taking differently: Take 81 mg by mouth daily Take 1 tablet every other day)    naproxen sodium (ALEVE) 220 MG tablet Take 1 tablet (220 mg total) by mouth 2 (two) times a day as needed for mild pain for up to 5 days     No current facility-administered medications on file prior to visit  Allergies   Allergen Reactions    Epinephrine Hives and Anxiety    Penicillins Hives and Anxiety       Physical Exam    /64   Pulse (!) 48   Temp 98 °F (36 7 °C) (Oral)   Ht 5' 7" (1 702 m)   Wt 74 1 kg (163 lb 4 8 oz)   BMI 25 58 kg/m²     Constitutional: normal, well developed, well nourished, alert, in no distress and non-toxic and no overt pain behavior    Eyes: anicteric  HEENT: grossly intact  Neck: supple, symmetric, trachea midline and no masses   Pulmonary:even and unlabored  Cardiovascular:No edema or pitting edema present  Skin:Normal without rashes or lesions and well hydrated  Psychiatric:Mood and affect appropriate  Neurologic:Cranial Nerves II-XII grossly intact  Musculoskeletal:antalgic gait  Left lumbar paraspinals tender to palpation from L3-L5  Bilateral SI joints nontender to palpation  Bilateral patellar and Achilles reflexes were 2/4 and symmetrical   No clonus was noted bilaterally  Bilateral lower extremity strength 5/5 in all muscle groups  Sensation intact to light touch in L3 through S1 dermatomes bilaterally  Positive straight leg raise on the left and negative on the right  Negative Laci's test bilaterally  Imaging      PACS Images      Show images for XR spine lumbar minimum 4 views non injury   Study Result     LUMBAR SPINE     INDICATION:   M[age]: Lumbago with sciatica, left side  History of fall one month ago  Low back pain radiating down left leg      COMPARISON:  Plain radiographs June 2, 2005     VIEWS:  XR SPINE LUMBAR MINIMUM 4 VIEWS NON INJURY  Images: 5     FINDINGS:  There is no evidence of acute fracture or destructive osseous lesion      Straightening of the lumbar lordotic curvature      Osseous structures demineralized  Mild disc space narrowing diffusely throughout the lumbar spine, most pronounced L5-S1    Diffuse facet hypertrophic changes throughout the lumbar spine      The pedicles appear intact      Soft tissues are unremarkable      IMPRESSION:  Progressive degenerative changes      If symptoms persist, follow-up MRI of the lumbar spine is suggested for further evaluation to evaluate for microtrabecular bone injury, given the history of prior trauma, or disc herniation      Workstation performed: [id number]

## 2019-12-02 ENCOUNTER — APPOINTMENT (OUTPATIENT)
Dept: PHYSICAL THERAPY | Facility: REHABILITATION | Age: 74
End: 2019-12-02
Payer: MEDICARE

## 2019-12-04 ENCOUNTER — APPOINTMENT (OUTPATIENT)
Dept: PHYSICAL THERAPY | Facility: REHABILITATION | Age: 74
End: 2019-12-04
Payer: MEDICARE

## 2019-12-04 ENCOUNTER — OFFICE VISIT (OUTPATIENT)
Dept: PHYSICAL THERAPY | Facility: REHABILITATION | Age: 74
End: 2019-12-04
Payer: MEDICARE

## 2019-12-04 DIAGNOSIS — M54.42 ACUTE LEFT-SIDED LOW BACK PAIN WITH LEFT-SIDED SCIATICA: Primary | ICD-10-CM

## 2019-12-04 PROCEDURE — 97140 MANUAL THERAPY 1/> REGIONS: CPT | Performed by: PHYSICAL THERAPIST

## 2019-12-04 PROCEDURE — 97110 THERAPEUTIC EXERCISES: CPT | Performed by: PHYSICAL THERAPIST

## 2019-12-04 NOTE — PROGRESS NOTES
Daily Note     Today's date: 2019  Patient name: Darya Galvan  : [birth year]  MRN: [id number]  Referring provider: Yamile Pizano MD  Dx:   Encounter Diagnosis     ICD-10-CM    1   Acute left-sided low back pain with left-sided sciatica M54 42                   Subjective: Reports doing well    Objective: See treatment diary below  Manual  9/25 10/3 10/4 10/7 10/14 10/16  10/24 10/28  10/30 11/4 11/6 11/11 11/13 11/18 11/21 12/4   Ext mob in prone ALEJANDRO 10' ALEJANDRO 10' ALEJANDRO 10' ALEJANDRO 10' ALEJANDRO 10' TP 5'  TP x5' ALEJANDRO x5'  ALEJANDRO 5'  ALEJANDRO 5' ALEJANDRO 5' ALEJANDRO 5' ALEJANDRO 5' ALEJANDRO 5' ALEJANDRO 5'    L hip PROM 5' 5' 5' 5' 5' TP x5'  TP x5'  ALEJANDRO 5'  ALEJANDRO 5"           90/90 sciatic nerve glides ALEJANDRO x20 ALEJANDRO x20 ALEJANDRO x20 ALEJANDRO x20 ALEJANDRO x20 TP x20  TP x20  ALEJANDRO x20  ALEJANDRO x20  ALEJANDRO x20 Nica Bales x20    L LE long axis distraction           TP 60"x5  TP 60"x5  ALEJANDRO 60"x5  ALEJANDRO 60"x5  ALEJANDRO 60"x5 ALEJANDRO 60 x6 ALEJANDRO 60 x6 ALEJANDRO 60s x5 ALEJANDRO 60s x5 ALEJANDRO 60s x5     R hand PROM -                13'  15'  15' 15' 15' 15' 10' 10' ALEJANDRO 10'   Scar mob           5'  5' 5' 5' 5' ALEJANDRO         Exercise Diary  9/25 10/3 10/4 10/7 10/14 10/16  10/24  10/28  10/30  11/4 11/6 11/11 11/13 11/18 11/21 12/4   Recumbent bike 10' 10' 10' NP   10'  10' NP  10'  10'  10'  10' 10'    Prone press up 2x10 2x10 2x10 2x10 2x`15 np  np               Slump sliders 20 20 20 20 20 20  20  20  20  20  20       LTR 5s x10 5s x10 5s x20 5s x20 5s x20 5"x20  5"x20  5"x20  5"x20  5" x20  5" x20  5s x20 5s x20    Standing hip abd       2x10 2x15 2x15  2x15  NP  2x10  2x10  2x10  2x10 2x10     bridge                        3s x15 3s x20                                  Treadmill   10' 10' 12' 12' np  np  10'  10'  10' 10' 10' 10'  10' 10'    R tendon gliding PIP and DIP              x20ea    x20  x20 x20 x20 x20  x30 x30    digiflex                     20  20  3' 2'x2                                                                                                                                                                                                                                                                                                                     Modalities                                                                                                      Assessment: Hand ROM improving needs improved strengthening,  Plan: Continue per plan of care

## 2019-12-06 ENCOUNTER — OFFICE VISIT (OUTPATIENT)
Dept: PHYSICAL THERAPY | Facility: REHABILITATION | Age: 74
End: 2019-12-06
Payer: MEDICARE

## 2019-12-06 DIAGNOSIS — Z98.890 S/P ENDOSCOPIC CARPAL TUNNEL RELEASE: Primary | ICD-10-CM

## 2019-12-06 PROCEDURE — 97110 THERAPEUTIC EXERCISES: CPT | Performed by: PHYSICAL THERAPIST

## 2019-12-06 PROCEDURE — 97140 MANUAL THERAPY 1/> REGIONS: CPT | Performed by: PHYSICAL THERAPIST

## 2019-12-09 ENCOUNTER — HOSPITAL ENCOUNTER (OUTPATIENT)
Dept: RADIOLOGY | Facility: HOSPITAL | Age: 74
Discharge: HOME | End: 2019-12-09
Attending: ANESTHESIOLOGY
Payer: MEDICARE

## 2019-12-09 ENCOUNTER — OFFICE VISIT (OUTPATIENT)
Dept: PHYSICAL THERAPY | Facility: REHABILITATION | Age: 74
End: 2019-12-09
Payer: MEDICARE

## 2019-12-09 DIAGNOSIS — M54.16 LUMBAR RADICULOPATHY: ICD-10-CM

## 2019-12-09 DIAGNOSIS — Z98.890 S/P ENDOSCOPIC CARPAL TUNNEL RELEASE: Primary | ICD-10-CM

## 2019-12-09 PROCEDURE — 72148 MRI LUMBAR SPINE W/O DYE: CPT

## 2019-12-09 PROCEDURE — 97140 MANUAL THERAPY 1/> REGIONS: CPT | Performed by: PHYSICAL THERAPIST

## 2019-12-09 PROCEDURE — 97110 THERAPEUTIC EXERCISES: CPT | Performed by: PHYSICAL THERAPIST

## 2019-12-09 NOTE — PROGRESS NOTES
Daily Note     Today's date: 2019  Patient name: Vicenta Jennings  : [birth year]  MRN: [id number]  Referring provider: Ronit Tavares MD  Dx:   Encounter Diagnosis     ICD-10-CM    1   S/P endoscopic carpal tunnel release Z98 [id number]                   Subjective: No new complaints    Objective: See treatment diary below  Manual  12/6 12/9 10/4 10/7 10/14 10/16  10/24 10/28  10/30 11/4 11/6 11/11 11/13 11/18 11/21 12/4   Ext mob in prone   ALEJANDRO 10' ALEJANDRO 10' ALEJANDRO 10' TP 5'  TP x5' ALEJANDRO x5'  ALEJANDRO 5'  ALEJANDRO 5' ALEJANDRO 5' ALEJANDRO 5' ALEJANDRO 5' ALEJANDRO 5' ALEJANDRO 5'    L hip PROM   5' 5' 5' TP x5'  TP x5'  ALEJANDRO 5'  ALEJANDRO 5"           90/90 sciatic nerve glides   ALEJANDRO x20 ALEJANDRO x20 ALEJANDRO x20 TP x20  TP x20  ALEJANDRO x20  ALEJANDRO x20  ALEJANDRO x20 ALEJANDRO x20 ALEJANDRO x20 ALEJANDRO x20 ALEJANDRO x20 ALEJANDRO x20    L LE long axis distraction           TP 60"x5  TP 60"x5  ALEJANDRO 60"x5  ALEJANDRO 60"x5  ALEJANDRO 60"x5 ALEJANDRO 60 x6 ALEJANDRO 60 x6 ALEJANDRO 60s x5 ALEJANDRO 60s x5 ALEJANDRO 60s x5     R hand PROM -  15' ALEJANDRO ALEJANDRO 10'            13'  15'  15' 15' 15' 15' 10' 10' ALEJANDRO 10'   Scar mob 10' [address] [facility] [address] 5'         5'  5' 5' 5' 5' ALEJANDRO         Exercise Diary  12/6 12/9 10/4 10/7 10/14 10/16  10/24  10/28  10/30  11/4 11/6 11/11 11/13 11/18 11/21 12/4   UBE 10' 10' 10' NP   10'  10' NP  10'  10'  10'  10' 10'    Prone press up   2x10 2x10 2x`15 np  np               Slump sliders   20 20 20 20  20  20  20  20  20       LTR   5s x20 5s x20 5s x20 5"x20  5"x20  5"x20  5"x20  5" x20  5" x20  5s x20 5s x20    Standing hip abd       2x10 2x15 2x15  2x15  NP  2x10  2x10  2x10  2x10 2x10     bridge                        3s x15 3s x20                                  Treadmill    10' 12' 12' np  np  10'  10'  10' 10' 10' 10'  10' 10'    R tendon gliding PIP and DIP  x30  x30          x20ea    x20  x20 x20 x20 x20  x30 x30    digiflex  3'  3'                 20  20  3' 2'x2   Pincer  all 5 finger   x20                          3 jaw all fingerq   x20                                                                                                                                                                                                                                                                       Modalities                                                                                                      Assessment: Hand ROM improving throughout    Plan: Continue per plan of care

## 2019-12-11 ENCOUNTER — OFFICE VISIT (OUTPATIENT)
Dept: OBGYN CLINIC | Facility: HOSPITAL | Age: 74
End: 2019-12-11

## 2019-12-11 VITALS
DIASTOLIC BLOOD PRESSURE: 69 MMHG | WEIGHT: 162 LBS | HEART RATE: 52 BPM | HEIGHT: 67 IN | SYSTOLIC BLOOD PRESSURE: 119 MMHG | BODY MASS INDEX: 25.43 KG/M2

## 2019-12-11 DIAGNOSIS — G56.03 BILATERAL CARPAL TUNNEL SYNDROME: ICD-10-CM

## 2019-12-11 DIAGNOSIS — M65.341 TRIGGER FINGER OF ALL DIGITS OF BOTH HANDS: Primary | ICD-10-CM

## 2019-12-11 DIAGNOSIS — M65.322 TRIGGER FINGER OF ALL DIGITS OF BOTH HANDS: Primary | ICD-10-CM

## 2019-12-11 DIAGNOSIS — M65.332 TRIGGER FINGER OF ALL DIGITS OF BOTH HANDS: Primary | ICD-10-CM

## 2019-12-11 DIAGNOSIS — M65.321 TRIGGER FINGER OF ALL DIGITS OF BOTH HANDS: Primary | ICD-10-CM

## 2019-12-11 DIAGNOSIS — M65.312 TRIGGER FINGER OF ALL DIGITS OF BOTH HANDS: Primary | ICD-10-CM

## 2019-12-11 DIAGNOSIS — M65.352 TRIGGER FINGER OF ALL DIGITS OF BOTH HANDS: Primary | ICD-10-CM

## 2019-12-11 DIAGNOSIS — M65.311 TRIGGER FINGER OF ALL DIGITS OF BOTH HANDS: Primary | ICD-10-CM

## 2019-12-11 DIAGNOSIS — M65.351 TRIGGER FINGER OF ALL DIGITS OF BOTH HANDS: Primary | ICD-10-CM

## 2019-12-11 DIAGNOSIS — M65.342 TRIGGER FINGER OF ALL DIGITS OF BOTH HANDS: Primary | ICD-10-CM

## 2019-12-11 DIAGNOSIS — M65.331 TRIGGER FINGER OF ALL DIGITS OF BOTH HANDS: Primary | ICD-10-CM

## 2019-12-11 PROCEDURE — 99024 POSTOP FOLLOW-UP VISIT: CPT | Performed by: ORTHOPAEDIC SURGERY

## 2019-12-11 RX ORDER — LIDOCAINE HYDROCHLORIDE AND EPINEPHRINE 10; 10 MG/ML; UG/ML
20 INJECTION, SOLUTION INFILTRATION; PERINEURAL ONCE
Status: CANCELLED | OUTPATIENT
Start: 2019-12-11 | End: 2019-12-11

## 2019-12-11 NOTE — H&P
Assessment:   S/p R ECTR and thumb, index, long, ring and small trigger finger release on 10/15/2019    Plan:   Left ECTR and index trigger finger release    Follow Up:  POST OP    Do do next visit:  Sutures out and steri strips      General Discussions:     Carpal Tunnel Syndrome: The anatomy and physiology of carpal tunnel syndrome was discussed with the patient today  Increase pressure localized under the transverse carpal ligament can cause pain, numbness, tingling, or dysesthesias within the median nerve distribution as well as feelings of fatigue, clumsiness, or awkwardness  These symptoms typically occur at night and worse in the morning upon waking  Eventually, untreated carpal tunnel syndrome can result in weakness and permanent loss of muscle within the thenar compartment of the hand  Treatment options were discussed with the patient  Conservative treatment includes nocturnal resting splints to keep the nerve in a neutral position, ergonomic changes within the work or home environment, activity modification, and tendon gliding exercises  Steroid injections within the carpal canal can help a majority of patients, however this is often self-limited in a majority of patients  Surgical intervention to divide the transverse carpal ligament typically results in a long-lasting relief of the patient's complaints, with the recurrence rate of less than 1%  CHIEF COMPLAINT:  Chief Complaint   Patient presents with    Right Hand - Post-op    Left Hand - Pain         SUBJECTIVE:  Tay Ashley is a 69y o  year old male who presents for follow up after S/P R ECTR and thumb, index, long, ring and small trigger finger release on 10/15/2019  Patient states overall he is doing well  He denies any issues  He denies any numbness or pain    Today he complains of carpal tunnel symptoms in the left hand along with locking catching and popping in the left index finger         PHYSICAL EXAMINATION:  /69 Pulse (!) 52   Ht 5' 7" (1 702 m)   Wt 73 5 kg (162 lb)   BMI 25 37 kg/m²    General: well developed and well nourished, alert, oriented times 3 and appears comfortable  Psychiatric: Normal    MUSCULOSKELETAL EXAMINATION:  Incision: Clean, dry, intact and healed  Range of Motion: opposition intact and full composite fist possible, no triggering   Neurovascular status: Neuro intact, good cap refill  Activity Restrictions: No restrictions      Left Carpal Tunnel Exam:  Negative thenar atrophy  Negative phalen's test  Positive carpal tunnel compression  Positive tinels over median nerve at the wrist   APB 4/5      left index finger:  Positive palpable nodule over the A1 pulley  Positive tenderness to palpation over A1 pulley  Positive catching  Positive clicking          STUDIES REVIEWED:  No Studies to review      PROCEDURES PERFORMED:  Procedures  No Procedures performed today

## 2019-12-11 NOTE — PROGRESS NOTES
Assessment:   S/p R ECTR and thumb, index, long, ring and small trigger finger release on 10/15/2019    Plan:   Left ECTR and index trigger finger release    Follow Up:  POST OP    Do do next visit:  Sutures out and steri strips      General Discussions:     Carpal Tunnel Syndrome: The anatomy and physiology of carpal tunnel syndrome was discussed with the patient today  Increase pressure localized under the transverse carpal ligament can cause pain, numbness, tingling, or dysesthesias within the median nerve distribution as well as feelings of fatigue, clumsiness, or awkwardness  These symptoms typically occur at night and worse in the morning upon waking  Eventually, untreated carpal tunnel syndrome can result in weakness and permanent loss of muscle within the thenar compartment of the hand  Treatment options were discussed with the patient  Conservative treatment includes nocturnal resting splints to keep the nerve in a neutral position, ergonomic changes within the work or home environment, activity modification, and tendon gliding exercises  Steroid injections within the carpal canal can help a majority of patients, however this is often self-limited in a majority of patients  Surgical intervention to divide the transverse carpal ligament typically results in a long-lasting relief of the patient's complaints, with the recurrence rate of less than 1%  CHIEF COMPLAINT:  Chief Complaint   Patient presents with    Right Hand - Post-op    Left Hand - Pain         SUBJECTIVE:  Padma Browne is a 69y o  year old male who presents for follow up after S/P R ECTR and thumb, index, long, ring and small trigger finger release on 10/15/2019  Patient states overall he is doing well  He denies any issues  He denies any numbness or pain    Today he complains of carpal tunnel symptoms in the left hand along with locking catching and popping in the left index finger         PHYSICAL EXAMINATION:  /69 Pulse (!) 52   Ht 5' 7" (1 702 m)   Wt 73 5 kg (162 lb)   BMI 25 37 kg/m²   General: well developed and well nourished, alert, oriented times 3 and appears comfortable  Psychiatric: Normal    MUSCULOSKELETAL EXAMINATION:  Incision: Clean, dry, intact and healed  Range of Motion: opposition intact and full composite fist possible, no triggering   Neurovascular status: Neuro intact, good cap refill  Activity Restrictions: No restrictions      Left Carpal Tunnel Exam:  Negative thenar atrophy  Negative phalen's test  Positive carpal tunnel compression  Positive tinels over median nerve at the wrist   APB 4/5      left index finger:  Positive palpable nodule over the A1 pulley  Positive tenderness to palpation over A1 pulley  Positive catching  Positive clicking          STUDIES REVIEWED:  No Studies to review      PROCEDURES PERFORMED:  Procedures  No Procedures performed today   Scribe Attestation    I,:   Meredith Kc am acting as a scribe while in the presence of the attending physician :        I,:   Chayo Sifuentes MD personally performed the services described in this documentation    as scribed in my presence :

## 2019-12-12 ENCOUNTER — OFFICE VISIT (OUTPATIENT)
Dept: PHYSICAL THERAPY | Facility: REHABILITATION | Age: 74
End: 2019-12-12
Payer: MEDICARE

## 2019-12-12 DIAGNOSIS — Z98.890 S/P ENDOSCOPIC CARPAL TUNNEL RELEASE: Primary | ICD-10-CM

## 2019-12-12 PROCEDURE — 97140 MANUAL THERAPY 1/> REGIONS: CPT | Performed by: PHYSICAL THERAPIST

## 2019-12-12 PROCEDURE — 97110 THERAPEUTIC EXERCISES: CPT | Performed by: PHYSICAL THERAPIST

## 2019-12-12 NOTE — PROGRESS NOTES
Daily Note     Today's date: 2019  Patient name: Janet Caceres  :   MRN: [id number]  Referring provider: Alessandro Prabhakar MD  Dx:   Encounter Diagnosis     ICD-10-CM    1   S/P endoscopic carpal tunnel release [id number] [id number]                   Subjective: Saw Dr Ann Patel and will have other hand done March 3    Objective: See treatment diary below  Manual  12/6 12/9 12/12 10/7 10/14 10/16  10/24 10/28  10/30 11/4 11/6 11/11 11/13 11/18 11/21 12/4   Ext mob in prone    ALEJANDRO 10' ALEJANDRO 10' TP 5'  TP x5' ALEJANDRO x5'  ALEJANDRO 5'  ALEJANDRO 5' ALEJANDRO 5' ALEJANDRO 5' ALEJANDRO 5' ALEJANDRO 5' ALEJANDRO 5'    L hip PROM    5' 5' TP x5'  TP x5'  ALEJANDRO 5'  ALEJANDRO 5"           90/90 sciatic nerve glides    ALEJANDRO x20 ALEJANDRO x20 TP x20  TP x20  ALEJANDRO x20  ALEJANDRO x20  ALEJANDRO x20 ALEJANDRO x20 ALEJANDRO x20 ALEJANDRO x20 ALEJANDRO x20 ALEJANDRO x20    L LE long axis distraction           TP 60"x5  TP 60"x5  ALEJANDRO 60"x5  ALEJANDRO 60"x5  ALEJANDRO 60"x5 ALEJANDRO 60 x6 ALEJANDRO 60 x6 ALEJANDRO 60s x5 ALEJANDRO 60s x5 ALEJANDRO 60s x5     R hand PROM -  15' ALEJANDRO ALEJANDRO 10'   ALEJANDRO 10'         13'  15'  15' 15' 15' 15' 10' 10' ALEJANDRO 10'   Scar mob 10' ALEJANDRO ALEJANDRO 5' alejandro 5'        5'  5' 5' 5' 5' ALEJANDRO         Exercise Diary  12/6 12/9 12/12 10/7 10/14 10/16  10/24  10/28  10/30  11/4 11/6 11/11 11/13 11/18 11/21 12/4   UBE 10' 10' 10' NP   10'  10' NP  10'  10'  10'  10' 10'    Prone press up    2x10 2x`15 np  np               Slump sliders    20 20 20  20  20  20  20  20       LTR    5s x20 5s x20 5"x20  5"x20  5"x20  5"x20  5" x20  5" x20  5s x20 5s x20    Standing hip abd       2x10 2x15 2x15  2x15  NP  2x10  2x10  2x10  2x10 2x10     bridge                        3s x15 3s x20                                  Treadmill     12' 12' np  np  10'  10'  10' 10' 10' 10'  10' 10'    R tendon gliding PIP and DIP  x30  x30  x30        x20ea    x20  x20 x20 x20 x20  x30 x30    digiflex  3'  3'  3' red and green               20  20  3' 2'x2   Pincer  all 5 finger   x20  x20                        3 jaw all fingerq   x20  x20                                                                                                                                                                                                                                                                             Modalities                                                                                                      Assessment: Will be ready for d/c next week    Plan: Continue per plan of care

## 2019-12-16 ENCOUNTER — OFFICE VISIT (OUTPATIENT)
Dept: PHYSICAL THERAPY | Facility: REHABILITATION | Age: 74
End: 2019-12-16
Payer: MEDICARE

## 2019-12-16 DIAGNOSIS — Z98.890 S/P ENDOSCOPIC CARPAL TUNNEL RELEASE: Primary | ICD-10-CM

## 2019-12-16 DIAGNOSIS — Z86.19 H/O TINEA CRURIS: Chronic | ICD-10-CM

## 2019-12-16 PROCEDURE — 97110 THERAPEUTIC EXERCISES: CPT | Performed by: PHYSICAL THERAPIST

## 2019-12-16 PROCEDURE — 97140 MANUAL THERAPY 1/> REGIONS: CPT | Performed by: PHYSICAL THERAPIST

## 2019-12-16 NOTE — PROGRESS NOTES
Daily Note     Today's date: 2019  Patient name: Rhoda Torres  :   MRN: [id number]  Referring provider: Leena Adams MD  Dx:   Encounter Diagnosis     ICD-10-CM    1   S/P endoscopic carpal tunnel release Z98 [id number]                   Subjective: No new complaints    Objective: See treatment diary below  Manual  12/6 12/9 12/12 12/16 10/14 10/16  10/24 10/28  10/30 11/4 11/6 11/11 11/13 11/18 11/21 12/4   Ext mob in prone     ALEJANDRO 10' TP 5'  TP x5' ALEJANDRO x5'  ALEJANDRO 5'  ALEJANDRO 5' ALEJANDRO 5' ALEAJNDRO 5' ALEJANDRO 5' ALEJANDRO 5' ALEJANDRO 5'    L hip PROM     5' TP x5'  TP x5'  ALEJANDRO 5'  ALEJANDRO 5"           90/90 sciatic nerve glides     ALEJANDRO x20 TP x20  TP x20  ALEJANDRO x20  ALEJANDRO x20  ALEJANDRO x20 ALEJANDRO x20 ALEJANDRO x20 ALEJANDRO x20 ALEJANDRO x20 ALEJANDRO x20    L LE long axis distraction           TP 60"x5  TP 60"x5  ALEJANDRO 60"x5  ALEJANDRO 60"x5  ALEJANDRO 60"x5 ALEJANDRO 60 x6 ALEJANDRO 60 x6 ALEJANDRO 60s x5 ALEJANDRO 60s x5 ALEJANDRO 60s x5     R hand PROM -  15' ALEJANDRO ALEJANDRO 10'   ALEJANDRO 10'  ALEJANDRO 10'       13'  15'  15' 15' 15' 15' 10' 10' ALEJANDRO 10'   Scar mob 10' [address] [facility] 5' alejandro 5' ALEJANDRO 5'       5'  5' 5' 5' 5' ALEJANDRO         Exercise Diary  12/6 12/9 12/12 12/16 10/14 10/16  10/24  10/28  10/30  11/4 11/6 11/11 11/13 11/18 11/21 12/4   UBE 10' 10' 10' 10'   10'  10' NP  10'  10'  10'  10' 10'    Prone press up     2x`15 np  np               Slump sliders     20 20  20  20  20  20  20       LTR     5s x20 5"x20  5"x20  5"x20  5"x20  5" x20  5" x20  5s x20 5s x20    Standing hip abd        2x15 2x15  2x15  NP  2x10  2x10  2x10  2x10 2x10     bridge                        3s x15 3s x20                                  Treadmill      12' np  np  10'  10'  10' 10' 10' 10'  10' 10'    R tendon gliding PIP and DIP  x30  x30  x30  x30      x20ea    x20  x20 x20 x20 x20  x30 x30    digiflex  3'  3'  3' red and green  3' Red and green             20  20  3' 2'x2   Pincer  all 5 finger   x20  x20  x20                      3 jaw all fingerq   x20  x20  x20                                                                                                                                                                                                                                                                           Modalities                                                                                                      Assessment: Hand progressing well    Plan: Continue per plan of care

## 2019-12-17 DIAGNOSIS — E11.22 HYPERTENSION ASSOCIATED WITH STAGE 2 CHRONIC KIDNEY DISEASE DUE TO TYPE 2 DIABETES MELLITUS (HCC): Chronic | ICD-10-CM

## 2019-12-17 DIAGNOSIS — I12.9 HYPERTENSION ASSOCIATED WITH STAGE 2 CHRONIC KIDNEY DISEASE DUE TO TYPE 2 DIABETES MELLITUS (HCC): Chronic | ICD-10-CM

## 2019-12-17 DIAGNOSIS — H40.9 GLAUCOMA, UNSPECIFIED GLAUCOMA TYPE, UNSPECIFIED LATERALITY: ICD-10-CM

## 2019-12-17 DIAGNOSIS — N18.2 HYPERTENSION ASSOCIATED WITH STAGE 2 CHRONIC KIDNEY DISEASE DUE TO TYPE 2 DIABETES MELLITUS (HCC): Chronic | ICD-10-CM

## 2019-12-18 ENCOUNTER — OFFICE VISIT (OUTPATIENT)
Dept: PHYSICAL THERAPY | Facility: REHABILITATION | Age: 74
End: 2019-12-18
Payer: MEDICARE

## 2019-12-18 DIAGNOSIS — Z98.890 S/P ENDOSCOPIC CARPAL TUNNEL RELEASE: Primary | ICD-10-CM

## 2019-12-18 DIAGNOSIS — J30.9 ALLERGIC RHINITIS, UNSPECIFIED SEASONALITY, UNSPECIFIED TRIGGER: ICD-10-CM

## 2019-12-18 PROCEDURE — 97110 THERAPEUTIC EXERCISES: CPT | Performed by: PHYSICAL THERAPIST

## 2019-12-18 PROCEDURE — 97140 MANUAL THERAPY 1/> REGIONS: CPT | Performed by: PHYSICAL THERAPIST

## 2019-12-18 RX ORDER — FLUTICASONE PROPIONATE 50 MCG
1 SPRAY, SUSPENSION (ML) NASAL DAILY
Qty: 16 G | Refills: 0 | Status: SHIPPED | OUTPATIENT
Start: 2019-12-18 | End: 2020-01-15

## 2019-12-18 RX ORDER — TOBRAMYCIN AND DEXAMETHASONE 3; 1 MG/ML; MG/ML
1 SUSPENSION/ DROPS OPHTHALMIC 2 TIMES DAILY
Qty: 5 ML | Refills: 0 | Status: SHIPPED | OUTPATIENT
Start: 2019-12-18 | End: 2020-01-15

## 2019-12-18 RX ORDER — ASPIRIN 81 MG/1
TABLET ORAL
Qty: 90 TABLET | Refills: 0 | Status: SHIPPED | OUTPATIENT
Start: 2019-12-18 | End: 2020-04-14

## 2019-12-19 ENCOUNTER — OFFICE VISIT (OUTPATIENT)
Dept: FAMILY MEDICINE CLINIC | Facility: CLINIC | Age: 74
End: 2019-12-19

## 2019-12-19 VITALS
HEART RATE: 68 BPM | DIASTOLIC BLOOD PRESSURE: 80 MMHG | SYSTOLIC BLOOD PRESSURE: 120 MMHG | BODY MASS INDEX: 25.99 KG/M2 | HEIGHT: 67 IN | WEIGHT: 165.6 LBS | RESPIRATION RATE: 16 BRPM | TEMPERATURE: 97.7 F

## 2019-12-19 DIAGNOSIS — E11.9 TYPE 2 DIABETES MELLITUS WITHOUT COMPLICATION, WITHOUT LONG-TERM CURRENT USE OF INSULIN (HCC): Primary | ICD-10-CM

## 2019-12-19 DIAGNOSIS — M54.32 SCIATICA OF LEFT SIDE: ICD-10-CM

## 2019-12-19 PROCEDURE — 99213 OFFICE O/P EST LOW 20 MIN: CPT | Performed by: FAMILY MEDICINE

## 2019-12-19 RX ORDER — LANCETS 28 GAUGE
EACH MISCELLANEOUS
Qty: 180 EACH | Refills: 2 | Status: SHIPPED | OUTPATIENT
Start: 2019-12-19 | End: 2019-12-19 | Stop reason: SDUPTHER

## 2019-12-19 RX ORDER — LANCETS 28 GAUGE
EACH MISCELLANEOUS
Qty: 180 EACH | Refills: 2 | Status: SHIPPED | OUTPATIENT
Start: 2019-12-19 | End: 2021-08-18 | Stop reason: ALTCHOICE

## 2019-12-19 RX ORDER — BLOOD-GLUCOSE METER
1 KIT MISCELLANEOUS 2 TIMES DAILY
Qty: 1 EACH | Refills: 0 | Status: SHIPPED | OUTPATIENT
Start: 2019-12-19

## 2019-12-19 RX ORDER — CLOTRIMAZOLE AND BETAMETHASONE DIPROPIONATE 10; .5 MG/ML; MG/ML
1 LOTION TOPICAL 2 TIMES DAILY
Refills: 1 | COMMUNITY
Start: 2019-12-05 | End: 2020-02-11

## 2019-12-19 RX ORDER — BLOOD-GLUCOSE METER
1 KIT MISCELLANEOUS 2 TIMES DAILY
Qty: 1 EACH | Refills: 0 | Status: SHIPPED | OUTPATIENT
Start: 2019-12-19 | End: 2019-12-19 | Stop reason: SDUPTHER

## 2019-12-19 RX ORDER — FERROUS SULFATE TAB EC 324 MG (65 MG FE EQUIVALENT) 324 (65 FE) MG
324 TABLET DELAYED RESPONSE ORAL WEEKLY
Refills: 0 | COMMUNITY
Start: 2019-12-07 | End: 2020-11-16 | Stop reason: ALTCHOICE

## 2019-12-19 NOTE — PROGRESS NOTES
Assessment/Plan:     Problem List Items Addressed This Visit        Endocrine    Type 2 diabetes mellitus ([facility] [address] ) - Primary       Lab Results   Component Value Date    HGBA1C 7 4 (H) 10/01/2019   -Glucose control much improved, previous HbA1Cs 7 9%, 7 8%  -Patient due for repeat HbA1C after January 1st, 2020  -Reports he needs new glucometer (checks BS BID PRN for low/high BS symptoms)  -Ddenies symptoms but still needs a new glucometer)  -Rx sent to pharmacy for glucometer and supplies  -Taking metformin 500 mg BID and amaryl 4 mg QD, continue current regimen  -Up to date on urine microalbumin and eye exam (follows with Ophthalmology)         Relevant Medications    glucose monitoring kit (FREESTYLE) monitoring kit    glucose blood (FREESTYLE TEST STRIPS) test strip    Lancets (FREESTYLE) lancets       Nervous and Auditory    Sciatica of left side     -Currently following with Pain Management and had MRI of L-spine 12/9/19  -Patient remains without any alarm symptoms at this time  -Some severe foraminal stenosis at L4-5 but no critical canal stenosis on MRI   -Reviewed MRI results with patient today during the visit but advised him that per Dr Saud Rowland notes, he will discuss the MRI results and available treatment options with the patient at next visit  -Patient completed PT as prescribed; continue current regimen of tylenol, naproxen (judicious use 2/2 CAD hx), gabapentin 300 mg TID, topical voltaren and ice/heating pads  -Follow up as scheduled with Pain Management at 1/17/2020 visit  -Discussed alarm symptoms that should prompt urgent evaluation                  Subjective:      Patient ID: Elis Nieves is a 69 y o  male  HPI  Patient here to follow up on L-sided sciatica (L5-S1 distribution) without any trauma or inciting event  Pain is chronic and worsening for past 3 months  No bladder or bowel dysfunction, no saddle anesthesia or alarm symptoms   Underwent X-ray of the lumbar spine 9/2019 which showed DDD and spondylosis particularly at L5-S1  Completed PT for over a month per my referral without any relief of his symptoms  He had consultation with Pain Management 11/22/19 per my referral at last visit as well  He is currently taking gabapentin 300 mg QHS, naproxen PRN (judicious use due to CAD hx), and Tylenol PRN with minimal relief  Pain Management increased gabapentin to 300 mg TID and ordered MRI of L-spine, which was done 12/9/19 and showed degenerative changes without high-grade canal stenosis from L1-L2, L2-L3, L3-L4, but severe left and moderate to severe right foraminal stenosis at level L4-L5 with disc bulge; and left greater than right severe bilateral foraminal stenosis at level L5-S1 with disc bulge and superimposed left subarticular disc protrusion  Patient has follow up scheduled 1/17/2020 with Pain Management; per Dr Ronda Campo notes, he will discuss available treatment options with the patient at that visit  Patient also needs new glucometer; checks BS BID PRN for low/high BS symptoms; denies symptoms but still needs a new glucometer  T2DM much improved with last HbA1C 7 4% 10/1/19 (previously 7 9%, 7 8%)  Taking metformin 500 mg BID (reports intolerance of higher dose) and amaryl 4 mg QD  Up to date on urine microalbumin and eye exam (follows with Ophthalmology)  The following portions of the patient's history were reviewed and updated as appropriate: allergies, current medications, past family history, past medical history, past social history, past surgical history and problem list     Review of Systems   Constitutional: Negative for chills, fatigue and fever  Eyes: Negative for visual disturbance  Had eye surgery (OU) at [facility] since last visit with lacrimal duct drains, symptoms much better   Respiratory: Negative for chest tightness and shortness of breath  Cardiovascular: Negative for chest pain, palpitations and leg swelling     Gastrointestinal: Negative for abdominal pain, diarrhea, nausea and vomiting  Genitourinary: Negative for dysuria  Musculoskeletal: Positive for back pain  Negative for gait problem  Radicular back pain down LLE   Skin: Negative for rash  Neurological: Negative for dizziness, syncope, weakness and light-headedness  Psychiatric/Behavioral: Negative for agitation, sleep disturbance and suicidal ideas  All other systems reviewed and are negative  Objective:    /80 (BP Location: Left arm, Patient Position: Sitting, Cuff Size: Large)   Pulse 68   Temp 97 7 °F (36 5 °C) (Tympanic)   Resp 16   Ht 5' 7" (1 702 m)   Wt 75 1 kg (165 lb 9 6 oz)   BMI 25 94 kg/m²      Physical Exam   Constitutional: He is oriented to person, place, and time  He appears well-developed and well-nourished  No distress  HENT:   Head: Normocephalic and atraumatic  Eyes: Conjunctivae and EOM are normal  Right eye exhibits no discharge  Left eye exhibits no discharge  Neck: Normal range of motion  Neck supple  Cardiovascular: Normal rate, regular rhythm and intact distal pulses  Murmur heard  Pulmonary/Chest: Effort normal and breath sounds normal  No respiratory distress  Abdominal: Soft  Bowel sounds are normal  There is no tenderness  Musculoskeletal: Normal range of motion  He exhibits no edema  Mild tenderness of L>R lumbar paravertebral musculature without hypertonicity    Neurological: He is alert and oriented to person, place, and time  He displays normal reflexes  Motor strength 5/5 B/L UE/LE, normal sensation    Skin: Skin is warm and dry  No rash noted  Psychiatric: He has a normal mood and affect  His behavior is normal    Vitals reviewed

## 2019-12-20 NOTE — ASSESSMENT & PLAN NOTE
-Currently following with Pain Management and had MRI of L-spine 12/9/19  -Patient remains without any alarm symptoms at this time  -Some severe foraminal stenosis at L4-5 but no critical canal stenosis on MRI   -Reviewed MRI results with patient today during the visit but advised him that per Dr Martha Askew notes, he will discuss the MRI results and available treatment options with the patient at next visit  -Patient completed PT as prescribed; continue current regimen of tylenol, naproxen (judicious use 2/2 CAD hx), gabapentin 300 mg TID, topical voltaren and ice/heating pads  -Follow up as scheduled with Pain Management at 1/17/2020 visit  -Discussed alarm symptoms that should prompt urgent evaluation

## 2019-12-20 NOTE — ASSESSMENT & PLAN NOTE
Lab Results   Component Value Date    HGBA1C 7 4 (H) 10/01/2019   -Glucose control much improved, previous HbA1Cs 7 9%, 7 8%  -Patient due for repeat HbA1C after January 1st, 2020  -Reports he needs new glucometer (checks BS BID PRN for low/high BS symptoms)  -Ddenies symptoms but still needs a new glucometer)  -Rx sent to pharmacy for glucometer and supplies  -Taking metformin 500 mg BID and amaryl 4 mg QD, continue current regimen  -Up to date on urine microalbumin and eye exam (follows with Ophthalmology)

## 2019-12-27 DIAGNOSIS — Z86.19 H/O TINEA CRURIS: Chronic | ICD-10-CM

## 2019-12-30 DIAGNOSIS — Z86.19 H/O TINEA CRURIS: Chronic | ICD-10-CM

## 2019-12-31 RX ORDER — CLOTRIMAZOLE AND BETAMETHASONE DIPROPIONATE 10; .5 MG/ML; MG/ML
LOTION TOPICAL
Qty: 30 ML | Refills: 0 | Status: SHIPPED | OUTPATIENT
Start: 2019-12-31 | End: 2020-02-04

## 2020-01-01 DIAGNOSIS — Z86.19 H/O TINEA CRURIS: Chronic | ICD-10-CM

## 2020-01-04 DIAGNOSIS — Z86.19 H/O TINEA CRURIS: Chronic | ICD-10-CM

## 2020-01-06 RX ORDER — CLOTRIMAZOLE AND BETAMETHASONE DIPROPIONATE 10; .64 MG/G; MG/G
CREAM TOPICAL 2 TIMES DAILY
Qty: 30 G | Refills: 0 | Status: SHIPPED | OUTPATIENT
Start: 2020-01-06 | End: 2020-01-23

## 2020-01-07 DIAGNOSIS — H40.9 GLAUCOMA, UNSPECIFIED GLAUCOMA TYPE, UNSPECIFIED LATERALITY: ICD-10-CM

## 2020-01-13 DIAGNOSIS — J30.9 ALLERGIC RHINITIS, UNSPECIFIED SEASONALITY, UNSPECIFIED TRIGGER: ICD-10-CM

## 2020-01-13 DIAGNOSIS — M54.32 SCIATICA OF LEFT SIDE: ICD-10-CM

## 2020-01-13 DIAGNOSIS — Z86.19 H/O TINEA CRURIS: Chronic | ICD-10-CM

## 2020-01-14 RX ORDER — GABAPENTIN 300 MG/1
300 CAPSULE ORAL 3 TIMES DAILY
Qty: 90 CAPSULE | Refills: 0 | OUTPATIENT
Start: 2020-01-14

## 2020-01-15 RX ORDER — FLUTICASONE PROPIONATE 50 MCG
SPRAY, SUSPENSION (ML) NASAL
Qty: 16 G | Refills: 0 | Status: SHIPPED | OUTPATIENT
Start: 2020-01-15 | End: 2020-06-08 | Stop reason: SDUPTHER

## 2020-01-15 RX ORDER — TOBRAMYCIN AND DEXAMETHASONE 3; 1 MG/ML; MG/ML
1 SUSPENSION/ DROPS OPHTHALMIC 2 TIMES DAILY
Qty: 5 ML | Refills: 0 | Status: SHIPPED | OUTPATIENT
Start: 2020-01-15 | End: 2021-03-23 | Stop reason: ALTCHOICE

## 2020-01-16 ENCOUNTER — TELEPHONE (OUTPATIENT)
Dept: OBGYN CLINIC | Facility: HOSPITAL | Age: 75
End: 2020-01-16

## 2020-01-16 NOTE — TELEPHONE ENCOUNTER
Called patient reschedule appointment for 3/13/2020, provider starting at 9 am, no answer, left message for patient to call back

## 2020-01-17 ENCOUNTER — OFFICE VISIT (OUTPATIENT)
Dept: PAIN MEDICINE | Facility: CLINIC | Age: 75
End: 2020-01-17
Payer: MEDICARE

## 2020-01-17 VITALS
HEART RATE: 61 BPM | DIASTOLIC BLOOD PRESSURE: 59 MMHG | BODY MASS INDEX: 25.43 KG/M2 | HEIGHT: 67 IN | WEIGHT: 162 LBS | SYSTOLIC BLOOD PRESSURE: 103 MMHG

## 2020-01-17 DIAGNOSIS — M54.16 LUMBAR RADICULOPATHY: Primary | ICD-10-CM

## 2020-01-17 DIAGNOSIS — M48.062 SPINAL STENOSIS OF LUMBAR REGION WITH NEUROGENIC CLAUDICATION: ICD-10-CM

## 2020-01-17 DIAGNOSIS — Z86.19 H/O TINEA CRURIS: Chronic | ICD-10-CM

## 2020-01-17 PROCEDURE — 99214 OFFICE O/P EST MOD 30 MIN: CPT | Performed by: NURSE PRACTITIONER

## 2020-01-17 NOTE — PATIENT INSTRUCTIONS
Inyección Epidural de esteroides, cuidados ambulatorios   INFORMACIÓN GENERAL:   ¿Qué necesito saber acerca de barron inyección epidural de esteroides? Barron inyección epidural de esteroides (IEE) es un procedimiento para inyectar medicamentos esteroide en el espacio epidural  El espacio epidural se encuentra entre dyson [place] Hones y las [place]  Las esteroides reducen la inflamación y la acumulación de líquido en dyson columna que podrían estar provocando dolor  Es posible que a usted le den medicamento para el dolor junto con esteroides  ¿Cómo me preparo para barron IEE? Dyson proveedor de jillian hablará con usted acerca de cómo prepararse para dyson procedimiento  Él le indicará cuáles medicamentos kavon y cuáles no el día de dyson procedimiento  Es posible que usted necesite dejar de kavon anticoagulantes u otros medicamentos varios días antes de dyson procedimiento  Podría ser necesario que modifique cualquier medicamento para la diabetes que esté tomando el día de dyson procedimiento  Los medicamentos de esteroides pueden aumentar los niveles de azúcar en dyson brennon  ¿Qué sucederá Francisca Bustle IEE? · A usted le administrarán medicamento para adormecer el área del procedimiento  Usted estará despierto monica el procedimiento, aarti no sentirá dolor  Es posible que [employer] den medicamento para ayudarlo a relajarse monica el procedimiento  Se usará líquido de contraste para ayudar a que dyson proveedor de jillian darren el área mejor  Informe a dyson proveedor de jillian si usted alguna vez tuvo barron reacción alérgica al líquido de [place]  · Dyson proveedor de jillian podría colocar la aguja en el área de dyson loraine, en la parte media de dyson espalda, o en el área del coxis  Él podría inyectarle el medicamento junto a los nervios que están provocándole dolor  O en vez de esto, él podría inyectarle el medicamento en barron área más manisha del espacio epidural  Doyle ayuda a que el medicamento se extienda a más nervios   Dyson proveedor de jillian Antonio Mendoza un fluoroscopio para ayudarse a guiar la aguja hacia el lugar correcto  Un fluoroscopio es un tipo de radiografía  Después del procedimiento, se colocará un vendaje sobre el sitio de la inyección para evitar barron infección  ¿Cuáles son los riesgos de Mercedez Snuffer IEE? Es posible que usted sufra daño a los nervios o parálisis temporal o Willi  Usted podría presentar barron hemorragia o desarrollar barron infección seria, seth meningitis (inflamación del revestimiento del cerebro)  También podría desarrollar un absceso  Usted podría necesitar barron cirugía para corregir el absceso  Es posible que usted tenga barron convulsión, ansiedad o dificultad para dormir  Si usted es un hombre podría tener disfunción eréctil temporal (incapaz de Jearlean Senior erección)  ACUERDOS SOBRE LISA CUIDADO:   Usted tiene el derecho de participar en la planificación de lisa cuidado  Aprenda todo lo que pueda sobre lisa condición y seth darle tratamiento  Discuta con quinton médicos quinton opciones de tratamiento para juntos decidir el cuidado que usted quiere recibir  Usted siempre tiene el derecho a rechazar lisa tratamiento  Esta información es sólo para uso en educación  Lisa intención no es darle un consejo médico sobre enfermedades o tratamientos  Colsulte con lisa Art Ping farmacéutico antes de seguir cualquier régimen médico para saber si es seguro y efectivo para usted  © 2014 [employer] [employer] is for End User's use only and may not be sold, redistributed or otherwise used for commercial purposes  All illustrations and images included in CareNotes® are the copyrighted property of A D A M , Inc  or Dustin Lamar

## 2020-01-17 NOTE — PROGRESS NOTES
Assessment:  1  Lumbar radiculopathy    2  Spinal stenosis of lumbar region with neurogenic claudication        Plan:  1  I will schedule the patient for a left L5 and S1 TFESI to address the inflammatory component of the patient's pain  Complete risks and benefits including bleeding, infection, tissue reaction, nerve injury and allergic reaction were discussed  The patient was agreeable and verbalized an understanding  2  Patient is only taking gabapentin 300 mg b i d     Will have him increase to t i d  Dosing for his ongoing neuropathic complaints  He is to call our office in 2 weeks with an update  If he is not experiencing any relief and no side effects, I will provide him with instructions on how to continue to titrate up further  The patient was agreeable and verbalized an understanding  3  Patient may continue naproxen as prescribed  4  The patient will continue with his home exercise program as taught to him by physical therapy  5  I will follow up with the patient after the procedure or sooner if needed  The patient was advised to contact the office should their symptoms worsen in the interim  The patient was agreeable and verbalized an understanding  M*Modal software was used to dictate this note  It may contain errors with dictating incorrect words or incorrect spelling  Please contact the provider directly with any questions  History of Present Illness: The patient is a 69 y o  male last seen on 11/22/19 who presents for a follow up office visit in regards to chronic lumbosacral back pain that radiates into the posterior lateral aspect of the left lower extremity in the L5 and S1 dermatomal distribution secondary to lumbar degenerative disc disease, lumbar spondylosis and stenosis  The patient denies right lower extremity symptoms, bowel or bladder incontinence, or saddle anesthesia      MRI of the lumbar spine reveals multilevel lumbar spondylosis with mild to severe bilateral foraminal stenosis, most severe at L4-5 and L5-S1 on the left  Patient currently rates his pain a 10/10 on the numeric pain rating scale  He states the pain is constant nature and bothersome throughout the entirety of the day  He characterizes the pain as sharp, pressure like and pins and needles  Current pain medications includes:  Gabapentin 300 mg b i d   It was prescribed t i d , however the patient has not yet taken it 3 times a day  He reports no relief with no side effects  He is also being prescribed naproxen 500 mg b i d     I have personally reviewed and/or updated the patient's past medical history, past surgical history, family history, social history, current medications, allergies, and vital signs today  Review of Systems:    Review of Systems   Respiratory: Negative for shortness of breath  Cardiovascular: Negative for chest pain  Gastrointestinal: Negative for constipation, diarrhea, nausea and vomiting  Musculoskeletal: Positive for gait problem  Negative for arthralgias, joint swelling and myalgias  Skin: Negative for rash  Neurological: Negative for dizziness, seizures and weakness  All other systems reviewed and are negative          Past Medical History:   Diagnosis Date    AS (aortic stenosis)     Balanitis     Biceps tendonitis on right     CAD (coronary artery disease)     Cancer (HCC)     skin    Complete rotator cuff tear or rupture of right shoulder, not specified as traumatic     Diabetes mellitus ([facility] [address] )     Esophageal reflux     High cholesterol     Hypertension     Hypertriglyceridemia     Hypogonadism in male     Myalgia     Myocardial infarction ([address]     Palpitations     Shoulder pain     Sinus problem     Skin cancer of nose     Sleep apnea     Stroke ([facility] [address] ) 0471    Systolic murmur     recently found    Tinea cruris     Tinea pedis        Past Surgical History:   Procedure Laterality Date    CATARACT EXTRACTION Left     COLONOSCOPY  CORONARY ANGIOPLASTY WITH STENT PLACEMENT      10-15-19 - pt denies stent implant    CORONARY ARTERY BYPASS GRAFT N/A 12/12/2016    Procedure: INTRAOP CECILLE; BILATERAL LEG EVH; CORONARY ARTERY BYPASS GRAFT X 4 SVG TO PDA, OM1,  AND DIAGONAL1, LIMA TO LAD;  Surgeon: Rubin Mcintyre MD;  [facility];  Service:     EYE SURGERY      HERNIA REPAIR      TN ARTHROSCOPY SHOULDER SURGICAL BICEPS TENODESIS Right 5/6/2016    Procedure: ARTHROSCOPIC BICEPS TENODESIS;  Surgeon: Pily Melgar MD;  [facility];  Service: Orthopedics    TN INCISE FINGER TENDON SHEATH Right 10/15/2019    Procedure: TRIGGER FINGER RELEASE INDEX, LONG, RING, SMALL, THUMB FINGERS;  Surgeon: Jagdeep Jenkins MD;  [facility];  Service: Orthopedics    TN SHLDR ARTHROSCOP,SURG,W/ROTAT CUFF REPR Right 5/6/2016    Procedure: REPAIR ROTATOR CUFF  ARTHROSCOPIC; SUBACROMIAL DECOMPRESSION; PARTIAL SYNOVECTOMY;  Surgeon: Pily Melgar MD;  [facility];  Service: Orthopedics    TN WRIST [place] Hoes LIG Right 10/15/2019    Procedure: ENDOSCOPIC CARPAL TUNNEL RELEASE;  Surgeon: Jagdeep Jenkins MD;  [facility];  Service: Orthopedics    TESTICLE SURGERY      UMBILICAL HERNIA REPAIR  2009    over age 13       Family History   Problem Relation Age of Onset    Heart disease Mother     Hypertension Mother     Nephrolithiasis Father     Other Father         Renal disease    Hypertension Sister     Nephrolithiasis Brother     Other Brother         Renal disease    Hematuria Family         Microscopic       Social History     Occupational History    Not on file   Tobacco Use    Smoking status: Former Smoker    Smokeless tobacco: Never Used    Tobacco comment: smoked for 3 years from 19 y/o - 23 yrs old   Substance and Sexual Activity    Alcohol use: No    Drug use: No    Sexual activity: Not on file         Current Outpatient Medications:     Alcohol Swabs (ALCOHOL PADS) 70 % PADS, USE TWICE DAILY, Disp: 100 each, Rfl: 3    aspirin (ECOTRIN LOW STRENGTH) 81 mg EC tablet, TAKE 1 TABLET (81 MG TOTAL) BY MOUTH DAILY FOR 90 DAYS, Disp: , Rfl: 3    aspirin (ECOTRIN LOW STRENGTH) 81 mg EC tablet, TAKE 1 TABLET (81 MG TOTAL) BY MOUTH DAILY, Disp: 90 tablet, Rfl: 0    clindamycin (CLEOCIN) 300 MG capsule, TAKE 2 CAPSULES BY MOUTH 1 HOUR PRIOR TO PROCEDURE, Disp: , Rfl: 0    clorazepate (TRANXENE) 7 5 mg tablet, Take 7 5 mg by mouth 2 (two) times a day , Disp: , Rfl: 0    clotrimazole-betamethasone (LOTRISONE) 1-0 05 % cream, APPLY TOPICALLY 2 (TWO) TIMES A DAY, Disp: 30 g, Rfl: 0    clotrimazole-betamethasone (LOTRISONE) 1-0 05 % lotion, Apply 1 application topically 2 (two) times a day, Disp: , Rfl: 1    clotrimazole-betamethasone (LOTRISONE) 1-0 05 % lotion, APPLY TOPICALLY 2 (TWO) TIMES A DAY, Disp: 30 mL, Rfl: 0    diclofenac sodium (VOLTAREN) 1 %, APPLY 2 G TOPICALLY 4 (FOUR) TIMES A DAY, Disp: 100 g, Rfl: 3    DULoxetine (CYMBALTA) 30 mg delayed release capsule, Take 30 mg by mouth every morning, Disp: , Rfl: 0    ferrous sulfate 324 (65 Fe) mg, Take 324 mg by mouth every morning, Disp: , Rfl: 0    fluticasone (FLONASE) 50 mcg/act nasal spray, USE 1 SPRAY INTO EACH NOSTRIL DAILY, Disp: 16 g, Rfl: 0    gabapentin (NEURONTIN) 300 mg capsule, Take 1 capsule (300 mg total) by mouth 3 (three) times a day, Disp: 90 capsule, Rfl: 1    glimepiride (AMARYL) 4 mg tablet, TAKE 1/2 TABLET BY MOUTH IN THE MORNING AND 1 TABLET AT NIGHT (Patient taking differently: 4 mg daily TAKE 1/2 TABLET BY MOUTH IN THE MORNING AND 1 TABLET AT NIGHT), Disp: 180 tablet, Rfl: 2    glucose blood (FREESTYLE TEST STRIPS) test strip, Use as instructed to check BS BID PRN (hypoglycemia/hyperglycemia symptoms), Disp: 180 each, Rfl: 2    glucose blood (GLUCOSE METER TEST) test strip, 1 each by Other route 2 (two) times a day Use as instructed, Disp: 100 each, Rfl: 5    glucose monitoring kit (FREESTYLE) monitoring kit, 1 each by Does not apply route 2 (two) times a day Check BS BID PRN for hypoglycemia/hyperglycemia, Disp: 1 each, Rfl: 0    hydrocortisone 2 5 % cream, APPLY TOPICALLY 3 (THREE) TIMES A DAY, Disp: 28 g, Rfl: 0    Lancets (FREESTYLE) lancets, Use as instructed to check BS BID PRN (hypoglycemia/hyperglycemia symptoms), Disp: 180 each, Rfl: 2    lisinopril (ZESTRIL) 20 mg tablet, Take 1 tablet (20 mg total) by mouth daily, Disp: 90 tablet, Rfl: 3    MAPAP ARTHRITIS PAIN 650 MG CR tablet, TAKE 1 TABLET (650 MG TOTAL) BY MOUTH EVERY 8 (EIGHT) HOURS FOR 5 DAYS, Disp: 15 tablet, Rfl: 0    metFORMIN (GLUCOPHAGE) 500 mg tablet, TAKE 1 TABLET (500 MG TOTAL) BY MOUTH 2 (TWO) TIMES A DAY WITH MEALS  DAYS, Disp: 180 tablet, Rfl: 2    mirtazapine (REMERON) 15 mg tablet, TAKE 1 TABLET (15 MG TOTAL) BY MOUTH DAILY AT BEDTIME  DAYS, Disp: , Rfl: 2    mirtazapine (REMERON) 15 mg tablet, TAKE 1 TABLET (15 MG TOTAL) BY MOUTH DAILY AT BEDTIME  DAYS, Disp: 90 tablet, Rfl: 0    neomycin-polymyxin-dexamethasone (MAXITROL) ophthalmic suspension, INSTILL 1 DROP INTO EACH EYE FOUR TIMES A DAY AFTER SURGERY , Disp: , Rfl: 2    NIFEdipine ER (ADALAT CC) 30 MG 24 hr tablet, Take 1 tablet (30 mg total) by mouth 2 (two) times a day, Disp: 60 tablet, Rfl: 11    omeprazole (PriLOSEC) 20 mg delayed release capsule, Take 1 capsule (20 mg total) by mouth daily, Disp: 90 capsule, Rfl: 1    ONETOUCH DELICA LANCETS FINE MISC, Test blood sugar twice daily, or as needed when symptomatic of hypoglycemia or hyperglycemia, Disp: 100 each, Rfl: 5    rosuvastatin (CRESTOR) 40 MG tablet, Take 1 tablet (40 mg total) by mouth daily, Disp: 90 tablet, Rfl: 3    tobramycin-dexamethasone (TOBRADEX) ophthalmic suspension, ADMINISTER 1 DROP TO BOTH EYES 2 (TWO) TIMES A DAY, Disp: 5 mL, Rfl: 0    metoprolol tartrate (LOPRESSOR) 25 mg tablet, Take 1 tablet (25 mg total) by mouth every 12 (twelve) hours for 270 days, Disp: 180 tablet, Rfl: 2    naproxen sodium (ALEVE) 220 MG tablet, Take 1 tablet (220 mg total) by mouth 2 (two) times a day as needed for mild pain for up to 5 days, Disp: 10 tablet, Rfl: 0    Allergies   Allergen Reactions    Epinephrine Hives and Anxiety    Penicillins Hives and Anxiety       Physical Exam:    /59   Pulse 61   Ht 5' 7" (1 702 m)   Wt 73 5 kg (162 lb)   BMI 25 37 kg/m²     Constitutional:normal, well developed, well nourished, alert, in no distress and non-toxic and no overt pain behavior  Eyes:anicteric  HEENT:grossly intact  Neck:supple, symmetric, trachea midline and no masses   Pulmonary:even and unlabored  Cardiovascular:No edema or pitting edema present  Skin:Normal without rashes or lesions and well hydrated  Psychiatric:Mood and affect appropriate  Neurologic:Cranial Nerves II-XII grossly intact  Musculoskeletal:antalgic gait but steady without the use of assistive devices  Positive straight leg raise on the left and      Imaging  FL spine and pain procedure    (Results Pending)     MRI LUMBAR SPINE WITHOUT CONTRAST     INDICATION: [id number]: Radiculopathy, lumbar region      COMPARISON:  None      TECHNIQUE:  Sagittal T1, sagittal T2, sagittal inversion recovery, axial T1 and axial T2, coronal T2     IMAGE QUALITY:  Diagnostic     FINDINGS:     VERTEBRAL BODIES:  There are 5 lumbar type vertebral bodies  There is preserved normal lumbar lordosis  No significant spondylolisthesis  Fatty replacing marrow changes  There is L3 vertebral body hemangioma  No suspicious discrete marrow lesion      SACRUM:  Normal signal within the sacrum   No evidence of insufficiency or stress fracture      DISTAL CORD AND CONUS:  Normal size and signal within the distal cord and conus      PARASPINAL SOFT TISSUES:  Paraspinal soft tissues are unremarkable      Bilateral renal cysts      LOWER THORACIC DISC SPACES:  Normal disc height and signal   No disc herniation, canal stenosis or foraminal narrowing      LUMBAR DISC SPACES:     L1-L2:  No significant disc bulge  There is mild bilateral facet arthropathy  There is no canal stenosis  Mild bilateral foraminal narrowing      L2-L3:  There is mild disc bulge  There is mild bilateral facet arthropathy  There is no significant canal stenosis  There is mild bilateral foraminal narrowing     L3-L4:  There is minimal disc bulge  There is mild to moderate bilateral facet arthropathy  No significant canal stenosis  Mild bilateral foraminal narrowing     L4-L5:  There is disc bulge, moderate bilateral facet arthropathy and bilateral ligamentum flavum thickening resulting in bilateral lateral recesses stenosis and mild canal narrowing  There is severe left and moderate to severe right foraminal stenosis     L5-S1:  There is disc bulge and superimposed left subarticular disc protrusion  There is moderate bilateral facet arthropathy  No canal stenosis  There is left greater than right severe bilateral foraminal stenosis     IMPRESSION:     Degenerative changes without high-grade canal stenosis, as detailed  There is severe left and moderate to severe right foraminal stenosis at level L4-5; and left greater than right severe bilateral foraminal stenosis at level L5-S1      Orders Placed This Encounter   Procedures    FL spine and pain procedure

## 2020-01-20 ENCOUNTER — HOSPITAL ENCOUNTER (OUTPATIENT)
Dept: RADIOLOGY | Facility: CLINIC | Age: 75
Discharge: HOME | End: 2020-01-20
Attending: ANESTHESIOLOGY | Admitting: ANESTHESIOLOGY
Payer: MEDICARE

## 2020-01-20 VITALS
HEART RATE: 58 BPM | RESPIRATION RATE: 20 BRPM | TEMPERATURE: 97.9 F | SYSTOLIC BLOOD PRESSURE: 123 MMHG | DIASTOLIC BLOOD PRESSURE: 70 MMHG | OXYGEN SATURATION: 96 %

## 2020-01-20 DIAGNOSIS — M54.16 LUMBAR RADICULOPATHY: ICD-10-CM

## 2020-01-20 DIAGNOSIS — M48.062 SPINAL STENOSIS OF LUMBAR REGION WITH NEUROGENIC CLAUDICATION: ICD-10-CM

## 2020-01-20 PROCEDURE — 64484 NJX AA&/STRD TFRM EPI L/S EA: CPT | Performed by: ANESTHESIOLOGY

## 2020-01-20 PROCEDURE — 64483 NJX AA&/STRD TFRM EPI L/S 1: CPT | Performed by: ANESTHESIOLOGY

## 2020-01-20 RX ORDER — PAPAVERINE HCL 150 MG
20 CAPSULE, EXTENDED RELEASE ORAL ONCE
Status: COMPLETED | OUTPATIENT
Start: 2020-01-20 | End: 2020-01-20

## 2020-01-20 RX ORDER — LIDOCAINE HYDROCHLORIDE 10 MG/ML
5 INJECTION, SOLUTION EPIDURAL; INFILTRATION; INTRACAUDAL; PERINEURAL ONCE
Status: COMPLETED | OUTPATIENT
Start: 2020-01-20 | End: 2020-01-20

## 2020-01-20 RX ADMIN — LIDOCAINE HYDROCHLORIDE 2 ML: 20 INJECTION, SOLUTION EPIDURAL; INFILTRATION; INTRACAUDAL; PERINEURAL at 11:29

## 2020-01-20 RX ADMIN — IOHEXOL 2 ML: 300 INJECTION, SOLUTION INTRAVENOUS at 11:28

## 2020-01-20 RX ADMIN — DEXAMETHASONE SODIUM PHOSPHATE 15 MG: 10 INJECTION, SOLUTION INTRAMUSCULAR; INTRAVENOUS at 11:30

## 2020-01-20 RX ADMIN — LIDOCAINE HYDROCHLORIDE 4 ML: 10 INJECTION, SOLUTION EPIDURAL; INFILTRATION; INTRACAUDAL; PERINEURAL at 11:26

## 2020-01-20 NOTE — DISCHARGE INSTR - LAB
Epidural Steroid Injection   WHAT YOU NEED TO KNOW:   An epidural steroid injection ([place]) is a procedure to inject steroid medicine into the epidural space  The epidural space is between your spinal cord and vertebrae  Steroids reduce inflammation and fluid buildup in your spine that may be causing pain  You may be given pain medicine along with the steroids  ACTIVITY  · Do not drive or operate machinery today  · No strenuous activity today - bending, lifting, etc   · You may resume normal activites starting tomorrow - start slowly and as tolerated  · You may shower today, but no tub baths or hot tubs  · You may have numbness for several hours from the local anesthetic  Please use caution and common sense, especially with weight-bearing activities  CARE OF THE INJECTION SITE  · If you have soreness or pain, apply ice to the area today (20 minutes on/20 minutes off)  · Starting tomorrow, you may use warm, moist heat or ice if needed  · You may have an increase or change in your discomfort for 36-48 hours after your treatment  · Apply ice and continue with any pain medication you have been prescribed  · [facility]ify [facility] if you have any of the following: redness, drainage, swelling, headache, stiff neck or fever above 100°F     SPECIAL INSTRUCTIONS  · Our office will contact you in approximately 7 days for a progress report  MEDICATIONS  · Continue to take all routine medications  · Our office may have instructed you to hold some medications  If you have a problem specifically related to your procedure, please call our office at [phone]  Problems not related to your procedure should be directed to your primary care physician

## 2020-01-20 NOTE — H&P
History of Present Illness: The patient is a 69 y o  male who presents with complaints of low back and left leg pain      Patient Active Problem List   Diagnosis    Biceps tendonitis on right    Complete rotator cuff tear or rupture of right shoulder, not specified as traumatic    NSTEMI (non-ST elevated myocardial infarction) ([facility] )    S/P drug eluting coronary stent placement    H/O non-ST elevation myocardial infarction (NSTEMI)    Essential hypertension    Polyarthralgia    Hematuria, microscopic    H/O tinea cruris    Erectile dysfunction associated with type 2 diabetes mellitus ([facility] )    Anxiety    Type 2 diabetes mellitus ([facility])    Gastroesophageal reflux disease    Bilateral carpal tunnel syndrome    Mild aortic stenosis    Coronary artery disease involving native coronary artery of native heart without angina pectoris    Pain in both hands    Glaucoma    Major depressive disorder, recurrent episode, moderate ([facility])    History of tobacco use    Dental caries    S/P CABG (coronary artery bypass graft)    Trigger finger of all digits of both hands    Polyp of colon    Vaccine for VZV (varicella-zoster virus)    Hypertension associated with stage 2 chronic kidney disease due to type 2 diabetes mellitus ([facility] )    Insomnia    Hyperlipidemia    Acute left-sided back pain    Skin lesion of face    Essential hypertriglyceridemia    Pulmonary nodule seen on imaging study    Skin cancer    Sleep apnea    Testicular hypogonadism    Sciatica of left side    Lumbar radiculopathy    Spinal stenosis of lumbar region with neurogenic claudication       Past Medical History:   Diagnosis Date    AS (aortic stenosis)     Balanitis     Biceps tendonitis on right     CAD (coronary artery disease)     Cancer (HCC)     skin    Complete rotator cuff tear or rupture of right shoulder, not specified as traumatic     Diabetes mellitus (HCC)     Esophageal reflux     High cholesterol     Hypertension     Hypertriglyceridemia     Hypogonadism in male     Myalgia     Myocardial infarction [facility] )     Palpitations     Shoulder pain     Sinus problem     Skin cancer of nose     Sleep apnea     Stroke ([facility] ) 1274    Systolic murmur     recently found    Tinea cruris     Tinea pedis        Past Surgical History:   Procedure Laterality Date    CATARACT EXTRACTION Left     COLONOSCOPY      CORONARY ANGIOPLASTY WITH STENT PLACEMENT      10-15-19 - pt denies stent implant    CORONARY ARTERY BYPASS GRAFT N/A 12/12/2016    Procedure: INTRAOP CECILLE; BILATERAL LEG EVH; CORONARY ARTERY BYPASS GRAFT X 4 SVG TO PDA, OM1,  AND DIAGONAL1, LIMA TO LAD;  Surgeon: Dinorah Butler MD;  [facility];  Service:     EYE SURGERY      HERNIA REPAIR      OK ARTHROSCOPY SHOULDER SURGICAL BICEPS TENODESIS Right 5/6/2016    Procedure: ARTHROSCOPIC BICEPS TENODESIS;  Surgeon: David Sena MD;  [facility];  Service: Orthopedics    OK INCISE FINGER TENDON SHEATH Right 10/15/2019    Procedure: TRIGGER FINGER RELEASE INDEX, LONG, RING, SMALL, THUMB FINGERS;  Surgeon: Andrea Lund MD;  [facility];  Service: Orthopedics    OK [address] ARTHROSCOP,SURG,W/ROTAT CUFF REPR Right 5/6/2016    Procedure: REPAIR ROTATOR CUFF  ARTHROSCOPIC; SUBACROMIAL DECOMPRESSION; PARTIAL SYNOVECTOMY;  Surgeon: David Sena MD;  [facility];  Service: Orthopedics    OK WRIST Monica Reek LIG Right 10/15/2019    Procedure: ENDOSCOPIC CARPAL TUNNEL RELEASE;  Surgeon: Andrea Lund MD;  [facility];  Service: Orthopedics    [address] [place] [address]  2009    over age 13         Current Outpatient Medications:     Alcohol Swabs (ALCOHOL PADS) 70 % PADS, USE TWICE DAILY, Disp: 100 each, Rfl: 3    aspirin (ECOTRIN LOW STRENGTH) 81 mg EC tablet, TAKE 1 TABLET (81 MG TOTAL) BY MOUTH DAILY FOR 90 DAYS, Disp: , Rfl: 3    aspirin (ECOTRIN LOW STRENGTH) 81 mg EC tablet, TAKE 1 TABLET (81 MG TOTAL) BY MOUTH DAILY, Disp: 90 tablet, Rfl: 0    clindamycin (CLEOCIN) 300 MG capsule, TAKE 2 CAPSULES BY MOUTH 1 HOUR PRIOR TO PROCEDURE, Disp: , Rfl: 0    clorazepate (TRANXENE) 7 5 mg tablet, Take 7 5 mg by mouth 2 (two) times a day , Disp: , Rfl: 0    clotrimazole-betamethasone (LOTRISONE) 1-0 05 % cream, APPLY TOPICALLY 2 (TWO) TIMES A DAY, Disp: 30 g, Rfl: 0    clotrimazole-betamethasone (LOTRISONE) 1-0 05 % lotion, Apply 1 application topically 2 (two) times a day, Disp: , Rfl: 1    clotrimazole-betamethasone (LOTRISONE) 1-0 05 % lotion, APPLY TOPICALLY 2 (TWO) TIMES A DAY, Disp: 30 mL, Rfl: 0    diclofenac sodium (VOLTAREN) 1 %, APPLY 2 G TOPICALLY 4 (FOUR) TIMES A DAY, Disp: 100 g, Rfl: 3    DULoxetine (CYMBALTA) 30 mg delayed release capsule, Take 30 mg by mouth every morning, Disp: , Rfl: 0    ferrous sulfate 324 (65 Fe) mg, Take 324 mg by mouth every morning, Disp: , Rfl: 0    fluticasone (FLONASE) 50 mcg/act nasal spray, USE 1 SPRAY INTO EACH NOSTRIL DAILY, Disp: 16 g, Rfl: 0    gabapentin (NEURONTIN) 300 mg capsule, Take 1 capsule (300 mg total) by mouth 3 (three) times a day, Disp: 90 capsule, Rfl: 1    glimepiride (AMARYL) 4 mg tablet, TAKE 1/2 TABLET BY MOUTH IN THE MORNING AND 1 TABLET AT NIGHT (Patient taking differently: 4 mg daily TAKE 1/2 TABLET BY MOUTH IN THE MORNING AND 1 TABLET AT NIGHT), Disp: 180 tablet, Rfl: 2    glucose blood (FREESTYLE TEST STRIPS) test strip, Use as instructed to check BS BID PRN (hypoglycemia/hyperglycemia symptoms), Disp: 180 each, Rfl: 2    glucose blood (GLUCOSE METER TEST) test strip, 1 each by Other route 2 (two) times a day Use as instructed, Disp: 100 each, Rfl: 5    glucose monitoring kit (FREESTYLE) monitoring kit, 1 each by Does not apply route 2 (two) times a day Check BS BID PRN for hypoglycemia/hyperglycemia, Disp: 1 each, Rfl: 0    hydrocortisone 2 5 % cream, APPLY TOPICALLY 3 (THREE) TIMES A DAY, Disp: 28 g, Rfl: 0    Lancets (FREESTYLE) lancets, Use as instructed to check BS BID PRN (hypoglycemia/hyperglycemia symptoms), Disp: 180 each, Rfl: 2    lisinopril (ZESTRIL) 20 mg tablet, Take 1 tablet (20 mg total) by mouth daily, Disp: 90 tablet, Rfl: 3    MAPAP ARTHRITIS PAIN 650 MG CR tablet, TAKE 1 TABLET (650 MG TOTAL) BY MOUTH EVERY 8 (EIGHT) HOURS FOR 5 DAYS, Disp: 15 tablet, Rfl: 0    metFORMIN (GLUCOPHAGE) 500 mg tablet, TAKE 1 TABLET (500 MG TOTAL) BY MOUTH 2 (TWO) TIMES A DAY WITH MEALS  DAYS, Disp: 180 tablet, Rfl: 2    metoprolol tartrate (LOPRESSOR) 25 mg tablet, Take 1 tablet (25 mg total) by mouth every 12 (twelve) hours for 270 days, Disp: 180 tablet, Rfl: 2    mirtazapine (REMERON) 15 mg tablet, TAKE 1 TABLET (15 MG TOTAL) BY MOUTH DAILY AT BEDTIME  DAYS, Disp: , Rfl: 2    mirtazapine (REMERON) 15 mg tablet, TAKE 1 TABLET (15 MG TOTAL) BY MOUTH DAILY AT BEDTIME  DAYS, Disp: 90 tablet, Rfl: 0    naproxen sodium (ALEVE) 220 MG tablet, Take 1 tablet (220 mg total) by mouth 2 (two) times a day as needed for mild pain for up to 5 days, Disp: 10 tablet, Rfl: 0    neomycin-polymyxin-dexamethasone (MAXITROL) ophthalmic suspension, INSTILL 1 DROP INTO EACH EYE FOUR TIMES A DAY AFTER SURGERY , Disp: , Rfl: 2    NIFEdipine ER (ADALAT CC) 30 MG 24 hr tablet, Take 1 tablet (30 mg total) by mouth 2 (two) times a day, Disp: 60 tablet, Rfl: 11    omeprazole (PriLOSEC) 20 mg delayed release capsule, Take 1 capsule (20 mg total) by mouth daily, Disp: 90 capsule, Rfl: 1    ONETOUCH DELICA LANCETS FINE MISC, Test blood sugar twice daily, or as needed when symptomatic of hypoglycemia or hyperglycemia, Disp: 100 each, Rfl: 5    rosuvastatin (CRESTOR) 40 MG tablet, Take 1 tablet (40 mg total) by mouth daily, Disp: 90 tablet, Rfl: 3    tobramycin-dexamethasone (TOBRADEX) ophthalmic suspension, ADMINISTER 1 DROP TO BOTH EYES 2 (TWO) TIMES A DAY, Disp: 5 mL, Rfl: 0    Allergies   Allergen Reactions    Epinephrine Hives and Anxiety    Penicillins Hives and Anxiety       Physical Exam:   Vitals:    01/20/20 1102   BP: 123/70   Pulse: 59   Resp: 20   Temp: 97 9 °F (36 6 °C)   SpO2: 96%     General: Awake, Alert, Oriented x 3, Mood and affect appropriate  Respiratory: Respirations even and unlabored  Cardiovascular: Peripheral pulses intact; no edema  Musculoskeletal Exam:  Left lumbar paraspinals tender to palpation  ASA Score: 3    Patient/Chart Verification  Patient ID Verified: Verbal  ID Band Applied: No  H&P( within 30 days) Verified: To be obtained in the Pre-Procedure area  Interval H&P(within 24 hr) Complete (required for Outpatients and Surgery Admit only): To be obtained in the Pre-Procedure area  Allergies Reviewed: Yes  Anticoag/NSAID held?: No  Currently on antibiotics?: No    Assessment:   1  Lumbar radiculopathy    2   Spinal stenosis of lumbar region with neurogenic claudication        Plan: Left L5 and S1 TFESI

## 2020-01-23 RX ORDER — CLOTRIMAZOLE AND BETAMETHASONE DIPROPIONATE 10; .64 MG/G; MG/G
CREAM TOPICAL 2 TIMES DAILY
Qty: 30 G | Refills: 0 | Status: SHIPPED | OUTPATIENT
Start: 2020-01-23 | End: 2020-02-24

## 2020-01-25 DIAGNOSIS — Z86.19 H/O TINEA CRURIS: Chronic | ICD-10-CM

## 2020-01-27 ENCOUNTER — OFFICE VISIT (OUTPATIENT)
Dept: FAMILY MEDICINE CLINIC | Facility: CLINIC | Age: 75
End: 2020-01-27

## 2020-01-27 ENCOUNTER — TELEPHONE (OUTPATIENT)
Dept: PAIN MEDICINE | Facility: CLINIC | Age: 75
End: 2020-01-27

## 2020-01-27 VITALS
HEART RATE: 72 BPM | RESPIRATION RATE: 16 BRPM | HEIGHT: 67 IN | TEMPERATURE: 97.9 F | WEIGHT: 161.4 LBS | SYSTOLIC BLOOD PRESSURE: 118 MMHG | BODY MASS INDEX: 25.33 KG/M2 | DIASTOLIC BLOOD PRESSURE: 70 MMHG

## 2020-01-27 DIAGNOSIS — E11.9 TYPE 2 DIABETES MELLITUS WITHOUT COMPLICATION, WITHOUT LONG-TERM CURRENT USE OF INSULIN (HCC): ICD-10-CM

## 2020-01-27 DIAGNOSIS — Z01.818 PREOPERATIVE EXAMINATION: ICD-10-CM

## 2020-01-27 DIAGNOSIS — H04.553 NASOLACRIMAL DUCT OBSTRUCTION, ACQUIRED, BILATERAL: Primary | ICD-10-CM

## 2020-01-27 LAB — SL AMB POCT HEMOGLOBIN AIC: 8 (ref ?–6.5)

## 2020-01-27 PROCEDURE — 99213 OFFICE O/P EST LOW 20 MIN: CPT | Performed by: FAMILY MEDICINE

## 2020-01-27 PROCEDURE — 83036 HEMOGLOBIN GLYCOSYLATED A1C: CPT | Performed by: FAMILY MEDICINE

## 2020-01-27 NOTE — PROGRESS NOTES
[address] [place] [address]  1945    Darya Galvan is a 69 y o  male with chronic nasolacrimal obstruction OU who is planning to undergo dacryocystorhinostomy on 2/3/20 (OS) & 2/17/20 (OD) under general anesthesia by Dr Yenni Mckeon at [facility]  Patient has not had complications with anesthesia in the past     ROS:   Chest pain: no   Shortness of breath: no  Shortness of breath with exertion: no  Orthopnea: no, uses 1 pillow only  Dizziness: no  Unexplained weight change: no    Wt Readings from Last 3 Encounters:   01/27/20 73 2 kg (161 lb 6 4 oz)   01/17/20 73 5 kg (162 lb)   12/19/19 75 1 kg (165 lb 9 6 oz)     PMH:  CAD: yes, MI in 2006 s/p cath with EYAL to LAD, MI in 2012 with stent to RCA, and NSTEMI 2016 with multivessel disease, s/p CABG (4 vessel bypass with LIMA to the LAD, vein grafts to the diagonal, OM1, PDA); echo 6/28/19 showed low-normal LV function with inferior wall motion abnormality, G2DD, and mild aortic stenosis; follows with Dr Dick Paula (Cardiology), taking lopressor 25 mg BID, crestor 40 mg QD  Currently asymptomatic  HTN: yes, taking lisinopril and nifedipine ER   CKD: no  DM: yes, on insulin: no (taking metformin and amaryl only, due for HbA1C today)  History of CVA: no    He  reports that he has quit smoking  He has never used smokeless tobacco  He reports that he does not drink alcohol or use drugs  /70 (BP Location: Left arm, Patient Position: Sitting, Cuff Size: Large)   Pulse 72   Temp 97 9 °F (36 6 °C) (Tympanic)   Resp 16   Ht 5' 7" (1 702 m)   Wt 73 2 kg (161 lb 6 4 oz)   BMI 25 28 kg/m²   Physical Exam   Constitutional: He appears well-developed and well-nourished  HENT:   Head: Normocephalic and atraumatic  Neck: Normal range of motion  Neck supple  Cardiovascular: Normal rate, regular rhythm and intact distal pulses  Murmur heard  Pulmonary/Chest: Effort normal and breath sounds normal    Abdominal: Soft   Bowel sounds are normal    Musculoskeletal:   Mild tenderness of L>R lumbar paravertebral musculature without hypertonicity, similar to previous exams   Neurological:   Motor strength 5/5 B/L UE/LE, normal sensation    Skin: Skin is warm and dry  Capillary refill takes less than 2 seconds  Vitals reviewed  Revised Cardiac Risk Index (RCRI) for Pre-Operative Risk   (estimates risk of cardiac complications after noncardiac surgery)    · High-risk surgery: No 0 / Yes +1  ? Intraperitoneal, intrathoracic, suprainguinal vascular  · History of ischemic heart disease: No 0 / Yes +1  ? Hx of MI, (+) exercise test, current chest pain considered due to myocardial ischemia, use of nitrate therapy or ECG with pathological Q waves)  · History of CHF: No 0 / Yes +1  ? Pulmonary edema, B/L rales or S3 gallop; MOORE, orthopnea, PND, CXR showing pulmonary vascular redistribution)  · History of cerebrovascular disease: No 0 / Yes +1  ? Prior TIA or stroke  · Pre-operative treatment with insulin: No 0 / Yes +1  · Pre-operative creatinine >2 mg/dL: No 0 / Yes +1     RCRI Scoring:  · 2 points: Class III Risk, 10 1% 30-day risk of death, non-fatal MI, or cardiac arrest        Lab Results   Component Value Date    CREATININE 0 91 07/18/2019     POCT HbA1c: 8 0%    Sally Mas was seen today for pre-op exam     Diagnoses and all orders for this visit:    Nasolacrimal duct obstruction, acquired, bilateral    Preoperative examination    Type 2 diabetes mellitus without complication, without long-term current use of insulin ([facility] )  -     POCT hemoglobin A1c      Recommendations:  Antonella Clifford is undergoing 2 elective Low Risk surgeries, dacryocystorhinostomy on 2/3/20 (OS) & 2/17/20 (OD) under general anesthesia by Dr Selwyn Wilson at [facility]  He is RCRI class III risk (2 points for CAD/HFpEF) with 10 1% 30-day risk of death, non-fatal MI, or cardiac arrest  Due to his extensive cardiac history, he is intermediate risk for this low-risk surgery       EKG 10/2019 unchanged from previous  As NT-proBNP recommended due to age>65 and RCRI>2 per 2016 [facility] Perioperative guidelines, it was ordered and obtained at last pre-operative visit  Although NT pro-BNP slightly above 300 (414) on 10/2019, EKG and troponin monitoring in the post-operative period likely not medically necessary, given low-risk surgery, and lack of previous NT pro-BNP value available for comparison  Suspect that [phone] likely represents patient's baseline value when not in exacerbation from his HFpEF/G2DD, as patient asymptomatic without any evidence of fluid overload on exam  Accordingly, he may proceed with surgery as planned without further workup at this time      I personally spoke on 1/28/20 with the surgical team at [facility] and confirmed the details of the surgeries, and that a second pre-operative visit was not needed prior to the second surgery 2/17/20   Pre-operative form completed and faxed today to office at [phone] as requested    Discussed with MARIBEL Mensah  PGY-3  Flex Frank

## 2020-01-30 DIAGNOSIS — M54.32 SCIATICA OF LEFT SIDE: ICD-10-CM

## 2020-02-04 ENCOUNTER — ANESTHESIA EVENT (OUTPATIENT)
Dept: PERIOP | Facility: HOSPITAL | Age: 75
End: 2020-02-04
Payer: MEDICARE

## 2020-02-04 RX ORDER — CLOTRIMAZOLE AND BETAMETHASONE DIPROPIONATE 10; .5 MG/ML; MG/ML
LOTION TOPICAL
Qty: 30 ML | Refills: 0 | Status: SHIPPED | OUTPATIENT
Start: 2020-02-04 | End: 2020-03-02

## 2020-02-07 ENCOUNTER — APPOINTMENT (OUTPATIENT)
Dept: LAB | Facility: HOSPITAL | Age: 75
End: 2020-02-07
Attending: ORTHOPAEDIC SURGERY
Payer: MEDICARE

## 2020-02-07 DIAGNOSIS — M65.341 TRIGGER FINGER OF ALL DIGITS OF BOTH HANDS: ICD-10-CM

## 2020-02-07 DIAGNOSIS — M65.352 TRIGGER FINGER OF ALL DIGITS OF BOTH HANDS: ICD-10-CM

## 2020-02-07 DIAGNOSIS — M65.331 TRIGGER FINGER OF ALL DIGITS OF BOTH HANDS: ICD-10-CM

## 2020-02-07 DIAGNOSIS — M65.311 TRIGGER FINGER OF ALL DIGITS OF BOTH HANDS: ICD-10-CM

## 2020-02-07 DIAGNOSIS — M65.351 TRIGGER FINGER OF ALL DIGITS OF BOTH HANDS: ICD-10-CM

## 2020-02-07 DIAGNOSIS — M65.342 TRIGGER FINGER OF ALL DIGITS OF BOTH HANDS: ICD-10-CM

## 2020-02-07 DIAGNOSIS — M65.332 TRIGGER FINGER OF ALL DIGITS OF BOTH HANDS: ICD-10-CM

## 2020-02-07 DIAGNOSIS — G56.03 BILATERAL CARPAL TUNNEL SYNDROME: ICD-10-CM

## 2020-02-07 DIAGNOSIS — M65.321 TRIGGER FINGER OF ALL DIGITS OF BOTH HANDS: ICD-10-CM

## 2020-02-07 DIAGNOSIS — Z01.812 PRE-OPERATIVE LABORATORY EXAMINATION: ICD-10-CM

## 2020-02-07 DIAGNOSIS — M65.312 TRIGGER FINGER OF ALL DIGITS OF BOTH HANDS: ICD-10-CM

## 2020-02-07 DIAGNOSIS — M65.322 TRIGGER FINGER OF ALL DIGITS OF BOTH HANDS: ICD-10-CM

## 2020-02-07 LAB
ALBUMIN SERPL BCP-MCNC: 3.6 G/DL (ref 3.5–5)
ALP SERPL-CCNC: 65 U/L (ref 46–116)
ALT SERPL W P-5'-P-CCNC: 27 U/L (ref 12–78)
ANION GAP SERPL CALCULATED.3IONS-SCNC: 3 MMOL/L (ref 4–13)
AST SERPL W P-5'-P-CCNC: 15 U/L (ref 5–45)
BASOPHILS # BLD AUTO: 0.06 THOUSANDS/ΜL (ref 0–0.1)
BASOPHILS NFR BLD AUTO: 1 % (ref 0–1)
BILIRUB SERPL-MCNC: 0.6 MG/DL (ref 0.2–1)
BUN SERPL-MCNC: 16 MG/DL (ref 5–25)
CALCIUM SERPL-MCNC: 9.1 MG/DL (ref 8.3–10.1)
CHLORIDE SERPL-SCNC: 103 MMOL/L (ref 100–108)
CO2 SERPL-SCNC: 29 MMOL/L (ref 21–32)
CREAT SERPL-MCNC: 0.91 MG/DL (ref 0.6–1.3)
EOSINOPHIL # BLD AUTO: 0.18 THOUSAND/ΜL (ref 0–0.61)
EOSINOPHIL NFR BLD AUTO: 2 % (ref 0–6)
ERYTHROCYTE [DISTWIDTH] IN BLOOD BY AUTOMATED COUNT: 12.7 % (ref 11.6–15.1)
GFR SERPL CREATININE-BSD FRML MDRD: 84 ML/MIN/1.73SQ M
GLUCOSE P FAST SERPL-MCNC: 153 MG/DL (ref 65–99)
HCT VFR BLD AUTO: 41.6 % (ref 36.5–49.3)
HGB BLD-MCNC: 12.9 G/DL (ref 12–17)
IMM GRANULOCYTES # BLD AUTO: 0.04 THOUSAND/UL (ref 0–0.2)
IMM GRANULOCYTES NFR BLD AUTO: 1 % (ref 0–2)
LYMPHOCYTES # BLD AUTO: 2.6 THOUSANDS/ΜL (ref 0.6–4.47)
LYMPHOCYTES NFR BLD AUTO: 32 % (ref 14–44)
MCH RBC QN AUTO: 27.7 PG (ref 26.8–34.3)
MCHC RBC AUTO-ENTMCNC: 31 G/DL (ref 31.4–37.4)
MCV RBC AUTO: 90 FL (ref 82–98)
MONOCYTES # BLD AUTO: 0.82 THOUSAND/ΜL (ref 0.17–1.22)
MONOCYTES NFR BLD AUTO: 10 % (ref 4–12)
NEUTROPHILS # BLD AUTO: 4.45 THOUSANDS/ΜL (ref 1.85–7.62)
NEUTS SEG NFR BLD AUTO: 54 % (ref 43–75)
NRBC BLD AUTO-RTO: 0 /100 WBCS
PLATELET # BLD AUTO: 188 THOUSANDS/UL (ref 149–390)
PMV BLD AUTO: 10.7 FL (ref 8.9–12.7)
POTASSIUM SERPL-SCNC: 4.3 MMOL/L (ref 3.5–5.3)
PROT SERPL-MCNC: 7.5 G/DL (ref 6.4–8.2)
RBC # BLD AUTO: 4.65 MILLION/UL (ref 3.88–5.62)
SODIUM SERPL-SCNC: 135 MMOL/L (ref 136–145)
WBC # BLD AUTO: 8.15 THOUSAND/UL (ref 4.31–10.16)

## 2020-02-07 PROCEDURE — 36415 COLL VENOUS BLD VENIPUNCTURE: CPT

## 2020-02-07 PROCEDURE — 85025 COMPLETE CBC W/AUTO DIFF WBC: CPT

## 2020-02-07 PROCEDURE — 80053 COMPREHEN METABOLIC PANEL: CPT

## 2020-02-11 NOTE — PRE-PROCEDURE INSTRUCTIONS
Pre-Surgery Instructions:   Medication Instructions    clorazepate (TRANXENE) 7 5 mg tablet Instructed patient per Anesthesia Guidelines   clotrimazole-betamethasone (LOTRISONE) 1-0 05 % lotion Instructed patient per Anesthesia Guidelines   diclofenac sodium (VOLTAREN) 1 % Instructed patient per Anesthesia Guidelines   DULoxetine (CYMBALTA) 30 mg delayed release capsule Instructed patient per Anesthesia Guidelines   ferrous sulfate 324 (65 Fe) mg Instructed patient per Anesthesia Guidelines   fluticasone (FLONASE) 50 mcg/act nasal spray Instructed patient per Anesthesia Guidelines   glimepiride (AMARYL) 4 mg tablet Instructed patient per Anesthesia Guidelines   hydrocortisone 2 5 % cream Instructed patient per Anesthesia Guidelines   lisinopril (ZESTRIL) 20 mg tablet Instructed patient per Anesthesia Guidelines   MAPAP ARTHRITIS PAIN 650 MG CR tablet Instructed patient per Anesthesia Guidelines   metFORMIN (GLUCOPHAGE) 500 mg tablet Instructed patient per Anesthesia Guidelines   metoprolol tartrate (LOPRESSOR) 25 mg tablet Instructed patient per Anesthesia Guidelines   mirtazapine (REMERON) 15 mg tablet Instructed patient per Anesthesia Guidelines   NIFEdipine ER (ADALAT CC) 30 MG 24 hr tablet Instructed patient per Anesthesia Guidelines   omeprazole (PriLOSEC) 20 mg delayed release capsule Instructed patient per Anesthesia Guidelines   rosuvastatin (CRESTOR) 40 MG tablet Instructed patient per Anesthesia Guidelines   tobramycin-dexamethasone (TOBRADEX) ophthalmic suspension Instructed patient per Anesthesia Guidelines  Review with patient in [facility] office medications and showering instructions  Says the surgeon give him instructions about aspirin  Advice don't take Lisinopril,Metformin,glimepiride the morning of the day of surgery  Advice ASC call and [facility] phone number  Verbalized understanding      ACE/ARB Med Class   Continue this medication up to the evening before surgery/procedure, but do not take the morning of the day of surgery  Acetaminophen Med Class   Continue to take this medication on your normal schedule  If this is an oral medication and you take it in the morning, then you may take this medicine with a sip of water  Antidepressant Med Class   Continue to take this medication on your normal schedule  If this is an oral medication and you take it in the morning, then you may take this medicine with a sip of water  Benzodiazepine antagonist Med Class   If this medication is needed please continue to take on your normal schedule  If you take it in the morning, then you may take this medicine with a sip of water  ASA Med Class: Aspirin   Should be discontinued at least one week prior to planned operation, unless specifically stated otherwise by surgical service  Your Surgeon may have patient stop taking aspirin up to a week before surgery if having intracranial, middle ear, posterior eye, spine surgery or prostate surgery  [Patients taking aspirin for coronary stents should be reviewed by an anesthesiologist in the optimization clinic  Please do not discontinue aspirin in patients with coronary stents unless given specific permission to do so by the cardiologist who prescribed medication ]   If your surgeon approves please continue to take this medication on your normal schedule  You may take this medication on the morning of your surgery with a sip of water  Beta blocker Med Class   Continue to take this heart medication on your normal schedule  If this is an oral medication and you take it in the morning, then you may take this medicine with a sip of water  Calcium Channel Blocker Med Class   Continue to take this heart medication on your normal schedule  If this is an oral medication and you take it in the morning, then you may take this medicine with a sip of water      Insulin Med Class     Pre-Surgery/Procedure Instructions for Adult Patients who Take Medicine for Diabetes or to Control their Blood Sugar     Day Before Surgery/Procedure  Use the directions based on the type of medicine you take for your diabetes  1  If you are having a procedure that does not require a bowel prep:  ? Pre-Mixed Insulin (Intermediate Acting: Humalog 75/25, Humulin 70/30  Novolog 70/30, Regular Insulin)  § Take ½ your regular dose the evening before your procedure  ? Rapid/Fast Acting Insulin/Long Acting Insulin (Humalog U200, NovoLog, Apidra, Lantus, Levemir, Michaeline Duran, [place])  § Take your FULL regular dose the day before procedure  ? Oral Diabetic Medicines including Glipizide/Glimepiride/Glucotrol (sulfonylurea)  § Take your regular dose with dinner the evening before your procedure  2  If you are having a procedure (e g  Colonoscopy) that requires a bowel prep and you are allowed to have at least a clear liquid diet:  ? Pre-Mixed Insulin (Intermediate Acting: Humalog 75/25, Humulin 70/30, Novolog 70/30, Regular Insulin)  § Take ½ your regular dose the evening before your procedure  ? Rapid/Fast Acting Insulin (Humalog U200, NovoLog, Apidra, Fiasp)  § Take ½ your regular dose the evening before your procedure  ? Long Acting Insulin (Lantus, Levemir, Michaeline Duran)  § Take your FULL regular dose the day before procedure  ? Oral Glipizide/Glimepiride/Glucotrol (sulfonylurea)  § Take ½ your regular dose the evening before your procedure  ? Oral Diabetic Medicines that are NOT Glipizide/Glimepiride/Glucotrol  § Take your regular dose with dinner in the evening before your procedure      Day of Surgery/Procedure  · Long Acting Insulin (Lantus, Levemir, Michaeline Duran)  ? If you usually take your Long-Acting Insulin in the morning, take the full dose as scheduled    · With the exception of the morning Long-Acting Insulin noted above, DO NOT take ANY diabetic medicine on the day of your procedure unless you were instructed by the doctor who manages your diabetic medicines  · Continue to check your blood sugars  · If you have an insulin pump then consult with your Endocrinologist for instructions  · If you cannot see your Endocrinologist, on the day of the procedure set your insulin pump to your basal rate only  Please bring your insulin pump supplies to the hospital      This Educational material has been approved by the Patient Education Advisory Committee  Date prepared: 1/17/2018          Expiration date: 1/17/2019        Approval Number:                   Statin Med Class   Continue to take this medication on your normal schedule  If this is an oral medication and you take it in the morning, then you may take this medicine with a sip of water

## 2020-02-17 DIAGNOSIS — Z86.19 H/O TINEA CRURIS: Chronic | ICD-10-CM

## 2020-02-24 DIAGNOSIS — G47.00 INSOMNIA, UNSPECIFIED TYPE: ICD-10-CM

## 2020-02-24 RX ORDER — CLOTRIMAZOLE AND BETAMETHASONE DIPROPIONATE 10; .64 MG/G; MG/G
CREAM TOPICAL 2 TIMES DAILY
Qty: 30 G | Refills: 0 | Status: SHIPPED | OUTPATIENT
Start: 2020-02-24 | End: 2020-03-07

## 2020-02-24 RX ORDER — MIRTAZAPINE 15 MG/1
TABLET, FILM COATED ORAL
Qty: 90 TABLET | Refills: 0 | Status: SHIPPED | OUTPATIENT
Start: 2020-02-24 | End: 2020-05-07

## 2020-02-26 ENCOUNTER — TELEPHONE (OUTPATIENT)
Dept: FAMILY MEDICINE CLINIC | Facility: CLINIC | Age: 75
End: 2020-02-26

## 2020-02-28 DIAGNOSIS — K21.9 GASTROESOPHAGEAL REFLUX DISEASE, ESOPHAGITIS PRESENCE NOT SPECIFIED: ICD-10-CM

## 2020-03-02 DIAGNOSIS — Z86.19 H/O TINEA CRURIS: Chronic | ICD-10-CM

## 2020-03-02 DIAGNOSIS — M54.32 SCIATICA OF LEFT SIDE: ICD-10-CM

## 2020-03-02 RX ORDER — OMEPRAZOLE 20 MG/1
20 CAPSULE, DELAYED RELEASE ORAL DAILY
Qty: 90 CAPSULE | Refills: 1 | Status: SHIPPED | OUTPATIENT
Start: 2020-03-02 | End: 2020-07-02

## 2020-03-02 RX ORDER — CLOTRIMAZOLE AND BETAMETHASONE DIPROPIONATE 10; .5 MG/ML; MG/ML
LOTION TOPICAL
Qty: 30 ML | Refills: 0 | Status: ON HOLD | OUTPATIENT
Start: 2020-03-02 | End: 2020-03-03

## 2020-03-03 ENCOUNTER — ANESTHESIA (OUTPATIENT)
Dept: PERIOP | Facility: HOSPITAL | Age: 75
End: 2020-03-03
Payer: MEDICARE

## 2020-03-03 ENCOUNTER — HOSPITAL ENCOUNTER (OUTPATIENT)
Facility: HOSPITAL | Age: 75
Setting detail: OUTPATIENT SURGERY
Discharge: HOME | End: 2020-03-03
Attending: ORTHOPAEDIC SURGERY | Admitting: ORTHOPAEDIC SURGERY
Payer: MEDICARE

## 2020-03-03 VITALS
SYSTOLIC BLOOD PRESSURE: 114 MMHG | OXYGEN SATURATION: 96 % | TEMPERATURE: 95.8 F | DIASTOLIC BLOOD PRESSURE: 54 MMHG | RESPIRATION RATE: 18 BRPM | BODY MASS INDEX: 25.43 KG/M2 | HEART RATE: 55 BPM | HEIGHT: 67 IN | WEIGHT: 162 LBS

## 2020-03-03 DIAGNOSIS — M65.342 TRIGGER FINGER OF ALL DIGITS OF BOTH HANDS: ICD-10-CM

## 2020-03-03 DIAGNOSIS — M65.332 TRIGGER FINGER OF ALL DIGITS OF BOTH HANDS: ICD-10-CM

## 2020-03-03 DIAGNOSIS — M65.321 TRIGGER FINGER OF ALL DIGITS OF BOTH HANDS: ICD-10-CM

## 2020-03-03 DIAGNOSIS — M65.341 TRIGGER FINGER OF ALL DIGITS OF BOTH HANDS: ICD-10-CM

## 2020-03-03 DIAGNOSIS — M65.331 TRIGGER FINGER OF ALL DIGITS OF BOTH HANDS: ICD-10-CM

## 2020-03-03 DIAGNOSIS — M65.351 TRIGGER FINGER OF ALL DIGITS OF BOTH HANDS: ICD-10-CM

## 2020-03-03 DIAGNOSIS — M65.322 TRIGGER FINGER OF ALL DIGITS OF BOTH HANDS: ICD-10-CM

## 2020-03-03 DIAGNOSIS — M65.352 TRIGGER FINGER OF ALL DIGITS OF BOTH HANDS: ICD-10-CM

## 2020-03-03 DIAGNOSIS — M65.312 TRIGGER FINGER OF ALL DIGITS OF BOTH HANDS: ICD-10-CM

## 2020-03-03 DIAGNOSIS — G56.03 BILATERAL CARPAL TUNNEL SYNDROME: Primary | ICD-10-CM

## 2020-03-03 DIAGNOSIS — M65.311 TRIGGER FINGER OF ALL DIGITS OF BOTH HANDS: ICD-10-CM

## 2020-03-03 LAB — GLUCOSE SERPL-MCNC: 145 MG/DL (ref 65–140)

## 2020-03-03 PROCEDURE — 99024 POSTOP FOLLOW-UP VISIT: CPT | Performed by: ORTHOPAEDIC SURGERY

## 2020-03-03 PROCEDURE — 82948 REAGENT STRIP/BLOOD GLUCOSE: CPT

## 2020-03-03 PROCEDURE — 26055 INCISE FINGER TENDON SHEATH: CPT | Performed by: ORTHOPAEDIC SURGERY

## 2020-03-03 PROCEDURE — 29848 WRIST ENDOSCOPY/SURGERY: CPT | Performed by: ORTHOPAEDIC SURGERY

## 2020-03-03 RX ORDER — FENTANYL CITRATE 50 UG/ML
INJECTION, SOLUTION INTRAMUSCULAR; INTRAVENOUS AS NEEDED
Status: DISCONTINUED | OUTPATIENT
Start: 2020-03-03 | End: 2020-03-03 | Stop reason: SURG

## 2020-03-03 RX ORDER — ONDANSETRON 2 MG/ML
INJECTION INTRAMUSCULAR; INTRAVENOUS AS NEEDED
Status: DISCONTINUED | OUTPATIENT
Start: 2020-03-03 | End: 2020-03-03 | Stop reason: SURG

## 2020-03-03 RX ORDER — NAPROXEN SODIUM 220 MG
220 TABLET ORAL 2 TIMES DAILY WITH MEALS
Qty: 10 TABLET | Refills: 0 | Status: SHIPPED | OUTPATIENT
Start: 2020-03-03 | End: 2020-03-12

## 2020-03-03 RX ORDER — PROPOFOL 10 MG/ML
INJECTION, EMULSION INTRAVENOUS AS NEEDED
Status: DISCONTINUED | OUTPATIENT
Start: 2020-03-03 | End: 2020-03-03 | Stop reason: SURG

## 2020-03-03 RX ORDER — ONDANSETRON 2 MG/ML
4 INJECTION INTRAMUSCULAR; INTRAVENOUS ONCE AS NEEDED
Status: DISCONTINUED | OUTPATIENT
Start: 2020-03-03 | End: 2020-03-03 | Stop reason: HOSPADM

## 2020-03-03 RX ORDER — LIDOCAINE HYDROCHLORIDE 10 MG/ML
INJECTION, SOLUTION EPIDURAL; INFILTRATION; INTRACAUDAL; PERINEURAL AS NEEDED
Status: DISCONTINUED | OUTPATIENT
Start: 2020-03-03 | End: 2020-03-03 | Stop reason: SURG

## 2020-03-03 RX ORDER — HYDROCODONE BITARTRATE AND ACETAMINOPHEN 5; 325 MG/1; MG/1
1 TABLET ORAL ONCE AS NEEDED
Status: DISCONTINUED | OUTPATIENT
Start: 2020-03-03 | End: 2020-03-03 | Stop reason: HOSPADM

## 2020-03-03 RX ORDER — LIDOCAINE HYDROCHLORIDE AND EPINEPHRINE 10; 10 MG/ML; UG/ML
20 INJECTION, SOLUTION INFILTRATION; PERINEURAL ONCE
Status: DISCONTINUED | OUTPATIENT
Start: 2020-03-03 | End: 2020-03-03 | Stop reason: HOSPADM

## 2020-03-03 RX ORDER — SENNOSIDES 8.6 MG
650 CAPSULE ORAL EVERY 8 HOURS
Qty: 15 TABLET | Refills: 0 | Status: SHIPPED | OUTPATIENT
Start: 2020-03-03 | End: 2020-03-08

## 2020-03-03 RX ORDER — PROPOFOL 10 MG/ML
INJECTION, EMULSION INTRAVENOUS CONTINUOUS PRN
Status: DISCONTINUED | OUTPATIENT
Start: 2020-03-03 | End: 2020-03-03 | Stop reason: SURG

## 2020-03-03 RX ORDER — LIDOCAINE HYDROCHLORIDE 10 MG/ML
0.5 INJECTION, SOLUTION EPIDURAL; INFILTRATION; INTRACAUDAL; PERINEURAL ONCE AS NEEDED
Status: COMPLETED | OUTPATIENT
Start: 2020-03-03 | End: 2020-03-03

## 2020-03-03 RX ORDER — GLYCOPYRROLATE 0.2 MG/ML
INJECTION INTRAMUSCULAR; INTRAVENOUS AS NEEDED
Status: DISCONTINUED | OUTPATIENT
Start: 2020-03-03 | End: 2020-03-03 | Stop reason: SURG

## 2020-03-03 RX ORDER — ACETAMINOPHEN 325 MG/1
650 TABLET ORAL EVERY 6 HOURS PRN
COMMUNITY
End: 2020-03-03 | Stop reason: HOSPADM

## 2020-03-03 RX ORDER — SODIUM CHLORIDE, SODIUM LACTATE, POTASSIUM CHLORIDE, CALCIUM CHLORIDE 600; 310; 30; 20 MG/100ML; MG/100ML; MG/100ML; MG/100ML
125 INJECTION, SOLUTION INTRAVENOUS CONTINUOUS
Status: DISCONTINUED | OUTPATIENT
Start: 2020-03-03 | End: 2020-03-03 | Stop reason: HOSPADM

## 2020-03-03 RX ORDER — EPHEDRINE SULFATE 50 MG/ML
INJECTION INTRAVENOUS AS NEEDED
Status: DISCONTINUED | OUTPATIENT
Start: 2020-03-03 | End: 2020-03-03 | Stop reason: SURG

## 2020-03-03 RX ORDER — HYDROCODONE BITARTRATE AND ACETAMINOPHEN 5; 325 MG/1; MG/1
1 TABLET ORAL EVERY 6 HOURS PRN
Qty: 5 TABLET | Refills: 0 | Status: SHIPPED | OUTPATIENT
Start: 2020-03-03 | End: 2020-11-16 | Stop reason: ALTCHOICE

## 2020-03-03 RX ORDER — FENTANYL CITRATE/PF 50 MCG/ML
25 SYRINGE (ML) INJECTION
Status: DISCONTINUED | OUTPATIENT
Start: 2020-03-03 | End: 2020-03-03 | Stop reason: HOSPADM

## 2020-03-03 RX ADMIN — PROPOFOL 40 MG: 10 INJECTION, EMULSION INTRAVENOUS at 10:03

## 2020-03-03 RX ADMIN — LIDOCAINE HYDROCHLORIDE 50 MG: 10 INJECTION, SOLUTION EPIDURAL; INFILTRATION; INTRACAUDAL; PERINEURAL at 10:03

## 2020-03-03 RX ADMIN — PROPOFOL 120 MCG/KG/MIN: 10 INJECTION, EMULSION INTRAVENOUS at 10:03

## 2020-03-03 RX ADMIN — ONDANSETRON 4 MG: 2 INJECTION INTRAMUSCULAR; INTRAVENOUS at 10:03

## 2020-03-03 RX ADMIN — EPHEDRINE SULFATE 10 MG: 50 INJECTION, SOLUTION INTRAVENOUS at 10:17

## 2020-03-03 RX ADMIN — GLYCOPYRROLATE 0.2 MG: 0.2 INJECTION, SOLUTION INTRAMUSCULAR; INTRAVENOUS at 10:03

## 2020-03-03 RX ADMIN — SODIUM CHLORIDE, SODIUM LACTATE, POTASSIUM CHLORIDE, AND CALCIUM CHLORIDE 125 ML/HR: .6; .31; .03; .02 INJECTION, SOLUTION INTRAVENOUS at 08:57

## 2020-03-03 RX ADMIN — LIDOCAINE HYDROCHLORIDE 0.5 ML: 10 INJECTION, SOLUTION EPIDURAL; INFILTRATION; INTRACAUDAL; PERINEURAL at 08:57

## 2020-03-03 RX ADMIN — FENTANYL CITRATE 25 MCG: 50 INJECTION, SOLUTION INTRAMUSCULAR; INTRAVENOUS at 10:06

## 2020-03-03 NOTE — H&P
H&P Exam - Orthopedics   Bobbi Monge 69 y o  male MRN: [id number]  Unit/Bed#: [facility] 01    Assessment/Plan   Assessment:  Left carpal tunnel syndrome and left index finger trigger finger  Plan:  Left endoscopic carpal tunnel release and left index finger trigger finger release    History of Present Illness   HPI:  Bobbi Monge is a 69 y o  male who presents with continued c/o L hand n/t  He also has catching/popping and pain of the L index finger       Historical Information  Review Of Systems:   · Skin: Normal  · Neuro: See HPI  · Musculoskeletal: See HPI  · 14 point review of systems negative except as stated above     Past Medical History:   Past Medical History:   Diagnosis Date    AS (aortic stenosis)     Balanitis     Biceps tendonitis on right     CAD (coronary artery disease)     Cancer (HCC)     skin    Colon polyp     Complete rotator cuff tear or rupture of right shoulder, not specified as traumatic     CPAP (continuous positive airway pressure) dependence     Diabetes mellitus (HCC)     Esophageal reflux     High cholesterol     Hypertension     Hypertriglyceridemia     Hypogonadism in male     Myalgia     Myocardial infarction ([facility] [address] )     Palpitations     Shoulder pain     Sinus problem     Skin cancer of nose     Sleep apnea     Stroke ([facility] [address] ) 7036    Systolic murmur     recently found    Tinea cruris     Tinea pedis        Past Surgical History:   Past Surgical History:   Procedure Laterality Date    CARPAL TUNNEL RELEASE      CATARACT EXTRACTION Bilateral     COLONOSCOPY      CORONARY ANGIOPLASTY WITH STENT PLACEMENT      10-15-19 - pt denies stent implant    CORONARY ARTERY BYPASS GRAFT N/A 12/12/2016    Procedure: INTRAOP CECILLE; BILATERAL LEG EVH; CORONARY ARTERY BYPASS GRAFT X 4 SVG TO PDA, OM1,  AND DIAGONAL1, LIMA TO LAD;  Surgeon: Bianca Avalos MD;  [facility];  Service:    [place] EYE SURGERY      HERNIA REPAIR      IA ARTHROSCOPY SHOULDER SURGICAL BICEPS TENODESIS Right 5/6/2016    Procedure: ARTHROSCOPIC BICEPS TENODESIS;  Surgeon: Charley Cates MD;  [facility];  Service: Orthopedics    TX INCISE FINGER TENDON SHEATH Right 10/15/2019    Procedure: TRIGGER FINGER RELEASE INDEX, LONG, RING, SMALL, THUMB FINGERS;  Surgeon: Adrian Walden MD;  [facility];  Service: Orthopedics    TX SHLDR ARTHROSCOP,SURG,W/ROTAT CUFF REPR Right 5/6/2016    Procedure: REPAIR ROTATOR CUFF  ARTHROSCOPIC; SUBACROMIAL DECOMPRESSION; PARTIAL SYNOVECTOMY;  Surgeon: Charley Cates MD;  [facility];  Service: Orthopedics    TX WRIST [place] LIG Right 10/15/2019    Procedure: ENDOSCOPIC CARPAL TUNNEL RELEASE;  Surgeon: Adrian Walden MD;  [facility];  Service: Orthopedics    [address] [place] [address] SURGERY      UMBILICAL HERNIA REPAIR  2009    over age 13       Family History:  Family history reviewed and non-contributory  Family History   Problem Relation Age of Onset    Heart disease Mother     Hypertension Mother     Nephrolithiasis Father     Other Father         Renal disease    Hypertension Sister     Nephrolithiasis Brother     Other Brother         Renal disease    Hematuria Family         Microscopic       Social History:  Social History     Socioeconomic History    Marital status: /Civil Union     Spouse name: None    Number of children: None    Years of education: None    Highest education level: None   Occupational History    None   Social Needs    Financial resource strain: None    Food insecurity:     Worry: None     Inability: None    Transportation needs:     Medical: None     Non-medical: None   Tobacco Use    Smoking status: Former Smoker    Smokeless tobacco: Never Used    Tobacco comment: smoked for 3 years from 19 y/o - 23 yrs old   Substance and Sexual Activity    Alcohol use: No    Drug use: No    Sexual activity: Yes     Partners: Female   Lifestyle    Physical activity:     Days per week: None     Minutes per session: None    Stress: None   Relationships    Social connections:     Talks on phone: None     Gets together: None     Attends Yazidi service: None     Active member of club or organization: None     Attends meetings of clubs or organizations: None     Relationship status: None    Intimate partner violence:     Fear of current or ex partner: None     Emotionally abused: None     Physically abused: None     Forced sexual activity: None   Other Topics Concern    None   Social History Narrative    Uses Safety Equipment Seatbelts       Allergies:    Allergies   Allergen Reactions    Epinephrine Hives and Anxiety    Penicillins Hives and Anxiety           Labs:  0   Lab Value Date/Time    HCT 41 6 02/07/2020 0820    HCT 40 2 06/28/2019 0643    HCT 41 8 05/28/2019 0648    HCT 40 3 10/21/2015 0728    HCT 39 9 09/22/2014 1304    HGB 12 9 02/07/2020 0820    HGB 12 4 06/28/2019 0643    HGB 13 3 05/28/2019 0648    HGB 13 3 10/21/2015 0728    HGB 14 3 01/01/2015 1632    HGB 12 8 09/22/2014 1304    HGB 13 3 02/18/2014 1127    INR 1 40 (H) 12/12/2016 2137    WBC 8 15 02/07/2020 0820    WBC 6 46 06/28/2019 0643    WBC 7 31 05/28/2019 0648    WBC 6 44 10/21/2015 0728    WBC 7 97 09/22/2014 1304    ESR 7 10/21/2015 0721    CRP <3 0 10/21/2015 0721       Meds:    Current Facility-Administered Medications:     lactated ringers infusion, 125 mL/hr, Intravenous, Continuous, Guillermo Ling MD, Last Rate: 125 mL/hr at 03/03/20 0857, 125 mL/hr at 03/03/20 0857    lidocaine-epinephrine (XYLOCAINE/EPINEPHRINE) 1 %-1:100,000 20 mL, sodium bicarbonate 2 mEq infiltration, , Infiltration, Once, Adrian Walden MD    lidocaine-epinephrine (XYLOCAINE/EPINEPHRINE) 1 %-1:100,000 injection 20 mL, 20 mL, Infiltration, Once, Adrian Walden MD    Blood Culture:   No results found for: BLOODCX    Wound Culture:   No results found for: WOUNDCULT    Ins and Outs:  No intake/output data recorded  Physical Exam  /56   Pulse 55   Temp 98 9 °F (37 2 °C) (Tympanic)   Resp 16   Ht 5' 7" (1 702 m)   Wt 73 5 kg (162 lb)   SpO2 98%   BMI 25 37 kg/m²   /56   Pulse 55   Temp 98 9 °F (37 2 °C) (Tympanic)   Resp 16   Ht 5' 7" (1 702 m)   Wt 73 5 kg (162 lb)   SpO2 98%   BMI 25 37 kg/m²   Gen: Alert and oriented to person, place, time  HEENT: EOMI, eyes clear, moist mucus membranes, hearing intact  Respiratory: Bilateral chest rise  No audible wheezing found  Cardiovascular: Regular Rate and Rhythm  Abdomen: soft nontender/nondistended  Ortho Exam: L index finger tender to palpation with positive crepitation, decreased finger range of motion    Decreased sensation median nerve with positive Tinel's, carpal tunnel compression test, and weakness abductor pollicis brevis  Neuro Exam:  Radial and ulnar nerve intact    Lab Results: Reviewed  Imaging: Reviewed

## 2020-03-03 NOTE — OP NOTE
OPERATIVE REPORT  PATIENT NAME: Jm Fine  :  1945  MRN: [id number]  Pt [facility]: [facility]    SURGERY DATE: 20    Surgeon(s) and Role:     * Jona Nguyễn MD - Primary     * Ryland Bryant MD - Assisting    Pre-Op Diagnosis:  Left carpal tunnel syndrome  Left index finger trigger finger    Post-Op Diagnosis Codes:  Left carpal tunnel syndrome  Left index finger trigger finger    Procedure(s):  RELEASE CARPAL TUNNEL ENDOSCOPIC (Left)  RELEASE TRIGGER FINGER INDEX (Left)    Specimen(s):  * No orders in the log *    Estimated Blood Loss:   5 mL      Anesthesia Type:   IV Sedation with Anesthesia    Operative Indications: The patient has a history of Carpal Tunnel Syndrome  left and Trigger Finger  left  index finger that was recalcitrant to conservative management  The decision was made to bring the patient to the operating room for Endoscopic Carpal Tunnel Release  left and Trigger Finger Release  left  index finger  Risks of the procedure were explained which include, but are not limited to bleeding; infection; damage to nerves, arteries,veins, tendons; scar; pain; need for reoperation; failure to give desired result; and risks of anaesthesia  All questions were answered to satisfaction and they were willing to proceed  Operative Findings:  Left carpal tunnel syndrome  Left index finger trigger finger    Complications:   None    Procedure and Technique:  After the patient, site, and procedure were identified, the patient was brought into the operating room in a supine position  Local anaesthesia and sedation were provided  A tourniquet was not used  The  left upper extremity was then prepped and drapped in a normal, sterile, orthopedic fashion  After reconfirmation of the patient, site, and surgical procedure, which was agreed upon by the entire surgical team, attention was turned to the left wrist   The sites of the proximal and distal incisions were marked    The geoff of the proximal incision was placed horizontally at the midline of the wrist   The distal incision geoff was longitudinal extending distally from the point of intersection of the line between the long finger and ring finger and the line along the distal border of the fully abducted thumb  The proximal incision was performed  Subcutaneous tissues were dissected  Then the transverse volar antebrachial fascia was perforated with a scalpel  The edges of the skin incision where retracted and the forearm fascia was incised for approximately 1 5 cm proximally with care taken to identify and protect the median nerve  Retractors were used to inspect the transverse carpal ligament distally  A curved Escobar dissector was used to glide under the transverse carpal ligament and superficial to the median nerve with confirmation via the washboard feeling  Then the curved Escobar was pushed into the palm toward the distal incision site  When the location of the distal skin geoff was adequate, the distal incision was made  Then with retraction of the skin, further dissection and perforation of the palmar fascia was performed with the use of tenotomy scissors  The curved Escobar was guided from proximal to distal out the distal incisions without any twisting to allow for dilation of the tract  The curved Escobar was removed, and the cannula for the camera was inserted along the same tract, making sure to keep the alignment post on the cannula perpendicular to the plane of the hand without twisting  Then while keeping the wrist in extension, and holding the cannula of the camera in place, the wrist was placed on the hyperextension board  The scope was inserted distally, and a cotton-tip applicator was used proximally to clean the tract as well as the scope  A curved cutting knife was introduced from proximal to distal while keeping visualization with the use of the camera    Without twisting of the canula, the knife was used to cut the transverse carpal ligament completely, making sure there were no remnant fibers  Then after this was accomplished, the hand was removed from the extension block  Three maneuvers were used to confirm the full release of the transverse carpal ligament  First, the ease of twisting the trocar of the camera confirmed the release of the ligament  Second, the curved Escobar was introduced to make sure there were no remnant fibers that could be felt palmarly  Third, the scope was introduced again to visualize that the whole ligament was released proximally to distally  Additional confirmation of full release included retraction and inspection in the distal and proximal incisions to make sure there were no remnant fibers distally or proximally respectively  After the patient, site, and procedure were once again identified, attention was turned to the left index finger  An incision was made over the flexor tendon sheath at the level of the A1 pulley  Dissection was carried out in-line with the flexor tendon sheath and the radial and ulnar digital artery and nerve were protected  The A1 pulley was identified at the base of the incision  Under direct visualization, the A1 pulley was divided along the midline in its entirety with care taken to avoid injury to the underlying tendon  The tendons were examined to ensure that no further catching, popping, clicking or locking occurred with motion of the finger  At the completion of the procedure, hemostasis was obtained with cautery and direct pressure  The wounds were copiously irrigated with sterile solution  The wounds were closed with Prolene  Sterile dressings were applied, including Xeroform, gauze, tweeners, webril, ACE  Please note, all sponge, needle, and instrument counts were correct prior to closure  Loupe magnification was utilized  The patient tolerated the procedure well       I was present for the entire procedure    Patient Disposition:  PACU and hemodynamically stable    SIGNATURE: Felicity Cranker, MD  DATE: 03/03/20  TIME: 10:33 AM

## 2020-03-03 NOTE — ANESTHESIA POSTPROCEDURE EVALUATION
Post-Op Assessment Note    CV Status:  Stable    Pain management: adequate     Mental Status:  Alert and awake   Hydration Status:  Euvolemic   PONV Controlled:  Controlled   Airway Patency:  Patent   Post Op Vitals Reviewed: Yes      Staff: CRNA           BP   112/76   Temp   97   Pulse  74   Resp   16   SpO2   98% RA

## 2020-03-03 NOTE — ANESTHESIA PREPROCEDURE EVALUATION
Review of Systems/Medical History  Patient summary reviewed  Chart reviewed  No history of anesthetic complications     Cardiovascular  Exercise tolerance (METS): >4,  Hyperlipidemia, Hypertension , Valvular heart disease (mild) , aortic valve stenosis, Past MI , CAD , History of CABG, Cardiac stents drug eluting    Pulmonary  Smoker ex-smoker  , Sleep apnea ,        GI/Hepatic    GERD ,             Endo/Other  Diabetes type 2 Oral agent,      GYN       Hematology   Musculoskeletal       Neurology    CVA ,    Psychology   Anxiety, Depression ,              Physical Exam    Airway    Mallampati score: II  TM Distance: >3 FB  Neck ROM: full     Dental   No notable dental hx     Cardiovascular  Cardiovascular exam normal    Pulmonary  Pulmonary exam normal     Other Findings        Anesthesia Plan  ASA Score- 3     Anesthesia Type- IV sedation with anesthesia with ASA Monitors  Additional Monitors:   Airway Plan:         Plan Factors-    Induction-     Postoperative Plan-     Informed Consent- Anesthetic plan and risks discussed with patient  I personally reviewed this patient with the CRNA  Discussed and agreed on the Anesthesia Plan with the CRNA  Karine Box

## 2020-03-06 DIAGNOSIS — Z86.19 H/O TINEA CRURIS: Chronic | ICD-10-CM

## 2020-03-07 RX ORDER — CLOTRIMAZOLE AND BETAMETHASONE DIPROPIONATE 10; .64 MG/G; MG/G
CREAM TOPICAL 2 TIMES DAILY
Qty: 30 G | Refills: 0 | Status: SHIPPED | OUTPATIENT
Start: 2020-03-07 | End: 2020-03-17

## 2020-03-09 ENCOUNTER — OFFICE VISIT (OUTPATIENT)
Dept: PAIN MEDICINE | Facility: CLINIC | Age: 75
End: 2020-03-09
Payer: MEDICARE

## 2020-03-09 VITALS — WEIGHT: 161 LBS | BODY MASS INDEX: 25.27 KG/M2 | TEMPERATURE: 97.6 F | HEIGHT: 67 IN

## 2020-03-09 DIAGNOSIS — M54.16 LUMBAR RADICULOPATHY: ICD-10-CM

## 2020-03-09 DIAGNOSIS — G89.4 CHRONIC PAIN SYNDROME: Primary | ICD-10-CM

## 2020-03-09 DIAGNOSIS — M48.062 SPINAL STENOSIS OF LUMBAR REGION WITH NEUROGENIC CLAUDICATION: ICD-10-CM

## 2020-03-09 PROCEDURE — 1036F TOBACCO NON-USER: CPT | Performed by: NURSE PRACTITIONER

## 2020-03-09 PROCEDURE — 3066F NEPHROPATHY DOC TX: CPT | Performed by: NURSE PRACTITIONER

## 2020-03-09 PROCEDURE — 3074F SYST BP LT 130 MM HG: CPT | Performed by: NURSE PRACTITIONER

## 2020-03-09 PROCEDURE — 3052F HG A1C>EQUAL 8.0%<EQUAL 9.0%: CPT | Performed by: NURSE PRACTITIONER

## 2020-03-09 PROCEDURE — 2022F DILAT RTA XM EVC RTNOPTHY: CPT | Performed by: NURSE PRACTITIONER

## 2020-03-09 PROCEDURE — 1160F RVW MEDS BY RX/DR IN RCRD: CPT | Performed by: NURSE PRACTITIONER

## 2020-03-09 PROCEDURE — 3008F BODY MASS INDEX DOCD: CPT | Performed by: NURSE PRACTITIONER

## 2020-03-09 PROCEDURE — 4040F PNEUMOC VAC/ADMIN/RCVD: CPT | Performed by: NURSE PRACTITIONER

## 2020-03-09 PROCEDURE — 3078F DIAST BP <80 MM HG: CPT | Performed by: NURSE PRACTITIONER

## 2020-03-09 PROCEDURE — 99214 OFFICE O/P EST MOD 30 MIN: CPT | Performed by: NURSE PRACTITIONER

## 2020-03-09 RX ORDER — GABAPENTIN 300 MG/1
300 CAPSULE ORAL 3 TIMES DAILY
Qty: 90 CAPSULE | Refills: 0
Start: 2020-03-09 | End: 2020-06-05 | Stop reason: SDUPTHER

## 2020-03-09 NOTE — PATIENT INSTRUCTIONS
Gabapentin 300mg by mouth: Take 1 capsule by mouth at bedtime x 5 days, then increase to 1 capsule by mouth twice a day x 5 days, then increase to 1 capsule by mouth three times a day  If no relief at three times a day after 2 weeks, give our office a call to discuss further increase in dose

## 2020-03-09 NOTE — PROGRESS NOTES
Assessment:  1  Chronic pain syndrome    2  Lumbar radiculopathy    3  Spinal stenosis of lumbar region with neurogenic claudication        Plan:  Patient continues with low back pain that radiates into the left lower extremity  He unfortunately found no relief with left L5 and S1 TFESI on January 20, 2020  He has been taking gabapentin, however not consistently and on an as-needed basis  1  I will schedule the patient for a left L4 and L5 TFESI to address the inflammatory component of the patient's pain  Complete risks and benefits including bleeding, infection, tissue reaction, nerve injury and allergic reaction were discussed  The patient was agreeable and verbalized an understanding  2  The patient will reinitiate on gabapentin 300 mg q h s  And titrate up to t i d  Dosing  Instructions were provided at today's office visit  I did explain that the patient needs to take this medication consistently in order for her to become fact of  I also explained that once he reaches t i d  Dosing, if no relief after 2 weeks, however no side effects, to call our office so we can discuss further increase as 600 mg t i d  Is considered therapeutic dosing of this medication  The patient was agreeable and verbalized an understanding  3  The patient will follow-up after the procedure for medication prescription refill and reevaluation  The patient was advised to contact the office should their symptoms worsen in the interim  The patient was agreeable and verbalized an understanding  M*Modal software was used to dictate this note  It may contain errors with dictating incorrect words or incorrect spelling  Please contact the provider directly with any questions  History of Present Illness: The patient is a 69 y o  male last seen on 01/20/20   who presents for a follow up office visit in regards to chronic lumbosacral back pain that radiates into the posterolateral aspect of the left lower extremity secondary to lumbar degenerative disc disease, lumbar spondylosis and stenosis  The patient denies right lower extremity symptoms, bowel or bladder incontinence, or saddle anesthesia  Patient is status post left L5 and S1 TFESI with Dr Latia Calderon on January 20, 2020 without any relief of his symptoms from the procedure  He has been taking gabapentin 300 mg, however takes it on an as-needed basis instead of consistently without relief  MRI of the lumbar spine reveals multilevel lumbar spondylosis with mild to severe bilateral foraminal stenosis, most severe at L4-5 and L5-S1 on the left  The patient currently rates his pain a 9/10 on the numeric pain rating scale  He states the pain is intermittent in nature and bothersome the morning in the evening  He characterizes the pain as sharp and shooting  I have personally reviewed and/or updated the patient's past medical history, past surgical history, family history, social history, current medications, allergies, and vital signs today  Review of Systems:    Review of Systems   Constitutional: Negative for fever and unexpected weight change  HENT: Negative for trouble swallowing  Eyes: Negative for visual disturbance  Respiratory: Negative for shortness of breath and wheezing  Cardiovascular: Negative for chest pain and palpitations  Gastrointestinal: Negative for constipation, diarrhea, nausea and vomiting  Endocrine: Negative for cold intolerance, heat intolerance and polydipsia  Genitourinary: Negative for difficulty urinating and frequency  Musculoskeletal: Positive for gait problem  Negative for arthralgias, joint swelling and myalgias  Decreased ROM, pain in extremity   Skin: Negative for rash  Neurological: Positive for weakness  Negative for dizziness, seizures, syncope and headaches  Hematological: Does not bruise/bleed easily  Psychiatric/Behavioral: Negative for dysphoric mood     All other systems reviewed and are negative          Past Medical History:   Diagnosis Date    AS (aortic stenosis)     Balanitis     Biceps tendonitis on right     CAD (coronary artery disease)     Cancer (HCC)     skin    Colon polyp     Complete rotator cuff tear or rupture of right shoulder, not specified as traumatic     CPAP (continuous positive airway pressure) dependence     Diabetes mellitus (HCC)     Esophageal reflux     High cholesterol     Hypertension     Hypertriglyceridemia     Hypogonadism in male     Myalgia     Myocardial infarction ([facility] [address] )     Palpitations     Shoulder pain     Sinus problem     Skin cancer of nose     Sleep apnea     Stroke ([facility] [address] ) 7429    Systolic murmur     recently found    Tinea cruris     Tinea pedis        Past Surgical History:   Procedure Laterality Date    CARPAL TUNNEL RELEASE      CATARACT EXTRACTION Bilateral     COLONOSCOPY      CORONARY ANGIOPLASTY WITH STENT PLACEMENT      10-15-19 - pt denies stent implant    CORONARY ARTERY BYPASS GRAFT N/A 12/12/2016    Procedure: INTRAOP CECILLE; BILATERAL LEG EVH; CORONARY ARTERY BYPASS GRAFT X 4 SVG TO PDA, OM1,  AND DIAGONAL1, LIMA TO LAD;  Surgeon: Savita Segura MD;  [facility];  Service:     EYE SURGERY      HERNIA REPAIR      NV ARTHROSCOPY SHOULDER SURGICAL BICEPS TENODESIS Right 5/6/2016    Procedure: ARTHROSCOPIC BICEPS TENODESIS;  Surgeon: Mildred Logan MD;  [facility];  Service: Orthopedics    NV INCISE FINGER TENDON SHEATH Right 10/15/2019    Procedure: TRIGGER FINGER RELEASE INDEX, LONG, RING, SMALL, THUMB FINGERS;  Surgeon: Marie Nickerson MD;  [facility];  Service: Orthopedics    NV INCISE FINGER TENDON SHEATH Left 3/3/2020    Procedure: RELEASE TRIGGER FINGER INDEX;  Surgeon: Marie Nickerson MD;  [facility];  Service: Orthopedics    NV SHLDR ARTHROSCOP,SURG,W/ROTAT CUFF REPR Right 5/6/2016    Procedure: REPAIR ROTATOR CUFF  ARTHROSCOPIC; SUBACROMIAL DECOMPRESSION; PARTIAL SYNOVECTOMY;  Surgeon: Mildred Logan MD;  [facility];  Service: Orthopedics    KS WRIST Colie Risen LIG Right 10/15/2019    Procedure: ENDOSCOPIC CARPAL TUNNEL RELEASE;  Surgeon: Marie Nickerson MD;  [facility];  Service: Orthopedics    KS WRIST Colie Risen LIG Left 3/3/2020    Procedure: RELEASE CARPAL TUNNEL ENDOSCOPIC;  Surgeon: Marie Nickerson MD;  [facility];  Service: Orthopedics    [facility] [address]  SURGERY      UMBILICAL HERNIA REPAIR  2009    over age 13       Family History   Problem Relation Age of Onset    Heart disease Mother     Hypertension Mother     Nephrolithiasis Father     Other Father         Renal disease    Hypertension Sister     Nephrolithiasis Brother     Other Brother         Renal disease    Hematuria Family         Microscopic       Social History     Occupational History    Not on file   Tobacco Use    Smoking status: Former Smoker    Smokeless tobacco: Never Used    Tobacco comment: smoked for 3 years from 17 y/o - 23 yrs old   Substance and Sexual Activity    Alcohol use: No    Drug use: No    Sexual activity: Yes     Partners: Female         Current Outpatient Medications:     Alcohol Swabs (ALCOHOL PADS) 70 % PADS, USE TWICE DAILY, Disp: 100 each, Rfl: 3    aspirin (ECOTRIN LOW STRENGTH) 81 mg EC tablet, TAKE 1 TABLET (81 MG TOTAL) BY MOUTH DAILY, Disp: 90 tablet, Rfl: 0    clindamycin (CLEOCIN) 300 MG capsule, TAKE 2 CAPSULES BY MOUTH 1 HOUR PRIOR TO PROCEDURE, Disp: , Rfl: 0    clorazepate (TRANXENE) 7 5 mg tablet, Take 7 5 mg by mouth 2 (two) times a day , Disp: , Rfl: 0    clotrimazole-betamethasone (LOTRISONE) 1-0 05 % cream, APPLY TOPICALLY 2 (TWO) TIMES A DAY, Disp: 30 g, Rfl: 0    diclofenac sodium (VOLTAREN) 1 %, APPLY 2 G TOPICALLY 4 (FOUR) TIMES A DAY, Disp: 100 g, Rfl: 0    DULoxetine (CYMBALTA) 30 mg delayed release capsule, Take 30 mg by mouth every morning, Disp: , Rfl: 0    ferrous sulfate 324 (65 Fe) mg, Take 324 mg by mouth once a week , Disp: , Rfl: 0    fluticasone (FLONASE) 50 mcg/act nasal spray, USE 1 SPRAY INTO EACH NOSTRIL DAILY, Disp: 16 g, Rfl: 0    glimepiride (AMARYL) 4 mg tablet, TAKE 1/2 TABLET BY MOUTH IN THE MORNING AND 1 TABLET AT NIGHT (Patient taking differently: 4 mg daily TAKE 1/2 TABLET BY MOUTH IN THE MORNING AND 1 TABLET AT NIGHT), Disp: 180 tablet, Rfl: 2    glucose blood (FREESTYLE TEST STRIPS) test strip, Use as instructed to check BS BID PRN (hypoglycemia/hyperglycemia symptoms), Disp: 180 each, Rfl: 2    glucose blood (GLUCOSE METER TEST) test strip, 1 each by Other route 2 (two) times a day Use as instructed, Disp: 100 each, Rfl: 5    glucose monitoring kit (FREESTYLE) monitoring kit, 1 each by Does not apply route 2 (two) times a day Check BS BID PRN for hypoglycemia/hyperglycemia, Disp: 1 each, Rfl: 0    HYDROcodone-acetaminophen (NORCO) 5-325 mg per tablet, Take 1 tablet by mouth every 6 (six) hours as needed for pain for up to 5 dosesMax Daily Amount: 4 tablets, Disp: 5 tablet, Rfl: 0    hydrocortisone 2 5 % cream, APPLY TOPICALLY 3 (THREE) TIMES A DAY (Patient taking differently: Apply topically as needed ), Disp: 28 g, Rfl: 0    Lancets (FREESTYLE) lancets, Use as instructed to check BS BID PRN (hypoglycemia/hyperglycemia symptoms), Disp: 180 each, Rfl: 2    lisinopril (ZESTRIL) 20 mg tablet, Take 1 tablet (20 mg total) by mouth daily, Disp: 90 tablet, Rfl: 3    metFORMIN (GLUCOPHAGE) 500 mg tablet, TAKE 1 TABLET (500 MG TOTAL) BY MOUTH 2 (TWO) TIMES A DAY WITH MEALS  DAYS, Disp: 180 tablet, Rfl: 2    mirtazapine (REMERON) 15 mg tablet, TAKE 1 TABLET (15 MG TOTAL) BY MOUTH DAILY AT BEDTIME, Disp: 90 tablet, Rfl: 0    neomycin-polymyxin-dexamethasone (MAXITROL) ophthalmic suspension, INSTILL 1 DROP INTO EACH EYE FOUR TIMES A DAY AFTER SURGERY , Disp: , Rfl: 2    NIFEdipine ER (ADALAT CC) 30 MG 24 hr tablet, Take 1 tablet (30 mg total) by mouth 2 (two) times a day, Disp: 60 tablet, Rfl: 11    omeprazole (PriLOSEC) 20 mg delayed release capsule, TAKE 1 CAPSULE (20 MG TOTAL) BY MOUTH DAILY, Disp: 90 capsule, Rfl: 1    ONETOUCH DELICA LANCETS FINE MISC, Test blood sugar twice daily, or as needed when symptomatic of hypoglycemia or hyperglycemia, Disp: 100 each, Rfl: 5    rosuvastatin (CRESTOR) 40 MG tablet, Take 1 tablet (40 mg total) by mouth daily, Disp: 90 tablet, Rfl: 3    tobramycin-dexamethasone (TOBRADEX) ophthalmic suspension, ADMINISTER 1 DROP TO BOTH EYES 2 (TWO) TIMES A DAY, Disp: 5 mL, Rfl: 0    gabapentin (NEURONTIN) 300 mg capsule, Take 1 capsule (300 mg total) by mouth 3 (three) times a day, Disp: 90 capsule, Rfl: 0    metoprolol tartrate (LOPRESSOR) 25 mg tablet, Take 1 tablet (25 mg total) by mouth every 12 (twelve) hours for 270 days, Disp: 180 tablet, Rfl: 2    naproxen sodium (ALEVE) 220 MG tablet, Take 1 tablet (220 mg total) by mouth 2 (two) times a day with meals for 5 days, Disp: 10 tablet, Rfl: 0    Allergies   Allergen Reactions    Epinephrine Hives and Anxiety    Penicillins Hives and Anxiety       Physical Exam:    Temp 97 6 °F (36 4 °C) (Oral)   Ht 5' 7" (1 702 m)   Wt 73 kg (161 lb)   BMI 25 22 kg/m²     Constitutional:normal, well developed, well nourished, alert, in no distress and non-toxic and no overt pain behavior  Eyes:anicteric  HEENT:grossly intact  Neck:supple, symmetric, trachea midline and no masses   Pulmonary:even and unlabored  Cardiovascular:No edema or pitting edema present  Skin:Normal without rashes or lesions and well hydrated  Psychiatric:Mood and affect appropriate  Neurologic:Cranial Nerves II-XII grossly intact  Musculoskeletal:antalgic gait but steady without the use of assistive devices         Imaging  FL spine and pain procedure    (Results Pending)   MRI LUMBAR SPINE WITHOUT CONTRAST     INDICATION: [id number]: Radiculopathy, lumbar region      COMPARISON:  None      TECHNIQUE:  Sagittal T1, sagittal T2, sagittal inversion recovery, axial T1 and axial T2, coronal T2     IMAGE QUALITY:  Diagnostic     FINDINGS:     VERTEBRAL BODIES:  There are 5 lumbar type vertebral bodies  There is preserved normal lumbar lordosis  No significant spondylolisthesis  Fatty replacing marrow changes  There is L3 vertebral body hemangioma  No suspicious discrete marrow lesion      SACRUM:  Normal signal within the sacrum  No evidence of insufficiency or stress fracture      DISTAL CORD AND CONUS:  Normal size and signal within the distal cord and conus      PARASPINAL SOFT TISSUES:  Paraspinal soft tissues are unremarkable      Bilateral renal cysts      LOWER THORACIC DISC SPACES:  Normal disc height and signal   No disc herniation, canal stenosis or foraminal narrowing      LUMBAR DISC SPACES:     L1-L2:  No significant disc bulge  There is mild bilateral facet arthropathy  There is no canal stenosis  Mild bilateral foraminal narrowing      L2-L3:  There is mild disc bulge  There is mild bilateral facet arthropathy  There is no significant canal stenosis  There is mild bilateral foraminal narrowing     L3-L4:  There is minimal disc bulge  There is mild to moderate bilateral facet arthropathy  No significant canal stenosis  Mild bilateral foraminal narrowing     L4-L5:  There is disc bulge, moderate bilateral facet arthropathy and bilateral ligamentum flavum thickening resulting in bilateral lateral recesses stenosis and mild canal narrowing  There is severe left and moderate to severe right foraminal stenosis     L5-S1:  There is disc bulge and superimposed left subarticular disc protrusion  There is moderate bilateral facet arthropathy  No canal stenosis  There is left greater than right severe bilateral foraminal stenosis     IMPRESSION:     Degenerative changes without high-grade canal stenosis, as detailed    There is severe left and moderate to severe right foraminal stenosis at level L4-5; and left greater than right severe bilateral foraminal stenosis at level L5-S1          Orders Placed This Encounter   Procedures    FL spine and pain procedure

## 2020-03-10 DIAGNOSIS — M54.32 SCIATICA OF LEFT SIDE: ICD-10-CM

## 2020-03-12 ENCOUNTER — OFFICE VISIT (OUTPATIENT)
Dept: FAMILY MEDICINE CLINIC | Facility: CLINIC | Age: 75
End: 2020-03-12

## 2020-03-12 VITALS
WEIGHT: 162 LBS | HEART RATE: 64 BPM | TEMPERATURE: 97.8 F | HEIGHT: 67 IN | RESPIRATION RATE: 16 BRPM | BODY MASS INDEX: 25.43 KG/M2 | SYSTOLIC BLOOD PRESSURE: 120 MMHG | DIASTOLIC BLOOD PRESSURE: 70 MMHG

## 2020-03-12 DIAGNOSIS — K02.9 DENTAL CARIES: Primary | ICD-10-CM

## 2020-03-12 DIAGNOSIS — Z01.818 PREOPERATIVE EXAMINATION: ICD-10-CM

## 2020-03-12 DIAGNOSIS — I25.10 CORONARY ARTERY DISEASE INVOLVING NATIVE CORONARY ARTERY OF NATIVE HEART WITHOUT ANGINA PECTORIS: ICD-10-CM

## 2020-03-12 DIAGNOSIS — E11.9 TYPE 2 DIABETES MELLITUS WITHOUT COMPLICATION, WITHOUT LONG-TERM CURRENT USE OF INSULIN (HCC): ICD-10-CM

## 2020-03-12 PROCEDURE — 1036F TOBACCO NON-USER: CPT | Performed by: FAMILY MEDICINE

## 2020-03-12 PROCEDURE — 1160F RVW MEDS BY RX/DR IN RCRD: CPT | Performed by: FAMILY MEDICINE

## 2020-03-12 PROCEDURE — 3066F NEPHROPATHY DOC TX: CPT | Performed by: FAMILY MEDICINE

## 2020-03-12 PROCEDURE — 3078F DIAST BP <80 MM HG: CPT | Performed by: FAMILY MEDICINE

## 2020-03-12 PROCEDURE — 3008F BODY MASS INDEX DOCD: CPT | Performed by: FAMILY MEDICINE

## 2020-03-12 PROCEDURE — 4040F PNEUMOC VAC/ADMIN/RCVD: CPT | Performed by: FAMILY MEDICINE

## 2020-03-12 PROCEDURE — 3052F HG A1C>EQUAL 8.0%<EQUAL 9.0%: CPT | Performed by: FAMILY MEDICINE

## 2020-03-12 PROCEDURE — 2022F DILAT RTA XM EVC RTNOPTHY: CPT | Performed by: FAMILY MEDICINE

## 2020-03-12 PROCEDURE — 99213 OFFICE O/P EST LOW 20 MIN: CPT | Performed by: FAMILY MEDICINE

## 2020-03-12 PROCEDURE — 93000 ELECTROCARDIOGRAM COMPLETE: CPT | Performed by: FAMILY MEDICINE

## 2020-03-12 PROCEDURE — 3074F SYST BP LT 130 MM HG: CPT | Performed by: FAMILY MEDICINE

## 2020-03-12 RX ORDER — GLIMEPIRIDE 4 MG/1
TABLET ORAL
Qty: 180 TABLET | Refills: 2 | Status: SHIPPED | OUTPATIENT
Start: 2020-03-12 | End: 2020-06-08 | Stop reason: SDUPTHER

## 2020-03-12 RX ORDER — CLINDAMYCIN HYDROCHLORIDE 300 MG/1
600 CAPSULE ORAL ONCE
Qty: 2 CAPSULE | Refills: 0 | Status: SHIPPED | OUTPATIENT
Start: 2020-03-12 | End: 2020-03-12

## 2020-03-12 NOTE — PROGRESS NOTES
[address] [place] [address]  1945    Kory Estes is a 69 y o  male with dental caries who is planning to undergo dental extraction of tooth #1 by Dr Erica Foote ([address] [place] [address]) on 3/31/20  Patient has not had complications with anesthesia in the past   Personally called [phone] office to obtain further information, as type of anesthesia, whether NPO status is needed, etc  was not specified on paperwork faxed to our office  Had to leave a message for Dr Erica Foote with  staff requesting call back to complete preoperative evaluation  Takes ASA 81 mg QD due to cardiac history  Asymptomatic other than chronic back pain and current LUE pain from recent Ortho surgery (patient is s/p L ECTR & L index TFR 3/3/20)  ROS:   Chest pain: no   Shortness of breath: no  Shortness of breath with exertion: no  Orthopnea: no, uses 1 pillow only  Dizziness: no  Unexplained weight change: no    PMH:  CAD: yes, MI in 2006 s/p cath with EYAL to LAD, MI in 2012 with stent to RCA, and NSTEMI 2016 with multivessel disease, s/p CABG (4 vessel bypass with LIMA to the LAD, vein grafts to the diagonal, OM1, PDA); echo 6/28/19 showed low-normal LV function with EF 50%, apical wall hypokinesis, G2DD, mild LAE, mild MVR/TVR/AVR and mild aortic stenosis (SIMON 1 8cm^2); follows with Dr Saucedo (Cardiology), last seen 11/4/19  Taking ASA 81 mg QD, lopressor 25 mg BID, crestor 40 mg QD, ACEI  Currently asymptomatic  HTN: yes, taking lisinopril 20 mg QD and nifedipine ER 30 mg BID per Cardiology  CKD: Stage 2 CKD, last Cr 0 91/GFR 84; no microalbuminuria 7/16/19  DM: yes, on insulin: no (taking metformin and amaryl only, last HbA1C 8 0% 1/27/20)  History of CVA: yes, documented 2006 but no further information available    He  reports that he has quit smoking  He has never used smokeless tobacco  He reports that he does not drink alcohol or use drugs      /70 (BP Location: Left arm, Patient Position: Sitting, Cuff Size: Large)   Pulse 64   Temp 97 8 °F (36 6 °C) (Tympanic)   Resp 16   Ht 5' 7" (1 702 m)   Wt 73 5 kg (162 lb)   BMI 25 37 kg/m²   Physical Exam   Constitutional: He is oriented to person, place, and time  He appears well-developed and well-nourished  HENT:   Head: Normocephalic and atraumatic  Eyes:   Small incisions well-healed s/p dacryocystorhinostomy OU   Neck: Normal range of motion  Cardiovascular: Normal rate, regular rhythm and intact distal pulses  Murmur heard  2/6 MARTHA   Pulmonary/Chest: Effort normal and breath sounds normal    Abdominal: Soft  Bowel sounds are normal    Musculoskeletal: Normal range of motion  Mild tenderness of L>R lumbar paravertebral musculature without hypertonicity, similar to previous exams; LUE in splint (patient is s/p L ECTR & L index TFR 3/3/20)   Neurological: He is alert and oriented to person, place, and time  Motor strength 5/5 B/L UE/LE, normal sensation, follows all commands appropriately   Skin: Skin is warm and dry  Capillary refill takes less than 2 seconds  Psychiatric: He has a normal mood and affect  His behavior is normal  Judgment and thought content normal    Vitals reviewed  Revised Cardiac Risk Index (RCRI) for Pre-Operative Risk   (estimates risk of cardiac complications after noncardiac surgery)     · High-risk surgery: No 0 / Yes +1  ? Intraperitoneal, intrathoracic, suprainguinal vascular  · History of ischemic heart disease: No 0 / Yes +1  ? Hx of MI, (+) exercise test, current chest pain considered due to myocardial ischemia, use of nitrate therapy or ECG with pathological Q waves)  · History of CHF: No 0 / Yes +1  ? Pulmonary edema, B/L rales or S3 gallop; MOORE, orthopnea, PND, CXR showing pulmonary vascular redistribution)  · History of cerebrovascular disease: No 0 / Yes +1  ?  Prior TIA or stroke  · Pre-operative treatment with insulin: No 0 / Yes +1  · Pre-operative creatinine >2 mg/dL: No 0 / Yes +1     RCRI Scoring:  · 3 points: Class IV Risk, 15% 30-day risk of death, MI, or cardiac arrest    Lab Results   Component Value Date    CREATININE 0 91 02/07/2020       Vonzella Klinefelter was seen today for pre-op exam     Diagnoses and all orders for this visit:    Dental caries  -     clindamycin (CLEOCIN) 300 MG capsule; Take 2 capsules (600 mg total) by mouth once for 1 dose 1 hour prior to procedure    Preoperative examination    Coronary artery disease involving native coronary artery of native heart without angina pectoris  -     metoprolol tartrate (LOPRESSOR) 25 mg tablet; Take 1 tablet (25 mg total) by mouth every 12 (twelve) hours  -     POCT ECG    Type 2 diabetes mellitus without complication, without long-term current use of insulin (HCC)  -     metFORMIN (GLUCOPHAGE) 500 mg tablet; Take 1 tablet (500 mg total) by mouth 2 (two) times a day with meals  -     glimepiride (AMARYL) 4 mg tablet; TAKE 1/2 TABLET BY MOUTH IN THE MORNING AND 1 TABLET AT NIGHT      Recommendations:  Severo Penna undergoing an elective Low Risk surgery, dental extraction of tooth #1 on 3/31/20 by Dr Cheyanne Manrique  I called and left message for Dr Cheyanne Manrique at [facility], as anesthesia type, NPO status, etc  was not specified and needs to be obtained  Regarding patient's pre-operative exam, he is RCRI Class IV Risk, (3 points for CAD/HFpEF/CVA) with a 15% 30-day risk of death, MI, or cardiac arrest  Due to his extensive cardiac history, he is intermediate risk for this low-risk surgery  EKG 10/2019 unchanged from previous, repeat EKG today also unchanged   As NT-proBNP recommended due to age>65 and RCRI>2 per 2016 [facility] Perioperative guidelines, it was ordered and obtained at previous pre-operative visit Octoboer 2019  Although NT pro-BNP slightly above 300 (414) on 10/2019, EKG and troponin monitoring in the post-operative period likely not medically necessary, given low-risk surgery, and lack of previous NT pro-BNP value available for comparison  Suspect that 414 likely represents patient's baseline value when not in exacerbation from his HFpEF/G2DD, as patient asymptomatic without any evidence of fluid overload on exam  Accordingly, he may proceed with surgery as planned without further workup at this time      Additional recommendations are as follows:    1 ) AC/anti-plt therapy: Recommend to hold ASA 5-7 days prior to surgery, and resume once perioperative bleeding risk has passed (specific recommended hold duration of post-op ASA to be determined through discussion with Dr Gabriella Ulrich)  Once I have verified details with Dr Gabriella Ulrich regarding how long post-op ASA needs to be held, will call patient and instruct him to take ASA 3/25/20, but hold ASA starting 3/26/20, until recommended restart date (TBD)  Once I have discussed further details with Dr Gabriella Ulrich, will send a message to patient's Cardiologist, Dr Suzanna Gan, in order to close the loop in this patient's perioperative care  2 ) SBE ppx: Per Dr Han Distance visit note from 3/10/20, patient will need to pre-medicate with Clindamycin 600 mg x1 1 hour prior to the procedure (time of surgery not yet specified, but will be obtained once I hear back from Dr Gabriella Ulrich)  However, patient does not have any indication for medical SBE ppx (no hx of valve replacement, etc )    3 ) T2DM meds: If patient needs to be NPO after midnight prior to surgery, will recommend to hold amaryl and metformin on the morning of surgery, then resume once diet resumed  If NPO not required, continue metformin but hold amaryl if not not eating, until diet resumed  Will complete paperwork and fax form/pre-op visit note to Dr Belia Lay office once I have discussed the details of the upcoming surgery in greater detail with her, as noted above  Will finalize plan with patient via phone call in 1-2 weeks to ensure adherence to holding ASA/management of T2DM meds perioperatively  Discussed with MARIBEL Abdi  PGY-3  [facility]

## 2020-03-13 ENCOUNTER — OFFICE VISIT (OUTPATIENT)
Dept: OBGYN CLINIC | Facility: HOSPITAL | Age: 75
End: 2020-03-13

## 2020-03-13 ENCOUNTER — TELEPHONE (OUTPATIENT)
Dept: FAMILY MEDICINE CLINIC | Facility: CLINIC | Age: 75
End: 2020-03-13

## 2020-03-13 VITALS
HEIGHT: 67 IN | WEIGHT: 164 LBS | HEART RATE: 66 BPM | SYSTOLIC BLOOD PRESSURE: 120 MMHG | DIASTOLIC BLOOD PRESSURE: 70 MMHG | BODY MASS INDEX: 25.74 KG/M2

## 2020-03-13 DIAGNOSIS — Z98.890 S/P CARPAL TUNNEL RELEASE: Primary | ICD-10-CM

## 2020-03-13 DIAGNOSIS — Z98.890 S/P TRIGGER FINGER RELEASE: ICD-10-CM

## 2020-03-13 PROCEDURE — 3008F BODY MASS INDEX DOCD: CPT | Performed by: ORTHOPAEDIC SURGERY

## 2020-03-13 PROCEDURE — 99024 POSTOP FOLLOW-UP VISIT: CPT | Performed by: ORTHOPAEDIC SURGERY

## 2020-03-13 PROCEDURE — 3078F DIAST BP <80 MM HG: CPT | Performed by: ORTHOPAEDIC SURGERY

## 2020-03-13 PROCEDURE — 1160F RVW MEDS BY RX/DR IN RCRD: CPT | Performed by: ORTHOPAEDIC SURGERY

## 2020-03-13 PROCEDURE — 3066F NEPHROPATHY DOC TX: CPT | Performed by: ORTHOPAEDIC SURGERY

## 2020-03-13 PROCEDURE — 3074F SYST BP LT 130 MM HG: CPT | Performed by: ORTHOPAEDIC SURGERY

## 2020-03-13 PROCEDURE — 4040F PNEUMOC VAC/ADMIN/RCVD: CPT | Performed by: ORTHOPAEDIC SURGERY

## 2020-03-13 PROCEDURE — 2022F DILAT RTA XM EVC RTNOPTHY: CPT | Performed by: ORTHOPAEDIC SURGERY

## 2020-03-13 NOTE — PROGRESS NOTES
Assessment:   S/P L ECTR and L index TFR 03/03/20    Plan:   Resume activities as tolerated and scar tissue   No soaking x 48 hours    Follow Up:  PRN      CHIEF COMPLAINT:  Chief Complaint   Patient presents with    Left Wrist - Post-op    Left Index Finger - Post-op         SUBJECTIVE:  Eder Jacinto is a 69 y o  male who presents for follow up S/P L ECTR and L index TFR 03/03/20  Patient states he is doing very well  He has no complaints at this time         PHYSICAL EXAMINATION:  Vital signs: /70   Pulse 66   Ht 5' 7" (1 702 m)   Wt 74 4 kg (164 lb)   BMI 25 69 kg/m²   General: well developed and well nourished, alert, oriented times 3 and appears comfortable  Psychiatric: Normal    MUSCULOSKELETAL EXAMINATION:  Incision: Clean, dry, intact  Range of Motion: As expected, +APB  Neurovascular status: Neuro intact, good cap refill  Activity Restrictions: No restrictions  Done today: Sutures out and Steri strips applied      STUDIES REVIEWED:  No Studies to review      PROCEDURES PERFORMED:  Procedures  No Procedures performed today

## 2020-03-13 NOTE — TELEPHONE ENCOUNTER
[facility] the requiered to complete the attached physician order for CPAP supplies  The patient has expressed a desire to purchase replacement CPAP supplies from them but they need a new physician's order prior to shipping supplies to the patient  The form would be in your bin for you to review it and sign it   Thanks Dr Devante Murphy

## 2020-03-17 DIAGNOSIS — Z86.19 H/O TINEA CRURIS: Chronic | ICD-10-CM

## 2020-03-17 RX ORDER — CLOTRIMAZOLE AND BETAMETHASONE DIPROPIONATE 10; .64 MG/G; MG/G
CREAM TOPICAL 2 TIMES DAILY
Qty: 30 G | Refills: 0 | Status: SHIPPED | OUTPATIENT
Start: 2020-03-17 | End: 2020-04-14

## 2020-03-17 NOTE — TELEPHONE ENCOUNTER
[address] [facility] and left a message on physician line requesting phone call back from Dr Rema Dennis to discuss further details of patient's upcoming dental surgery 3/31/20, as I need more information to advise patient regarding his medications the morning of the surgery, etc  Left message with my personal cell phone requesting call back from MARIBEL Serrato  PGY-3  [facility]  2

## 2020-03-19 DIAGNOSIS — M54.32 SCIATICA OF LEFT SIDE: ICD-10-CM

## 2020-03-21 ENCOUNTER — TELEPHONE (OUTPATIENT)
Dept: FAMILY MEDICINE CLINIC | Facility: CLINIC | Age: 75
End: 2020-03-21

## 2020-03-21 NOTE — TELEPHONE ENCOUNTER
DENTAL PRE-OP: MORE INFO NEEDED    I have placed several calls/left several messages for Dr Kait Jones at [facility] regarding obtaining more details for this patient's upcoming dental extraction scheduled 3/31/20, but have not heard back  Dr Kait Jones is not on tigertext either  I have counseled patient about holding ASA 5-7 days prior to surgery (around 3/25/20), and advised him to take the clinda pre-op as they instructed, but I need to know the type of anesthesia, what time the surgery will be/if he will be NPO for the procedure, as he will need to hold his morning dose of amaryl if he will be NPO  I need this information as I need to advise my patient on what to do with his DM meds  Or, if his surgery is canceled, we need to know that as well  Since I am starting on night float, could you please try to get in touch with Dr Fang Notice office for answers to these questions? Her  staff has been unable to answer them for me whenever I have called, which is why I have had to leave messages for Dr Kait Jones through her staff  Or, if the surgery is canceled, the office could confirm that  The phone number for [facility] is [phone]  In addition, I had placed the clearance form in the fax bin but saw it had been scanned without any notation that it was faxed, and could we make sure it was faxed as well? Fax number is [phone]       Thank you so much, please let me know what you can find out!    -Dr Dru Olson

## 2020-03-24 NOTE — TELEPHONE ENCOUNTER
Called dental clinic, they tentatively have rescheduled patient for 4/15/20   did state she does not have full access to Epic  Faxed dental clearance form with your Dental Pre-Op note, was told that this will be directed to dentist patient is scheduled with you  Forwarding for your information

## 2020-03-27 DIAGNOSIS — Z86.19 H/O TINEA CRURIS: Chronic | ICD-10-CM

## 2020-03-31 RX ORDER — CLOTRIMAZOLE AND BETAMETHASONE DIPROPIONATE 10; .5 MG/ML; MG/ML
LOTION TOPICAL
Qty: 30 ML | Refills: 0 | Status: SHIPPED | OUTPATIENT
Start: 2020-03-31 | End: 2020-04-14

## 2020-04-04 DIAGNOSIS — M54.32 SCIATICA OF LEFT SIDE: ICD-10-CM

## 2020-04-06 ENCOUNTER — TELEPHONE (OUTPATIENT)
Dept: FAMILY MEDICINE CLINIC | Facility: CLINIC | Age: 75
End: 2020-04-06

## 2020-04-11 DIAGNOSIS — Z86.19 H/O TINEA CRURIS: Chronic | ICD-10-CM

## 2020-04-11 DIAGNOSIS — E11.9 TYPE 2 DIABETES MELLITUS WITHOUT COMPLICATION, WITHOUT LONG-TERM CURRENT USE OF INSULIN (HCC): ICD-10-CM

## 2020-04-13 DIAGNOSIS — E11.22 HYPERTENSION ASSOCIATED WITH STAGE 2 CHRONIC KIDNEY DISEASE DUE TO TYPE 2 DIABETES MELLITUS (HCC): Chronic | ICD-10-CM

## 2020-04-13 DIAGNOSIS — M54.32 SCIATICA OF LEFT SIDE: ICD-10-CM

## 2020-04-13 DIAGNOSIS — Z86.19 H/O TINEA CRURIS: Chronic | ICD-10-CM

## 2020-04-13 DIAGNOSIS — I12.9 HYPERTENSION ASSOCIATED WITH STAGE 2 CHRONIC KIDNEY DISEASE DUE TO TYPE 2 DIABETES MELLITUS (HCC): Chronic | ICD-10-CM

## 2020-04-13 DIAGNOSIS — N18.2 HYPERTENSION ASSOCIATED WITH STAGE 2 CHRONIC KIDNEY DISEASE DUE TO TYPE 2 DIABETES MELLITUS (HCC): Chronic | ICD-10-CM

## 2020-04-14 RX ORDER — ASPIRIN 81 MG/1
TABLET ORAL
Qty: 90 TABLET | Refills: 0 | Status: SHIPPED | OUTPATIENT
Start: 2020-04-14 | End: 2020-05-12

## 2020-04-14 RX ORDER — CLOTRIMAZOLE AND BETAMETHASONE DIPROPIONATE 10; .64 MG/G; MG/G
CREAM TOPICAL 2 TIMES DAILY
Qty: 30 G | Refills: 0 | Status: SHIPPED | OUTPATIENT
Start: 2020-04-14 | End: 2020-05-12

## 2020-04-14 RX ORDER — CLOTRIMAZOLE AND BETAMETHASONE DIPROPIONATE 10; .5 MG/ML; MG/ML
LOTION TOPICAL
Qty: 30 ML | Refills: 0 | Status: SHIPPED | OUTPATIENT
Start: 2020-04-14 | End: 2020-05-12

## 2020-04-14 RX ORDER — BLOOD SUGAR DIAGNOSTIC
STRIP MISCELLANEOUS
Qty: 100 EACH | Refills: 3 | Status: SHIPPED | OUTPATIENT
Start: 2020-04-14 | End: 2020-09-22

## 2020-04-28 DIAGNOSIS — E11.9 TYPE 2 DIABETES MELLITUS WITHOUT COMPLICATION, WITHOUT LONG-TERM CURRENT USE OF INSULIN (HCC): ICD-10-CM

## 2020-04-28 RX ORDER — BLOOD-GLUCOSE METER
EACH MISCELLANEOUS
Qty: 100 EACH | Refills: 5 | Status: SHIPPED | OUTPATIENT
Start: 2020-04-28 | End: 2021-02-02

## 2020-04-28 RX ORDER — BLOOD-GLUCOSE METER
EACH MISCELLANEOUS
Qty: 100 EACH | Refills: 3 | Status: SHIPPED | OUTPATIENT
Start: 2020-04-28 | End: 2020-08-18

## 2020-05-06 DIAGNOSIS — G47.00 INSOMNIA, UNSPECIFIED TYPE: ICD-10-CM

## 2020-05-07 RX ORDER — MIRTAZAPINE 15 MG/1
TABLET, FILM COATED ORAL
Qty: 90 TABLET | Refills: 0 | Status: SHIPPED | OUTPATIENT
Start: 2020-05-07 | End: 2020-06-01

## 2020-05-08 DIAGNOSIS — M54.32 SCIATICA OF LEFT SIDE: ICD-10-CM

## 2020-05-11 ENCOUNTER — TELEPHONE (OUTPATIENT)
Dept: RADIOLOGY | Facility: CLINIC | Age: 75
End: 2020-05-11

## 2020-05-11 DIAGNOSIS — N18.2 HYPERTENSION ASSOCIATED WITH STAGE 2 CHRONIC KIDNEY DISEASE DUE TO TYPE 2 DIABETES MELLITUS (HCC): Chronic | ICD-10-CM

## 2020-05-11 DIAGNOSIS — Z86.19 H/O TINEA CRURIS: Chronic | ICD-10-CM

## 2020-05-11 DIAGNOSIS — I12.9 HYPERTENSION ASSOCIATED WITH STAGE 2 CHRONIC KIDNEY DISEASE DUE TO TYPE 2 DIABETES MELLITUS (HCC): Chronic | ICD-10-CM

## 2020-05-11 DIAGNOSIS — E11.22 HYPERTENSION ASSOCIATED WITH STAGE 2 CHRONIC KIDNEY DISEASE DUE TO TYPE 2 DIABETES MELLITUS (HCC): Chronic | ICD-10-CM

## 2020-05-12 RX ORDER — CLOTRIMAZOLE AND BETAMETHASONE DIPROPIONATE 10; .64 MG/G; MG/G
CREAM TOPICAL 2 TIMES DAILY
Qty: 30 G | Refills: 0 | Status: SHIPPED | OUTPATIENT
Start: 2020-05-12 | End: 2020-05-23

## 2020-05-12 RX ORDER — CLOTRIMAZOLE AND BETAMETHASONE DIPROPIONATE 10; .5 MG/ML; MG/ML
LOTION TOPICAL
Qty: 30 ML | Refills: 0 | Status: SHIPPED | OUTPATIENT
Start: 2020-05-12 | End: 2020-05-23

## 2020-05-12 RX ORDER — ASPIRIN 81 MG/1
TABLET ORAL
Qty: 90 TABLET | Refills: 0 | Status: SHIPPED | OUTPATIENT
Start: 2020-05-12 | End: 2020-09-09

## 2020-05-19 ENCOUNTER — HOSPITAL ENCOUNTER (OUTPATIENT)
Dept: RADIOLOGY | Facility: CLINIC | Age: 75
Discharge: HOME | End: 2020-05-19
Attending: ANESTHESIOLOGY | Admitting: ANESTHESIOLOGY
Payer: MEDICARE

## 2020-05-19 VITALS
DIASTOLIC BLOOD PRESSURE: 60 MMHG | OXYGEN SATURATION: 95 % | HEART RATE: 58 BPM | SYSTOLIC BLOOD PRESSURE: 121 MMHG | TEMPERATURE: 97.9 F | RESPIRATION RATE: 18 BRPM

## 2020-05-19 DIAGNOSIS — M54.16 LUMBAR RADICULOPATHY: ICD-10-CM

## 2020-05-19 PROCEDURE — 64483 NJX AA&/STRD TFRM EPI L/S 1: CPT | Performed by: ANESTHESIOLOGY

## 2020-05-19 PROCEDURE — 64484 NJX AA&/STRD TFRM EPI L/S EA: CPT | Performed by: ANESTHESIOLOGY

## 2020-05-19 RX ORDER — LIDOCAINE HYDROCHLORIDE 10 MG/ML
5 INJECTION, SOLUTION EPIDURAL; INFILTRATION; INTRACAUDAL; PERINEURAL ONCE
Status: COMPLETED | OUTPATIENT
Start: 2020-05-19 | End: 2020-05-19

## 2020-05-19 RX ORDER — PAPAVERINE HCL 150 MG
20 CAPSULE, EXTENDED RELEASE ORAL ONCE
Status: COMPLETED | OUTPATIENT
Start: 2020-05-19 | End: 2020-05-19

## 2020-05-19 RX ADMIN — IOHEXOL 2 ML: 300 INJECTION, SOLUTION INTRAVENOUS at 10:46

## 2020-05-19 RX ADMIN — DEXAMETHASONE SODIUM PHOSPHATE 15 MG: 10 INJECTION, SOLUTION INTRAMUSCULAR; INTRAVENOUS at 10:49

## 2020-05-19 RX ADMIN — LIDOCAINE HYDROCHLORIDE 4 ML: 10 INJECTION, SOLUTION EPIDURAL; INFILTRATION; INTRACAUDAL; PERINEURAL at 10:43

## 2020-05-19 RX ADMIN — LIDOCAINE HYDROCHLORIDE 2 ML: 20 INJECTION, SOLUTION EPIDURAL; INFILTRATION; INTRACAUDAL; PERINEURAL at 10:49

## 2020-05-22 DIAGNOSIS — Z86.19 H/O TINEA CRURIS: Chronic | ICD-10-CM

## 2020-05-23 RX ORDER — CLOTRIMAZOLE AND BETAMETHASONE DIPROPIONATE 10; .5 MG/ML; MG/ML
LOTION TOPICAL
Qty: 30 ML | Refills: 0 | Status: SHIPPED | OUTPATIENT
Start: 2020-05-23 | End: 2020-06-02

## 2020-05-23 RX ORDER — CLOTRIMAZOLE AND BETAMETHASONE DIPROPIONATE 10; .64 MG/G; MG/G
CREAM TOPICAL 2 TIMES DAILY
Qty: 30 G | Refills: 0 | Status: SHIPPED | OUTPATIENT
Start: 2020-05-23 | End: 2020-11-18 | Stop reason: SDUPTHER

## 2020-05-25 DIAGNOSIS — I10 ESSENTIAL HYPERTENSION: ICD-10-CM

## 2020-05-25 DIAGNOSIS — N18.2 HYPERTENSION ASSOCIATED WITH STAGE 2 CHRONIC KIDNEY DISEASE DUE TO TYPE 2 DIABETES MELLITUS (HCC): Chronic | ICD-10-CM

## 2020-05-25 DIAGNOSIS — I25.10 CORONARY ARTERY DISEASE INVOLVING NATIVE CORONARY ARTERY OF NATIVE HEART WITHOUT ANGINA PECTORIS: ICD-10-CM

## 2020-05-25 DIAGNOSIS — E11.22 HYPERTENSION ASSOCIATED WITH STAGE 2 CHRONIC KIDNEY DISEASE DUE TO TYPE 2 DIABETES MELLITUS (HCC): Chronic | ICD-10-CM

## 2020-05-25 DIAGNOSIS — I12.9 HYPERTENSION ASSOCIATED WITH STAGE 2 CHRONIC KIDNEY DISEASE DUE TO TYPE 2 DIABETES MELLITUS (HCC): Chronic | ICD-10-CM

## 2020-05-25 RX ORDER — LISINOPRIL 20 MG/1
20 TABLET ORAL DAILY
Qty: 90 TABLET | Refills: 3 | Status: SHIPPED | OUTPATIENT
Start: 2020-05-25 | End: 2020-09-16 | Stop reason: SDUPTHER

## 2020-05-25 RX ORDER — ROSUVASTATIN CALCIUM 40 MG/1
40 TABLET, COATED ORAL DAILY
Qty: 90 TABLET | Refills: 3 | Status: SHIPPED | OUTPATIENT
Start: 2020-05-25 | End: 2021-01-18 | Stop reason: ALTCHOICE

## 2020-05-26 ENCOUNTER — TELEPHONE (OUTPATIENT)
Dept: PAIN MEDICINE | Facility: CLINIC | Age: 75
End: 2020-05-26

## 2020-05-30 DIAGNOSIS — G47.00 INSOMNIA, UNSPECIFIED TYPE: ICD-10-CM

## 2020-06-01 RX ORDER — MIRTAZAPINE 15 MG/1
TABLET, FILM COATED ORAL
Qty: 90 TABLET | Refills: 0 | Status: SHIPPED | OUTPATIENT
Start: 2020-06-01 | End: 2020-08-06

## 2020-06-02 DIAGNOSIS — Z86.19 H/O TINEA CRURIS: Chronic | ICD-10-CM

## 2020-06-02 RX ORDER — CLOTRIMAZOLE AND BETAMETHASONE DIPROPIONATE 10; .5 MG/ML; MG/ML
LOTION TOPICAL
Qty: 30 ML | Refills: 0 | Status: SHIPPED | OUTPATIENT
Start: 2020-06-02 | End: 2020-06-10 | Stop reason: SDUPTHER

## 2020-06-05 ENCOUNTER — OFFICE VISIT (OUTPATIENT)
Dept: PAIN MEDICINE | Facility: CLINIC | Age: 75
End: 2020-06-05
Payer: MEDICARE

## 2020-06-05 VITALS
DIASTOLIC BLOOD PRESSURE: 61 MMHG | BODY MASS INDEX: 25.74 KG/M2 | HEIGHT: 67 IN | SYSTOLIC BLOOD PRESSURE: 118 MMHG | TEMPERATURE: 98.7 F | HEART RATE: 56 BPM | WEIGHT: 164 LBS

## 2020-06-05 DIAGNOSIS — M54.16 LUMBAR RADICULOPATHY: Primary | ICD-10-CM

## 2020-06-05 DIAGNOSIS — M47.816 LUMBAR SPONDYLOSIS: ICD-10-CM

## 2020-06-05 DIAGNOSIS — M51.36 DDD (DEGENERATIVE DISC DISEASE), LUMBAR: ICD-10-CM

## 2020-06-05 DIAGNOSIS — M48.062 SPINAL STENOSIS OF LUMBAR REGION WITH NEUROGENIC CLAUDICATION: ICD-10-CM

## 2020-06-05 PROCEDURE — 3008F BODY MASS INDEX DOCD: CPT | Performed by: ANESTHESIOLOGY

## 2020-06-05 PROCEDURE — 4040F PNEUMOC VAC/ADMIN/RCVD: CPT | Performed by: ANESTHESIOLOGY

## 2020-06-05 PROCEDURE — 99214 OFFICE O/P EST MOD 30 MIN: CPT | Performed by: ANESTHESIOLOGY

## 2020-06-05 PROCEDURE — 1160F RVW MEDS BY RX/DR IN RCRD: CPT | Performed by: ANESTHESIOLOGY

## 2020-06-05 PROCEDURE — 2022F DILAT RTA XM EVC RTNOPTHY: CPT | Performed by: ANESTHESIOLOGY

## 2020-06-05 PROCEDURE — 3074F SYST BP LT 130 MM HG: CPT | Performed by: ANESTHESIOLOGY

## 2020-06-05 PROCEDURE — 3052F HG A1C>EQUAL 8.0%<EQUAL 9.0%: CPT | Performed by: ANESTHESIOLOGY

## 2020-06-05 PROCEDURE — 3078F DIAST BP <80 MM HG: CPT | Performed by: ANESTHESIOLOGY

## 2020-06-05 PROCEDURE — 1036F TOBACCO NON-USER: CPT | Performed by: ANESTHESIOLOGY

## 2020-06-05 PROCEDURE — 3066F NEPHROPATHY DOC TX: CPT | Performed by: ANESTHESIOLOGY

## 2020-06-05 RX ORDER — GABAPENTIN 400 MG/1
400 CAPSULE ORAL 3 TIMES DAILY
Qty: 90 CAPSULE | Refills: 1 | Status: SHIPPED | OUTPATIENT
Start: 2020-06-05 | End: 2020-07-10 | Stop reason: SDUPTHER

## 2020-06-06 DIAGNOSIS — M54.32 SCIATICA OF LEFT SIDE: ICD-10-CM

## 2020-06-08 ENCOUNTER — OFFICE VISIT (OUTPATIENT)
Dept: FAMILY MEDICINE CLINIC | Facility: CLINIC | Age: 75
End: 2020-06-08

## 2020-06-08 ENCOUNTER — TELEPHONE (OUTPATIENT)
Dept: RADIOLOGY | Facility: CLINIC | Age: 75
End: 2020-06-08

## 2020-06-08 VITALS
HEIGHT: 67 IN | BODY MASS INDEX: 25.96 KG/M2 | DIASTOLIC BLOOD PRESSURE: 70 MMHG | RESPIRATION RATE: 18 BRPM | WEIGHT: 165.4 LBS | HEART RATE: 78 BPM | SYSTOLIC BLOOD PRESSURE: 120 MMHG | TEMPERATURE: 98.4 F

## 2020-06-08 DIAGNOSIS — E11.9 TYPE 2 DIABETES MELLITUS WITHOUT COMPLICATION, WITHOUT LONG-TERM CURRENT USE OF INSULIN (HCC): Primary | ICD-10-CM

## 2020-06-08 DIAGNOSIS — J30.9 ALLERGIC RHINITIS, UNSPECIFIED SEASONALITY, UNSPECIFIED TRIGGER: ICD-10-CM

## 2020-06-08 DIAGNOSIS — R60.0 LEG EDEMA: ICD-10-CM

## 2020-06-08 LAB — SL AMB POCT HEMOGLOBIN AIC: 7.5 (ref ?–6.5)

## 2020-06-08 PROCEDURE — 3051F HG A1C>EQUAL 7.0%<8.0%: CPT | Performed by: FAMILY MEDICINE

## 2020-06-08 PROCEDURE — 3074F SYST BP LT 130 MM HG: CPT | Performed by: FAMILY MEDICINE

## 2020-06-08 PROCEDURE — 99213 OFFICE O/P EST LOW 20 MIN: CPT | Performed by: FAMILY MEDICINE

## 2020-06-08 PROCEDURE — 4040F PNEUMOC VAC/ADMIN/RCVD: CPT | Performed by: FAMILY MEDICINE

## 2020-06-08 PROCEDURE — 3078F DIAST BP <80 MM HG: CPT | Performed by: FAMILY MEDICINE

## 2020-06-08 PROCEDURE — 3066F NEPHROPATHY DOC TX: CPT | Performed by: FAMILY MEDICINE

## 2020-06-08 PROCEDURE — 83036 HEMOGLOBIN GLYCOSYLATED A1C: CPT | Performed by: FAMILY MEDICINE

## 2020-06-08 PROCEDURE — 2022F DILAT RTA XM EVC RTNOPTHY: CPT | Performed by: FAMILY MEDICINE

## 2020-06-08 PROCEDURE — 1036F TOBACCO NON-USER: CPT | Performed by: FAMILY MEDICINE

## 2020-06-08 PROCEDURE — 1160F RVW MEDS BY RX/DR IN RCRD: CPT | Performed by: FAMILY MEDICINE

## 2020-06-08 RX ORDER — FLUTICASONE PROPIONATE 50 MCG
1 SPRAY, SUSPENSION (ML) NASAL DAILY
Qty: 1 BOTTLE | Refills: 5 | Status: SHIPPED | OUTPATIENT
Start: 2020-06-08 | End: 2020-10-28

## 2020-06-08 RX ORDER — GLIMEPIRIDE 4 MG/1
TABLET ORAL
Qty: 180 TABLET | Refills: 2 | Status: SHIPPED | OUTPATIENT
Start: 2020-06-08 | End: 2020-09-16

## 2020-06-09 NOTE — TELEPHONE ENCOUNTER
Scheduled pt for Lt L4 L5 Tfesi for 6/10/20   Went over pre-procedure instructions below:  Pt denies rx blood thinners  Nothing to eat or drink 1 hr prior to procedure  Need to arrange transportation  Proper clothing for procedure  If ill or placed on antibiotics please call to reschedule  covid disclaimer complete

## 2020-06-10 ENCOUNTER — OFFICE VISIT (OUTPATIENT)
Dept: CARDIOLOGY CLINIC | Facility: CLINIC | Age: 75
End: 2020-06-10
Payer: MEDICARE

## 2020-06-10 ENCOUNTER — TELEPHONE (OUTPATIENT)
Dept: CARDIOLOGY CLINIC | Facility: CLINIC | Age: 75
End: 2020-06-10

## 2020-06-10 ENCOUNTER — TELEPHONE (OUTPATIENT)
Dept: PAIN MEDICINE | Facility: CLINIC | Age: 75
End: 2020-06-10

## 2020-06-10 ENCOUNTER — HOSPITAL ENCOUNTER (OUTPATIENT)
Dept: RADIOLOGY | Facility: CLINIC | Age: 75
Discharge: HOME | End: 2020-06-10
Attending: ANESTHESIOLOGY | Admitting: ANESTHESIOLOGY
Payer: MEDICARE

## 2020-06-10 VITALS
RESPIRATION RATE: 20 BRPM | OXYGEN SATURATION: 97 % | HEART RATE: 54 BPM | DIASTOLIC BLOOD PRESSURE: 63 MMHG | TEMPERATURE: 99.1 F | SYSTOLIC BLOOD PRESSURE: 119 MMHG

## 2020-06-10 VITALS
WEIGHT: 163.9 LBS | HEART RATE: 54 BPM | HEIGHT: 67 IN | SYSTOLIC BLOOD PRESSURE: 114 MMHG | BODY MASS INDEX: 25.72 KG/M2 | DIASTOLIC BLOOD PRESSURE: 52 MMHG | OXYGEN SATURATION: 95 %

## 2020-06-10 DIAGNOSIS — I35.0 AORTIC VALVE STENOSIS, ETIOLOGY OF CARDIAC VALVE DISEASE UNSPECIFIED: ICD-10-CM

## 2020-06-10 DIAGNOSIS — M54.16 LUMBAR RADICULOPATHY: ICD-10-CM

## 2020-06-10 DIAGNOSIS — R60.0 BILATERAL LEG EDEMA: Primary | ICD-10-CM

## 2020-06-10 DIAGNOSIS — E11.9 TYPE 2 DIABETES MELLITUS WITHOUT COMPLICATION, WITHOUT LONG-TERM CURRENT USE OF INSULIN (HCC): ICD-10-CM

## 2020-06-10 DIAGNOSIS — E78.5 HYPERLIPIDEMIA, UNSPECIFIED HYPERLIPIDEMIA TYPE: ICD-10-CM

## 2020-06-10 DIAGNOSIS — I10 HYPERTENSION, UNSPECIFIED TYPE: ICD-10-CM

## 2020-06-10 PROCEDURE — 3066F NEPHROPATHY DOC TX: CPT | Performed by: NURSE PRACTITIONER

## 2020-06-10 PROCEDURE — 3074F SYST BP LT 130 MM HG: CPT | Performed by: NURSE PRACTITIONER

## 2020-06-10 PROCEDURE — 64483 NJX AA&/STRD TFRM EPI L/S 1: CPT | Performed by: ANESTHESIOLOGY

## 2020-06-10 PROCEDURE — 1036F TOBACCO NON-USER: CPT | Performed by: NURSE PRACTITIONER

## 2020-06-10 PROCEDURE — 64484 NJX AA&/STRD TFRM EPI L/S EA: CPT | Performed by: ANESTHESIOLOGY

## 2020-06-10 PROCEDURE — 3051F HG A1C>EQUAL 7.0%<8.0%: CPT | Performed by: NURSE PRACTITIONER

## 2020-06-10 PROCEDURE — 4040F PNEUMOC VAC/ADMIN/RCVD: CPT | Performed by: NURSE PRACTITIONER

## 2020-06-10 PROCEDURE — 3078F DIAST BP <80 MM HG: CPT | Performed by: NURSE PRACTITIONER

## 2020-06-10 PROCEDURE — 99214 OFFICE O/P EST MOD 30 MIN: CPT | Performed by: NURSE PRACTITIONER

## 2020-06-10 PROCEDURE — 1160F RVW MEDS BY RX/DR IN RCRD: CPT | Performed by: NURSE PRACTITIONER

## 2020-06-10 PROCEDURE — 2022F DILAT RTA XM EVC RTNOPTHY: CPT | Performed by: NURSE PRACTITIONER

## 2020-06-10 RX ORDER — PAPAVERINE HCL 150 MG
20 CAPSULE, EXTENDED RELEASE ORAL ONCE
Status: COMPLETED | OUTPATIENT
Start: 2020-06-10 | End: 2020-06-10

## 2020-06-10 RX ORDER — FUROSEMIDE 20 MG/1
TABLET ORAL
Qty: 1 TABLET | Refills: 0 | Status: SHIPPED | OUTPATIENT
Start: 2020-06-10 | End: 2020-06-11

## 2020-06-10 RX ORDER — LIDOCAINE HYDROCHLORIDE 10 MG/ML
5 INJECTION, SOLUTION EPIDURAL; INFILTRATION; INTRACAUDAL; PERINEURAL ONCE
Status: COMPLETED | OUTPATIENT
Start: 2020-06-10 | End: 2020-06-10

## 2020-06-10 RX ADMIN — IOHEXOL 2 ML: 300 INJECTION, SOLUTION INTRAVENOUS at 11:19

## 2020-06-10 RX ADMIN — DEXAMETHASONE SODIUM PHOSPHATE 15 MG: 10 INJECTION, SOLUTION INTRAMUSCULAR; INTRAVENOUS at 11:21

## 2020-06-10 RX ADMIN — LIDOCAINE HYDROCHLORIDE 2 ML: 20 INJECTION, SOLUTION EPIDURAL; INFILTRATION; INTRACAUDAL; PERINEURAL at 11:21

## 2020-06-10 RX ADMIN — LIDOCAINE HYDROCHLORIDE 4 ML: 10 INJECTION, SOLUTION EPIDURAL; INFILTRATION; INTRACAUDAL; PERINEURAL at 11:17

## 2020-06-11 DIAGNOSIS — R60.0 BILATERAL LEG EDEMA: ICD-10-CM

## 2020-06-11 RX ORDER — FUROSEMIDE 20 MG/1
TABLET ORAL
Qty: 1 TABLET | Refills: 0 | Status: SHIPPED | OUTPATIENT
Start: 2020-06-11 | End: 2020-06-15

## 2020-06-12 ENCOUNTER — TELEPHONE (OUTPATIENT)
Dept: CARDIOLOGY CLINIC | Facility: CLINIC | Age: 75
End: 2020-06-12

## 2020-06-13 DIAGNOSIS — Z86.19 H/O TINEA CRURIS: Chronic | ICD-10-CM

## 2020-06-13 RX ORDER — CLOTRIMAZOLE AND BETAMETHASONE DIPROPIONATE 10; .5 MG/ML; MG/ML
LOTION TOPICAL
Qty: 30 ML | Refills: 0 | Status: SHIPPED | OUTPATIENT
Start: 2020-06-13 | End: 2020-07-10

## 2020-06-15 ENCOUNTER — OFFICE VISIT (OUTPATIENT)
Dept: CARDIOLOGY CLINIC | Facility: CLINIC | Age: 75
End: 2020-06-15
Payer: MEDICARE

## 2020-06-15 VITALS
HEIGHT: 67 IN | WEIGHT: 158 LBS | HEART RATE: 57 BPM | DIASTOLIC BLOOD PRESSURE: 60 MMHG | SYSTOLIC BLOOD PRESSURE: 120 MMHG | BODY MASS INDEX: 24.8 KG/M2

## 2020-06-15 DIAGNOSIS — Z95.5 S/P DRUG ELUTING CORONARY STENT PLACEMENT: Chronic | ICD-10-CM

## 2020-06-15 DIAGNOSIS — I25.10 CORONARY ARTERY DISEASE INVOLVING NATIVE CORONARY ARTERY OF NATIVE HEART WITHOUT ANGINA PECTORIS: Primary | ICD-10-CM

## 2020-06-15 DIAGNOSIS — Z95.1 S/P CABG (CORONARY ARTERY BYPASS GRAFT): ICD-10-CM

## 2020-06-15 DIAGNOSIS — I35.0 MILD AORTIC STENOSIS: ICD-10-CM

## 2020-06-15 DIAGNOSIS — I10 ESSENTIAL HYPERTENSION: ICD-10-CM

## 2020-06-15 PROCEDURE — 99214 OFFICE O/P EST MOD 30 MIN: CPT | Performed by: INTERNAL MEDICINE

## 2020-06-15 PROCEDURE — 3051F HG A1C>EQUAL 7.0%<8.0%: CPT | Performed by: INTERNAL MEDICINE

## 2020-06-15 PROCEDURE — 3066F NEPHROPATHY DOC TX: CPT | Performed by: INTERNAL MEDICINE

## 2020-06-15 PROCEDURE — 2022F DILAT RTA XM EVC RTNOPTHY: CPT | Performed by: INTERNAL MEDICINE

## 2020-06-15 PROCEDURE — 3078F DIAST BP <80 MM HG: CPT | Performed by: INTERNAL MEDICINE

## 2020-06-15 PROCEDURE — 4040F PNEUMOC VAC/ADMIN/RCVD: CPT | Performed by: INTERNAL MEDICINE

## 2020-06-15 PROCEDURE — 3074F SYST BP LT 130 MM HG: CPT | Performed by: INTERNAL MEDICINE

## 2020-06-15 PROCEDURE — 1036F TOBACCO NON-USER: CPT | Performed by: INTERNAL MEDICINE

## 2020-06-15 PROCEDURE — 1160F RVW MEDS BY RX/DR IN RCRD: CPT | Performed by: INTERNAL MEDICINE

## 2020-06-15 PROCEDURE — 3008F BODY MASS INDEX DOCD: CPT | Performed by: INTERNAL MEDICINE

## 2020-06-15 RX ORDER — FUROSEMIDE 20 MG/1
20 TABLET ORAL DAILY PRN
Qty: 30 TABLET | Refills: 3 | Status: SHIPPED | OUTPATIENT
Start: 2020-06-15 | End: 2020-08-24

## 2020-06-17 ENCOUNTER — TELEPHONE (OUTPATIENT)
Dept: PAIN MEDICINE | Facility: CLINIC | Age: 75
End: 2020-06-17

## 2020-06-17 DIAGNOSIS — M54.32 SCIATICA OF LEFT SIDE: ICD-10-CM

## 2020-06-19 ENCOUNTER — OFFICE VISIT (OUTPATIENT)
Dept: URGENT CARE | Age: 75
End: 2020-06-19
Payer: MEDICARE

## 2020-06-19 VITALS
SYSTOLIC BLOOD PRESSURE: 147 MMHG | TEMPERATURE: 97.8 F | OXYGEN SATURATION: 97 % | HEIGHT: 67 IN | RESPIRATION RATE: 18 BRPM | HEART RATE: 59 BPM | DIASTOLIC BLOOD PRESSURE: 66 MMHG | WEIGHT: 164 LBS | BODY MASS INDEX: 25.74 KG/M2

## 2020-06-19 DIAGNOSIS — R10.30 LOWER ABDOMINAL PAIN: Primary | ICD-10-CM

## 2020-06-19 PROCEDURE — G0463 HOSPITAL OUTPT CLINIC VISIT: HCPCS | Performed by: PHYSICIAN ASSISTANT

## 2020-06-19 PROCEDURE — 99213 OFFICE O/P EST LOW 20 MIN: CPT | Performed by: PHYSICIAN ASSISTANT

## 2020-06-19 RX ORDER — NAPROXEN SODIUM 220 MG
220 TABLET ORAL 2 TIMES DAILY PRN
Qty: 10 TABLET | Refills: 0 | OUTPATIENT
Start: 2020-06-19 | End: 2020-06-24

## 2020-06-24 ENCOUNTER — TELEPHONE (OUTPATIENT)
Dept: FAMILY MEDICINE CLINIC | Facility: CLINIC | Age: 75
End: 2020-06-24

## 2020-07-02 ENCOUNTER — OFFICE VISIT (OUTPATIENT)
Dept: FAMILY MEDICINE CLINIC | Facility: CLINIC | Age: 75
End: 2020-07-02

## 2020-07-02 VITALS
WEIGHT: 160 LBS | DIASTOLIC BLOOD PRESSURE: 60 MMHG | SYSTOLIC BLOOD PRESSURE: 130 MMHG | RESPIRATION RATE: 16 BRPM | TEMPERATURE: 97.2 F | HEIGHT: 67 IN | HEART RATE: 82 BPM | BODY MASS INDEX: 25.11 KG/M2

## 2020-07-02 DIAGNOSIS — R60.0 LEG EDEMA: ICD-10-CM

## 2020-07-02 DIAGNOSIS — R10.84 GENERALIZED ABDOMINAL PAIN: Primary | ICD-10-CM

## 2020-07-02 DIAGNOSIS — K21.9 GASTROESOPHAGEAL REFLUX DISEASE, ESOPHAGITIS PRESENCE NOT SPECIFIED: ICD-10-CM

## 2020-07-02 PROCEDURE — 4040F PNEUMOC VAC/ADMIN/RCVD: CPT | Performed by: PHYSICIAN ASSISTANT

## 2020-07-02 PROCEDURE — 3078F DIAST BP <80 MM HG: CPT | Performed by: PHYSICIAN ASSISTANT

## 2020-07-02 PROCEDURE — 1160F RVW MEDS BY RX/DR IN RCRD: CPT | Performed by: PHYSICIAN ASSISTANT

## 2020-07-02 PROCEDURE — 3051F HG A1C>EQUAL 7.0%<8.0%: CPT | Performed by: PHYSICIAN ASSISTANT

## 2020-07-02 PROCEDURE — 1036F TOBACCO NON-USER: CPT | Performed by: PHYSICIAN ASSISTANT

## 2020-07-02 PROCEDURE — 3008F BODY MASS INDEX DOCD: CPT | Performed by: PHYSICIAN ASSISTANT

## 2020-07-02 PROCEDURE — 3075F SYST BP GE 130 - 139MM HG: CPT | Performed by: PHYSICIAN ASSISTANT

## 2020-07-02 PROCEDURE — 3066F NEPHROPATHY DOC TX: CPT | Performed by: PHYSICIAN ASSISTANT

## 2020-07-02 PROCEDURE — 99213 OFFICE O/P EST LOW 20 MIN: CPT | Performed by: PHYSICIAN ASSISTANT

## 2020-07-02 PROCEDURE — 2022F DILAT RTA XM EVC RTNOPTHY: CPT | Performed by: PHYSICIAN ASSISTANT

## 2020-07-02 RX ORDER — OMEPRAZOLE 20 MG/1
CAPSULE, DELAYED RELEASE ORAL
Qty: 90 CAPSULE | Refills: 1 | Status: SHIPPED | OUTPATIENT
Start: 2020-07-02 | End: 2020-10-23

## 2020-07-02 RX ORDER — SIMETHICONE 125 MG
125 CAPSULE ORAL EVERY 6 HOURS PRN
Qty: 28 EACH | Refills: 0 | Status: SHIPPED | OUTPATIENT
Start: 2020-07-02 | End: 2020-07-07

## 2020-07-02 NOTE — PATIENT INSTRUCTIONS
Dieta para úlceras estomacales y gastritis   CUIDADO AMBULATORIO:   Barron dieta para úlceras estomacales y gastritis  es un plan alimenticio que limita los alimentos que irritan li BJURHOLM  Ciertos alimentos [employer] síntomas seth dolor de DRU, Yu, acidez estomacal o indigestión  Alimentos que debe limitar o evitar:  Es posible que usted necesite evitar los alimentos ácidos, picantes o altos en grasa  No todos los alimentos afectan a las personas de la [employer]  Usted necesitará aprender cuáles alimentos empeoran quinton síntomas y tendrá que limitar esos alimentos  Los siguientes son algunos alimentos que podrían empeorar barron úlcera o los síntomas de la gastritis:  · Bebidas:      ¨ Leche entera y [place] chocolatada    ¨ Chocolate caliente y refrescos de cola    ¨ Cualquier bebida con cafeína    ¨ Café regular y descafeinado    ¨ Té de hierbabuena y menta luz    ¨ Té luz o eyad, regular o descafeinado    ¨ Jugos de [facility] y toronja    ¨ Bebidas alcohólicas    · Especias y condimentos:      Ophelia Spies y lavon    ¨ Polvo de [facility]    ¨ Semilla de mostaza y Janyce Flattery moscada    · Otros alimentos:      ¨ Alimentos lácteos hechos de leche entera o crema    ¨ Chocolate    ¨ Quesos picantes o de sabor hugo, seth de [employer] o brett jewel    ¨ Carne con alto contenido de grasa y muy condimentada, seth el chorizo, salchichómickie, tocrhiannon, Darlene y embutidos    ¨ Chiles picantes    ¨ Productos derivados del tomate, seth pasta de [place] city, salsa o jugo del mismo  Alimentos que puede incluir:  Consuma barron variedad de alimentos saludables de todos los grupos alimenticios  Consuma frutas, verduras, granos enteros y productos lácteos descremados o bajos en grasa  Los granos [employer], los cereales, la pasta y el arroz integral  Elija la carne [place], aves de lanier (kadeem y Jose Elias), pescado, frijoles, huevos y juanito secos   Un plan alimenticio saludable tiene un contenido bajo de grasas no saludables, sal y azúcar  Las grasas saludables incluyen el aceite de jeronimo y de canola  Solicite a dyson dietista más información acerca de un plan alimenticio saludable  Otras pautas útiles:   · No coma david antes de acostarse  Deje de comer por lo menos 2 horas antes de acostarse  · Coma comidas pequeñas y con frecuencia  Es probable que dyson estómago tolere mejor comidas pequeñas y frecuentes en vez de comidas grandes  © 2017 [address] Information is for End User's use only and may not be sold, redistributed or otherwise used for commercial purposes  All illustrations and images included in CareNotes® are the copyrighted property of A D A M , Inc  or Dustin Lamar  Esta información es sólo para uso en educación  Dyson intención no es darle un consejo médico sobre enfermedades o tratamientos  Colsulte con dyson [place]ala Primer farmacéutico antes de seguir cualquier régimen médico para saber si es seguro y efectivo para usted

## 2020-07-02 NOTE — ASSESSMENT & PLAN NOTE
Advised to increase omeprazole to 40 mg po qd x 14 days then resume to 20 mg daily  Simethicone 125mg  Avoid food such as tomato based foods, spicy foods, caffeine, fried fatty foods and chocolate-handout provided Chinese  ED precautions given if symptoms worsen

## 2020-07-02 NOTE — PROGRESS NOTES
Assessment/Plan:    Generalized abdominal pain  Advised to increase omeprazole to 40 mg po qd x 14 days then resume to 20 mg daily  Simethicone 125mg  Avoid food such as tomato based foods, spicy foods, caffeine, fried fatty foods and chocolate  ED precautions given if symptoms worsen    Leg edema  Advised to try compression stockings   Elevate legs  Low-sodium diet      Diagnoses and all orders for this visit:    Screening for diabetic retinopathy    Gastroesophageal reflux disease, esophagitis presence not specified  -     omeprazole (PriLOSEC) 20 mg delayed release capsule; TAKE 2 CAPS PO X 14 DAYS THEN 1 CAP PO QD  -     simethicone (Gas-X Extra Strength) 125 MG CAPS; Take 1 capsule (125 mg total) by mouth every 6 (six) hours as needed for flatulence    Leg edema  -     Elastic Bandages & Supports (MEDICAL COMPRESSION STOCKINGS) MISC; by Does not apply route daily Please provide B/L compression stockings    Generalized abdominal pain    Other orders  -     Cancel: IRIS Diabetic eye exam        Subjective:      Patient ID: Abel French is a 70 y o  male presents today with daughter for complaint of abdominal pain  Patient was seen at -urgent care on 06/18/2020 for c/o abdominal pain  He was advised to go to the emergency room but did not wish to go was then advised to follow-up with his PCP  Abdominal Pain   This is a new problem  Episode onset: 2 weeks  The problem occurs 2 to 4 times per day  The most recent episode lasted 1 hour  The problem has been gradually improving  The pain is located in the generalized abdominal region  The pain is at a severity of 6/10  The quality of the pain is burning  Associated symptoms include belching, flatus and nausea  Pertinent negatives include no arthralgias, constipation, diarrhea, fever, headaches, melena, myalgias, vomiting or weight loss  The pain is aggravated by eating  He has tried proton pump inhibitors for the symptoms   His past medical history is significant for GERD  No pain at this times  C/o BLE edema- was seen by Cardiology on 06/15/2020 for c/o BLE edema  He was advised to wear compression stockings, which he has yet to get  Also advised use Lasix as needed if edema does not improve with elevation and follow a low-sodium diet  The following portions of the patient's history were reviewed and updated as appropriate: allergies, current medications, past family history, past medical history, past social history, past surgical history and problem list     Review of Systems   Constitutional: Negative for chills, fatigue, fever and weight loss  Respiratory: Negative for cough, chest tightness, shortness of breath and wheezing  Cardiovascular: Positive for leg swelling  Negative for chest pain and palpitations  Gastrointestinal: Positive for abdominal pain, flatus and nausea  Negative for constipation, diarrhea, melena and vomiting  Genitourinary: Negative for difficulty urinating  Musculoskeletal: Positive for back pain (Chronic)  Negative for arthralgias, myalgias, neck pain and neck stiffness  Skin: Negative for rash and wound  Neurological: Negative for dizziness, weakness, light-headedness, numbness and headaches  Psychiatric/Behavioral: Negative for agitation and behavioral problems  The patient is not nervous/anxious  Objective:      /60   Pulse 82   Temp (!) 97 2 °F (36 2 °C)   Resp 16   Ht 5' 7" (1 702 m)   Wt 72 6 kg (160 lb)   BMI 25 06 kg/m²          Physical Exam   Constitutional: He is oriented to person, place, and time  He appears well-developed and well-nourished  No distress  HENT:   Head: Normocephalic and atraumatic  Neck: Neck supple  Cardiovascular: Normal rate, regular rhythm and normal heart sounds  No murmur heard  Pulmonary/Chest: Effort normal and breath sounds normal  No respiratory distress  He has no wheezes  Abdominal: Soft  Bowel sounds are normal  He exhibits no mass  There is generalized tenderness (MIld)  There is no guarding  Musculoskeletal: He exhibits no edema, tenderness or deformity  Neurological: He is oriented to person, place, and time  Skin: Skin is warm and dry  Psychiatric: He has a normal mood and affect   His behavior is normal  Thought content normal

## 2020-07-06 ENCOUNTER — TELEPHONE (OUTPATIENT)
Dept: FAMILY MEDICINE CLINIC | Facility: CLINIC | Age: 75
End: 2020-07-06

## 2020-07-06 DIAGNOSIS — K21.9 GASTROESOPHAGEAL REFLUX DISEASE, ESOPHAGITIS PRESENCE NOT SPECIFIED: ICD-10-CM

## 2020-07-07 DIAGNOSIS — R60.0 LEG EDEMA: Primary | ICD-10-CM

## 2020-07-07 RX ORDER — SIMETHICONE 125 MG
TABLET,CHEWABLE ORAL
Qty: 28 TABLET | Refills: 1 | Status: SHIPPED | OUTPATIENT
Start: 2020-07-07 | End: 2020-07-14

## 2020-07-07 NOTE — TELEPHONE ENCOUNTER
Compression stockings 15 to 20
New order entered, faxed to [facility] 
Patient Med supply company calling  And stated, I need to know the type of compression stockings whether it is knee high or thigh high and the amount of compression, 15 to 20  Or 20 to 30?
Please indicate which compression number?
Left Knee x-rays demonstrate diffuse tricompartmental degenerative Arthritis, medial joint space narrowing, marginal osteophytes, sclerosis, patellofemoral joint space narrowing with lateral tracking, peripheral osteophytes, bone on bone, Kellgren and Estuardo grade 4.  Right Knee x-rays demonstrate diffuse tricompartmental degenerative Arthritis, medial joint space narrowing, marginal osteophytes, sclerosis, patellofemoral joint space narrowing with lateral tracking, peripheral osteophytes, bone on bone, Kellgren and Estuardo grade 4.

## 2020-07-10 ENCOUNTER — OFFICE VISIT (OUTPATIENT)
Dept: PAIN MEDICINE | Facility: CLINIC | Age: 75
End: 2020-07-10
Payer: MEDICARE

## 2020-07-10 VITALS
BODY MASS INDEX: 25.58 KG/M2 | HEART RATE: 58 BPM | HEIGHT: 67 IN | TEMPERATURE: 98.4 F | WEIGHT: 163 LBS | SYSTOLIC BLOOD PRESSURE: 107 MMHG | DIASTOLIC BLOOD PRESSURE: 54 MMHG

## 2020-07-10 DIAGNOSIS — M54.16 LUMBAR RADICULOPATHY: Primary | ICD-10-CM

## 2020-07-10 DIAGNOSIS — M51.36 DDD (DEGENERATIVE DISC DISEASE), LUMBAR: ICD-10-CM

## 2020-07-10 DIAGNOSIS — Z86.19 H/O TINEA CRURIS: Chronic | ICD-10-CM

## 2020-07-10 DIAGNOSIS — M47.816 LUMBAR SPONDYLOSIS: ICD-10-CM

## 2020-07-10 DIAGNOSIS — M48.062 SPINAL STENOSIS OF LUMBAR REGION WITH NEUROGENIC CLAUDICATION: ICD-10-CM

## 2020-07-10 PROCEDURE — 1160F RVW MEDS BY RX/DR IN RCRD: CPT | Performed by: ANESTHESIOLOGY

## 2020-07-10 PROCEDURE — 99213 OFFICE O/P EST LOW 20 MIN: CPT | Performed by: ANESTHESIOLOGY

## 2020-07-10 PROCEDURE — 3074F SYST BP LT 130 MM HG: CPT | Performed by: ANESTHESIOLOGY

## 2020-07-10 PROCEDURE — 3066F NEPHROPATHY DOC TX: CPT | Performed by: ANESTHESIOLOGY

## 2020-07-10 PROCEDURE — 3051F HG A1C>EQUAL 7.0%<8.0%: CPT | Performed by: ANESTHESIOLOGY

## 2020-07-10 PROCEDURE — 1036F TOBACCO NON-USER: CPT | Performed by: ANESTHESIOLOGY

## 2020-07-10 PROCEDURE — 2022F DILAT RTA XM EVC RTNOPTHY: CPT | Performed by: ANESTHESIOLOGY

## 2020-07-10 PROCEDURE — 3078F DIAST BP <80 MM HG: CPT | Performed by: ANESTHESIOLOGY

## 2020-07-10 PROCEDURE — 3008F BODY MASS INDEX DOCD: CPT | Performed by: ANESTHESIOLOGY

## 2020-07-10 PROCEDURE — 4040F PNEUMOC VAC/ADMIN/RCVD: CPT | Performed by: ANESTHESIOLOGY

## 2020-07-10 RX ORDER — GABAPENTIN 400 MG/1
400 CAPSULE ORAL 3 TIMES DAILY
Qty: 90 CAPSULE | Refills: 1 | Status: SHIPPED | OUTPATIENT
Start: 2020-07-10 | End: 2020-09-04 | Stop reason: SDUPTHER

## 2020-07-10 RX ORDER — CLOTRIMAZOLE AND BETAMETHASONE DIPROPIONATE 10; .5 MG/ML; MG/ML
LOTION TOPICAL
Qty: 30 ML | Refills: 0 | Status: SHIPPED | OUTPATIENT
Start: 2020-07-10 | End: 2020-12-15

## 2020-07-10 RX ORDER — DULOXETIN HYDROCHLORIDE 60 MG/1
60 CAPSULE, DELAYED RELEASE ORAL EVERY MORNING
COMMUNITY
Start: 2020-07-03

## 2020-07-10 NOTE — PROGRESS NOTES
Assessment  1  Lumbar radiculopathy    2  Lumbar spondylosis    3  DDD (degenerative disc disease), lumbar    4  Spinal stenosis of lumbar region with neurogenic claudication        Plan  57-year-old male returning for follow-up of lumbosacral back pain with radiculopathy into the left lower extremity secondary to lumbar degenerative disc disease, spondylosis, and stenosis  The patient had a left L4 and L5 TFESI which has given him approximately 90% relief of his low back and left lower extremity symptoms  He is also taking gabapentin 400 mg t i d   The patient is very satisfied with the amount of relief he is getting and denies any side effects from the gabapentin  1  The patient may continue with gabapentin 400 mg t i d   2  We will repeat left L4 and L5 TFESI p r n   3  Patient will continue with his home exercise program  4  I will follow up the patient in 2 months or sooner if new       My impressions and treatment recommendations were discussed in detail with the patient who verbalized understanding and had no further questions  Discharge instructions were provided  I personally saw and examined the patient and I agree with the above discussed plan of care  No orders of the defined types were placed in this encounter  New Medications Ordered This Visit   Medications    DULoxetine (CYMBALTA) 60 mg delayed release capsule     Sig: Take 60 mg by mouth every morning       History of Present Illness    Lenard Loco is a 70 y o  male returning for follow-up of lumbosacral back pain with radiculopathy into the left lower extremity secondary to lumbar degenerative disc disease, spondylosis, and stenosis  He denies any right lower extremity symptoms, bladder bowel incontinence, or saddle anesthesia  The patient had a left L4 and L5 TFESI Velia 10, 2020 which has given him approximately 90% relief of his low back and left lower extremity symptoms which she is still experiencing today    He is also taking gabapentin 400 mg t i d   The patient is very satisfied with the amount of relief he is getting and denies any side effects from the gabapentin  The patient rates his pain a 1/10 on the pain does not follow any particular pattern throughout the day  The pain is intermittent and described as pressure-like  The pain is worse with standing, walking, and exercise  The pain is alleviated with sitting, relaxation, and lying down  Other than as stated above, the patient denies any interval changes in medications, medical condition, mental condition, symptoms, or allergies since the last office visit  I have personally reviewed and/or updated the patient's past medical history, past surgical history, family history, social history, current medications, allergies, and vital signs today  Review of Systems   Constitutional: Negative for fever and unexpected weight change  HENT: Negative for trouble swallowing  Eyes: Negative for visual disturbance  Respiratory: Negative for shortness of breath and wheezing  Cardiovascular: Negative for chest pain and palpitations  Gastrointestinal: Negative for constipation, diarrhea, nausea and vomiting  Endocrine: Negative for cold intolerance, heat intolerance and polydipsia  Genitourinary: Negative for difficulty urinating and frequency  Musculoskeletal: Positive for gait problem  Negative for arthralgias, joint swelling and myalgias  Pain in extremity   Skin: Negative for rash  Neurological: Negative for dizziness, seizures, syncope, weakness and headaches  Hematological: Does not bruise/bleed easily  Psychiatric/Behavioral: Negative for dysphoric mood  All other systems reviewed and are negative        Patient Active Problem List   Diagnosis    Biceps tendonitis on right    Complete rotator cuff tear or rupture of right shoulder, not specified as traumatic    NSTEMI (non-ST elevated myocardial infarction) ([facility]    S/P drug eluting coronary stent placement    H/O non-ST elevation myocardial infarction (NSTEMI)    Essential hypertension    Polyarthralgia    Hematuria, microscopic    H/O tinea cruris    Erectile dysfunction associated with type 2 diabetes mellitus (HCC)    Anxiety    Type 2 diabetes mellitus (HCC)    Gastroesophageal reflux disease    Bilateral carpal tunnel syndrome    Mild aortic stenosis    Coronary artery disease involving native coronary artery of native heart without angina pectoris    Pain in both hands    Glaucoma    Major depressive disorder, recurrent episode, moderate (HCC)    History of tobacco use    Dental caries    S/P CABG (coronary artery bypass graft)    Trigger finger of all digits of both hands    Polyp of colon    Vaccine for VZV (varicella-zoster virus)    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)    Insomnia    Hyperlipidemia    Acute left-sided back pain    Skin lesion of face    Essential hypertriglyceridemia    Pulmonary nodule seen on imaging study    Skin cancer    Sleep apnea    Testicular hypogonadism    Sciatica of left side    Lumbar radiculopathy    Spinal stenosis of lumbar region with neurogenic claudication    Lumbar spondylosis    DDD (degenerative disc disease), lumbar    Leg edema    Lower abdominal pain    Generalized abdominal pain       Past Medical History:   Diagnosis Date    AS (aortic stenosis)     Balanitis     Biceps tendonitis on right     CAD (coronary artery disease)     Cancer (HCC)     skin    Colon polyp     Complete rotator cuff tear or rupture of right shoulder, not specified as traumatic     CPAP (continuous positive airway pressure) dependence     Diabetes mellitus (HCC)     Esophageal reflux     High cholesterol     Hypertension     Hypertriglyceridemia     Hypogonadism in male     Myalgia     Myocardial infarction (HCC)     Palpitations     Shoulder pain     Sinus problem     Skin cancer of nose     Sleep apnea     Stroke [facility]) 5308    Systolic murmur     recently found    Tinea cruris     Tinea pedis        Past Surgical History:   Procedure Laterality Date    CARPAL TUNNEL RELEASE      CATARACT EXTRACTION Bilateral     COLONOSCOPY      CORONARY ANGIOPLASTY WITH STENT PLACEMENT      10-15-19 - pt denies stent implant    CORONARY ARTERY BYPASS GRAFT N/A 12/12/2016    Procedure: INTRAOP CECILLE; BILATERAL LEG EVH; CORONARY ARTERY BYPASS GRAFT X 4 SVG TO PDA, OM1,  AND DIAGONAL1, LIMA TO LAD;  Surgeon: Dilshad Rodriguez MD;  [facility];  Service:     EYE SURGERY      HERNIA REPAIR      NC ARTHROSCOPY SHOULDER SURGICAL BICEPS TENODESIS Right 5/6/2016    Procedure: ARTHROSCOPIC BICEPS TENODESIS;  Surgeon: Jason Bella MD;  [facility];  Service: Orthopedics    NC INCISE FINGER TENDON SHEATH Right 10/15/2019    Procedure: TRIGGER FINGER RELEASE INDEX, LONG, RING, SMALL, THUMB FINGERS;  Surgeon: Nakia Amador MD;  [facility];  Service: Orthopedics    NC INCISE FINGER TENDON SHEATH Left 3/3/2020    Procedure: RELEASE TRIGGER FINGER INDEX;  Surgeon: Nakia Amador MD;  [facility];  Service: Orthopedics    NC SHLDR ARTHROSCOP,SURG,W/ROTAT CUFF REPR Right 5/6/2016    Procedure: REPAIR ROTATOR CUFF  ARTHROSCOPIC; SUBACROMIAL DECOMPRESSION; PARTIAL SYNOVECTOMY;  Surgeon: Jason Bella MD;  [facility];  Service: Orthopedics    NC WRIST Zohreh Doles LIG Right 10/15/2019    Procedure: ENDOSCOPIC CARPAL TUNNEL RELEASE;  Surgeon: Nakia Amador MD;  [facility];  Service: Orthopedics    NC WRIST Zohreh Doles LIG Left 3/3/2020    Procedure: RELEASE CARPAL TUNNEL ENDOSCOPIC;  Surgeon: Nakia Amador MD;  [facility];  Service: Orthopedics    ROTATOR CUFF REPAIR      TESTICLE SURGERY      UMBILICAL HERNIA REPAIR  2009    over age 13       Family History Problem Relation Age of Onset    Heart disease Mother     Hypertension Mother     Nephrolithiasis Father     Other Father         Renal disease    Hypertension Sister     Nephrolithiasis Brother     Other Brother         Renal disease    Hematuria Family         Microscopic       Social History     Occupational History    Not on file   Tobacco Use    Smoking status: Former Smoker    Smokeless tobacco: Never Used    Tobacco comment: smoked for 3 years from 17 y/o - 23 yrs old   Substance and Sexual Activity    Alcohol use: No    Drug use: No    Sexual activity: Yes     Partners: Female       Current Outpatient Medications on File Prior to Visit   Medication Sig    Alcohol Swabs (ALCOHOL PADS) 70 % PADS USE TWICE DAILY    aspirin (ECOTRIN LOW STRENGTH) 81 mg EC tablet TAKE 1 TABLET (81 MG TOTAL) BY MOUTH DAILY    clorazepate (TRANXENE) 7 5 mg tablet Take 7 5 mg by mouth 2 (two) times a day     clotrimazole-betamethasone (LOTRISONE) 1-0 05 % cream APPLY TOPICALLY 2 (TWO) TIMES A DAY    clotrimazole-betamethasone (LOTRISONE) 1-0 05 % lotion APPLY TOPICALLY 2 (TWO) TIMES A DAY    diclofenac sodium (VOLTAREN) 1 % APPLY 2 G TOPICALLY 4 (FOUR) TIMES A DAY    DULoxetine (CYMBALTA) 30 mg delayed release capsule Take 30 mg by mouth every morning    Easy Comfort Lancets MISC TEST BLOOD SUGAR TWICE DAILY, OR AS NEEDED WHEN SYMPTOMATIC OF HYPOGLYCEMIA OR HYPERGLYCEMIA    EASY PLUS II GLUCOSE TEST test strip TEST AS DIRECTED TWICE DAILY    Elastic Bandages & Supports (MEDICAL COMPRESSION STOCKINGS) MISC by Does not apply route daily Please provide B/L compression stockings    ferrous sulfate 324 (65 Fe) mg Take 324 mg by mouth once a week     fluticasone (FLONASE) 50 mcg/act nasal spray 1 spray into each nostril daily    furosemide (LASIX) 20 mg tablet Take 1 tablet (20 mg total) by mouth daily as needed (swelling)    gabapentin (NEURONTIN) 400 mg capsule Take 1 capsule (400 mg total) by mouth 3 (three) times a day    glimepiride (AMARYL) 4 mg tablet TAKE 1/2 TABLET BY MOUTH IN THE MORNING AND 1 TABLET AT NIGHT    glucose blood (FREESTYLE TEST STRIPS) test strip Use as instructed to check BS BID PRN (hypoglycemia/hyperglycemia symptoms)    glucose blood (GLUCOSE METER TEST) test strip 1 each by Other route 2 (two) times a day Use as instructed    glucose monitoring kit (FREESTYLE) monitoring kit 1 each by Does not apply route 2 (two) times a day Check BS BID PRN for hypoglycemia/hyperglycemia    HYDROcodone-acetaminophen (NORCO) 5-325 mg per tablet Take 1 tablet by mouth every 6 (six) hours as needed for pain for up to 5 dosesMax Daily Amount: 4 tablets    hydrocortisone 2 5 % cream APPLY TOPICALLY 3 (THREE) TIMES A DAY (Patient taking differently: Apply topically as needed )    Lancets (FREESTYLE) lancets Use as instructed to check BS BID PRN (hypoglycemia/hyperglycemia symptoms)    lisinopril (ZESTRIL) 20 mg tablet TAKE 1 TABLET (20 MG TOTAL) BY MOUTH DAILY    metFORMIN (GLUCOPHAGE) 500 mg tablet Take 1 tablet (500 mg total) by mouth 2 (two) times a day with meals    metoprolol tartrate (LOPRESSOR) 25 mg tablet Take 1 tablet (25 mg total) by mouth every 12 (twelve) hours    mirtazapine (REMERON) 15 mg tablet TAKE 1 TABLET (15 MG TOTAL) BY MOUTH DAILY AT BEDTIME    neomycin-polymyxin-dexamethasone (MAXITROL) ophthalmic suspension INSTILL 1 DROP INTO EACH EYE FOUR TIMES A DAY AFTER SURGERY      NIFEdipine ER (ADALAT CC) 30 MG 24 hr tablet Take 1 tablet (30 mg total) by mouth 2 (two) times a day    omeprazole (PriLOSEC) 20 mg delayed release capsule TAKE 2 CAPS PO X 14 DAYS THEN 1 CAP PO QD    ONETOUCH DELICA LANCETS FINE MISC Test blood sugar twice daily, or as needed when symptomatic of hypoglycemia or hyperglycemia    rosuvastatin (CRESTOR) 40 MG tablet TAKE 1 TABLET (40 MG TOTAL) BY MOUTH DAILY    simethicone (MYLICON) 807 MG chewable tablet TAKE 1 CAPSULE (125 MG TOTAL) BY MOUTH EVERY 6 (SIX) HOURS AS NEEDED FOR FLATULENCE    tobramycin-dexamethasone (TOBRADEX) ophthalmic suspension ADMINISTER 1 DROP TO BOTH EYES 2 (TWO) TIMES A DAY    DULoxetine (CYMBALTA) 60 mg delayed release capsule Take 60 mg by mouth every morning     No current facility-administered medications on file prior to visit  Allergies   Allergen Reactions    Epinephrine Hives and Anxiety    Penicillins Hives and Anxiety       Physical Exam    /54   Pulse 58   Temp 98 4 °F (36 9 °C) (Oral)   Ht 5' 7" (1 702 m)   Wt 73 9 kg (163 lb)   BMI 25 53 kg/m²     Constitutional: normal, well developed, well nourished, alert, in no distress and non-toxic and no overt pain behavior  Eyes: anicteric  HEENT: grossly intact  Neck: supple, symmetric, trachea midline and no masses   Pulmonary:even and unlabored  Cardiovascular:No edema or pitting edema present  Skin:Normal without rashes or lesions and well hydrated  Psychiatric:Mood and affect appropriate  Neurologic:Cranial Nerves II-XII grossly intact  Musculoskeletal:normal gait  Bilateral lumbar paraspinals nontender to palpation  Bilateral lower extremity strength 5/5 in all muscle groups  Sensation intact to light touch in L3 through S1 dermatomes bilaterally  Negative seated straight leg raise bilaterally      Imaging  Imaging reviewed

## 2020-07-13 DIAGNOSIS — M54.32 SCIATICA OF LEFT SIDE: ICD-10-CM

## 2020-07-14 DIAGNOSIS — G47.00 INSOMNIA, UNSPECIFIED TYPE: ICD-10-CM

## 2020-07-14 DIAGNOSIS — K21.9 GASTROESOPHAGEAL REFLUX DISEASE, ESOPHAGITIS PRESENCE NOT SPECIFIED: ICD-10-CM

## 2020-07-14 RX ORDER — SIMETHICONE 125 MG
TABLET,CHEWABLE ORAL
Qty: 28 TABLET | Refills: 1 | Status: SHIPPED | OUTPATIENT
Start: 2020-07-14 | End: 2020-08-12

## 2020-07-15 RX ORDER — MIRTAZAPINE 15 MG/1
TABLET, FILM COATED ORAL
Qty: 90 TABLET | Refills: 0 | OUTPATIENT
Start: 2020-07-15

## 2020-07-16 ENCOUNTER — TELEPHONE (OUTPATIENT)
Dept: FAMILY MEDICINE CLINIC | Facility: CLINIC | Age: 75
End: 2020-07-16

## 2020-07-16 ENCOUNTER — OFFICE VISIT (OUTPATIENT)
Dept: FAMILY MEDICINE CLINIC | Facility: CLINIC | Age: 75
End: 2020-07-16

## 2020-07-16 VITALS
RESPIRATION RATE: 16 BRPM | DIASTOLIC BLOOD PRESSURE: 64 MMHG | WEIGHT: 164.4 LBS | TEMPERATURE: 96.8 F | BODY MASS INDEX: 25.8 KG/M2 | SYSTOLIC BLOOD PRESSURE: 140 MMHG | HEART RATE: 60 BPM | HEIGHT: 67 IN

## 2020-07-16 DIAGNOSIS — R60.0 LEG EDEMA: ICD-10-CM

## 2020-07-16 DIAGNOSIS — K21.9 GASTROESOPHAGEAL REFLUX DISEASE, ESOPHAGITIS PRESENCE NOT SPECIFIED: ICD-10-CM

## 2020-07-16 DIAGNOSIS — R10.84 GENERALIZED ABDOMINAL PAIN: Primary | ICD-10-CM

## 2020-07-16 DIAGNOSIS — E11.9 TYPE 2 DIABETES MELLITUS WITHOUT COMPLICATION, WITHOUT LONG-TERM CURRENT USE OF INSULIN (HCC): ICD-10-CM

## 2020-07-16 PROCEDURE — 3066F NEPHROPATHY DOC TX: CPT | Performed by: PHYSICIAN ASSISTANT

## 2020-07-16 PROCEDURE — 3077F SYST BP >= 140 MM HG: CPT | Performed by: PHYSICIAN ASSISTANT

## 2020-07-16 PROCEDURE — 3051F HG A1C>EQUAL 7.0%<8.0%: CPT | Performed by: PHYSICIAN ASSISTANT

## 2020-07-16 PROCEDURE — 3008F BODY MASS INDEX DOCD: CPT | Performed by: PHYSICIAN ASSISTANT

## 2020-07-16 PROCEDURE — 1036F TOBACCO NON-USER: CPT | Performed by: PHYSICIAN ASSISTANT

## 2020-07-16 PROCEDURE — 1160F RVW MEDS BY RX/DR IN RCRD: CPT | Performed by: PHYSICIAN ASSISTANT

## 2020-07-16 PROCEDURE — 4040F PNEUMOC VAC/ADMIN/RCVD: CPT | Performed by: PHYSICIAN ASSISTANT

## 2020-07-16 PROCEDURE — 99213 OFFICE O/P EST LOW 20 MIN: CPT | Performed by: PHYSICIAN ASSISTANT

## 2020-07-16 PROCEDURE — 2022F DILAT RTA XM EVC RTNOPTHY: CPT | Performed by: PHYSICIAN ASSISTANT

## 2020-07-16 PROCEDURE — 3078F DIAST BP <80 MM HG: CPT | Performed by: PHYSICIAN ASSISTANT

## 2020-07-16 RX ORDER — TOBRAMYCIN AND DEXAMETHASONE 3; 1 MG/ML; MG/ML
SUSPENSION/ DROPS OPHTHALMIC
COMMUNITY
Start: 2019-12-04 | End: 2020-07-16

## 2020-07-16 NOTE — ASSESSMENT & PLAN NOTE
No further edema to BLE  Advised to continue wearing compression stockings on qam off qhs  Continue low-sodium diet

## 2020-07-16 NOTE — PROGRESS NOTES
Assessment/Plan:    Generalized abdominal pain  Resolved  Resume omeprazole 40 mg daily  Simethicone 125 mg po PRN      Gastroesophageal reflux disease  Continue omeprazole 20 mg daily  Advised to continue following a GERD free diet    Leg edema  No further edema to BLE  Advised to continue wearing compression stockings on qam off qhs  Continue low-sodium diet        Diagnoses and all orders for this visit:    Generalized abdominal pain    Gastroesophageal reflux disease, esophagitis presence not specified    Type 2 diabetes mellitus without complication, without long-term current use of insulin ([facility])  -     Diabetic Shoe    Leg edema    Other orders  -     Discontinue: tobramycin-dexamethasone (TOBRADEX) ophthalmic suspension;   Start: 12/04/19 14:18:00 EST, 1 drop, both eyes, bid        Subjective:      Patient ID: Moody Duenas is a 70 y o  male [resents today for 2 week up abdominal pain  HPI    He increase omeprazole to 40 mg daily for 2 weeks as instructed at last visit  Is scheduled to resume to previous dose of 20 mg po qd tomorrow  Received simethicone yesterday from his pharmacy when it became available  He reports no further abdominal pain  Denies any constipation, diarrhea, nausea, or vomiting  Began wearing compression stocking since last visit  He reports no further swelling since he began wearing compression stockings  Requesting an order for new diabetic shoes  The following portions of the patient's history were reviewed and updated as appropriate: allergies, current medications, past family history, past medical history, past social history, past surgical history and problem list     Review of Systems   Constitutional: Negative for chills, fatigue and fever  Respiratory: Negative for cough, chest tightness, shortness of breath and wheezing  Cardiovascular: Negative for chest pain and palpitations     Gastrointestinal: Negative for abdominal pain, constipation, diarrhea, nausea and vomiting  Objective:      /64 (BP Location: Left arm, Patient Position: Sitting, Cuff Size: Standard)   Pulse 60   Temp (!) 96 8 °F (36 °C) (Tympanic)   Resp 16   Ht 5' 7" (1 702 m)   Wt 74 6 kg (164 lb 6 4 oz)   BMI 25 75 kg/m²          Physical Exam   Constitutional: He is oriented to person, place, and time  He appears well-developed and well-nourished  No distress  HENT:   Head: Normocephalic and atraumatic  Eyes: Conjunctivae are normal    Neck: Normal range of motion  Neck supple  Cardiovascular: Normal rate, regular rhythm and normal heart sounds  No murmur heard  Pulmonary/Chest: Effort normal and breath sounds normal  No respiratory distress  He has no wheezes  Abdominal: Soft  Bowel sounds are normal  He exhibits no mass  There is no tenderness  There is no rebound and no guarding  Musculoskeletal: He exhibits no edema or deformity  Lymphadenopathy:     He has no cervical adenopathy  Neurological: He is alert and oriented to person, place, and time  Psychiatric: He has a normal mood and affect   His behavior is normal  Judgment and thought content normal

## 2020-07-21 DIAGNOSIS — M54.32 SCIATICA OF LEFT SIDE: ICD-10-CM

## 2020-07-22 ENCOUNTER — TELEPHONE (OUTPATIENT)
Dept: FAMILY MEDICINE CLINIC | Facility: CLINIC | Age: 75
End: 2020-07-22

## 2020-07-22 NOTE — TELEPHONE ENCOUNTER
Patient's daughter calling and stated, " I tried to get an appointment for my father for the Diabetic shoes [address] ordered but [facility] where we were to get the appointment said that they need the notes from the Dr 's visit that he had that states he needs the diabetic shoes "    Fax # [phone]

## 2020-07-23 ENCOUNTER — APPOINTMENT (OUTPATIENT)
Dept: LAB | Facility: HOSPITAL | Age: 75
End: 2020-07-23
Payer: MEDICARE

## 2020-07-23 DIAGNOSIS — F31.10 MANIC BIPOLAR I DISORDER (HCC): Primary | ICD-10-CM

## 2020-07-23 LAB
FERRITIN SERPL-MCNC: 175 NG/ML (ref 8–388)
IRON SATN MFR SERPL: 36 %
IRON SERPL-MCNC: 94 UG/DL (ref 65–175)
RETICS # AUTO: ABNORMAL 10*3/UL (ref 14356–105094)
RETICS # CALC: 2.06 % (ref 0.37–1.87)
TIBC SERPL-MCNC: 258 UG/DL (ref 250–450)
TRIGL SERPL-MCNC: 84 MG/DL
URATE SERPL-MCNC: 5.3 MG/DL (ref 4.2–8)
VIT B12 SERPL-MCNC: 560 PG/ML (ref 100–900)

## 2020-07-23 PROCEDURE — 85045 AUTOMATED RETICULOCYTE COUNT: CPT

## 2020-07-23 PROCEDURE — 82607 VITAMIN B-12: CPT

## 2020-07-23 PROCEDURE — 82728 ASSAY OF FERRITIN: CPT

## 2020-07-23 PROCEDURE — 83540 ASSAY OF IRON: CPT

## 2020-07-23 PROCEDURE — 84550 ASSAY OF BLOOD/URIC ACID: CPT

## 2020-07-23 PROCEDURE — 36415 COLL VENOUS BLD VENIPUNCTURE: CPT

## 2020-07-23 PROCEDURE — 83550 IRON BINDING TEST: CPT

## 2020-07-23 PROCEDURE — 84478 ASSAY OF TRIGLYCERIDES: CPT

## 2020-07-30 ENCOUNTER — TELEPHONE (OUTPATIENT)
Dept: FAMILY MEDICINE CLINIC | Facility: CLINIC | Age: 75
End: 2020-07-30

## 2020-07-30 NOTE — TELEPHONE ENCOUNTER
Patient will need to have face to face evaluation for insurance documentation  Please call to schedule  Thanks!

## 2020-08-04 DIAGNOSIS — Z86.19 H/O TINEA CRURIS: Chronic | ICD-10-CM

## 2020-08-05 DIAGNOSIS — G47.00 INSOMNIA, UNSPECIFIED TYPE: ICD-10-CM

## 2020-08-06 RX ORDER — MIRTAZAPINE 15 MG/1
TABLET, FILM COATED ORAL
Qty: 90 TABLET | Refills: 0 | Status: SHIPPED | OUTPATIENT
Start: 2020-08-06 | End: 2020-09-22

## 2020-08-07 RX ORDER — CLOTRIMAZOLE AND BETAMETHASONE DIPROPIONATE 10; .5 MG/ML; MG/ML
LOTION TOPICAL
Qty: 30 ML | Refills: 0 | OUTPATIENT
Start: 2020-08-07

## 2020-08-10 ENCOUNTER — TELEPHONE (OUTPATIENT)
Dept: FAMILY MEDICINE CLINIC | Facility: CLINIC | Age: 75
End: 2020-08-10

## 2020-08-11 ENCOUNTER — OFFICE VISIT (OUTPATIENT)
Dept: FAMILY MEDICINE CLINIC | Facility: CLINIC | Age: 75
End: 2020-08-11

## 2020-08-11 VITALS
RESPIRATION RATE: 16 BRPM | HEART RATE: 60 BPM | TEMPERATURE: 99 F | WEIGHT: 161.8 LBS | DIASTOLIC BLOOD PRESSURE: 80 MMHG | BODY MASS INDEX: 25.34 KG/M2 | SYSTOLIC BLOOD PRESSURE: 124 MMHG

## 2020-08-11 DIAGNOSIS — E11.9 TYPE 2 DIABETES MELLITUS WITHOUT COMPLICATION, WITHOUT LONG-TERM CURRENT USE OF INSULIN (HCC): Primary | ICD-10-CM

## 2020-08-11 DIAGNOSIS — G47.30 SLEEP APNEA, UNSPECIFIED TYPE: ICD-10-CM

## 2020-08-11 PROCEDURE — 3051F HG A1C>EQUAL 7.0%<8.0%: CPT | Performed by: FAMILY MEDICINE

## 2020-08-11 PROCEDURE — 4040F PNEUMOC VAC/ADMIN/RCVD: CPT | Performed by: FAMILY MEDICINE

## 2020-08-11 PROCEDURE — 3074F SYST BP LT 130 MM HG: CPT | Performed by: FAMILY MEDICINE

## 2020-08-11 PROCEDURE — 1036F TOBACCO NON-USER: CPT | Performed by: FAMILY MEDICINE

## 2020-08-11 PROCEDURE — 3066F NEPHROPATHY DOC TX: CPT | Performed by: FAMILY MEDICINE

## 2020-08-11 PROCEDURE — 1160F RVW MEDS BY RX/DR IN RCRD: CPT | Performed by: FAMILY MEDICINE

## 2020-08-11 PROCEDURE — 99213 OFFICE O/P EST LOW 20 MIN: CPT | Performed by: FAMILY MEDICINE

## 2020-08-11 PROCEDURE — 3079F DIAST BP 80-89 MM HG: CPT | Performed by: FAMILY MEDICINE

## 2020-08-11 PROCEDURE — 2022F DILAT RTA XM EVC RTNOPTHY: CPT | Performed by: FAMILY MEDICINE

## 2020-08-11 RX ORDER — SENNOSIDES 8.6 MG
650 CAPSULE ORAL
COMMUNITY
Start: 2020-02-06 | End: 2021-01-18 | Stop reason: ALTCHOICE

## 2020-08-11 NOTE — ASSESSMENT & PLAN NOTE
Discussed need for evaluation by sleep medicine for possible new sleep study since patient has not seen for many years     MARCELA score 6  Referral provided

## 2020-08-11 NOTE — PROGRESS NOTES
Assessment/Plan:   Yoly Hudson is a 70 y o  male with past medical history of diabetes, sleep apnea, and recent eye surgery presenting for prescription requests for new diabetic shoes and CPAP machine  Problem List Items Addressed This Visit        Endocrine    Type 2 diabetes mellitus ([address] ) - Primary       Lab Results   Component Value Date    HGBA1C 7 5 (A) 06/08/2020     -Patient demonstrating fair control of T2DM currently  -Continue treatment via Metformin   -Encouraged adherence to low-carbohydrate diet & weight reduction efforts   -Counseled on importance of treatment to prevent further F1LC complications  - Diabetic foot exam performed today and nl  -Script for diabetic foot provided at pt's request  -Repeat HbA1C in september           Relevant Orders    Diabetic Shoe       Respiratory    Sleep apnea     Discussed need for evaluation by sleep medicine for possible new sleep study since patient has not seen for many years  CHANEL score 6  Referral provided           Relevant Orders    Ambulatory referral to Sleep Medicine            Subjective:     Patient ID: Yoly Hudson is a 70 y o  male  Requesting new prescription for diabetic shoes and new CPAP machine  Notes that he previously had prescription diabetic shoes but needs a new pair  States that diabetes otherwise currently controlled with medication and compression socks for mild swelling of the feet  History of sleep apnea and previous CPAP use but states machine has been broken for the past 2 years and thus not currently using the CPAP  He reports trouble sleeping at night with snoring  He also reports witnessed apneic episodes by his SO  Also notes recent eye surgery completed at [facility] 1 week prior  He is unsure of the exact surgery performed but describes it as ducts being placed bilaterally in the eyes for draining into the nose  Has follow up appointment with [facility] eye surgeon on Aug 19th         T2DM:  -Hx of T2DM with complications:   Lab Results   Component Value Date    HGBA1C 7 5 (A) 06/08/2020     -Current treatment regimen includes metformin 500 mg b i d   - Medication compliance: compliant most of the time  - Home glucose monitoring: is performed regularly  -BS ranges running  in a m ,145-160 at night   -Last appointment with Ophthalmology:  Last week, but I surgery unclear what type of eye surgery patient had  -Last DM foot exam:  Today  -Received PPSV: Yes    Review of Systems   Constitutional: Negative for chills and fever  Respiratory: Negative for shortness of breath  Neurological: Negative for headaches  Objective:     Physical Exam  Constitutional:       Appearance: He is normal weight  HENT:      Head: Normocephalic and atraumatic  Eyes:      Extraocular Movements: Extraocular movements intact  Comments: Appeared swollen s/p surgery, teary   Neck:      Musculoskeletal: Normal range of motion  Pulmonary:      Effort: Pulmonary effort is normal    Musculoskeletal: Normal range of motion  Feet:      Right foot:      Skin integrity: No ulcer, skin breakdown, erythema, warmth, callus or dry skin  Left foot:      Skin integrity: No ulcer, skin breakdown, erythema, warmth, callus or dry skin  Neurological:      Mental Status: He is alert  Patient's shoes and socks removed  Right Foot/Ankle   Right Foot Inspection  Skin Exam: skin normal and skin intact no dry skin, no warmth, no callus, no erythema, no maceration, no abnormal color, no pre-ulcer, no ulcer and no callus                          Toe Exam: no swelling  Sensory       Monofilament testing: intact      Left Foot/Ankle  Left Foot Inspection  Skin Exam: skin normal and skin intactno dry skin, no warmth, no erythema, no maceration, normal color, no pre-ulcer, no ulcer and no callus                         Toe Exam: no swelling                   Sensory       Monofilament: intact    Assign Risk Category:  No deformity present;  No loss of protective sensation;        Risk: 0

## 2020-08-11 NOTE — ASSESSMENT & PLAN NOTE
Lab Results   Component Value Date    HGBA1C 7 5 (A) 06/08/2020     -Patient demonstrating fair control of T2DM currently  -Continue treatment via Metformin   -Encouraged adherence to low-carbohydrate diet & weight reduction efforts   -Counseled on importance of treatment to prevent further S7LR complications  - Diabetic foot exam performed today and nl  -Script for diabetic foot provided at pt's request  -Repeat HbA1C in september

## 2020-08-11 NOTE — PATIENT INSTRUCTIONS
Diabetes en el adulto mayor   CUIDADO AMBULATORIO:   Lo que necesita saber si usted es un adulto mayor con diabetes:  Los adultos mayores con diabetes están en riesgo de tener enfermedad cardíaca, derrame cerebral, enfermedad renal, ceguera y daño a los nervios  También podría estar en riesgo de alguna de las siguientes condiciones:  · Nutrición deficiente o bajos niveles de azúcar en la brennon    · Confusión o problemas con la memoria, la atención o para aprender cosas nuevas    · Problemas para controlar la orina o infecciones urinarias frecuentes    · Problemas con la coordinación o el equilibrio    · Caídas y lesiones    · Dolor    · Depresión    · Llagas abiertas en las piernas o los pies  La sigla de la diabetes:  La sigla APCF significa ciertas cosas que puede hacer para controlar o evitar los problemas causados por la diabetes:  · A  representa la prueba A1c   Esta prueba muestra el promedio de la cantidad de azúcar en li brennon en los últimos 2 a 3 meses  Los [employer] de azúcar en la brennon pueden dañar li corazón, los vasos sanguíneos, los riñones, los pies y los ojos  La mayoría de los adultos mayores con diabetes debe tener un nivel de A1c inferior a 7 5  Consulte con li médico si carolina objetivo de valor de A1c es apropiado para usted  Li médico puede ayudarle a hacer cambios si li valor de A1c está demasiado alto  · P  representa la presión arterial   La presión arterial chandlre pueden aumentar el riesgo de un ataque cardíaco, derrame cerebral o enfermedad renal  La mayoría de los adultos mayores con diabetes debe tener barron presión arterial sistólica (primer número) de 140 y [place] Marvin presión arterial diastólica (murray número) por debajo de 90  Consulte con li médico si estos objetivos de presión arterial son apropiados para usted  · C  representa el colesterol    Los [employer] de colesterol pueden obstruir las arterias y causar un ataque cardíaco o derrame cerebral  Consulte con li médico cuáles deberían ser quinton valores de colesterol  · F  representa dejar de fumar   La nicotina puede perjudicar los vasos sanguíneos e impedir que pueda controlar li diabetes  Llame al 911 en fouzia de presentar alguno de los siguientes:   · Usted tiene alguno de los siguientes signos de derrame cerebral:      ¨ Adormecimiento o caída de un lado de li joanne     ¨ Debilidad en un brazo o barron pierna    ¨ Confusión o debilidad para hablar    ¨ Mareos o dolor de sharath intenso, o pérdida de la visión  · Usted tiene alguno de los siguientes signos de un ataque cardíaco:      ¨ Estornudos, presión, o dolor en li pecho que dura mas de 5 minutos o regresa  ¨ Malestar o dolor en li espalda, loraine, mandíbula, abdomen, o brazo     ¨ Dificultad para respirar    ¨ Náuseas o vómito    ¨ Siente un desvanecimiento o tiene sudores fríos especialmente en el pecho o dificultad para respirar  Busque atención médica de inmediato si:   · Usted tiene dolor abdominal intenso, o el dolor se extiende a li espalda  Es posible que también esté vomitando  · Usted tiene dificultad para permanecer despierto o concentrarse  · Usted está temblando o sudando  · Usted tiene visión borrosa o doble  · Li aliento huele a fruta o komal  · Li respiración es profunda y [place], o rápida y superficial      · Li ritmo cardíaco es acelerado y débil  · Sufre barron caída y se lastima  Pregúntele a li [place] vitaminas y minerales son adecuados para usted  · Tiene vómitos o diarrea  · Tiene malestar estomacal y no puede ingerir los alimentos de li plan de comidas  · Usted se siente débil o más cansado de lo habitual      · Usted tiene [place], esthela de [place] o se irrita con facilidad  · Li piel está lavon, tibia, seca o inflamada  · Usted tiene barron herida que no cicatriza  · Usted tiene entumecimiento en los brazos o piernas       · Usted tiene problemas para sobrellevar li enfermedad, o se siente ansioso o deprimido  · Usted tiene problemas con la memoria  · Usted presenta cambios en li visión  · Usted tiene preguntas o inquietudes acerca de li condición o cuidado  El tratamiento para la diabetes  incluye el mantener el azúcar en la brennon a un nivel normal  Usted debe comer los alimentos adecuados y ejercitarse regularmente  Es posible que necesite medicamentos si no puede controlar li nivel de azúcar en la brennon con nutrición y ejercicios  También puede necesitar medicamentos para bajar li presión arterial o colesterol, o para prevenir coágulos de brennon  Recuerde controlar los factores de la sigla APCF y prevenga los problemas causados por la diabetes:   · Revise li nivel de azúcar en la brennon seth se le haya indicado  Li médico le dirá cuándo y con qué frecuencia lo debe revisar  Il médico también le indicará cuáles deben ser quinton niveles de azúcar en la brennon antes y después de Ang Laci comida  Usted puede necesitar revisar li orina o brennon por la presencia de cetonas, en fouzia que li nivel esté más alto de lo recomendado  Anote quinton resultados y muéstreselos a li médico  Puede que éste utilice los resultados para realizar modificaciones en li medicación y li alimentación o en los horarios en que usted hace ejercicio  Pídale a li médico más información acerca de cómo tratar un nivel de azúcar en la brennon alto o bajo  · Siga li plan de comidas según indicaciones  Un dietista lo ayudará a hacer un plan de comidas para mantener li nivel de azúcar en la brennon estable y asegurarse barron nutrición adecuada  No se salte ninguna comida  Li nivel de azúcar en la brennon puede bajar demasiado si usted ha tomado medicamento para la diabetes y no ha comido  Consulte con li médico acerca de los programas en li comunidad que pueden ofrecerle la entrega de comidas a domicilio  · Intente estar activo de 30 a 60 minutos kam todos los días de la [place]    La actividad física puede ayudarlo a mantener el nivel de glucosa en la brennon estable, disminuir li riesgo de insuficiencia cardíaca y [place] a bajar de [place]  También puede ayudarlo a mejorar li equilibrio y [place] riesgo de caídas  Colabore con li médico para elaborar un plan de ejercicios  Pídale a un familiar o amigo que arleen ejercicio con usted  Comience lentamente y ejercite de 5 a 10 minutos a la vez  Ejemplos de actividades incluyen caminar o nadar  Incluya ejercicios para fortalecer los músculos de 2 a 3 días a la semana  Incluya entrenamiento del equilibrio de 2 a 3 veces a la semana  Actividades que ayudan a aumentar el equilibrio incluyen yoga y anny chi      · Mantenga un peso saludable  Consulte con li médico cuánto debería pesar  El peso saludable puede ayudarle a controlar li diabetes y a evitar barron enfermedad cardíaca  Solicite a li médico que le ayude a crear un plan para perder peso de barron forma bernard si usted tiene sobrepeso  Juntos podrán fijar metas de pérdida de peso alcanzables  · No fume  Pida información a li médico si usted actualmente fuma y necesita ayuda para dejar de fumar  No use cigarrillos electrónicos o tabaco sin humo en vez de cigarrillos o para tratar de dejar de fumar  Todos estos aún contienen nicotina  · Controle el estrés  El estrés puede aumentar li nivel de azúcar en la brennon  La respiración profunda, la relajación muscular y la música pueden ayudarlo a relajarse  Solicite a li médico más información sobre estas prácticas  Otras maneras de controlar li diabetes:   · Revise janice pies todos los días para reese si tienen llagas  Observe todo el pie, incluyendo la planta del pie, entre y [employer] dedos  Revise si hay heridas y callos  Use un rosanne para verse la planta de los pies  La piel de los pies podría estar brillante, tirante, seca o más oscura de lo normal  Janiec pies también podrían estar fríos y pálidos   Pase janice hunter por encima, por debajo, por los lados y [facility] dedos del pie para sentir la piel  El enrojecimiento, inflamación y calor son signos de problemas con el flujo sanguíneo que pueden conllevar a barron úlcera en el pie  No trate de quitarse los callos usted mismo  · Use identificación de alerta médica  Use un brazalete o collar de alerta médica o lleve consigo barron tarjeta que indique que tiene diabetes  Pregúntele a li médico dónde conseguir estos artículos  · Frannie [place] vacunas  Usted corre un mayor riesgo de presentar enfermedades graves si usted contrae la gripe, neumonía o hepatitis  Pregúntele a li médico si usted debe ponerse la vacuna contra la gripe, la neumonía o la hepatitis B, y cuándo debe hacerlo  · Asista a todas quinton citas  Necesitará regresar para que le realicen el examen de hemoglobina A1c cada 3 meses  Tendrá que regresar por lo menos barron vez al año para que le revisen los pies  Necesitará de un examen de la vista barron vez al año para revisar si tiene retinopatía  También necesitará exámenes de orina anuales para revisar si hay problemas renales  Es posible que deba realizarse exámenes para detectar enfermedad cardíaca  Anote quinton preguntas para que se acuerde de hacerlas monica quinton visitas  · Pídale ayuda a quinton familiares y amigos  Puede que necesite ayuda para comprobar li nivel de azúcar en la brennon, inyectarse la insulina o preparar las comidas  Pídale a quinton familiareas y amigos que lo ayuden con estas tareas  Hable con li médico si no tiene a nadie que le ayude en li hogar  Un médico puede venir a li casa para ayudarlo con estas tareas  Acuda a quinton consultas de control con li médico según le indicaron  Necesitará regresar para que le realicen el examen de hemoglobina A1c cada 3 meses  Tendrá que regresar por lo menos barron vez al año para que le revisen los pies  Necesitará de un examen de la vista barron vez al año para revisar si tiene retinopatía  También necesitará exámenes de orina anuales para revisar si hay problemas renales   Es posible que deba realizarse exámenes para detectar enfermedad cardíaca  Anote quinton preguntas para que se acuerde de hacerlas monica quinton visitas  © 2017 [address] Information is for End User's use only and may not be sold, redistributed or otherwise used for commercial purposes  All illustrations and images included in CareNotes® are the copyrighted property of A D A M , Inc  or Dustin Lamar  Esta información es sólo para uso en educación  Dyson intención no es darle un consejo médico sobre enfermedades o tratamientos  Colsulte con dyson Ladena Creed farmacéutico antes de seguir cualquier régimen médico para saber si es seguro y efectivo para usted

## 2020-08-12 DIAGNOSIS — K21.9 GASTROESOPHAGEAL REFLUX DISEASE, ESOPHAGITIS PRESENCE NOT SPECIFIED: ICD-10-CM

## 2020-08-12 RX ORDER — SIMETHICONE 125 MG
TABLET,CHEWABLE ORAL
Qty: 28 TABLET | Refills: 1 | Status: SHIPPED | OUTPATIENT
Start: 2020-08-12 | End: 2020-08-25

## 2020-08-13 DIAGNOSIS — M54.16 LUMBAR RADICULOPATHY: ICD-10-CM

## 2020-08-13 DIAGNOSIS — E11.9 TYPE 2 DIABETES MELLITUS WITHOUT COMPLICATION, WITHOUT LONG-TERM CURRENT USE OF INSULIN (HCC): ICD-10-CM

## 2020-08-14 DIAGNOSIS — I10 ESSENTIAL HYPERTENSION: ICD-10-CM

## 2020-08-14 RX ORDER — GABAPENTIN 400 MG/1
CAPSULE ORAL
Qty: 90 CAPSULE | Refills: 1 | OUTPATIENT
Start: 2020-08-14

## 2020-08-14 RX ORDER — NIFEDIPINE 30 MG/1
30 TABLET, FILM COATED, EXTENDED RELEASE ORAL 2 TIMES DAILY
Qty: 60 TABLET | Refills: 11 | Status: SHIPPED | OUTPATIENT
Start: 2020-08-14 | End: 2021-04-28

## 2020-08-18 RX ORDER — BLOOD-GLUCOSE METER
EACH MISCELLANEOUS
Qty: 100 EACH | Refills: 3 | Status: SHIPPED | OUTPATIENT
Start: 2020-08-18 | End: 2021-04-27

## 2020-08-22 DIAGNOSIS — K21.9 GASTROESOPHAGEAL REFLUX DISEASE, ESOPHAGITIS PRESENCE NOT SPECIFIED: ICD-10-CM

## 2020-08-24 DIAGNOSIS — I25.10 CORONARY ARTERY DISEASE INVOLVING NATIVE CORONARY ARTERY OF NATIVE HEART WITHOUT ANGINA PECTORIS: ICD-10-CM

## 2020-08-24 RX ORDER — FUROSEMIDE 20 MG/1
20 TABLET ORAL DAILY PRN
Qty: 30 TABLET | Refills: 3 | Status: SHIPPED | OUTPATIENT
Start: 2020-08-24 | End: 2020-11-23

## 2020-08-25 RX ORDER — SIMETHICONE 125 MG
TABLET,CHEWABLE ORAL
Qty: 28 TABLET | Refills: 1 | Status: SHIPPED | OUTPATIENT
Start: 2020-08-25 | End: 2020-09-01

## 2020-08-29 DIAGNOSIS — M54.32 SCIATICA OF LEFT SIDE: ICD-10-CM

## 2020-09-01 DIAGNOSIS — K21.9 GASTROESOPHAGEAL REFLUX DISEASE, ESOPHAGITIS PRESENCE NOT SPECIFIED: ICD-10-CM

## 2020-09-01 RX ORDER — SIMETHICONE 125 MG
TABLET,CHEWABLE ORAL
Qty: 28 TABLET | Refills: 1 | Status: SHIPPED | OUTPATIENT
Start: 2020-09-01 | End: 2020-09-08

## 2020-09-03 ENCOUNTER — OFFICE VISIT (OUTPATIENT)
Dept: SLEEP CENTER | Facility: CLINIC | Age: 75
End: 2020-09-03
Payer: MEDICARE

## 2020-09-03 ENCOUNTER — TELEPHONE (OUTPATIENT)
Dept: SLEEP CENTER | Facility: CLINIC | Age: 75
End: 2020-09-03

## 2020-09-03 VITALS
WEIGHT: 163 LBS | SYSTOLIC BLOOD PRESSURE: 108 MMHG | HEART RATE: 57 BPM | DIASTOLIC BLOOD PRESSURE: 68 MMHG | HEIGHT: 67 IN | BODY MASS INDEX: 25.58 KG/M2

## 2020-09-03 DIAGNOSIS — I25.10 CORONARY ARTERY DISEASE INVOLVING NATIVE CORONARY ARTERY OF NATIVE HEART WITHOUT ANGINA PECTORIS: ICD-10-CM

## 2020-09-03 DIAGNOSIS — G47.30 SLEEP APNEA, UNSPECIFIED TYPE: ICD-10-CM

## 2020-09-03 DIAGNOSIS — I12.9 HYPERTENSION ASSOCIATED WITH STAGE 2 CHRONIC KIDNEY DISEASE DUE TO TYPE 2 DIABETES MELLITUS (HCC): Chronic | ICD-10-CM

## 2020-09-03 DIAGNOSIS — Z20.822 ENCOUNTER FOR LABORATORY TESTING FOR COVID-19 VIRUS: Primary | ICD-10-CM

## 2020-09-03 DIAGNOSIS — N18.2 HYPERTENSION ASSOCIATED WITH STAGE 2 CHRONIC KIDNEY DISEASE DUE TO TYPE 2 DIABETES MELLITUS (HCC): Chronic | ICD-10-CM

## 2020-09-03 DIAGNOSIS — E11.22 HYPERTENSION ASSOCIATED WITH STAGE 2 CHRONIC KIDNEY DISEASE DUE TO TYPE 2 DIABETES MELLITUS (HCC): Chronic | ICD-10-CM

## 2020-09-03 DIAGNOSIS — I21.4 NSTEMI (NON-ST ELEVATED MYOCARDIAL INFARCTION) (HCC): ICD-10-CM

## 2020-09-03 DIAGNOSIS — G47.33 OSA (OBSTRUCTIVE SLEEP APNEA): Primary | ICD-10-CM

## 2020-09-03 PROCEDURE — 99204 OFFICE O/P NEW MOD 45 MIN: CPT | Performed by: PSYCHIATRY & NEUROLOGY

## 2020-09-03 NOTE — LETTER
September 3, 2020     [address]  [address]    Patient: Yusra Chandra   YOB: 1945   Date of Visit: 9/3/2020       Dear Dr Nirav Graves: Thank you for referring Yusra Chandra to me for evaluation  Below are my notes for this consultation  If you have questions, please do not hesitate to call me  I look forward to following your patient along with you  Sincerely,        Titus Pruett MD        CC: MD Titus Mcmillan MD  9/3/2020  2:39 PM  Sign when Signing Visit  Sleep Medicine Consultation Note    HPI:  Mr Yusra Chandra is a 70 y o  male seen at the request of Damon Nesbitt MD for advice regarding suspected sleep disordered breathing  The patient has LUIS MIGUEL and was diagnosed at [facility] and treated with CPAP many years ago  He has stopped due to running out of supplies  He stated that he was also using an oxygen concentrator  He has [employer] as the DME  He last been without his CPAP or oxygen for three years  His wife stated that he gasps at night and has observed apneas  He snores a lot and very loud  He snores nightly, he has been snoring for many years and has been getting worse  He has not gained weight  He is unsure what makes things worse, but when he was on CPAP it was better  He sometimes stops snoring or snores less on his side  He also moves his legs a lot at night and keeps his wife up  He sleeps with his mouth open and wakes up with a dry mouth  He denied nasal congestion      Please see below for continuation of the HPI:      Sleep Pattern:  -Location: bedroom  -Bed/Recliner/Wedge:  bed  -Bed Partner: yes  -HOB: flat  -# of pillows under head: 2  -Position: back  -Bedtime: 10pm  -Lights out: same time  -Environmental: No lights/TV  -Latency: 15 mins  -Awakenings: 2   -Reason: to void   -Duration: mins  -Wake time: 6am   -Alarm: no  -Rise time: same time  -Days off: same time  -Shift Work: not working  -Patient's estimate of total sleep time: 8h     Daytime Symptoms:  -Upon Awakening: "I feel fine"  -Daytime fatigue/sleepiness: tired, sometimes sleepy  -Naps: sometimes  -Involuntary Dozing: yes, while watching TV and reading    -Cognitive Symptoms: denied  -Driving: Difficulty with sleepiness and driving:  no   -- Close calls related to sleepiness: no   -- Accidents related to sleepiness: no      Questionnaires:   Sitting and reading: Moderate chance of dozing  Watching TV: Moderate chance of dozing  Sitting, inactive in a public place (e g  a theatre or a meeting): Moderate chance of dozing  As a passenger in a car for an hour without a break: Moderate chance of dozing  Lying down to rest in the afternoon when circumstances permit: Moderate chance of dozing  Sitting and talking to someone: Moderate chance of dozing  Sitting quietly after a lunch without alcohol:  Moderate chance of dozing  In a car, while stopped for a few minutes in traffic: High chance of dozing  Total score: 17      Sleep Review of Symptoms:  -Parasomnias:  --Sleep Walking: no  --Dream Enactment: no  --Bruxism: no  -Motor:  --RLS: no  --PLMS: no  -Narcolepsy:  --Hallucinations: no  --Paralysis: no  --Cataplexy: no    Childhood Sleep History: denied    Prior Sleep Studies/Evaluations:  [place] [facility] [place] available    Family History:  Family history of sleep disorders: denied    Patient Active Problem List   Diagnosis    Biceps tendonitis on right    Complete rotator cuff tear or rupture of right shoulder, not specified as traumatic    NSTEMI (non-ST elevated myocardial infarction) ([facility] )    S/P drug eluting coronary stent placement    H/O non-ST elevation myocardial infarction (NSTEMI)    Essential hypertension    Polyarthralgia    Hematuria, microscopic    H/O tinea cruris    Erectile dysfunction associated with type 2 diabetes mellitus ([facility] )    Anxiety    Type 2 diabetes mellitus ([facility] )    Gastroesophageal reflux disease    Bilateral carpal tunnel syndrome    Mild aortic stenosis    Coronary artery disease involving native coronary artery of native heart without angina pectoris    Pain in both hands    Glaucoma    Major depressive disorder, recurrent episode, moderate (HCC)    History of tobacco use    Dental caries    S/P CABG (coronary artery bypass graft)    Trigger finger of all digits of both hands    Polyp of colon    Vaccine for VZV (varicella-zoster virus)    Hypertension associated with stage 2 chronic kidney disease due to type 2 diabetes mellitus ([facility] )    Insomnia    Hyperlipidemia    Acute left-sided back pain    Skin lesion of face    Essential hypertriglyceridemia    Pulmonary nodule seen on imaging study    Skin cancer    Sleep apnea    Testicular hypogonadism    Sciatica of left side    Lumbar radiculopathy    Spinal stenosis of lumbar region with neurogenic claudication    Lumbar spondylosis    DDD (degenerative disc disease), lumbar    Leg edema    Lower abdominal pain    Generalized abdominal pain     Past Medical History:   Diagnosis Date    AS (aortic stenosis)     Balanitis     Biceps tendonitis on right     CAD (coronary artery disease)     Cancer (HCC)     skin    Colon polyp     Complete rotator cuff tear or rupture of right shoulder, not specified as traumatic     CPAP (continuous positive airway pressure) dependence     Diabetes mellitus (HCC)     Esophageal reflux     High cholesterol     Hypertension     Hypertriglyceridemia     Hypogonadism in male     Myalgia     Myocardial infarction ([facility] )     Palpitations     Shoulder pain     Sinus problem     Skin cancer of nose     Sleep apnea     Stroke [facility] 1420    Systolic murmur     recently found    Tinea cruris     Tinea pedis     had open heart surgery 3 5 years ago         --> Seizure hx: denies  --> Head injury with LOC: denies  --> Supplemental Oxygen Use: denies currently    Labs   Results for ALEN Jada Soares (MRN [id number]) as of 9/3/2020 14:12   Ref   Range 2/7/2020 08:20 3/3/2020 08:31 6/8/2020 17:03 7/23/2020 07:02   POC Glucose Latest Ref Range: 65 - 140 mg/dl  145 (H)     Sodium Latest Ref Range: 136 - 145 mmol/L 135 (L)      Potassium Latest Ref Range: 3 5 - 5 3 mmol/L 4 3      Chloride Latest Ref Range: 100 - 108 mmol/L 103      CO2 Latest Ref Range: 21 - 32 mmol/L 29      Anion Gap Latest Ref Range: 4 - 13 mmol/L 3 (L)      BUN Latest Ref Range: 5 - 25 mg/dL 16      Creatinine Latest Ref Range: 0 60 - 1 30 mg/dL 0 91      GLUCOSE FASTING Latest Ref Range: 65 - 99 mg/dL 153 (H)      Calcium Latest Ref Range: 8 3 - 10 1 mg/dL 9 1      AST Latest Ref Range: 5 - 45 U/L 15      ALT Latest Ref Range: 12 - 78 U/L 27      Alkaline Phosphatase Latest Ref Range: 46 - 116 U/L 65      Total Protein Latest Ref Range: 6 4 - 8 2 g/dL 7 5      Albumin Latest Ref Range: 3 5 - 5 0 g/dL 3 6      TOTAL BILIRUBIN Latest Ref Range: 0 20 - 1 00 mg/dL 0 60      eGFR Latest Units: ml/min/1 73sq m 84      Triglycerides Latest Ref Range: <=150 mg/dL    84   Iron Latest Ref Range: 65 - 175 ug/dL    94   Ferritin Latest Ref Range: 8 - 388 ng/mL    175   Iron Saturation Latest Units: %    36   TIBC Latest Ref Range: 250 - 450 ug/dL    258   Vitamin B-12 Latest Ref Range: 100 - 900 pg/mL    560   WBC Latest Ref Range: 4 31 - 10 16 Thousand/uL 8 15      Red Blood Cell Count Latest Ref Range: 3 88 - 5 62 Million/uL 4 65      Hemoglobin Latest Ref Range: 12 0 - 17 0 g/dL 12 9      HCT Latest Ref Range: 36 5 - 49 3 % 41 6      MCV Latest Ref Range: 82 - 98 fL 90      MCH Latest Ref Range: 26 8 - 34 3 pg 27 7      MCHC Latest Ref Range: 31 4 - 37 4 g/dL 31 0 (L)      RDW Latest Ref Range: 11 6 - 15 1 % 12 7      Platelet Count Latest Ref Range: 149 - 390 Thousands/uL 188      MPV Latest Ref Range: 8 9 - 12 7 fL 10 7      nRBC Latest Units: /100 WBCs 0      Neutrophils % Latest Ref Range: 43 - 75 % 54      Immat GRANS % Latest Ref Range: 0 - 2 % 1      Lymphocytes Relative Latest Ref Range: 14 - 44 % 32      Monocytes Relative Latest Ref Range: 4 - 12 % 10      Eosinophils Latest Ref Range: 0 - 6 % 2      Basophils Relative Latest Ref Range: 0 - 1 % 1      Immature Grans Absolute Latest Ref Range: 0 00 - 0 20 Thousand/uL 0 04      Absolute Neutrophils Latest Ref Range: 1 85 - 7 62 Thousands/µL 4 45      Lymphocytes Absolute Latest Ref Range: 0 60 - 4 47 Thousands/µL 2 60      Absolute Monocytes Latest Ref Range: 0 17 - 1 22 Thousand/µL 0 82      Absolute Eosinophils Latest Ref Range: 0 00 - 0 61 Thousand/µL 0 18      Basophils Absolute Latest Ref Range: 0 00 - 0 10 Thousands/µL 0 06      Retic Ct Pct Latest Ref Range: 0 37 - 1 87 %    2 06 (H)   Retic Ct Abs Latest Ref Range: 14,356-105,094     88,800   Hemoglobin A1C Latest Ref Range: 6 5    7 5 (A)    URIC ACID Latest Ref Range: 4 2 - 8 0 mg/dL    5 3     Echo 6/2019: Indications: Cardiac Murmur      Diagnoses: R01 1 - Cardiac murmur, unspecified     Sonographer:  Bj Salomon RDCS  Primary Physician:  Robinson Makc MD  Referring Physician:  Jess Aviles MD  Group:  [facility]  Interpreting Physician:  Kalee Brown MD     SUMMARY     LEFT VENTRICLE:  Systolic function was at the lower limits of normal  Ejection fraction was estimated to be 50 %  There was hypokinesis of the apical wall(s)  Wall thickness was mildly increased  Features were consistent with a pseudonormal left ventricular filling pattern, with concomitant abnormal relaxation and increased filling pressure (grade 2 diastolic dysfunction)      LEFT ATRIUM:  The atrium was mildly dilated      MITRAL VALVE:  There was mild annular calcification  There was mild regurgitation      AORTIC VALVE:  Transaortic velocity was increased due to valvular stenosis  There was mild stenosis   The calculated aortic valve area was 1 8 cm^2  There was mild regurgitation      TRICUSPID VALVE:  There was mild regurgitation  Pulmonary artery systolic pressure was within the normal range      HISTORY: PRIOR HISTORY: GERD  Type 2 diabetes  HTN  Mild AS  CAD  NSTEMI  Hyperlipidemia  PRIOR PROCEDURES: Stent  Coronary bypass      PROCEDURE: The procedure was performed in the echo lab  This was a routine study  The transthoracic approach was used  The study included complete 2D imaging, M-mode, complete spectral Doppler, and color Doppler  The heart rate was 49 bpm,  at the start of the study  Images were obtained from the parasternal, apical, subcostal, and suprasternal notch acoustic windows  Image quality was adequate      LEFT VENTRICLE: Size was normal  Systolic function was at the lower limits of normal  Ejection fraction was estimated to be 50 %  There was hypokinesis of the apical wall(s)  Wall thickness was mildly increased  DOPPLER: Features were  consistent with a pseudonormal left ventricular filling pattern, with concomitant abnormal relaxation and increased filling pressure (grade 2 diastolic dysfunction)      RIGHT VENTRICLE: The size was normal  Systolic function was normal  Wall thickness was normal      LEFT ATRIUM: The atrium was mildly dilated      RIGHT ATRIUM: Size was normal      MITRAL VALVE: There was mild annular calcification  Valve structure was normal  There was normal leaflet separation  DOPPLER: The transmitral velocity was within the normal range  There was no evidence for stenosis  There was mild  regurgitation      AORTIC VALVE: The valve was trileaflet  Leaflets exhibited normal thickness, mild calcification, and mildly reduced cuspal separation  DOPPLER: Transaortic velocity was increased due to valvular stenosis  There was mild stenosis  The  calculated aortic valve area was 1 8 cm^2 There was mild regurgitation      TRICUSPID VALVE: The valve structure was normal  There was normal leaflet separation  DOPPLER: The transtricuspid velocity was within the normal range   There was no evidence for stenosis  There was mild regurgitation  Pulmonary artery  systolic pressure was within the normal range  Estimated peak PA pressure was 30 mmHg      PULMONIC VALVE: Leaflets exhibited normal thickness, no calcification, and normal cuspal separation  DOPPLER: The transpulmonic velocity was within the normal range  There was mild regurgitation      PERICARDIUM: There was no pericardial effusion      AORTA: The root exhibited normal size      SYSTEMIC VEINS: IVC: The inferior vena cava was normal in size   Respirophasic changes were normal     Past Surgical History:   Procedure Laterality Date    CARPAL TUNNEL RELEASE      CATARACT EXTRACTION Bilateral     COLONOSCOPY      CORONARY ANGIOPLASTY WITH STENT PLACEMENT      10-15-19 - pt denies stent implant    CORONARY ARTERY BYPASS GRAFT N/A 12/12/2016    Procedure: INTRAOP CECILLE; BILATERAL LEG EVH; CORONARY ARTERY BYPASS GRAFT X 4 SVG TO PDA, OM1,  AND DIAGONAL1, LIMA TO LAD;  Surgeon: Jm Mcintosh MD;  [facility];  Service:     EYE SURGERY      HERNIA REPAIR      SC ARTHROSCOPY SHOULDER SURGICAL BICEPS TENODESIS Right 5/6/2016    Procedure: ARTHROSCOPIC BICEPS TENODESIS;  Surgeon: Rafia Esposito MD;  [facility];  Service: Orthopedics    SC INCISE FINGER TENDON SHEATH Right 10/15/2019    Procedure: TRIGGER FINGER RELEASE INDEX, LONG, RING, SMALL, THUMB FINGERS;  Surgeon: Michael Acharya MD;  [facility];  Service: Orthopedics    SC INCISE FINGER TENDON SHEATH Left 3/3/2020    Procedure: RELEASE TRIGGER FINGER INDEX;  Surgeon: Michael Acharya MD;  [facility];  Service: Orthopedics    SC SHLDR ARTHROSCOP,SURG,W/ROTAT CUFF REPR Right 5/6/2016    Procedure: REPAIR ROTATOR CUFF  ARTHROSCOPIC; SUBACROMIAL DECOMPRESSION; PARTIAL SYNOVECTOMY;  Surgeon: Rafia Esposito MD;  [facility];  Service: Orthopedics    SC WRIST Deborra Levo LIG Right 10/15/2019    Procedure: ENDOSCOPIC CARPAL TUNNEL RELEASE;  Surgeon: Francisco Pederson MD;  [facility];  Service: Orthopedics    ME WRIST Doris Vaughan LIG Left 3/3/2020    Procedure: RELEASE CARPAL TUNNEL ENDOSCOPIC;  Surgeon: Francisco Pederson MD;  [facility];  Service: Orthopedics    ROTATOR CUFF REPAIR      TESTICLE SURGERY      UMBILICAL HERNIA REPAIR  2009    over age 13       --> ENT procedures: denies    Current Outpatient Medications   Medication Sig Dispense Refill    acetaminophen (TYLENOL) 650 mg CR tablet 650 mg      Alcohol Swabs (ALCOHOL PADS) 70 % PADS USE TWICE DAILY 100 each 3    aspirin (ECOTRIN LOW STRENGTH) 81 mg EC tablet TAKE 1 TABLET (81 MG TOTAL) BY MOUTH DAILY 90 tablet 0    clorazepate (TRANXENE) 7 5 mg tablet Take 7 5 mg by mouth 2 (two) times a day   0    clotrimazole-betamethasone (LOTRISONE) 1-0 05 % cream APPLY TOPICALLY 2 (TWO) TIMES A DAY 30 g 0    clotrimazole-betamethasone (LOTRISONE) 1-0 05 % lotion APPLY TOPICALLY 2 (TWO) TIMES A DAY 30 mL 0    diclofenac sodium (VOLTAREN) 1 % APPLY 2 G TOPICALLY 4 (FOUR) TIMES A  g 0    DULoxetine (CYMBALTA) 30 mg delayed release capsule Take 30 mg by mouth every morning  0    DULoxetine (CYMBALTA) 60 mg delayed release capsule Take 60 mg by mouth every morning      Easy Comfort Lancets MISC TEST BLOOD SUGAR TWICE DAILY, OR AS NEEDED WHEN SYMPTOMATIC OF HYPOGLYCEMIA OR HYPERGLYCEMIA 100 each 5    Easy Plus II Glucose Test test strip TEST AS DIRECTED TWICE DAILY 100 each 3    Elastic Bandages & Supports (MEDICAL COMPRESSION STOCKINGS) MISC by Does not apply route daily Please provide B/L compression stockings 2 each 0    ferrous sulfate 324 (65 Fe) mg Take 324 mg by mouth once a week   0    fluticasone (FLONASE) 50 mcg/act nasal spray 1 spray into each nostril daily 1 Bottle 5    furosemide (LASIX) 20 mg tablet TAKE 1 TABLET (20 MG TOTAL) BY MOUTH DAILY AS NEEDED (SWELLING) 30 tablet 3    gabapentin (NEURONTIN) 400 mg capsule Take 1 capsule (400 mg total) by mouth 3 (three) times a day 90 capsule 1    glimepiride (AMARYL) 4 mg tablet TAKE 1/2 TABLET BY MOUTH IN THE MORNING AND 1 TABLET AT NIGHT 180 tablet 2    glucose blood (FREESTYLE TEST STRIPS) test strip Use as instructed to check BS BID PRN (hypoglycemia/hyperglycemia symptoms) 180 each 2    glucose blood (GLUCOSE METER TEST) test strip 1 each by Other route 2 (two) times a day Use as instructed 100 each 5    glucose monitoring kit (FREESTYLE) monitoring kit 1 each by Does not apply route 2 (two) times a day Check BS BID PRN for hypoglycemia/hyperglycemia 1 each 0    HYDROcodone-acetaminophen (NORCO) 5-325 mg per tablet Take 1 tablet by mouth every 6 (six) hours as needed for pain for up to 5 dosesMax Daily Amount: 4 tablets 5 tablet 0    hydrocortisone 2 5 % cream APPLY TOPICALLY 3 (THREE) TIMES A DAY (Patient taking differently: Apply topically as needed ) 28 g 0    Lancets (FREESTYLE) lancets Use as instructed to check BS BID PRN (hypoglycemia/hyperglycemia symptoms) 180 each 2    lisinopril (ZESTRIL) 20 mg tablet TAKE 1 TABLET (20 MG TOTAL) BY MOUTH DAILY 90 tablet 3    metFORMIN (GLUCOPHAGE) 500 mg tablet Take 1 tablet (500 mg total) by mouth 2 (two) times a day with meals 180 tablet 2    metoprolol tartrate (LOPRESSOR) 25 mg tablet Take 1 tablet (25 mg total) by mouth every 12 (twelve) hours 180 tablet 2    mirtazapine (REMERON) 15 mg tablet TAKE 1 TABLET (15 MG TOTAL) BY MOUTH DAILY AT BEDTIME 90 tablet 0    neomycin-polymyxin-dexamethasone (MAXITROL) ophthalmic suspension INSTILL 1 DROP INTO EACH EYE FOUR TIMES A DAY AFTER SURGERY    2    NIFEdipine ER (ADALAT CC) 30 MG 24 hr tablet TAKE 1 TABLET (30 MG TOTAL) BY MOUTH 2 (TWO) TIMES A DAY 60 tablet 11    omeprazole (PriLOSEC) 20 mg delayed release capsule TAKE 2 CAPS PO X 14 DAYS THEN 1 CAP PO QD 90 capsule 1    ONETOUCH DELICA LANCETS FINE MISC Test blood sugar twice daily, or as needed when symptomatic of hypoglycemia or hyperglycemia 100 each 5    rosuvastatin (CRESTOR) 40 MG tablet TAKE 1 TABLET (40 MG TOTAL) BY MOUTH DAILY 90 tablet 3    simethicone (MYLICON) 302 MG chewable tablet TAKE 1 CAPSULE BY MOUTH EVERY 6 HOURS AS NEEDED FOR FLATULENCE 28 tablet 1    tobramycin-dexamethasone (TOBRADEX) ophthalmic suspension ADMINISTER 1 DROP TO BOTH EYES 2 (TWO) TIMES A DAY 5 mL 0     No current facility-administered medications for this visit  Social History:  -Employment: unemployed  -Smoking: no  -Caffeine: yes, coffee daily  -Alcohol: no  -THC: no  -OTC/Supplements/herbals: none  -Illicits:  denies  -Family: family    ROS:  Genitourinary need to urinate more than twice a night and difficulty with erection   Cardiology none   Gastrointestinal none   Neurology forgetfulness, poor concentration or confusion,  and difficulty with memory   Constitutional none   Integumentary none   Psychiatry anxiety and depression   Musculoskeletal joint pain, muscle aches, back pain and sciatica   Pulmonary snoring   ENT none   Endocrine none   Hematological none       MSE:  -Alert and appropriate: alert, calm, cooperative  -Oriented to person, place and time:  name, age, location, day/date/mon/yr  -Behavior: good, sustained eye contact  -Speech: Unremarkable rate/rhythm/volume  -Mood: "good"  -Affect: constricted  -Thought Processes: linear, logical, goal directed  PE:  Body mass index is 25 53 kg/m²    Vitals:    09/03/20 1351   BP: 108/68   Pulse: 57   Weight: 73 9 kg (163 lb)   Height: 5' 7" (1 702 m)       -General:  In NAD    -Eyes: Conjunctival injection: none     -EOM:  PERRLA, EOMI   -Eyelid hooding: yes    -ENT: MP: 4/4   -Facial deformity:  retrognathia   -Hard palate: moderate arch   -Soft palate: crowding with redundant tissue   -Gums and teeth: normal dentition   -Tongue:  Scalloping   -Nares:  Patent    -Neck/Lymphatics: Lymphadenopathy:  none appreciated   -Masses:  none appreciated   -Circumference:  Neck Circumference: 16 5 "    -Cardiac: Auscultation:  RRR   - LE edema over shins: none appreciated    -Pulm: -Respirations: unlaboured         -Auscultation:  CTA bilaterally, posterior fields    -Neuro: No resting tremor     -Musculoskeletal: Gait and stance: normal turning and ambulation; unremarkable  Assessment:  Mr Maxwell Deras is a 70 y o  male who is seen to evaluate for possible obstructive sleep apnea  The patient has LUIS MIGUEL and has been untreated for the last three years due to no more new supplies and loss of follow up  He is symptomatic with snoring, gasping, and observed apnea  He has a lot of underlying medical conditions including CAD, stroke, and MI in the past  He has had open heart surgery since he stopped using the CPAP with oxygen  The pathophysiology of, the reasons to treat and treatment options for obstructive sleep apnea were all reviewed with the patient and his wife today  Discussed the testing options and reviewed the benefits and downsides of both, will do a split study to get started on treatment sooner  He is amenable to treatment with PAP therapy  Discussed keeping nasal passages clear, abstaining from alcohol, and other sedating drugs at night- which will worsen symptoms of LUIS MIGUEL  --History provided by: patient and wife  --Records reviewed: in chart      Recommendations:  1) split study for AHI>15  2) Driving safety was reviewed with patient  If the patient feels too sleepy to drive he knows not to drive  If he becomes sleepy while driving he will pull over and nap  3) Follow-up after initiation of treatment  4) Call with any questions or concerns  All questions answered for the patient and his wife, who indicated understanding and agreed with the plan       Vanesa Bosch MD  Psychiatry/ Sleep medicine

## 2020-09-03 NOTE — PATIENT INSTRUCTIONS
Recommendations:  1) split study for AHI>15  2) Driving safety was reviewed with patient  If the patient feels too sleepy to drive he knows not to drive  If he becomes sleepy while driving he will pull over and nap  3) Follow-up after initiation of treatment  4) Call with any questions or concerns

## 2020-09-03 NOTE — PROGRESS NOTES
Sleep Medicine Consultation Note    HPI:  Mr Elvis Vanegas is a 70 y o  male seen at the request of Tessa Keane MD for advice regarding suspected sleep disordered breathing  The patient has LUIS MIGUEL and was diagnosed at [facility] and treated with CPAP many years ago  He has stopped due to running out of supplies  He stated that he was also using an oxygen concentrator  He has [employer] as the DME  He last been without his CPAP or oxygen for three years  His wife stated that he gasps at night and has observed apneas  He snores a lot and very loud  He snores nightly, he has been snoring for many years and has been getting worse  He has not gained weight  He is unsure what makes things worse, but when he was on CPAP it was better  He sometimes stops snoring or snores less on his side  He also moves his legs a lot at night and keeps his wife up  He sleeps with his mouth open and wakes up with a dry mouth  He denied nasal congestion  Please see below for continuation of the HPI:      Sleep Pattern:  -Location: bedroom  -Bed/Recliner/Wedge:  bed  -Bed Partner: yes  -HOB: flat  -# of pillows under head: 2  -Position: back  -Bedtime: 10pm  -Lights out: same time  -Environmental: No lights/TV  -Latency: 15 mins  -Awakenings: 2   -Reason: to void   -Duration: mins  -Wake time: 6am   -Alarm: no  -Rise time: same time  -Days off: same time  -Shift Work: not working  -Patient's estimate of total sleep time: 8h     Daytime Symptoms:  -Upon Awakening: "I feel fine"  -Daytime fatigue/sleepiness: tired, sometimes sleepy  -Naps: sometimes  -Involuntary Dozing: yes, while watching TV and reading    -Cognitive Symptoms: denied  -Driving: Difficulty with sleepiness and driving:  no   -- Close calls related to sleepiness: no   -- Accidents related to sleepiness: no      Questionnaires:   Sitting and reading:  Moderate chance of dozing  Watching TV: Moderate chance of dozing  Sitting, inactive in a public place (e g  a theatre or a meeting): Moderate chance of dozing  As a passenger in a car for an hour without a break: Moderate chance of dozing  Lying down to rest in the afternoon when circumstances permit: Moderate chance of dozing  Sitting and talking to someone: Moderate chance of dozing  Sitting quietly after a lunch without alcohol:  Moderate chance of dozing  In a car, while stopped for a few minutes in traffic: High chance of dozing  Total score: 17      Sleep Review of Symptoms:  -Parasomnias:  --Sleep Walking: no  --Dream Enactment: no  --Bruxism: no  -Motor:  --RLS: no  --PLMS: no  -Narcolepsy:  --Hallucinations: no  --Paralysis: no  --Cataplexy: no    Childhood Sleep History: denied    Prior Sleep Studies/Evaluations:  PSG at [facility] many years ago, no record available    Family History:  Family history of sleep disorders: denied    Patient Active Problem List   Diagnosis    Biceps tendonitis on right    Complete rotator cuff tear or rupture of right shoulder, not specified as traumatic    NSTEMI (non-ST elevated myocardial infarction) ([email or web address] )    S/P drug eluting coronary stent placement    H/O non-ST elevation myocardial infarction (NSTEMI)    Essential hypertension    Polyarthralgia    Hematuria, microscopic    H/O tinea cruris    E[facility]ile dysfunction associated with type 2 diabetes mellitus ([facility] )    Anxiety    Type 2 diabetes mellitus ([facility] )    Gastroesophageal reflux disease    Bilateral carpal tunnel syndrome    Mild aortic stenosis    Coronary artery disease involving native coronary artery of native heart without angina pectoris    Pain in both hands    Glaucoma    Major depressive disorder, recurrent episode, moderate (HCC)    History of tobacco use    Dental caries    S/P CABG (coronary artery bypass graft)    Trigger finger of all digits of both hands    Polyp of colon    Vaccine for VZV (varicella-zoster virus)    Hypertension associated with stage 2 chronic kidney disease due to type 2 diabetes mellitus ([facility] )    Insomnia    Hyperlipidemia    Acute left-sided back pain    Skin lesion of face    Essential hypertriglyceridemia    Pulmonary nodule seen on imaging study    Skin cancer    Sleep apnea    Testicular hypogonadism    Sciatica of left side    Lumbar radiculopathy    Spinal stenosis of lumbar region with neurogenic claudication    Lumbar spondylosis    DDD (degenerative disc disease), lumbar    Leg edema    Lower abdominal pain    Generalized abdominal pain     Past Medical History:   Diagnosis Date    AS (aortic stenosis)     Balanitis     Biceps tendonitis on right     CAD (coronary artery disease)     Cancer (HCC)     skin    Colon polyp     Complete rotator cuff tear or rupture of right shoulder, not specified as traumatic     CPAP (continuous positive airway pressure) dependence     Diabetes mellitus (HCC)     Esophageal reflux     High cholesterol     Hypertension     Hypertriglyceridemia     Hypogonadism in male     Myalgia     Myocardial infarction ([facility] )     Palpitations     Shoulder pain     Sinus problem     Skin cancer of nose     Sleep apnea     Stroke ([facility] ) 7516    Systolic murmur     recently found    Tinea cruris     Tinea pedis     had open heart surgery 3 5 years ago  --> Seizure hx: denies  --> Head injury with LOC: denies  --> Supplemental Oxygen Use: denies currently    Labs   Results for Yovana Teran (MRN [id number]) as of 9/3/2020 14:12   Ref   Range 2/7/2020 08:20 3/3/2020 08:31 6/8/2020 17:03 7/23/2020 07:02   POC Glucose Latest Ref Range: 65 - 140 mg/dl  145 (H)     Sodium Latest Ref Range: 136 - 145 mmol/L 135 (L)      Potassium Latest Ref Range: 3 5 - 5 3 mmol/L 4 3      Chloride Latest Ref Range: 100 - 108 mmol/L 103      CO2 Latest Ref Range: 21 - 32 mmol/L 29      Anion Gap Latest Ref Range: 4 - 13 mmol/L 3 (L)      BUN Latest Ref Range: 5 - 25 mg/dL 16      Creatinine Latest Ref Range: 0 60 - 1 30 mg/dL 0 91      GLUCOSE FASTING Latest Ref Range: 65 - 99 mg/dL 153 (H)      Calcium Latest Ref Range: 8 3 - 10 1 mg/dL 9 1      AST Latest Ref Range: 5 - 45 U/L 15      ALT Latest Ref Range: 12 - 78 U/L 27      Alkaline Phosphatase Latest Ref Range: 46 - 116 U/L 65      Total Protein Latest Ref Range: 6 4 - 8 2 g/dL 7 5      Albumin Latest Ref Range: 3 5 - 5 0 g/dL 3 6      TOTAL BILIRUBIN Latest Ref Range: 0 20 - 1 00 mg/dL 0 60      eGFR Latest Units: ml/min/1 73sq m 84      Triglycerides Latest Ref Range: <=150 mg/dL    84   Iron Latest Ref Range: 65 - 175 ug/dL    94   Ferritin Latest Ref Range: 8 - 388 ng/mL    175   Iron Saturation Latest Units: %    36   TIBC Latest Ref Range: 250 - 450 ug/dL    258   Vitamin B-12 Latest Ref Range: 100 - 900 pg/mL    560   WBC Latest Ref Range: 4 31 - 10 16 Thousand/uL 8 15      Red Blood Cell Count Latest Ref Range: 3 88 - 5 62 Million/uL 4 65      Hemoglobin Latest Ref Range: 12 0 - 17 0 g/dL 12 9      HCT Latest Ref Range: 36 5 - 49 3 % 41 6      MCV Latest Ref Range: 82 - 98 fL 90      MCH Latest Ref Range: 26 8 - 34 3 pg 27 7      MCHC Latest Ref Range: 31 4 - 37 4 g/dL 31 0 (L)      RDW Latest Ref Range: 11 6 - 15 1 % 12 7      Platelet Count Latest Ref Range: 149 - 390 Thousands/uL 188      MPV Latest Ref Range: 8 9 - 12 7 fL 10 7      nRBC Latest Units: /100 WBCs 0      Neutrophils % Latest Ref Range: 43 - 75 % 54      Immat GRANS % Latest Ref Range: 0 - 2 % 1      Lymphocytes Relative Latest Ref Range: 14 - 44 % 32      Monocytes Relative Latest Ref Range: 4 - 12 % 10      Eosinophils Latest Ref Range: 0 - 6 % 2      Basophils Relative Latest Ref Range: 0 - 1 % 1      Immature Grans Absolute Latest Ref Range: 0 00 - 0 20 Thousand/uL 0 04      Absolute Neutrophils Latest Ref Range: 1 85 - 7 62 Thousands/µL 4 45      Lymphocytes Absolute Latest Ref Range: 0 60 - 4 47 Thousands/µL 2 60      Absolute Monocytes Latest Ref Range: 0 17 - 1 22 Thousand/µL 0 82      Absolute Eosinophils Latest Ref Range: 0 00 - 0 61 Thousand/µL 0 18      Basophils Absolute Latest Ref Range: 0 00 - 0 10 Thousands/µL 0 06      Retic Ct Pct Latest Ref Range: 0 37 - 1 87 %    2 06 (H)   Retic Ct Abs Latest Ref Range: 14,356-105,094     88,800   Hemoglobin A1C Latest Ref Range: 6 5    7 5 (A)    URIC ACID Latest Ref Range: 4 2 - 8 0 mg/dL    5 3     Echo 6/2019: Indications: Cardiac Murmur      Diagnoses: R01 1 - Cardiac murmur, unspecified     Sonographer:  Wicho Ordonez NILA  Primary Physician:  Ilsa Estrada MD  Referring Physician:  Yesenia Baker MD  Group:  [facility]  Interpreting Physician:  Freddie Childers MD     SUMMARY     LEFT VENTRICLE:  Systolic function was at the lower limits of normal  Ejection fraction was estimated to be 50 %  There was hypokinesis of the apical wall(s)  Wall thickness was mildly increased  Features were consistent with a pseudonormal left ventricular filling pattern, with concomitant abnormal relaxation and increased filling pressure (grade 2 diastolic dysfunction)      LEFT ATRIUM:  The atrium was mildly dilated      MITRAL VALVE:  There was mild annular calcification  There was mild regurgitation      AORTIC VALVE:  Transaortic velocity was increased due to valvular stenosis  There was mild stenosis  The calculated aortic valve area was 1 8 cm^2  There was mild regurgitation      TRICUSPID VALVE:  There was mild regurgitation  Pulmonary artery systolic pressure was within the normal range      HISTORY: PRIOR HISTORY: GERD  Type 2 diabetes  HTN  Mild AS  CAD  NSTEMI  Hyperlipidemia  PRIOR PROCEDURES: Stent  Coronary bypass      PROCEDURE: The procedure was performed in the echo lab  This was a routine study  The transthoracic approach was used  The study included complete 2D imaging, M-mode, complete spectral Doppler, and color Doppler  The heart rate was 49 bpm,  at the start of the study   Images were obtained from the parasternal, apical, subcostal, and suprasternal notch acoustic windows  Image quality was adequate      LEFT VENTRICLE: Size was normal  Systolic function was at the lower limits of normal  Ejection fraction was estimated to be 50 %  There was hypokinesis of the apical wall(s)  Wall thickness was mildly increased  DOPPLER: Features were  consistent with a pseudonormal left ventricular filling pattern, with concomitant abnormal relaxation and increased filling pressure (grade 2 diastolic dysfunction)      RIGHT VENTRICLE: The size was normal  Systolic function was normal  Wall thickness was normal      LEFT ATRIUM: The atrium was mildly dilated      RIGHT ATRIUM: Size was normal      MITRAL VALVE: There was mild annular calcification  Valve structure was normal  There was normal leaflet separation  DOPPLER: The transmitral velocity was within the normal range  There was no evidence for stenosis  There was mild  regurgitation      AORTIC VALVE: The valve was trileaflet  Leaflets exhibited normal thickness, mild calcification, and mildly reduced cuspal separation  DOPPLER: Transaortic velocity was increased due to valvular stenosis  There was mild stenosis  The  calculated aortic valve area was 1 8 cm^2 There was mild regurgitation      TRICUSPID VALVE: The valve structure was normal  There was normal leaflet separation  DOPPLER: The transtricuspid velocity was within the normal range  There was no evidence for stenosis  There was mild regurgitation  Pulmonary artery  systolic pressure was within the normal range  Estimated peak PA pressure was 30 mmHg      PULMONIC VALVE: Leaflets exhibited normal thickness, no calcification, and normal cuspal separation  DOPPLER: The transpulmonic velocity was within the normal range  There was mild regurgitation      PERICARDIUM: There was no pericardial effusion      AORTA: The root exhibited normal size      SYSTEMIC VEINS: IVC: The inferior vena cava was normal in size   Respirophasic changes were normal     Past Surgical History:   Procedure Laterality Date    CARPAL TUNNEL RELEASE      CATARACT EXTRACTION Bilateral     COLONOSCOPY      CORONARY ANGIOPLASTY WITH STENT PLACEMENT      10-15-19 - pt denies stent implant    CORONARY ARTERY BYPASS GRAFT N/A 12/12/2016    Procedure: INTRAOP CECILLE; BILATERAL LEG EVH; CORONARY ARTERY BYPASS GRAFT X 4 SVG TO PDA, OM1,  AND DIAGONAL1, LIMA TO LAD;  Surgeon: Eligio Garcia MD;  [facility];  Service:     EYE SURGERY      HERNIA REPAIR      OK ARTHROSCOPY SHOULDER SURGICAL BICEPS TENODESIS Right 5/6/2016    Procedure: ARTHROSCOPIC BICEPS TENODESIS;  Surgeon: Jose Guadalupe Salomon MD;  [facility];  Service: Orthopedics    OK INCISE FINGER TENDON SHEATH Right 10/15/2019    Procedure: TRIGGER FINGER RELEASE INDEX, LONG, RING, SMALL, THUMB FINGERS;  Surgeon: Odalys Hester MD;  [facility];  Service: Orthopedics    OK INCISE FINGER TENDON SHEATH Left 3/3/2020    Procedure: RELEASE TRIGGER FINGER INDEX;  Surgeon: Odalys Hester MD;  [facility];  Service: Orthopedics    OK SHLDR ARTHROSCOP,SURG,W/ROTAT CUFF REPR Right 5/6/2016    Procedure: REPAIR ROTATOR CUFF  ARTHROSCOPIC; SUBACROMIAL DECOMPRESSION; PARTIAL SYNOVECTOMY;  Surgeon: Jose Guadalupe Salomon MD;  [facility];  Service: Orthopedics    OK WRIST Alicia Early LIG Right 10/15/2019    Procedure: ENDOSCOPIC CARPAL TUNNEL RELEASE;  Surgeon: Odalys Hester MD;  [facility];  Service: Orthopedics    OK WRIST Alicia Early LIG Left 3/3/2020    Procedure: RELEASE CARPAL TUNNEL ENDOSCOPIC;  Surgeon: Odalys Hester MD;  [facility];  Service: Orthopedics    ROTATOR CUFF REPAIR      TESTICLE SURGERY      UMBILICAL HERNIA REPAIR  2009    over age 13       --> ENT procedures: denies    Current Outpatient Medications   Medication Sig Dispense Refill    acetaminophen (TYLENOL) 650 mg CR tablet 650 mg      Alcohol Swabs (ALCOHOL PADS) 70 % PADS USE TWICE DAILY 100 each 3    aspirin (ECOTRIN LOW STRENGTH) 81 mg EC tablet TAKE 1 TABLET (81 MG TOTAL) BY MOUTH DAILY 90 tablet 0    clorazepate (TRANXENE) 7 5 mg tablet Take 7 5 mg by mouth 2 (two) times a day   0    clotrimazole-betamethasone (LOTRISONE) 1-0 05 % cream APPLY TOPICALLY 2 (TWO) TIMES A DAY 30 g 0    clotrimazole-betamethasone (LOTRISONE) 1-0 05 % lotion APPLY TOPICALLY 2 (TWO) TIMES A DAY 30 mL 0    diclofenac sodium (VOLTAREN) 1 % APPLY 2 G TOPICALLY 4 (FOUR) TIMES A  g 0    DULoxetine (CYMBALTA) 30 mg delayed release capsule Take 30 mg by mouth every morning  0    DULoxetine (CYMBALTA) 60 mg delayed release capsule Take 60 mg by mouth every morning      Easy Comfort Lancets MISC TEST BLOOD SUGAR TWICE DAILY, OR AS NEEDED WHEN SYMPTOMATIC OF HYPOGLYCEMIA OR HYPERGLYCEMIA 100 each 5    Easy Plus II Glucose Test test strip TEST AS DIRECTED TWICE DAILY 100 each 3    Elastic Bandages & Supports (MEDICAL COMPRESSION STOCKINGS) MISC by Does not apply route daily Please provide B/L compression stockings 2 each 0    ferrous sulfate 324 (65 Fe) mg Take 324 mg by mouth once a week   0    fluticasone (FLONASE) 50 mcg/act nasal spray 1 spray into each nostril daily 1 Bottle 5    furosemide (LASIX) 20 mg tablet TAKE 1 TABLET (20 MG TOTAL) BY MOUTH DAILY AS NEEDED (SWELLING) 30 tablet 3    gabapentin (NEURONTIN) 400 mg capsule Take 1 capsule (400 mg total) by mouth 3 (three) times a day 90 capsule 1    glimepiride (AMARYL) 4 mg tablet TAKE 1/2 TABLET BY MOUTH IN THE MORNING AND 1 TABLET AT NIGHT 180 tablet 2    glucose blood (FREESTYLE TEST STRIPS) test strip Use as instructed to check BS BID PRN (hypoglycemia/hyperglycemia symptoms) 180 each 2    glucose blood (GLUCOSE METER TEST) test strip 1 each by Other route 2 (two) times a day Use as instructed 100 each 5    glucose monitoring kit (FREESTYLE) monitoring kit 1 each by Does not apply route 2 (two) times a day Check BS BID PRN for hypoglycemia/hyperglycemia 1 each 0    HYDROcodone-acetaminophen (NORCO) 5-325 mg per tablet Take 1 tablet by mouth every 6 (six) hours as needed for pain for up to 5 dosesMax Daily Amount: 4 tablets 5 tablet 0    hydrocortisone 2 5 % cream APPLY TOPICALLY 3 (THREE) TIMES A DAY (Patient taking differently: Apply topically as needed ) 28 g 0    Lancets (FREESTYLE) lancets Use as instructed to check BS BID PRN (hypoglycemia/hyperglycemia symptoms) 180 each 2    lisinopril (ZESTRIL) 20 mg tablet TAKE 1 TABLET (20 MG TOTAL) BY MOUTH DAILY 90 tablet 3    metFORMIN (GLUCOPHAGE) 500 mg tablet Take 1 tablet (500 mg total) by mouth 2 (two) times a day with meals 180 tablet 2    metoprolol tartrate (LOPRESSOR) 25 mg tablet Take 1 tablet (25 mg total) by mouth every 12 (twelve) hours 180 tablet 2    mirtazapine (REMERON) 15 mg tablet TAKE 1 TABLET (15 MG TOTAL) BY MOUTH DAILY AT BEDTIME 90 tablet 0    neomycin-polymyxin-dexamethasone (MAXITROL) ophthalmic suspension INSTILL 1 DROP INTO EACH EYE FOUR TIMES A DAY AFTER SURGERY  2    NIFEdipine ER (ADALAT CC) 30 MG 24 hr tablet TAKE 1 TABLET (30 MG TOTAL) BY MOUTH 2 (TWO) TIMES A DAY 60 tablet 11    omeprazole (PriLOSEC) 20 mg delayed release capsule TAKE 2 CAPS PO X 14 DAYS THEN 1 CAP PO QD 90 capsule 1    ONETOUCH DELICA LANCETS FINE MISC Test blood sugar twice daily, or as needed when symptomatic of hypoglycemia or hyperglycemia 100 each 5    rosuvastatin (CRESTOR) 40 MG tablet TAKE 1 TABLET (40 MG TOTAL) BY MOUTH DAILY 90 tablet 3    simethicone (MYLICON) 767 MG chewable tablet TAKE 1 CAPSULE BY MOUTH EVERY 6 HOURS AS NEEDED FOR FLATULENCE 28 tablet 1    tobramycin-dexamethasone (TOBRADEX) ophthalmic suspension ADMINISTER 1 DROP TO BOTH EYES 2 (TWO) TIMES A DAY 5 mL 0     No current facility-administered medications for this visit            Social History:  -Employment: unemployed  -Smoking: no  -Caffeine: yes, coffee daily  -Alcohol: no  -THC: no  -OTC/Supplements/herbals: none  -Illicits:  denies  -Family: family    ROS:  Genitourinary need to urinate more than twice a night and difficulty with erection   Cardiology none   Gastrointestinal none   Neurology forgetfulness, poor concentration or confusion,  and difficulty with memory   Constitutional none   Integumentary none   Psychiatry anxiety and depression   Musculoskeletal joint pain, muscle aches, back pain and sciatica   Pulmonary snoring   ENT none   Endocrine none   Hematological none       MSE:  -Alert and appropriate: alert, calm, cooperative  -Oriented to person, place and time:  name, age, location, day/date/mon/yr  -Behavior: good, sustained eye contact  -Speech: Unremarkable rate/rhythm/volume  -Mood: "good"  -Affect: constricted  -Thought Processes: linear, logical, goal directed  PE:  Body mass index is 25 53 kg/m²  Vitals:    09/03/20 1351   BP: 108/68   Pulse: 57   Weight: 73 9 kg (163 lb)   Height: 5' 7" (1 702 m)       -General:  In NAD    -Eyes: Conjunctival injection: none     -EOM:  PERRLA, EOMI   -Eyelid hooding: yes    -ENT: MP: 4/4   -Facial deformity:  retrognathia   -Hard palate: moderate arch   -Soft palate: crowding with redundant tissue   -Gums and teeth: normal dentition   -Tongue:  Scalloping   -Nares:  Patent    -Neck/Lymphatics: Lymphadenopathy:  none appreciated   -Masses:  none appreciated   -Circumference:  Neck Circumference: 16 5 "    -Cardiac: Auscultation:  RRR   - LE edema over shins: none appreciated    -Pulm: -Respirations: unlaboured         -Auscultation:  CTA bilaterally, posterior fields    -Neuro: No resting tremor     -Musculoskeletal: Gait and stance: normal turning and ambulation; unremarkable  Assessment:  Mr Jacquelyn Marks is a 70 y o  male who is seen to evaluate for possible obstructive sleep apnea  The patient has LUIS MIGUEL and has been untreated for the last three years due to no more new supplies and loss of follow up   He is symptomatic with snoring, gasping, and observed apnea  He has a lot of underlying medical conditions including CAD, stroke, and MI in the past  He has had open heart surgery since he stopped using the CPAP with oxygen  The pathophysiology of, the reasons to treat and treatment options for obstructive sleep apnea were all reviewed with the patient and his wife today  Discussed the testing options and reviewed the benefits and downsides of both, will do a split study to get started on treatment sooner  He is amenable to treatment with PAP therapy  Discussed keeping nasal passages clear, abstaining from alcohol, and other sedating drugs at night- which will worsen symptoms of LUIS MIGUEL  --History provided by: patient and wife  --Records reviewed: in chart      Recommendations:  1) split study for AHI>15  2) Driving safety was reviewed with patient  If the patient feels too sleepy to drive he knows not to drive  If he becomes sleepy while driving he will pull over and nap  3) Follow-up after initiation of treatment  4) Call with any questions or concerns  All questions answered for the patient and his wife, who indicated understanding and agreed with the plan       Kamala Borges MD  Psychiatry/ Sleep medicine

## 2020-09-04 ENCOUNTER — OFFICE VISIT (OUTPATIENT)
Dept: PAIN MEDICINE | Facility: CLINIC | Age: 75
End: 2020-09-04
Payer: MEDICARE

## 2020-09-04 VITALS
BODY MASS INDEX: 25.58 KG/M2 | HEART RATE: 52 BPM | DIASTOLIC BLOOD PRESSURE: 75 MMHG | HEIGHT: 67 IN | TEMPERATURE: 98.2 F | SYSTOLIC BLOOD PRESSURE: 126 MMHG | WEIGHT: 163 LBS

## 2020-09-04 DIAGNOSIS — M47.816 LUMBAR SPONDYLOSIS: ICD-10-CM

## 2020-09-04 DIAGNOSIS — G89.4 CHRONIC PAIN SYNDROME: Primary | ICD-10-CM

## 2020-09-04 DIAGNOSIS — M48.062 SPINAL STENOSIS OF LUMBAR REGION WITH NEUROGENIC CLAUDICATION: ICD-10-CM

## 2020-09-04 DIAGNOSIS — M51.36 DDD (DEGENERATIVE DISC DISEASE), LUMBAR: ICD-10-CM

## 2020-09-04 DIAGNOSIS — M54.16 LUMBAR RADICULOPATHY: ICD-10-CM

## 2020-09-04 PROCEDURE — 99214 OFFICE O/P EST MOD 30 MIN: CPT | Performed by: NURSE PRACTITIONER

## 2020-09-04 RX ORDER — GABAPENTIN 400 MG/1
CAPSULE ORAL
Qty: 90 CAPSULE | Refills: 2 | Status: SHIPPED | OUTPATIENT
Start: 2020-09-04 | End: 2021-01-18 | Stop reason: ALTCHOICE

## 2020-09-04 NOTE — PROGRESS NOTES
Assessment:  1  Chronic pain syndrome    2  Lumbar radiculopathy    3  Lumbar spondylosis    4  DDD (degenerative disc disease), lumbar    5  Spinal stenosis of lumbar region with neurogenic claudication        Plan:  1  We can repeat left L4 and L5 TFESI p r n     I discussed with the patient that since there has been moderate to significant improvement in the pain symptoms, we will hold off on any repeat injections at this point in time  However, I reviewed with the patient that if their symptoms should return or worsen,  they should call our office to schedule to discuss repeating the injection  2  The patient may continue gabapentin 400 mg in the morning and 800 mg at bedtime  This medication was refilled today  3  The patient will continue with his home exercise program  4  The patient will follow-up in 3 months for medication prescription refill and reevaluation  The patient was advised to contact the office should their symptoms worsen in the interim  The patient was agreeable and verbalized an understanding  M*Modal software was used to dictate this note  It may contain errors with dictating incorrect words or incorrect spelling  Please contact the provider directly with any questions  History of Present Illness: The patient is a 70 y o  male last seen on 7/10/20 who presents for a follow up office visit in regards to chronic lumbosacral back pain that radiates into the left lower extremity secondary to lumbar degenerative disc disease, lumbar spondylosis and stenosis  The patient denies right lower extremity symptoms, bowel or bladder incontinence or saddle anesthesia  The patient is status post left L4 and L5 TFESI with Dr Nicki Rice on Velia 10, 2020 which has provided a 90% relief of his symptoms which is ongoing at this time  He continues to take gabapentin 400 mg twice a day consistently although he has post to be taking 400 mg 3 times a day as prescribed    The patient rates his pain a 1/10 on the numeric pain rating scale  States the pain is occasional in nature and follows no particular pattern throughout the day  I have personally reviewed and/or updated the patient's past medical history, past surgical history, family history, social history, current medications, allergies, and vital signs today  Review of Systems:    Review of Systems   Respiratory: Negative for shortness of breath  Cardiovascular: Negative for chest pain  Gastrointestinal: Negative for constipation, diarrhea, nausea and vomiting  Musculoskeletal: Negative for arthralgias and myalgias  Skin: Negative for rash  Neurological: Negative for dizziness, seizures and weakness  All other systems reviewed and are negative          Past Medical History:   Diagnosis Date    AS (aortic stenosis)     Balanitis     Biceps tendonitis on right     CAD (coronary artery disease)     Cancer (HCC)     skin    Colon polyp     Complete rotator cuff tear or rupture of right shoulder, not specified as traumatic     CPAP (continuous positive airway pressure) dependence     Diabetes mellitus (HCC)     Esophageal reflux     High cholesterol     Hypertension     Hypertriglyceridemia     Hypogonadism in male     Myalgia     Myocardial infarction ([facility] [address] )     Palpitations     Shoulder pain     Sinus problem     Skin cancer of nose     Sleep apnea     Stroke ([facility] [address] ) [address]    Systolic murmur     recently found    Tinea cruris     Tinea pedis        Past Surgical History:   Procedure Laterality Date    CARPAL TUNNEL RELEASE      CATARACT EXTRACTION Bilateral     COLONOSCOPY      CORONARY ANGIOPLASTY WITH STENT PLACEMENT      10-15-19 - pt denies stent implant    CORONARY ARTERY BYPASS GRAFT N/A 12/12/2016    Procedure: INTRAOP CECILLE; BILATERAL LEG EVH; CORONARY ARTERY BYPASS GRAFT X 4 SVG TO PDA, OM1,  AND DIAGONAL1, LIMA TO LAD;  Surgeon: Eligio Garcia MD;  [facility];  Service:    [facility] EYE SURGERY      HERNIA REPAIR      [facility] ARTHROSCOPY SHOULDER SURGICAL BICEPS TENODESIS Right 5/6/2016    Procedure: ARTHROSCOPIC BICEPS TENODESIS;  Surgeon: Rafy Becker MD;  [facility];  Service: Orthopedics    WV INCISE FINGER TENDON SHEATH Right 10/15/2019    Procedure: TRIGGER FINGER RELEASE INDEX, LONG, RING, SMALL, THUMB FINGERS;  Surgeon: Aleyda Cali MD;  [facility];  Service: Orthopedics    [place] INCISE FINGER TENDON SHEATH Left 3/3/2020    Procedure: RELEASE TRIGGER FINGER INDEX;  Surgeon: Aleyda Cali MD;  [facility];  Service: Orthopedics    [facility] SHLDR ARTHROSCOP,SURG,W/ROTAT CUFF REPR Right 5/6/2016    Procedure: REPAIR ROTATOR CUFF  ARTHROSCOPIC; SUBACROMIAL DECOMPRESSION; PARTIAL SYNOVECTOMY;  Surgeon: Rafy Becker MD;  [facility];  Service: Orthopedics    [facility] WRIST Jinnie Footman LIG Right 10/15/2019    Procedure: ENDOSCOPIC CARPAL TUNNEL RELEASE;  Surgeon: Aleyda Cali MD;  [facility];  Service: Orthopedics    [facility] WRIST Jinnie Footman LIG Left 3/3/2020    Procedure: RELEASE CARPAL TUNNEL ENDOSCOPIC;  Surgeon: Aleyda Cali MD;  [facility];  Service: Orthopedics    ROTATOR CUFF REPAIR      TESTICLE SURGERY      UMBILICAL HERNIA REPAIR  2009    over age 13       Family History   Problem Relation Age of Onset    Heart disease Mother     Hypertension Mother     Nephrolithiasis Father     Other Father         Renal disease    Hypertension Sister     Nephrolithiasis Brother     Other Brother         Renal disease    Hematuria Family         Microscopic       Social History     Occupational History    Not on file   Tobacco Use    Smoking status: Former Smoker    Smokeless tobacco: Former User    Tobacco comment: smoked for 3 years from 19 y/o - 23 yrs old   Substance and Sexual Activity    Alcohol use: No    Drug use: No    Sexual activity: Yes     Partners: Female         Current Outpatient Medications:   acetaminophen (TYLENOL) 650 mg CR tablet, 650 mg, Disp: , Rfl:     Alcohol Swabs (ALCOHOL PADS) 70 % PADS, USE TWICE DAILY, Disp: 100 each, Rfl: 3    aspirin (ECOTRIN LOW STRENGTH) 81 mg EC tablet, TAKE 1 TABLET (81 MG TOTAL) BY MOUTH DAILY, Disp: 90 tablet, Rfl: 0    clorazepate (TRANXENE) 7 5 mg tablet, Take 7 5 mg by mouth 2 (two) times a day , Disp: , Rfl: 0    clotrimazole-betamethasone (LOTRISONE) 1-0 05 % cream, APPLY TOPICALLY 2 (TWO) TIMES A DAY, Disp: 30 g, Rfl: 0    clotrimazole-betamethasone (LOTRISONE) 1-0 05 % lotion, APPLY TOPICALLY 2 (TWO) TIMES A DAY, Disp: 30 mL, Rfl: 0    diclofenac sodium (VOLTAREN) 1 %, APPLY 2 G TOPICALLY 4 (FOUR) TIMES A DAY, Disp: 100 g, Rfl: 0    DULoxetine (CYMBALTA) 30 mg delayed release capsule, Take 30 mg by mouth every morning, Disp: , Rfl: 0    DULoxetine (CYMBALTA) 60 mg delayed release capsule, Take 60 mg by mouth every morning, Disp: , Rfl:     Easy Comfort Lancets MISC, TEST BLOOD SUGAR TWICE DAILY, OR AS NEEDED WHEN SYMPTOMATIC OF HYPOGLYCEMIA OR HYPERGLYCEMIA, Disp: 100 each, Rfl: 5    Easy Plus II Glucose Test test strip, TEST AS DIRECTED TWICE DAILY, Disp: 100 each, Rfl: 3    Elastic Bandages & Supports (MEDICAL COMPRESSION STOCKINGS) MISC, by Does not apply route daily Please provide B/L compression stockings, Disp: 2 each, Rfl: 0    ferrous sulfate 324 (65 Fe) mg, Take 324 mg by mouth once a week , Disp: , Rfl: 0    fluticasone (FLONASE) 50 mcg/act nasal spray, 1 spray into each nostril daily, Disp: 1 Bottle, Rfl: 5    furosemide (LASIX) 20 mg tablet, TAKE 1 TABLET (20 MG TOTAL) BY MOUTH DAILY AS NEEDED (SWELLING), Disp: 30 tablet, Rfl: 3    gabapentin (NEURONTIN) 400 mg capsule, Take 1 PO AM and 2 PO HS, Disp: 90 capsule, Rfl: 2    glimepiride (AMARYL) 4 mg tablet, TAKE 1/2 TABLET BY MOUTH IN THE MORNING AND 1 TABLET AT NIGHT, Disp: 180 tablet, Rfl: 2    glucose blood (FREESTYLE TEST STRIPS) test strip, Use as instructed to check BS BID PRN (hypoglycemia/hyperglycemia symptoms), Disp: 180 each, Rfl: 2    glucose blood (GLUCOSE METER TEST) test strip, 1 each by Other route 2 (two) times a day Use as instructed, Disp: 100 each, Rfl: 5    glucose monitoring kit (FREESTYLE) monitoring kit, 1 each by Does not apply route 2 (two) times a day Check BS BID PRN for hypoglycemia/hyperglycemia, Disp: 1 each, Rfl: 0    HYDROcodone-acetaminophen (NORCO) 5-325 mg per tablet, Take 1 tablet by mouth every 6 (six) hours as needed for pain for up to 5 dosesMax Daily Amount: 4 tablets, Disp: 5 tablet, Rfl: 0    hydrocortisone 2 5 % cream, APPLY TOPICALLY 3 (THREE) TIMES A DAY (Patient taking differently: Apply topically as needed ), Disp: 28 g, Rfl: 0    Lancets (FREESTYLE) lancets, Use as instructed to check BS BID PRN (hypoglycemia/hyperglycemia symptoms), Disp: 180 each, Rfl: 2    lisinopril (ZESTRIL) 20 mg tablet, TAKE 1 TABLET (20 MG TOTAL) BY MOUTH DAILY, Disp: 90 tablet, Rfl: 3    metFORMIN (GLUCOPHAGE) 500 mg tablet, Take 1 tablet (500 mg total) by mouth 2 (two) times a day with meals, Disp: 180 tablet, Rfl: 2    metoprolol tartrate (LOPRESSOR) 25 mg tablet, Take 1 tablet (25 mg total) by mouth every 12 (twelve) hours, Disp: 180 tablet, Rfl: 2    mirtazapine (REMERON) 15 mg tablet, TAKE 1 TABLET (15 MG TOTAL) BY MOUTH DAILY AT BEDTIME, Disp: 90 tablet, Rfl: 0    neomycin-polymyxin-dexamethasone (MAXITROL) ophthalmic suspension, INSTILL 1 DROP INTO EACH EYE FOUR TIMES A DAY AFTER SURGERY , Disp: , Rfl: 2    NIFEdipine ER (ADALAT CC) 30 MG 24 hr tablet, TAKE 1 TABLET (30 MG TOTAL) BY MOUTH 2 (TWO) TIMES A DAY, Disp: 60 tablet, Rfl: 11    omeprazole (PriLOSEC) 20 mg delayed release capsule, TAKE 2 CAPS PO X 14 DAYS THEN 1 CAP PO QD, Disp: 90 capsule, Rfl: 1    ONETOUCH DELICA LANCETS FINE MISC, Test blood sugar twice daily, or as needed when symptomatic of hypoglycemia or hyperglycemia, Disp: 100 each, Rfl: 5    rosuvastatin (CRESTOR) 40 MG tablet, TAKE 1 TABLET (40 MG TOTAL) BY MOUTH DAILY, Disp: 90 tablet, Rfl: 3    simethicone (MYLICON) 739 MG chewable tablet, TAKE 1 CAPSULE BY MOUTH EVERY 6 HOURS AS NEEDED FOR FLATULENCE, Disp: 28 tablet, Rfl: 1    tobramycin-dexamethasone (TOBRADEX) ophthalmic suspension, ADMINISTER 1 DROP TO BOTH EYES 2 (TWO) TIMES A DAY, Disp: 5 mL, Rfl: 0    Allergies   Allergen Reactions    Epinephrine Hives and Anxiety    Penicillins Hives and Anxiety       Physical Exam:    /75   Pulse (!) 52   Temp 98 2 °F (36 8 °C)   Ht 5' 7" (1 702 m)   Wt 73 9 kg (163 lb)   BMI 25 53 kg/m²     Constitutional:normal, well developed, well nourished, alert, in no distress and non-toxic and no overt pain behavior  Eyes:anicteric  HEENT:grossly intact  Neck:supple, symmetric, trachea midline and no masses   Pulmonary:even and unlabored  Cardiovascular:No edema or pitting edema present  Skin:Normal without rashes or lesions and well hydrated  Psychiatric:Mood and affect appropriate  Neurologic:Cranial Nerves II-XII grossly intact  Musculoskeletal:normal gait      Imaging  No orders to display     MRI LUMBAR SPINE WITHOUT CONTRAST     INDICATION: [id number]: Radiculopathy, lumbar region      COMPARISON:  None      TECHNIQUE:  Sagittal T1, sagittal T2, sagittal inversion recovery, axial T1 and axial T2, coronal T2     IMAGE QUALITY:  Diagnostic     FINDINGS:     VERTEBRAL BODIES:  There are 5 lumbar type vertebral bodies  There is preserved normal lumbar lordosis  No significant spondylolisthesis  Fatty replacing marrow changes  There is L3 vertebral body hemangioma  No suspicious discrete marrow lesion      SACRUM:  Normal signal within the sacrum   No evidence of insufficiency or stress fracture      DISTAL CORD AND CONUS:  Normal size and signal within the distal cord and conus      PARASPINAL SOFT TISSUES:  Paraspinal soft tissues are unremarkable      Bilateral renal cysts      LOWER THORACIC DISC SPACES:  Normal disc height and signal   No disc herniation, canal stenosis or foraminal narrowing      LUMBAR DISC SPACES:     L1-L2:  No significant disc bulge  There is mild bilateral facet arthropathy  There is no canal stenosis  Mild bilateral foraminal narrowing      L2-L3:  There is mild disc bulge  There is mild bilateral facet arthropathy  There is no significant canal stenosis  There is mild bilateral foraminal narrowing     L3-L4:  There is minimal disc bulge  There is mild to moderate bilateral facet arthropathy  No significant canal stenosis  Mild bilateral foraminal narrowing     L4-L5:  There is disc bulge, moderate bilateral facet arthropathy and bilateral ligamentum flavum thickening resulting in bilateral lateral recesses stenosis and mild canal narrowing  There is severe left and moderate to severe right foraminal stenosis     L5-S1:  There is disc bulge and superimposed left subarticular disc protrusion  There is moderate bilateral facet arthropathy  No canal stenosis  There is left greater than right severe bilateral foraminal stenosis     IMPRESSION:     Degenerative changes without high-grade canal stenosis, as detailed  There is severe left and moderate to severe right foraminal stenosis at level L4-5; and left greater than right severe bilateral foraminal stenosis at level L5-S1        No orders of the defined types were placed in this encounter

## 2020-09-05 DIAGNOSIS — K21.9 GASTROESOPHAGEAL REFLUX DISEASE, ESOPHAGITIS PRESENCE NOT SPECIFIED: ICD-10-CM

## 2020-09-07 DIAGNOSIS — E11.22 HYPERTENSION ASSOCIATED WITH STAGE 2 CHRONIC KIDNEY DISEASE DUE TO TYPE 2 DIABETES MELLITUS (HCC): Chronic | ICD-10-CM

## 2020-09-07 DIAGNOSIS — N18.2 HYPERTENSION ASSOCIATED WITH STAGE 2 CHRONIC KIDNEY DISEASE DUE TO TYPE 2 DIABETES MELLITUS (HCC): Chronic | ICD-10-CM

## 2020-09-07 DIAGNOSIS — I12.9 HYPERTENSION ASSOCIATED WITH STAGE 2 CHRONIC KIDNEY DISEASE DUE TO TYPE 2 DIABETES MELLITUS (HCC): Chronic | ICD-10-CM

## 2020-09-08 RX ORDER — SIMETHICONE 125 MG
TABLET,CHEWABLE ORAL
Qty: 28 TABLET | Refills: 1 | Status: SHIPPED | OUTPATIENT
Start: 2020-09-08 | End: 2020-09-15

## 2020-09-09 DIAGNOSIS — I25.10 CORONARY ARTERY DISEASE INVOLVING NATIVE CORONARY ARTERY OF NATIVE HEART WITHOUT ANGINA PECTORIS: ICD-10-CM

## 2020-09-09 RX ORDER — ASPIRIN 81 MG/1
TABLET ORAL
Qty: 90 TABLET | Refills: 0 | Status: SHIPPED | OUTPATIENT
Start: 2020-09-09 | End: 2020-10-05

## 2020-09-11 DIAGNOSIS — K21.9 GASTROESOPHAGEAL REFLUX DISEASE, ESOPHAGITIS PRESENCE NOT SPECIFIED: ICD-10-CM

## 2020-09-14 DIAGNOSIS — M54.32 SCIATICA OF LEFT SIDE: ICD-10-CM

## 2020-09-15 ENCOUNTER — TELEPHONE (OUTPATIENT)
Dept: FAMILY MEDICINE CLINIC | Facility: CLINIC | Age: 75
End: 2020-09-15

## 2020-09-15 DIAGNOSIS — N18.6 DM TYPE 2 CAUSING ESRD (HCC): Primary | ICD-10-CM

## 2020-09-15 DIAGNOSIS — E11.22 DM TYPE 2 CAUSING ESRD (HCC): Primary | ICD-10-CM

## 2020-09-15 RX ORDER — SIMETHICONE 125 MG
TABLET,CHEWABLE ORAL
Qty: 28 TABLET | Refills: 1 | Status: SHIPPED | OUTPATIENT
Start: 2020-09-15 | End: 2020-11-18

## 2020-09-15 NOTE — PROGRESS NOTES
BMI Counseling: Body mass index is 25 44 kg/m²  The BMI is above normal  Nutrition recommendations include reducing portion sizes, reducing fast food intake, moderation in carbohydrate intake and increasing intake of lean protein  Exercise recommendations include exercising 3-5 times per week  Assessment/Plan:    Type 2 diabetes mellitus (HCC)    Lab Results   Component Value Date    HGBA1C 7 3 (A) 09/16/2020     Several reported hypoglycemic episodes will d/c glimepiride and start Jardiance 25 mg daily  Continue metformin 500 mg BID(higher dose causes GI intolerance)  Advised to continue a diabetic diet  Encouraged exercise 3-5 days/wk min 30 mins  Continue checking bs qd-bid  Check urine microalbumin  Last eye exam 8/2020  Recheck hgba1c in 3 months    Leg edema  Refill compression stockings    Hyperlipidemia  Continue Crestor 40 mg daily  Will check cmp and lipid panel    Essential hypertension  BP-132/80  Well controlled  Continue current medication regimen       Diagnoses and all orders for this visit:    Type 2 diabetes mellitus without complication, without long-term current use of insulin (HCC)  -     POCT hemoglobin A1c  -     Microalbumin / creatinine urine ratio  -     Comprehensive metabolic panel; Future  -     Empagliflozin (Jardiance) 25 MG TABS; Take 1 tablet (25 mg total) by mouth every morning    Screening for diabetic retinopathy    Hyperlipidemia, unspecified hyperlipidemia type  -     Lipid panel; Future    DDD (degenerative disc disease), lumbar    Encounter for vitamin deficiency screening    Major depressive disorder, recurrent episode, moderate (HCC)    Leg edema  -     Compression Stocking    Essential hypertension  -     lisinopril (ZESTRIL) 20 mg tablet; Take 1 tablet (20 mg total) by mouth daily    Sciatica of left side  -     diclofenac sodium (VOLTAREN) 1 %;  Apply 2 g topically 4 (four) times a day    Other orders  -     Cancel: IRIS Diabetic eye exam        Subjective:      Patient ID: Harpreet Cheng is a 70 y o  male presents today for DM f/u     HPI    T2DM- checking bs bid, fasting bs  PM readings 112-158 mg/dl  He reports he doesn't take metformin regularly d/t low bs readings Follows a diabetic diet    Requesting refill on Voltaren gel PRN nightly for lbp with good results  Requesting a new order for compression stockings  Wears stocking on qam off qpm Has noticed the different in BLE edema since he began wearing compression stockings    Hyperlipidemia-patient taking Crestor 40 mg daily  Denies any adverse effects  The following portions of the patient's history were reviewed and updated as appropriate: allergies, current medications, past family history, past medical history, past social history, past surgical history and problem list     Review of Systems   Constitutional: Negative for chills, fatigue and fever  HENT: Negative  Respiratory: Negative for cough, chest tightness, shortness of breath and wheezing  Cardiovascular: Negative for chest pain and palpitations  Gastrointestinal: Negative for abdominal pain, constipation, diarrhea, nausea and vomiting  Genitourinary: Negative for difficulty urinating  Musculoskeletal: Negative for arthralgias, myalgias, neck pain and neck stiffness  Skin: Negative for rash and wound  Neurological: Negative for dizziness, weakness, light-headedness, numbness and headaches  Psychiatric/Behavioral: Negative for agitation and behavioral problems  The patient is not nervous/anxious  Objective:      /80 (BP Location: Left arm, Patient Position: Sitting, Cuff Size: Standard)   Pulse 84   Temp (!) 96 6 °F (35 9 °C) (Tympanic)   Resp 16   Ht 5' 7" (1 702 m)   Wt 73 7 kg (162 lb 6 4 oz)   BMI 25 44 kg/m²          Physical Exam  Constitutional:       General: He is not in acute distress  Appearance: He is well-developed  HENT:      Head: Normocephalic and atraumatic     Eyes:      Conjunctiva/sclera: Conjunctivae normal    Neck:      Musculoskeletal: Normal range of motion and neck supple  Cardiovascular:      Rate and Rhythm: Normal rate and regular rhythm  Heart sounds: Murmur present  Systolic murmur present with a grade of 2/6  Pulmonary:      Effort: Pulmonary effort is normal  No respiratory distress  Breath sounds: Normal breath sounds  No wheezing  Musculoskeletal:         General: No deformity  Lymphadenopathy:      Cervical: No cervical adenopathy  Neurological:      Mental Status: He is alert and oriented to person, place, and time  Psychiatric:         Behavior: Behavior normal          Thought Content:  Thought content normal  Xelpricillaz Pregnancy And Lactation Text: This medication is Pregnancy Category D and is not considered safe during pregnancy.  The risk during breast feeding is also uncertain.

## 2020-09-16 ENCOUNTER — OFFICE VISIT (OUTPATIENT)
Dept: FAMILY MEDICINE CLINIC | Facility: CLINIC | Age: 75
End: 2020-09-16

## 2020-09-16 VITALS
SYSTOLIC BLOOD PRESSURE: 132 MMHG | BODY MASS INDEX: 25.49 KG/M2 | TEMPERATURE: 96.6 F | HEART RATE: 84 BPM | HEIGHT: 67 IN | RESPIRATION RATE: 16 BRPM | DIASTOLIC BLOOD PRESSURE: 80 MMHG | WEIGHT: 162.4 LBS

## 2020-09-16 DIAGNOSIS — E11.9 TYPE 2 DIABETES MELLITUS WITHOUT COMPLICATION, WITHOUT LONG-TERM CURRENT USE OF INSULIN (HCC): Primary | ICD-10-CM

## 2020-09-16 DIAGNOSIS — I10 ESSENTIAL HYPERTENSION: ICD-10-CM

## 2020-09-16 DIAGNOSIS — E78.5 HYPERLIPIDEMIA, UNSPECIFIED HYPERLIPIDEMIA TYPE: ICD-10-CM

## 2020-09-16 DIAGNOSIS — M54.32 SCIATICA OF LEFT SIDE: ICD-10-CM

## 2020-09-16 DIAGNOSIS — R60.0 LEG EDEMA: ICD-10-CM

## 2020-09-16 PROBLEM — Z23 VACCINE FOR VZV (VARICELLA-ZOSTER VIRUS): Status: RESOLVED | Noted: 2019-03-15 | Resolved: 2020-09-16

## 2020-09-16 LAB — SL AMB POCT HEMOGLOBIN AIC: 7.3 (ref ?–6.5)

## 2020-09-16 PROCEDURE — 99214 OFFICE O/P EST MOD 30 MIN: CPT | Performed by: PHYSICIAN ASSISTANT

## 2020-09-16 PROCEDURE — 83036 HEMOGLOBIN GLYCOSYLATED A1C: CPT | Performed by: PHYSICIAN ASSISTANT

## 2020-09-16 RX ORDER — LISINOPRIL 20 MG/1
20 TABLET ORAL DAILY
Qty: 90 TABLET | Refills: 3 | Status: SHIPPED | OUTPATIENT
Start: 2020-09-16 | End: 2021-11-16

## 2020-09-16 RX ORDER — EMPAGLIFLOZIN 25 MG/1
25 TABLET, FILM COATED ORAL EVERY MORNING
Qty: 30 TABLET | Refills: 5 | Status: SHIPPED | OUTPATIENT
Start: 2020-09-16 | End: 2021-01-12

## 2020-09-16 NOTE — ASSESSMENT & PLAN NOTE
Lab Results   Component Value Date    HGBA1C 7 3 (A) 09/16/2020     Several reported hypoglycemic episodes will d/c glimepiride and start Jardiance 25 mg daily  Continue metformin 500 mg BID(higher dose causes GI intolerance)  Advised to continue a diabetic diet  Encouraged exercise 3-5 days/wk min 30 mins  Continue checking bs qd-bid  Check urine microalbumin  Last eye exam 8/2020  Recheck hgba1c in 3 months

## 2020-09-19 DIAGNOSIS — G47.00 INSOMNIA, UNSPECIFIED TYPE: ICD-10-CM

## 2020-09-21 DIAGNOSIS — E11.9 TYPE 2 DIABETES MELLITUS WITHOUT COMPLICATION, WITHOUT LONG-TERM CURRENT USE OF INSULIN (HCC): ICD-10-CM

## 2020-09-22 RX ORDER — MIRTAZAPINE 15 MG/1
TABLET, FILM COATED ORAL
Qty: 90 TABLET | Refills: 0 | Status: SHIPPED | OUTPATIENT
Start: 2020-09-22 | End: 2020-11-25

## 2020-09-22 RX ORDER — BLOOD SUGAR DIAGNOSTIC
STRIP MISCELLANEOUS
Qty: 100 EACH | Refills: 3 | Status: SHIPPED | OUTPATIENT
Start: 2020-09-22 | End: 2021-02-03

## 2020-09-26 DIAGNOSIS — K21.9 GASTROESOPHAGEAL REFLUX DISEASE, ESOPHAGITIS PRESENCE NOT SPECIFIED: ICD-10-CM

## 2020-10-01 DIAGNOSIS — K21.9 GASTROESOPHAGEAL REFLUX DISEASE: ICD-10-CM

## 2020-10-02 DIAGNOSIS — I12.9 HYPERTENSION ASSOCIATED WITH STAGE 2 CHRONIC KIDNEY DISEASE DUE TO TYPE 2 DIABETES MELLITUS (HCC): Chronic | ICD-10-CM

## 2020-10-02 DIAGNOSIS — E11.22 HYPERTENSION ASSOCIATED WITH STAGE 2 CHRONIC KIDNEY DISEASE DUE TO TYPE 2 DIABETES MELLITUS (HCC): Chronic | ICD-10-CM

## 2020-10-02 DIAGNOSIS — N18.2 HYPERTENSION ASSOCIATED WITH STAGE 2 CHRONIC KIDNEY DISEASE DUE TO TYPE 2 DIABETES MELLITUS (HCC): Chronic | ICD-10-CM

## 2020-10-05 RX ORDER — SIMETHICONE 125 MG
TABLET,CHEWABLE ORAL
Qty: 28 TABLET | Refills: 1 | OUTPATIENT
Start: 2020-10-05

## 2020-10-05 RX ORDER — ASPIRIN 81 MG/1
TABLET ORAL
Qty: 90 TABLET | Refills: 0 | Status: SHIPPED | OUTPATIENT
Start: 2020-10-05 | End: 2021-03-02

## 2020-10-10 DIAGNOSIS — M54.32 SCIATICA OF LEFT SIDE: ICD-10-CM

## 2020-10-15 ENCOUNTER — TELEPHONE (OUTPATIENT)
Dept: FAMILY MEDICINE CLINIC | Facility: CLINIC | Age: 75
End: 2020-10-15

## 2020-10-21 ENCOUNTER — LAB (OUTPATIENT)
Dept: LAB | Facility: HOSPITAL | Age: 75
End: 2020-10-21
Payer: MEDICARE

## 2020-10-21 DIAGNOSIS — M54.32 SCIATICA OF LEFT SIDE: ICD-10-CM

## 2020-10-21 DIAGNOSIS — E11.9 TYPE 2 DIABETES MELLITUS WITHOUT COMPLICATION, WITHOUT LONG-TERM CURRENT USE OF INSULIN (HCC): ICD-10-CM

## 2020-10-21 DIAGNOSIS — E78.5 HYPERLIPIDEMIA, UNSPECIFIED HYPERLIPIDEMIA TYPE: ICD-10-CM

## 2020-10-21 LAB
ALBUMIN SERPL BCP-MCNC: 4.1 G/DL (ref 3.5–5)
ALP SERPL-CCNC: 75 U/L (ref 46–116)
ALT SERPL W P-5'-P-CCNC: 23 U/L (ref 12–78)
ANION GAP SERPL CALCULATED.3IONS-SCNC: 5 MMOL/L (ref 4–13)
AST SERPL W P-5'-P-CCNC: 15 U/L (ref 5–45)
BILIRUB SERPL-MCNC: 0.68 MG/DL (ref 0.2–1)
BUN SERPL-MCNC: 18 MG/DL (ref 5–25)
CALCIUM SERPL-MCNC: 9.5 MG/DL (ref 8.3–10.1)
CHLORIDE SERPL-SCNC: 104 MMOL/L (ref 100–108)
CHOLEST SERPL-MCNC: 177 MG/DL (ref 50–200)
CO2 SERPL-SCNC: 30 MMOL/L (ref 21–32)
CREAT SERPL-MCNC: 0.89 MG/DL (ref 0.6–1.3)
CREAT UR-MCNC: 79.9 MG/DL
GFR SERPL CREATININE-BSD FRML MDRD: 85 ML/MIN/1.73SQ M
GLUCOSE P FAST SERPL-MCNC: 124 MG/DL (ref 65–99)
HDLC SERPL-MCNC: 69 MG/DL
LDLC SERPL CALC-MCNC: 84 MG/DL (ref 0–100)
MICROALBUMIN UR-MCNC: 6.7 MG/L (ref 0–20)
MICROALBUMIN/CREAT 24H UR: 8 MG/G CREATININE (ref 0–30)
NONHDLC SERPL-MCNC: 108 MG/DL
POTASSIUM SERPL-SCNC: 4 MMOL/L (ref 3.5–5.3)
PROT SERPL-MCNC: 7.8 G/DL (ref 6.4–8.2)
SODIUM SERPL-SCNC: 139 MMOL/L (ref 136–145)
TRIGL SERPL-MCNC: 120 MG/DL

## 2020-10-21 PROCEDURE — 80061 LIPID PANEL: CPT

## 2020-10-21 PROCEDURE — 36415 COLL VENOUS BLD VENIPUNCTURE: CPT

## 2020-10-21 PROCEDURE — 82570 ASSAY OF URINE CREATININE: CPT | Performed by: PHYSICIAN ASSISTANT

## 2020-10-21 PROCEDURE — 82043 UR ALBUMIN QUANTITATIVE: CPT | Performed by: PHYSICIAN ASSISTANT

## 2020-10-21 PROCEDURE — 80053 COMPREHEN METABOLIC PANEL: CPT

## 2020-10-23 ENCOUNTER — OFFICE VISIT (OUTPATIENT)
Dept: FAMILY MEDICINE CLINIC | Facility: CLINIC | Age: 75
End: 2020-10-23

## 2020-10-23 VITALS
WEIGHT: 160 LBS | BODY MASS INDEX: 25.11 KG/M2 | HEART RATE: 56 BPM | HEIGHT: 67 IN | SYSTOLIC BLOOD PRESSURE: 124 MMHG | DIASTOLIC BLOOD PRESSURE: 80 MMHG | TEMPERATURE: 98.2 F | RESPIRATION RATE: 16 BRPM

## 2020-10-23 DIAGNOSIS — Z13.0 SCREENING FOR DEFICIENCY ANEMIA: ICD-10-CM

## 2020-10-23 DIAGNOSIS — K21.9 GASTROESOPHAGEAL REFLUX DISEASE, UNSPECIFIED WHETHER ESOPHAGITIS PRESENT: Primary | ICD-10-CM

## 2020-10-23 DIAGNOSIS — Z79.899 POLYPHARMACY: ICD-10-CM

## 2020-10-23 PROCEDURE — 99213 OFFICE O/P EST LOW 20 MIN: CPT | Performed by: FAMILY MEDICINE

## 2020-10-23 RX ORDER — PANTOPRAZOLE SODIUM 40 MG/1
40 TABLET, DELAYED RELEASE ORAL DAILY
Qty: 30 TABLET | Refills: 1 | Status: SHIPPED | OUTPATIENT
Start: 2020-10-23 | End: 2020-11-16

## 2020-10-23 RX ORDER — UREA 10 %
1 LOTION (ML) TOPICAL 3 TIMES DAILY PRN
Qty: 30 TABLET | Refills: 2 | Status: SHIPPED | OUTPATIENT
Start: 2020-10-23 | End: 2020-11-09 | Stop reason: SDUPTHER

## 2020-10-27 ENCOUNTER — APPOINTMENT (OUTPATIENT)
Dept: LAB | Facility: HOSPITAL | Age: 75
End: 2020-10-27
Payer: MEDICARE

## 2020-10-27 ENCOUNTER — TELEPHONE (OUTPATIENT)
Dept: FAMILY MEDICINE CLINIC | Facility: CLINIC | Age: 75
End: 2020-10-27

## 2020-10-27 DIAGNOSIS — J30.9 ALLERGIC RHINITIS, UNSPECIFIED SEASONALITY, UNSPECIFIED TRIGGER: ICD-10-CM

## 2020-10-27 DIAGNOSIS — Z13.0 SCREENING FOR DEFICIENCY ANEMIA: ICD-10-CM

## 2020-10-27 LAB
BASOPHILS # BLD AUTO: 0.06 THOUSANDS/ΜL (ref 0–0.1)
BASOPHILS NFR BLD AUTO: 1 % (ref 0–1)
EOSINOPHIL # BLD AUTO: 0.17 THOUSAND/ΜL (ref 0–0.61)
EOSINOPHIL NFR BLD AUTO: 2 % (ref 0–6)
ERYTHROCYTE [DISTWIDTH] IN BLOOD BY AUTOMATED COUNT: 12.4 % (ref 11.6–15.1)
HCT VFR BLD AUTO: 44.6 % (ref 36.5–49.3)
HEMOCCULT STL QL: NEGATIVE
HGB BLD-MCNC: 14.3 G/DL (ref 12–17)
IMM GRANULOCYTES # BLD AUTO: 0.02 THOUSAND/UL (ref 0–0.2)
IMM GRANULOCYTES NFR BLD AUTO: 0 % (ref 0–2)
LYMPHOCYTES # BLD AUTO: 3.1 THOUSANDS/ΜL (ref 0.6–4.47)
LYMPHOCYTES NFR BLD AUTO: 39 % (ref 14–44)
MCH RBC QN AUTO: 28.5 PG (ref 26.8–34.3)
MCHC RBC AUTO-ENTMCNC: 32.1 G/DL (ref 31.4–37.4)
MCV RBC AUTO: 89 FL (ref 82–98)
MONOCYTES # BLD AUTO: 0.66 THOUSAND/ΜL (ref 0.17–1.22)
MONOCYTES NFR BLD AUTO: 8 % (ref 4–12)
NEUTROPHILS # BLD AUTO: 3.9 THOUSANDS/ΜL (ref 1.85–7.62)
NEUTS SEG NFR BLD AUTO: 50 % (ref 43–75)
NRBC BLD AUTO-RTO: 0 /100 WBCS
PLATELET # BLD AUTO: 220 THOUSANDS/UL (ref 149–390)
PMV BLD AUTO: 11 FL (ref 8.9–12.7)
RBC # BLD AUTO: 5.01 MILLION/UL (ref 3.88–5.62)
WBC # BLD AUTO: 7.91 THOUSAND/UL (ref 4.31–10.16)

## 2020-10-27 PROCEDURE — 82272 OCCULT BLD FECES 1-3 TESTS: CPT

## 2020-10-27 PROCEDURE — 36415 COLL VENOUS BLD VENIPUNCTURE: CPT

## 2020-10-27 PROCEDURE — 85025 COMPLETE CBC W/AUTO DIFF WBC: CPT

## 2020-10-28 RX ORDER — FLUTICASONE PROPIONATE 50 MCG
1 SPRAY, SUSPENSION (ML) NASAL DAILY
Qty: 16 G | Refills: 2 | Status: SHIPPED | OUTPATIENT
Start: 2020-10-28 | End: 2021-02-09

## 2020-11-02 DIAGNOSIS — M54.32 SCIATICA OF LEFT SIDE: ICD-10-CM

## 2020-11-05 ENCOUNTER — OFFICE VISIT (OUTPATIENT)
Dept: FAMILY MEDICINE CLINIC | Facility: CLINIC | Age: 75
End: 2020-11-05

## 2020-11-05 VITALS
HEART RATE: 60 BPM | SYSTOLIC BLOOD PRESSURE: 140 MMHG | BODY MASS INDEX: 25.93 KG/M2 | TEMPERATURE: 97.8 F | RESPIRATION RATE: 16 BRPM | DIASTOLIC BLOOD PRESSURE: 70 MMHG | WEIGHT: 165.2 LBS | HEIGHT: 67 IN

## 2020-11-05 DIAGNOSIS — K21.9 GASTROESOPHAGEAL REFLUX DISEASE, UNSPECIFIED WHETHER ESOPHAGITIS PRESENT: Primary | ICD-10-CM

## 2020-11-05 DIAGNOSIS — Z23 ENCOUNTER FOR IMMUNIZATION: ICD-10-CM

## 2020-11-05 PROCEDURE — G0008 ADMIN INFLUENZA VIRUS VAC: HCPCS | Performed by: PHYSICIAN ASSISTANT

## 2020-11-05 PROCEDURE — 90662 IIV NO PRSV INCREASED AG IM: CPT | Performed by: PHYSICIAN ASSISTANT

## 2020-11-05 PROCEDURE — 99213 OFFICE O/P EST LOW 20 MIN: CPT | Performed by: PHYSICIAN ASSISTANT

## 2020-11-09 ENCOUNTER — TELEPHONE (OUTPATIENT)
Dept: FAMILY MEDICINE CLINIC | Facility: CLINIC | Age: 75
End: 2020-11-09

## 2020-11-09 DIAGNOSIS — K21.9 GASTROESOPHAGEAL REFLUX DISEASE, UNSPECIFIED WHETHER ESOPHAGITIS PRESENT: ICD-10-CM

## 2020-11-09 RX ORDER — UREA 10 %
1 LOTION (ML) TOPICAL 3 TIMES DAILY PRN
Qty: 30 TABLET | Refills: 2 | Status: SHIPPED | OUTPATIENT
Start: 2020-11-09 | End: 2020-11-20 | Stop reason: SDUPTHER

## 2020-11-11 ENCOUNTER — APPOINTMENT (EMERGENCY)
Dept: RADIOLOGY | Facility: HOSPITAL | Age: 75
End: 2020-11-11
Payer: MEDICARE

## 2020-11-11 ENCOUNTER — HOSPITAL ENCOUNTER (EMERGENCY)
Facility: HOSPITAL | Age: 75
Discharge: HOME | End: 2020-11-11
Attending: EMERGENCY MEDICINE
Payer: MEDICARE

## 2020-11-11 VITALS
TEMPERATURE: 97.8 F | SYSTOLIC BLOOD PRESSURE: 140 MMHG | OXYGEN SATURATION: 99 % | DIASTOLIC BLOOD PRESSURE: 65 MMHG | RESPIRATION RATE: 16 BRPM | WEIGHT: 162 LBS | HEIGHT: 67 IN | HEART RATE: 61 BPM | BODY MASS INDEX: 25.43 KG/M2

## 2020-11-11 DIAGNOSIS — R10.9 ABDOMINAL PAIN: Primary | ICD-10-CM

## 2020-11-11 DIAGNOSIS — M54.32 SCIATICA OF LEFT SIDE: ICD-10-CM

## 2020-11-11 DIAGNOSIS — R01.1 GRADE 2 OUT OF 6 INTENSITY MURMUR: ICD-10-CM

## 2020-11-11 DIAGNOSIS — K57.90 DIVERTICULOSIS: ICD-10-CM

## 2020-11-11 LAB
ALBUMIN SERPL BCP-MCNC: 4 G/DL (ref 3.5–5)
ALP SERPL-CCNC: 74 U/L (ref 46–116)
ALT SERPL W P-5'-P-CCNC: 26 U/L (ref 12–78)
ANION GAP SERPL CALCULATED.3IONS-SCNC: 4 MMOL/L (ref 4–13)
AST SERPL W P-5'-P-CCNC: 22 U/L (ref 5–45)
ATRIAL RATE: 54 BPM
BACTERIA UR QL AUTO: NORMAL /HPF
BASOPHILS # BLD AUTO: 0.05 THOUSANDS/ΜL (ref 0–0.1)
BASOPHILS NFR BLD AUTO: 1 % (ref 0–1)
BILIRUB SERPL-MCNC: 0.54 MG/DL (ref 0.2–1)
BILIRUB UR QL STRIP: NEGATIVE
BUN SERPL-MCNC: 21 MG/DL (ref 5–25)
CALCIUM SERPL-MCNC: 9.3 MG/DL (ref 8.3–10.1)
CHLORIDE SERPL-SCNC: 104 MMOL/L (ref 100–108)
CLARITY UR: CLEAR
CO2 SERPL-SCNC: 29 MMOL/L (ref 21–32)
COLOR UR: YELLOW
CREAT SERPL-MCNC: 1.07 MG/DL (ref 0.6–1.3)
EOSINOPHIL # BLD AUTO: 0.14 THOUSAND/ΜL (ref 0–0.61)
EOSINOPHIL NFR BLD AUTO: 2 % (ref 0–6)
ERYTHROCYTE [DISTWIDTH] IN BLOOD BY AUTOMATED COUNT: 12.7 % (ref 11.6–15.1)
GFR SERPL CREATININE-BSD FRML MDRD: 68 ML/MIN/1.73SQ M
GLUCOSE SERPL-MCNC: 162 MG/DL (ref 65–140)
GLUCOSE UR STRIP-MCNC: ABNORMAL MG/DL
HCT VFR BLD AUTO: 45.2 % (ref 36.5–49.3)
HGB BLD-MCNC: 14.1 G/DL (ref 12–17)
HGB UR QL STRIP.AUTO: NEGATIVE
HYALINE CASTS #/AREA URNS LPF: NORMAL /LPF
IMM GRANULOCYTES # BLD AUTO: 0.01 THOUSAND/UL (ref 0–0.2)
IMM GRANULOCYTES NFR BLD AUTO: 0 % (ref 0–2)
KETONES UR STRIP-MCNC: NEGATIVE MG/DL
LEUKOCYTE ESTERASE UR QL STRIP: ABNORMAL
LIPASE SERPL-CCNC: 219 U/L (ref 73–393)
LYMPHOCYTES # BLD AUTO: 2.06 THOUSANDS/ΜL (ref 0.6–4.47)
LYMPHOCYTES NFR BLD AUTO: 34 % (ref 14–44)
MCH RBC QN AUTO: 28.3 PG (ref 26.8–34.3)
MCHC RBC AUTO-ENTMCNC: 31.2 G/DL (ref 31.4–37.4)
MCV RBC AUTO: 91 FL (ref 82–98)
MONOCYTES # BLD AUTO: 0.47 THOUSAND/ΜL (ref 0.17–1.22)
MONOCYTES NFR BLD AUTO: 8 % (ref 4–12)
NEUTROPHILS # BLD AUTO: 3.39 THOUSANDS/ΜL (ref 1.85–7.62)
NEUTS SEG NFR BLD AUTO: 55 % (ref 43–75)
NITRITE UR QL STRIP: NEGATIVE
NON-SQ EPI CELLS URNS QL MICRO: NORMAL /HPF
NRBC BLD AUTO-RTO: 0 /100 WBCS
P AXIS: 61 DEGREES
PH UR STRIP.AUTO: 7 [PH]
PLATELET # BLD AUTO: 206 THOUSANDS/UL (ref 149–390)
PMV BLD AUTO: 10.3 FL (ref 8.9–12.7)
POTASSIUM SERPL-SCNC: 4.7 MMOL/L (ref 3.5–5.3)
PR INTERVAL: 160 MS
PROT SERPL-MCNC: 7.9 G/DL (ref 6.4–8.2)
PROT UR STRIP-MCNC: NEGATIVE MG/DL
QRS AXIS: 30 DEGREES
QRSD INTERVAL: 102 MS
QT INTERVAL: 438 MS
QTC INTERVAL: 415 MS
RBC # BLD AUTO: 4.98 MILLION/UL (ref 3.88–5.62)
RBC #/AREA URNS AUTO: NORMAL /HPF
SODIUM SERPL-SCNC: 137 MMOL/L (ref 136–145)
SP GR UR STRIP.AUTO: 1.02 (ref 1–1.03)
T WAVE AXIS: 79 DEGREES
TROPONIN I SERPL-MCNC: <0.02 NG/ML
UROBILINOGEN UR QL STRIP.AUTO: 1 E.U./DL
VENTRICULAR RATE: 54 BPM
WBC # BLD AUTO: 6.12 THOUSAND/UL (ref 4.31–10.16)
WBC #/AREA URNS AUTO: NORMAL /HPF

## 2020-11-11 PROCEDURE — 80053 COMPREHEN METABOLIC PANEL: CPT | Performed by: EMERGENCY MEDICINE

## 2020-11-11 PROCEDURE — 71046 X-RAY EXAM CHEST 2 VIEWS: CPT

## 2020-11-11 PROCEDURE — 93010 ELECTROCARDIOGRAM REPORT: CPT | Performed by: INTERNAL MEDICINE

## 2020-11-11 PROCEDURE — 84484 ASSAY OF TROPONIN QUANT: CPT | Performed by: EMERGENCY MEDICINE

## 2020-11-11 PROCEDURE — 74177 CT ABD & PELVIS W/CONTRAST: CPT

## 2020-11-11 PROCEDURE — 81001 URINALYSIS AUTO W/SCOPE: CPT | Performed by: EMERGENCY MEDICINE

## 2020-11-11 PROCEDURE — 99284 EMERGENCY DEPT VISIT MOD MDM: CPT

## 2020-11-11 PROCEDURE — G1004 CDSM NDSC: HCPCS

## 2020-11-11 PROCEDURE — 99285 EMERGENCY DEPT VISIT HI MDM: CPT | Performed by: EMERGENCY MEDICINE

## 2020-11-11 PROCEDURE — 83690 ASSAY OF LIPASE: CPT | Performed by: EMERGENCY MEDICINE

## 2020-11-11 PROCEDURE — 85025 COMPLETE CBC W/AUTO DIFF WBC: CPT | Performed by: EMERGENCY MEDICINE

## 2020-11-11 PROCEDURE — 93005 ELECTROCARDIOGRAM TRACING: CPT

## 2020-11-11 PROCEDURE — 36415 COLL VENOUS BLD VENIPUNCTURE: CPT | Performed by: EMERGENCY MEDICINE

## 2020-11-11 RX ADMIN — IOHEXOL 100 ML: 350 INJECTION, SOLUTION INTRAVENOUS at 08:54

## 2020-11-13 ENCOUNTER — APPOINTMENT (OUTPATIENT)
Dept: LAB | Facility: HOSPITAL | Age: 75
End: 2020-11-13
Attending: PSYCHIATRY & NEUROLOGY
Payer: MEDICARE

## 2020-11-13 DIAGNOSIS — Z20.822 ENCOUNTER FOR LABORATORY TESTING FOR COVID-19 VIRUS: ICD-10-CM

## 2020-11-13 PROCEDURE — U0003 INFECTIOUS AGENT DETECTION BY NUCLEIC ACID (DNA OR RNA); SEVERE ACUTE RESPIRATORY SYNDROME CORONAVIRUS 2 (SARS-COV-2) (CORONAVIRUS DISEASE [COVID-19]), AMPLIFIED PROBE TECHNIQUE, MAKING USE OF HIGH THROUGHPUT TECHNOLOGIES AS DESCRIBED BY CMS-2020-01-R: HCPCS

## 2020-11-15 LAB — SARS-COV-2 RNA SPEC QL NAA+PROBE: NOT DETECTED

## 2020-11-16 ENCOUNTER — CLINICAL SUPPORT (OUTPATIENT)
Dept: FAMILY MEDICINE CLINIC | Facility: CLINIC | Age: 75
End: 2020-11-16

## 2020-11-16 DIAGNOSIS — K21.9 GASTROESOPHAGEAL REFLUX DISEASE, UNSPECIFIED WHETHER ESOPHAGITIS PRESENT: ICD-10-CM

## 2020-11-16 DIAGNOSIS — E11.9 TYPE 2 DIABETES MELLITUS WITHOUT COMPLICATION, WITHOUT LONG-TERM CURRENT USE OF INSULIN (HCC): Primary | ICD-10-CM

## 2020-11-16 DIAGNOSIS — M54.32 SCIATICA OF LEFT SIDE: ICD-10-CM

## 2020-11-16 DIAGNOSIS — M54.16 LUMBAR RADICULOPATHY: ICD-10-CM

## 2020-11-16 RX ORDER — PANTOPRAZOLE SODIUM 40 MG/1
40 TABLET, DELAYED RELEASE ORAL DAILY
Qty: 30 TABLET | Refills: 1 | Status: SHIPPED | OUTPATIENT
Start: 2020-11-16 | End: 2020-12-25

## 2020-11-16 RX ORDER — GABAPENTIN 400 MG/1
CAPSULE ORAL
Qty: 90 CAPSULE | Refills: 2 | OUTPATIENT
Start: 2020-11-16

## 2020-11-18 ENCOUNTER — OFFICE VISIT (OUTPATIENT)
Dept: FAMILY MEDICINE CLINIC | Facility: CLINIC | Age: 75
End: 2020-11-18

## 2020-11-18 VITALS
HEART RATE: 58 BPM | BODY MASS INDEX: 24.99 KG/M2 | OXYGEN SATURATION: 98 % | RESPIRATION RATE: 18 BRPM | HEIGHT: 67 IN | DIASTOLIC BLOOD PRESSURE: 70 MMHG | TEMPERATURE: 97.9 F | SYSTOLIC BLOOD PRESSURE: 130 MMHG | WEIGHT: 159.2 LBS

## 2020-11-18 DIAGNOSIS — R10.84 GENERALIZED ABDOMINAL PAIN: ICD-10-CM

## 2020-11-18 DIAGNOSIS — R14.3 FLATULENCE: Primary | ICD-10-CM

## 2020-11-18 DIAGNOSIS — M54.32 SCIATICA OF LEFT SIDE: ICD-10-CM

## 2020-11-18 DIAGNOSIS — K21.9 GASTROESOPHAGEAL REFLUX DISEASE: ICD-10-CM

## 2020-11-18 PROCEDURE — 99213 OFFICE O/P EST LOW 20 MIN: CPT | Performed by: PHYSICIAN ASSISTANT

## 2020-11-18 RX ORDER — SIMETHICONE 180 MG
180 CAPSULE ORAL EVERY 8 HOURS PRN
Qty: 30 CAPSULE | Refills: 0 | Status: SHIPPED | OUTPATIENT
Start: 2020-11-18 | End: 2020-12-03

## 2020-11-18 RX ORDER — ATORVASTATIN CALCIUM 80 MG/1
TABLET, FILM COATED ORAL
COMMUNITY
End: 2021-01-18 | Stop reason: SDUPTHER

## 2020-11-18 RX ORDER — NIFEDIPINE 30 MG/1
30 TABLET, FILM COATED, EXTENDED RELEASE ORAL
COMMUNITY
Start: 2020-08-14 | End: 2020-12-22 | Stop reason: SDUPTHER

## 2020-11-18 RX ORDER — GLIMEPIRIDE 4 MG/1
TABLET ORAL
COMMUNITY
Start: 2020-10-28 | End: 2020-12-23

## 2020-11-18 RX ORDER — OMEPRAZOLE 20 MG/1
CAPSULE, DELAYED RELEASE ORAL
COMMUNITY
End: 2020-12-23

## 2020-11-18 RX ORDER — ARIPIPRAZOLE 5 MG/1
5 TABLET ORAL
COMMUNITY
Start: 2020-09-29

## 2020-11-20 DIAGNOSIS — K21.9 GASTROESOPHAGEAL REFLUX DISEASE, UNSPECIFIED WHETHER ESOPHAGITIS PRESENT: ICD-10-CM

## 2020-11-20 RX ORDER — UREA 10 %
1 LOTION (ML) TOPICAL 3 TIMES DAILY PRN
Qty: 30 TABLET | Refills: 2 | Status: SHIPPED | OUTPATIENT
Start: 2020-11-20 | End: 2020-11-30

## 2020-11-23 ENCOUNTER — HOSPITAL ENCOUNTER (OUTPATIENT)
Dept: SLEEP CENTER | Facility: CLINIC | Age: 75
Discharge: HOME | End: 2020-11-23
Payer: MEDICARE

## 2020-11-23 DIAGNOSIS — G47.30 SLEEP APNEA, UNSPECIFIED TYPE: ICD-10-CM

## 2020-11-23 DIAGNOSIS — I25.10 CORONARY ARTERY DISEASE INVOLVING NATIVE CORONARY ARTERY OF NATIVE HEART WITHOUT ANGINA PECTORIS: ICD-10-CM

## 2020-11-23 DIAGNOSIS — I12.9 HYPERTENSION ASSOCIATED WITH STAGE 2 CHRONIC KIDNEY DISEASE DUE TO TYPE 2 DIABETES MELLITUS (HCC): ICD-10-CM

## 2020-11-23 DIAGNOSIS — N18.2 HYPERTENSION ASSOCIATED WITH STAGE 2 CHRONIC KIDNEY DISEASE DUE TO TYPE 2 DIABETES MELLITUS (HCC): ICD-10-CM

## 2020-11-23 DIAGNOSIS — I21.4 NSTEMI (NON-ST ELEVATED MYOCARDIAL INFARCTION) (HCC): ICD-10-CM

## 2020-11-23 DIAGNOSIS — E11.22 HYPERTENSION ASSOCIATED WITH STAGE 2 CHRONIC KIDNEY DISEASE DUE TO TYPE 2 DIABETES MELLITUS (HCC): ICD-10-CM

## 2020-11-23 DIAGNOSIS — G47.33 OSA (OBSTRUCTIVE SLEEP APNEA): ICD-10-CM

## 2020-11-23 PROCEDURE — 95811 POLYSOM 6/>YRS CPAP 4/> PARM: CPT

## 2020-11-23 PROCEDURE — 95811 POLYSOM 6/>YRS CPAP 4/> PARM: CPT | Performed by: PSYCHIATRY & NEUROLOGY

## 2020-11-23 RX ORDER — FUROSEMIDE 20 MG/1
20 TABLET ORAL DAILY PRN
Qty: 30 TABLET | Refills: 3 | Status: SHIPPED | OUTPATIENT
Start: 2020-11-23 | End: 2021-01-18 | Stop reason: ALTCHOICE

## 2020-11-25 DIAGNOSIS — G47.00 INSOMNIA, UNSPECIFIED TYPE: ICD-10-CM

## 2020-11-25 RX ORDER — MIRTAZAPINE 15 MG/1
TABLET, FILM COATED ORAL
Qty: 90 TABLET | Refills: 0 | Status: SHIPPED | OUTPATIENT
Start: 2020-11-25 | End: 2021-03-16

## 2020-11-27 ENCOUNTER — TELEPHONE (OUTPATIENT)
Dept: SLEEP CENTER | Facility: CLINIC | Age: 75
End: 2020-11-27

## 2020-11-27 DIAGNOSIS — G47.33 OSA (OBSTRUCTIVE SLEEP APNEA): Primary | ICD-10-CM

## 2020-11-30 DIAGNOSIS — K21.9 GASTROESOPHAGEAL REFLUX DISEASE, UNSPECIFIED WHETHER ESOPHAGITIS PRESENT: ICD-10-CM

## 2020-11-30 RX ORDER — UREA 10 %
1 LOTION (ML) TOPICAL 3 TIMES DAILY PRN
Qty: 30 TABLET | Refills: 2 | Status: SHIPPED | OUTPATIENT
Start: 2020-11-30 | End: 2020-12-11

## 2020-12-02 ENCOUNTER — OFFICE VISIT (OUTPATIENT)
Dept: FAMILY MEDICINE CLINIC | Facility: CLINIC | Age: 75
End: 2020-12-02

## 2020-12-02 VITALS
HEIGHT: 67 IN | RESPIRATION RATE: 16 BRPM | SYSTOLIC BLOOD PRESSURE: 100 MMHG | DIASTOLIC BLOOD PRESSURE: 60 MMHG | TEMPERATURE: 98.2 F | OXYGEN SATURATION: 99 % | BODY MASS INDEX: 25.11 KG/M2 | HEART RATE: 56 BPM | WEIGHT: 160 LBS

## 2020-12-02 DIAGNOSIS — G47.33 OSA (OBSTRUCTIVE SLEEP APNEA): Primary | ICD-10-CM

## 2020-12-02 PROCEDURE — 99213 OFFICE O/P EST LOW 20 MIN: CPT | Performed by: PHYSICIAN ASSISTANT

## 2020-12-03 DIAGNOSIS — R14.3 FLATULENCE: ICD-10-CM

## 2020-12-03 RX ORDER — SIMETHICONE 180 MG
180 CAPSULE ORAL EVERY 8 HOURS PRN
Qty: 30 CAPSULE | Refills: 0 | Status: SHIPPED | OUTPATIENT
Start: 2020-12-03 | End: 2020-12-15

## 2020-12-10 DIAGNOSIS — R14.3 FLATULENCE: ICD-10-CM

## 2020-12-10 DIAGNOSIS — Z86.19 H/O TINEA CRURIS: Chronic | ICD-10-CM

## 2020-12-11 DIAGNOSIS — K21.9 GASTROESOPHAGEAL REFLUX DISEASE, UNSPECIFIED WHETHER ESOPHAGITIS PRESENT: ICD-10-CM

## 2020-12-11 RX ORDER — CALCIUM CARBONATE 200(500)MG
TABLET,CHEWABLE ORAL
Qty: 90 TABLET | Refills: 3 | Status: SHIPPED | OUTPATIENT
Start: 2020-12-11 | End: 2021-04-05

## 2020-12-14 DIAGNOSIS — M54.32 SCIATICA OF LEFT SIDE: ICD-10-CM

## 2020-12-15 RX ORDER — SIMETHICONE 180 MG
180 CAPSULE ORAL EVERY 12 HOURS PRN
Qty: 90 CAPSULE | Refills: 0 | Status: SHIPPED | OUTPATIENT
Start: 2020-12-15 | End: 2021-06-07 | Stop reason: ALTCHOICE

## 2020-12-15 RX ORDER — CLOTRIMAZOLE AND BETAMETHASONE DIPROPIONATE 10; .5 MG/ML; MG/ML
LOTION TOPICAL
Qty: 30 ML | Refills: 0 | Status: SHIPPED | OUTPATIENT
Start: 2020-12-15 | End: 2020-12-29

## 2020-12-17 ENCOUNTER — TELEMEDICINE (OUTPATIENT)
Dept: GASTROENTEROLOGY | Facility: CLINIC | Age: 75
End: 2020-12-17
Payer: MEDICARE

## 2020-12-17 DIAGNOSIS — R14.0 ABDOMINAL BLOATING: Primary | ICD-10-CM

## 2020-12-17 PROCEDURE — 99214 OFFICE O/P EST MOD 30 MIN: CPT | Performed by: PHYSICIAN ASSISTANT

## 2020-12-18 ENCOUNTER — TELEPHONE (OUTPATIENT)
Dept: GASTROENTEROLOGY | Facility: CLINIC | Age: 75
End: 2020-12-18

## 2020-12-21 ENCOUNTER — LAB (OUTPATIENT)
Dept: LAB | Facility: HOSPITAL | Age: 75
End: 2020-12-21
Payer: MEDICARE

## 2020-12-21 DIAGNOSIS — R14.0 ABDOMINAL BLOATING: ICD-10-CM

## 2020-12-21 LAB — IGA SERPL-MCNC: 340 MG/DL (ref 70–400)

## 2020-12-21 PROCEDURE — 82784 ASSAY IGA/IGD/IGG/IGM EACH: CPT

## 2020-12-21 PROCEDURE — 83516 IMMUNOASSAY NONANTIBODY: CPT

## 2020-12-21 PROCEDURE — 87338 HPYLORI STOOL AG IA: CPT

## 2020-12-21 PROCEDURE — 36415 COLL VENOUS BLD VENIPUNCTURE: CPT

## 2020-12-22 ENCOUNTER — OFFICE VISIT (OUTPATIENT)
Dept: CARDIOLOGY CLINIC | Facility: CLINIC | Age: 75
End: 2020-12-22
Payer: MEDICARE

## 2020-12-22 VITALS
HEIGHT: 67 IN | DIASTOLIC BLOOD PRESSURE: 60 MMHG | BODY MASS INDEX: 24.48 KG/M2 | SYSTOLIC BLOOD PRESSURE: 140 MMHG | WEIGHT: 156 LBS | HEART RATE: 50 BPM

## 2020-12-22 DIAGNOSIS — I25.10 CORONARY ARTERY DISEASE INVOLVING NATIVE CORONARY ARTERY OF NATIVE HEART WITHOUT ANGINA PECTORIS: Primary | ICD-10-CM

## 2020-12-22 DIAGNOSIS — I35.0 MILD AORTIC STENOSIS: ICD-10-CM

## 2020-12-22 DIAGNOSIS — I10 ESSENTIAL HYPERTENSION: ICD-10-CM

## 2020-12-22 DIAGNOSIS — I25.2 H/O NON-ST ELEVATION MYOCARDIAL INFARCTION (NSTEMI): Chronic | ICD-10-CM

## 2020-12-22 DIAGNOSIS — Z95.1 S/P CABG (CORONARY ARTERY BYPASS GRAFT): ICD-10-CM

## 2020-12-22 DIAGNOSIS — R00.1 SINUS BRADYCARDIA: ICD-10-CM

## 2020-12-22 LAB
H PYLORI AG STL QL IA: POSITIVE
TTG IGA SER-ACNC: <2 U/ML (ref 0–3)

## 2020-12-22 PROCEDURE — 99214 OFFICE O/P EST MOD 30 MIN: CPT | Performed by: INTERNAL MEDICINE

## 2020-12-23 ENCOUNTER — OFFICE VISIT (OUTPATIENT)
Dept: FAMILY MEDICINE CLINIC | Facility: CLINIC | Age: 75
End: 2020-12-23

## 2020-12-23 VITALS
WEIGHT: 156.2 LBS | DIASTOLIC BLOOD PRESSURE: 70 MMHG | BODY MASS INDEX: 24.52 KG/M2 | HEART RATE: 72 BPM | SYSTOLIC BLOOD PRESSURE: 130 MMHG | OXYGEN SATURATION: 98 % | HEIGHT: 67 IN | RESPIRATION RATE: 18 BRPM | TEMPERATURE: 97.2 F

## 2020-12-23 DIAGNOSIS — E11.9 TYPE 2 DIABETES MELLITUS WITHOUT COMPLICATION, WITHOUT LONG-TERM CURRENT USE OF INSULIN (HCC): Primary | ICD-10-CM

## 2020-12-23 LAB — SL AMB POCT HEMOGLOBIN AIC: 6.5 (ref ?–6.5)

## 2020-12-23 PROCEDURE — 99213 OFFICE O/P EST LOW 20 MIN: CPT | Performed by: PHYSICIAN ASSISTANT

## 2020-12-23 PROCEDURE — 83036 HEMOGLOBIN GLYCOSYLATED A1C: CPT | Performed by: PHYSICIAN ASSISTANT

## 2020-12-25 ENCOUNTER — TELEPHONE (OUTPATIENT)
Dept: OTHER | Facility: HOSPITAL | Age: 75
End: 2020-12-25

## 2020-12-25 DIAGNOSIS — A04.8 H. PYLORI INFECTION: Primary | ICD-10-CM

## 2020-12-25 RX ORDER — TETRACYCLINE HYDROCHLORIDE 500 MG/1
500 CAPSULE ORAL 4 TIMES DAILY
Qty: 56 CAPSULE | Refills: 0 | Status: SHIPPED | OUTPATIENT
Start: 2020-12-25 | End: 2021-01-08

## 2020-12-25 RX ORDER — OMEPRAZOLE 40 MG/1
40 CAPSULE, DELAYED RELEASE ORAL 2 TIMES DAILY
Qty: 28 CAPSULE | Refills: 0 | Status: SHIPPED | OUTPATIENT
Start: 2020-12-25 | End: 2021-01-18 | Stop reason: SDUPTHER

## 2020-12-25 RX ORDER — METRONIDAZOLE 500 MG/1
500 TABLET ORAL 3 TIMES DAILY
Qty: 42 TABLET | Refills: 0 | Status: SHIPPED | OUTPATIENT
Start: 2020-12-25 | End: 2021-01-08

## 2020-12-25 RX ORDER — BISMUTH SUBSALICYLATE 262 MG/1
524 TABLET, CHEWABLE ORAL
Qty: 30 TABLET | Refills: 0 | Status: SHIPPED | OUTPATIENT
Start: 2020-12-25 | End: 2021-01-18 | Stop reason: ALTCHOICE

## 2020-12-25 NOTE — TELEPHONE ENCOUNTER
The patient was seen for abdominal bloating and H pylori stool test was ordered  This came back positive  Will start the patient on quadruple therapy x 14 days  After completing the 14 days he should discontinue all proton pump inhibitors  He should then obtain a stool test 1 month later to ensure medication  Please notify the patient, thank you!

## 2020-12-26 DIAGNOSIS — Z86.19 H/O TINEA CRURIS: Chronic | ICD-10-CM

## 2020-12-28 ENCOUNTER — TELEPHONE (OUTPATIENT)
Dept: GASTROENTEROLOGY | Facility: CLINIC | Age: 75
End: 2020-12-28

## 2020-12-28 DIAGNOSIS — A04.8 H. PYLORI INFECTION: Primary | ICD-10-CM

## 2020-12-29 RX ORDER — CLOTRIMAZOLE AND BETAMETHASONE DIPROPIONATE 10; .5 MG/ML; MG/ML
LOTION TOPICAL
Qty: 30 ML | Refills: 0 | Status: SHIPPED | OUTPATIENT
Start: 2020-12-29 | End: 2021-01-18 | Stop reason: SDUPTHER

## 2021-01-01 DIAGNOSIS — M54.32 SCIATICA OF LEFT SIDE: ICD-10-CM

## 2021-01-05 ENCOUNTER — HOSPITAL ENCOUNTER (EMERGENCY)
Facility: HOSPITAL | Age: 76
Discharge: HOME | End: 2021-01-05
Attending: EMERGENCY MEDICINE | Admitting: EMERGENCY MEDICINE
Payer: MEDICARE

## 2021-01-05 VITALS
RESPIRATION RATE: 20 BRPM | OXYGEN SATURATION: 96 % | TEMPERATURE: 98.1 F | BODY MASS INDEX: 24.33 KG/M2 | HEIGHT: 67 IN | SYSTOLIC BLOOD PRESSURE: 102 MMHG | WEIGHT: 155 LBS | HEART RATE: 52 BPM | DIASTOLIC BLOOD PRESSURE: 64 MMHG

## 2021-01-05 DIAGNOSIS — E78.5 HYPERLIPIDEMIA, UNSPECIFIED HYPERLIPIDEMIA TYPE: ICD-10-CM

## 2021-01-05 DIAGNOSIS — A04.8 POSITIVE H. PYLORI TEST: ICD-10-CM

## 2021-01-05 DIAGNOSIS — E11.22 HYPERTENSION ASSOCIATED WITH STAGE 2 CHRONIC KIDNEY DISEASE DUE TO TYPE 2 DIABETES MELLITUS (HCC): Chronic | ICD-10-CM

## 2021-01-05 DIAGNOSIS — Z95.1 S/P CABG (CORONARY ARTERY BYPASS GRAFT): ICD-10-CM

## 2021-01-05 DIAGNOSIS — A04.8 H. PYLORI INFECTION: Primary | ICD-10-CM

## 2021-01-05 DIAGNOSIS — N18.2 HYPERTENSION ASSOCIATED WITH STAGE 2 CHRONIC KIDNEY DISEASE DUE TO TYPE 2 DIABETES MELLITUS (HCC): Chronic | ICD-10-CM

## 2021-01-05 DIAGNOSIS — I25.2 H/O NON-ST ELEVATION MYOCARDIAL INFARCTION (NSTEMI): Chronic | ICD-10-CM

## 2021-01-05 DIAGNOSIS — I12.9 HYPERTENSION ASSOCIATED WITH STAGE 2 CHRONIC KIDNEY DISEASE DUE TO TYPE 2 DIABETES MELLITUS (HCC): Chronic | ICD-10-CM

## 2021-01-05 DIAGNOSIS — K21.9 GASTROESOPHAGEAL REFLUX DISEASE, UNSPECIFIED WHETHER ESOPHAGITIS PRESENT: ICD-10-CM

## 2021-01-05 DIAGNOSIS — R10.9 ABDOMINAL PAIN: Primary | ICD-10-CM

## 2021-01-05 DIAGNOSIS — G47.33 OSA (OBSTRUCTIVE SLEEP APNEA): ICD-10-CM

## 2021-01-05 LAB
ALBUMIN SERPL BCP-MCNC: 4 G/DL (ref 3.5–5)
ALP SERPL-CCNC: 80 U/L (ref 46–116)
ALT SERPL W P-5'-P-CCNC: 46 U/L (ref 12–78)
ANION GAP SERPL CALCULATED.3IONS-SCNC: 3 MMOL/L (ref 4–13)
AST SERPL W P-5'-P-CCNC: 31 U/L (ref 5–45)
BASOPHILS # BLD AUTO: 0.05 THOUSANDS/ΜL (ref 0–0.1)
BASOPHILS NFR BLD AUTO: 1 % (ref 0–1)
BILIRUB SERPL-MCNC: 0.74 MG/DL (ref 0.2–1)
BUN SERPL-MCNC: 10 MG/DL (ref 5–25)
CALCIUM SERPL-MCNC: 9.5 MG/DL (ref 8.3–10.1)
CHLORIDE SERPL-SCNC: 101 MMOL/L (ref 100–108)
CO2 SERPL-SCNC: 29 MMOL/L (ref 21–32)
CREAT SERPL-MCNC: 0.88 MG/DL (ref 0.6–1.3)
EOSINOPHIL # BLD AUTO: 0.08 THOUSAND/ΜL (ref 0–0.61)
EOSINOPHIL NFR BLD AUTO: 1 % (ref 0–6)
ERYTHROCYTE [DISTWIDTH] IN BLOOD BY AUTOMATED COUNT: 12.8 % (ref 11.6–15.1)
GFR SERPL CREATININE-BSD FRML MDRD: 85 ML/MIN/1.73SQ M
GLUCOSE SERPL-MCNC: 134 MG/DL (ref 65–140)
HCT VFR BLD AUTO: 44.3 % (ref 36.5–49.3)
HGB BLD-MCNC: 14.1 G/DL (ref 12–17)
IMM GRANULOCYTES # BLD AUTO: 0.02 THOUSAND/UL (ref 0–0.2)
IMM GRANULOCYTES NFR BLD AUTO: 0 % (ref 0–2)
LIPASE SERPL-CCNC: 132 U/L (ref 73–393)
LYMPHOCYTES # BLD AUTO: 2.53 THOUSANDS/ΜL (ref 0.6–4.47)
LYMPHOCYTES NFR BLD AUTO: 31 % (ref 14–44)
MCH RBC QN AUTO: 28.4 PG (ref 26.8–34.3)
MCHC RBC AUTO-ENTMCNC: 31.8 G/DL (ref 31.4–37.4)
MCV RBC AUTO: 89 FL (ref 82–98)
MONOCYTES # BLD AUTO: 0.73 THOUSAND/ΜL (ref 0.17–1.22)
MONOCYTES NFR BLD AUTO: 9 % (ref 4–12)
NEUTROPHILS # BLD AUTO: 4.86 THOUSANDS/ΜL (ref 1.85–7.62)
NEUTS SEG NFR BLD AUTO: 58 % (ref 43–75)
NRBC BLD AUTO-RTO: 0 /100 WBCS
PLATELET # BLD AUTO: 231 THOUSANDS/UL (ref 149–390)
PMV BLD AUTO: 10.5 FL (ref 8.9–12.7)
POTASSIUM SERPL-SCNC: 4.2 MMOL/L (ref 3.5–5.3)
PROT SERPL-MCNC: 7.2 G/DL (ref 6.4–8.2)
RBC # BLD AUTO: 4.97 MILLION/UL (ref 3.88–5.62)
SODIUM SERPL-SCNC: 133 MMOL/L (ref 136–145)
WBC # BLD AUTO: 8.27 THOUSAND/UL (ref 4.31–10.16)

## 2021-01-05 PROCEDURE — 36415 COLL VENOUS BLD VENIPUNCTURE: CPT | Performed by: EMERGENCY MEDICINE

## 2021-01-05 PROCEDURE — 85025 COMPLETE CBC W/AUTO DIFF WBC: CPT | Performed by: EMERGENCY MEDICINE

## 2021-01-05 PROCEDURE — 83690 ASSAY OF LIPASE: CPT | Performed by: EMERGENCY MEDICINE

## 2021-01-05 PROCEDURE — 80053 COMPREHEN METABOLIC PANEL: CPT | Performed by: EMERGENCY MEDICINE

## 2021-01-05 PROCEDURE — 96374 THER/PROPH/DIAG INJ IV PUSH: CPT

## 2021-01-05 PROCEDURE — 93005 ELECTROCARDIOGRAM TRACING: CPT

## 2021-01-05 PROCEDURE — 99284 EMERGENCY DEPT VISIT MOD MDM: CPT

## 2021-01-05 PROCEDURE — 99284 EMERGENCY DEPT VISIT MOD MDM: CPT | Performed by: EMERGENCY MEDICINE

## 2021-01-05 RX ORDER — SUCRALFATE 1 G/1
1 TABLET ORAL ONCE
Status: COMPLETED | OUTPATIENT
Start: 2021-01-05 | End: 2021-01-05

## 2021-01-05 RX ORDER — ONDANSETRON 2 MG/ML
4 INJECTION INTRAMUSCULAR; INTRAVENOUS ONCE
Status: COMPLETED | OUTPATIENT
Start: 2021-01-05 | End: 2021-01-05

## 2021-01-05 RX ADMIN — SUCRALFATE 1 G: 1 TABLET ORAL at 17:56

## 2021-01-05 RX ADMIN — ONDANSETRON 4 MG: 2 INJECTION INTRAMUSCULAR; INTRAVENOUS at 17:55

## 2021-01-05 NOTE — ED PROVIDER NOTES
History  Chief Complaint   Patient presents with    Abdominal Pain     Patient reports abdominal pain that has been "going on for a little while"; reports some nausea      70year old Irish-speaking male history of type 2 diabetes, hypertension, hypertriglyceridemia, NSTEMI status post CABG, hernia status post repair, diverticulosis, presents with abdominal pain   used to facilitate history  Patient is overall a poor historian, requires frequent redirection  His abdominal pain is sharp, sometimes better with eating, however overall worsening over the past 10 days or so, currently 7 to 8/10 in severity  Has tried [place], Tums without relief  Endorses associated nausea, water brash, flatulence, associated elevated blood pressure readings at home  Endorses normal bowel movement, denies diarrhea or blood in the stool  Denies any urinary symptoms, pain or swelling in scrotum  Denies chest pain, shortness breath, fever, chills, diaphoresis  States that overall symptoms are similar to episode about 1 month ago when he came to the ED for evaluation  Had imaging, per chart review, CT demonstrated diverticulosis, cystitis  Referred to GI, has been being treated for H pylori for about 1 week  He called GI today out of concern for his symptoms but had not yet received a return call so he came to the ED  Currently on tetracycline, metronidazole, simethicone, omeprazole  Denies tobacco abuse, alcohol  Prior to Admission Medications   Prescriptions Last Dose Informant Patient Reported? Taking?    ARIPiprazole (ABILIFY) 5 mg tablet  Self Yes No   Sig: Take 5 mg by mouth daily at bedtime   Alcohol Swabs (Alcohol Pads) 70 % PADS  Self No No   Sig: USE TWICE DAILY   Arginine 1000 MG TABS  Self Yes No   Sig: Take 1 pill per day   DULoxetine (CYMBALTA) 60 mg delayed release capsule  Self Yes No   Sig: Take 60 mg by mouth every morning   Diclofenac Sodium (VOLTAREN) 1 %   No No   Sig: APPLY 2 GRAMS TOPICALLY 4 (FOUR) TIMES A DAY   Easy Comfort Lancets MISC  Self No No   Sig: TEST BLOOD SUGAR TWICE DAILY, OR AS NEEDED WHEN SYMPTOMATIC OF HYPOGLYCEMIA OR HYPERGLYCEMIA   Easy Plus II Glucose Test test strip  Self No No   Sig: TEST AS DIRECTED TWICE DAILY   Elastic Bandages & Supports (MEDICAL COMPRESSION STOCKINGS) MISC  Self No No   Sig: by Does not apply route daily Please provide B/L compression stockings   Empagliflozin (Jardiance) 25 MG TABS  Self No No   Sig: Take 1 tablet (25 mg total) by mouth every morning   Lancets (FREESTYLE) lancets  Self No No   Sig: Use as instructed to check BS BID PRN (hypoglycemia/hyperglycemia symptoms)   NIFEdipine ER (ADALAT CC) 30 MG 24 hr tablet  Self No No   Sig: TAKE 1 TABLET (30 MG TOTAL) BY MOUTH 2 (TWO) TIMES A DAY   ONETOUCH DELICA LANCETS FINE MISC  Self No No   Sig: Test blood sugar twice daily, or as needed when symptomatic of hypoglycemia or hyperglycemia   acetaminophen (TYLENOL) 650 mg CR tablet  Self Yes No   Si mg   aspirin (ECOTRIN LOW STRENGTH) 81 mg EC tablet  Self No No   Sig: TAKE 1 TABLET (81 MG TOTAL) BY MOUTH DAILY   atorvastatin (LIPITOR) 80 mg tablet  Self Yes No   Sig: Atorvastatin Calcium 80 MG Oral Tablet    Refills: 0       Active   bismuth subsalicylate (PEPTO BISMOL) 262 MG chewable tablet   No No   Sig: Chew 2 tablets (524 mg total) 4 (four) times a day (before meals and at bedtime)   calcium carbonate (TUMS) 500 mg chewable tablet  Self No No   Sig: CHEW 1 TABLET (1,250 MG TOTAL) 3 (THREE) TIMES A DAY AS NEEDED FOR HEARTBURN   clorazepate (TRANXENE) 7 5 mg tablet  Self Yes No   Sig: Take 7 5 mg by mouth 2 (two) times a day    clotrimazole-betamethasone (LOTRISONE) 1-0 05 % lotion   No No   Sig: APPLY TOPICALLY 2 (TWO) TIMES A DAY   fluticasone (FLONASE) 50 mcg/act nasal spray  Self No No   Si SPRAY INTO EACH NOSTRIL DAILY   furosemide (LASIX) 20 mg tablet  Self No No   Sig: TAKE 1 TABLET (20 MG TOTAL) BY MOUTH DAILY AS NEEDED (SWELLING)   gabapentin (NEURONTIN) 400 mg capsule  Self No No   Sig: Take 1 PO AM and 2 PO HS   Patient taking differently: Take 1 PO TID per pt   glucose blood (FREESTYLE TEST STRIPS) test strip  Self No No   Sig: Use as instructed to check BS BID PRN (hypoglycemia/hyperglycemia symptoms)   glucose blood (GLUCOSE METER TEST) test strip  Self No No   Si each by Other route 2 (two) times a day Use as instructed   glucose monitoring kit (FREESTYLE) monitoring kit  Self No No   Si each by Does not apply route 2 (two) times a day Check BS BID PRN for hypoglycemia/hyperglycemia   hydrocortisone 2 5 % cream   No No   Sig: APPLY TOPICALLY 2 (TWO) TIMES A DAY   lisinopril (ZESTRIL) 20 mg tablet  Self No No   Sig: Take 1 tablet (20 mg total) by mouth daily   metFORMIN (GLUCOPHAGE) 500 mg tablet  Self No No   Sig: Take 1 tablet (500 mg total) by mouth 2 (two) times a day with meals   metoprolol tartrate (LOPRESSOR) 25 mg tablet  Self No No   Sig: TAKE 1 TABLET (25 MG TOTAL) BY MOUTH EVERY 12 (TWELVE) HOURS AS DIRECTED   metroNIDAZOLE (FLAGYL) 500 mg tablet   No No   Sig: Take 1 tablet (500 mg total) by mouth 3 (three) times a day for 14 days   mirtazapine (REMERON) 15 mg tablet  Self No No   Sig: TAKE 1 TABLET (15 MG TOTAL) BY MOUTH DAILY AT BEDTIME   omeprazole (PriLOSEC) 40 MG capsule   No No   Sig: Take 1 capsule (40 mg total) by mouth 2 (two) times a day for 14 days (to be taken 30 minutes before breakfast and 30 minutes before dinner)     rosuvastatin (CRESTOR) 40 MG tablet  Self No No   Sig: TAKE 1 TABLET (40 MG TOTAL) BY MOUTH DAILY   simethicone (MYLICON,GAS-X) 174 MG capsule  Self No No   Sig: Take 1 capsule (180 mg total) by mouth every 12 (twelve) hours as needed for flatulence   tetracycline (ACHROMYCIN,SUMYCIN) 500 MG capsule   No No   Sig: Take 1 capsule (500 mg total) by mouth 4 (four) times a day for 14 days   tobramycin-dexamethasone (TOBRADEX) ophthalmic suspension  Self No No   Sig: ADMINISTER 1 DROP TO BOTH EYES 2 (TWO) TIMES A DAY      Facility-Administered Medications: None       Past Medical History:   Diagnosis Date    AS (aortic stenosis)     Balanitis     Biceps tendonitis on right     CAD (coronary artery disease)     Cancer (HCC)     skin    Colon polyp     Complete rotator cuff tear or rupture of right shoulder, not specified as traumatic     CPAP (continuous positive airway pressure) dependence     Diabetes mellitus (HCC)     Esophageal reflux     High cholesterol     Hypertension     Hypertriglyceridemia     Hypogonadism in male     Myalgia     Myocardial infarction ([facility] [address] )     Palpitations     Shoulder pain     Sinus problem     Skin cancer of nose     Sleep apnea     Stroke ([facility] [address] ) [id number]    Systolic murmur     recently found    Tinea cruris     Tinea pedis        Past Surgical History:   Procedure Laterality Date    CARPAL TUNNEL RELEASE      CATARACT EXTRACTION Bilateral     COLONOSCOPY      CORONARY ANGIOPLASTY WITH STENT PLACEMENT      10-15-19 - pt denies stent implant    CORONARY ARTERY BYPASS GRAFT N/A 12/12/2016    Procedure: INTRAOP CECILLE; BILATERAL LEG EVH; CORONARY ARTERY BYPASS GRAFT X 4 SVG TO PDA, OM1,  AND DIAGONAL1, LIMA TO LAD;  Surgeon: Maren Noonan MD;  [facility];  Service:     EYE SURGERY      HERNIA REPAIR      MI ARTHROSCOPY SHOULDER SURGICAL BICEPS TENODESIS Right 5/6/2016    Procedure: ARTHROSCOPIC BICEPS TENODESIS;  Surgeon: Lucille Larry MD;  [facility];  Service: Orthopedics    MI INCISE FINGER TENDON SHEATH Right 10/15/2019    Procedure: TRIGGER FINGER RELEASE INDEX, LONG, RING, SMALL, THUMB FINGERS;  Surgeon: Alessandro Prabhakar MD;  [facility];  Service: Orthopedics    MI INCISE FINGER TENDON SHEATH Left 3/3/2020    Procedure: RELEASE TRIGGER FINGER INDEX;  Surgeon: Alessandro Prabhakar MD;  [facility];  Service: Orthopedics    MI SHLDR ARTHROSCOP,SURG,W/ROTAT CUFF REPR Right 5/6/2016 Procedure: REPAIR ROTATOR CUFF  ARTHROSCOPIC; SUBACROMIAL DECOMPRESSION; PARTIAL SYNOVECTOMY;  Surgeon: Danish Greenwood MD;  [facility];  Service: Orthopedics    RI WRIST [place]  LIG Right 10/15/2019    Procedure: ENDOSCOPIC CARPAL TUNNEL RELEASE;  Surgeon: Sharonda Rosario MD;  [facility];  Service: Orthopedics    RI WRIST [place]  LIG Left 3/3/2020    Procedure: RELEASE CARPAL TUNNEL ENDOSCOPIC;  Surgeon: Sharonda Rosario MD;  [facility];  Service: Orthopedics    ROTATOR CUFF REPAIR      TESTICLE SURGERY      UMBILICAL HERNIA REPAIR  2009    over age 13       Family History   Problem Relation Age of Onset    Heart disease Mother     Hypertension Mother     Nephrolithiasis Father     Other Father         Renal disease    Hypertension Sister     Nephrolithiasis Brother     Other Brother         Renal disease    Hematuria Family         Microscopic     I have reviewed and agree with the history as documented  E-Cigarette/Vaping    E-Cigarette Use Never User      E-Cigarette/Vaping Substances    Nicotine No     THC No     CBD No     Flavoring No     Unknown No      Social History     Tobacco Use    Smoking status: Former Smoker    Smokeless tobacco: Former User    Tobacco comment: smoked for 3 years from 17 y/o - 23 yrs old   Substance Use Topics    Alcohol use: No    Drug use: No        Review of Systems   Constitutional: Negative for chills and fever  HENT: Negative for ear pain, sinus pain and sore throat  Eyes: Negative for pain  Respiratory: Negative for shortness of breath  Cardiovascular: Negative for chest pain  Gastrointestinal: Positive for abdominal pain and nausea  Negative for blood in stool, diarrhea and vomiting  Genitourinary: Negative for difficulty urinating, dysuria and flank pain  Musculoskeletal: Negative for back pain and neck pain  Neurological: Negative for headaches     All other systems reviewed and are negative  Physical Exam  ED Triage Vitals [01/05/21 1515]   Temperature Pulse Respirations Blood Pressure SpO2   98 1 °F (36 7 °C) 59 19 102/64 98 %      Temp Source Heart Rate Source Patient Position - Orthostatic VS BP Location FiO2 (%)   Oral Monitor Sitting Left arm --      Pain Score       7             Orthostatic Vital Signs  Vitals:    01/05/21 1515 01/05/21 1900   BP: 102/64    Pulse: 59 (!) 52   Patient Position - Orthostatic VS: Sitting        Physical Exam  Vitals signs and nursing note reviewed  Constitutional:       General: He is not in acute distress  Appearance: He is well-developed  He is not ill-appearing, toxic-appearing or diaphoretic  HENT:      Head: Normocephalic  Nose: Nose normal    Eyes:      General: No scleral icterus  Right eye: No discharge  Left eye: No discharge  Conjunctiva/sclera: Conjunctivae normal    Neck:      Musculoskeletal: Normal range of motion  Cardiovascular:      Rate and Rhythm: Normal rate  Heart sounds: Normal heart sounds  Pulmonary:      Effort: Pulmonary effort is normal  No respiratory distress  Breath sounds: No stridor  Abdominal:      General: A surgical scar is present  There is no distension  Palpations: Abdomen is soft  Tenderness: There is no abdominal tenderness  There is no guarding or rebound  Negative signs include Demarco's sign and Rovsing's sign  Skin:     General: Skin is warm and dry  Capillary Refill: Capillary refill takes less than 2 seconds  Coloration: Skin is not jaundiced  Neurological:      Mental Status: He is alert and oriented to person, place, and time  Cranial Nerves: No cranial nerve deficit  Psychiatric:         Mood and Affect: Mood normal  Mood is not anxious or depressed           Behavior: Behavior normal       Comments: Pleasant, cooperative         ED Medications  Medications   ondansetron (ZOFRAN) injection 4 mg (4 mg Intravenous Given 1/5/21 1755)   sucralfate (CARAFATE) tablet 1 g (1 g Oral Given 1/5/21 1756)       Diagnostic Studies  Results Reviewed     Procedure Component Value Units Date/Time    CMP [[id number]]  (Abnormal) Collected: 01/05/21 1756    Lab Status: Final result Specimen: Blood from Arm, Right Updated: 01/05/21 1833     Sodium 133 mmol/L      Potassium 4 2 mmol/L      Chloride 101 mmol/L      CO2 29 mmol/L      ANION GAP 3 mmol/L      BUN 10 mg/dL      Creatinine 0 88 mg/dL      Glucose 134 mg/dL      Calcium 9 5 mg/dL      AST 31 U/L      ALT 46 U/L      Alkaline Phosphatase 80 U/L      Total Protein 7 2 g/dL      Albumin 4 0 g/dL      Total Bilirubin 0 74 mg/dL      eGFR 85 ml/min/1 73sq m     Narrative:      National Kidney Disease Foundation guidelines for Chronic Kidney Disease (CKD):     Stage 1 with normal or high GFR (GFR > 90 mL/min/1 73 square meters)    Stage 2 Mild CKD (GFR = 60-89 mL/min/1 73 square meters)    Stage 3A Moderate CKD (GFR = 45-59 mL/min/1 73 square meters)    Stage 3B Moderate CKD (GFR = 30-44 mL/min/1 73 square meters)    Stage 4 Severe CKD (GFR = 15-29 mL/min/1 73 square meters)    Stage 5 End Stage CKD (GFR <15 mL/min/1 73 square meters)  Note: GFR calculation is accurate only with a steady state creatinine    Lipase [[id number]]  (Normal) Collected: 01/05/21 1756    Lab Status: Final result Specimen: Blood from Arm, Right Updated: 01/05/21 1828     Lipase 132 u/L     CBC and differential [[id number]] Collected: 01/05/21 1756    Lab Status: Final result Specimen: Blood from Arm, Right Updated: 01/05/21 1807     WBC 8 27 Thousand/uL      RBC 4 97 Million/uL      Hemoglobin 14 1 g/dL      Hematocrit 44 3 %      MCV 89 fL      MCH 28 4 pg      MCHC 31 8 g/dL      RDW 12 8 %      MPV 10 5 fL      Platelets 144 Thousands/uL      nRBC 0 /100 WBCs      Neutrophils Relative 58 %      Immat GRANS % 0 %      Lymphocytes Relative 31 %      Monocytes Relative 9 %      Eosinophils Relative 1 %      Basophils Relative 1 %      Neutrophils Absolute 4 86 Thousands/µL      Immature Grans Absolute 0 02 Thousand/uL      Lymphocytes Absolute 2 53 Thousands/µL      Monocytes Absolute 0 73 Thousand/µL      Eosinophils Absolute 0 08 Thousand/µL      Basophils Absolute 0 05 Thousands/µL                  No orders to display         Procedures  Procedures      ED Course  ED Course as of Jan 06 1505   Tue Jan 05, 2021   1803 EKG shows NSR, rate of 52, normal axis, normal intervals, no obvious ST elevations or depressions  Overall no significant changes compared to prior  Suspect artifact secondary to patient movement  Identification of Seniors at Risk      Most Recent Value   (ISAR) Identification of Seniors at Risk   Before the illness or injury that brought you to the Emergency, did you need someone to help you on a regular basis? 0 Filed at: 01/05/2021 1516   In the last 24 hours, have you needed more help than usual?  0 Filed at: 01/05/2021 1516   Have you been hospitalized for one or more nights during the past 6 months? 0 Filed at: 01/05/2021 1516   In general, do you see well?  0 Filed at: 01/05/2021 1516   In general, do you have serious problems with your memory? 0 Filed at: 01/05/2021 1516   Do you take more than three different medications every day? 1 Filed at: 01/05/2021 1516   ISAR Score  1 Filed at: 01/05/2021 1516                    SBIRT 22yo+      Most Recent Value   SBIRT (25 yo +)   In order to provide better care to our patients, we are screening all of our patients for alcohol and drug use  Would it be okay to ask you these screening questions?   No Filed at: 01/05/2021 1646                MDM  Number of Diagnoses or Management Options  Abdominal pain:   Gastroesophageal reflux disease, unspecified whether esophagitis present:   H/O non-ST elevation myocardial infarction (NSTEMI):   Hyperlipidemia, unspecified hyperlipidemia type:   Hypertension associated with stage 2 chronic kidney disease due to type 2 diabetes mellitus ([place] [address] ):   LUIS MIGUEL (obstructive sleep apnea):   Positive H  pylori test:   S/P CABG (coronary artery bypass graft):   Diagnosis management comments: Abdominal exam benign, reassuring history, imaging deferred  Symptoms likely related to antibiotics versus H pylori recurrent symptoms  If workup unremarkable will recommend continued follow-up with GI  PCP follow-up, return precautions discussed  Patient appreciative and in agreement with plan          Disposition  Final diagnoses:   Abdominal pain   Gastroesophageal reflux disease, unspecified whether esophagitis present   Positive H  pylori test   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)   S/P CABG (coronary artery bypass graft)   H/O non-ST elevation myocardial infarction (NSTEMI)   Hyperlipidemia, unspecified hyperlipidemia type   LUIS MIGUEL (obstructive sleep apnea)     Time reflects when diagnosis was documented in both MDM as applicable and the Disposition within this note     Time User Action Codes Description Comment    1/5/2021  7:13 PM Esaw Drummer T Add [R10 9] Abdominal pain     1/5/2021  7:13 PM Aline Gist Add [R11 0] Nausea     1/5/2021  7:13 PM Garland Nip [R11 0] Nausea     1/5/2021  7:13 PM Aline Gist Add [K21 9] Gastroesophageal reflux disease, unspecified whether esophagitis present     1/5/2021  7:14 PM Aline Gist Add [A04 8] Positive H  pylori test     1/5/2021  7:14 PM Aline Gist Add [E11 22,  I12 9,  N18 2] Hypertension associated with stage 2 chronic kidney disease due to type 2 diabetes mellitus ([place] [address] )     1/5/2021  7:14 PM Aline Gist Add [Z95 1] S/P CABG (coronary artery bypass graft)     1/5/2021  7:14 PM Aline Gist Add [I25 2] H/O non-ST elevation myocardial infarction (NSTEMI)     1/5/2021  7:14 PM Esaw Drummer T Add [E78 5] Hyperlipidemia, unspecified hyperlipidemia type     1/5/2021 7:14 PM Jazzy Elliott Add [G47 33] LUIS MIGUEL (obstructive sleep apnea)       ED Disposition     ED Disposition Condition Date/Time Comment    Discharge Stable Tue Jan 5, 2021  7:12 PM Darya Mandy discharge to home/self care              Follow-up Information     Follow up With Specialties Details Why Contact Info Additional [address], PA-C Family Medicine, Physician Assistant Schedule an appointment as soon as possible for a visit  As needed [address] [facility] [phone]       [facility] Emergency Department Emergency Medicine Go to  If symptoms worsen [address]  [phone]  ED, [address] [place] [address], [address]   [phone]    Please follow-up with your gastroenterologist               Discharge Medication List as of 1/5/2021  7:19 PM      CONTINUE these medications which have NOT CHANGED    Details   acetaminophen (TYLENOL) 650 mg CR tablet 650 mg, Starting u 2/6/2020, Historical Med      Alcohol Swabs (Alcohol Pads) 70 % PADS USE TWICE DAILY, Normal      Arginine 1000 MG TABS Take 1 pill per day, Historical Med      ARIPiprazole (ABILIFY) 5 mg tablet Take 5 mg by mouth daily at bedtime, Starting Tue 9/29/2020, Historical Med      aspirin (ECOTRIN LOW STRENGTH) 81 mg EC tablet TAKE 1 TABLET (81 MG TOTAL) BY MOUTH DAILY, Normal      atorvastatin (LIPITOR) 80 mg tablet Atorvastatin Calcium 80 MG Oral Tablet    Refills: 0       Active, Historical Med      bismuth subsalicylate (PEPTO BISMOL) 262 MG chewable tablet Chew 2 tablets (524 mg total) 4 (four) times a day (before meals and at bedtime), Starting Fri 12/25/2020, Normal      calcium carbonate (TUMS) 500 mg chewable tablet CHEW 1 TABLET (1,250 MG TOTAL) 3 (THREE) TIMES A DAY AS NEEDED FOR HEARTBURN, Normal      clorazepate (TRANXENE) 7 5 mg tablet Take 7 5 mg by mouth 2 (two) times a day , Starting Wed 3/28/2018, Historical Med clotrimazole-betamethasone (LOTRISONE) 1-0 05 % lotion APPLY TOPICALLY 2 (TWO) TIMES A DAY, Normal      Diclofenac Sodium (VOLTAREN) 1 % APPLY 2 GRAMS TOPICALLY 4 (FOUR) TIMES A DAY, Normal      DULoxetine (CYMBALTA) 60 mg delayed release capsule Take 60 mg by mouth every morning, Starting Fri 7/3/2020, Historical Med      !! Easy Comfort Lancets MISC TEST BLOOD SUGAR TWICE DAILY, OR AS NEEDED WHEN SYMPTOMATIC OF HYPOGLYCEMIA OR HYPERGLYCEMIA, Normal      !! Easy Plus II Glucose Test test strip TEST AS DIRECTED TWICE DAILY, Normal      Elastic Bandages & Supports (MEDICAL COMPRESSION STOCKINGS) MISC by Does not apply route daily Please provide B/L compression stockings, Starting Thu 7/2/2020, Print      Empagliflozin (Jardiance) 25 MG TABS Take 1 tablet (25 mg total) by mouth every morning, Starting Wed 9/16/2020, Normal      fluticasone (FLONASE) 50 mcg/act nasal spray 1 SPRAY INTO EACH NOSTRIL DAILY, Starting Wed 10/28/2020, Normal      furosemide (LASIX) 20 mg tablet TAKE 1 TABLET (20 MG TOTAL) BY MOUTH DAILY AS NEEDED (SWELLING), Starting Mon 11/23/2020, Normal      gabapentin (NEURONTIN) 400 mg capsule Take 1 PO AM and 2 PO HS, Normal      !! glucose blood (FREESTYLE TEST STRIPS) test strip Use as instructed to check BS BID PRN (hypoglycemia/hyperglycemia symptoms), Print      !! glucose blood (GLUCOSE METER TEST) test strip 1 each by Other route 2 (two) times a day Use as instructed, Starting Sat 6/22/2019, Normal      glucose monitoring kit (FREESTYLE) monitoring kit 1 each by Does not apply route 2 (two) times a day Check BS BID PRN for hypoglycemia/hyperglycemia, Starting Thu 12/19/2019, Print      hydrocortisone 2 5 % cream APPLY TOPICALLY 2 (TWO) TIMES A DAY, Starting Tue 12/29/2020, Normal      !!  Lancets (FREESTYLE) lancets Use as instructed to check BS BID PRN (hypoglycemia/hyperglycemia symptoms), Print      lisinopril (ZESTRIL) 20 mg tablet Take 1 tablet (20 mg total) by mouth daily, Starting Wed 9/16/2020, Normal      metFORMIN (GLUCOPHAGE) 500 mg tablet Take 1 tablet (500 mg total) by mouth 2 (two) times a day with meals, Starting Mon 6/8/2020, Until Fri 3/5/2021, Normal      metoprolol tartrate (LOPRESSOR) 25 mg tablet TAKE 1 TABLET (25 MG TOTAL) BY MOUTH EVERY 12 (TWELVE) HOURS AS DIRECTED, Normal      metroNIDAZOLE (FLAGYL) 500 mg tablet Take 1 tablet (500 mg total) by mouth 3 (three) times a day for 14 days, Starting Fri 12/25/2020, Until Fri 1/8/2021, Normal      mirtazapine (REMERON) 15 mg tablet TAKE 1 TABLET (15 MG TOTAL) BY MOUTH DAILY AT BEDTIME, Normal      NIFEdipine ER (ADALAT CC) 30 MG 24 hr tablet TAKE 1 TABLET (30 MG TOTAL) BY MOUTH 2 (TWO) TIMES A DAY, Starting Fri 8/14/2020, Normal      omeprazole (PriLOSEC) 40 MG capsule Take 1 capsule (40 mg total) by mouth 2 (two) times a day for 14 days (to be taken 30 minutes before breakfast and 30 minutes before dinner)  , Starting Fri 12/25/2020, Until Fri 1/8/2021, Normal      !! ONETOUCH DELICA LANCETS FINE MISC Test blood sugar twice daily, or as needed when symptomatic of hypoglycemia or hyperglycemia, Normal      rosuvastatin (CRESTOR) 40 MG tablet TAKE 1 TABLET (40 MG TOTAL) BY MOUTH DAILY, Starting Mon 5/25/2020, Normal      simethicone (MYLICON,GAS-X) 346 MG capsule Take 1 capsule (180 mg total) by mouth every 12 (twelve) hours as needed for flatulence, Starting Tue 12/15/2020, Normal      tetracycline (ACHROMYCIN,SUMYCIN) 500 MG capsule Take 1 capsule (500 mg total) by mouth 4 (four) times a day for 14 days, Starting Fri 12/25/2020, Until Fri 1/8/2021, Normal      tobramycin-dexamethasone (TOBRADEX) ophthalmic suspension ADMINISTER 1 DROP TO BOTH EYES 2 (TWO) TIMES A DAY, Starting Wed 1/15/2020, Normal       !! - Potential duplicate medications found  Please discuss with provider  No discharge procedures on file      PDMP Review       Value Time User    PDMP Reviewed  Yes 3/3/2020 10:02 AM Nabeel Sexton PA-C           ED Provider  Attending physically available and evaluated Jm Fine I managed the patient along with the ED Attending      Electronically Signed by         Gisella Mann MD  01/06/21 4623

## 2021-01-05 NOTE — ED ATTENDING ATTESTATION
1/5/2021  I, Marcello Teran DO, saw and evaluated the patient  I have discussed the patient with the resident/non-physician practitioner and agree with the resident's/non-physician practitioner's findings, Plan of Care, and MDM as documented in the resident's/non-physician practitioner's note, except where noted  All available labs and Radiology studies were reviewed  I was present for key portions of any procedure(s) performed by the resident/non-physician practitioner and I was immediately available to provide assistance  At this point I agree with the current assessment done in the Emergency Department  I have conducted an independent evaluation of this patient a history and physical is as follows:    Patient is a 72-year-old predominantly Lithuanian-speaking male interviewed the  service  He presents for evaluation of abdominal pain which being on for about 2 and half months  It is somewhat crampy in nature, not there every day but usually happens once every day or so  Feels somewhat bloating, epigastric in nature, often times is relieved with eating food  It has been essentially unchanged  He was seen in the emergency department on November 11th, CT abdomen and pelvis showed no acute pathology, workup was unremarkable  Referred to gastroenterology  He had a video visit in December, then had outpatient stool testing which with positive H pylori  He was started on omeprazole, bismuth, metronidazole and tetracycline on December 26, 2020  Says since then he has just not improved 100%, and was concerned that he is not getting better  He called his primary care physician today, did not hear back so he came to the ED  He denies dysuria, denies hematuria, denies diarrhea, denies constipation, occasionally has some nausea but no vomiting  No change in his symptoms over the past 2 and half months, he presents today just because he is not all better      General:  Patient is well-appearing  Head:  Atraumatic  Eyes:  Conjunctiva pink  ENT:  Mucous membranes are moist  Neck:  Supple  Cardiac:  S1-S2, without murmurs  Lungs:  Clear to auscultation bilaterally  Abdomen:  Soft, nontender, normal bowel sounds, no CVA tenderness, no tympany, no rigidity, no guarding  Extremities:  Normal range of motion  Neurologic:  Awake, fluent speech, normal comprehension  AAOx3  Skin:  Pink warm and dry  Psychiatric:  Alert, pleasant, cooperative                ED Course     EKG interpreted me, sinus rhythm, rate of 54, no ST segment elevation or depression, no ischemic or infarct changes, no acute change from November 11, 2020    Labs Reviewed   COMPREHENSIVE METABOLIC PANEL - Abnormal       Result Value Ref Range Status    Sodium 133 (*) 136 - 145 mmol/L Final    Potassium 4 2  3 5 - 5 3 mmol/L Final    Chloride 101  100 - 108 mmol/L Final    CO2 29  21 - 32 mmol/L Final    ANION GAP 3 (*) 4 - 13 mmol/L Final    BUN 10  5 - 25 mg/dL Final    Creatinine 0 88  0 60 - 1 30 mg/dL Final    Comment: Standardized to IDMS reference method    Glucose 134  65 - 140 mg/dL Final    Comment: If the patient is fasting, the ADA then defines impaired fasting glucose as > 100 mg/dL and diabetes as > or equal to 123 mg/dL  Specimen collection should occur prior to Sulfasalazine administration due to the potential for falsely depressed results  Specimen collection should occur prior to Sulfapyridine administration due to the potential for falsely elevated results  Calcium 9 5  8 3 - 10 1 mg/dL Final    AST 31  5 - 45 U/L Final    Comment: Specimen collection should occur prior to Sulfasalazine administration due to the potential for falsely depressed results  ALT 46  12 - 78 U/L Final    Comment: Specimen collection should occur prior to Sulfasalazine and/or Sulfapyridine administration due to the potential for falsely depressed results       Alkaline Phosphatase 80  46 - 116 U/L Final    Total Protein 7 2  6 4 - 8 2 g/dL Final    Albumin 4 0  3 5 - 5 0 g/dL Final    Total Bilirubin 0 74  0 20 - 1 00 mg/dL Final    Comment: Use of this assay is not recommended for patients undergoing treatment with eltrombopag due to the potential for falsely elevated results      eGFR 85  ml/min/1 73sq m Final    Narrative:     National Kidney Disease Foundation guidelines for Chronic Kidney Disease (CKD):     Stage 1 with normal or high GFR (GFR > 90 mL/min/1 73 square meters)    Stage 2 Mild CKD (GFR = 60-89 mL/min/1 73 square meters)    Stage 3A Moderate CKD (GFR = 45-59 mL/min/1 73 square meters)    Stage 3B Moderate CKD (GFR = 30-44 mL/min/1 73 square meters)    Stage 4 Severe CKD (GFR = 15-29 mL/min/1 73 square meters)    Stage 5 End Stage CKD (GFR <15 mL/min/1 73 square meters)  Note: GFR calculation is accurate only with a steady state creatinine   LIPASE - Normal    Lipase 132  73 - 393 u/L Final   CBC AND DIFFERENTIAL    WBC 8 27  4 31 - 10 16 Thousand/uL Final    RBC 4 97  3 88 - 5 62 Million/uL Final    Hemoglobin 14 1  12 0 - 17 0 g/dL Final    Hematocrit 44 3  36 5 - 49 3 % Final    MCV 89  82 - 98 fL Final    MCH 28 4  26 8 - 34 3 pg Final    MCHC 31 8  31 4 - 37 4 g/dL Final    RDW 12 8  11 6 - 15 1 % Final    MPV 10 5  8 9 - 12 7 fL Final    Platelets 758  905 - 390 Thousands/uL Final    nRBC 0  /100 WBCs Final    Neutrophils Relative 58  43 - 75 % Final    Immat GRANS % 0  0 - 2 % Final    Lymphocytes Relative 31  14 - 44 % Final    Monocytes Relative 9  4 - 12 % Final    Eosinophils Relative 1  0 - 6 % Final    Basophils Relative 1  0 - 1 % Final    Neutrophils Absolute 4 86  1 85 - 7 62 Thousands/µL Final    Immature Grans Absolute 0 02  0 00 - 0 20 Thousand/uL Final    Lymphocytes Absolute 2 53  0 60 - 4 47 Thousands/µL Final    Monocytes Absolute 0 73  0 17 - 1 22 Thousand/µL Final    Eosinophils Absolute 0 08  0 00 - 0 61 Thousand/µL Final    Basophils Absolute 0 05  0 00 - 0 10 Thousands/µL Final       On reassessment patient is feeling better  The cause of the patient's symptoms is unclear but unlikely to represent an acute emergency  His symptoms are essentially unchanged over the past 2 months, and I do not believe he requires repeat CT  He has only been on therapy for H pylori for a little over week, and I would not expect to see complete resolution of his symptoms  Supportive care, importance of follow-up and return precautions were discussed with the patient, who expressed understanding        Critical Care Time  Procedures

## 2021-01-05 NOTE — TELEPHONE ENCOUNTER
Patients GI provider:  Dr Rosangela Simeon    Number to return call: [phone]    Reason for call: Pt calling stated he has abdominal pain and medication he is taking is not helping  Would like a call to advise      Scheduled procedure/appointment date if applicable: Apt/procedure n/a

## 2021-01-06 LAB
ATRIAL RATE: 53 BPM
P AXIS: 41 DEGREES
PR INTERVAL: 154 MS
QRS AXIS: 24 DEGREES
QRSD INTERVAL: 96 MS
QT INTERVAL: 448 MS
QTC INTERVAL: 416 MS
T WAVE AXIS: 53 DEGREES
VENTRICULAR RATE: 52 BPM

## 2021-01-06 PROCEDURE — 93010 ELECTROCARDIOGRAM REPORT: CPT | Performed by: INTERNAL MEDICINE

## 2021-01-06 RX ORDER — DICYCLOMINE HYDROCHLORIDE 10 MG/1
10 CAPSULE ORAL
Qty: 90 CAPSULE | Refills: 1 | Status: SHIPPED | OUTPATIENT
Start: 2021-01-06 | End: 2021-12-09 | Stop reason: SDUPTHER

## 2021-01-06 NOTE — TELEPHONE ENCOUNTER
Left message on voice mail for patient's daughter Lori Alexis  Call back to review message from Dr Matilde Ash

## 2021-01-06 NOTE — TELEPHONE ENCOUNTER
Can someone please reach out to pt  Seen in er with pain likely due to h pylori gastritis, recommend he get scheduled for an outpatient follow-up, his pain is likely could be due to his H pylori so he can take Bentyl and/or Pepto-Bismol supportive care, please arrange outpatient follow-up soon given his recent ER visit  Please let me know if he has any questions thank you very much, I am on inpatient this week and extremiely busy

## 2021-01-06 NOTE — TELEPHONE ENCOUNTER
Clerical- please see attached encounter  Patient needs office visit with Dr Jodie Calvillo-  recent ER visit  Patient's wife will call to schedule  Please follow up with patient to confirm appointment is made    Thank you

## 2021-01-06 NOTE — TELEPHONE ENCOUNTER
Spoke to patient's son Salome Guerrero  Reports patient's symptoms improved today and tolerating food/ liquids well  Reviewed Dr Gloria Art instructions- pain likely due to h pylori gastritis so he can take Bentyl and/or Pepto-Bismol supportive care  He is aware bentyl script was sent to his pharmacy and [address]  He will have patient's wife call to schedule ER follow up visit

## 2021-01-07 ENCOUNTER — TELEPHONE (OUTPATIENT)
Dept: GASTROENTEROLOGY | Facility: AMBULARY SURGERY CENTER | Age: 76
End: 2021-01-07

## 2021-01-07 DIAGNOSIS — R11.0 NAUSEA: Primary | ICD-10-CM

## 2021-01-07 RX ORDER — ONDANSETRON 4 MG/1
4 TABLET, FILM COATED ORAL EVERY 8 HOURS PRN
Qty: 20 TABLET | Refills: 0 | Status: SHIPPED | OUTPATIENT
Start: 2021-01-07 | End: 2021-01-18 | Stop reason: ALTCHOICE

## 2021-01-07 NOTE — TELEPHONE ENCOUNTER
Patients GI provider:  Dr Ngoc Rosen    Number to return call: [phone]    Reason for call: Pt's daughter called stated she is returning a call from [address] regarding antibiotic    Scheduled procedure/appointment date if applicable: Apt/procedure

## 2021-01-07 NOTE — TELEPHONE ENCOUNTER
Patients GI provider:  Dr Bandar Cooper     Number to return call: (  [phone]    Reason for call: Pt calling because he is unable to keep the antibiotics down   He would like to know if he can have zofran    Scheduled procedure/appointment date if applicable: Apt/procedure    na

## 2021-01-07 NOTE — TELEPHONE ENCOUNTER
See 1/5/21 encounter-    Spoke to patient's daughter Daniel Chavez  Reports patient has "moderate" abdominal pain and decreased appetite that she thinks is from the antibiotics- flagy and tetracycline  She is requesting zofran for nausea  She was not aware that I spoke with patient's son Marycruz Dockery yesterday  I reviewed Dr Palma Murray instructions - pain likely due to h pylori gastritis so he can take Bentyl and/or Pepto-Bismol supportive care  She is aware bentyl script was sent to his pharmacy  Start pepto bismol and bentyl for symptoms  Zofran script entered if agreeable

## 2021-01-11 DIAGNOSIS — E11.9 TYPE 2 DIABETES MELLITUS WITHOUT COMPLICATION, WITHOUT LONG-TERM CURRENT USE OF INSULIN (HCC): ICD-10-CM

## 2021-01-12 RX ORDER — EMPAGLIFLOZIN 25 MG/1
25 TABLET, FILM COATED ORAL EVERY MORNING
Qty: 30 TABLET | Refills: 5 | Status: SHIPPED | OUTPATIENT
Start: 2021-01-12 | End: 2021-06-29

## 2021-01-13 DIAGNOSIS — E11.9 TYPE 2 DIABETES MELLITUS WITHOUT COMPLICATION, WITHOUT LONG-TERM CURRENT USE OF INSULIN (HCC): ICD-10-CM

## 2021-01-18 ENCOUNTER — LAB (OUTPATIENT)
Dept: LAB | Facility: HOSPITAL | Age: 76
End: 2021-01-18
Payer: MEDICARE

## 2021-01-18 ENCOUNTER — CLINICAL SUPPORT (OUTPATIENT)
Dept: FAMILY MEDICINE CLINIC | Facility: CLINIC | Age: 76
End: 2021-01-18

## 2021-01-18 VITALS
DIASTOLIC BLOOD PRESSURE: 80 MMHG | WEIGHT: 150 LBS | HEART RATE: 56 BPM | BODY MASS INDEX: 23.49 KG/M2 | SYSTOLIC BLOOD PRESSURE: 110 MMHG

## 2021-01-18 DIAGNOSIS — A04.8 H. PYLORI INFECTION: ICD-10-CM

## 2021-01-18 DIAGNOSIS — Z86.19 H/O TINEA CRURIS: Chronic | ICD-10-CM

## 2021-01-18 DIAGNOSIS — E11.9 TYPE 2 DIABETES MELLITUS WITHOUT COMPLICATION, WITHOUT LONG-TERM CURRENT USE OF INSULIN (HCC): Primary | ICD-10-CM

## 2021-01-18 DIAGNOSIS — I25.10 CORONARY ARTERY DISEASE INVOLVING NATIVE CORONARY ARTERY OF NATIVE HEART WITHOUT ANGINA PECTORIS: Primary | ICD-10-CM

## 2021-01-18 DIAGNOSIS — E55.9 VITAMIN D INSUFFICIENCY: ICD-10-CM

## 2021-01-18 PROCEDURE — 87338 HPYLORI STOOL AG IA: CPT

## 2021-01-18 RX ORDER — PANTOPRAZOLE SODIUM 40 MG/1
40 TABLET, DELAYED RELEASE ORAL DAILY
COMMUNITY
Start: 2020-12-31 | End: 2021-01-25

## 2021-01-18 NOTE — ASSESSMENT & PLAN NOTE
Lab Results   Component Value Date    HGBA1C 6 5 12/23/2020   Controlled based on recent home glucose readings (ranging 94 - 127 after lunch)  A1c at goal of <7  Continue current regimen metformin 500 mg BID, Jardiance 25 mg daily  Next A1c due 3/23/2021  Patient has hx of MI  He is already on appropriate high intensity statin, and tolerating medication well without SE  Continue atorvastatin 80 mg daily

## 2021-01-18 NOTE — TELEPHONE ENCOUNTER
Patient is requesting refill for the following medications:  · Lipitor  · Lotrisone lotion   · Vitamin D (he currently buys vitamin-D 1000 units daily over-the-counter and wants to know if his insurance would cover vitamin-D)

## 2021-01-18 NOTE — PROGRESS NOTES
[address] [address] [place] [address]  [phone]  Clinical Pharmacy Consultation    This patient was referred by Dr Niño for pharmacy consultation  Thank you for the referral     Assessment/Plan  1  Type 2 diabetes mellitus without complication, without long-term current use of insulin ([facility])  Assessment & Plan:    Lab Results   Component Value Date    HGBA1C 6 5 12/23/2020   Controlled based on recent home glucose readings (ranging 94 - 127 after lunch)  A1c at goal of <7  Continue current regimen metformin 500 mg BID, Jardiance 25 mg daily  Next A1c due 3/23/2021  Patient has hx of MI  He is already on appropriate high intensity statin, and tolerating medication well without SE  Continue atorvastatin 80 mg daily  Renewal request sent to prescribing provider for:   Lipitor, Lotrisone lotion, Vitamin D (Reports taking vitamin D 1000 units daily)    Medication list was updated and discussed with patient  The following medication related items were identified:     Discontinued medications (patient reports no longer taking these medications- therapy completed): · Tylenol  · Arginine  · Pepto Bismol  · Lasix  · Gabapentin  · Zofran   · Crestor (using Lipitor instead)  · Omeprazole (using pantoprazole instead)       Clinically significant drug interactions identified: none   Side effects from medication(s) reported: none    Advised patient to bring all his medication bottles next visit  Need to verify if he still taking Cymbalta  Counseling was provided to the patient on the following: educated pt on indication of Simethicone; and compliance with medications and compliance with medications  The patient communicates understanding of the treatment plan    Items to be addressed at future visit with the pharmacist: medication reconciliation, medication education and polypharmacy consult  Next Medication Management Appointment with the clinical pharmacist: Feb 22 at 8:30 am and as needed per patient request     M*Modal software was used to dictate this note  It may contain errors with dictating incorrect words or incorrect spelling  Please contact the provider directly with any questions  Thank you for your understanding  Ashley Muñiz is a 70 y o  male  Focus of today's visit with the clinical pharmacist: medication reconciliation, medication education, polypharmacy consult  Patient is not sure if he still taking Cymbalta  He did not bring his medication bottles with him to the visit  Reports taking vitamin D 1000 units daily, vitamin C and vitamin E daily (unsure of strength)  Patient is requesting refill for the following medications:  Lipitor  Lotrisone lotion   Vitamin D (he currently buys vitamin-D 1000 units daily over-the-counter and wants to know if his insurance would cover vitamin-D)    Denies any hypoglycemic episodes  SMBG readings within the last week:     After lunch: 94 - 127       Social History     Tobacco Use   Smoking Status Former Smoker   Smokeless Tobacco Former User   Tobacco Comment    smoked for 3 years from 19 y/o - 23 yrs old        Allergies   Allergen Reactions    Epinephrine Hives and Anxiety    Penicillins Hives and Anxiety          Current Outpatient Medications:     ARIPiprazole (ABILIFY) 5 mg tablet, Take 5 mg by mouth daily at bedtime, Disp: , Rfl:     aspirin (ECOTRIN LOW STRENGTH) 81 mg EC tablet, TAKE 1 TABLET (81 MG TOTAL) BY MOUTH DAILY, Disp: 90 tablet, Rfl: 0    atorvastatin (LIPITOR) 80 mg tablet, Atorvastatin Calcium 80 MG Oral Tablet   Refills: 0     Active, Disp: , Rfl:     calcium carbonate (TUMS) 500 mg chewable tablet, CHEW 1 TABLET (1,250 MG TOTAL) 3 (THREE) TIMES A DAY AS NEEDED FOR HEARTBURN, Disp: 90 tablet, Rfl: 3    clorazepate (TRANXENE) 7 5 mg tablet, Take 7 5 mg by mouth 2 (two) times a day , Disp: , Rfl: 0    clotrimazole-betamethasone (LOTRISONE) 1-0 05 % lotion, APPLY TOPICALLY 2 (TWO) TIMES A DAY, Disp: 30 mL, Rfl: 0    Diclofenac Sodium (VOLTAREN) 1 %, APPLY 2 GRAMS TOPICALLY 4 (FOUR) TIMES A DAY, Disp: 100 g, Rfl: 2    fluticasone (FLONASE) 50 mcg/act nasal spray, 1 SPRAY INTO EACH NOSTRIL DAILY, Disp: 16 g, Rfl: 2    hydrocortisone 2 5 % cream, APPLY TOPICALLY 2 (TWO) TIMES A DAY, Disp: 28 g, Rfl: 0    Jardiance 25 MG TABS, TAKE 1 TABLET (25 MG TOTAL) BY MOUTH EVERY MORNING, Disp: 30 tablet, Rfl: 5    lisinopril (ZESTRIL) 20 mg tablet, Take 1 tablet (20 mg total) by mouth daily, Disp: 90 tablet, Rfl: 3    metFORMIN (GLUCOPHAGE) 500 mg tablet, Take 1 tablet (500 mg total) by mouth 2 (two) times a day with meals, Disp: 180 tablet, Rfl: 2    metoprolol tartrate (LOPRESSOR) 25 mg tablet, TAKE 1 TABLET (25 MG TOTAL) BY MOUTH EVERY 12 (TWELVE) HOURS AS DIRECTED, Disp: 180 tablet, Rfl: 2    mirtazapine (REMERON) 15 mg tablet, TAKE 1 TABLET (15 MG TOTAL) BY MOUTH DAILY AT BEDTIME, Disp: 90 tablet, Rfl: 0    NIFEdipine ER (ADALAT CC) 30 MG 24 hr tablet, TAKE 1 TABLET (30 MG TOTAL) BY MOUTH 2 (TWO) TIMES A DAY, Disp: 60 tablet, Rfl: 11    tobramycin-dexamethasone (TOBRADEX) ophthalmic suspension, ADMINISTER 1 DROP TO BOTH EYES 2 (TWO) TIMES A DAY, Disp: 5 mL, Rfl: 0    Alcohol Swabs (Alcohol Pads) 70 % PADS, USE TWICE DAILY, Disp: 100 each, Rfl: 3    dicyclomine (BENTYL) 10 mg capsule, Take 1 capsule (10 mg total) by mouth 3 (three) times a day before meals (Patient not taking: Reported on 1/18/2021), Disp: 90 capsule, Rfl: 1    DULoxetine (CYMBALTA) 60 mg delayed release capsule, Take 60 mg by mouth every morning, Disp: , Rfl:     Easy Comfort Lancets MISC, TEST BLOOD SUGAR TWICE DAILY, OR AS NEEDED WHEN SYMPTOMATIC OF HYPOGLYCEMIA OR HYPERGLYCEMIA, Disp: 100 each, Rfl: 5    Easy Plus II Glucose Test test strip, TEST AS DIRECTED TWICE DAILY, Disp: 100 each, Rfl: 3    Elastic Bandages & Supports (MEDICAL COMPRESSION STOCKINGS) MISC, by Does not apply route daily Please provide B/L compression stockings, Disp: 2 each, Rfl: 0    glucose blood (FREESTYLE TEST STRIPS) test strip, Use as instructed to check BS BID PRN (hypoglycemia/hyperglycemia symptoms), Disp: 180 each, Rfl: 2    glucose blood (GLUCOSE METER TEST) test strip, 1 each by Other route 2 (two) times a day Use as instructed, Disp: 100 each, Rfl: 5    glucose monitoring kit (FREESTYLE) monitoring kit, 1 each by Does not apply route 2 (two) times a day Check BS BID PRN for hypoglycemia/hyperglycemia, Disp: 1 each, Rfl: 0    Lancets (FREESTYLE) lancets, Use as instructed to check BS BID PRN (hypoglycemia/hyperglycemia symptoms), Disp: 180 each, Rfl: 2    ONETOUCH DELICA LANCETS FINE MISC, Test blood sugar twice daily, or as needed when symptomatic of hypoglycemia or hyperglycemia, Disp: 100 each, Rfl: 5    pantoprazole (PROTONIX) 40 mg tablet, Take 40 mg by mouth daily, Disp: , Rfl:     simethicone (MYLICON,GAS-X) 644 MG capsule, Take 1 capsule (180 mg total) by mouth every 12 (twelve) hours as needed for flatulence (Patient not taking: Reported on 1/18/2021), Disp: 90 capsule, Rfl: 0    Objective  Vitals:    Wt Readings from Last 3 Encounters:   01/18/21 68 kg (150 lb)   01/05/21 70 3 kg (155 lb)   12/23/20 70 9 kg (156 lb 3 2 oz)     Temp Readings from Last 3 Encounters:   01/05/21 98 1 °F (36 7 °C) (Oral)   12/23/20 (!) 97 2 °F (36 2 °C) (Tympanic)   12/02/20 98 2 °F (36 8 °C) (Tympanic)     BP Readings from Last 3 Encounters:   01/18/21 110/80   01/05/21 102/64   12/23/20 130/70     Pulse Readings from Last 3 Encounters:   01/18/21 56   01/05/21 (!) 52   12/23/20 72       Labs:  Lab Results   Component Value Date    HGBA1C 6 5 12/23/2020     Lab Results   Component Value Date    LDLCALC 84 10/21/2020     Lab Results   Component Value Date    CHOLESTEROL 177 10/21/2020     Lab Results   Component Value Date    HDL 69 10/21/2020     Lab Results   Component Value Date    TRIG 120 10/21/2020     Lab Results Component Value Date    [facility] 108 10/21/2020       Montserrat Hernandez, Pharmacist    Demographics  Interaction Method: Face to Face  Type of Intervention: Follow-Up    Topic(s) Addressed  Medication Management  Polypharmacy  Diabetes    Intervention(s) Made    Pharmacologic:      Prevent or Manage Adverse Drug Reaction    Drug Interaction    Non-Pharmacologic:  Adherence Addressed    Tool(s) Used  Not Applicable    Time Spent:   Time Spent in Direct Patient Care: 45 minutes    Recommendation(s) Accepted by the Patient/Caregiver:  All Accepted

## 2021-01-19 ENCOUNTER — TELEPHONE (OUTPATIENT)
Dept: NON INVASIVE DIAGNOSTICS | Facility: HOSPITAL | Age: 76
End: 2021-01-19

## 2021-01-19 DIAGNOSIS — I25.10 CORONARY ARTERY DISEASE INVOLVING NATIVE CORONARY ARTERY OF NATIVE HEART WITHOUT ANGINA PECTORIS: ICD-10-CM

## 2021-01-19 DIAGNOSIS — I50.9 CONGESTIVE HEART FAILURE, UNSPECIFIED HF CHRONICITY, UNSPECIFIED HEART FAILURE TYPE (HCC): Primary | ICD-10-CM

## 2021-01-19 LAB — H PYLORI AG STL QL IA: NEGATIVE

## 2021-01-19 RX ORDER — ATORVASTATIN CALCIUM 80 MG/1
80 TABLET, FILM COATED ORAL DAILY
Qty: 90 TABLET | Refills: 1 | Status: SHIPPED | OUTPATIENT
Start: 2021-01-19 | End: 2021-10-08 | Stop reason: ALTCHOICE

## 2021-01-19 RX ORDER — ATORVASTATIN CALCIUM 80 MG/1
80 TABLET, FILM COATED ORAL DAILY
Qty: 90 TABLET | Refills: 1 | Status: SHIPPED | OUTPATIENT
Start: 2021-01-19 | End: 2021-01-19

## 2021-01-19 RX ORDER — MELATONIN
1000 DAILY
Qty: 90 TABLET | Refills: 1 | Status: SHIPPED | OUTPATIENT
Start: 2021-01-19 | End: 2021-04-29

## 2021-01-19 RX ORDER — FUROSEMIDE 20 MG/1
20 TABLET ORAL DAILY
Qty: 30 TABLET | Refills: 8 | Status: SHIPPED | OUTPATIENT
Start: 2021-01-19

## 2021-01-19 RX ORDER — CLOTRIMAZOLE AND BETAMETHASONE DIPROPIONATE 10; .5 MG/ML; MG/ML
LOTION TOPICAL 2 TIMES DAILY
Qty: 30 ML | Refills: 0 | Status: SHIPPED | OUTPATIENT
Start: 2021-01-19 | End: 2021-02-24

## 2021-01-19 NOTE — TELEPHONE ENCOUNTER
No to Crestor, is on atorvastatin instead  Furosemide was just PRN previously, likely not currently taking

## 2021-01-19 NOTE — TELEPHONE ENCOUNTER
----- Message from Dillon Retana sent at 1/18/2021 10:49 AM EST -----  Crestor & Furosemide are crossed off  medication list-Pharmacy is getting a denied claim   Should pt be on these medications?

## 2021-01-20 NOTE — TELEPHONE ENCOUNTER
I spoke to the patient in regards to his results, verbalized understanding  Patient stated he was off PPI therapy  fingertip

## 2021-01-20 NOTE — TELEPHONE ENCOUNTER
Repeat stool test negative, as long as the patient has been off PPI therapy for 2-4 weeks this indicated h pylori eradication, if he has not been off for 2-4 weeks I recommend a repeat in another month, please let patient know thank you

## 2021-01-25 ENCOUNTER — OFFICE VISIT (OUTPATIENT)
Dept: GASTROENTEROLOGY | Facility: CLINIC | Age: 76
End: 2021-01-25
Payer: MEDICARE

## 2021-01-25 ENCOUNTER — TELEPHONE (OUTPATIENT)
Dept: SLEEP CENTER | Facility: CLINIC | Age: 76
End: 2021-01-25

## 2021-01-25 VITALS
HEART RATE: 54 BPM | DIASTOLIC BLOOD PRESSURE: 74 MMHG | SYSTOLIC BLOOD PRESSURE: 122 MMHG | HEIGHT: 67 IN | WEIGHT: 152 LBS | TEMPERATURE: 96 F | BODY MASS INDEX: 23.86 KG/M2

## 2021-01-25 DIAGNOSIS — A04.8 H. PYLORI INFECTION: Primary | ICD-10-CM

## 2021-01-25 DIAGNOSIS — K21.9 GASTROESOPHAGEAL REFLUX DISEASE, UNSPECIFIED WHETHER ESOPHAGITIS PRESENT: Primary | ICD-10-CM

## 2021-01-25 DIAGNOSIS — Z86.010 PERSONAL HISTORY OF COLONIC POLYPS: ICD-10-CM

## 2021-01-25 PROCEDURE — 99214 OFFICE O/P EST MOD 30 MIN: CPT | Performed by: PHYSICIAN ASSISTANT

## 2021-01-25 RX ORDER — ROSUVASTATIN CALCIUM 40 MG/1
40 TABLET, COATED ORAL DAILY
COMMUNITY
Start: 2021-01-20 | End: 2021-02-21 | Stop reason: ALTCHOICE

## 2021-01-25 RX ORDER — PANTOPRAZOLE SODIUM 40 MG/1
40 TABLET, DELAYED RELEASE ORAL DAILY
Qty: 30 TABLET | Refills: 1 | Status: SHIPPED | OUTPATIENT
Start: 2021-01-25 | End: 2021-03-15

## 2021-01-25 NOTE — PROGRESS NOTES
[facility] - Outpatient Follow-up Note  Antonella Clifford 70 y o  male MRN: [id number]  Encounter: [id number]      Assessment and Plan    1  H pylori   The patient was having abdominal pain and bloating and was found to have h pylori on stool testing  He was treated with quadruple therapy and repeat h pylori stool antigen after treatment obtained off PPI was negative  Feeling better now with no further complaints      2  Personal history of colon polyps  Colonoscopy 4/16/19 with sigmoid diverticulosis otherwise normal colonoscopy, repeat in 5 years or sooner if clinically indicated    FU PRN, recall for colonoscopy 2024    ______________________________________________________________________    History of Present Illness  Antonella Clifford is a 70 y o  male here for follow up evaluation of abdominal bloating and abdominal pain  Celiac serologies were negative but his H pylori stool antigen was positive  He was treated with quadruple therapy and after treatment had a repeat stool antigen test that was negative  He reports he had been off PPI at least 1-2 weeks prior to testing  He presents today and feels much better today without any further complaints  No alarm symptoms  Colonoscopy 4/16/19 with sigmoid diverticulosis otherwise normal colonoscopy, repeat in 5 years due to a personal history of polyps or colon cancer  Review of Systems   Constitutional: Negative for activity change, appetite change, chills, fatigue, fever and unexpected weight change  HENT: Negative for sore throat and trouble swallowing  Respiratory: Negative for cough and choking  Gastrointestinal: Negative for abdominal distention, abdominal pain, anal bleeding, blood in stool, constipation, diarrhea, nausea, rectal pain and vomiting  Musculoskeletal: Negative for gait problem  Neurological: Negative for syncope  Psychiatric/Behavioral: Negative for confusion         Past Medical History  Past Medical History: Diagnosis Date    AS (aortic stenosis)     Balanitis     Biceps tendonitis on right     CAD (coronary artery disease)     Cancer (HCC)     skin    Colon polyp     Complete rotator cuff tear or rupture of right shoulder, not specified as traumatic     CPAP (continuous positive airway pressure) dependence     Diabetes mellitus (HCC)     Esophageal reflux     High cholesterol     Hypertension     Hypertriglyceridemia     Hypogonadism in male     Myalgia     [facility] infarction ([facility] [address] [facility]     Palpitations     Shoulder pain     Sinus problem     Skin cancer of nose     Sleep apnea     Stroke ([facility] [address] ) [address]    Systolic murmur     recently found    Tinea cruris     Tinea pedis        Past Social history  Past Surgical History:   Procedure Laterality Date    CARPAL TUNNEL RELEASE      CATARACT EXTRACTION Bilateral     COLONOSCOPY  2019    CORONARY ANGIOPLASTY WITH STENT PLACEMENT      10-15-19 - pt denies stent implant    CORONARY ARTERY BYPASS GRAFT N/A 12/12/2016    Procedure: INTRAOP CECILLE; BILATERAL LEG EVH; CORONARY ARTERY BYPASS GRAFT X 4 SVG TO PDA, OM1,  AND DIAGONAL1, LIMA TO LAD;  Surgeon: Phil Shah MD;  [facility];  Service:     EYE SURGERY      HERNIA REPAIR      SD ARTHROSCOPY SHOULDER SURGICAL BICEPS TENODESIS Right 5/6/2016    Procedure: ARTHROSCOPIC BICEPS TENODESIS;  Surgeon: Jacquelyn Solitario MD;  [facility];  Service: Orthopedics    SD INCISE FINGER TENDON SHEATH Right 10/15/2019    Procedure: TRIGGER FINGER RELEASE INDEX, LONG, RING, SMALL, THUMB FINGERS;  Surgeon: Lashell Kamara MD;  [facility];  Service: Orthopedics    SD INCISE FINGER TENDON SHEATH Left 3/3/2020    Procedure: RELEASE TRIGGER FINGER INDEX;  Surgeon: Lashell Kamara MD;  [facility];  Service: Orthopedics    SD SHLDR ARTHROSCOP,SURG,W/ROTAT CUFF REPR Right 5/6/2016    Procedure: REPAIR ROTATOR CUFF  ARTHROSCOPIC; SUBACROMIAL DECOMPRESSION; PARTIAL SYNOVECTOMY;  Surgeon: Benjy Holland MD;  [facility];  Service: Orthopedics    IL WRIST Alda Beau LIG Right 10/15/2019    Procedure: ENDOSCOPIC CARPAL TUNNEL RELEASE;  Surgeon: Rosmery Christie MD;  [facility];  Service: Orthopedics    IL WRIST Alda Beau LIG Left 3/3/2020    Procedure: RELEASE CARPAL TUNNEL ENDOSCOPIC;  Surgeon: Rosmery Christie MD;  [facility];  Service: Orthopedics    [address]      TESTICLE SURGERY      UMBILICAL HERNIA REPAIR  2009    over age 13     Social History     Socioeconomic History    Marital status: /Civil Union     Spouse name: Not on file    Number of children: Not on file    Years of education: Not on file    Highest education level: Not on file   Occupational History    Not on file   Social Needs    Financial resource strain: Not hard at all   Rachel-Zoe insecurity     Worry: Never true     Inability: Never true   [employer] needs     Medical: No     Non-medical: No   Tobacco Use    Smoking status: Former Smoker    Smokeless tobacco: Former User    Tobacco comment: smoked for 3 years from 19 y/o - 23 yrs old   Substance and Sexual Activity    Alcohol use: No    Drug use: No    Sexual activity: Yes     Partners: Female   Lifestyle    Physical activity     Days per week: Not on file     Minutes per session: Not on file    Stress: Not on file   Relationships    Social connections     Talks on phone: Not on file     Gets together: Not on file     Attends Yazidi service: Not on file     Active member of club or organization: Not on file     Attends meetings of clubs or organizations: Not on file     Relationship status: Not on file    Intimate partner violence     Fear of current or ex partner: Not on file     Emotionally abused: Not on file     Physically abused: Not on file     Forced sexual activity: Not on file   Other Topics Concern    Not on file   Social History Narrative Uses Safety Equipment Seatbelts     Social History     Substance and Sexual Activity   Alcohol Use No     Social History     Substance and Sexual Activity   Drug Use No     Social History     Tobacco Use   Smoking Status Former Smoker   Smokeless Tobacco Former User   Tobacco Comment    smoked for 3 years from 19 y/o - 23 yrs old       Past Family History  Family History   Problem Relation Age of Onset    Heart disease Mother     Hypertension Mother     Nephrolithiasis Father     Other Father         Renal disease    Hypertension Sister     Nephrolithiasis Brother     Other Brother         Renal disease    Hematuria Family         Microscopic       Current Medications  Current Outpatient Medications   Medication Sig Dispense Refill    Alcohol Swabs (Alcohol Pads) 70 % PADS USE TWICE DAILY 100 each 3    ARIPiprazole (ABILIFY) 5 mg tablet Take 5 mg by mouth daily at bedtime      aspirin (ECOTRIN LOW STRENGTH) 81 mg EC tablet TAKE 1 TABLET (81 MG TOTAL) BY MOUTH DAILY 90 tablet 0    atorvastatin (LIPITOR) 80 mg tablet Take 1 tablet (80 mg total) by mouth daily 90 tablet 1    calcium carbonate (TUMS) 500 mg chewable tablet CHEW 1 TABLET (1,250 MG TOTAL) 3 (THREE) TIMES A DAY AS NEEDED FOR HEARTBURN 90 tablet 3    cholecalciferol (VITAMIN D3) 1,000 units tablet Take 1 tablet (1,000 Units total) by mouth daily 90 tablet 1    clorazepate (TRANXENE) 7 5 mg tablet Take 7 5 mg by mouth 2 (two) times a day   0    clotrimazole-betamethasone (LOTRISONE) 1-0 05 % lotion Apply topically 2 (two) times a day 30 mL 0    Diclofenac Sodium (VOLTAREN) 1 % APPLY 2 GRAMS TOPICALLY 4 (FOUR) TIMES A  g 2    dicyclomine (BENTYL) 10 mg capsule Take 1 capsule (10 mg total) by mouth 3 (three) times a day before meals 90 capsule 1    DULoxetine (CYMBALTA) 60 mg delayed release capsule Take 60 mg by mouth every morning      Easy Comfort Lancets MISC TEST BLOOD SUGAR TWICE DAILY, OR AS NEEDED WHEN SYMPTOMATIC OF HYPOGLYCEMIA OR HYPERGLYCEMIA 100 each 5    Easy Plus II Glucose Test test strip TEST AS DIRECTED TWICE DAILY 100 each 3    Elastic Bandages & Supports (MEDICAL COMPRESSION STOCKINGS) MISC by Does not apply route daily Please provide B/L compression stockings 2 each 0    fluticasone (FLONASE) 50 mcg/act nasal spray 1 SPRAY INTO EACH NOSTRIL DAILY 16 g 2    furosemide (LASIX) 20 mg tablet Take 1 tablet (20 mg total) by mouth daily Patient takes as needed 30 tablet 8    glucose blood (FREESTYLE TEST STRIPS) test strip Use as instructed to check BS BID PRN (hypoglycemia/hyperglycemia symptoms) 180 each 2    glucose blood (GLUCOSE METER TEST) test strip 1 each by Other route 2 (two) times a day Use as instructed 100 each 5    glucose monitoring kit (FREESTYLE) monitoring kit 1 each by Does not apply route 2 (two) times a day Check BS BID PRN for hypoglycemia/hyperglycemia 1 each 0    hydrocortisone 2 5 % cream APPLY TOPICALLY 2 (TWO) TIMES A DAY 28 g 0    Jardiance 25 MG TABS TAKE 1 TABLET (25 MG TOTAL) BY MOUTH EVERY MORNING 30 tablet 5    Lancets (FREESTYLE) lancets Use as instructed to check BS BID PRN (hypoglycemia/hyperglycemia symptoms) 180 each 2    lisinopril (ZESTRIL) 20 mg tablet Take 1 tablet (20 mg total) by mouth daily 90 tablet 3    metFORMIN (GLUCOPHAGE) 500 mg tablet TAKE 1 TABLET (500 MG TOTAL) BY MOUTH 2 (TWO) TIMES A DAY WITH MEALS 180 tablet 2    metoprolol tartrate (LOPRESSOR) 25 mg tablet TAKE 1 TABLET (25 MG TOTAL) BY MOUTH EVERY 12 (TWELVE) HOURS AS DIRECTED 180 tablet 2    mirtazapine (REMERON) 15 mg tablet TAKE 1 TABLET (15 MG TOTAL) BY MOUTH DAILY AT BEDTIME 90 tablet 0    NIFEdipine ER (ADALAT CC) 30 MG 24 hr tablet TAKE 1 TABLET (30 MG TOTAL) BY MOUTH 2 (TWO) TIMES A DAY 60 tablet 11    ONETOUCH DELICA LANCETS FINE MISC Test blood sugar twice daily, or as needed when symptomatic of hypoglycemia or hyperglycemia 100 each 5    pantoprazole (PROTONIX) 40 mg tablet Take 40 mg by mouth daily      rosuvastatin (CRESTOR) 40 MG tablet Take 40 mg by mouth daily      simethicone (MYLICON,GAS-X) 126 MG capsule Take 1 capsule (180 mg total) by mouth every 12 (twelve) hours as needed for flatulence 90 capsule 0    tobramycin-dexamethasone (TOBRADEX) ophthalmic suspension ADMINISTER 1 DROP TO BOTH EYES 2 (TWO) TIMES A DAY 5 mL 0     No current facility-administered medications for this visit  Allergies  Allergies   Allergen Reactions    Epinephrine Hives and Anxiety    Penicillins Hives and Anxiety         The following portions of the patient's history were reviewed and updated as appropriate: allergies, current medications, past medical history, past social history, past surgical history and problem list       Vitals  Vitals:    01/25/21 1007   BP: 122/74   BP Location: Left arm   Patient Position: Sitting   Cuff Size: Large   Pulse: (!) 54   Temp: (!) 96 °F (35 6 °C)   TempSrc: Tympanic   Weight: 68 9 kg (152 lb)   Height: 5' 7" (1 702 m)         Physical Exam  Constitutional   General appearance: Patient is seated and in no acute distress, well appearing and well nourished  Head and Face   Head and face: Normal     Eyes   Conjunctiva and lids: No erythema, swelling or discharge  Anicteric  Ears, Nose, Mouth, and Throat   Hearing: Normal     Neck: Supple, trachea midline  Pulmonary   Respiratory effort: No increased work of breathing or signs of respiratory distress  Cardiovascular   Examination of extremities for edema and/or varicosities: Normal     Abdomen   Abdomen: Soft, non-tender, no masses, no organomegaly  Musculoskeletal   Gait and station: Normal     Skin   Skin and subcutaneous tissue: Warm, dry, and intact  No visible jaundice, lesions or rashes  Psychiatric   Judgment and insight: Normal  Recent and remote memory:  Normal  Mood and affect: Normal      Results  No visits with results within 1 Day(s) from this visit     Latest known visit with results is:   Lab on 01/18/2021   Component Date Value    H pylori Ag, Stl 01/18/2021 Negative        Radiology Results  No results found  Orders  No orders of the defined types were placed in this encounter

## 2021-01-27 DIAGNOSIS — E11.9 TYPE 2 DIABETES MELLITUS WITHOUT COMPLICATION, WITHOUT LONG-TERM CURRENT USE OF INSULIN (HCC): ICD-10-CM

## 2021-01-28 DIAGNOSIS — E11.9 TYPE 2 DIABETES MELLITUS WITHOUT COMPLICATION, WITHOUT LONG-TERM CURRENT USE OF INSULIN (HCC): ICD-10-CM

## 2021-01-28 RX ORDER — GLIMEPIRIDE 4 MG/1
TABLET ORAL
Qty: 180 TABLET | Refills: 2 | OUTPATIENT
Start: 2021-01-28

## 2021-01-29 ENCOUNTER — OFFICE VISIT (OUTPATIENT)
Dept: SLEEP CENTER | Facility: CLINIC | Age: 76
End: 2021-01-29
Payer: MEDICARE

## 2021-01-29 VITALS
HEIGHT: 67 IN | HEART RATE: 52 BPM | WEIGHT: 154 LBS | SYSTOLIC BLOOD PRESSURE: 112 MMHG | BODY MASS INDEX: 24.17 KG/M2 | DIASTOLIC BLOOD PRESSURE: 60 MMHG

## 2021-01-29 DIAGNOSIS — Z86.19 H/O TINEA CRURIS: Chronic | ICD-10-CM

## 2021-01-29 DIAGNOSIS — G47.33 OSA (OBSTRUCTIVE SLEEP APNEA): Primary | ICD-10-CM

## 2021-01-29 PROCEDURE — 99213 OFFICE O/P EST LOW 20 MIN: CPT | Performed by: PSYCHIATRY & NEUROLOGY

## 2021-01-29 NOTE — PROGRESS NOTES
Sleep Medicine Follow-Up Note    HPI: 79yo M with LUIS MIGUEL and PLMD being seen for a follow up  Treatment Summary: split study 2020: apnea/hypopnea index was 15 8 events per hour of sleep   The AHI in the   supine position was 15 3   The AHI during REM sleep was 56  7  oxygen speedy 76%  PLMI 83  BiPAP 16/12cm recommended as 12cm EPAP was minimum to resolve hypoxemia in REM supine  HPI:   Today, patient presents unaccompanied  He stated that he uses his BiPAP nightly  He stated that this is helping him feel better during the day  He denied sleepiness and tiredness  He has stopped snoring as well  He stated that he has an oxygen concentrator and that he has trouble connecting it to his BiPAP  He was not ordered oxygen with his BiPAP  He was supposed to do an ALIZA on BiPAP to check if his oxygen was adequate        Treatment: BiPAP  -Pressure: 16/12cm   -Pressure intolerance: no   -Aerophagia: no   -Air Hunger: no  -Interface: FFM  -Fit: good  -Chin strap: no  -HCC: YME    Compliance Card Data:    - Date Range: 20-20 (set up on 12/10/20)   - Settin/12cm   - Residual AHI: 4   - %  Night in Large Leak: 2 min   - Average usage days used: 10 mins   - % Days used: 10%   - % Days with Usage > 4 hrs: 0%    Respiratory:  -Ongoing Snoring: no  -Mouth Breathing: sometimes  -Dry Mouth: sometimes  -Nocturnal Gasping: no    ROS:   Genitourinary none   Cardiology palpitations/fluttering feeling in the chest   Gastrointestinal frequent heartburn/acid reflux   Neurology none   Constitutional none   Integumentary none   Psychiatry none   Musculoskeletal joint pain and sciatica   Pulmonary none   ENT none   Endocrine none   Hematological none       Sleep Pattern:  -Position: back  -Bedtime: 10-11pm  -Lights Out: same time  -Environment: noLights/TV  -Latency: 15 mins  -Awakenings: 1  -Wake Time: 6am  -Rise Time: same time  -Patient's estimate of Sleep Time: 8h      Daytime Symptoms:  -Upon Awakening: not tired  -Daytime fatigue/sleepiness: no  -Naps: no  -Involuntary Dozing: no  -Cognitive Symptoms: no  -Driving: Difficulty with sleepiness and driving:  no   -- Close calls related to sleepiness: no   -- Accidents related to sleepiness: no    Substance Use:  -Caffeine: 1 cup of coffee in the morning  -Alcohol: no  -THC: no    --> took an antibiotic for a GI problem since last visit  --> Supplemental Oxygen Use: denies    Questionnaire:  Sitting and reading: Would never doze  Watching TV: Would never doze  Sitting, inactive in a public place (e g  a theatre or a meeting): Would never doze  As a passenger in a car for an hour without a break: Would never doze  Lying down to rest in the afternoon when circumstances permit: Would never doze  Sitting and talking to someone: Would never doze  Sitting quietly after a lunch without alcohol: Would never doze  In a car, while stopped for a few minutes in traffic: Would never doze  Total score: 0      PE:    /60   Pulse (!) 52   Ht 5' 7" (1 702 m)   Wt 69 9 kg (154 lb)   BMI 24 12 kg/m²     General:  In NAD  Pul: Respirations: unlaboured  MS: No atrophy  Neuro: No resting tremor  Gait normal turning & station; unremarkable overall  Psych: Socially appropriate  Pleasant  No overt dysphoria  Assessment: the patient stated that he has been compliant with his BiPAP, but it doesn't seem that way on the download  He endorsed no longer snoring on it and feel less tired  He also has not yet done his ALIZA on BiPAP  No oxygen was ordered, but he still stated that he uses it  Recommendations:    1) BiPAP at 16/12cm with FFM  2) Safe driving reviewed  3) Follow-up 2 months  4) Call with any questions or concerns  All questions answered for the patient, who indicated understanding and agreed with the plan       Johann Romero MD  Psychiatry/ Sleep medicine

## 2021-01-29 NOTE — PATIENT INSTRUCTIONS
Recommendations:    1) BiPAP at 16/12cm with FFM  2) Safe driving reviewed  3) Follow-up 2 months  4) Call with any questions or concerns

## 2021-02-02 RX ORDER — CLOTRIMAZOLE AND BETAMETHASONE DIPROPIONATE 10; .5 MG/ML; MG/ML
LOTION TOPICAL 2 TIMES DAILY
Qty: 30 ML | Refills: 0 | OUTPATIENT
Start: 2021-02-02

## 2021-02-02 RX ORDER — BLOOD-GLUCOSE METER
EACH MISCELLANEOUS
Qty: 100 EACH | Refills: 5 | Status: SHIPPED | OUTPATIENT
Start: 2021-02-02 | End: 2021-08-04 | Stop reason: SDUPTHER

## 2021-02-03 DIAGNOSIS — E11.9 TYPE 2 DIABETES MELLITUS WITHOUT COMPLICATION, WITHOUT LONG-TERM CURRENT USE OF INSULIN (HCC): ICD-10-CM

## 2021-02-03 RX ORDER — BLOOD SUGAR DIAGNOSTIC
STRIP MISCELLANEOUS
Qty: 100 EACH | Refills: 3 | Status: SHIPPED | OUTPATIENT
Start: 2021-02-03 | End: 2021-08-31

## 2021-02-05 DIAGNOSIS — J30.9 ALLERGIC RHINITIS, UNSPECIFIED SEASONALITY, UNSPECIFIED TRIGGER: ICD-10-CM

## 2021-02-09 RX ORDER — FLUTICASONE PROPIONATE 50 MCG
SPRAY, SUSPENSION (ML) NASAL
Qty: 16 G | Refills: 2 | Status: SHIPPED | OUTPATIENT
Start: 2021-02-09 | End: 2021-04-27

## 2021-02-11 ENCOUNTER — DOCUMENTATION (OUTPATIENT)
Dept: SLEEP CENTER | Facility: CLINIC | Age: 76
End: 2021-02-11

## 2021-02-11 DIAGNOSIS — G47.33 OSA (OBSTRUCTIVE SLEEP APNEA): Primary | ICD-10-CM

## 2021-02-11 NOTE — PROGRESS NOTES
Overnight oximetry report: on room air and BiPAP done 21  Time spent below 90%: 50 5min  Time spent below 89%: 36 min  Oxygen speedy: 67%  EZEQUIEL 31 66    Will obtain compliance and make adjustments if needed         Compliance Card Data:    Date Range: 21-2/10/21   Settin/12cm   Residual AHI: 20 7   Vibratory Snore Index: n/a   %  Night in Large Leak: 15 mins   Average usage days used: 6h 11m   % Days used: 43%   % Days with USage > 4 hrs: 40%      Will call and find out how he is feeling  He seems as though he needs a higher pressure according to the AHI and to tighten his mask or change out the cushion  Also needs to be more compliant

## 2021-02-12 ENCOUNTER — TELEPHONE (OUTPATIENT)
Dept: SLEEP CENTER | Facility: CLINIC | Age: 76
End: 2021-02-12

## 2021-02-12 NOTE — TELEPHONE ENCOUNTER
Jess Esquivel MD  [facility] [place] [facility]             Please give him a call and make sure he can use his BiPAP nightly because without his his oxygen is low  I have increased his pressure to help with AHI being 20  He should also address his leak with mask tightening and making sure he changes the cushion regularly  Spoke to patient and made him aware of pressure change and above instructions from Dr Dougie Davidson  Verbalized understanding  Patient states he feels his mask may be the wrong size as it is very tight on the bridge of his nose  It is so tight that it is irritating his skin  He is wondering if there is a different sized or different type of mask he can use  Scheduled a mask fitting for Monday 2/15/21 in [place]

## 2021-02-18 ENCOUNTER — TELEMEDICINE (OUTPATIENT)
Dept: FAMILY MEDICINE CLINIC | Facility: CLINIC | Age: 76
End: 2021-02-18

## 2021-02-18 DIAGNOSIS — Z02.9 ADMINISTRATIVE ENCOUNTER: Primary | ICD-10-CM

## 2021-02-18 PROCEDURE — 99213 OFFICE O/P EST LOW 20 MIN: CPT | Performed by: PHYSICIAN ASSISTANT

## 2021-02-18 NOTE — PROGRESS NOTES
Virtual Regular Visit      Assessment/Plan:    Problem List Items Addressed This Visit        Other    Administrative encounter - Primary     Letter completed as requested listing current conditions so he may receive the COVID vaccine  Pt advised if he is unable to receive the vaccine at the [facility] he should register on [facility] for the COVID vaccine                    Reason for visit is   Chief Complaint   Patient presents with    Virtual Regular Visit        Encounter provider Fiorella Thornton PA-C    Provider located at [address]   [phone]      Recent Visits  No visits were found meeting these conditions  Showing recent visits within past 7 days and meeting all other requirements     Today's Visits  Date Type Provider Dept   02/18/21 Telemedicine Fiorella Thornton PA-C  Ramses Mcmullen   Showing today's visits and meeting all other requirements     Future Appointments  No visits were found meeting these conditions  Showing future appointments within next 150 days and meeting all other requirements        The patient was identified by name and date of birth  Han Interiano was informed that this is a telemedicine visit and that the visit is being conducted through telephone  My office door was closed  No one else was in the room  He acknowledged consent and understanding of privacy and security of the video platform  The patient has agreed to participate and understands they can discontinue the visit at any time  Patient is aware this is a billable service  It was my intent to perform this visit via video technology but the patient was not able to do a video connection so the visit was completed via audio telephone only  Subjective  Han Interiano is a 70 y o  male presents via telephone requesting a letter for the COVID vaccine     He was told by a friend that the COVID vaccine is being offered at [address] for persons with chronic conditions  He must present a letter listing his chronic conditions in order to  Received the COVID vaccine            Past Medical History:   Diagnosis Date    AS (aortic stenosis)     Balanitis     Biceps tendonitis on right     CAD (coronary artery disease)     Cancer (HCC)     skin    Colon polyp     Complete rotator cuff tear or rupture of right shoulder, not specified as traumatic     CPAP (continuous positive airway pressure) dependence     Diabetes mellitus (HCC)     Esophageal reflux     High cholesterol     Hypertension     Hypertriglyceridemia     Hypogonadism in male     Myalgia     [facility]ocardial infarction ([facility] [address] )     Palpitations     Shoulder pain     Sinus problem     Skin cancer of nose     Sleep apnea     Stroke ([facility] [address] ) 6024    Systolic murmur     recently found    Tinea cruris     Tinea pedis        Past Surgical History:   Procedure Laterality Date    CARPAL TUNNEL RELEASE      CATARACT EXTRACTION Bilateral     COLONOSCOPY  2019    CORONARY ANGIOPLASTY WITH STENT PLACEMENT      10-15-19 - pt denies stent implant    CORONARY ARTERY BYPASS GRAFT N/A 12/12/2016    Procedure: INTRAOP CECILLE; BILATERAL LEG EVH; CORONARY ARTERY BYPASS GRAFT X 4 SVG TO PDA, OM1,  AND DIAGONAL1, LIMA TO LAD;  Surgeon: Abel Aceves MD;  [facility];  Service:     EYE SURGERY      HERNIA REPAIR      VA ARTHROSCOPY SHOULDER SURGICAL BICEPS TENODESIS Right 5/6/2016    Procedure: ARTHROSCOPIC BICEPS TENODESIS;  Surgeon: Ezequiel Baptiste MD;  [facility];  Service: Orthopedics    VA INCISE FINGER TENDON SHEATH Right 10/15/2019    Procedure: TRIGGER FINGER RELEASE INDEX, LONG, RING, SMALL, THUMB FINGERS;  Surgeon: Han Avila MD;  [facility];  Service: Orthopedics    VA INCISE FINGER TENDON SHEATH Left 3/3/2020    Procedure: RELEASE TRIGGER FINGER INDEX;  Surgeon: Han Avila MD;  [facility]; Service: Orthopedics    TX SHLDR ARTHROSCOP,SURG,W/ROTAT CUFF REPR Right 5/6/2016    Procedure: REPAIR ROTATOR CUFF  ARTHROSCOPIC; SUBACROMIAL DECOMPRESSION; PARTIAL SYNOVECTOMY;  Surgeon: Gerard Herrera MD;  [facility];  Service: Orthopedics    TX WRIST [employer] LIG Right 10/15/2019    Procedure: ENDOSCOPIC CARPAL TUNNEL RELEASE;  Surgeon: Don Gaviria MD;  [facility];  Service: Orthopedics    TX WRIST [employer] LIG Left 3/3/2020    Procedure: RELEASE CARPAL TUNNEL ENDOSCOPIC;  Surgeon: Don Gaviria MD;  [facility];  Service: Orthopedics    [address] [place] [address] UMBILICAL HERNIA REPAIR  2009    over age 13       Current Outpatient Medications   Medication Sig Dispense Refill    Alcohol Swabs (Alcohol Pads) 70 % PADS USE TWICE DAILY 100 each 3    ARIPiprazole (ABILIFY) 5 mg tablet Take 5 mg by mouth daily at bedtime      aspirin (ECOTRIN LOW STRENGTH) 81 mg EC tablet TAKE 1 TABLET (81 MG TOTAL) BY MOUTH DAILY 90 tablet 0    atorvastatin (LIPITOR) 80 mg tablet Take 1 tablet (80 mg total) by mouth daily 90 tablet 1    calcium carbonate (TUMS) 500 mg chewable tablet CHEW 1 TABLET (1,250 MG TOTAL) 3 (THREE) TIMES A DAY AS NEEDED FOR HEARTBURN 90 tablet 3    cholecalciferol (VITAMIN D3) 1,000 units tablet Take 1 tablet (1,000 Units total) by mouth daily 90 tablet 1    clorazepate (TRANXENE) 7 5 mg tablet Take 7 5 mg by mouth 2 (two) times a day   0    clotrimazole-betamethasone (LOTRISONE) 1-0 05 % lotion Apply topically 2 (two) times a day 30 mL 0    Diclofenac Sodium (VOLTAREN) 1 % APPLY 2 GRAMS TOPICALLY 4 (FOUR) TIMES A  g 2    dicyclomine (BENTYL) 10 mg capsule Take 1 capsule (10 mg total) by mouth 3 (three) times a day before meals 90 capsule 1    DULoxetine (CYMBALTA) 60 mg delayed release capsule Take 60 mg by mouth every morning      Easy Comfort Lancets MISC TEST BLOOD SUGAR TWICE DAILY, OR AS NEEDED WHEN SYMPTOMATIC OF HYPOGLYCEMIA OR HYPERGLYCEMIA 100 each 5    Easy Plus II Glucose Test test strip TEST AS DIRECTED TWICE DAILY 100 each 3    Elastic Bandages & Supports (MEDICAL COMPRESSION STOCKINGS) MISC by Does not apply route daily Please provide B/L compression stockings 2 each 0    fluticasone (FLONASE) 50 mcg/act nasal spray INHALE 1 SPRAY INTO EACH NOSTRIL DAILY 16 g 2    furosemide (LASIX) 20 mg tablet Take 1 tablet (20 mg total) by mouth daily Patient takes as needed 30 tablet 8    glucose blood (FREESTYLE TEST STRIPS) test strip Use as instructed to check BS BID PRN (hypoglycemia/hyperglycemia symptoms) 180 each 2    glucose blood (GLUCOSE METER TEST) test strip 1 each by Other route 2 (two) times a day Use as instructed 100 each 5    glucose monitoring kit (FREESTYLE) monitoring kit 1 each by Does not apply route 2 (two) times a day Check BS BID PRN for hypoglycemia/hyperglycemia 1 each 0    hydrocortisone 2 5 % cream APPLY TOPICALLY 2 (TWO) TIMES A DAY 28 g 0    Jardiance 25 MG TABS TAKE 1 TABLET (25 MG TOTAL) BY MOUTH EVERY MORNING 30 tablet 5    Lancets (FREESTYLE) lancets Use as instructed to check BS BID PRN (hypoglycemia/hyperglycemia symptoms) 180 each 2    lisinopril (ZESTRIL) 20 mg tablet Take 1 tablet (20 mg total) by mouth daily 90 tablet 3    metFORMIN (GLUCOPHAGE) 500 mg tablet TAKE 1 TABLET (500 MG TOTAL) BY MOUTH 2 (TWO) TIMES A DAY WITH MEALS 180 tablet 2    metoprolol tartrate (LOPRESSOR) 25 mg tablet TAKE 1 TABLET (25 MG TOTAL) BY MOUTH EVERY 12 (TWELVE) HOURS AS DIRECTED 180 tablet 2    mirtazapine (REMERON) 15 mg tablet TAKE 1 TABLET (15 MG TOTAL) BY MOUTH DAILY AT BEDTIME 90 tablet 0    NIFEdipine ER (ADALAT CC) 30 MG 24 hr tablet TAKE 1 TABLET (30 MG TOTAL) BY MOUTH 2 (TWO) TIMES A DAY 60 tablet 11    ONETOUCH DELICA LANCETS FINE MISC Test blood sugar twice daily, or as needed when symptomatic of hypoglycemia or hyperglycemia 100 each 5    pantoprazole (PROTONIX) 40 mg tablet Take 1 tablet (40 mg total) by mouth daily 30 tablet 1    rosuvastatin (CRESTOR) 40 MG tablet Take 40 mg by mouth daily      simethicone (MYLICON,GAS-X) 708 MG capsule Take 1 capsule (180 mg total) by mouth every 12 (twelve) hours as needed for flatulence 90 capsule 0    tobramycin-dexamethasone (TOBRADEX) ophthalmic suspension ADMINISTER 1 DROP TO BOTH EYES 2 (TWO) TIMES A DAY 5 mL 0     No current facility-administered medications for this visit  Allergies   Allergen Reactions    Epinephrine Hives and Anxiety    Penicillins Hives and Anxiety       Review of Systems   Constitutional: Negative for chills, fatigue and fever  Respiratory: Negative for cough, chest tightness, shortness of breath and wheezing  Cardiovascular: Negative for chest pain and palpitations  Neurological: Negative for dizziness, light-headedness, numbness and headaches  Video Exam    There were no vitals filed for this visit  Physical Exam     I spent 7 minutes directly with the patient during this visit      VIRTUAL VISIT DISCLAIMER    Tay Ashley acknowledges that he has consented to an online visit or consultation  He understands that the online visit is based solely on information provided by him, and that, in the absence of a face-to-face physical evaluation by the physician, the diagnosis he receives is both limited and provisional in terms of accuracy and completeness  This is not intended to replace a full medical face-to-face evaluation by the physician  Tay Ashley understands and accepts these terms

## 2021-02-18 NOTE — ASSESSMENT & PLAN NOTE
Letter completed as requested listing current conditions so he may receive the COVID vaccine  Pt advised if he is unable to receive the vaccine at the [facility] he should register on JANAE S[facility] for the COVID vaccine

## 2021-02-18 NOTE — LETTER
February 18, 2021     Patient: Lux Anderson   YOB: 1945   Date of Visit: 2/18/2021   Allergies: Penicillins and Epinephrine      To Whom it May Concern:    Lux Anderson is under my professional care  He has a diagnosis of diabetes mellitus, hypertension, hyperlipemia, coronary artery disease, aortic stenosis, GERD, history stroke and MI  I highly recommend he receive the COVID vaccine  If you have any questions or concerns, please don't hesitate to call           Sincerely,          Marielos Levy PA-C        CC: No Recipients

## 2021-02-21 RX ORDER — NYSTATIN 100000 U/G
CREAM TOPICAL 3 TIMES DAILY
COMMUNITY
Start: 2021-02-17 | End: 2022-06-22

## 2021-02-21 RX ORDER — GLIMEPIRIDE 4 MG/1
TABLET ORAL
COMMUNITY
Start: 2021-01-28 | End: 2021-03-23 | Stop reason: ALTCHOICE

## 2021-02-21 NOTE — PROGRESS NOTES
[address] [place] [id number]  [phone]  Clinical Pharmacy Consultation    Encounter provider: Umm Doty    Patient agrees to participate in a virtual check in via telephone or video visit instead of presenting to the office to address urgent/immediate medical needs  After connecting through telephone, the patient was identified by name and date of birth  Isabellerobert Nieves was informed that this was a telemedicine visit and that the exam was being conducted confidentially over secure lines  My office door was closed  No one else was in the room  Elis Nieves acknowledged consent and understanding of privacy and security of the telemedicine visit  The patient agreed to participate  This patient was referred by [facility]  pharmacy consultation  Thank you for the referral      Assessment/Plan  1  Type 2 diabetes mellitus without complication, without long-term current use of insulin (HCC)  Assessment & Plan:    Lab Results   Component Value Date    HGBA1C 6 5 12/23/2020   Controlled based on recent home glucose readings (fasting ranging 113- 120 and afternoon ranging 145-160)  A1c at goal of <7  Continue current regimen metformin 500 mg BID, Jardiance 25 mg daily  Next A1c due 3/23/2021        Patient has hx of MI  He is already on appropriate high intensity statin, and tolerating medication well without SE  Continue atorvastatin 80 mg daily       Medication list was updated and discussed with patient       Discontinued medications   · Crestor (using Lipitor instead)      Clinically significant drug interactions identified: none   Side effects from medication(s) reported: none     Counseling was provided to the patient on the following: educated pt on indication of Simethicone; and compliance with medications       The patient communicates understanding of the treatment plan    Items to be addressed at future visit with the pharmacist: medication reconciliation, medication education and polypharmacy consult  Next Medication Management Appointment with the clinical pharmacist: May 24th, 9:15 am and as needed per patient request      M*Modal software was used to dictate this note  It may contain errors with dictating incorrect words or incorrect spelling  Please contact the provider directly with any questions  Thank you for your understanding      Subjective  Asad Mccloud is a 70 y o  male  Focus of today's visit with the clinical pharmacist: medication reconciliation, medication education, polypharmacy consult  Denies hypoglycemic episodes    SMBG readings within the last 3 days:  Before breakfast: 113, 117, 120   Bedtime: 145, 150, 160     Social History     Tobacco Use   Smoking Status Former Smoker   Smokeless Tobacco Former User   Tobacco Comment    smoked for 3 years from 17 y/o - 23 yrs old        Allergies   Allergen Reactions    Epinephrine Hives and Anxiety    Penicillins Hives and Anxiety          Current Outpatient Medications:     ARIPiprazole (ABILIFY) 5 mg tablet, Take 5 mg by mouth daily at bedtime, Disp: , Rfl:     aspirin (ECOTRIN LOW STRENGTH) 81 mg EC tablet, TAKE 1 TABLET (81 MG TOTAL) BY MOUTH DAILY, Disp: 90 tablet, Rfl: 0    atorvastatin (LIPITOR) 80 mg tablet, Take 1 tablet (80 mg total) by mouth daily, Disp: 90 tablet, Rfl: 1    fluticasone (FLONASE) 50 mcg/act nasal spray, INHALE 1 SPRAY INTO EACH NOSTRIL DAILY, Disp: 16 g, Rfl: 2    mirtazapine (REMERON) 15 mg tablet, TAKE 1 TABLET (15 MG TOTAL) BY MOUTH DAILY AT BEDTIME, Disp: 90 tablet, Rfl: 0    nystatin (MYCOSTATIN) cream, Apply topically Three times a day, Disp: , Rfl:     Alcohol Swabs (Alcohol Pads) 70 % PADS, USE TWICE DAILY, Disp: 100 each, Rfl: 3    calcium carbonate (TUMS) 500 mg chewable tablet, CHEW 1 TABLET (1,250 MG TOTAL) 3 (THREE) TIMES A DAY AS NEEDED FOR HEARTBURN, Disp: 90 tablet, Rfl: 3    cholecalciferol (VITAMIN D3) 1,000 units tablet, Take 1 tablet (1,000 Units total) by mouth daily, Disp: 90 tablet, Rfl: 1    clorazepate (TRANXENE) 7 5 mg tablet, Take 7 5 mg by mouth 2 (two) times a day , Disp: , Rfl: 0    clotrimazole-betamethasone (LOTRISONE) 1-0 05 % lotion, APPLY TOPICALLY 2 (TWO) TIMES A DAY, Disp: 30 mL, Rfl: 0    Diclofenac Sodium (VOLTAREN) 1 %, APPLY 2 GRAMS TOPICALLY 4 (FOUR) TIMES A DAY, Disp: 100 g, Rfl: 2    dicyclomine (BENTYL) 10 mg capsule, Take 1 capsule (10 mg total) by mouth 3 (three) times a day before meals, Disp: 90 capsule, Rfl: 1    DULoxetine (CYMBALTA) 60 mg delayed release capsule, Take 60 mg by mouth every morning, Disp: , Rfl:     Easy Comfort Lancets MISC, TEST BLOOD SUGAR TWICE DAILY, OR AS NEEDED WHEN SYMPTOMATIC OF HYPOGLYCEMIA OR HYPERGLYCEMIA, Disp: 100 each, Rfl: 5    Easy Plus II Glucose Test test strip, TEST AS DIRECTED TWICE DAILY, Disp: 100 each, Rfl: 3    Elastic Bandages & Supports (MEDICAL COMPRESSION STOCKINGS) MISC, by Does not apply route daily Please provide B/L compression stockings, Disp: 2 each, Rfl: 0    furosemide (LASIX) 20 mg tablet, Take 1 tablet (20 mg total) by mouth daily Patient takes as needed, Disp: 30 tablet, Rfl: 8    glimepiride (AMARYL) 4 mg tablet, TAKE 1/2 TABLET BY MOUTH IN THE MORNING AND 1 TABLET AT NIGHT, Disp: , Rfl:     glucose blood (FREESTYLE TEST STRIPS) test strip, Use as instructed to check BS BID PRN (hypoglycemia/hyperglycemia symptoms), Disp: 180 each, Rfl: 2    glucose blood (GLUCOSE METER TEST) test strip, 1 each by Other route 2 (two) times a day Use as instructed, Disp: 100 each, Rfl: 5    glucose monitoring kit (FREESTYLE) monitoring kit, 1 each by Does not apply route 2 (two) times a day Check BS BID PRN for hypoglycemia/hyperglycemia, Disp: 1 each, Rfl: 0    hydrocortisone 2 5 % cream, APPLY TOPICALLY 2 (TWO) TIMES A DAY, Disp: 28 g, Rfl: 0    Jardiance 25 MG TABS, TAKE 1 TABLET (25 MG TOTAL) BY MOUTH EVERY MORNING, Disp: 30 tablet, Rfl: 5    Lancets (FREESTYLE) lancets, Use as instructed to check BS BID PRN (hypoglycemia/hyperglycemia symptoms), Disp: 180 each, Rfl: 2    lisinopril (ZESTRIL) 20 mg tablet, Take 1 tablet (20 mg total) by mouth daily, Disp: 90 tablet, Rfl: 3    metFORMIN (GLUCOPHAGE) 500 mg tablet, TAKE 1 TABLET (500 MG TOTAL) BY MOUTH 2 (TWO) TIMES A DAY WITH MEALS, Disp: 180 tablet, Rfl: 2    metoprolol tartrate (LOPRESSOR) 25 mg tablet, TAKE 1 TABLET (25 MG TOTAL) BY MOUTH EVERY 12 (TWELVE) HOURS AS DIRECTED, Disp: 180 tablet, Rfl: 2    NIFEdipine ER (ADALAT CC) 30 MG 24 hr tablet, TAKE 1 TABLET (30 MG TOTAL) BY MOUTH 2 (TWO) TIMES A DAY, Disp: 60 tablet, Rfl: 11    ONETOUCH DELICA LANCETS FINE MISC, Test blood sugar twice daily, or as needed when symptomatic of hypoglycemia or hyperglycemia, Disp: 100 each, Rfl: 5    pantoprazole (PROTONIX) 40 mg tablet, Take 1 tablet (40 mg total) by mouth daily, Disp: 30 tablet, Rfl: 1    simethicone (MYLICON,GAS-X) 814 MG capsule, Take 1 capsule (180 mg total) by mouth every 12 (twelve) hours as needed for flatulence, Disp: 90 capsule, Rfl: 0    tobramycin-dexamethasone (TOBRADEX) ophthalmic suspension, ADMINISTER 1 DROP TO BOTH EYES 2 (TWO) TIMES A DAY, Disp: 5 mL, Rfl: 0    Objective  Vitals:    Wt Readings from Last 3 Encounters:   01/29/21 69 9 kg (154 lb)   01/25/21 68 9 kg (152 lb)   01/18/21 68 kg (150 lb)     Temp Readings from Last 3 Encounters:   01/25/21 (!) 96 °F (35 6 °C) (Tympanic)   01/05/21 98 1 °F (36 7 °C) (Oral)   12/23/20 (!) 97 2 °F (36 2 °C) (Tympanic)     BP Readings from Last 3 Encounters:   01/29/21 112/60   01/25/21 122/74   01/18/21 110/80     Pulse Readings from Last 3 Encounters:   01/29/21 (!) 52   01/25/21 (!) 54   01/18/21 56       Labs:  Lab Results   Component Value Date    HGBA1C 6 5 12/23/2020     Lab Results   Component Value Date    LDLCALC 84 10/21/2020     Lab Results   Component Value Date    CHOLESTEROL 177 10/21/2020     Lab Results   Component Value Date    HDL 69 10/21/2020     Lab Results   Component Value Date    TRIG 120 10/21/2020     Lab Results   Component Value Date    [place] 108 10/21/2020       Umm Pinon    Demographics  Interaction Method: Phone  Type of Intervention: Follow-Up    Topic(s) Addressed  Medication Management  Polypharmacy  Statin Use  Dyslipidemia    Intervention(s) Made    Pharmacologic:      Prevent or Manage Adverse Drug Reaction    Drug Interaction    Non-Pharmacologic:  Adherence Addressed    Tool(s) Used  Not Applicable    Time Spent:   Time Spent in Direct Patient Care: 45 minutes    Recommendation(s) Accepted by the Patient/Caregiver:  All Accepted

## 2021-02-22 ENCOUNTER — TELEMEDICINE (OUTPATIENT)
Dept: FAMILY MEDICINE CLINIC | Facility: CLINIC | Age: 76
End: 2021-02-22

## 2021-02-22 DIAGNOSIS — E11.9 TYPE 2 DIABETES MELLITUS WITHOUT COMPLICATION, WITHOUT LONG-TERM CURRENT USE OF INSULIN (HCC): Primary | ICD-10-CM

## 2021-02-23 DIAGNOSIS — A04.8 H. PYLORI INFECTION: ICD-10-CM

## 2021-02-23 RX ORDER — DICYCLOMINE HYDROCHLORIDE 10 MG/1
10 CAPSULE ORAL
Qty: 90 CAPSULE | Refills: 1 | OUTPATIENT
Start: 2021-02-23

## 2021-02-24 DIAGNOSIS — Z86.19 H/O TINEA CRURIS: Chronic | ICD-10-CM

## 2021-02-24 RX ORDER — CLOTRIMAZOLE AND BETAMETHASONE DIPROPIONATE 10; .5 MG/ML; MG/ML
LOTION TOPICAL 2 TIMES DAILY
Qty: 30 ML | Refills: 0 | Status: SHIPPED | OUTPATIENT
Start: 2021-02-24 | End: 2021-08-04 | Stop reason: SDUPTHER

## 2021-02-28 NOTE — ASSESSMENT & PLAN NOTE
Lab Results   Component Value Date    HGBA1C 6 5 12/23/2020   Controlled based on recent home glucose readings (fasting ranging 113- 120 and afternoon ranging 145-160)  A1c at goal of <7  Continue current regimen metformin 500 mg BID, Jardiance 25 mg daily  Next A1c due 3/23/2021        Patient has hx of MI  He is already on appropriate high intensity statin, and tolerating medication well without SE   Continue atorvastatin 80 mg daily

## 2021-03-02 DIAGNOSIS — N18.2 HYPERTENSION ASSOCIATED WITH STAGE 2 CHRONIC KIDNEY DISEASE DUE TO TYPE 2 DIABETES MELLITUS (HCC): Chronic | ICD-10-CM

## 2021-03-02 DIAGNOSIS — I12.9 HYPERTENSION ASSOCIATED WITH STAGE 2 CHRONIC KIDNEY DISEASE DUE TO TYPE 2 DIABETES MELLITUS (HCC): Chronic | ICD-10-CM

## 2021-03-02 DIAGNOSIS — E11.22 HYPERTENSION ASSOCIATED WITH STAGE 2 CHRONIC KIDNEY DISEASE DUE TO TYPE 2 DIABETES MELLITUS (HCC): Chronic | ICD-10-CM

## 2021-03-02 RX ORDER — ASPIRIN 81 MG/1
TABLET ORAL
Qty: 90 TABLET | Refills: 2 | Status: SHIPPED | OUTPATIENT
Start: 2021-03-02 | End: 2021-10-04

## 2021-03-12 DIAGNOSIS — G47.00 INSOMNIA, UNSPECIFIED TYPE: ICD-10-CM

## 2021-03-15 DIAGNOSIS — K21.9 GASTROESOPHAGEAL REFLUX DISEASE, UNSPECIFIED WHETHER ESOPHAGITIS PRESENT: ICD-10-CM

## 2021-03-15 RX ORDER — PANTOPRAZOLE SODIUM 40 MG/1
40 TABLET, DELAYED RELEASE ORAL DAILY
Qty: 30 TABLET | Refills: 1 | Status: SHIPPED | OUTPATIENT
Start: 2021-03-15 | End: 2021-04-07

## 2021-03-16 RX ORDER — MIRTAZAPINE 15 MG/1
TABLET, FILM COATED ORAL
Qty: 90 TABLET | Refills: 1 | Status: SHIPPED | OUTPATIENT
Start: 2021-03-16 | End: 2021-06-07 | Stop reason: ALTCHOICE

## 2021-03-23 ENCOUNTER — OFFICE VISIT (OUTPATIENT)
Dept: FAMILY MEDICINE CLINIC | Facility: CLINIC | Age: 76
End: 2021-03-23

## 2021-03-23 VITALS
HEART RATE: 58 BPM | BODY MASS INDEX: 23.86 KG/M2 | DIASTOLIC BLOOD PRESSURE: 60 MMHG | RESPIRATION RATE: 18 BRPM | OXYGEN SATURATION: 98 % | SYSTOLIC BLOOD PRESSURE: 138 MMHG | HEIGHT: 67 IN | TEMPERATURE: 97.1 F | WEIGHT: 152 LBS

## 2021-03-23 DIAGNOSIS — F33.1 MAJOR DEPRESSIVE DISORDER, RECURRENT EPISODE, MODERATE (HCC): ICD-10-CM

## 2021-03-23 DIAGNOSIS — I10 ESSENTIAL HYPERTENSION: ICD-10-CM

## 2021-03-23 DIAGNOSIS — E11.9 TYPE 2 DIABETES MELLITUS WITHOUT COMPLICATION, WITHOUT LONG-TERM CURRENT USE OF INSULIN (HCC): Primary | ICD-10-CM

## 2021-03-23 PROBLEM — M79.641 PAIN IN BOTH HANDS: Status: RESOLVED | Noted: 2018-09-20 | Resolved: 2021-03-23

## 2021-03-23 PROBLEM — M54.32 SCIATICA OF LEFT SIDE: Status: RESOLVED | Noted: 2019-09-26 | Resolved: 2021-03-23

## 2021-03-23 PROBLEM — M79.642 PAIN IN BOTH HANDS: Status: RESOLVED | Noted: 2018-09-20 | Resolved: 2021-03-23

## 2021-03-23 LAB — SL AMB POCT HEMOGLOBIN AIC: 7 (ref ?–6.5)

## 2021-03-23 PROCEDURE — 83036 HEMOGLOBIN GLYCOSYLATED A1C: CPT | Performed by: PHYSICIAN ASSISTANT

## 2021-03-23 PROCEDURE — 99214 OFFICE O/P EST MOD 30 MIN: CPT | Performed by: PHYSICIAN ASSISTANT

## 2021-03-23 NOTE — PROGRESS NOTES
Assessment/Plan:    Type 2 diabetes mellitus (HCC)    Lab Results   Component Value Date    HGBA1C 7 0 (A) 03/23/2021     Stable  Continue current regimen metformin 500 mg BID and  Jardiance 25 mg daily  Advised may decrease finger sticks to qd  Discussed following a diabetic diet  Encouraged to increase physical activity as tolerated    Essential hypertension  BP today 138/60 stable  Continue current medication regimen    Major depressive disorder, recurrent episode, moderate (HCC)  Followed by psychiatry  Stable on current medications  Continue scheduled follow-up appointment      Diagnoses and all orders for this visit:    Type 2 diabetes mellitus without complication, without long-term current use of insulin (HCC)  -     POCT hemoglobin A1c    Major depressive disorder, recurrent episode, moderate (HCC)    Essential hypertension        Subjective:      Patient ID: Nel Hernandez is a 70 y o  male presents today for 3 month  f/u  T2DM- checks bs twicw daily  FBS low 100s  PM blood sugars < 150 mg/dl  Tries to follow a diabetic diet  Exercises when the weather is nice  Continues on metformin 500 mg bid along with Jardiance 25 mg po qd  HTN-checks BP daily, reading at home today-129/60  Home BP readings ranging 120-130s/60s-70s  Taking medication as directed  Depression/anxiety-  Patient is followed by Psychiatry  He feels his complete addition is currently stable on current medications  Sees psych every 3 months  Pt is doing well  No complaints voiced at this time  The following portions of the patient's history were reviewed and updated as appropriate: allergies, current medications, past family history, past medical history, past social history, past surgical history and problem list     Review of Systems   Constitutional: Negative for chills, fatigue and fever  Respiratory: Negative for cough, chest tightness, shortness of breath and wheezing      Cardiovascular: Negative for chest pain and palpitations  Gastrointestinal: Negative for abdominal pain, constipation, diarrhea, nausea and vomiting  Genitourinary: Negative for difficulty urinating  Musculoskeletal: Negative for arthralgias, myalgias, neck pain and neck stiffness  Skin: Negative for rash and wound  Neurological: Negative for dizziness, weakness, light-headedness, numbness and headaches  Psychiatric/Behavioral: Negative for agitation and behavioral problems  The patient is not nervous/anxious  Objective:      /60 (BP Location: Left arm, Patient Position: Sitting, Cuff Size: Large)   Pulse 58   Temp (!) 97 1 °F (36 2 °C) (Temporal)   Resp 18   Ht 5' 7" (1 702 m)   Wt 68 9 kg (152 lb)   SpO2 98%   BMI 23 81 kg/m²          Physical Exam  Constitutional:       General: He is not in acute distress  Appearance: Normal appearance  He is well-developed and normal weight  He is not ill-appearing  HENT:      Head: Normocephalic and atraumatic  Eyes:      Conjunctiva/sclera: Conjunctivae normal    Neck:      Musculoskeletal: Normal range of motion and neck supple  Cardiovascular:      Rate and Rhythm: Regular rhythm  Bradycardia present  Heart sounds: Normal heart sounds  No murmur  Pulmonary:      Effort: Pulmonary effort is normal  No respiratory distress  Breath sounds: Normal breath sounds  No wheezing  Musculoskeletal:         General: No deformity  Neurological:      Mental Status: He is alert and oriented to person, place, and time  Psychiatric:         Mood and Affect: Mood normal          Behavior: Behavior normal          Thought Content:  Thought content normal          Judgment: Judgment normal

## 2021-03-23 NOTE — ASSESSMENT & PLAN NOTE
Lab Results   Component Value Date    HGBA1C 7 0 (A) 03/23/2021     Stable  Continue current regimen metformin 500 mg BID and  Jardiance 25 mg daily    Advised may decrease finger sticks to qd  Discussed following a diabetic diet  Encouraged to increase physical activity as tolerated

## 2021-04-05 DIAGNOSIS — K21.9 GASTROESOPHAGEAL REFLUX DISEASE, UNSPECIFIED WHETHER ESOPHAGITIS PRESENT: ICD-10-CM

## 2021-04-05 RX ORDER — CALCIUM CARBONATE 200(500)MG
TABLET,CHEWABLE ORAL
Qty: 90 TABLET | Refills: 3 | Status: SHIPPED | OUTPATIENT
Start: 2021-04-05 | End: 2021-06-18

## 2021-04-06 ENCOUNTER — TELEMEDICINE (OUTPATIENT)
Dept: FAMILY MEDICINE CLINIC | Facility: CLINIC | Age: 76
End: 2021-04-06

## 2021-04-06 DIAGNOSIS — Z20.822 ENCOUNTER FOR LABORATORY TESTING FOR COVID-19 VIRUS: Primary | ICD-10-CM

## 2021-04-06 PROCEDURE — 99213 OFFICE O/P EST LOW 20 MIN: CPT | Performed by: FAMILY MEDICINE

## 2021-04-06 NOTE — ASSESSMENT & PLAN NOTE
Pt is asymptomatic  Pt is leaving for [facility] on Saturday 04/10/2021 and requests a COVID test, ordered    Will call pt with results

## 2021-04-06 NOTE — PROGRESS NOTES
Virtual Brief Visit    Assessment/Plan:    Problem List Items Addressed This Visit        Other    Encounter for laboratory testing for COVID-19 virus - Primary     Pt is asymptomatic  Pt is leaving for [place] on Saturday 04/10/2021 and requests a COVID test, ordered  Will call pt with results           Relevant Orders    Novel Coronavirus (Covid-19),PCR [facility] - [facility]   [facility]                Reason for visit is   Chief Complaint   Patient presents with    Virtual Brief Visit        Encounter provider Eliza Munguia MD    Provider located at [address] [place] [id number] [facility]   [phone]    Recent Visits  No visits were found meeting these conditions  Showing recent visits within past 7 days and meeting all other requirements     Today's Visits  Date Type Provider Dept   04/06/21 Telemedicine Eliza Munguia MD [facility]   Showing today's visits and meeting all other requirements     Future Appointments  No visits were found meeting these conditions  Showing future appointments within next 150 days and meeting all other requirements        After connecting through telephone, the patient was identified by name and date of birth  Roberto Carlos Huggins was informed that this is a telemedicine visit and that the visit is being conducted through telephone  My office door was closed  No one else was in the room  He acknowledged consent and understanding of privacy and security of the platform  The patient has agreed to participate and understands he can discontinue the visit at any time  Patient is aware this is a billable service  Subjective    Roberto Carlos Huggins is a 70 y o  male   HPI    telephone visit for 68-year-old male who plans to travel to [facility] to visit his sick mom on Saturday 04/10/2021  Patient is asymptomatic     He reports he is fully vaccinated for COVID      Past Medical History:   Diagnosis Date    AS (aortic stenosis)     Balanitis     Biceps tendonitis on right     CAD (coronary artery disease)     Cancer (HCC)     skin    Colon polyp     Complete rotator cuff tear or rupture of right shoulder, not specified as traumatic     CPAP (continuous positive airway pressure) dependence     Diabetes mellitus (HCC)     Esophageal reflux     High cholesterol     Hypertension     Hypertriglyceridemia     Hypogonadism in male     Myalgia     Myocardial infarction ([facility] [address] )     Palpitations     Shoulder pain     Sinus problem     Skin cancer of nose     Sleep apnea     Stroke ([facility] [address] ) [id number]    Systolic murmur     recently found    Tinea cruris     Tinea pedis        Past Surgical History:   Procedure Laterality Date    CARPAL TUNNEL RELEASE      CATARACT EXTRACTION Bilateral     COLONOSCOPY  2019    CORONARY ANGIOPLASTY WITH STENT PLACEMENT      10-15-19 - pt denies stent implant    CORONARY ARTERY BYPASS GRAFT N/A 12/12/2016    Procedure: INTRAOP CECILLE; BILATERAL LEG EVH; CORONARY ARTERY BYPASS GRAFT X 4 SVG TO PDA, OM1,  AND DIAGONAL1, LIMA TO LAD;  Surgeon: Velma Gamino MD;  [facility];  Service:     EYE SURGERY      HERNIA REPAIR      WA ARTHROSCOPY SHOULDER SURGICAL BICEPS TENODESIS Right 5/6/2016    Procedure: ARTHROSCOPIC BICEPS TENODESIS;  Surgeon: Susan Flanagan MD;  [facility];  Service: Orthopedics    WA INCISE FINGER TENDON SHEATH Right 10/15/2019    Procedure: TRIGGER FINGER RELEASE INDEX, LONG, RING, SMALL, THUMB FINGERS;  Surgeon: Maritza Soria MD;  [facility];  Service: Orthopedics    WA INCISE FINGER TENDON SHEATH Left 3/3/2020    Procedure: RELEASE TRIGGER FINGER INDEX;  Surgeon: Maritza Soria MD;  [facility];  Service: Orthopedics    WA SHLDR ARTHROSCOP,SURG,W/ROTAT CUFF REPR Right 5/6/2016    Procedure: REPAIR ROTATOR CUFF  ARTHROSCOPIC; SUBACROMIAL DECOMPRESSION; PARTIAL SYNOVECTOMY;  Surgeon: Susan Flanagan MD; [facility];  Service: Orthopedics    MT WRIST Ayan Soles LIG Right 10/15/2019    Procedure: ENDOSCOPIC CARPAL TUNNEL RELEASE;  Surgeon: Coleen Hernández MD;  [facility];  Service: Orthopedics    MT WRIST Ayan Soles LIG Left 3/3/2020    Procedure: RELEASE CARPAL TUNNEL ENDOSCOPIC;  Surgeon: Coleen Hernández MD;  [facility];  Service: Orthopedics    [address] [place] [address] UMBILICAL HERNIA REPAIR  2009    over age 13       Current Outpatient Medications   Medication Sig Dispense Refill    Alcohol Swabs (Alcohol Pads) 70 % PADS USE TWICE DAILY 100 each 3    ARIPiprazole (ABILIFY) 5 mg tablet Take 5 mg by mouth daily at bedtime      aspirin (ECOTRIN LOW STRENGTH) 81 mg EC tablet TAKE 1 TABLET (81 MG TOTAL) BY MOUTH DAILY 90 tablet 2    atorvastatin (LIPITOR) 80 mg tablet Take 1 tablet (80 mg total) by mouth daily 90 tablet 1    calcium carbonate (TUMS) 500 mg chewable tablet CHEW 1 TABLET (1,250 MG TOTAL) 3 (THREE) TIMES A DAY AS NEEDED FOR HEARTBURN 90 tablet 3    cholecalciferol (VITAMIN D3) 1,000 units tablet Take 1 tablet (1,000 Units total) by mouth daily 90 tablet 1    clorazepate (TRANXENE) 7 5 mg tablet Take 7 5 mg by mouth 2 (two) times a day   0    clotrimazole-betamethasone (LOTRISONE) 1-0 05 % lotion APPLY TOPICALLY 2 (TWO) TIMES A DAY 30 mL 0    Diclofenac Sodium (VOLTAREN) 1 % APPLY 2 GRAMS TOPICALLY 4 (FOUR) TIMES A  g 2    dicyclomine (BENTYL) 10 mg capsule Take 1 capsule (10 mg total) by mouth 3 (three) times a day before meals 90 capsule 1    DULoxetine (CYMBALTA) 60 mg delayed release capsule Take 60 mg by mouth every morning      Easy Comfort Lancets MISC TEST BLOOD SUGAR TWICE DAILY, OR AS NEEDED WHEN SYMPTOMATIC OF HYPOGLYCEMIA OR HYPERGLYCEMIA 100 each 5    Easy Plus II Glucose Test test strip TEST AS DIRECTED TWICE DAILY 100 each 3    Elastic Bandages & Supports (MEDICAL COMPRESSION STOCKINGS) MISC by Does not apply route daily Please provide B/L compression stockings 2 each 0    fluticasone (FLONASE) 50 mcg/act nasal spray INHALE 1 SPRAY INTO EACH NOSTRIL DAILY 16 g 2    furosemide (LASIX) 20 mg tablet Take 1 tablet (20 mg total) by mouth daily Patient takes as needed 30 tablet 8    glucose blood (FREESTYLE TEST STRIPS) test strip Use as instructed to check BS BID PRN (hypoglycemia/hyperglycemia symptoms) 180 each 2    glucose blood (GLUCOSE METER TEST) test strip 1 each by Other route 2 (two) times a day Use as instructed 100 each 5    glucose monitoring kit (FREESTYLE) monitoring kit 1 each by Does not apply route 2 (two) times a day Check BS BID PRN for hypoglycemia/hyperglycemia 1 each 0    hydrocortisone 2 5 % cream APPLY TOPICALLY 2 (TWO) TIMES A DAY 28 g 0    Jardiance 25 MG TABS TAKE 1 TABLET (25 MG TOTAL) BY MOUTH EVERY MORNING 30 tablet 5    Lancets (FREESTYLE) lancets Use as instructed to check BS BID PRN (hypoglycemia/hyperglycemia symptoms) 180 each 2    lisinopril (ZESTRIL) 20 mg tablet Take 1 tablet (20 mg total) by mouth daily 90 tablet 3    metFORMIN (GLUCOPHAGE) 500 mg tablet TAKE 1 TABLET (500 MG TOTAL) BY MOUTH 2 (TWO) TIMES A DAY WITH MEALS 180 tablet 2    metoprolol tartrate (LOPRESSOR) 25 mg tablet TAKE 1 TABLET (25 MG TOTAL) BY MOUTH EVERY 12 (TWELVE) HOURS AS DIRECTED 180 tablet 2    mirtazapine (REMERON) 15 mg tablet TAKE 1 TABLET (15 MG TOTAL) BY MOUTH DAILY AT BEDTIME 90 tablet 1    NIFEdipine ER (ADALAT CC) 30 MG 24 hr tablet TAKE 1 TABLET (30 MG TOTAL) BY MOUTH 2 (TWO) TIMES A DAY 60 tablet 11    nystatin (MYCOSTATIN) cream Apply topically Three times a day      ONETOUCH DELICA LANCETS FINE MISC Test blood sugar twice daily, or as needed when symptomatic of hypoglycemia or hyperglycemia 100 each 5    pantoprazole (PROTONIX) 40 mg tablet TAKE 1 TABLET (40 MG TOTAL) BY MOUTH DAILY 30 tablet 1    simethicone (MYLICON,GAS-X) 228 MG capsule Take 1 capsule (180 mg total) by mouth every 12 (twelve) hours as needed for flatulence 90 capsule 0     No current facility-administered medications for this visit  Allergies   Allergen Reactions    Epinephrine Hives and Anxiety    Penicillins Hives and Anxiety       Review of Systems   Constitutional: Negative for appetite change, chills and fever  HENT: Negative for congestion  Respiratory: Negative for cough and shortness of breath  Gastrointestinal: Negative for abdominal pain  Neurological: Negative for headaches  There were no vitals filed for this visit  I spent 10 minutes directly with the patient during this visit    VIRTUAL VISIT Bethany acknowledges that he has consented to an online visit or consultation  He understands that the online visit is based solely on information provided by him, and that, in the absence of a face-to-face physical evaluation by the physician, the diagnosis he receives is both limited and provisional in terms of accuracy and completeness  This is not intended to replace a full medical face-to-face evaluation by the physician  Ana Maria Godinez understands and accepts these terms  It was my intent to perform this visit via video technology but the patient was not able to do a video connection so the visit was completed via audio telephone only

## 2021-04-07 DIAGNOSIS — Z20.822 ENCOUNTER FOR LABORATORY TESTING FOR COVID-19 VIRUS: ICD-10-CM

## 2021-04-07 DIAGNOSIS — K21.9 GASTROESOPHAGEAL REFLUX DISEASE, UNSPECIFIED WHETHER ESOPHAGITIS PRESENT: ICD-10-CM

## 2021-04-07 PROCEDURE — U0003 INFECTIOUS AGENT DETECTION BY NUCLEIC ACID (DNA OR RNA); SEVERE ACUTE RESPIRATORY SYNDROME CORONAVIRUS 2 (SARS-COV-2) (CORONAVIRUS DISEASE [COVID-19]), AMPLIFIED PROBE TECHNIQUE, MAKING USE OF HIGH THROUGHPUT TECHNOLOGIES AS DESCRIBED BY CMS-2020-01-R: HCPCS | Performed by: FAMILY MEDICINE

## 2021-04-07 PROCEDURE — U0005 INFEC AGEN DETEC AMPLI PROBE: HCPCS | Performed by: FAMILY MEDICINE

## 2021-04-07 RX ORDER — PANTOPRAZOLE SODIUM 40 MG/1
40 TABLET, DELAYED RELEASE ORAL DAILY
Qty: 30 TABLET | Refills: 1 | Status: SHIPPED | OUTPATIENT
Start: 2021-04-07 | End: 2021-06-11 | Stop reason: SDUPTHER

## 2021-04-08 ENCOUNTER — OFFICE VISIT (OUTPATIENT)
Dept: SLEEP CENTER | Facility: CLINIC | Age: 76
End: 2021-04-08
Payer: MEDICARE

## 2021-04-08 ENCOUNTER — TELEPHONE (OUTPATIENT)
Dept: FAMILY MEDICINE CLINIC | Facility: CLINIC | Age: 76
End: 2021-04-08

## 2021-04-08 VITALS
HEIGHT: 67 IN | SYSTOLIC BLOOD PRESSURE: 110 MMHG | HEART RATE: 54 BPM | WEIGHT: 158 LBS | BODY MASS INDEX: 24.8 KG/M2 | DIASTOLIC BLOOD PRESSURE: 60 MMHG

## 2021-04-08 DIAGNOSIS — G47.33 OSA (OBSTRUCTIVE SLEEP APNEA): Primary | ICD-10-CM

## 2021-04-08 LAB — SARS-COV-2 RNA RESP QL NAA+PROBE: NEGATIVE

## 2021-04-08 PROCEDURE — 99214 OFFICE O/P EST MOD 30 MIN: CPT | Performed by: PSYCHIATRY & NEUROLOGY

## 2021-04-08 NOTE — PROGRESS NOTES
Sleep Medicine Follow-Up Note    HPI: 79yo M with LUIS MIGUEL and PLMD being seen for a follow up  Treatment Summary: split study 2020: apnea/hypopnea index was 15 8 events per hour of sleep   The AHI in the   supine position was 15 3   The AHI during REM sleep was 56  7  oxygen speedy 76%  PLMI 83  BiPAP 16/12cm recommended as 12cm EPAP was minimum to resolve hypoxemia in REM supine  Overnight oximetry report: on room air and BiPAP done 21  Time spent below 90%: 50 5min  Time spent below 89%: 36 min  Oxygen speedy: 67%  EZEQUIEL 31 66    Pressure increased to 18/14cm in 2021  HPI:   Today, patient presents unaccompanied  He endorsed sleeping more with the BiPAP  He reports that sometimes the mask is leaking and he tightens it, but has nasal bridge discomfort and lesions at times from that  He denied any issues with the pressure  He has had a mask change  He is going to [facility] for three weeks this Saturday  He at times removes the mask unknowingly         Treatment: BiPAP  -Pressure: 18/14cm   -Pressure intolerance: no   -Aerophagia: no   -Air Hunger: no  -Interface: FFM  -Fit: poor  -Chin strap: no  -HCC: YME    Compliance Card Data:    - Date Range: 3/7/21-21   - Settin/14cm   - Residual AHI: 10 9   - %  Night in Large Leak: 50 min   - Average usage days used: 6h44m   - % Days used: 97%   - % Days with Usage > 4 hrs: 93%    Respiratory:  -Ongoing Snoring: no  -Mouth Breathing: sometimes  -Dry Mouth: sometimes  -Nocturnal Gasping: no    ROS:   Genitourinary need to urinate more than twice a night   Cardiology none   Gastrointestinal none   Neurology none   Constitutional none   Integumentary none   Psychiatry anxiety   Musculoskeletal joint pain and back pain   Pulmonary none   ENT throat clearing   Endocrine frequent urination   Hematological none       Sleep Pattern:  -Position: back  -Bedtime: 10-11pm  -Lights Out: same time  -Environment: noLights/TV  -Latency: 15 mins  -Awakenings: 1  -Wake Time: 6am  -Rise Time: same time  -Patient's estimate of Sleep Time: 8h      Daytime Symptoms:  -Upon Awakening: not tired  -Daytime fatigue/sleepiness: no  -Naps: no  -Involuntary Dozing: no  -Cognitive Symptoms: no  -Driving: Difficulty with sleepiness and driving:  no   -- Close calls related to sleepiness: no   -- Accidents related to sleepiness: no    Substance Use:  -Caffeine: 1 cup of coffee in the morning  -Alcohol: no  -THC: no    --> no significant changes in his medical history since last visit  --> Supplemental Oxygen Use: denies    Questionnaire:  Sitting and reading: Would never doze  Watching TV: Moderate chance of dozing  Sitting, inactive in a public place (e g  a theatre or a meeting): Would never doze  As a passenger in a car for an hour without a break: Would never doze  Lying down to rest in the afternoon when circumstances permit: Would never doze  Sitting and talking to someone: Would never doze  Sitting quietly after a lunch without alcohol: Would never doze  In a car, while stopped for a few minutes in traffic: Would never doze  Total score: 2      PE:    /60   Pulse (!) 54   Ht 5' 7" (1 702 m)   Wt 71 7 kg (158 lb)   BMI 24 75 kg/m²     General:  In NAD  Pul: Respirations: unlaboured  MS: No atrophy  Neuro: No resting tremor  Gait normal turning & station; unremarkable overall  Psych: Socially appropriate  Pleasant  No overt dysphoria  Assessment: The patient continues to be compliant with his BiPAP, buthis obstructive events are not well controlled with a residual AHI 10 9 due to a poor mask fit  He continues to derive benefit from using his biPAP as he is less tired than he was in the past and no longer snoring  Will not make any changes to his pressure today, but will have him meet with [facility] for a mask fit today  Recommendations:    1) BiPAP at 18/14cm with a better fitting FFM  2) Safe driving reviewed    3) Follow-up 3 months  4) Call with any questions or concerns  All questions answered for the patient, who indicated understanding and agreed with the plan       Ken Watson MD  Psychiatry/ Sleep medicine

## 2021-04-08 NOTE — PATIENT INSTRUCTIONS
Recommendations:    1) BiPAP at 18/14cm with a better fitting FFM  2) Safe driving reviewed  3) Follow-up 3 months  4) Call with any questions or concerns

## 2021-04-26 DIAGNOSIS — J30.9 ALLERGIC RHINITIS, UNSPECIFIED SEASONALITY, UNSPECIFIED TRIGGER: ICD-10-CM

## 2021-04-26 DIAGNOSIS — E11.9 TYPE 2 DIABETES MELLITUS WITHOUT COMPLICATION, WITHOUT LONG-TERM CURRENT USE OF INSULIN (HCC): ICD-10-CM

## 2021-04-27 RX ORDER — FLUTICASONE PROPIONATE 50 MCG
SPRAY, SUSPENSION (ML) NASAL
Qty: 16 G | Refills: 2 | Status: SHIPPED | OUTPATIENT
Start: 2021-04-27 | End: 2021-08-02

## 2021-04-27 RX ORDER — BLOOD-GLUCOSE METER
EACH MISCELLANEOUS
Qty: 100 EACH | Refills: 3 | Status: SHIPPED | OUTPATIENT
Start: 2021-04-27 | End: 2021-08-04

## 2021-04-28 DIAGNOSIS — I10 ESSENTIAL HYPERTENSION: ICD-10-CM

## 2021-04-28 DIAGNOSIS — E55.9 VITAMIN D INSUFFICIENCY: ICD-10-CM

## 2021-04-28 RX ORDER — NIFEDIPINE 30 MG/1
30 TABLET, FILM COATED, EXTENDED RELEASE ORAL 2 TIMES DAILY
Qty: 60 TABLET | Refills: 11 | Status: SHIPPED | OUTPATIENT
Start: 2021-04-28 | End: 2022-01-28

## 2021-04-29 RX ORDER — VITAMIN B COMPLEX
TABLET ORAL
Qty: 90 TABLET | Refills: 1 | Status: SHIPPED | OUTPATIENT
Start: 2021-04-29 | End: 2021-11-17

## 2021-05-25 DIAGNOSIS — I25.10 CORONARY ARTERY DISEASE INVOLVING NATIVE CORONARY ARTERY OF NATIVE HEART WITHOUT ANGINA PECTORIS: ICD-10-CM

## 2021-06-07 ENCOUNTER — CLINICAL SUPPORT (OUTPATIENT)
Dept: FAMILY MEDICINE CLINIC | Facility: CLINIC | Age: 76
End: 2021-06-07

## 2021-06-07 ENCOUNTER — PATIENT OUTREACH (OUTPATIENT)
Dept: FAMILY MEDICINE CLINIC | Facility: CLINIC | Age: 76
End: 2021-06-07

## 2021-06-07 VITALS
WEIGHT: 157.6 LBS | SYSTOLIC BLOOD PRESSURE: 120 MMHG | DIASTOLIC BLOOD PRESSURE: 70 MMHG | HEART RATE: 52 BPM | BODY MASS INDEX: 24.68 KG/M2 | TEMPERATURE: 98 F

## 2021-06-07 DIAGNOSIS — E78.5 HYPERLIPIDEMIA, UNSPECIFIED HYPERLIPIDEMIA TYPE: ICD-10-CM

## 2021-06-07 DIAGNOSIS — I10 ESSENTIAL HYPERTENSION: Primary | ICD-10-CM

## 2021-06-07 DIAGNOSIS — G47.00 INSOMNIA, UNSPECIFIED TYPE: ICD-10-CM

## 2021-06-07 DIAGNOSIS — F33.1 MAJOR DEPRESSIVE DISORDER, RECURRENT EPISODE, MODERATE (HCC): ICD-10-CM

## 2021-06-07 DIAGNOSIS — E11.9 TYPE 2 DIABETES MELLITUS WITHOUT COMPLICATION, WITHOUT LONG-TERM CURRENT USE OF INSULIN (HCC): ICD-10-CM

## 2021-06-07 PROCEDURE — G0511 CCM/BHI BY RHC/FQHC 20MIN MO: HCPCS | Performed by: PHYSICIAN ASSISTANT

## 2021-06-07 RX ORDER — CYANOCOBALAMIN (VITAMIN B-12) 500 MCG
TABLET ORAL DAILY
COMMUNITY

## 2021-06-07 NOTE — ASSESSMENT & PLAN NOTE
Lab Results   Component Value Date    HGBA1C 7 0 (A) 03/23/2021   Controlled  A1c at goal of <7%  Recent home glucose readings (fasting ranging 110- 130 and afternoon ranging 140-180)  There are no hypoglycemic complications  He is compliant with treatment all of the time  Current regimen metformin 500 mg BID (cannot tolerate higher dose due to GI SE), Jardiance 25 mg daily    Continue current regimen metformin 500 mg BID, Jardiance 25 mg daily    Counseled on compliance with medications, diet and exercise: walk ½ hour at least 5 days per week  Patient has hx of MI  He is already on appropriate high intensity statin, and tolerating medication well without SE   Continue atorvastatin 80 mg daily

## 2021-06-07 NOTE — ASSESSMENT & PLAN NOTE
Stable on current medications  Patient reports he would like to discontinue or lower dose of duloxetine because he takes too many medications and states his depression and anxiety have been controlled  Patient follows with psychiatry  Advised patient to discuss  Lowering the dose of duloxetine on upcoming visit with Psychiatry

## 2021-06-07 NOTE — PROGRESS NOTES
Duplicate documentation encounter for chronic care management program purposes      [address] [place], [place] [address] [address] [facility] [address]  [phone]  Clinical Pharmacy Consultation     This patient was referred by [facility] pharmacy consultation  Thank you for the referral   Sending this note to PCP  Chronic Care Management Program Consent:    Patient informed of availability of Chronic Care Management services  The services will billed monthly by their Primary Care Provider only  Patient is informed there may be a monthly cost sharing associated with the Chronic Care Management services  Patient is aware that financial counseling is available to assist with any co-pay questions or concerns  Chronic Care Management services include:     24/7 access to care   Comprehensive plan of care created by the provider   Individualized care planning by the care manager(s)   Transitional care support  The patient is informed that they have the right to stop Chronic Care Management services at anytime  Patient consents to Chronic Care Management services? Yes      Assessment/Plan  1  Essential hypertension  Assessment & Plan:  Controlled  Blood pressure at goal < 130/80  Pulse today 52  Patient reports his heart rate at home is usually 48  Advised patient to discuss on next visit with cardiologist possibly lowering metoprolol tartrate dose from 25 mg to 12 5 mg (1/2 tablet of 25 mg) b i d  Continue current regimen lisinopril 20 mg daily, metoprolol tartrate 25 mg b i d , nifedipine 30 mg b i d            2  Major depressive disorder, recurrent episode, moderate (HCC)  Assessment & Plan:  Stable on current medications  Patient reports he would like to discontinue or lower dose of duloxetine because he takes too many medications and states his depression and anxiety have been controlled  Patient follows with psychiatry    Advised patient to discuss Lowering the dose of duloxetine on upcoming visit with Psychiatry          3  Type 2 diabetes mellitus without complication, without long-term current use of insulin ([facility])  Assessment & Plan:           Lab Results   Component Value Date     HGBA1C 7 0 (A) 2021   Controlled  A1c at goal of <7%  Recent home glucose readings (fasting ranging 110- 130 and afternoon ranging 140-180)  There are no hypoglycemic complications  He is compliant with treatment all of the time  Current regimen metformin 500 mg BID (cannot tolerate higher dose due to GI SE), Jardiance 25 mg daily    Continue current regimen metformin 500 mg BID, Jardiance 25 mg daily    Counseled on compliance with medications, diet and exercise: walk ½ hour at least 5 days per week       Patient has hx of MI  He is already on appropriate high intensity statin, and tolerating medication well without SE  Continue atorvastatin 80 mg daily       Patient uses tobramycin and dexamethasone eyedrops daily 1 drop in each eye daily to prevent eye infection per patient; he states he sees an eye doctor at [facility]   Patient brought all his medication bottles to visit  Upon medication review, noticed his eyedrops bottle were  (2021)  Advised patient to make an appointment with eye doctor to assess the need to continue  tobramycin and dexamethasone eyedrops      Medication list was updated and discussed with patient  The following medication related items were identified:   Discontinued medications:   ·  mirtazapine, reports this medicine was discontinued by another clinician, therapy completed  ·  simethicone, reports no longer taking, therapy completed  ·  hydrocortisone cream, reports no longer taking, therapy completed  ·  Voltaren gel, reports no longer taking, therapy completed      Clinically significant drug interactions identified: none   Side effects from medication(s) reported: none      The patient communicates understanding of the treatment plan  Items to be addressed at future visit with the pharmacist: medication reconciliation, medication education and polypharmacy consult  Next Medication Management Appointment with the clinical pharmacist: 1  month(s) and as needed per patient request      M*Modal software was used to dictate this note  It may contain errors with dictating incorrect words or incorrect spelling  Please contact the provider directly with any questions  Thank you for your understanding      Ashlye Mejia is a 70 y o  male  Focus of today's visit with the clinical pharmacist: medication reconciliation, medication education, polypharmacy consult, and diabetes management       Patient reports his heart rate at home is usually 48        Patient uses tobramycin and dexamethasone eyedrops daily 1 drop in each eye daily to prevent eye infection per patient; he states he sees an eye doctor at [facility]        Patient brought all his medication bottles to visit  Patient brought all his medication bottles to visit        Diabetes  There are no diabetic associated symptoms  There are no hypoglycemic complications  He is compliant with treatment all of the time  He is following a generally healthy diet  He rarely participates in exercise  His breakfast blood glucose range is generally 110-130 mg/dl  His lunch blood glucose range is generally 140-180 mg/dl  His dinner blood glucose range is generally 140-180 mg/dl        Social History           Tobacco Use   Smoking Status Former Smoker   Smokeless Tobacco Former User   Tobacco Comment     smoked for 3 years from 19 y/o - 23 yrs old              Allergies   Allergen Reactions    Epinephrine Hives and Anxiety    Penicillins Hives and Anxiety            Current Outpatient Medications:     Alcohol Swabs (Alcohol Pads) 70 % PADS, USE TWICE DAILY, Disp: 100 each, Rfl: 3    ARIPiprazole (ABILIFY) 5 mg tablet, Take 5 mg by mouth daily at bedtime, Disp: , Rfl:     aspirin (ECOTRIN LOW STRENGTH) 81 mg EC tablet, TAKE 1 TABLET (81 MG TOTAL) BY MOUTH DAILY, Disp: 90 tablet, Rfl: 2    atorvastatin (LIPITOR) 80 mg tablet, Take 1 tablet (80 mg total) by mouth daily, Disp: 90 tablet, Rfl: 1    calcium carbonate (TUMS) 500 mg chewable tablet, CHEW 1 TABLET (1,250 MG TOTAL) 3 (THREE) TIMES A DAY AS NEEDED FOR HEARTBURN, Disp: 90 tablet, Rfl: 3    cholecalciferol (VITAMIN D3) 25 mcg (1,000 units) tablet, TAKE 1 TABLET BY MOUTH DAILY, Disp: 90 tablet, Rfl: 1    clorazepate (TRANXENE) 7 5 mg tablet, Take 7 5 mg by mouth daily as needed for anxiety , Disp: , Rfl: 0    clotrimazole-betamethasone (LOTRISONE) 1-0 05 % lotion, APPLY TOPICALLY 2 (TWO) TIMES A DAY, Disp: 30 mL, Rfl: 0    Cyanocobalamin (Vitamin B 12) 500 MCG TABS, Take by mouth daily, Disp: , Rfl:     dicyclomine (BENTYL) 10 mg capsule, Take 1 capsule (10 mg total) by mouth 3 (three) times a day before meals (Patient taking differently: Take 10 mg by mouth 3 (three) times a day before meals Taking as needed up to 3 times daily), Disp: 90 capsule, Rfl: 1    DULoxetine (CYMBALTA) 60 mg delayed release capsule, Take 60 mg by mouth every morning, Disp: , Rfl:     fluticasone (FLONASE) 50 mcg/act nasal spray, INHALE 1 SPRAY INTO EACH NOSTRIL DAILY, Disp: 16 g, Rfl: 2    furosemide (LASIX) 20 mg tablet, Take 1 tablet (20 mg total) by mouth daily Patient takes as needed, Disp: 30 tablet, Rfl: 8    Jardiance 25 MG TABS, TAKE 1 TABLET (25 MG TOTAL) BY MOUTH EVERY MORNING, Disp: 30 tablet, Rfl: 5    lisinopril (ZESTRIL) 20 mg tablet, Take 1 tablet (20 mg total) by mouth daily, Disp: 90 tablet, Rfl: 3    metFORMIN (GLUCOPHAGE) 500 mg tablet, TAKE 1 TABLET (500 MG TOTAL) BY MOUTH 2 (TWO) TIMES A DAY WITH MEALS, Disp: 180 tablet, Rfl: 2    metoprolol tartrate (LOPRESSOR) 25 mg tablet, TAKE 1 TABLET (25 MG TOTAL) BY MOUTH EVERY 12 (TWELVE) HOURS AS DIRECTED, Disp: 180 tablet, Rfl: 2    NIFEdipine ER (ADALAT CC) 30 MG 24 hr tablet, TAKE 1 TABLET (30 MG TOTAL) BY MOUTH 2 (TWO) TIMES A DAY, Disp: 60 tablet, Rfl: 11    nystatin (MYCOSTATIN) cream, Apply topically Three times a day, Disp: , Rfl:     tobramycin-dexamethasone (TOBRADEX) ophthalmic ointment, 0 5 inches daily, Disp: , Rfl:     Easy Comfort Lancets MISC, TEST BLOOD SUGAR TWICE DAILY, OR AS NEEDED WHEN SYMPTOMATIC OF HYPOGLYCEMIA OR HYPERGLYCEMIA, Disp: 100 each, Rfl: 5    Easy Plus II Glucose Test test strip, TEST AS DIRECTED TWICE DAILY, Disp: 100 each, Rfl: 3    Elastic Bandages & Supports (MEDICAL COMPRESSION STOCKINGS) MISC, by Does not apply route daily Please provide B/L compression stockings, Disp: 2 each, Rfl: 0    glucose blood (FREESTYLE TEST STRIPS) test strip, Use as instructed to check BS BID PRN (hypoglycemia/hyperglycemia symptoms), Disp: 180 each, Rfl: 2    glucose blood (GLUCOSE METER TEST) test strip, 1 each by Other route 2 (two) times a day Use as instructed, Disp: 100 each, Rfl: 5    glucose monitoring kit (FREESTYLE) monitoring kit, 1 each by Does not apply route 2 (two) times a day Check BS BID PRN for hypoglycemia/hyperglycemia, Disp: 1 each, Rfl: 0    Lancets (FREESTYLE) lancets, Use as instructed to check BS BID PRN (hypoglycemia/hyperglycemia symptoms), Disp: 180 each, Rfl: 2    ONETOUCH DELICA LANCETS FINE MISC, Test blood sugar twice daily, or as needed when symptomatic of hypoglycemia or hyperglycemia, Disp: 100 each, Rfl: 5    pantoprazole (PROTONIX) 40 mg tablet, TAKE 1 TABLET (40 MG TOTAL) BY MOUTH DAILY, Disp: 30 tablet, Rfl: 1     Objective  Vitals:    Wt Readings from Last 3 Encounters:   06/07/21 71 5 kg (157 lb 9 6 oz)   04/08/21 71 7 kg (158 lb)   03/23/21 68 9 kg (152 lb)          Temp Readings from Last 3 Encounters:   06/07/21 98 °F (36 7 °C)   03/23/21 (!) 97 1 °F (36 2 °C) (Temporal)   01/25/21 (!) 96 °F (35 6 °C) (Tympanic)          BP Readings from Last 3 Encounters:   06/07/21 120/70   04/08/21 110/60   03/23/21 138/60     Pulse Readings from Last 3 Encounters:   06/07/21 (!) 52   04/08/21 (!) 54   03/23/21 58         Labs:        Lab Results   Component Value Date     HGBA1C 7 0 (A) 03/23/2021            Lab Results   Component Value Date     LDLCALC 84 10/21/2020      Lab Results   Component Value Date     CHOLESTEROL 177 10/21/2020            Lab Results   Component Value Date     HDL 69 10/21/2020            Lab Results   Component Value Date     TRIG 120 10/21/2020            Lab Results   Component Value Date     NONHDLC 108 10/21/2020         Physical Exam  Musculoskeletal:      Right lower leg: No edema        Left lower leg: No edema       Geraldine Spence, Pharmacist

## 2021-06-07 NOTE — PROGRESS NOTES
[address] [place] [address]  [phone]  Clinical Pharmacy Consultation     This patient was referred by [facility] pharmacy consultation  Thank you for the referral   Sending this note to PCP  Chronic Care Management Program Consent:    Patient informed of availability of Chronic Care Management services  The services will billed monthly by their Primary Care Provider only  Patient is informed there may be a monthly cost sharing associated with the Chronic Care Management services  Patient is aware that financial counseling is available to assist with any co-pay questions or concerns  Chronic Care Management services include:     24/7 access to care   Comprehensive plan of care created by the provider   Individualized care planning by the care manager(s), and/or clinical pharmacist    Transitional care support  The patient is informed that they have the right to stop Chronic Care Management services at anytime  Patient consents to Chronic Care Management services? Yes    Assessment/Plan  1  Essential hypertension  Assessment & Plan:  Controlled  Blood pressure at goal < 130/80  Pulse today 52  Patient reports his heart rate at home is usually 48  Advised patient to discuss on next visit with cardiologist possibly lowering metoprolol tartrate dose from 25 mg to 12 5 mg (1/2 tablet of 25 mg) b i d  Continue current regimen lisinopril 20 mg daily, metoprolol tartrate 25 mg b i d , nifedipine 30 mg b i d         2  Major depressive disorder, recurrent episode, moderate (HCC)  Assessment & Plan:  Stable on current medications  Patient reports he would like to discontinue or lower dose of duloxetine because he takes too many medications and states his depression and anxiety have been controlled  Patient follows with psychiatry    Advised patient to discuss  Lowering the dose of duloxetine on upcoming visit with Psychiatry  3  Type 2 diabetes mellitus without complication, without long-term current use of insulin ([facility])  Assessment & Plan:    Lab Results   Component Value Date    HGBA1C 7 0 (A) 2021   Controlled  A1c at goal of <7%  Recent home glucose readings (fasting ranging 110- 130 and afternoon ranging 140-180)  There are no hypoglycemic complications  He is compliant with treatment all of the time  Current regimen metformin 500 mg BID (cannot tolerate higher dose due to GI SE), Jardiance 25 mg daily    Continue current regimen metformin 500 mg BID, Jardiance 25 mg daily    Counseled on compliance with medications, diet and exercise: walk ½ hour at least 5 days per week  Patient has hx of MI  He is already on appropriate high intensity statin, and tolerating medication well without SE  Continue atorvastatin 80 mg daily           Patient uses tobramycin and dexamethasone eyedrops daily 1 drop in each eye daily to prevent eye infection per patient; he states he sees an eye doctor at [facility]   Patient brought all his medication bottles to visit  Upon medication review, noticed his eyedrops bottle were  (2021)  Advised patient to make an appointment with eye doctor to assess the need to continue  tobramycin and dexamethasone eyedrops  Medication list was updated and discussed with patient  The following medication related items were identified:   Discontinued medications:   ·  mirtazapine, reports this medicine was discontinued by another clinician, therapy completed  ·  simethicone, reports no longer taking, therapy completed  ·  hydrocortisone cream, reports no longer taking, therapy completed  ·  Voltaren gel, reports no longer taking, therapy completed     Clinically significant drug interactions identified: none   Side effects from medication(s) reported: none     The patient communicates understanding of the treatment plan    Items to be addressed at future visit with the pharmacist: medication reconciliation, medication education and polypharmacy consult  Next Medication Management Appointment with the clinical pharmacist: 1  month(s) and as needed per patient request     M*Modal software was used to dictate this note  It may contain errors with dictating incorrect words or incorrect spelling  Please contact the provider directly with any questions  Thank you for your understanding  Ashley Huggins is a 70 y o  male  Focus of today's visit with the clinical pharmacist: medication reconciliation, medication education, polypharmacy consult, and diabetes management  Patient reports his heart rate at home is usually 48  Patient uses tobramycin and dexamethasone eyedrops daily 1 drop in each eye daily to prevent eye infection per patient; he states he sees an eye doctor at [facility]       Patient brought all his medication bottles to visit  Patient brought all his medication bottles to visit  Diabetes  There are no diabetic associated symptoms  There are no hypoglycemic complications  He is compliant with treatment all of the time  He is following a generally healthy diet  He rarely participates in exercise  His breakfast blood glucose range is generally 110-130 mg/dl  His lunch blood glucose range is generally 140-180 mg/dl  His dinner blood glucose range is generally 140-180 mg/dl        Social History     Tobacco Use   Smoking Status Former Smoker   Smokeless Tobacco Former User   Tobacco Comment    smoked for 3 years from 17 y/o - 23 yrs old        Allergies   Allergen Reactions    Epinephrine Hives and Anxiety    Penicillins Hives and Anxiety          Current Outpatient Medications:     Alcohol Swabs (Alcohol Pads) 70 % PADS, USE TWICE DAILY, Disp: 100 each, Rfl: 3    ARIPiprazole (ABILIFY) 5 mg tablet, Take 5 mg by mouth daily at bedtime, Disp: , Rfl:     aspirin (ECOTRIN LOW STRENGTH) 81 mg EC tablet, TAKE 1 TABLET (81 MG TOTAL) BY MOUTH DAILY, Disp: 90 tablet, Rfl: 2    atorvastatin (LIPITOR) 80 mg tablet, Take 1 tablet (80 mg total) by mouth daily, Disp: 90 tablet, Rfl: 1    calcium carbonate (TUMS) 500 mg chewable tablet, CHEW 1 TABLET (1,250 MG TOTAL) 3 (THREE) TIMES A DAY AS NEEDED FOR HEARTBURN, Disp: 90 tablet, Rfl: 3    cholecalciferol (VITAMIN D3) 25 mcg (1,000 units) tablet, TAKE 1 TABLET BY MOUTH DAILY, Disp: 90 tablet, Rfl: 1    clorazepate (TRANXENE) 7 5 mg tablet, Take 7 5 mg by mouth daily as needed for anxiety , Disp: , Rfl: 0    clotrimazole-betamethasone (LOTRISONE) 1-0 05 % lotion, APPLY TOPICALLY 2 (TWO) TIMES A DAY, Disp: 30 mL, Rfl: 0    Cyanocobalamin (Vitamin B 12) 500 MCG TABS, Take by mouth daily, Disp: , Rfl:     dicyclomine (BENTYL) 10 mg capsule, Take 1 capsule (10 mg total) by mouth 3 (three) times a day before meals (Patient taking differently: Take 10 mg by mouth 3 (three) times a day before meals Taking as needed up to 3 times daily), Disp: 90 capsule, Rfl: 1    DULoxetine (CYMBALTA) 60 mg delayed release capsule, Take 60 mg by mouth every morning, Disp: , Rfl:     fluticasone (FLONASE) 50 mcg/act nasal spray, INHALE 1 SPRAY INTO EACH NOSTRIL DAILY, Disp: 16 g, Rfl: 2    furosemide (LASIX) 20 mg tablet, Take 1 tablet (20 mg total) by mouth daily Patient takes as needed, Disp: 30 tablet, Rfl: 8    Jardiance 25 MG TABS, TAKE 1 TABLET (25 MG TOTAL) BY MOUTH EVERY MORNING, Disp: 30 tablet, Rfl: 5    lisinopril (ZESTRIL) 20 mg tablet, Take 1 tablet (20 mg total) by mouth daily, Disp: 90 tablet, Rfl: 3    metFORMIN (GLUCOPHAGE) 500 mg tablet, TAKE 1 TABLET (500 MG TOTAL) BY MOUTH 2 (TWO) TIMES A DAY WITH MEALS, Disp: 180 tablet, Rfl: 2    metoprolol tartrate (LOPRESSOR) 25 mg tablet, TAKE 1 TABLET (25 MG TOTAL) BY MOUTH EVERY 12 (TWELVE) HOURS AS DIRECTED, Disp: 180 tablet, Rfl: 2    NIFEdipine ER (ADALAT CC) 30 MG 24 hr tablet, TAKE 1 TABLET (30 MG TOTAL) BY MOUTH 2 (TWO) TIMES A DAY, Disp: 60 tablet, Rfl: 11   nystatin (MYCOSTATIN) cream, Apply topically Three times a day, Disp: , Rfl:     tobramycin-dexamethasone (TOBRADEX) ophthalmic ointment, 0 5 inches daily, Disp: , Rfl:     Easy Comfort Lancets MISC, TEST BLOOD SUGAR TWICE DAILY, OR AS NEEDED WHEN SYMPTOMATIC OF HYPOGLYCEMIA OR HYPERGLYCEMIA, Disp: 100 each, Rfl: 5    Easy Plus II Glucose Test test strip, TEST AS DIRECTED TWICE DAILY, Disp: 100 each, Rfl: 3    Elastic Bandages & Supports (MEDICAL COMPRESSION STOCKINGS) MISC, by Does not apply route daily Please provide B/L compression stockings, Disp: 2 each, Rfl: 0    glucose blood (FREESTYLE TEST STRIPS) test strip, Use as instructed to check BS BID PRN (hypoglycemia/hyperglycemia symptoms), Disp: 180 each, Rfl: 2    glucose blood (GLUCOSE METER TEST) test strip, 1 each by Other route 2 (two) times a day Use as instructed, Disp: 100 each, Rfl: 5    glucose monitoring kit (FREESTYLE) monitoring kit, 1 each by Does not apply route 2 (two) times a day Check BS BID PRN for hypoglycemia/hyperglycemia, Disp: 1 each, Rfl: 0    Lancets (FREESTYLE) lancets, Use as instructed to check BS BID PRN (hypoglycemia/hyperglycemia symptoms), Disp: 180 each, Rfl: 2    ONETOUCH DELICA LANCETS FINE MISC, Test blood sugar twice daily, or as needed when symptomatic of hypoglycemia or hyperglycemia, Disp: 100 each, Rfl: 5    pantoprazole (PROTONIX) 40 mg tablet, TAKE 1 TABLET (40 MG TOTAL) BY MOUTH DAILY, Disp: 30 tablet, Rfl: 1    Objective  Vitals:    Wt Readings from Last 3 Encounters:   06/07/21 71 5 kg (157 lb 9 6 oz)   04/08/21 71 7 kg (158 lb)   03/23/21 68 9 kg (152 lb)     Temp Readings from Last 3 Encounters:   06/07/21 98 °F (36 7 °C)   03/23/21 (!) 97 1 °F (36 2 °C) (Temporal)   01/25/21 (!) 96 °F (35 6 °C) (Tympanic)     BP Readings from Last 3 Encounters:   06/07/21 120/70   04/08/21 110/60   03/23/21 138/60     Pulse Readings from Last 3 Encounters:   06/07/21 (!) 52   04/08/21 (!) 54   03/23/21 58 Labs:  Lab Results   Component Value Date    HGBA1C 7 0 (A) 03/23/2021     Lab Results   Component Value Date    LDLCALC 84 10/21/2020     Lab Results   Component Value Date    CHOLESTEROL 177 10/21/2020     Lab Results   Component Value Date    HDL 69 10/21/2020     Lab Results   Component Value Date    TRIG 120 10/21/2020     Lab Results   Component Value Date    NONHDLC 108 10/21/2020       Physical Exam  Musculoskeletal:      Right lower leg: No edema  Left lower leg: No edema  Umm Cotter    Demographics  Interaction Method: Face to Face  Type of Intervention: Follow-Up    Topic(s) Addressed  Medication Management  Diabetes  Polypharmacy  Hypertension  Statin Use    Intervention(s) Made    Pharmacologic:      Prevent or Manage Adverse Drug Reaction    Drug Interaction    Non-Pharmacologic:  Adherence Addressed    Tool(s) Used  Not Applicable    Time Spent:   Time Spent in Direct Patient Care: 45 minutes    Recommendation(s) Accepted by the Patient/Caregiver:  All Accepted

## 2021-06-09 RX ORDER — MIRTAZAPINE 15 MG/1
TABLET, FILM COATED ORAL
Qty: 90 TABLET | Refills: 1 | OUTPATIENT
Start: 2021-06-09

## 2021-06-10 DIAGNOSIS — A04.8 H. PYLORI INFECTION: ICD-10-CM

## 2021-06-11 ENCOUNTER — OFFICE VISIT (OUTPATIENT)
Dept: GASTROENTEROLOGY | Facility: CLINIC | Age: 76
End: 2021-06-11
Payer: MEDICARE

## 2021-06-11 VITALS
HEIGHT: 67 IN | WEIGHT: 156 LBS | HEART RATE: 53 BPM | TEMPERATURE: 98.6 F | SYSTOLIC BLOOD PRESSURE: 107 MMHG | BODY MASS INDEX: 24.48 KG/M2 | DIASTOLIC BLOOD PRESSURE: 62 MMHG

## 2021-06-11 DIAGNOSIS — K59.00 CONSTIPATION, UNSPECIFIED CONSTIPATION TYPE: ICD-10-CM

## 2021-06-11 DIAGNOSIS — K21.9 GASTROESOPHAGEAL REFLUX DISEASE, UNSPECIFIED WHETHER ESOPHAGITIS PRESENT: ICD-10-CM

## 2021-06-11 DIAGNOSIS — K64.9 HEMORRHOIDS, UNSPECIFIED HEMORRHOID TYPE: Primary | ICD-10-CM

## 2021-06-11 PROCEDURE — 99214 OFFICE O/P EST MOD 30 MIN: CPT | Performed by: INTERNAL MEDICINE

## 2021-06-11 RX ORDER — PANTOPRAZOLE SODIUM 40 MG/1
40 TABLET, DELAYED RELEASE ORAL DAILY
Qty: 30 TABLET | Refills: 1 | Status: SHIPPED | OUTPATIENT
Start: 2021-06-11 | End: 2021-08-04 | Stop reason: SDUPTHER

## 2021-06-11 RX ORDER — HYDROCORTISONE 25 MG/G
CREAM TOPICAL 2 TIMES DAILY
Qty: 10 G | Refills: 1 | Status: SHIPPED | OUTPATIENT
Start: 2021-06-11 | End: 2021-06-18

## 2021-06-11 NOTE — PROGRESS NOTES
[facility] - Outpatient Follow-up Note  Carlos Walls 70 y o  male MRN: [id number]  Encounter: [id number]       [place]ortes ID [id number]    ASSESSMENT AND PLAN:      1  Hemorrhoids, unspecified hemorrhoid type  hydrocortisone (ANUSOL-HC) 2 5 % rectal cream   2  Gastroesophageal reflux disease, unspecified whether esophagitis present  pantoprazole (PROTONIX) 40 mg tablet   3  Constipation, unspecified constipation type  bisacodyl (DULCOLAX) 5 mg EC tablet     Patient continues to have abdominal discomfort, reflux and burning sensation in his rectum  Will prescribe pantoprazole for reflux and Dulcolax for constipation  Discussed lifestyle interventions for reflux including elevating head of the bed, avoiding foods that would trigger reflux  Patient also has lower abdominal pain which may be related to constipation  Have prescribed Dulcolax/stool softener to help with this issue  Will have him follow-up in couple months to see how he is doing       _________________________________    SUBJECTIVE:  History of h pylori infetion  Seen in the office 1/25  Found to have h pylori on stool antigen  Treated with quadruple therapy  Now eradicated as stool antigen negative  Currently he is having pain in the abdomen  He also is noting that when he goes to the bathroom and cleans he notes a burning sensation in his bottom  He has been taking bentyl and notices improvement in the pain  The burning and pain was seen by the doctor here  This was 3 months ago  After that he has been having burning sensation in the anus  The pain is in the stomach  The pain is not constant and comes and goes on occasion at night  He describes the pain at the middle of the night  He goes to the bathroom around once a day and he doesn't correlate the pain with having bowel movements  He does notice burning sensation with coffee and never stopped coffee      REVIEW OF SYSTEMS IS OTHERWISE NEGATIVE        Historical Information   Past Medical History:   Diagnosis Date    AS (aortic stenosis)     Balanitis     Biceps tendonitis on right     CAD (coronary artery disease)     Cancer (HCC)     skin    Colon polyp     Complete rotator cuff tear or rupture of right shoulder, not specified as traumatic     CPAP (continuous positive airway pressure) dependence     Diabetes mellitus (HCC)     Esophageal reflux     High cholesterol     Hypertension     Hypertriglyceridemia     Hypogonadism in male     Myalgia     Myocardial infarction ([facility] [address] )     Palpitations     Shoulder pain     Sinus problem     Skin cancer of nose     Sleep apnea     Stroke ([facility] [address] ) 1202    Systolic murmur     recently found    Tinea cruris     Tinea pedis      Past Surgical History:   Procedure Laterality Date    CARPAL TUNNEL RELEASE      CATARACT EXTRACTION Bilateral     COLONOSCOPY  2019    CORONARY ANGIOPLASTY WITH STENT PLACEMENT      10-15-19 - pt denies stent implant    CORONARY ARTERY BYPASS GRAFT N/A 12/12/2016    Procedure: INTRAOP CECILLE; BILATERAL LEG EVH; CORONARY ARTERY BYPASS GRAFT X 4 SVG TO PDA, OM1,  AND DIAGONAL1, LIMA TO LAD;  Surgeon: Claus You MD;  [facility];  Service:     EYE SURGERY      HERNIA REPAIR      NJ ARTHROSCOPY SHOULDER SURGICAL BICEPS TENODESIS Right 5/6/2016    Procedure: ARTHROSCOPIC BICEPS TENODESIS;  Surgeon: Rodriguez Burleson MD;  [facility];  Service: Orthopedics    NJ INCISE FINGER TENDON SHEATH Right 10/15/2019    Procedure: TRIGGER FINGER RELEASE INDEX, LONG, RING, SMALL, THUMB FINGERS;  Surgeon: Francisco Pederson MD;  [facility];  Service: Orthopedics    NJ INCISE FINGER TENDON SHEATH Left 3/3/2020    Procedure: RELEASE TRIGGER FINGER INDEX;  Surgeon: Francisco Pederson MD;  [facility];  Service: Orthopedics    NJ SHLDR ARTHROSCOP,SURG,W/ROTAT CUFF REPR Right 5/6/2016    Procedure: REPAIR ROTATOR CUFF  ARTHROSCOPIC; SUBACROMIAL DECOMPRESSION; PARTIAL SYNOVECTOMY;  Surgeon: Stalin Mclaughlin MD;  [facility];  Service: Orthopedics    [place] WRIST Curiel Denver LIG Right 10/15/2019    Procedure: ENDOSCOPIC CARPAL TUNNEL RELEASE;  Surgeon: Cherie Jiménez MD;  [facility];  Service: Orthopedics    [place] WRIST Curiel Denver LIG Left 3/3/2020    Procedure: RELEASE CARPAL TUNNEL ENDOSCOPIC;  Surgeon: Cherie Jiménez MD;  [facility];  Service: Orthopedics    ROTATOR CUFF REPAIR      TESTICLE SURGERY      UMBILICAL HERNIA REPAIR  2009    over age 13     Social History   Social History     Substance and Sexual Activity   Alcohol Use No     Social History     Substance and Sexual Activity   Drug Use No     Social History     Tobacco Use   Smoking Status Former Smoker   Smokeless Tobacco Former User   Tobacco Comment    smoked for 3 years from 17 y/o - 23 yrs old     Family History   Problem Relation Age of Onset    Heart disease Mother     Hypertension Mother     Nephrolithiasis Father     Other Father         Renal disease    Hypertension Sister     Nephrolithiasis Brother     Other Brother         Renal disease    Hematuria Family         Microscopic       Meds/Allergies       Current Outpatient Medications:     Alcohol Swabs (Alcohol Pads) 70 % PADS    ARIPiprazole (ABILIFY) 5 mg tablet    aspirin (ECOTRIN LOW STRENGTH) 81 mg EC tablet    atorvastatin (LIPITOR) 80 mg tablet    calcium carbonate (TUMS) 500 mg chewable tablet    cholecalciferol (VITAMIN D3) 25 mcg (1,000 units) tablet    clorazepate (TRANXENE) 7 5 mg tablet    clotrimazole-betamethasone (LOTRISONE) 1-0 05 % lotion    Cyanocobalamin (Vitamin B 12) 500 MCG TABS    dicyclomine (BENTYL) 10 mg capsule    DULoxetine (CYMBALTA) 60 mg delayed release capsule    Easy Comfort Lancets MISC    Easy Plus II Glucose Test test strip    Elastic Bandages & Supports (MEDICAL COMPRESSION STOCKINGS) MISC   fluticasone (FLONASE) 50 mcg/act nasal spray    furosemide (LASIX) 20 mg tablet    glucose blood (FREESTYLE TEST STRIPS) test strip    glucose blood (GLUCOSE METER TEST) test strip    glucose monitoring kit (FREESTYLE) monitoring kit    Jardiance 25 MG TABS    Lancets (FREESTYLE) lancets    lisinopril (ZESTRIL) 20 mg tablet    metFORMIN (GLUCOPHAGE) 500 mg tablet    metoprolol tartrate (LOPRESSOR) 25 mg tablet    NIFEdipine ER (ADALAT CC) 30 MG 24 hr tablet    nystatin (MYCOSTATIN) cream    ONETOUCH DELICA LANCETS FINE MISC    tobramycin-dexamethasone (TOBRADEX) ophthalmic ointment    pantoprazole (PROTONIX) 40 mg tablet    Allergies   Allergen Reactions    Epinephrine Hives and Anxiety    Penicillins Hives and Anxiety           Objective     Blood pressure 107/62, pulse (!) 53, temperature 98 6 °F (37 °C), temperature source Tympanic, height 5' 7" (1 702 m), weight 70 8 kg (156 lb)  Body mass index is 24 43 kg/m²  PHYSICAL EXAM:      General Appearance:   Alert, cooperative, no distress   HEENT:   Normocephalic, atraumatic, anicteric  Lungs:   Equal chest rise and unlabored breathing, normal cough   Heart:   No visualized JVD   Abdomen:   Soft, non-tender, non-distended; no masses, no organomegaly    Genitalia:   Deferred    Rectal:   Deferred    Extremities:  No cyanosis, clubbing or edema    Pulses:  Musculoskeletal:  2+ and symmetric  Normal range of motion visualized    Skin:  Neuro:  No jaundice, rashes, or lesions   Alert and appropriate           Lab Results:   No visits with results within 1 Day(s) from this visit  Latest known visit with results is:   Orders Only on 04/07/2021   Component Date Value    SARS-CoV-2 04/07/2021 Negative          Radiology Results:   No results found

## 2021-06-15 RX ORDER — OMEPRAZOLE 40 MG/1
40 CAPSULE, DELAYED RELEASE ORAL 2 TIMES DAILY
Qty: 28 CAPSULE | Refills: 0 | OUTPATIENT
Start: 2021-06-15 | End: 2021-06-29

## 2021-06-18 DIAGNOSIS — K21.9 GASTROESOPHAGEAL REFLUX DISEASE, UNSPECIFIED WHETHER ESOPHAGITIS PRESENT: ICD-10-CM

## 2021-06-18 DIAGNOSIS — K64.9 HEMORRHOIDS, UNSPECIFIED HEMORRHOID TYPE: ICD-10-CM

## 2021-06-18 RX ORDER — CALCIUM CARBONATE 200(500)MG
TABLET,CHEWABLE ORAL
Qty: 90 TABLET | Refills: 3 | Status: SHIPPED | OUTPATIENT
Start: 2021-06-18 | End: 2021-10-26

## 2021-06-21 ENCOUNTER — TELEPHONE (OUTPATIENT)
Dept: FAMILY MEDICINE CLINIC | Facility: CLINIC | Age: 76
End: 2021-06-21

## 2021-06-22 ENCOUNTER — OFFICE VISIT (OUTPATIENT)
Dept: FAMILY MEDICINE CLINIC | Facility: CLINIC | Age: 76
End: 2021-06-22

## 2021-06-22 VITALS
OXYGEN SATURATION: 98 % | RESPIRATION RATE: 16 BRPM | BODY MASS INDEX: 24.99 KG/M2 | HEIGHT: 67 IN | SYSTOLIC BLOOD PRESSURE: 142 MMHG | HEART RATE: 59 BPM | DIASTOLIC BLOOD PRESSURE: 62 MMHG | TEMPERATURE: 97.4 F | WEIGHT: 159.2 LBS

## 2021-06-22 DIAGNOSIS — Z00.00 MEDICARE ANNUAL WELLNESS VISIT, SUBSEQUENT: ICD-10-CM

## 2021-06-22 DIAGNOSIS — M65.332 TRIGGER MIDDLE FINGER OF LEFT HAND: ICD-10-CM

## 2021-06-22 DIAGNOSIS — E11.9 TYPE 2 DIABETES MELLITUS WITHOUT COMPLICATION, WITHOUT LONG-TERM CURRENT USE OF INSULIN (HCC): Primary | ICD-10-CM

## 2021-06-22 DIAGNOSIS — E78.5 HYPERLIPIDEMIA, UNSPECIFIED HYPERLIPIDEMIA TYPE: ICD-10-CM

## 2021-06-22 PROBLEM — Z20.822 ENCOUNTER FOR LABORATORY TESTING FOR COVID-19 VIRUS: Status: RESOLVED | Noted: 2021-02-18 | Resolved: 2021-06-22

## 2021-06-22 PROBLEM — M54.9 ACUTE LEFT-SIDED BACK PAIN: Status: RESOLVED | Noted: 2019-08-27 | Resolved: 2021-06-22

## 2021-06-22 PROBLEM — R60.0 LEG EDEMA: Status: RESOLVED | Noted: 2020-06-11 | Resolved: 2021-06-22

## 2021-06-22 LAB — SL AMB POCT HEMOGLOBIN AIC: 6.8 (ref ?–6.5)

## 2021-06-22 PROCEDURE — 1123F ACP DISCUSS/DSCN MKR DOCD: CPT | Performed by: PHYSICIAN ASSISTANT

## 2021-06-22 PROCEDURE — 3078F DIAST BP <80 MM HG: CPT | Performed by: PHYSICIAN ASSISTANT

## 2021-06-22 PROCEDURE — G0438 PPPS, INITIAL VISIT: HCPCS | Performed by: PHYSICIAN ASSISTANT

## 2021-06-22 PROCEDURE — 3077F SYST BP >= 140 MM HG: CPT | Performed by: PHYSICIAN ASSISTANT

## 2021-06-22 PROCEDURE — 99213 OFFICE O/P EST LOW 20 MIN: CPT | Performed by: PHYSICIAN ASSISTANT

## 2021-06-22 PROCEDURE — 83036 HEMOGLOBIN GLYCOSYLATED A1C: CPT | Performed by: PHYSICIAN ASSISTANT

## 2021-06-22 NOTE — ASSESSMENT & PLAN NOTE
Lab Results   Component Value Date    HGBA1C 6 8 (A) 06/22/2021   Controlled!   Last HGA1c 3/23/2021 7 0  Patient advised to continue metformin 500 mg BID(unable to tolerate a higher dose due to GI intolerance) and Jardiance 25 mg daily  Discussed increasing physical activity as tolerated   May decreased blood sugar checks to QD or QOD  Continue following diabetic diet  Recheck A1c 3 months

## 2021-06-22 NOTE — PATIENT INSTRUCTIONS
Medicare Preventive Visit Patient Instructions  Thank you for completing your Welcome to Medicare Visit or Medicare Annual Wellness Visit today  Your next wellness visit will be due in one year (6/23/2022)  The screening/preventive services that you may require over the next 5-10 years are detailed below  Some tests may not apply to you based off risk factors and/or age  Screening tests ordered at today's visit but not completed yet may show as past due  Also, please note that scanned in results may not display below  Preventive Screenings:  Service Recommendations Previous Testing/Comments   Colorectal Cancer Screening  · Colonoscopy    · Fecal Occult Blood Test (FOBT)/Fecal Immunochemical Test (FIT)  · Fecal DNA/Cologuard Test  · Flexible Sigmoidoscopy Age: 54-65 years old   Colonoscopy: every 10 years (May be performed more frequently if at higher risk)  OR  FOBT/FIT: every 1 year  OR  Cologuard: every 3 years  OR  Sigmoidoscopy: every 5 years  Screening may be recommended earlier than age 48 if at higher risk for colorectal cancer  Also, an individualized decision between you and your healthcare provider will decide whether screening between the ages of 74-80 would be appropriate  Colonoscopy: 05/30/2019  FOBT/FIT: 10/27/2020  Cologuard: Not on file  Sigmoidoscopy: Not on file          Prostate Cancer Screening Individualized decision between patient and health care provider in men between ages of 53-78   Medicare will cover every 12 months beginning on the day after your 50th birthday PSA: No results in last 5 years           Hepatitis C Screening Once for adults born between 1945 and 1965  More frequently in patients at high risk for Hepatitis C Hep C Antibody: 08/13/2019        Diabetes Screening 1-2 times per year if you're at risk for diabetes or have pre-diabetes Fasting glucose: 124 mg/dL   A1C: 7 0        Cholesterol Screening Once every 5 years if you don't have a lipid disorder   May order more often based on risk factors  Lipid panel: 10/21/2020           Other Preventive Screenings Covered by Medicare:  1  Abdominal Aortic Aneurysm (AAA) Screening: covered once if your at risk  You're considered to be at risk if you have a family history of AAA or a male between the age of 73-68 who smoking at least 100 cigarettes in your lifetime  2  Lung Cancer Screening: covers low dose CT scan once per year if you meet all of the following conditions: (1) Age 50-69; (2) No signs or symptoms of lung cancer; (3) Current smoker or have quit smoking within the last 15 years; (4) You have a tobacco smoking history of at least 30 pack years (packs per day x number of years you smoked); (5) You get a written order from a healthcare provider  3  Glaucoma Screening: covered annually if you're considered high risk: (1) You have diabetes OR (2) Family history of glaucoma OR (3)  aged 48 and older OR (3)  American aged 72 and older  3  Osteoporosis Screening: covered every 2 years if you meet one of the following conditions: (1) Have a vertebral abnormality; (2) On glucocorticoid therapy for more than 3 months; (3) Have primary hyperparathyroidism; (4) On osteoporosis medications and need to assess response to drug therapy  5  HIV Screening: covered annually if you're between the age of 12-76  Also covered annually if you are younger than 13 and older than 72 with risk factors for HIV infection  For pregnant patients, it is covered up to 3 times per pregnancy      Immunizations:  Immunization Recommendations   Influenza Vaccine Annual influenza vaccination during flu season is recommended for all persons aged >= 6 months who do not have contraindications   Pneumococcal Vaccine (Prevnar and Pneumovax)  * Prevnar = PCV13  * Pneumovax = PPSV23 Adults 25-60 years old: 1-3 doses may be recommended based on certain risk factors  Adults 72 years old: Prevnar (PCV13) vaccine recommended followed by Pneumovax (PPSV23) vaccine  If already received PPSV23 since turning 65, then PCV13 recommended at least one year after PPSV23 dose  Hepatitis B Vaccine 3 dose series if at intermediate or high risk (ex: diabetes, end stage renal disease, liver disease)   Tetanus (Td) Vaccine - COST NOT COVERED BY MEDICARE PART B Following completion of primary series, a booster dose should be given every 10 years to maintain immunity against tetanus  Td may also be given as tetanus wound prophylaxis  Tdap Vaccine - COST NOT COVERED BY MEDICARE PART B Recommended at least once for all adults  For pregnant patients, recommended with each pregnancy  Shingles Vaccine (Shingrix) - COST NOT COVERED BY MEDICARE PART B  2 shot series recommended in those aged 48 and above     Health Maintenance Due:      Topic Date Due    Colorectal Cancer Screening  05/30/2024    Hepatitis C Screening  Completed     Immunizations Due:      Topic Date Due    COVID-19 Vaccine (1) Never done     Advance Directives   What are advance directives? Advance directives are legal documents that state your wishes and plans for medical care  These plans are made ahead of time in case you lose your ability to make decisions for yourself  Advance directives can apply to any medical decision, such as the treatments you want, and if you want to donate organs  What are the types of advance directives? There are many types of advance directives, and each state has rules about how to use them  You may choose a combination of any of the following:  · Living will: This is a written record of the treatment you want  You can also choose which treatments you do not want, which to limit, and which to stop at a certain time  This includes surgery, medicine, IV fluid, and tube feedings  · Durable power of  for healthcare [facility]): This is a written record that states who you want to make healthcare choices for you when you are unable to make them for yourself   This person, called a proxy, is usually a family member or a friend  You may choose more than 1 proxy  · Do not resuscitate (DNR) order:  A DNR order is used in case your heart stops beating or you stop breathing  It is a request not to have certain forms of treatment, such as CPR  A DNR order may be included in other types of advance directives  · Medical directive: This covers the care that you want if you are in a coma, near death, or unable to make decisions for yourself  You can list the treatments you want for each condition  Treatment may include pain medicine, surgery, blood transfusions, dialysis, IV or tube feedings, and a ventilator (breathing machine)  · Values history: This document has questions about your views, beliefs, and how you feel and think about life  This information can help others choose the care that you would choose  Why are advance directives important? An advance directive helps you control your care  Although spoken wishes may be used, it is better to have your wishes written down  Spoken wishes can be misunderstood, or not followed  Treatments may be given even if you do not want them  An advance directive may make it easier for your family to make difficult choices about your care  © Copyright [employer] 2018 Information is for End User's use only and may not be sold, redistributed or otherwise used for commercial purposes   All illustrations and images included in CareNotes® are the copyrighted property of A D A M , Inc  or [facility] [place] [employer]

## 2021-06-22 NOTE — ASSESSMENT & PLAN NOTE
PCV 23 & 13 UTD  Zoster 2015 & 2018  Colonoscopy - 2019 repeat in 5 years  PSA -2021 wnl  Hepatitis-C screening- 2019 negative

## 2021-06-22 NOTE — PROGRESS NOTES
Assessment/Plan:    No problem-specific Assessment & Plan notes found for this encounter  Diagnoses and all orders for this visit:    Type 2 diabetes mellitus without complication, without long-term current use of insulin ([facility])  -     POCT hemoglobin A1c        Subjective:      Patient ID: Vinny Mccloud is a 78 y o  male  HPI    The following portions of the patient's history were reviewed and updated as appropriate: {history reviewed:20406::"allergies","current medications","past family history","past medical history","past social history","past surgical history","problem list"}      Review of Systems      Objective:      /62 (BP Location: Left arm, Patient Position: Sitting, Cuff Size: Standard)   Pulse (!) 49   Temp (!) 97 4 °F (36 3 °C) (Temporal)   Resp 16   Ht 5' 7" (1 702 m)   Wt 72 2 kg (159 lb 3 2 oz)   SpO2 98%   BMI 24 93 kg/m²          Physical Exam

## 2021-06-22 NOTE — PROGRESS NOTES
Assessment/Plan:    Medicare annual wellness visit, subsequent  PCV 23 & 13 UTD  Zoster 2015 & 2018  Colonoscopy - 2019 repeat in 5 years  PSA -2021 wnl  Hepatitis-C screening- 2019 negative      Trigger middle finger of left hand  OTC pain medication  Refer to hand surgery    Type 2 diabetes mellitus ([address] )    Lab Results   Component Value Date    HGBA1C 6 8 (A) 06/22/2021   Controlled! Last HGA1c 3/23/2021 7 0  Patient advised to continue metformin 500 mg BID(unable to tolerate a higher dose due to GI intolerance) and Jardiance 25 mg daily  Discussed increasing physical activity as tolerated   May decreased blood sugar checks to QD or QOD  Continue following diabetic diet  Recheck A1c 3 months      Diagnoses and all orders for this visit:    Type 2 diabetes mellitus without complication, without long-term current use of insulin (HCC)  -     POCT hemoglobin A1c    Hyperlipidemia, unspecified hyperlipidemia type    Trigger middle finger of left hand  -     Ambulatory referral to Hand Surgery; Future    Medicare annual wellness visit, subsequent        Subjective:      Patient ID: Janee Keene is a 78 y o  male presents today for DM f/u  T2DM- checks his blood sugar BID  FBS ranging 112-121 PM results ranging 141- 150  Taking Metformin 500 mg po BID and Jardiance 25 mg po qd  Is following a diabetic diet  Is currently not exercise  C/o left 3rd finger getting stuck after having fingers flexed for a short period of time  Has constant pain  Pain worse when it's flexed  The following portions of the patient's history were reviewed and updated as appropriate: allergies, current medications, past family history, past medical history, past social history, past surgical history and problem list     Review of Systems   Constitutional: Negative for chills, fatigue and fever  Respiratory: Negative for cough, chest tightness, shortness of breath and wheezing      Cardiovascular: Negative for chest pain and palpitations  Gastrointestinal: Negative for abdominal pain, constipation, diarrhea, nausea and vomiting  Genitourinary: Negative for difficulty urinating  Musculoskeletal: Positive for arthralgias  Negative for joint swelling, myalgias, neck pain and neck stiffness  Skin: Negative for rash and wound  Neurological: Negative for dizziness, weakness, light-headedness, numbness and headaches  Psychiatric/Behavioral: Negative for agitation and behavioral problems  The patient is not nervous/anxious  Objective:      /62 (BP Location: Left arm, Patient Position: Sitting, Cuff Size: Standard)   Pulse 59   Temp (!) 97 4 °F (36 3 °C) (Temporal)   Resp 16   Ht 5' 7" (1 702 m)   Wt 72 2 kg (159 lb 3 2 oz)   SpO2 98%   BMI 24 93 kg/m²          Physical Exam  Constitutional:       General: He is not in acute distress  Appearance: Normal appearance  He is well-developed  HENT:      Head: Normocephalic and atraumatic  Eyes:      Conjunctiva/sclera: Conjunctivae normal    Cardiovascular:      Rate and Rhythm: Normal rate and regular rhythm  Heart sounds: Normal heart sounds  No murmur heard  Pulmonary:      Effort: Pulmonary effort is normal  No respiratory distress  Breath sounds: Normal breath sounds  No wheezing  Musculoskeletal:         General: No deformity  Left hand: Tenderness present  No swelling, deformity or lacerations  Normal strength  Normal strength of finger abduction  Cervical back: Normal range of motion and neck supple  Comments: Left 3rd finger- no swelling, erythema, or deformity  Unable to extend finger once flex without the assistant of his other hand  Pain with ROM  TTP at flexor tendon sheath  Neurological:      Mental Status: He is alert and oriented to person, place, and time  Psychiatric:         Mood and Affect: Mood normal          Behavior: Behavior normal          Thought Content:  Thought content normal          Judgment: Judgment normal           Assessment and Plan:     Problem List Items Addressed This Visit        Endocrine    Type 2 diabetes mellitus ([facility] ) - Primary       Lab Results   Component Value Date    HGBA1C 6 8 (A) 06/22/2021   Controlled! Last HGA1c 3/23/2021 7 0  Patient advised to continue metformin 500 mg BID(unable to tolerate a higher dose due to GI intolerance) and Jardiance 25 mg daily  Discussed increasing physical activity as tolerated   May decreased blood sugar checks to QD or QOD  Continue following diabetic diet  Recheck A1c 3 months         Relevant Orders    POCT hemoglobin A1c (Completed)       Musculoskeletal and Integument    Trigger middle finger of left hand     OTC pain medication  Refer to hand surgery         Relevant Orders    Ambulatory referral to Hand Surgery       Other    Hyperlipidemia    Medicare annual wellness visit, subsequent     PCV 23 & 13 UTD  Zoster 2015 & 2018  Colonoscopy - 2019 repeat in 5 years  PSA -2021 wnl  Hepatitis-C screening- 2019 negative                  Preventive health issues were discussed with patient, and age appropriate screening tests were ordered as noted in patient's After Visit Summary  Personalized health advice and appropriate referrals for health education or preventive services given if needed, as noted in patient's After Visit Summary       History of Present Illness:     Patient presents for Medicare Annual Wellness visit    Patient Care Team:  Apurva Sheffield PA-C as PCP - General (Family Medicine)  MD Mirna Roland MD Benay Race, MD Mariea Kaplan, MD Lennart Beets, MD Allayne Dial, MD Denton Shires, Pharmacist as Lead OP Care Mgr (Pharmacy)     Problem List:     Patient Active Problem List   Diagnosis    Biceps tendonitis on right    Complete rotator cuff tear or rupture of right shoulder, not specified as traumatic    NSTEMI (non-ST elevated myocardial infarction) ([facility] )    S/P drug eluting coronary stent placement    H/O non-ST elevation myocardial infarction (NSTEMI)    Essential hypertension    Polyarthralgia    Hematuria, microscopic    H/O tinea cruris    Erectile dysfunction associated with type 2 diabetes mellitus (HCC)    Anxiety    Type 2 diabetes mellitus (HCC)    Gastroesophageal reflux disease    Bilateral carpal tunnel syndrome    Mild aortic stenosis    Coronary artery disease involving native coronary artery of native heart without angina pectoris    Glaucoma    Major depressive disorder, recurrent episode, moderate (HCC)    History of tobacco use    Dental caries    S/P CABG (coronary artery bypass graft)    Trigger finger of all digits of both hands    Polyp of colon    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)    Insomnia    Hyperlipidemia    Skin lesion of face    Essential hypertriglyceridemia    Pulmonary nodule seen on imaging study    Skin cancer    LUIS MIGUEL (obstructive sleep apnea)    Testicular hypogonadism    Lumbar radiculopathy    Spinal stenosis of lumbar region with neurogenic claudication    Lumbar spondylosis    DDD (degenerative disc disease), lumbar    Lower abdominal pain    Generalized abdominal pain    Chronic pain syndrome    Flatulence    PLMD (periodic limb movement disorder)    Sinus bradycardia    Abdominal bloating    Medicare annual wellness visit, subsequent    Trigger middle finger of left hand      Past Medical and Surgical History:     Past Medical History:   Diagnosis Date    AS (aortic stenosis)     Balanitis     Biceps tendonitis on right     CAD (coronary artery disease)     Cancer (HCC)     skin    Colon polyp     Complete rotator cuff tear or rupture of right shoulder, not specified as traumatic     CPAP (continuous positive airway pressure) dependence     Diabetes mellitus (HCC)     Esophageal reflux     High cholesterol     Hypertension     Hypertriglyceridemia     Hypogonadism in male     Myalgia     Myocardial infarction ([facility] )     Palpitations     Shoulder pain     Sinus problem     Skin cancer of nose     Sleep apnea     Stroke ([facility] ) 1112    Systolic murmur     recently found    Tinea cruris     Tinea pedis      Past Surgical History:   Procedure Laterality Date    CARPAL TUNNEL RELEASE      CATARACT EXTRACTION Bilateral     COLONOSCOPY  2019    CORONARY ANGIOPLASTY WITH STENT PLACEMENT      10-15-19 - pt denies stent implant    CORONARY ARTERY BYPASS GRAFT N/A 12/12/2016    Procedure: INTRAOP CECILLE; BILATERAL LEG EVH; CORONARY ARTERY BYPASS GRAFT X 4 SVG TO PDA, OM1,  AND DIAGONAL1, LIMA TO LAD;  Surgeon: Sruthi Fields MD;  [facility]: [facility];  Service:     EYE SURGERY      HERNIA REPAIR      AR ARTHROSCOPY SHOULDER SURGICAL BICEPS TENODESIS Right 5/6/2016    Procedure: ARTHROSCOPIC BICEPS TENODESIS;  Surgeon: Vinayak Alonzo MD;  [facility];  Service: Orthopedics    AR INCISE FINGER TENDON SHEATH Right 10/15/2019    Procedure: TRIGGER FINGER RELEASE INDEX, LONG, RING, SMALL, THUMB FINGERS;  Surgeon: Yoni Alvarez MD;  [facility];  Service: Orthopedics    AR INCISE FINGER TENDON SHEATH Left 3/3/2020    Procedure: RELEASE TRIGGER FINGER INDEX;  Surgeon: Yoni Alvarez MD;  [facility];  Service: Orthopedics    AR SHLDR ARTHROSCOP,SURG,W/ROTAT CUFF REPR Right 5/6/2016    Procedure: REPAIR ROTATOR CUFF  ARTHROSCOPIC; SUBACROMIAL DECOMPRESSION; PARTIAL SYNOVECTOMY;  Surgeon: Vinayak Alonzo MD;  [facility];  Service: Orthopedics    AR WRIST Mace Hesselbach LIG Right 10/15/2019    Procedure: ENDOSCOPIC CARPAL TUNNEL RELEASE;  Surgeon: Yoni Alvarez MD;  [facility];  Service: Orthopedics    AR WRIST Mace Hesselbach LIG Left 3/3/2020    Procedure: RELEASE CARPAL TUNNEL ENDOSCOPIC;  Surgeon: Yoni Alvarez MD;  [facility];  Service: Orthopedics    ROTATOR CUFF REPAIR      TESTICLE SURGERY      UMBILICAL HERNIA REPAIR  2009    over age 13      Family History:     Family History   Problem Relation Age of Onset    Heart disease Mother     Hypertension Mother     Nephrolithiasis Father     Other Father         Renal disease    Hypertension Sister     Nephrolithiasis Brother     Other Brother         Renal disease    Hematuria Family         Microscopic      Social History:     Social History     Socioeconomic History    Marital status: /Civil Union     Spouse name: None    Number of children: None    Years of education: None    Highest education level: None   Occupational History    None   Tobacco Use    Smoking status: Former Smoker    Smokeless tobacco: Former User    Tobacco comment: smoked for 3 years from 17 y/o - 23 yrs old   Vaping Use    Vaping Use: Never used   Substance and Sexual Activity    Alcohol use: No    Drug use: No    Sexual activity: Yes     Partners: Female   Other Topics Concern    None   Social History Narrative    Uses Safety Equipment Seatbelts     Social Determinants of Health     Financial Resource Strain: Low Risk     Difficulty of Paying Living Expenses: Not hard at all   Food Insecurity: No Food Insecurity    Worried About Running Out of Food in the Last Year: Never true    [address] in the Last Year: Never true   Transportation Needs: No Transportation Needs    Lack of Transportation (Medical): No    Lack of Transportation (Non-Medical):  No   Physical Activity:     Days of Exercise per Week:     Minutes of Exercise per Session:    Stress:     Feeling of Stress :    Social Connections:     Frequency of Communication with Friends and Family:     Frequency of Social Gatherings with Friends and Family:     Attends [employer] Services:     Active Member of Clubs or Organizations:     Attends Club or Organization Meetings:     Marital Status:    Intimate Partner Violence:  Fear of Current or Ex-Partner:     Emotionally Abused:     Physically Abused:     Sexually Abused:       Medications and Allergies:     Current Outpatient Medications   Medication Sig Dispense Refill    Alcohol Swabs (Alcohol Pads) 70 % PADS USE TWICE DAILY 100 each 3    ARIPiprazole (ABILIFY) 5 mg tablet Take 5 mg by mouth daily at bedtime      aspirin (ECOTRIN LOW STRENGTH) 81 mg EC tablet TAKE 1 TABLET (81 MG TOTAL) BY MOUTH DAILY 90 tablet 2    atorvastatin (LIPITOR) 80 mg tablet Take 1 tablet (80 mg total) by mouth daily 90 tablet 1    bisacodyl (DULCOLAX) 5 mg EC tablet Take 1 tablet (5 mg total) by mouth daily as needed for constipation 30 tablet 0    calcium carbonate (TUMS) 500 mg chewable tablet CHEW 1 TABLET (1,250 MG TOTAL) 3 (THREE) TIMES A DAY AS NEEDED FOR HEARTBURN 90 tablet 3    cholecalciferol (VITAMIN D3) 25 mcg (1,000 units) tablet TAKE 1 TABLET BY MOUTH DAILY 90 tablet 1    clorazepate (TRANXENE) 7 5 mg tablet Take 7 5 mg by mouth daily as needed for anxiety   0    clotrimazole-betamethasone (LOTRISONE) 1-0 05 % lotion APPLY TOPICALLY 2 (TWO) TIMES A DAY 30 mL 0    Cyanocobalamin (Vitamin B 12) 500 MCG TABS Take by mouth daily      dicyclomine (BENTYL) 10 mg capsule Take 1 capsule (10 mg total) by mouth 3 (three) times a day before meals (Patient taking differently: Take 10 mg by mouth 3 (three) times a day before meals Taking as needed up to 3 times daily) 90 capsule 1    DULoxetine (CYMBALTA) 60 mg delayed release capsule Take 60 mg by mouth every morning      Easy Comfort Lancets MISC TEST BLOOD SUGAR TWICE DAILY, OR AS NEEDED WHEN SYMPTOMATIC OF HYPOGLYCEMIA OR HYPERGLYCEMIA 100 each 5    Easy Plus II Glucose Test test strip TEST AS DIRECTED TWICE DAILY 100 each 3    Elastic Bandages & Supports (MEDICAL COMPRESSION STOCKINGS) MISC by Does not apply route daily Please provide B/L compression stockings 2 each 0    fluticasone (FLONASE) 50 mcg/act nasal spray INHALE 1 SPRAY INTO EACH NOSTRIL DAILY 16 g 2    furosemide (LASIX) 20 mg tablet Take 1 tablet (20 mg total) by mouth daily Patient takes as needed 30 tablet 8    glucose blood (FREESTYLE TEST STRIPS) test strip Use as instructed to check BS BID PRN (hypoglycemia/hyperglycemia symptoms) 180 each 2    glucose blood (GLUCOSE METER TEST) test strip 1 each by Other route 2 (two) times a day Use as instructed 100 each 5    glucose monitoring kit (FREESTYLE) monitoring kit 1 each by Does not apply route 2 (two) times a day Check BS BID PRN for hypoglycemia/hyperglycemia 1 each 0    Jardiance 25 MG TABS TAKE 1 TABLET (25 MG TOTAL) BY MOUTH EVERY MORNING 30 tablet 5    Lancets (FREESTYLE) lancets Use as instructed to check BS BID PRN (hypoglycemia/hyperglycemia symptoms) 180 each 2    lisinopril (ZESTRIL) 20 mg tablet Take 1 tablet (20 mg total) by mouth daily 90 tablet 3    metFORMIN (GLUCOPHAGE) 500 mg tablet TAKE 1 TABLET (500 MG TOTAL) BY MOUTH 2 (TWO) TIMES A DAY WITH MEALS 180 tablet 2    metoprolol tartrate (LOPRESSOR) 25 mg tablet TAKE 1 TABLET (25 MG TOTAL) BY MOUTH EVERY 12 (TWELVE) HOURS AS DIRECTED 180 tablet 2    NIFEdipine ER (ADALAT CC) 30 MG 24 hr tablet TAKE 1 TABLET (30 MG TOTAL) BY MOUTH 2 (TWO) TIMES A DAY 60 tablet 11    nystatin (MYCOSTATIN) cream Apply topically Three times a day      ONETOUCH DELICA LANCETS FINE MISC Test blood sugar twice daily, or as needed when symptomatic of hypoglycemia or hyperglycemia 100 each 5    pantoprazole (PROTONIX) 40 mg tablet Take 1 tablet (40 mg total) by mouth daily 30 tablet 1    Proctozone-HC 2 5 % rectal cream APPLY TOPICALLY 2 (TWO) TIMES A DAY AS DIRECTED 30 g 1    tobramycin-dexamethasone (TOBRADEX) ophthalmic ointment 0 5 inches daily       No current facility-administered medications for this visit       Allergies   Allergen Reactions    Epinephrine Hives and Anxiety    Penicillins Hives and Anxiety      Immunizations:     Immunization History Administered Date(s) Administered    INFLUENZA 03/10/2020    Influenza Quadrivalent Preservative Free 3 years and older IM 10/28/2014, 11/04/2016    Influenza Split High Dose Preservative Free IM 09/29/2015    Influenza, high dose seasonal 0 7 mL 09/21/2018, 11/05/2020    Influenza, seasonal, injectable 10/10/2017    Influenza, seasonal, injectable, preservative free 09/20/2012, 11/18/2013    Pneumococcal Conjugate 13-Valent 07/24/2018    Pneumococcal Polysaccharide PPV23 12/05/2012    Zoster 09/29/2015, 08/18/2018      Health Maintenance:         Topic Date Due    Colorectal Cancer Screening  05/30/2024    Hepatitis C Screening  Completed         Topic Date Due    COVID-19 Vaccine (1) Never done      Medicare Health Risk Assessment:     /62 (BP Location: Left arm, Patient Position: Sitting, Cuff Size: Standard)   Pulse 59   Temp (!) 97 4 °F (36 3 °C) (Temporal)   Resp 16   Ht 5' 7" (1 702 m)   Wt 72 2 kg (159 lb 3 2 oz)   SpO2 98%   BMI 24 93 kg/m²      Marlane Seats is here for his Subsequent Wellness visit  Health Risk Assessment:   Patient rates overall health as excellent  Patient feels that their physical health rating is same  Patient is very satisfied with their life  Eyesight was rated as same  Hearing was rated as same  Patient feels that their emotional and mental health rating is same  Patients states they are sometimes angry  Patient states they are sometimes unusually tired/fatigued  Pain experienced in the last 7 days has been none  Patient states that he has experienced no weight loss or gain in last 6 months  Fall Risk Screening: In the past year, patient has experienced: no history of falling in past year      Home Safety:  Patient does not have trouble with stairs inside or outside of their home  Patient has working smoke alarms and has working carbon monoxide detector  Home safety hazards include: none  Nutrition:   Current diet is Regular       Medications: Patient is currently taking over-the-counter supplements  OTC medications include: see medication list  Patient is able to manage medications  Activities of Daily Living (ADLs)/Instrumental Activities of Daily Living (IADLs):   Walk and transfer into and out of bed and chair?: Yes  Dress and groom yourself?: Yes    Bathe or shower yourself?: Yes    Feed yourself? Yes  Do your laundry/housekeeping?: Yes  Manage your money, pay your bills and track your expenses?: Yes  Make your own meals?: Yes    Do your own shopping?: Yes    Previous Hospitalizations:   Any hospitalizations or ED visits within the last 12 months?: Yes    How many hospitalizations have you had in the last year?: 1-2    Hospitalization Comments: Seen x 2 for abdominal pain in the ED  Advance Care Planning:   Living will: Yes    Durable POA for healthcare: No    Advanced directive: Yes    Advanced directive counseling given: No    Five wishes given: No      Cognitive Screening:   Provider or family/friend/caregiver concerned regarding cognition?: No    PREVENTIVE SCREENINGS      Cardiovascular Screening:    General: Screening Not Indicated and History Lipid Disorder      Diabetes Screening:     General: Screening Not Indicated and History Diabetes      Colorectal Cancer Screening:     General: Screening Current      Prostate Cancer Screening:    General: Screening Current      Abdominal Aortic Aneurysm (AAA) Screening:    Risk factors include: age between 73-69 yo and tobacco use        General: Screening Current      Lung Cancer Screening:     General: Screening Not Indicated      Hepatitis C Screening:    General: Screening Current    Screening, Brief Intervention, and Referral to Treatment (SBIRT)    Screening  Typical number of drinks in a day: 0  Typical number of drinks in a week: 0  Interpretation: Low risk drinking behavior      Single Item Drug Screening:  How often have you used an illegal drug (including marijuana) or a prescription medication for non-medical reasons in the past year? never    Single Item Drug Screen Score: 0  Interpretation: Negative screen for possible drug use disorder    Other Counseling Topics:   Car/seat belt/driving safety, skin self-exam, sunscreen and regular weightbearing exercise         Hussein Nixon PA-C

## 2021-06-25 DIAGNOSIS — E11.9 TYPE 2 DIABETES MELLITUS WITHOUT COMPLICATION, WITHOUT LONG-TERM CURRENT USE OF INSULIN (HCC): ICD-10-CM

## 2021-06-29 RX ORDER — EMPAGLIFLOZIN 25 MG/1
25 TABLET, FILM COATED ORAL EVERY MORNING
Qty: 30 TABLET | Refills: 5 | Status: SHIPPED | OUTPATIENT
Start: 2021-06-29 | End: 2021-11-12

## 2021-07-01 ENCOUNTER — OFFICE VISIT (OUTPATIENT)
Dept: OBGYN CLINIC | Facility: HOSPITAL | Age: 76
End: 2021-07-01
Payer: MEDICARE

## 2021-07-01 VITALS
WEIGHT: 157.2 LBS | HEART RATE: 55 BPM | SYSTOLIC BLOOD PRESSURE: 125 MMHG | HEIGHT: 67 IN | DIASTOLIC BLOOD PRESSURE: 65 MMHG | BODY MASS INDEX: 24.67 KG/M2

## 2021-07-01 DIAGNOSIS — M65.332 TRIGGER MIDDLE FINGER OF LEFT HAND: Primary | ICD-10-CM

## 2021-07-01 PROCEDURE — 1123F ACP DISCUSS/DSCN MKR DOCD: CPT | Performed by: PHYSICIAN ASSISTANT

## 2021-07-01 PROCEDURE — 99213 OFFICE O/P EST LOW 20 MIN: CPT | Performed by: PHYSICIAN ASSISTANT

## 2021-07-01 PROCEDURE — 20550 NJX 1 TENDON SHEATH/LIGAMENT: CPT | Performed by: PHYSICIAN ASSISTANT

## 2021-07-01 RX ORDER — BETAMETHASONE SODIUM PHOSPHATE AND BETAMETHASONE ACETATE 3; 3 MG/ML; MG/ML
3 INJECTION, SUSPENSION INTRA-ARTICULAR; INTRALESIONAL; INTRAMUSCULAR; SOFT TISSUE
Status: COMPLETED | OUTPATIENT
Start: 2021-07-01 | End: 2021-07-01

## 2021-07-01 RX ORDER — LIDOCAINE HYDROCHLORIDE 10 MG/ML
0.5 INJECTION, SOLUTION INFILTRATION; PERINEURAL
Status: COMPLETED | OUTPATIENT
Start: 2021-07-01 | End: 2021-07-01

## 2021-07-01 RX ADMIN — LIDOCAINE HYDROCHLORIDE 0.5 ML: 10 INJECTION, SOLUTION INFILTRATION; PERINEURAL at 14:51

## 2021-07-01 RX ADMIN — BETAMETHASONE SODIUM PHOSPHATE AND BETAMETHASONE ACETATE 3 MG: 3; 3 INJECTION, SUSPENSION INTRA-ARTICULAR; INTRALESIONAL; INTRAMUSCULAR; SOFT TISSUE at 14:51

## 2021-07-01 NOTE — PROGRESS NOTES
Assessment:     Left long finger triggering  S/P  Left trigger release index 3-3-20  Right trigger release index, long, ring , small, thumb 10-15-19          Follow Up:  6  week(s)    To Do Next Visit:         CHIEF COMPLAINT:  Chief Complaint   Patient presents with    Left Middle Finger - Triggering         SUBJECTIVE:  Tobi Siddiqi is a 78 y o  male who presents for an evaluation of his left long finger  He has had triggering for the last 2 weeks  No injury  He has had 7 other trigger releases by Dr Priscilla Brock in the past, but not this finger  PHYSICAL EXAMINATION:  Vital signs: /65   Pulse 55   Ht 5' 7" (1 702 m) Comment: per chart  Wt 71 3 kg (157 lb 3 2 oz)   BMI 24 62 kg/m²   General: well developed and well nourished, alert, oriented times 3 and appears comfortable  Psychiatric: Normal    MUSCULOSKELETAL EXAMINATION:  Range of Motion: As expected  Neurovascular status: Neuro intact, good cap refill  + triggering left long finger  No nodule palpable      STUDIES REVIEWED:  No Studies to review      PROCEDURES PERFORMED:  Hand/upper extremity injection  Universal Protocol:  Consent: Verbal consent obtained  Risks and benefits: risks, benefits and alternatives were discussed  Consent given by: patient  Time out: Immediately prior to procedure a "time out" was called to verify the correct patient, procedure, equipment, support staff and site/side marked as required    Timeout called at: 7/1/2021 2:51 PM   Patient understanding: patient states understanding of the procedure being performed  Site marked: the operative site was marked  Patient identity confirmed: verbally with patient    Supporting Documentation  Indications: tendon swelling   Procedure Details  Condition:trigger finger Needle size: 27 G  Ultrasound guidance: no  Approach: volar  Medications administered: 0 5 mL lidocaine 1 %; 3 mg betamethasone acetate-betamethasone sodium phosphate 6 (3-3) mg/mL    Patient tolerance: patient tolerated the procedure well with no immediate complications  Dressing:  Sterile dressing applied

## 2021-07-06 DIAGNOSIS — K59.00 CONSTIPATION, UNSPECIFIED CONSTIPATION TYPE: ICD-10-CM

## 2021-07-07 RX ORDER — BISACODYL 5 MG
TABLET, DELAYED RELEASE (ENTERIC COATED) ORAL
Qty: 30 TABLET | Refills: 0 | Status: SHIPPED | OUTPATIENT
Start: 2021-07-07

## 2021-07-08 ENCOUNTER — OFFICE VISIT (OUTPATIENT)
Dept: SLEEP CENTER | Facility: CLINIC | Age: 76
End: 2021-07-08
Payer: MEDICARE

## 2021-07-08 VITALS
DIASTOLIC BLOOD PRESSURE: 50 MMHG | BODY MASS INDEX: 24.33 KG/M2 | HEIGHT: 67 IN | WEIGHT: 155 LBS | SYSTOLIC BLOOD PRESSURE: 106 MMHG

## 2021-07-08 DIAGNOSIS — E11.9 TYPE 2 DIABETES MELLITUS WITHOUT COMPLICATION, WITHOUT LONG-TERM CURRENT USE OF INSULIN (HCC): ICD-10-CM

## 2021-07-08 DIAGNOSIS — G47.33 OSA (OBSTRUCTIVE SLEEP APNEA): Primary | ICD-10-CM

## 2021-07-08 DIAGNOSIS — I25.10 CORONARY ARTERY DISEASE INVOLVING NATIVE CORONARY ARTERY OF NATIVE HEART WITHOUT ANGINA PECTORIS: ICD-10-CM

## 2021-07-08 DIAGNOSIS — I10 ESSENTIAL HYPERTENSION: ICD-10-CM

## 2021-07-08 DIAGNOSIS — I21.4 NSTEMI (NON-ST ELEVATED MYOCARDIAL INFARCTION) (HCC): ICD-10-CM

## 2021-07-08 PROCEDURE — 99214 OFFICE O/P EST MOD 30 MIN: CPT | Performed by: PSYCHIATRY & NEUROLOGY

## 2021-07-08 RX ORDER — OMEPRAZOLE 20 MG/1
CAPSULE, DELAYED RELEASE ORAL
COMMUNITY
End: 2021-08-04

## 2021-07-08 NOTE — PATIENT INSTRUCTIONS
Continuous Positive Airway Pressure (CPAP) therapy was prescribed to you as a medical necessity and there are risks to discontinuing use of the device, some of which may be long term  Symptoms you experienced before using CPAP may return such as snoring, apneas, excessive daytime sleepiness, hypertension, cardiac arrythmias, risk of stroke, congestive heart-failure, exacerbation of COPD and potential respiratory failure  Ultimately, it is a personal decision for you to make if you continue use of an affected device or discontinue until a replacement is provided  Unfortunately, [employer] has not yet provided us with information about available devices  According to [employer], potential risks of continuing to use an affected device include irritation of skin, eyes and respiratory tract, inflammatory response, headache, asthma, adverse effects to other organs such as kidneys and liver and toxic carcinogenic effects  Do not use an ozone based  for the machine as this will cause the foam to break down faster  Recommendations:    1) BiPAP at 18/14cm with cloth liners for FFM  He has had multiple mask fittings in the past without success  2) Safe driving reviewed  3) Follow-up 7 months  4) Call with any questions or concerns

## 2021-07-08 NOTE — PROGRESS NOTES
Sleep Medicine Follow-Up Note    HPI: 68yo M with LUIS MIGUEL and PLMD being seen for a follow up  Treatment Summary: split study 2020: apnea/hypopnea index was 15 8 events per hour of sleep   The AHI in the   supine position was 15 3   The AHI during REM sleep was 56  7  oxygen speedy 76%  PLMI 83  BiPAP 16/12cm recommended as 12cm EPAP was minimum to resolve hypoxemia in REM supine  Overnight oximetry report: on room air and BiPAP done 21  Time spent below 90%: 50 5min  Time spent below 89%: 36 min  Oxygen speedy: 67%  EZEQUIEL 31 66    Pressure increased to 18/14cm in 2021  HPI:   Today, patient presents unaccompanied  He stated that he is feeling and sleeping well with his BiPAP  During the day he is a little tired, but denied being sleepy  Mask is leaving sores on his nose and it is leaking       Treatment: BiPAP  -Pressure: 18/14cm   -Pressure intolerance: maybe, unsure   -Aerophagia: no   -Air Hunger: no  -Interface: FFM  -Fit: poor  -Chin strap: no  -HCC: YME    Compliance Card Data:    - Date Range: 21-21   - Settin/14cm   - Residual AHI: 10 6   - %  Night in Large Leak: 1h 50 min   - Average usage days used: 6h46m   - % Days used: 97%   - % Days with Usage > 4 hrs: 90%    Respiratory:  -Ongoing Snoring: no  -Mouth Breathing: yes  -Dry Mouth: very dry  -Nocturnal Gasping: no    ROS:   Genitourinary none   Cardiology none   Gastrointestinal none   Neurology none   Constitutional none   Integumentary none   Psychiatry none   Musculoskeletal joint pain and muscle aches   Pulmonary none   ENT none   Endocrine none   Hematological none       Sleep Pattern:  -Position: back  -Bedtime: 10-11pm  -Lights Out: same time  -Environment: no Lights/TV  -Latency: 15 mins  -Awakenings: 1  -Wake Time: 6am  -Rise Time: same time  -Patient's estimate of Sleep Time: 8h      Daytime Symptoms:  -Upon Awakening: not tired  -Daytime fatigue/sleepiness: sometimes tired  -Naps: no  -Involuntary Dozing: no  -Cognitive Symptoms: no  -Driving: Difficulty with sleepiness and driving:  no   -- Close calls related to sleepiness: no   -- Accidents related to sleepiness: no    Substance Use:  -Caffeine: 1 cup of coffee in the morning  -Alcohol: no  -THC: no    --> another bacterial infection in his GI tract since last visit  --> Supplemental Oxygen Use: denies    Questionnaire:  Sitting and reading: Would never doze  Watching TV: Slight chance of dozing  Sitting, inactive in a public place (e g  a theatre or a meeting): Would never doze  As a passenger in a car for an hour without a break: Would never doze  Lying down to rest in the afternoon when circumstances permit: Slight chance of dozing  Sitting and talking to someone: Would never doze  Sitting quietly after a lunch without alcohol: Slight chance of dozing  In a car, while stopped for a few minutes in traffic: Would never doze  Total score: 3      PE:    /50   Ht 5' 7" (1 702 m)   Wt 70 3 kg (155 lb)   BMI 24 28 kg/m²     General:  In NAD  Pul: Respirations: unlaboured  MS: No atrophy  Neuro: No resting tremor  Gait normal turning & station; unremarkable overall  Psych: Socially appropriate  Pleasant  No overt dysphoria  Assessment: The patient continues to be compliant with his BiPAP, buthis obstructive events are not well controlled with a residual AHI 10 6 due to a poor mask fit  He continues to derive benefit from using his BiPAP as he is less tired than he was in the past and no longer snoring  He has to work with the masks he has as he has had numerous changes already  He is to try cloth liners to help with mask fit  We used an  phone to discuss the below details about the recall, but the patient did not understand  Will call his daughter (BLANCA in chart) to discuss this with her  Attempted to call his daughter at the number listed in the chart, but it went straight to voice mail   A message was left with a call back number  Continuous Positive Airway Pressure (CPAP) therapy was prescribed to you as a medical necessity and there are risks to discontinuing use of the device, some of which may be long term  Symptoms you experienced before using CPAP may return such as snoring, apneas, excessive daytime sleepiness, hypertension, cardiac arrythmias, risk of stroke, congestive heart-failure, exacerbation of COPD and potential respiratory failure  Ultimately, it is a personal decision for you to make if you continue use of an affected device or discontinue until a replacement is provided  Unfortunately, [employer] has not yet provided us with information about available devices  According to [employer], potential risks of continuing to use an affected device include irritation of skin, eyes and respiratory tract, inflammatory response, headache, asthma, adverse effects to other organs such as kidneys and liver and toxic carcinogenic effects  Do not use an ozone based  for the machine as this will cause the foam to break down faster  Recommendations:    1) BiPAP at 18/14cm with cloth liners for FFM  He has had multiple mask fittings in the past without success  2) Safe driving reviewed  3) Follow-up 7 months  4) Call with any questions or concerns  All questions answered for the patient, who indicated understanding and agreed with the plan       Sherren Hidalgo, MD  Psychiatry/ Sleep medicine

## 2021-07-10 DIAGNOSIS — K64.9 HEMORRHOIDS, UNSPECIFIED HEMORRHOID TYPE: ICD-10-CM

## 2021-07-27 DIAGNOSIS — J30.9 ALLERGIC RHINITIS, UNSPECIFIED SEASONALITY, UNSPECIFIED TRIGGER: ICD-10-CM

## 2021-07-28 DIAGNOSIS — K64.9 HEMORRHOIDS, UNSPECIFIED HEMORRHOID TYPE: ICD-10-CM

## 2021-07-28 RX ORDER — HYDROCORTISONE 25 MG/G
CREAM TOPICAL
Qty: 28 G | Refills: 3 | Status: SHIPPED | OUTPATIENT
Start: 2021-07-28 | End: 2021-08-18

## 2021-08-02 RX ORDER — FLUTICASONE PROPIONATE 50 MCG
SPRAY, SUSPENSION (ML) NASAL
Qty: 16 G | Refills: 2 | Status: SHIPPED | OUTPATIENT
Start: 2021-08-02 | End: 2021-10-11

## 2021-08-04 ENCOUNTER — TELEMEDICINE (OUTPATIENT)
Dept: FAMILY MEDICINE CLINIC | Facility: CLINIC | Age: 76
End: 2021-08-04

## 2021-08-04 ENCOUNTER — OFFICE VISIT (OUTPATIENT)
Dept: GASTROENTEROLOGY | Facility: CLINIC | Age: 76
End: 2021-08-04
Payer: MEDICARE

## 2021-08-04 VITALS
BODY MASS INDEX: 23.07 KG/M2 | TEMPERATURE: 98.3 F | WEIGHT: 147 LBS | DIASTOLIC BLOOD PRESSURE: 61 MMHG | HEIGHT: 67 IN | SYSTOLIC BLOOD PRESSURE: 132 MMHG

## 2021-08-04 VITALS — HEIGHT: 67 IN | WEIGHT: 147 LBS | BODY MASS INDEX: 23.07 KG/M2

## 2021-08-04 DIAGNOSIS — Z86.19 H/O TINEA CRURIS: Chronic | ICD-10-CM

## 2021-08-04 DIAGNOSIS — Z20.822 ENCOUNTER BY TELEHEALTH FOR SUSPECTED COVID-19: Primary | ICD-10-CM

## 2021-08-04 DIAGNOSIS — E11.9 TYPE 2 DIABETES MELLITUS WITHOUT COMPLICATION, WITHOUT LONG-TERM CURRENT USE OF INSULIN (HCC): ICD-10-CM

## 2021-08-04 DIAGNOSIS — K21.9 GASTROESOPHAGEAL REFLUX DISEASE, UNSPECIFIED WHETHER ESOPHAGITIS PRESENT: ICD-10-CM

## 2021-08-04 DIAGNOSIS — Z86.19 HISTORY OF HELICOBACTER PYLORI INFECTION: Primary | ICD-10-CM

## 2021-08-04 PROCEDURE — 3078F DIAST BP <80 MM HG: CPT | Performed by: PHYSICIAN ASSISTANT

## 2021-08-04 PROCEDURE — 3075F SYST BP GE 130 - 139MM HG: CPT | Performed by: PHYSICIAN ASSISTANT

## 2021-08-04 PROCEDURE — G2025 DIS SITE TELE SVCS RHC/FQHC: HCPCS | Performed by: PHYSICIAN ASSISTANT

## 2021-08-04 PROCEDURE — 99214 OFFICE O/P EST MOD 30 MIN: CPT | Performed by: INTERNAL MEDICINE

## 2021-08-04 RX ORDER — BLOOD-GLUCOSE METER
1 EACH MISCELLANEOUS 2 TIMES DAILY
Qty: 100 EACH | Refills: 3 | Status: SHIPPED | OUTPATIENT
Start: 2021-08-04 | End: 2021-11-18

## 2021-08-04 RX ORDER — BLOOD-GLUCOSE METER
EACH MISCELLANEOUS
Qty: 100 EACH | Refills: 5 | Status: SHIPPED | OUTPATIENT
Start: 2021-08-04

## 2021-08-04 RX ORDER — CLOTRIMAZOLE AND BETAMETHASONE DIPROPIONATE 10; .5 MG/ML; MG/ML
LOTION TOPICAL 2 TIMES DAILY
Qty: 30 ML | Refills: 0 | Status: SHIPPED | OUTPATIENT
Start: 2021-08-04 | End: 2022-06-22

## 2021-08-04 RX ORDER — PANTOPRAZOLE SODIUM 40 MG/1
40 TABLET, DELAYED RELEASE ORAL DAILY
Qty: 30 TABLET | Refills: 2 | Status: SHIPPED | OUTPATIENT
Start: 2021-08-04 | End: 2021-09-19

## 2021-08-04 NOTE — PROGRESS NOTES
COVID-19 Outpatient Progress Note    Assessment/Plan:    Problem List Items Addressed This Visit        Endocrine    Type 2 diabetes mellitus (HCC)    Relevant Medications    Easy Comfort Lancets MISC    glucose blood (Easy Plus II Glucose Test) test strip       Other    H/O tinea cruris (Chronic)    Relevant Medications    clotrimazole-betamethasone (LOTRISONE) 1-0 05 % lotion    Encounter by telehealth for suspected COVID-19 - Primary     Pt is asymptomatic and has been for over 3 weeks  COVID testing not recommended   Pt advised to contact our office if symptoms return              Disposition:     I have spent 8 minutes directly with the patient  Verification of patient location:    Patient is located in the following state in which I hold an active license [place]     Encounter provider Javier Suero PA-C    Provider located at [address] [place] [address]   [phone]    Recent Visits  No visits were found meeting these conditions  Showing recent visits within past 7 days and meeting all other requirements  Today's Visits  Date Type Provider Dept   08/04/21 Telemedicine Javier Suero PA-C  [facility]   Showing today's visits and meeting all other requirements  Future Appointments  No visits were found meeting these conditions  Showing future appointments within next 150 days and meeting all other requirements       Patient agrees to participate in a virtual check in via telephone or video visit instead of presenting to the office to address urgent/immediate medical needs  Patient is aware this is a billable service  After connecting through Telephone, the patient was identified by name and date of birth  Madeleine Peres was informed that this was a telemedicine visit and that the exam was being conducted confidentially over secure lines  My office door was closed  No one else was in the room   Madeleine Peres acknowledged consent and understanding of privacy and security of the telemedicine visit  I informed the patient that I have reviewed his record in Epic and presented the opportunity for him to ask any questions regarding the visit today  The patient agreed to participate  It was my intent to perform this visit via video technology but the patient was not able to do a video connection so the visit was completed via audio telephone only  Subjective:   Janet Perales is a 78 y o  male who is concerned about COVID-19  Patient denies fever, chills, fatigue, rhinorrhea, anosmia, loss of taste, shortness of breath, abdominal pain, diarrhea, myalgias and headaches  Date of symptom onset: 7/10/2021    Exposure:   Contact with a person who is under investigation (PUI) for or who is positive for COVID-19 within the last 14 days?: No    Hospitalized recently for fever and/or lower respiratory symptoms?: No      Currently a healthcare worker that is involved in direct patient care?: No      Works in a special setting where the risk of COVID-19 transmission may be high? (this may include long-term care, correctional and snf facilities; homeless shelters; assisted-living facilities and group homes ): No      Resident in a special setting where the risk of COVID-19 transmission may be high? (this may include long-term care, correctional and snf facilities; homeless shelters; assisted-living facilities and group homes ): No      Had diarrhea, fevers, chills, body aches, runny nose, congestion and diarrhea that started 7/10/2021  Symptoms lasting 3-4 days  He denies any further symptoms at this time  He reports he is fully vaccinated       Lab Results   Component Value Date    SARSCOV2 Negative 04/07/2021    SARSCOV2 Not Detected 11/13/2020     Past Medical History:   Diagnosis Date    AS (aortic stenosis)     Balanitis     Biceps tendonitis on right     CAD (coronary artery disease)     Cancer (HCC)     skin    Colon polyp     Complete rotator cuff tear or rupture of right shoulder, not specified as traumatic     CPAP (continuous positive airway pressure) dependence     Diabetes mellitus (HCC)     Esophageal reflux     High cholesterol     Hypertension     Hypertriglyceridemia     Hypogonadism in male     Myalgia     Myocardial infarction ([facility] [address] )     Palpitations     Shoulder pain     Sinus problem     Skin cancer of nose     Sleep apnea     Stroke ([facility] [address] ) 9228    Systolic murmur     recently found    Tinea cruris     Tinea pedis      Past Surgical History:   Procedure Laterality Date    CARPAL TUNNEL RELEASE      CATARACT EXTRACTION Bilateral     COLONOSCOPY  2019    CORONARY ANGIOPLASTY WITH STENT PLACEMENT      10-15-19 - pt denies stent implant    CORONARY ARTERY BYPASS GRAFT N/A 12/12/2016    Procedure: INTRAOP CECILLE; BILATERAL LEG EVH; CORONARY ARTERY BYPASS GRAFT X 4 SVG TO PDA, OM1,  AND DIAGONAL1, LIMA TO LAD;  Surgeon: Evangelista Olvera MD;  [facility];  Service:     EYE SURGERY      HERNIA REPAIR      [place] ARTHROSCOPY SHOULDER SURGICAL BICEPS TENODESIS Right 5/6/2016    Procedure: ARTHROSCOPIC BICEPS TENODESIS;  Surgeon: Tsering Yeung MD;  [facility];  Service: Orthopedics    NH INCISE FINGER TENDON SHEATH Right 10/15/2019    Procedure: TRIGGER FINGER RELEASE INDEX, LONG, RING, SMALL, THUMB FINGERS;  Surgeon: Bhavik Alex MD;  [facility];  Service: Orthopedics    NH INCISE FINGER TENDON SHEATH Left 3/3/2020    Procedure: RELEASE TRIGGER FINGER INDEX;  Surgeon: Bhavik Alex MD;  [facility];  Service: Orthopedics    NH SHLDR ARTHROSCOP,SURG,W/ROTAT CUFF REPR Right 5/6/2016    Procedure: REPAIR ROTATOR CUFF  ARTHROSCOPIC; SUBACROMIAL DECOMPRESSION; PARTIAL SYNOVECTOMY;  Surgeon: Tsering Yeung MD;  [facility];  Service: Orthopedics    NH WRIST Nelia Gamma LIG Right 10/15/2019    Procedure: ENDOSCOPIC CARPAL TUNNEL RELEASE;  Surgeon: Malina Tran MD;  [facility];  Service: Orthopedics    ME WRIST Jock Postal LIG Left 3/3/2020    Procedure: RELEASE CARPAL TUNNEL ENDOSCOPIC;  Surgeon: Malina Tran MD;  [facility];  Service: Orthopedics    [facility] UMBILICAL HERNIA REPAIR  2009    over age 13     Current Outpatient Medications   Medication Sig Dispense Refill    Alcohol Swabs (Alcohol Pads) 70 % PADS USE TWICE DAILY 100 each 3    Arginine 1000 MG TABS Take 1 pill per day      ARIPiprazole (ABILIFY) 5 mg tablet Take 5 mg by mouth daily at bedtime      aspirin (ECOTRIN LOW STRENGTH) 81 mg EC tablet TAKE 1 TABLET (81 MG TOTAL) BY MOUTH DAILY 90 tablet 2    atorvastatin (LIPITOR) 80 mg tablet Take 1 tablet (80 mg total) by mouth daily 90 tablet 1    bisacodyl 5 MG EC tablet TAKE 1 TABLET (5 MG TOTAL) BY MOUTH DAILY AS NEEDED FOR CONSTIPATION 30 tablet 0    calcium carbonate (TUMS) 500 mg chewable tablet CHEW 1 TABLET (1,250 MG TOTAL) 3 (THREE) TIMES A DAY AS NEEDED FOR HEARTBURN 90 tablet 3    cholecalciferol (VITAMIN D3) 25 mcg (1,000 units) tablet TAKE 1 TABLET BY MOUTH DAILY 90 tablet 1    clorazepate (TRANXENE) 7 5 mg tablet Take 7 5 mg by mouth daily as needed for anxiety   0    clotrimazole-betamethasone (LOTRISONE) 1-0 05 % lotion Apply topically 2 (two) times a day 30 mL 0    Cyanocobalamin (Vitamin B 12) 500 MCG TABS Take by mouth daily      dicyclomine (BENTYL) 10 mg capsule Take 1 capsule (10 mg total) by mouth 3 (three) times a day before meals (Patient taking differently: Take 10 mg by mouth 3 (three) times a day before meals Taking as needed up to 3 times daily) 90 capsule 1    DULoxetine (CYMBALTA) 60 mg delayed release capsule Take 60 mg by mouth every morning      Easy Comfort Lancets MISC For checking bs  each 5    Elastic Bandages & Supports (MEDICAL COMPRESSION STOCKINGS) MISC by Does not apply route daily Please provide B/L compression stockings 2 each 0    fluticasone (FLONASE) 50 mcg/act nasal spray INHALE 1 SPRAY INTO EACH NOSTRIL DAILY 16 g 2    furosemide (LASIX) 20 mg tablet Take 1 tablet (20 mg total) by mouth daily Patient takes as needed 30 tablet 8    glucose blood (Easy Plus II Glucose Test) test strip Use 1 each 2 (two) times a day Test as directed 100 each 3    glucose monitoring kit (FREESTYLE) monitoring kit 1 each by Does not apply route 2 (two) times a day Check BS BID PRN for hypoglycemia/hyperglycemia 1 each 0    Jardiance 25 MG TABS TAKE 1 TABLET (25 MG TOTAL) BY MOUTH EVERY MORNING 30 tablet 5    Lancets (FREESTYLE) lancets Use as instructed to check BS BID PRN (hypoglycemia/hyperglycemia symptoms) 180 each 2    lisinopril (ZESTRIL) 20 mg tablet Take 1 tablet (20 mg total) by mouth daily 90 tablet 3    metFORMIN (GLUCOPHAGE) 500 mg tablet TAKE 1 TABLET (500 MG TOTAL) BY MOUTH 2 (TWO) TIMES A DAY WITH MEALS 180 tablet 2    metoprolol tartrate (LOPRESSOR) 25 mg tablet TAKE 1 TABLET (25 MG TOTAL) BY MOUTH EVERY 12 (TWELVE) HOURS AS DIRECTED 180 tablet 2    NIFEdipine ER (ADALAT CC) 30 MG 24 hr tablet TAKE 1 TABLET (30 MG TOTAL) BY MOUTH 2 (TWO) TIMES A DAY 60 tablet 11    nystatin (MYCOSTATIN) cream Apply topically Three times a day      ONETOUCH DELICA LANCETS FINE MISC Test blood sugar twice daily, or as needed when symptomatic of hypoglycemia or hyperglycemia 100 each 5    pantoprazole (PROTONIX) 40 mg tablet Take 1 tablet (40 mg total) by mouth daily 30 tablet 2    Procto-Med HC 2 5 % rectal cream APPLY TOPICALLY 2 (TWO) TIMES A DAY AS DIRECTED 28 g 3    tobramycin-dexamethasone (TOBRADEX) ophthalmic ointment 0 5 inches daily       No current facility-administered medications for this visit  Allergies   Allergen Reactions    Epinephrine Hives and Anxiety    Penicillins Hives and Anxiety       Review of Systems   Constitutional: Negative for chills, fatigue and fever     HENT: Negative for rhinorrhea  Respiratory: Negative for shortness of breath  Gastrointestinal: Negative for abdominal pain and diarrhea  Musculoskeletal: Negative for myalgias  Neurological: Negative for weakness and headaches  Objective:    Vitals:    08/04/21 1627   Weight: 66 7 kg (147 lb)   Height: 5' 7" (1 702 m)       Physical Exam    VIRTUAL VISIT DISCLAIMER    Nakul Boone verbally agrees to participate in [employer]  Pt is aware that [employer] could be limited without vital signs or the ability to perform a full hands-on physical Caitlin Norwood understands he or the provider may request at any time to terminate the video visit and request the patient to seek care or treatment in person

## 2021-08-04 NOTE — PROGRESS NOTES
[facility] - Outpatient Follow-up Note  Memo Cruz 78 y o  male MRN: [id number]  Encounter: [id number]    ASSESSMENT AND PLAN:    Memo Cruz is a 78 y o  old male with PMH: HTN, HLD, CAD, previous H pylori infection, DM2 who presents for:    #Dyspepsia  #Hx of H pylori, GERD  Patient noted history dyspepsia symptoms  Recently started on Protonix 40 mg daily which has significantly helped his acid reflux symptoms and mildly improved his abdominal pain  However given his age will proceed with upper endoscopy to rule out Park's in setting of GERD as well as other sources for abdominal discomfort  Will have patient continues PPI therapy at this time given significant improvement in his reflux symptoms  Of note patient also had history of H pylori in the past, will rule out as source abdominal discomfort  Plan:  -continue protonix 40mg qdaily, refilled  -EGD ordered   -please get gastric and duodenal biopsies  -H pylori stool antigen  -avoid NSAIDs    #Constipation  #Hx of Hemorrhoids  Patient's history constipation and hemorrhoids  Since last visit states that his constipation has improved  -continue hydrocortisone rectal cream  -continue bisacodyl p r n  #Colon Cancer Screening  Patient with history of sigmoid diverticulosis seen on colonoscopy 2019  Plan for repeat colonoscopy in 2024   ______________________________________________________________________    SUBJECTIVE:    Patient with longstanding history of intermittent abdominal discomfort  States that at his last visit he was prescribed Protonix 40 mg b i d  and that significantly improved his acid reflux symptoms  Since that time it is also helped with his abdominal pain  Of note he had few days of intermittent diarrhea roughly 3 weeks ago but since that time has had a significant improvement in his symptoms  Denies any significant alarm symptoms at this time    States he has lost 5-10 lb in the last year but has not had any significant drastic weight loss  States his appetite is normal     Underwent CT A/P with contrast in November 2020 - diverticulosis, cystitis    Last colonoscopy - 4/16/19 - sigmoid diverticulitis, plan for repeat in 2024  Last EGD - never    REVIEW OF SYSTEMS IS OTHERWISE NEGATIVE      Historical Information   Past Medical History:   Diagnosis Date    AS (aortic stenosis)     Balanitis     Biceps tendonitis on right     CAD (coronary artery disease)     Cancer (HCC)     skin    Colon polyp     Complete rotator cuff tear or rupture of right shoulder, not specified as traumatic     CPAP (continuous positive airway pressure) dependence     Diabetes mellitus (HCC)     Esophageal reflux     High cholesterol     Hypertension     Hypertriglyceridemia     Hypogonadism in male     Myalgia     [facility] infarction ([facility] [address] [facility]     Palpitations     Shoulder pain     Sinus problem     Skin cancer of nose     Sleep apnea     Stroke ([facility] [address] 6033    Systolic murmur     recently found    Tinea cruris     Tinea pedis      Past Surgical History:   Procedure Laterality Date    CARPAL TUNNEL RELEASE      CATARACT EXTRACTION Bilateral     COLONOSCOPY  2019    CORONARY ANGIOPLASTY WITH STENT PLACEMENT      10-15-19 - pt denies stent implant    CORONARY ARTERY BYPASS GRAFT N/A 12/12/2016    Procedure: INTRAOP CECILLE; BILATERAL LEG EVH; CORONARY ARTERY BYPASS GRAFT X 4 SVG TO PDA, OM1,  AND DIAGONAL1, LIMA TO LAD;  Surgeon: Jessy Perkins MD;  [facility];  Service:     EYE SURGERY      HERNIA REPAIR      [place] ARTHROSCOPY SHOULDER SURGICAL BICEPS TENODESIS Right 5/6/2016    Procedure: ARTHROSCOPIC BICEPS TENODESIS;  Surgeon: Coleen Conley MD;  [facility];  Service: Orthopedics    [place] INCISE FINGER TENDON SHEATH Right 10/15/2019    Procedure: TRIGGER FINGER RELEASE INDEX, LONG, RING, SMALL, THUMB FINGERS;  Surgeon: Anil Young MD;  [facility];  Service: Orthopedics    [place] INCISE FINGER TENDON SHEATH Left 3/3/2020    Procedure: RELEASE TRIGGER FINGER INDEX;  Surgeon: Shavonne Lyman MD;  [facility];  Service: Orthopedics    TX SHLDR ARTHROSCOP,SURG,W/ROTAT CUFF REPR Right 5/6/2016    Procedure: REPAIR ROTATOR CUFF  ARTHROSCOPIC; SUBACROMIAL DECOMPRESSION; PARTIAL SYNOVECTOMY;  Surgeon: Velia Rainey MD;  [facility];  Service: Orthopedics    TX WRIST Chalice Pastel LIG Right 10/15/2019    Procedure: ENDOSCOPIC CARPAL TUNNEL RELEASE;  Surgeon: Shavonne Lyman MD;  [facility];  Service: Orthopedics    TX WRIST Chalice Pastel LIG Left 3/3/2020    Procedure: RELEASE CARPAL TUNNEL ENDOSCOPIC;  Surgeon: Shavonne Lyman MD;  [facility];  Service: Orthopedics    ROTATOR CUFF REPAIR      TESTICLE SURGERY      UMBILICAL HERNIA REPAIR  2009    over age 13     Social History   Social History     Substance and Sexual Activity   Alcohol Use No     Social History     Substance and Sexual Activity   Drug Use No     Social History     Tobacco Use   Smoking Status Former Smoker   Smokeless Tobacco Former User   Tobacco Comment    smoked for 3 years from 17 y/o - 23 yrs old     Family History   Problem Relation Age of Onset    Heart disease Mother     Hypertension Mother     Nephrolithiasis Father     Other Father         Renal disease    Hypertension Sister     Nephrolithiasis Brother     Other Brother         Renal disease    Hematuria Family         Microscopic       Meds/Allergies     Current Outpatient Medications:     Alcohol Swabs (Alcohol Pads) 70 % PADS    Arginine 1000 MG TABS    ARIPiprazole (ABILIFY) 5 mg tablet    aspirin (ECOTRIN LOW STRENGTH) 81 mg EC tablet    atorvastatin (LIPITOR) 80 mg tablet    bisacodyl 5 MG EC tablet    calcium carbonate (TUMS) 500 mg chewable tablet    cholecalciferol (VITAMIN D3) 25 mcg (1,000 units) tablet    clorazepate (TRANXENE) 7 5 mg tablet   clotrimazole-betamethasone (LOTRISONE) 1-0 05 % lotion    Cyanocobalamin (Vitamin B 12) 500 MCG TABS    dicyclomine (BENTYL) 10 mg capsule    DULoxetine (CYMBALTA) 60 mg delayed release capsule    Easy Comfort Lancets MISC    Easy Plus II Glucose Test test strip    Elastic Bandages & Supports (MEDICAL COMPRESSION STOCKINGS) MISC    fluticasone (FLONASE) 50 mcg/act nasal spray    furosemide (LASIX) 20 mg tablet    glucose blood (FREESTYLE TEST STRIPS) test strip    glucose blood (GLUCOSE METER TEST) test strip    glucose monitoring kit (FREESTYLE) monitoring kit    Jardiance 25 MG TABS    Lancets (FREESTYLE) lancets    lisinopril (ZESTRIL) 20 mg tablet    metFORMIN (GLUCOPHAGE) 500 mg tablet    metoprolol tartrate (LOPRESSOR) 25 mg tablet    NIFEdipine ER (ADALAT CC) 30 MG 24 hr tablet    nystatin (MYCOSTATIN) cream    omeprazole (PriLOSEC) 20 mg delayed release capsule    ONETOUCH DELICA LANCETS FINE MISC    pantoprazole (PROTONIX) 40 mg tablet    Procto-Med HC 2 5 % rectal cream    tobramycin-dexamethasone (TOBRADEX) ophthalmic ointment  Allergies   Allergen Reactions    Epinephrine Hives and Anxiety    Penicillins Hives and Anxiety       Objective     Blood pressure 132/61, temperature 98 3 °F (36 8 °C), temperature source Tympanic, height 5' 7" (1 702 m), weight 66 7 kg (147 lb)  Body mass index is 23 02 kg/m²  PHYSICAL EXAM:    General Appearance:   Alert, cooperative, no distress   HEENT:   Normocephalic, atraumatic, anicteric  Neck:  Supple, symmetrical, trachea midline   Lungs:   Clear to auscultation bilaterally; no rales, rhonchi or wheezing; respirations unlabored    Heart[de-identified]   Regular rate and rhythm; no murmur, rub, or gallop     Abdomen:   Soft, nontender on palpation   Genitalia:   Deferred    Rectal:   Deferred    Extremities:  No cyanosis, clubbing or edema    Pulses:  2+ and symmetric    Skin:  No jaundice, rashes, or lesions    Lymph nodes:  No palpable cervical lymphadenopathy      Lab Results:   No visits with results within 1 Day(s) from this visit     Latest known visit with results is:   Office Visit on 06/22/2021   Component Date Value    Hemoglobin A1C 06/22/2021 6 8*       Radiology Results:   No results found     ---------------------------------------------------  Note Electronically Signed By:    MD Bernie Sharif 75 Gastroenterology Fellow PGY-5  [address] [facility] #: [id number]

## 2021-08-04 NOTE — PATIENT INSTRUCTIONS
EGD scheduled for Thursday, 9/30/21, at [facility] with Dr Fabian Krishna  EGD ([place]ian) and diabetic prep instructions provided to patient in office

## 2021-08-05 NOTE — ASSESSMENT & PLAN NOTE
Pt is asymptomatic and has been for over 3 weeks  COVID testing not recommended   Pt advised to contact our office if symptoms return

## 2021-08-06 ENCOUNTER — OFFICE VISIT (OUTPATIENT)
Dept: CARDIOLOGY CLINIC | Facility: CLINIC | Age: 76
End: 2021-08-06
Payer: MEDICARE

## 2021-08-06 VITALS
SYSTOLIC BLOOD PRESSURE: 102 MMHG | BODY MASS INDEX: 24.64 KG/M2 | OXYGEN SATURATION: 97 % | DIASTOLIC BLOOD PRESSURE: 74 MMHG | HEIGHT: 67 IN | WEIGHT: 157 LBS | HEART RATE: 60 BPM

## 2021-08-06 DIAGNOSIS — Z95.1 S/P CABG (CORONARY ARTERY BYPASS GRAFT): ICD-10-CM

## 2021-08-06 DIAGNOSIS — I25.10 CORONARY ARTERY DISEASE INVOLVING NATIVE CORONARY ARTERY OF NATIVE HEART WITHOUT ANGINA PECTORIS: Primary | ICD-10-CM

## 2021-08-06 DIAGNOSIS — I35.0 MILD AORTIC STENOSIS: ICD-10-CM

## 2021-08-06 DIAGNOSIS — I10 ESSENTIAL HYPERTENSION: ICD-10-CM

## 2021-08-06 DIAGNOSIS — Z95.5 S/P DRUG ELUTING CORONARY STENT PLACEMENT: Chronic | ICD-10-CM

## 2021-08-06 PROCEDURE — 99214 OFFICE O/P EST MOD 30 MIN: CPT | Performed by: INTERNAL MEDICINE

## 2021-08-06 NOTE — PROGRESS NOTES
Cardiology Follow Up    Tobi Siddiqi  1945  [id number]  [address] [facility]  [address] [place] [address] [address]    1  Coronary artery disease involving native coronary artery of native heart without angina pectoris  Comprehensive metabolic panel    Lipid panel    LDL cholesterol, direct   2  Essential hypertension     3  Mild aortic stenosis     4  S/P CABG (coronary artery bypass graft)     5  S/P drug eluting coronary stent placement         Discussion/Summary    1  CAD:  Stable  Continues without any angina  No indication for any testing other than routine blood work which was ordered  2  Hypertension:  BP is controlled on his home readings which he brought a copy of today  3  AS: Mild in 6/2019  Repeat echo likely next year       4  Edema: previously seen, currently resolved  Previous History:  History of coronary artery disease with MI in 2006 with a drug-eluting stent to the LAD  Subsequently with an MI in 2012 with stent to the RCA  December 2016, and other NSTEMI at that time multivessel disease and underwent 4 vessel bypass with LIMA to the LAD, vein grafts to the diagonal, OM1, PDA  Low-normal LV function with inferior wall motion abnormality  Mild aortic stenosis in 6/2019    Interval History:    No cardiac symptoms  Continues to have some abdominal pain  Takes bentyl which helps  To get EGD soon  Up and down with weight  No chest pain  Cardiac medications are the same  Blood work for lipid panel last done in Oct 2020, but was stable at the time          Problem List     Biceps tendonitis on right    Complete rotator cuff tear or rupture of right shoulder, not specified as traumatic (Chronic)    NSTEMI (non-ST elevated myocardial infarction) ([address] )    S/P drug eluting coronary stent placement (Chronic)    H/O non-ST elevation myocardial infarction (NSTEMI) (Chronic)    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) (Chronic)    Lab Results   Component Value Date    HGBA1C 6 8 (A) 06/22/2021       No results for input(s): POCGLU in the last 72 hours  Blood Sugar Average: Last 72 hrs:          Polyarthralgia (Chronic)    Hematuria, microscopic (Chronic)    H/O tinea cruris (Chronic)    Erectile dysfunction associated with type 2 diabetes mellitus (HCC) (Chronic)    Lab Results   Component Value Date    HGBA1C 6 8 (A) 06/22/2021       No results for input(s): POCGLU in the last 72 hours  Blood Sugar Average: Last 72 hrs: Anxiety    Type 2 diabetes mellitus (HCC)    Lab Results   Component Value Date    HGBA1C 6 8 (A) 06/22/2021       No results for input(s): POCGLU in the last 72 hours      Blood Sugar Average: Last 72 hrs:          Gastroesophageal reflux disease    Bilateral carpal tunnel syndrome    Non-rheumatic aortic stenosis    Coronary artery disease involving native coronary artery of native heart without angina pectoris        Past Medical History:   Diagnosis Date    AS (aortic stenosis)     Balanitis     Biceps tendonitis on right     CAD (coronary artery disease)     Cancer (HCC)     skin    Colon polyp     Complete rotator cuff tear or rupture of right shoulder, not specified as traumatic     CPAP (continuous positive airway pressure) dependence     Diabetes mellitus (HCC)     Esophageal reflux     High cholesterol     Hypertension     Hypertriglyceridemia     Hypogonadism in male     Myalgia     Myocardial infarction ([address] )     Palpitations     Shoulder pain     Sinus problem     Skin cancer of nose     Sleep apnea     Stroke ([facility] [address] ) [address]    Systolic murmur     recently found    Tinea cruris     Tinea pedis      Social History     Tobacco Use    Smoking status: Former Smoker    Smokeless tobacco: Former User    Tobacco comment: smoked for 3 years from 17 y/o - 23 yrs old   Substance Use Topics    Alcohol use: No     Family History   Problem Relation Age of Onset    Heart disease Mother     Hypertension Mother     Nephrolithiasis Father     Other Father         Renal disease    Hypertension Sister     Nephrolithiasis Brother     Other Brother         Renal disease    Hematuria Family         Microscopic     Past Surgical History:   Procedure Laterality Date    CARPAL TUNNEL RELEASE      CATARACT EXTRACTION Bilateral     COLONOSCOPY  2019    CORONARY ANGIOPLASTY WITH STENT PLACEMENT      10-15-19 - pt denies stent implant    CORONARY ARTERY BYPASS GRAFT N/A 12/12/2016    Procedure: INTRAOP CECILLE; BILATERAL LEG EVH; CORONARY ARTERY BYPASS GRAFT X 4 SVG TO PDA, OM1,  AND DIAGONAL1, LIMA TO LAD;  Surgeon: Alla Chandra MD;  Location: [facility];  Service:     EYE SURGERY      HERNIA REPAIR      GA ARTHROSCOPY SHOULDER SURGICAL BICEPS TENODESIS Right 5/6/2016    Procedure: ARTHROSCOPIC BICEPS TENODESIS;  Surgeon: Merlin Castellanos MD;  [facility];  Service: Orthopedics    GA INCISE FINGER TENDON SHEATH Right 10/15/2019    Procedure: TRIGGER FINGER RELEASE INDEX, LONG, RING, SMALL, THUMB FINGERS;  Surgeon: Cristian Martinez MD;  [facility];  Service: Orthopedics    GA INCISE FINGER TENDON SHEATH Left 3/3/2020    Procedure: RELEASE TRIGGER FINGER INDEX;  Surgeon: Cristian Martinez MD;  [facility];  Service: Orthopedics    GA SHLDR ARTHROSCOP,SURG,W/ROTAT CUFF REPR Right 5/6/2016    Procedure: REPAIR ROTATOR CUFF  ARTHROSCOPIC; SUBACROMIAL DECOMPRESSION; PARTIAL SYNOVECTOMY;  Surgeon: Merlin Castellanos MD;  [facility];  Service: Orthopedics    GA WRIST Hesham Rust LIG Right 10/15/2019    Procedure: ENDOSCOPIC CARPAL TUNNEL RELEASE;  Surgeon: Cristian Martinez MD;  [facility];  Service: Orthopedics    GA WRIST Hesham Rust LIG Left 3/3/2020    Procedure: RELEASE CARPAL TUNNEL ENDOSCOPIC;  Surgeon: Cristian Martinez MD; [facility];  Service: Orthopedics    ROTATOR CUFF REPAIR      TESTICLE SURGERY      UMBILICAL HERNIA REPAIR  2009    over age 13       Current Outpatient Medications:     Alcohol Swabs (Alcohol Pads) 70 % PADS, USE TWICE DAILY, Disp: 100 each, Rfl: 3    Arginine 1000 MG TABS, Take 1 pill per day, Disp: , Rfl:     ARIPiprazole (ABILIFY) 5 mg tablet, Take 5 mg by mouth daily at bedtime, Disp: , Rfl:     aspirin (ECOTRIN LOW STRENGTH) 81 mg EC tablet, TAKE 1 TABLET (81 MG TOTAL) BY MOUTH DAILY, Disp: 90 tablet, Rfl: 2    atorvastatin (LIPITOR) 80 mg tablet, Take 1 tablet (80 mg total) by mouth daily, Disp: 90 tablet, Rfl: 1    bisacodyl 5 MG EC tablet, TAKE 1 TABLET (5 MG TOTAL) BY MOUTH DAILY AS NEEDED FOR CONSTIPATION, Disp: 30 tablet, Rfl: 0    calcium carbonate (TUMS) 500 mg chewable tablet, CHEW 1 TABLET (1,250 MG TOTAL) 3 (THREE) TIMES A DAY AS NEEDED FOR HEARTBURN, Disp: 90 tablet, Rfl: 3    cholecalciferol (VITAMIN D3) 25 mcg (1,000 units) tablet, TAKE 1 TABLET BY MOUTH DAILY, Disp: 90 tablet, Rfl: 1    clorazepate (TRANXENE) 7 5 mg tablet, Take 7 5 mg by mouth daily as needed for anxiety , Disp: , Rfl: 0    clotrimazole-betamethasone (LOTRISONE) 1-0 05 % lotion, Apply topically 2 (two) times a day, Disp: 30 mL, Rfl: 0    Cyanocobalamin (Vitamin B 12) 500 MCG TABS, Take by mouth daily, Disp: , Rfl:     dicyclomine (BENTYL) 10 mg capsule, Take 1 capsule (10 mg total) by mouth 3 (three) times a day before meals (Patient taking differently: Take 10 mg by mouth 3 (three) times a day before meals Taking as needed up to 3 times daily), Disp: 90 capsule, Rfl: 1    Easy Comfort Lancets MISC, For checking bs BID, Disp: 100 each, Rfl: 5    Elastic Bandages & Supports (MEDICAL COMPRESSION STOCKINGS) MISC, by Does not apply route daily Please provide B/L compression stockings, Disp: 2 each, Rfl: 0    fluticasone (FLONASE) 50 mcg/act nasal spray, INHALE 1 SPRAY INTO EACH NOSTRIL DAILY, Disp: 16 g, Rfl: 2    furosemide (LASIX) 20 mg tablet, Take 1 tablet (20 mg total) by mouth daily Patient takes as needed, Disp: 30 tablet, Rfl: 8    glucose blood (Easy Plus II Glucose Test) test strip, Use 1 each 2 (two) times a day Test as directed, Disp: 100 each, Rfl: 3    glucose monitoring kit (FREESTYLE) monitoring kit, 1 each by Does not apply route 2 (two) times a day Check BS BID PRN for hypoglycemia/hyperglycemia, Disp: 1 each, Rfl: 0    Jardiance 25 MG TABS, TAKE 1 TABLET (25 MG TOTAL) BY MOUTH EVERY MORNING, Disp: 30 tablet, Rfl: 5    Lancets (FREESTYLE) lancets, Use as instructed to check BS BID PRN (hypoglycemia/hyperglycemia symptoms), Disp: 180 each, Rfl: 2    lisinopril (ZESTRIL) 20 mg tablet, Take 1 tablet (20 mg total) by mouth daily, Disp: 90 tablet, Rfl: 3    metFORMIN (GLUCOPHAGE) 500 mg tablet, TAKE 1 TABLET (500 MG TOTAL) BY MOUTH 2 (TWO) TIMES A DAY WITH MEALS, Disp: 180 tablet, Rfl: 2    metoprolol tartrate (LOPRESSOR) 25 mg tablet, TAKE 1 TABLET (25 MG TOTAL) BY MOUTH EVERY 12 (TWELVE) HOURS AS DIRECTED, Disp: 180 tablet, Rfl: 2    NIFEdipine ER (ADALAT CC) 30 MG 24 hr tablet, TAKE 1 TABLET (30 MG TOTAL) BY MOUTH 2 (TWO) TIMES A DAY, Disp: 60 tablet, Rfl: 11    nystatin (MYCOSTATIN) cream, Apply topically Three times a day, Disp: , Rfl:     ONETOUCH DELICA LANCETS FINE MISC, Test blood sugar twice daily, or as needed when symptomatic of hypoglycemia or hyperglycemia, Disp: 100 each, Rfl: 5    pantoprazole (PROTONIX) 40 mg tablet, Take 1 tablet (40 mg total) by mouth daily, Disp: 30 tablet, Rfl: 2    Procto-Med HC 2 5 % rectal cream, APPLY TOPICALLY 2 (TWO) TIMES A DAY AS DIRECTED, Disp: 28 g, Rfl: 3    tobramycin-dexamethasone (TOBRADEX) ophthalmic ointment, 0 5 inches daily, Disp: , Rfl:     DULoxetine (CYMBALTA) 60 mg delayed release capsule, Take 60 mg by mouth every morning (Patient not taking: Reported on 8/6/2021), Disp: , Rfl:   Allergies   Allergen Reactions    Epinephrine Hives and Anxiety    Penicillins Hives and Anxiety       Vitals:    08/06/21 1551   BP: 102/74   BP Location: Right arm   Patient Position: Sitting   Cuff Size: Adult   Pulse: 60   SpO2: 97%   Weight: 71 2 kg (157 lb)   Height: 5' 7" (1 702 m)     Vitals:    08/06/21 1551   Weight: 71 2 kg (157 lb)      Height: 5' 7" (170 2 cm)   Body mass index is 24 59 kg/m²  Physical Exam:  GEN: Natalie Olvera appears well, alert and oriented x 3, pleasant and cooperative   HEENT: pupils equal, round, and reactive to light; extraocular muscles intact  NECK: supple, no carotid bruits   HEART: regular rhythm, 2/6 MARTHA  LUNGS: clear to auscultation bilaterally; no wheezes, rales, or rhonchi   ABDOMEN: normal bowel sounds, soft, no tenderness, no distention  EXTREMITIES: peripheral pulses normal; no clubbing, cyanosis, or edema  NEURO: no focal findings   SKIN: normal without suspicious lesions on exposed skin    ROS:  Positive for erectile dysfunction,   Except as noted in HPI, is otherwise reviewed in detail and a 12 point review of systems is negative    ROS reviewed and is unchanged    Labs:  Lab Results   Component Value Date     08/01/2017    K 4 2 01/05/2021     01/05/2021    CREATININE 0 88 01/05/2021    BUN 10 01/05/2021    CO2 29 01/05/2021    ALT 46 01/05/2021    AST 31 01/05/2021    INR 1 40 (H) 12/12/2016    GLUF 124 (H) 10/21/2020    HGBA1C 6 8 (A) 06/22/2021    WBC 8 27 01/05/2021    HGB 14 1 01/05/2021    HCT 44 3 01/05/2021     01/05/2021       Lab Results   Component Value Date    CHOL 117 (L) 03/17/2017    CHOL 270 (H) 03/09/2016    CHOL 176 03/10/2014     Lab Results   Component Value Date    LDLCALC 84 10/21/2020    LDLCALC 70 11/14/2019    LDLCALC 113 (H) 08/13/2019     Lab Results   Component Value Date    HDL 69 10/21/2020    HDL 64 11/14/2019    HDL 54 08/13/2019     Lab Results   Component Value Date    TRIG 120 10/21/2020    TRIG 84 07/23/2020    TRIG 97 11/14/2019     Testing:  Echo 6/28/19:  LEFT VENTRICLE:  Systolic function was at the lower limits of normal  Ejection fraction was estimated to be 50 %  There was hypokinesis of the apical wall(s)  Wall thickness was mildly increased  Features were consistent with a pseudonormal left ventricular filling pattern, with concomitant abnormal relaxation and increased filling pressure (grade 2 diastolic dysfunction)      LEFT ATRIUM:  The atrium was mildly dilated      MITRAL VALVE:  There was mild annular calcification  There was mild regurgitation      AORTIC VALVE:  Transaortic velocity was increased due to valvular stenosis  There was mild stenosis  The calculated aortic valve area was 1 8 cm^2  There was mild regurgitation      TRICUSPID VALVE:  There was mild regurgitation  Pulmonary artery systolic pressure was within the normal range  Echo 12/2016:  LEFT VENTRICLE:  Systolic function was at the lower limits of normal  Ejection fraction was  estimated to be 53 %  There was severe hypokinesis of the basal inferior and  basal inferolateral wall(s)  Wall thickness was mildly to moderately increased  There was mild concentric hypertrophy      RIGHT VENTRICLE:  The size was at the upper limits of normal      LEFT ATRIUM:  The atrium was mildly dilated      MITRAL VALVE:  There was mild regurgitation      AORTIC VALVE:  There was mild stenosis  There was mild regurgitation  Systolic area of 1 7 cm squared by planimetry  Estimated aortic valve area (by VTI) was 1 97 cm squared  Aortic valve obstructive index (by Vmax) was 0 5  Estimated aortic valve area (by Vmax) was 1 73 cm squared      TRICUSPID VALVE:  There was trace regurgitation

## 2021-08-18 ENCOUNTER — OFFICE VISIT (OUTPATIENT)
Dept: OBGYN CLINIC | Facility: HOSPITAL | Age: 76
End: 2021-08-18
Payer: MEDICARE

## 2021-08-18 VITALS
WEIGHT: 152.8 LBS | DIASTOLIC BLOOD PRESSURE: 56 MMHG | HEIGHT: 67 IN | BODY MASS INDEX: 23.98 KG/M2 | HEART RATE: 59 BPM | SYSTOLIC BLOOD PRESSURE: 114 MMHG

## 2021-08-18 DIAGNOSIS — M65.332 TRIGGER MIDDLE FINGER OF LEFT HAND: Primary | ICD-10-CM

## 2021-08-18 DIAGNOSIS — K64.9 HEMORRHOIDS, UNSPECIFIED HEMORRHOID TYPE: ICD-10-CM

## 2021-08-18 PROCEDURE — 99214 OFFICE O/P EST MOD 30 MIN: CPT | Performed by: ORTHOPAEDIC SURGERY

## 2021-08-18 RX ORDER — HYDROCORTISONE 25 MG/G
CREAM TOPICAL
Qty: 28 G | Refills: 3 | Status: SHIPPED | OUTPATIENT
Start: 2021-08-18 | End: 2021-11-22

## 2021-08-18 RX ORDER — CLINDAMYCIN PHOSPHATE 900 MG/50ML
900 INJECTION INTRAVENOUS ONCE
Status: CANCELLED | OUTPATIENT
Start: 2021-08-18 | End: 2021-08-18

## 2021-08-18 NOTE — PATIENT INSTRUCTIONS
[facility]  Meaghan Castro MD  Chief of [facility]  [address] [place] [address], [address] [place] [address]  [phone]  You have chosen to undergo surgery for your condition  Any surgery is associated with risks of potential complications  Certain medical problems such as smoking, diabetes, thyroid disease, neuropathy, malnutrition or bleeding problems may increase your risk of complications  Complications after surgery are rare but include the following:   Bleeding Infection   Scar Pain   Tenderness Problems with healing   Damage to nerves Damage to blood vessels   Lack of desired results Need for further surgery   Numbness Stiffness   Problems with anesthesia Blood clots   Need for hardware removal (if inserted)    If bony work is done, other risks include:   Delayed bone healing   Lack of bone healing   Bones healing in wrong position    While these risks and complications are rare and infrequent they are still important to know and discuss  If you have any other questions, please let me know  _____________________________________________________________________________________  It can be difficult, but it is possible to get on with your life with only one hand for the first few weeks after your operation  The following are a few suggestions that may be helpful when you're recovering from your hand problem or from your hand surgery  While most of these suggestions are really only applicable if your dominant or your writing hand is affected, some apply to problems involving either hand  Most daily activities can be accomplished with some modifications, rather than the need for store bought devices  Before surgery, if you can:   Ask for help: Have others help you with: childcare, housework, and meals   Practice: Dressing, undressing, using the toilet, brushing your teeth, showering   Prepare: for the first few days after surgery    o Open and re-sealed cans and bottles that you may need   o Open medication containers and leave them easy-to-read open  Make sure you put these medication containers out of reach of children, even if you don't expect children to visit you   o Prepare and think about no-cutmeals-sandwiches, ground meats, etc     It helps to have   In the shower  o Plastic bags and rubber bands to cover bandages-the leonard that newspapers come in are good  Also, small trashcan liners will work  Use 2 of these at a time  The other option is an oversized rubber glove that may be purchased at a food store to aid in dishwashing   o A bottle sponge (soft sponge on a long stick)-for the armpit of yourgood hand  o Shower brush  o At [place] in the shower will help you to wash your hair  o A cotton terrycloth bathrobe to aid you in drying your back     In the bathroom  o Toothpaste, shampoo, etc  in a flip top or pump dispenser  o Flossers (dental floss on aYshaped handle)  o Consider an electric razor     In the bedroom  o Back scratcher  o Large sleeve shirts and tops  o Put away clothing which buttons, fastens or snaps in the back, or which uses drawstrings     In the kitchen  o A rubber jar opener Markus-to help open jars, but also to keep things from sliding around while you are working on them   o Double suction cup pads (the little octopus)-to hold items while you use or wash them  o An electric can opener with a lid magnet strong enough to hold the can in the air-for one-handed use   Consider Maryjane Kanner and wear haircut  [facility]   You should keep your bandages dry and clean; change your dressings if you see drainage coming through your dressings   Signs of infection include increased pain, swelling, redness, warmth, and excessive or foul-smelling drainage  Please contact our office if you experience signs of infection   You may wash with soap and water after given permission     Sutures will be removed between 7-14 days after surgery if the wound is healed  Patients who smoke, have diabetes, or have nutritional problems may need longer to heal    Steri-strips may be placed across your incision at this time, which will fall off or peel away   To help prevent infection, do not submerse your incision area for 2-3 weeks (i e  no hot tubs, pools, ocean, dish water, fish ponds, fish tanks, bathtubs)   Swelling, bruising, and numbness are common after surgery  To help reduce these symptoms, keep the area elevated  Scar Care: To improve the appearance of your scar, you can massage the healed area (using circular motions with your fingertip) for 5 minutes 3x a day after your steri-strips peel away  You can use regular hand lotion or Scar zone from the store  You may also use Silicone pads after the stitches are removed (available at the store)  Redness and bumpiness of the scar are expected  These generally improve as healing progresses, but redness can be expected for up to 6-8 months  ** If you have any questions or concerns, please call our office  [phone]  _____________________________________________________________________________________  Pain Management  Pain after an injury or surgery is common and should often be expected  There are many ways to manage and reduce this pain  This often does not include medications or may not exclusively include medication  Each patient, surgery and surgeon are unique, and the approach to management of pain is individual   It is important to try to discuss your concerns and expectations regarding pain with your surgical team before and after surgery  They want to help you get better and have a good patient experience  Your patient experience includes understanding and treating your pain  Here's what you may expect before surgery and how to manage your pain and medications after surgery   Again, the approach to pain management after injury or surgery is individualized, and this is general information  Your surgical team will have more specific recommendations for you  Before using any of the methods explored here, please discuss with your medical team if these pain management methods are appropriate for you  We advise good communication with your team to let them help you achieve the best outcome  Surgery Day  As your surgery begins, your surgeon and anesthesia team may give you medications by mouth, IV, and/or injection  Giving you medication as the surgery progresses not only helps you to decrease pain during the surgery, but it also reduces your pain post-surgery  You may also receive medication in the recovery room after surgery, if needed  In some cases, you will receive prescription pain medication and instructions for its use to use at home in the days following your surgery  You may also receive instructions on using over-the-counter pain medications  It is important to follow these directions carefully, as many over-the counter medications contain some of the same ingredients as prescription pain medications and using them together can result in a dangerous accidental overdose  Post-Surgery Pain Management  While always important to follow your specific postoperative instructions, here are some different methods, outside of medication, that your team may recommend to reduce your pain:   Elevate: Elevating the injured area so it is higher than your heart can reduce swelling and pain  Swelling can increase quickly by putting your hand at your side, and this can make your dressing feel tight  Often, the pain associated with swelling is difficult to control, so it is best to avoid this problem   Take care of your dressing: If your dressing/splint feels tight, and elevation for 10 minutes does not improve the tight sensation, contact your surgical team  It may be recommended that you unravel any tape or elastic wrap and loosen the outer bandage   If this does not help, you may be advised to tear, unravel, or cut the inner layers with blunt tipped scissors  Make sure you are cutting on the opposite side of where your incision is located  When done, you will need to try to rebuild your dressing to keep your wound clean and covered  Before doing any of this, check with your medical team  They may want to be aware of the tight dressing and could have different instructions for loosening the dressing   Keep moving: If allowed by your surgeon, try to frequently move the fingers, wrist, elbow, and/or shoulder that are outside of the splint or cast  You can do this gently and slowly  This improves blood flow, which limits swelling and prevents bandages from feeling tight  It may be uncomfortable to move at first, but the discomfort will often improve with time and frequently improves with motion  Your surgeon will be more specific about what to move and what to rest    Ice the area: Icing the painful area will typically reduce swelling and inflammation and reduce pain  However, there may be certain procedures (such as surgery on arteries, skin grafts or flaps) where ice could be harmful, so consult your surgeon before using ice   Heat the area: If you are in the phase of care where you can remove your dressing or splint, you may be able to try heat  Heat increases blood flow to an area and can help with muscle spasms, muscle soreness and joint pain   Avoid smoking: Chemicals present in cigarettes can increase pain  Reducing or quitting smoking can improve your pain   Consume vitamin C: Consuming 500mg of vitamin C daily for 6 weeks may reduce pain after some injuries  However, it is ascorbic acid, which can upset your stomach if you have heartburn or gastritis  Post-Surgery Medication Management  The pain-management methods listed above are often effective when used in combination with taking medications post-surgery   There are many different classes of medication that can help pain  Some can be purchased over the counter, and some require a prescription  All medicines can have some benefits and some adverse reactions/side effects  Your surgical team will balance these issues to provide a plan for you  Some commonly prescribed medications can include:   Tylenol (Acetaminophen)   Aleve (Naprosyn/naproxen)   Motrin/Advil (Ibuprofen)   Celebrex (Celecoxib)   Toradol (Ketorolac)    When taking medication, keep the following in mind:   It may take 30-60 minutes for your body to absorb the medication after you take it by mouth, so be patient   Longer-acting medications used before bedtime may help you sleep better the first few nights after surgery   The first few nights post-surgery will generally be the toughest    Do not exceed the dose recommended by your physician or combine medications without consulting with your physician  If you are unfamiliar with these medications, your surgeon can specify how much medication you should take, for how long, and how often  Opioids  Opioids are a type of pain medication made from the poppy plant that is used to make opium and heroin  They can be effective in treating pain, but opioids should be used at a last resort, in limited amounts, and for a limited number of days  Use of these medications should only be done under the guidance of your doctor  When taking opioids, you are at risk of becoming dependent on the medicine, and they may become less effective over time  Oxycodone and hydrocodone are two of the most commonly used and effective opioid pain pills  These pills are frequently combined with Tylenol (acetaminophen), but it must be done carefully  Be sure to consult your surgeon before doing so   Your surgeon will give you a customized plan for managing your pain based on your type of surgery, number of procedures, duration of surgery, etc  Keep in mind that many opioids are combined with Tylenol (acetaminophen) already in the pill, so take care to follow your prescribed directions and not to take more opioids or acetaminophen than prescribed  Overdoses of each of these medications can be dangerous and life threatening  Learn more about opioids, including the side effects, how to safely use them, and how to properly dispose of any extras  Following the program below will greatly decrease your post-operative pain  1  Aleve (naproxen) 220 mg and Tylenol Arthritis 650 mg on the afternoon/evening of surgery  Do NOT take Aleve if you have a history of gastric ulcers, uncontrolled reflux or have been told previously by a physician that you should not take anti-inflammatory medications such as Advil/Aleve/Motrin  2  Aleve (naproxen) 220 mg in the morning and afternoon, for about 2-3 days after the surgery; even if you have no pain  You can stop two days after surgery if your hand does not hurt  3  Tylenol Arthritis (or any brand of acetaminophen 8-hour), 650 mg every eight hours, with a maximum dose of 3000 mg per day, for about 2-3 days after the surgery, even if you have no pain  Tylenol Arthritis plus Aleve is a case of 1+1=3, not 2  That is, they work together as a team to make each other stronger a  The maximum amount of Tylenol is 3000 mg per day, which is the same as 4 of the 650 mg pills  Remember; don't substitute any other medication for the Tylenol: don't take Motrin, aspirin, or any other over the counter medication  It must be Tylenol (or any brand of acetaminophen) for it to work as a team  Remember as well that Tylenol Arthritis is taken every 8 hours, the Aleve is twice a day  4  Norco (hydrocodone/acetaminophen) 5/325 mg or a similar medication to assist with sleeping at night, and possibly every 6 hours during the day, ONLY IF NEEDED, for the first few days   You will be given a written prescription, but many patients find they do not need to take any or all of the Norco  Do not take the medication just because it was given to you; only take it if you need it  Remember that Yves [place]brook is an opioid pain medication and can lead to addiction, respiratory sedation, and death  In 2015 over 17,500 Americans  from opioid overdoses and I don't want this to happen to you  Opioid medications can also cause constipation, so please plan for that, as well as possible mental confusion and drowsiness  Do not drive while you are taking this medication  With this protocol, you can expect your post-operative pain to be very manageable  The worst pain only lasts for the first 48 hours and improves significantly after that  By the time you see your surgeon for your post-operative visit you probably will no longer require any pain medication on a daily basis  Important Information about Painkillers  You are being prescribed an opioid pain medication to help with severe pain after surgery  Use the medication sparingly as needed to reduce your pain  The goal is not to be pain-free, but to make the pain more tolerable  If you are able to take non-steroidal anti-inflammatory drugs (NSAIDs), alternate the prescription pain medication with over-the-counter Ibuprofen or Naproxen (if you do not have a history of reflux or stomach ulcers)  This will allow you to take less opioid medication  Also, elevate the hand to reduce swelling and consider applying ice to the affected area for 15 minutes at a time, several times per day  Opioid medication is powerful and has the risk of overdose, abuse, and addiction  Only use the medication as directed by your physician and keep the pills in a safe place  When you no longer need the medication, please dispose of the pills properly as directed below  Allowing someone else to use your opioid prescription is illegal   Also, possible side effects from opioid medications are over-sedation, itching, nausea/vomiting, and constipation   Do not drive a vehicle or operate machinery while taking the pain medications  Drink plenty of fluids and consider a stool softener to prevent constipation  If the pain you are experiencing is not severe, stop taking the opioid pills and only take over-the-counter medications such as Tylenol and Ibuprofen  Disposing of unused pain medications:  (1) Follow pharmacist instructions on the bottle if available, or  (2) Call 9-282.380.9328 for a [facility] authorized collection site in your area, or  (3) If no collection site is available in your area, mix the pills with an undesirable substance such as used coffee grounds, jimy litter, or dirt  Place this mixture in a sealed plastic bag  Place the bag in the household garbage  Adapted from the opioid awareness section of the [employer], thanks to Suresh Yates and Naresh Rogers

## 2021-08-18 NOTE — H&P
ASSESSMENT/PLAN:    Assessment:   Trigger Finger  left  long finger    Plan:   Trigger Finger Release  left  long finger under sedation     Follow Up: After Surgery    To Do Next Visit:    and Sutures out    General Discussions:       Operative Discussions:     Trigger Finger Release: The anatomy and physiology of trigger finger was discussed with the patient today in the office  Edema and increased contact pressure within the flexor tendons at the A1 pulley can cause pain, crepitation, and limitation of function  Treatment options include resting MP blocking splints to decrease edema, oral anti-inflammatory medications, home or formal therapy exercises, up to 2 steroid injections or surgical release  While majority of patients do respond to conservative treatment, up to 20% may require surgical release  The patient has elected release of the trigger finger  The patient has elected to undergo a release of the A1 pulley (trigger finger)  A small incision will be made over the palmar aspect of the hand, the tendon sheath holding the flexor tendons will be released  In the postoperative period, light activities are allowed immediately, driving is allowed when narcotic medication has stopped, and the incision may get wet after 2 days  Heavy activities (lifting more than approximately 10 pounds) will be allowed after the follow up appointment in 1-2 weeks  While the pain and discomfort within the wrist typically improves rapidly, some residual discomfort may be present for up to 6 weeks  The nodule that is typically palpable in the palmar aspect of the hand will not be removed, as this would necessitate removal of a portion of the flexor tendon, however the catching, clicking, and locking should resolve  Approximate success rate is 98%  The risks and benefits of the procedure were explained to the patient, which include, but are not limited to: Bleeding, infection, recurrence, pain, scar, damage to tendons, damage to nerves, and damage to blood vessels, need for future surgery and complications related to anesthesia  If bony work is done, risks also include malunion and nonunion  These risks, along with alternative conservative treatment options, and postoperative protocols were voiced back and understood by the patient  All questions were answered to the patient's satisfaction  The patient agrees to comply with a standard postoperative protocol, and is willing to proceed  Education was provided via written and auditory forms  There were no barriers to learning  Written handouts regarding wound care, incision and scar care, and general preoperative information, as well as risks and benefits were provided to the patient     _____________________________________________________  CHIEF COMPLAINT:  Chief Complaint   Patient presents with    Left Middle Finger - Triggering, Locking         SUBJECTIVE:  Ree Barnard is a 78 y o  male who presents with Catching and Locking to the left long finger  This started  6 month(s) ago as Insidious      Radiation: Yes to the  long finger  Previous Treatments: steroid injections without relief  Associated symptoms: No Complaints    PAST MEDICAL HISTORY:  Past Medical History:   Diagnosis Date    AS (aortic stenosis)     Balanitis     Biceps tendonitis on right     CAD (coronary artery disease)     Cancer (HCC)     skin    Colon polyp     Complete rotator cuff tear or rupture of right shoulder, not specified as traumatic     CPAP (continuous positive airway pressure) dependence     Diabetes mellitus (HCC)     Esophageal reflux     High cholesterol     Hypertension     Hypertriglyceridemia     Hypogonadism in male     Myalgia     Myocardial infarction ([facility] [address]     Palpitations     Shoulder pain     Sinus problem     Skin cancer of nose     Sleep apnea     Stroke ([facility] [address] 2583    Systolic murmur     recently found    Tinea cruris     Tinea pedis        PAST SURGICAL HISTORY:  Past Surgical History:   Procedure Laterality Date    CARPAL TUNNEL RELEASE      CATARACT EXTRACTION Bilateral     COLONOSCOPY  2019    CORONARY ANGIOPLASTY WITH STENT PLACEMENT      10-15-19 - pt denies stent implant    CORONARY ARTERY BYPASS GRAFT N/A 12/12/2016    Procedure: INTRAOP CECILLE; BILATERAL LEG EVH; CORONARY ARTERY BYPASS GRAFT X 4 SVG TO PDA, OM1,  AND DIAGONAL1, LIMA TO LAD;  Surgeon: Bakari Coello MD;  [facility];  Service:     EYE SURGERY      HERNIA REPAIR      NE ARTHROSCOPY SHOULDER SURGICAL BICEPS TENODESIS Right 5/6/2016    Procedure: ARTHROSCOPIC BICEPS TENODESIS;  Surgeon: Nikolay Knox MD;  [facility];  Service: Orthopedics    NE INCISE FINGER TENDON SHEATH Right 10/15/2019    Procedure: TRIGGER FINGER RELEASE INDEX, LONG, RING, SMALL, THUMB FINGERS;  Surgeon: Enrico Sood MD;  [facility];  Service: Orthopedics    NE INCISE FINGER TENDON SHEATH Left 3/3/2020    Procedure: RELEASE TRIGGER FINGER INDEX;  Surgeon: Enrico Sood MD;  [facility];  Service: Orthopedics    NE SHLDR ARTHROSCOP,SURG,W/ROTAT CUFF REPR Right 5/6/2016    Procedure: REPAIR ROTATOR CUFF  ARTHROSCOPIC; SUBACROMIAL DECOMPRESSION; PARTIAL SYNOVECTOMY;  Surgeon: Nikolay Knox MD;  [facility];  Service: Orthopedics    NE WRIST Lala Ball LIG Right 10/15/2019    Procedure: ENDOSCOPIC CARPAL TUNNEL RELEASE;  Surgeon: Enrico Sood MD;  [facility];  Service: Orthopedics    NE WRIST Lala Ball LIG Left 3/3/2020    Procedure: RELEASE CARPAL TUNNEL ENDOSCOPIC;  Surgeon: Enrico Sood MD;  [facility];  Service: Orthopedics    ROTATOR CUFF REPAIR      TESTICLE SURGERY      UMBILICAL HERNIA REPAIR  2009    over age 13       FAMILY HISTORY:  Family History   Problem Relation Age of Onset    Heart disease Mother     Hypertension Mother     Nephrolithiasis Father    Garcia Other Father Renal disease    Hypertension Sister     Nephrolithiasis Brother     Other Brother         Renal disease    Hematuria Family         Microscopic       SOCIAL HISTORY:  Social History     Tobacco Use    Smoking status: Former Smoker    Smokeless tobacco: Former User    Tobacco comment: smoked for 3 years from 19 y/o - 23 yrs old   Vaping Use    Vaping Use: Never used   Substance Use Topics    Alcohol use: No    Drug use: No       MEDICATIONS:    Current Outpatient Medications:     Arginine 1000 MG TABS, Take 1 pill per day, Disp: , Rfl:     ARIPiprazole (ABILIFY) 5 mg tablet, Take 5 mg by mouth daily at bedtime, Disp: , Rfl:     aspirin (ECOTRIN LOW STRENGTH) 81 mg EC tablet, TAKE 1 TABLET (81 MG TOTAL) BY MOUTH DAILY, Disp: 90 tablet, Rfl: 2    atorvastatin (LIPITOR) 80 mg tablet, Take 1 tablet (80 mg total) by mouth daily, Disp: 90 tablet, Rfl: 1    bisacodyl 5 MG EC tablet, TAKE 1 TABLET (5 MG TOTAL) BY MOUTH DAILY AS NEEDED FOR CONSTIPATION, Disp: 30 tablet, Rfl: 0    calcium carbonate (TUMS) 500 mg chewable tablet, CHEW 1 TABLET (1,250 MG TOTAL) 3 (THREE) TIMES A DAY AS NEEDED FOR HEARTBURN, Disp: 90 tablet, Rfl: 3    cholecalciferol (VITAMIN D3) 25 mcg (1,000 units) tablet, TAKE 1 TABLET BY MOUTH DAILY, Disp: 90 tablet, Rfl: 1    clorazepate (TRANXENE) 7 5 mg tablet, Take 7 5 mg by mouth daily as needed for anxiety , Disp: , Rfl: 0    clotrimazole-betamethasone (LOTRISONE) 1-0 05 % lotion, Apply topically 2 (two) times a day, Disp: 30 mL, Rfl: 0    Cyanocobalamin (Vitamin B 12) 500 MCG TABS, Take by mouth daily, Disp: , Rfl:     dicyclomine (BENTYL) 10 mg capsule, Take 1 capsule (10 mg total) by mouth 3 (three) times a day before meals (Patient taking differently: Take 10 mg by mouth 3 (three) times a day before meals Taking as needed up to 3 times daily), Disp: 90 capsule, Rfl: 1    Easy Comfort Lancets MISC, For checking bs BID, Disp: 100 each, Rfl: 5    Elastic Bandages & Supports (MEDICAL COMPRESSION STOCKINGS) MISC, by Does not apply route daily Please provide B/L compression stockings, Disp: 2 each, Rfl: 0    fluticasone (FLONASE) 50 mcg/act nasal spray, INHALE 1 SPRAY INTO EACH NOSTRIL DAILY, Disp: 16 g, Rfl: 2    furosemide (LASIX) 20 mg tablet, Take 1 tablet (20 mg total) by mouth daily Patient takes as needed, Disp: 30 tablet, Rfl: 8    glucose blood (Easy Plus II Glucose Test) test strip, Use 1 each 2 (two) times a day Test as directed, Disp: 100 each, Rfl: 3    glucose monitoring kit (FREESTYLE) monitoring kit, 1 each by Does not apply route 2 (two) times a day Check BS BID PRN for hypoglycemia/hyperglycemia, Disp: 1 each, Rfl: 0    Jardiance 25 MG TABS, TAKE 1 TABLET (25 MG TOTAL) BY MOUTH EVERY MORNING, Disp: 30 tablet, Rfl: 5    lisinopril (ZESTRIL) 20 mg tablet, Take 1 tablet (20 mg total) by mouth daily, Disp: 90 tablet, Rfl: 3    metFORMIN (GLUCOPHAGE) 500 mg tablet, TAKE 1 TABLET (500 MG TOTAL) BY MOUTH 2 (TWO) TIMES A DAY WITH MEALS, Disp: 180 tablet, Rfl: 2    metoprolol tartrate (LOPRESSOR) 25 mg tablet, TAKE 1 TABLET (25 MG TOTAL) BY MOUTH EVERY 12 (TWELVE) HOURS AS DIRECTED, Disp: 180 tablet, Rfl: 2    NIFEdipine ER (ADALAT CC) 30 MG 24 hr tablet, TAKE 1 TABLET (30 MG TOTAL) BY MOUTH 2 (TWO) TIMES A DAY, Disp: 60 tablet, Rfl: 11    nystatin (MYCOSTATIN) cream, Apply topically Three times a day, Disp: , Rfl:     ONETOUCH DELICA LANCETS FINE MISC, Test blood sugar twice daily, or as needed when symptomatic of hypoglycemia or hyperglycemia, Disp: 100 each, Rfl: 5    pantoprazole (PROTONIX) 40 mg tablet, Take 1 tablet (40 mg total) by mouth daily, Disp: 30 tablet, Rfl: 2    Procto-Med HC 2 5 % rectal cream, APPLY TOPICALLY 2 (TWO) TIMES A DAY AS DIRECTED, Disp: 28 g, Rfl: 3    tobramycin-dexamethasone (TOBRADEX) ophthalmic ointment, 0 5 inches daily, Disp: , Rfl:     Alcohol Swabs (Alcohol Pads) 70 % PADS, USE TWICE DAILY, Disp: 100 each, Rfl: 3   DULoxetine (CYMBALTA) 60 mg delayed release capsule, Take 60 mg by mouth every morning (Patient not taking: Reported on 8/6/2021), Disp: , Rfl:     ALLERGIES:  Allergies   Allergen Reactions    Epinephrine Hives and Anxiety    Penicillins Hives and Anxiety       REVIEW OF SYSTEMS:  Pertinent items are noted in HPI      LABS:  HgA1c:   Lab Results   Component Value Date    HGBA1C 6 8 (A) 06/22/2021     BMP:   Lab Results   Component Value Date    GLUCOSE 121 12/12/2016    CALCIUM 9 5 01/05/2021     08/01/2017    K 4 2 01/05/2021    CO2 29 01/05/2021     01/05/2021    BUN 10 01/05/2021    CREATININE 0 88 01/05/2021         _____________________________________________________  PHYSICAL EXAMINATION:  Vital signs: /56   Pulse 59   Ht 5' 6 5" (1 689 m)   Wt 69 3 kg (152 lb 12 8 oz)   BMI 24 29 kg/m²   General: well developed and well nourished, alert, oriented times 3 and appears comfortable  Psychiatric: Normal  HEENT: Trachea Midline, No torticollis  Cardiovascular: No discernable arrhythmia  Pulmonary: No wheezing or stridor  Abdomen: No rebound or guarding  Extremities: No peripheral edema  Skin: No Erythema  Neurovascular: Sensation Intact to the Median, Ulnar, Radial Nerve, Motor Intact to the Median, Ulnar, Radial Nerve and Pulses Intact    MUSCULOSKELETAL EXAMINATION:  LEFT SIDE:  Finger:  Palpable clicking long finger, Crepitation long finger and Nodules  long finger    _____________________________________________________  STUDIES REVIEWED:  No Studies to review      PROCEDURES PERFORMED:  Procedures  No Procedures performed today   Scribe Attestation    I,:  Briseida Leone am acting as a scribe while in the presence of the attending physician :       I,:  Anil Young MD personally performed the services described in this documentation    as scribed in my presence :

## 2021-08-18 NOTE — PROGRESS NOTES
ASSESSMENT/PLAN:    Assessment:   Trigger Finger  left  long finger    Plan:   Trigger Finger Release  left  long finger under sedation     Follow Up: After Surgery    To Do Next Visit:    and Sutures out    General Discussions:       Operative Discussions:     Trigger Finger Release: The anatomy and physiology of trigger finger was discussed with the patient today in the office  Edema and increased contact pressure within the flexor tendons at the A1 pulley can cause pain, crepitation, and limitation of function  Treatment options include resting MP blocking splints to decrease edema, oral anti-inflammatory medications, home or formal therapy exercises, up to 2 steroid injections or surgical release  While majority of patients do respond to conservative treatment, up to 20% may require surgical release  The patient has elected release of the trigger finger  The patient has elected to undergo a release of the A1 pulley (trigger finger)  A small incision will be made over the palmar aspect of the hand, the tendon sheath holding the flexor tendons will be released  In the postoperative period, light activities are allowed immediately, driving is allowed when narcotic medication has stopped, and the incision may get wet after 2 days  Heavy activities (lifting more than approximately 10 pounds) will be allowed after the follow up appointment in 1-2 weeks  While the pain and discomfort within the wrist typically improves rapidly, some residual discomfort may be present for up to 6 weeks  The nodule that is typically palpable in the palmar aspect of the hand will not be removed, as this would necessitate removal of a portion of the flexor tendon, however the catching, clicking, and locking should resolve  Approximate success rate is 98%  The risks and benefits of the procedure were explained to the patient, which include, but are not limited to: Bleeding, infection, recurrence, pain, scar, damage to tendons, damage to nerves, and damage to blood vessels, need for future surgery and complications related to anesthesia  If bony work is done, risks also include malunion and nonunion  These risks, along with alternative conservative treatment options, and postoperative protocols were voiced back and understood by the patient  All questions were answered to the patient's satisfaction  The patient agrees to comply with a standard postoperative protocol, and is willing to proceed  Education was provided via written and auditory forms  There were no barriers to learning  Written handouts regarding wound care, incision and scar care, and general preoperative information, as well as risks and benefits were provided to the patient     _____________________________________________________  CHIEF COMPLAINT:  Chief Complaint   Patient presents with    Left Middle Finger - Triggering, Locking         SUBJECTIVE:  Bernard Green is a 78 y o  male who presents with Catching and Locking to the left long finger  This started  6 month(s) ago as Insidious      Radiation: Yes to the  long finger  Previous Treatments: steroid injections without relief  Associated symptoms: No Complaints    PAST MEDICAL HISTORY:  Past Medical History:   Diagnosis Date    AS (aortic stenosis)     Balanitis     Biceps tendonitis on right     CAD (coronary artery disease)     Cancer (HCC)     skin    Colon polyp     Complete rotator cuff tear or rupture of right shoulder, not specified as traumatic     CPAP (continuous positive airway pressure) dependence     Diabetes mellitus (HCC)     Esophageal reflux     High cholesterol     Hypertension     Hypertriglyceridemia     Hypogonadism in male     Myalgia     [facility] infarction ([facility] [address]     Palpitations     Shoulder pain     Sinus problem     Skin cancer of nose     Sleep apnea     Stroke ([facility] [address] 1127    Systolic murmur     recently found    Tinea cruris     Tinea pedis        PAST SURGICAL HISTORY:  Past Surgical History:   Procedure Laterality Date    CARPAL TUNNEL RELEASE      CATARACT EXTRACTION Bilateral     COLONOSCOPY  2019    CORONARY ANGIOPLASTY WITH STENT PLACEMENT      10-15-19 - pt denies stent implant    CORONARY ARTERY BYPASS GRAFT N/A 12/12/2016    Procedure: INTRAOP CECILLE; BILATERAL LEG EVH; CORONARY ARTERY BYPASS GRAFT X 4 SVG TO PDA, OM1,  AND DIAGONAL1, LIMA TO LAD;  Surgeon: Alla Chandra MD;  [facility];  Service:     EYE SURGERY      HERNIA REPAIR      MO ARTHROSCOPY SHOULDER SURGICAL BICEPS TENODESIS Right 5/6/2016    Procedure: ARTHROSCOPIC BICEPS TENODESIS;  Surgeon: Merlin Castellanos MD;  [facility];  Service: Orthopedics    MO INCISE FINGER TENDON SHEATH Right 10/15/2019    Procedure: TRIGGER FINGER RELEASE INDEX, LONG, RING, SMALL, THUMB FINGERS;  Surgeon: Cristian Martinez MD;  [facility];  Service: Orthopedics    MO INCISE FINGER TENDON SHEATH Left 3/3/2020    Procedure: RELEASE TRIGGER FINGER INDEX;  Surgeon: Cristian Martinez MD;  [facility];  Service: Orthopedics    MO SHLDR ARTHROSCOP,SURG,W/ROTAT CUFF REPR Right 5/6/2016    Procedure: REPAIR ROTATOR CUFF  ARTHROSCOPIC; SUBACROMIAL DECOMPRESSION; PARTIAL SYNOVECTOMY;  Surgeon: Merlin Castellanos MD;  [facility];  Service: Orthopedics    MO WRIST Hesham Rust LIG Right 10/15/2019    Procedure: ENDOSCOPIC CARPAL TUNNEL RELEASE;  Surgeon: Cristian Martinez MD;  [facility];  Service: Orthopedics    MO WRIST Hesham Rust LIG Left 3/3/2020    Procedure: RELEASE CARPAL TUNNEL ENDOSCOPIC;  Surgeon: Cristian Martinez MD;  [facility];  Service: Orthopedics    ROTATOR CUFF REPAIR      TESTICLE SURGERY      UMBILICAL HERNIA REPAIR  2009    over age 13       FAMILY HISTORY:  Family History   Problem Relation Age of Onset    Heart disease Mother     Hypertension Mother     Nephrolithiasis Father    Mollxenia Sizer Other Father Renal disease    Hypertension Sister     Nephrolithiasis Brother     Other Brother         Renal disease    Hematuria Family         Microscopic       SOCIAL HISTORY:  Social History     Tobacco Use    Smoking status: Former Smoker    Smokeless tobacco: Former User    Tobacco comment: smoked for 3 years from 19 y/o - 23 yrs old   Vaping Use    Vaping Use: Never used   Substance Use Topics    Alcohol use: No    Drug use: No       MEDICATIONS:    Current Outpatient Medications:     Arginine 1000 MG TABS, Take 1 pill per day, Disp: , Rfl:     ARIPiprazole (ABILIFY) 5 mg tablet, Take 5 mg by mouth daily at bedtime, Disp: , Rfl:     aspirin (ECOTRIN LOW STRENGTH) 81 mg EC tablet, TAKE 1 TABLET (81 MG TOTAL) BY MOUTH DAILY, Disp: 90 tablet, Rfl: 2    atorvastatin (LIPITOR) 80 mg tablet, Take 1 tablet (80 mg total) by mouth daily, Disp: 90 tablet, Rfl: 1    bisacodyl 5 MG EC tablet, TAKE 1 TABLET (5 MG TOTAL) BY MOUTH DAILY AS NEEDED FOR CONSTIPATION, Disp: 30 tablet, Rfl: 0    calcium carbonate (TUMS) 500 mg chewable tablet, CHEW 1 TABLET (1,250 MG TOTAL) 3 (THREE) TIMES A DAY AS NEEDED FOR HEARTBURN, Disp: 90 tablet, Rfl: 3    cholecalciferol (VITAMIN D3) 25 mcg (1,000 units) tablet, TAKE 1 TABLET BY MOUTH DAILY, Disp: 90 tablet, Rfl: 1    clorazepate (TRANXENE) 7 5 mg tablet, Take 7 5 mg by mouth daily as needed for anxiety , Disp: , Rfl: 0    clotrimazole-betamethasone (LOTRISONE) 1-0 05 % lotion, Apply topically 2 (two) times a day, Disp: 30 mL, Rfl: 0    Cyanocobalamin (Vitamin B 12) 500 MCG TABS, Take by mouth daily, Disp: , Rfl:     dicyclomine (BENTYL) 10 mg capsule, Take 1 capsule (10 mg total) by mouth 3 (three) times a day before meals (Patient taking differently: Take 10 mg by mouth 3 (three) times a day before meals Taking as needed up to 3 times daily), Disp: 90 capsule, Rfl: 1    Easy Comfort Lancets MISC, For checking bs BID, Disp: 100 each, Rfl: 5    Elastic Bandages & Supports (MEDICAL COMPRESSION STOCKINGS) MISC, by Does not apply route daily Please provide B/L compression stockings, Disp: 2 each, Rfl: 0    fluticasone (FLONASE) 50 mcg/act nasal spray, INHALE 1 SPRAY INTO EACH NOSTRIL DAILY, Disp: 16 g, Rfl: 2    furosemide (LASIX) 20 mg tablet, Take 1 tablet (20 mg total) by mouth daily Patient takes as needed, Disp: 30 tablet, Rfl: 8    glucose blood (Easy Plus II Glucose Test) test strip, Use 1 each 2 (two) times a day Test as directed, Disp: 100 each, Rfl: 3    glucose monitoring kit (FREESTYLE) monitoring kit, 1 each by Does not apply route 2 (two) times a day Check BS BID PRN for hypoglycemia/hyperglycemia, Disp: 1 each, Rfl: 0    Jardiance 25 MG TABS, TAKE 1 TABLET (25 MG TOTAL) BY MOUTH EVERY MORNING, Disp: 30 tablet, Rfl: 5    lisinopril (ZESTRIL) 20 mg tablet, Take 1 tablet (20 mg total) by mouth daily, Disp: 90 tablet, Rfl: 3    metFORMIN (GLUCOPHAGE) 500 mg tablet, TAKE 1 TABLET (500 MG TOTAL) BY MOUTH 2 (TWO) TIMES A DAY WITH MEALS, Disp: 180 tablet, Rfl: 2    metoprolol tartrate (LOPRESSOR) 25 mg tablet, TAKE 1 TABLET (25 MG TOTAL) BY MOUTH EVERY 12 (TWELVE) HOURS AS DIRECTED, Disp: 180 tablet, Rfl: 2    NIFEdipine ER (ADALAT CC) 30 MG 24 hr tablet, TAKE 1 TABLET (30 MG TOTAL) BY MOUTH 2 (TWO) TIMES A DAY, Disp: 60 tablet, Rfl: 11    nystatin (MYCOSTATIN) cream, Apply topically Three times a day, Disp: , Rfl:     ONETOUCH DELICA LANCETS FINE MISC, Test blood sugar twice daily, or as needed when symptomatic of hypoglycemia or hyperglycemia, Disp: 100 each, Rfl: 5    pantoprazole (PROTONIX) 40 mg tablet, Take 1 tablet (40 mg total) by mouth daily, Disp: 30 tablet, Rfl: 2    Procto-Med HC 2 5 % rectal cream, APPLY TOPICALLY 2 (TWO) TIMES A DAY AS DIRECTED, Disp: 28 g, Rfl: 3    tobramycin-dexamethasone (TOBRADEX) ophthalmic ointment, 0 5 inches daily, Disp: , Rfl:     Alcohol Swabs (Alcohol Pads) 70 % PADS, USE TWICE DAILY, Disp: 100 each, Rfl: 3   DULoxetine (CYMBALTA) 60 mg delayed release capsule, Take 60 mg by mouth every morning (Patient not taking: Reported on 8/6/2021), Disp: , Rfl:     ALLERGIES:  Allergies   Allergen Reactions    Epinephrine Hives and Anxiety    Penicillins Hives and Anxiety       REVIEW OF SYSTEMS:  Pertinent items are noted in HPI      LABS:  HgA1c:   Lab Results   Component Value Date    HGBA1C 6 8 (A) 06/22/2021     BMP:   Lab Results   Component Value Date    GLUCOSE 121 12/12/2016    CALCIUM 9 5 01/05/2021     08/01/2017    K 4 2 01/05/2021    CO2 29 01/05/2021     01/05/2021    BUN 10 01/05/2021    CREATININE 0 88 01/05/2021         _____________________________________________________  PHYSICAL EXAMINATION:  Vital signs: /56   Pulse 59   Ht 5' 6 5" (1 689 m)   Wt 69 3 kg (152 lb 12 8 oz)   BMI 24 29 kg/m²   General: well developed and well nourished, alert, oriented times 3 and appears comfortable  Psychiatric: Normal  HEENT: Trachea Midline, No torticollis  Cardiovascular: No discernable arrhythmia  Pulmonary: No wheezing or stridor  Abdomen: No rebound or guarding  Extremities: No peripheral edema  Skin: No Erythema  Neurovascular: Sensation Intact to the Median, Ulnar, Radial Nerve, Motor Intact to the Median, Ulnar, Radial Nerve and Pulses Intact    MUSCULOSKELETAL EXAMINATION:  LEFT SIDE:  Finger:  Palpable clicking long finger, Crepitation long finger and Nodules  long finger    _____________________________________________________  STUDIES REVIEWED:  No Studies to review      PROCEDURES PERFORMED:  Procedures  No Procedures performed today   Scribe Attestation    I,:  Yara Pederson am acting as a scribe while in the presence of the attending physician :       I,:  Dunia Moss MD personally performed the services described in this documentation    as scribed in my presence :

## 2021-08-28 DIAGNOSIS — E11.9 TYPE 2 DIABETES MELLITUS WITHOUT COMPLICATION, WITHOUT LONG-TERM CURRENT USE OF INSULIN (HCC): ICD-10-CM

## 2021-08-31 RX ORDER — BLOOD SUGAR DIAGNOSTIC
STRIP MISCELLANEOUS
Qty: 100 EACH | Refills: 3 | Status: SHIPPED | OUTPATIENT
Start: 2021-08-31 | End: 2022-01-05

## 2021-09-18 DIAGNOSIS — K21.9 GASTROESOPHAGEAL REFLUX DISEASE, UNSPECIFIED WHETHER ESOPHAGITIS PRESENT: ICD-10-CM

## 2021-09-19 RX ORDER — PANTOPRAZOLE SODIUM 40 MG/1
40 TABLET, DELAYED RELEASE ORAL DAILY
Qty: 30 TABLET | Refills: 2 | Status: SHIPPED | OUTPATIENT
Start: 2021-09-19 | End: 2021-11-15 | Stop reason: SDUPTHER

## 2021-09-24 DIAGNOSIS — I12.9 HYPERTENSION ASSOCIATED WITH STAGE 2 CHRONIC KIDNEY DISEASE DUE TO TYPE 2 DIABETES MELLITUS (HCC): Chronic | ICD-10-CM

## 2021-09-24 DIAGNOSIS — N18.2 HYPERTENSION ASSOCIATED WITH STAGE 2 CHRONIC KIDNEY DISEASE DUE TO TYPE 2 DIABETES MELLITUS (HCC): Chronic | ICD-10-CM

## 2021-09-24 DIAGNOSIS — E11.22 HYPERTENSION ASSOCIATED WITH STAGE 2 CHRONIC KIDNEY DISEASE DUE TO TYPE 2 DIABETES MELLITUS (HCC): Chronic | ICD-10-CM

## 2021-09-29 ENCOUNTER — OFFICE VISIT (OUTPATIENT)
Dept: FAMILY MEDICINE CLINIC | Facility: CLINIC | Age: 76
End: 2021-09-29

## 2021-09-29 ENCOUNTER — TELEPHONE (OUTPATIENT)
Dept: GASTROENTEROLOGY | Facility: HOSPITAL | Age: 76
End: 2021-09-29

## 2021-09-29 VITALS
SYSTOLIC BLOOD PRESSURE: 130 MMHG | WEIGHT: 154 LBS | BODY MASS INDEX: 24.17 KG/M2 | DIASTOLIC BLOOD PRESSURE: 70 MMHG | HEART RATE: 72 BPM | TEMPERATURE: 98 F | RESPIRATION RATE: 16 BRPM | HEIGHT: 67 IN

## 2021-09-29 DIAGNOSIS — E11.22 HYPERTENSION ASSOCIATED WITH STAGE 2 CHRONIC KIDNEY DISEASE DUE TO TYPE 2 DIABETES MELLITUS (HCC): Chronic | ICD-10-CM

## 2021-09-29 DIAGNOSIS — E11.9 TYPE 2 DIABETES MELLITUS WITHOUT COMPLICATION, WITHOUT LONG-TERM CURRENT USE OF INSULIN (HCC): ICD-10-CM

## 2021-09-29 DIAGNOSIS — I12.9 HYPERTENSION ASSOCIATED WITH STAGE 2 CHRONIC KIDNEY DISEASE DUE TO TYPE 2 DIABETES MELLITUS (HCC): Chronic | ICD-10-CM

## 2021-09-29 DIAGNOSIS — Z01.810 PREOP CARDIOVASCULAR EXAM: Primary | ICD-10-CM

## 2021-09-29 DIAGNOSIS — N18.2 HYPERTENSION ASSOCIATED WITH STAGE 2 CHRONIC KIDNEY DISEASE DUE TO TYPE 2 DIABETES MELLITUS (HCC): Chronic | ICD-10-CM

## 2021-09-29 PROCEDURE — 99213 OFFICE O/P EST LOW 20 MIN: CPT | Performed by: FAMILY MEDICINE

## 2021-09-29 PROCEDURE — 3075F SYST BP GE 130 - 139MM HG: CPT | Performed by: FAMILY MEDICINE

## 2021-09-29 PROCEDURE — 3078F DIAST BP <80 MM HG: CPT | Performed by: FAMILY MEDICINE

## 2021-09-29 NOTE — PROGRESS NOTES
[address] [place] [address]  1945    Chiquis Irizarry is a 78 y o  male with GERD and H  Pylori who is planning to undergo endoscopy under IV sedation by [facility] on 09/30/21  Patient has not had complications with anesthesia in the past     [facility] ID: Oliver Granados [id number]    ROS:   Chest pain: no   Shortness of breath: no  Shortness of breath with exertion: no  Orthopnea: no  Dizziness: no  Unexplained weight change: no    PMH:  CAD: yes  HTN: yes  CKD: yes  DM: yes on insulin: no  History of CVA: no     reports that he has quit smoking  He has quit using smokeless tobacco  He reports that he does not drink alcohol and does not use drugs  /70 (BP Location: Left arm, Patient Position: Sitting, Cuff Size: Large)   Pulse 72   Temp 98 °F (36 7 °C) (Temporal)   Resp 16   Ht 5' 6 5" (1 689 m)   Wt 69 9 kg (154 lb)   BMI 24 48 kg/m²   Physical Exam  Vitals reviewed  Constitutional:       Appearance: Normal appearance  HENT:      Head: Normocephalic and atraumatic  Cardiovascular:      Rate and Rhythm: Normal rate and regular rhythm  Pulses: Normal pulses  Heart sounds: Murmur (blowing systolic murmur appreciated) heard  Pulmonary:      Effort: Pulmonary effort is normal       Breath sounds: Normal breath sounds  No wheezing or rales  Musculoskeletal:         General: Normal range of motion  Skin:     General: Skin is warm and dry  Neurological:      Mental Status: He is alert  Mental status is at baseline     Psychiatric:         Mood and Affect: Mood normal          Behavior: Behavior normal          Revised Cardiac Risk Index (RCRI) for Pre-Operative Risk   (estimates risk of cardiac complications after noncardiac surgery)    · High-risk surgery: No 0 / Yes +1  · Intraperitoneal, intrathoracic, suprainguinal vascular  · History of ischemic heart disease: No 0 / Yes +1  · Hx of MI, (+) exercise test, current chest pain considered due to myocardial ischemia, use of nitrate therapy or ECG with pathological Q waves)  · History of CHF: No 0 / Yes +1  · Pulmonary edema, B/L rales or S3 gallop; MOORE, orthopnea, PND, CXR showing pulmonary vascular redistribution)  · History of cerebrovascular disease: No 0 / Yes +1  · Prior TIA or stroke  · Pre-operative treatment with insulin: No 0 / Yes +1  · Pre-operative creatinine >2 mg/dL: No 0 / Yes +1    RCRI Scoring: (score 0= low risk, 1-2=intermediate risk, 3-6: high risk)  · 0 points: Class I Risk, 3 9% Risk of Major Cardiac Event  · 1 point: Class II Risk, 6 0 % Risk of Major Cardiac Event  · 2 points: Class III Risk, 10 1% Risk of Major Cardiac Event  · 3 points: Class IV Risk, 15% Risk of Major Cardiac Event  · 4 points: Class IV Risk, 15% Risk of Major Cardiac Event  · 5 points: Class IV Risk, 15% Risk of Major Cardiac Event  · 6 points: Class IV Risk, 15% Risk of Major Cardiac Event    Lab Results   Component Value Date    CREATININE 0 88 01/05/2021       There are no diagnoses linked to this encounter  Recommendations:  Se Madden is undergoing an elective Low Risk surgery, endoscopy  He is RCRI class II risk (1 points for CAD) with 6 0% risk for major adverse cardiac event (MACE)  He may proceed with surgery as planned without further workup  Pre-operative form completed and faxed today to office as requested  Discussed with Dr Salvador Sabillon MD PGY-3  [place] [id number]

## 2021-09-30 ENCOUNTER — HOSPITAL ENCOUNTER (OUTPATIENT)
Dept: GASTROENTEROLOGY | Facility: HOSPITAL | Age: 76
Setting detail: OUTPATIENT SURGERY
Discharge: HOME | End: 2021-09-30
Attending: INTERNAL MEDICINE | Admitting: INTERNAL MEDICINE
Payer: MEDICARE

## 2021-09-30 ENCOUNTER — ANESTHESIA (OUTPATIENT)
Dept: GASTROENTEROLOGY | Facility: HOSPITAL | Age: 76
End: 2021-09-30

## 2021-09-30 ENCOUNTER — ANESTHESIA EVENT (OUTPATIENT)
Dept: GASTROENTEROLOGY | Facility: HOSPITAL | Age: 76
End: 2021-09-30

## 2021-09-30 VITALS
RESPIRATION RATE: 20 BRPM | OXYGEN SATURATION: 98 % | SYSTOLIC BLOOD PRESSURE: 110 MMHG | HEART RATE: 46 BPM | TEMPERATURE: 97.3 F | DIASTOLIC BLOOD PRESSURE: 56 MMHG

## 2021-09-30 DIAGNOSIS — K21.9 GASTROESOPHAGEAL REFLUX DISEASE, UNSPECIFIED WHETHER ESOPHAGITIS PRESENT: ICD-10-CM

## 2021-09-30 DIAGNOSIS — Z86.19 HISTORY OF HELICOBACTER PYLORI INFECTION: ICD-10-CM

## 2021-09-30 LAB — GLUCOSE SERPL-MCNC: 129 MG/DL (ref 65–140)

## 2021-09-30 PROCEDURE — 88342 IMHCHEM/IMCYTCHM 1ST ANTB: CPT | Performed by: PATHOLOGY

## 2021-09-30 PROCEDURE — 43239 EGD BIOPSY SINGLE/MULTIPLE: CPT | Performed by: INTERNAL MEDICINE

## 2021-09-30 PROCEDURE — 88305 TISSUE EXAM BY PATHOLOGIST: CPT | Performed by: PATHOLOGY

## 2021-09-30 PROCEDURE — 82948 REAGENT STRIP/BLOOD GLUCOSE: CPT

## 2021-09-30 RX ORDER — SODIUM CHLORIDE 9 MG/ML
INJECTION, SOLUTION INTRAVENOUS CONTINUOUS PRN
Status: DISCONTINUED | OUTPATIENT
Start: 2021-09-30 | End: 2021-09-30

## 2021-09-30 RX ORDER — GLYCOPYRROLATE 0.2 MG/ML
INJECTION INTRAMUSCULAR; INTRAVENOUS AS NEEDED
Status: DISCONTINUED | OUTPATIENT
Start: 2021-09-30 | End: 2021-09-30

## 2021-09-30 RX ORDER — PROPOFOL 10 MG/ML
INJECTION, EMULSION INTRAVENOUS CONTINUOUS PRN
Status: DISCONTINUED | OUTPATIENT
Start: 2021-09-30 | End: 2021-09-30

## 2021-09-30 RX ORDER — LIDOCAINE HYDROCHLORIDE 10 MG/ML
INJECTION, SOLUTION EPIDURAL; INFILTRATION; INTRACAUDAL; PERINEURAL AS NEEDED
Status: DISCONTINUED | OUTPATIENT
Start: 2021-09-30 | End: 2021-09-30

## 2021-09-30 RX ORDER — PROPOFOL 10 MG/ML
INJECTION, EMULSION INTRAVENOUS AS NEEDED
Status: DISCONTINUED | OUTPATIENT
Start: 2021-09-30 | End: 2021-09-30

## 2021-09-30 RX ADMIN — PROPOFOL 120 MCG/KG/MIN: 10 INJECTION, EMULSION INTRAVENOUS at 10:20

## 2021-09-30 RX ADMIN — PROPOFOL 70 MG: 10 INJECTION, EMULSION INTRAVENOUS at 10:20

## 2021-09-30 RX ADMIN — SODIUM CHLORIDE: 0.9 INJECTION, SOLUTION INTRAVENOUS at 10:14

## 2021-09-30 RX ADMIN — GLYCOPYRROLATE 0.1 MG: 0.2 INJECTION, SOLUTION INTRAMUSCULAR; INTRAVENOUS at 10:26

## 2021-09-30 RX ADMIN — LIDOCAINE HYDROCHLORIDE 70 MG: 10 INJECTION, SOLUTION EPIDURAL; INFILTRATION; INTRACAUDAL; PERINEURAL at 10:20

## 2021-09-30 NOTE — ANESTHESIA POSTPROCEDURE EVALUATION
Post-Op Assessment Note    CV Status:  Stable  Pain Score: 0    Pain management: adequate     Mental Status:  Sleepy   Hydration Status:  Euvolemic   PONV Controlled:  Controlled   Airway Patency:  Patent      Post Op Vitals Reviewed: Yes      Staff: CRNA         No complications documented      BP   95/55   Temp     Pulse 44   Resp   18   SpO2   96%

## 2021-09-30 NOTE — ANESTHESIA PREPROCEDURE EVALUATION
Procedure:  EGD    Relevant Problems   CARDIO   (+) Coronary artery disease involving native coronary artery of native heart without angina pectoris   (+) Essential hypertension   (+) Essential hypertriglyceridemia   (+) Hyperlipidemia   (+) 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)   (+) Mild aortic stenosis   (+) NSTEMI (non-ST elevated myocardial infarction) (HCC)   (+) S/P CABG (coronary artery bypass graft)   (+) Sinus bradycardia      ENDO   (+) Type 2 diabetes mellitus (HCC)      GI/HEPATIC   (+) Gastroesophageal reflux disease      /RENAL   (+) 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)      MUSCULOSKELETAL   (+) DDD (degenerative disc disease), lumbar   (+) Lumbar spondylosis      NEURO/PSYCH   (+) Anxiety   (+) Chronic pain syndrome   (+) H/O non-ST elevation myocardial infarction (NSTEMI)   (+) H/O tinea cruris   (+) Major depressive disorder, recurrent episode, moderate (HCC)      PULMONARY   (+) LUIS MIGUEL (obstructive sleep apnea)        Physical Exam    Airway    Mallampati score: III         Dental   upper dentures and lower dentures,     Cardiovascular  Cardiovascular exam normal    Pulmonary  Pulmonary exam normal     Other Findings    CAD s/p CABG  CAD stable per cardiology  2019 echo showing HK apical wall and G2DD  Anesthesia Plan  ASA Score- 3     Anesthesia Type- IV sedation with anesthesia with ASA Monitors  Additional Monitors:   Airway Plan:           Plan Factors-Exercise tolerance (METS): >4 METS  Chart reviewed  Existing labs reviewed  Patient summary reviewed  Patient is not a current smoker  Induction- intravenous  Postoperative Plan-     Informed Consent- Anesthetic plan and risks discussed with patient  I personally reviewed this patient with the CRNA  Discussed and agreed on the Anesthesia Plan with the CRNA  Ever Merino

## 2021-10-04 RX ORDER — ASPIRIN 81 MG/1
TABLET ORAL
Qty: 90 TABLET | Refills: 2 | Status: SHIPPED | OUTPATIENT
Start: 2021-10-04 | End: 2022-06-28

## 2021-10-06 ENCOUNTER — APPOINTMENT (OUTPATIENT)
Dept: LAB | Facility: HOSPITAL | Age: 76
End: 2021-10-06
Attending: INTERNAL MEDICINE
Payer: MEDICARE

## 2021-10-06 ENCOUNTER — APPOINTMENT (OUTPATIENT)
Dept: LAB | Facility: HOSPITAL | Age: 76
End: 2021-10-06
Payer: MEDICARE

## 2021-10-06 DIAGNOSIS — Z86.19 HISTORY OF HELICOBACTER PYLORI INFECTION: ICD-10-CM

## 2021-10-06 LAB
ALBUMIN SERPL BCP-MCNC: 3.8 G/DL (ref 3.5–5)
ALP SERPL-CCNC: 69 U/L (ref 46–116)
ALT SERPL W P-5'-P-CCNC: 19 U/L (ref 12–78)
ANION GAP SERPL CALCULATED.3IONS-SCNC: 3 MMOL/L (ref 4–13)
AST SERPL W P-5'-P-CCNC: 17 U/L (ref 5–45)
BILIRUB SERPL-MCNC: 0.67 MG/DL (ref 0.2–1)
BUN SERPL-MCNC: 16 MG/DL (ref 5–25)
CALCIUM SERPL-MCNC: 9.2 MG/DL (ref 8.3–10.1)
CHLORIDE SERPL-SCNC: 107 MMOL/L (ref 100–108)
CHOLEST SERPL-MCNC: 198 MG/DL (ref 50–200)
CO2 SERPL-SCNC: 28 MMOL/L (ref 21–32)
CREAT SERPL-MCNC: 0.82 MG/DL (ref 0.6–1.3)
GFR SERPL CREATININE-BSD FRML MDRD: 87 ML/MIN/1.73SQ M
GLUCOSE P FAST SERPL-MCNC: 104 MG/DL (ref 65–99)
HDLC SERPL-MCNC: 72 MG/DL
LDLC SERPL CALC-MCNC: 108 MG/DL (ref 0–100)
LDLC SERPL DIRECT ASSAY-MCNC: 100 MG/DL (ref 0–100)
NONHDLC SERPL-MCNC: 126 MG/DL
POTASSIUM SERPL-SCNC: 4 MMOL/L (ref 3.5–5.3)
PROT SERPL-MCNC: 7.6 G/DL (ref 6.4–8.2)
SODIUM SERPL-SCNC: 138 MMOL/L (ref 136–145)
TRIGL SERPL-MCNC: 90 MG/DL

## 2021-10-06 PROCEDURE — 83721 ASSAY OF BLOOD LIPOPROTEIN: CPT | Performed by: INTERNAL MEDICINE

## 2021-10-06 PROCEDURE — 80061 LIPID PANEL: CPT | Performed by: INTERNAL MEDICINE

## 2021-10-06 PROCEDURE — 36415 COLL VENOUS BLD VENIPUNCTURE: CPT | Performed by: INTERNAL MEDICINE

## 2021-10-06 PROCEDURE — 87338 HPYLORI STOOL AG IA: CPT

## 2021-10-06 PROCEDURE — 80053 COMPREHEN METABOLIC PANEL: CPT | Performed by: INTERNAL MEDICINE

## 2021-10-07 LAB — H PYLORI AG STL QL IA: NEGATIVE

## 2021-10-08 DIAGNOSIS — I12.9 HYPERTENSION ASSOCIATED WITH STAGE 2 CHRONIC KIDNEY DISEASE DUE TO TYPE 2 DIABETES MELLITUS (HCC): Primary | ICD-10-CM

## 2021-10-08 DIAGNOSIS — N18.2 HYPERTENSION ASSOCIATED WITH STAGE 2 CHRONIC KIDNEY DISEASE DUE TO TYPE 2 DIABETES MELLITUS (HCC): Primary | ICD-10-CM

## 2021-10-08 DIAGNOSIS — E11.22 HYPERTENSION ASSOCIATED WITH STAGE 2 CHRONIC KIDNEY DISEASE DUE TO TYPE 2 DIABETES MELLITUS (HCC): Primary | ICD-10-CM

## 2021-10-08 DIAGNOSIS — E78.00 HIGH CHOLESTEROL: ICD-10-CM

## 2021-10-11 DIAGNOSIS — J30.9 ALLERGIC RHINITIS, UNSPECIFIED SEASONALITY, UNSPECIFIED TRIGGER: ICD-10-CM

## 2021-10-11 RX ORDER — FLUTICASONE PROPIONATE 50 MCG
SPRAY, SUSPENSION (ML) NASAL
Qty: 16 G | Refills: 2 | Status: SHIPPED | OUTPATIENT
Start: 2021-10-11 | End: 2021-12-20

## 2021-10-11 RX ORDER — ROSUVASTATIN CALCIUM 40 MG/1
40 TABLET, COATED ORAL DAILY
Qty: 90 TABLET | Refills: 3 | Status: SHIPPED | OUTPATIENT
Start: 2021-10-11

## 2021-10-12 ENCOUNTER — OFFICE VISIT (OUTPATIENT)
Dept: FAMILY MEDICINE CLINIC | Facility: CLINIC | Age: 76
End: 2021-10-12

## 2021-10-12 VITALS
WEIGHT: 152.6 LBS | BODY MASS INDEX: 23.95 KG/M2 | TEMPERATURE: 98 F | SYSTOLIC BLOOD PRESSURE: 120 MMHG | HEIGHT: 67 IN | DIASTOLIC BLOOD PRESSURE: 70 MMHG | RESPIRATION RATE: 14 BRPM | HEART RATE: 64 BPM

## 2021-10-12 DIAGNOSIS — E78.5 HYPERLIPIDEMIA, UNSPECIFIED HYPERLIPIDEMIA TYPE: ICD-10-CM

## 2021-10-12 DIAGNOSIS — I10 ESSENTIAL HYPERTENSION: ICD-10-CM

## 2021-10-12 DIAGNOSIS — Z23 ENCOUNTER FOR IMMUNIZATION: ICD-10-CM

## 2021-10-12 DIAGNOSIS — E11.9 TYPE 2 DIABETES MELLITUS WITHOUT COMPLICATION, WITHOUT LONG-TERM CURRENT USE OF INSULIN (HCC): Primary | ICD-10-CM

## 2021-10-12 DIAGNOSIS — R14.3 FLATULENCE: ICD-10-CM

## 2021-10-12 PROBLEM — Z00.00 MEDICARE ANNUAL WELLNESS VISIT, SUBSEQUENT: Status: RESOLVED | Noted: 2021-02-18 | Resolved: 2021-10-12

## 2021-10-12 PROBLEM — R10.30 LOWER ABDOMINAL PAIN: Status: RESOLVED | Noted: 2020-06-19 | Resolved: 2021-10-12

## 2021-10-12 LAB — SL AMB POCT HEMOGLOBIN AIC: 6.5 (ref ?–6.5)

## 2021-10-12 PROCEDURE — 99214 OFFICE O/P EST MOD 30 MIN: CPT | Performed by: PHYSICIAN ASSISTANT

## 2021-10-12 PROCEDURE — 83036 HEMOGLOBIN GLYCOSYLATED A1C: CPT | Performed by: PHYSICIAN ASSISTANT

## 2021-10-12 PROCEDURE — 3074F SYST BP LT 130 MM HG: CPT | Performed by: PHYSICIAN ASSISTANT

## 2021-10-12 PROCEDURE — 90662 IIV NO PRSV INCREASED AG IM: CPT | Performed by: PHYSICIAN ASSISTANT

## 2021-10-12 PROCEDURE — G0008 ADMIN INFLUENZA VIRUS VAC: HCPCS | Performed by: PHYSICIAN ASSISTANT

## 2021-10-12 PROCEDURE — 3078F DIAST BP <80 MM HG: CPT | Performed by: PHYSICIAN ASSISTANT

## 2021-10-23 DIAGNOSIS — K21.9 GASTROESOPHAGEAL REFLUX DISEASE, UNSPECIFIED WHETHER ESOPHAGITIS PRESENT: ICD-10-CM

## 2021-10-26 ENCOUNTER — OFFICE VISIT (OUTPATIENT)
Dept: DENTISTRY | Facility: CLINIC | Age: 76
End: 2021-10-26

## 2021-10-26 VITALS — TEMPERATURE: 97.4 F | DIASTOLIC BLOOD PRESSURE: 65 MMHG | SYSTOLIC BLOOD PRESSURE: 117 MMHG | HEART RATE: 49 BPM

## 2021-10-26 DIAGNOSIS — K08.89 PAIN, DENTAL: Primary | ICD-10-CM

## 2021-10-26 PROCEDURE — D0140 LIMITED ORAL EVALUATION - PROBLEM FOCUSED: HCPCS | Performed by: STUDENT IN AN ORGANIZED HEALTH CARE EDUCATION/TRAINING PROGRAM

## 2021-10-26 PROCEDURE — D0220 INTRAORAL - PERIAPICAL FIRST RADIOGRAPHIC IMAGE: HCPCS | Performed by: STUDENT IN AN ORGANIZED HEALTH CARE EDUCATION/TRAINING PROGRAM

## 2021-10-26 RX ORDER — CALCIUM CARBONATE 200(500)MG
TABLET,CHEWABLE ORAL
Qty: 90 TABLET | Refills: 3 | Status: SHIPPED | OUTPATIENT
Start: 2021-10-26 | End: 2022-01-10

## 2021-11-15 ENCOUNTER — TELEPHONE (OUTPATIENT)
Dept: FAMILY MEDICINE CLINIC | Facility: CLINIC | Age: 76
End: 2021-11-15

## 2021-11-15 ENCOUNTER — TELEPHONE (OUTPATIENT)
Dept: SLEEP CENTER | Facility: CLINIC | Age: 76
End: 2021-11-15

## 2021-11-15 ENCOUNTER — OFFICE VISIT (OUTPATIENT)
Dept: GASTROENTEROLOGY | Facility: CLINIC | Age: 76
End: 2021-11-15
Payer: MEDICARE

## 2021-11-15 VITALS
HEART RATE: 53 BPM | OXYGEN SATURATION: 97 % | BODY MASS INDEX: 23.95 KG/M2 | TEMPERATURE: 98.1 F | WEIGHT: 149 LBS | HEIGHT: 66 IN | DIASTOLIC BLOOD PRESSURE: 68 MMHG | SYSTOLIC BLOOD PRESSURE: 112 MMHG

## 2021-11-15 DIAGNOSIS — K21.9 GASTROESOPHAGEAL REFLUX DISEASE, UNSPECIFIED WHETHER ESOPHAGITIS PRESENT: ICD-10-CM

## 2021-11-15 DIAGNOSIS — R10.13 DYSPEPSIA: Primary | ICD-10-CM

## 2021-11-15 PROCEDURE — 99214 OFFICE O/P EST MOD 30 MIN: CPT | Performed by: PHYSICIAN ASSISTANT

## 2021-11-15 RX ORDER — PANTOPRAZOLE SODIUM 40 MG/1
40 TABLET, DELAYED RELEASE ORAL DAILY
Qty: 90 TABLET | Refills: 2 | Status: SHIPPED | OUTPATIENT
Start: 2021-11-15 | End: 2022-02-24

## 2021-11-16 DIAGNOSIS — I10 ESSENTIAL HYPERTENSION: ICD-10-CM

## 2021-11-16 RX ORDER — LISINOPRIL 20 MG/1
20 TABLET ORAL DAILY
Qty: 90 TABLET | Refills: 3 | Status: SHIPPED | OUTPATIENT
Start: 2021-11-16

## 2021-11-17 DIAGNOSIS — E55.9 VITAMIN D INSUFFICIENCY: ICD-10-CM

## 2021-11-17 RX ORDER — VITAMIN B COMPLEX
TABLET ORAL
Qty: 90 TABLET | Refills: 1 | Status: SHIPPED | OUTPATIENT
Start: 2021-11-17

## 2021-11-19 DIAGNOSIS — K64.9 HEMORRHOIDS, UNSPECIFIED HEMORRHOID TYPE: ICD-10-CM

## 2021-11-22 RX ORDER — HYDROCORTISONE 25 MG/G
CREAM TOPICAL
Qty: 28 G | Refills: 3 | Status: SHIPPED | OUTPATIENT
Start: 2021-11-22 | End: 2022-02-04

## 2021-11-23 ENCOUNTER — OFFICE VISIT (OUTPATIENT)
Dept: DENTISTRY | Facility: CLINIC | Age: 76
End: 2021-11-23

## 2021-11-23 VITALS — TEMPERATURE: 98.4 F | DIASTOLIC BLOOD PRESSURE: 62 MMHG | HEART RATE: 59 BPM | SYSTOLIC BLOOD PRESSURE: 121 MMHG

## 2021-11-23 DIAGNOSIS — Z01.20 ENCOUNTER FOR DENTAL EXAMINATION AND CLEANING WITHOUT ABNORMAL FINDINGS: ICD-10-CM

## 2021-11-23 DIAGNOSIS — Z01.21 ENCOUNTER FOR DENTAL EXAMINATION AND CLEANING WITH ABNORMAL FINDINGS: Primary | ICD-10-CM

## 2021-11-23 PROCEDURE — D0150 COMPREHENSIVE ORAL EVALUATION - NEW OR ESTABLISHED PATIENT: HCPCS

## 2021-11-23 PROCEDURE — D0210 INTRAORAL - COMPLETE SERIES OF RADIOGRAPHIC IMAGES: HCPCS

## 2021-11-24 ENCOUNTER — OFFICE VISIT (OUTPATIENT)
Dept: DENTISTRY | Facility: CLINIC | Age: 76
End: 2021-11-24

## 2021-11-24 VITALS — SYSTOLIC BLOOD PRESSURE: 91 MMHG | TEMPERATURE: 97.5 F | HEART RATE: 57 BPM | DIASTOLIC BLOOD PRESSURE: 47 MMHG

## 2021-11-24 DIAGNOSIS — Z01.20 ENCOUNTER FOR DENTAL EXAMINATION AND CLEANING WITHOUT ABNORMAL FINDINGS: Primary | ICD-10-CM

## 2021-11-24 PROCEDURE — D1110 PROPHYLAXIS - ADULT: HCPCS

## 2021-11-29 ENCOUNTER — TELEPHONE (OUTPATIENT)
Dept: FAMILY MEDICINE CLINIC | Facility: CLINIC | Age: 76
End: 2021-11-29

## 2021-12-02 ENCOUNTER — CONSULT (OUTPATIENT)
Dept: SURGERY | Facility: CLINIC | Age: 76
End: 2021-12-02
Payer: MEDICARE

## 2021-12-02 ENCOUNTER — OFFICE VISIT (OUTPATIENT)
Dept: FAMILY MEDICINE CLINIC | Facility: CLINIC | Age: 76
End: 2021-12-02

## 2021-12-02 VITALS
HEIGHT: 66 IN | BODY MASS INDEX: 24.11 KG/M2 | HEART RATE: 66 BPM | DIASTOLIC BLOOD PRESSURE: 84 MMHG | WEIGHT: 150 LBS | SYSTOLIC BLOOD PRESSURE: 118 MMHG

## 2021-12-02 VITALS
HEART RATE: 57 BPM | WEIGHT: 150 LBS | BODY MASS INDEX: 24.11 KG/M2 | RESPIRATION RATE: 16 BRPM | DIASTOLIC BLOOD PRESSURE: 74 MMHG | HEIGHT: 66 IN | OXYGEN SATURATION: 98 % | TEMPERATURE: 97.5 F | SYSTOLIC BLOOD PRESSURE: 144 MMHG

## 2021-12-02 DIAGNOSIS — T14.8XXA SPLINTER IN SKIN: Primary | ICD-10-CM

## 2021-12-02 DIAGNOSIS — Z23 ENCOUNTER FOR IMMUNIZATION: ICD-10-CM

## 2021-12-02 DIAGNOSIS — T14.8XXA SPLINTER IN SKIN: ICD-10-CM

## 2021-12-02 PROCEDURE — 10120 INC&RMVL FB SUBQ TISS SMPL: CPT | Performed by: PHYSICIAN ASSISTANT

## 2021-12-02 PROCEDURE — 3077F SYST BP >= 140 MM HG: CPT | Performed by: PHYSICIAN ASSISTANT

## 2021-12-02 PROCEDURE — 3078F DIAST BP <80 MM HG: CPT | Performed by: PHYSICIAN ASSISTANT

## 2021-12-02 PROCEDURE — 99202 OFFICE O/P NEW SF 15 MIN: CPT | Performed by: SURGERY

## 2021-12-02 PROCEDURE — 99213 OFFICE O/P EST LOW 20 MIN: CPT | Performed by: PHYSICIAN ASSISTANT

## 2021-12-09 ENCOUNTER — OFFICE VISIT (OUTPATIENT)
Dept: GASTROENTEROLOGY | Facility: CLINIC | Age: 76
End: 2021-12-09
Payer: MEDICARE

## 2021-12-09 VITALS
SYSTOLIC BLOOD PRESSURE: 128 MMHG | BODY MASS INDEX: 24.11 KG/M2 | TEMPERATURE: 98.1 F | DIASTOLIC BLOOD PRESSURE: 62 MMHG | HEART RATE: 59 BPM | HEIGHT: 66 IN | WEIGHT: 150 LBS

## 2021-12-09 DIAGNOSIS — R10.84 GENERALIZED ABDOMINAL PAIN: ICD-10-CM

## 2021-12-09 DIAGNOSIS — Z12.11 COLON CANCER SCREENING: ICD-10-CM

## 2021-12-09 DIAGNOSIS — A04.8 H. PYLORI INFECTION: ICD-10-CM

## 2021-12-09 DIAGNOSIS — K21.9 GASTROESOPHAGEAL REFLUX DISEASE, UNSPECIFIED WHETHER ESOPHAGITIS PRESENT: Primary | ICD-10-CM

## 2021-12-09 PROCEDURE — 99213 OFFICE O/P EST LOW 20 MIN: CPT | Performed by: PHYSICIAN ASSISTANT

## 2021-12-09 RX ORDER — DICYCLOMINE HYDROCHLORIDE 10 MG/1
10 CAPSULE ORAL
Qty: 90 CAPSULE | Refills: 1 | Status: SHIPPED | OUTPATIENT
Start: 2021-12-09 | End: 2022-01-17

## 2021-12-15 ENCOUNTER — OFFICE VISIT (OUTPATIENT)
Dept: DENTISTRY | Facility: CLINIC | Age: 76
End: 2021-12-15

## 2021-12-15 VITALS — TEMPERATURE: 97.8 F | SYSTOLIC BLOOD PRESSURE: 118 MMHG | HEART RATE: 54 BPM | DIASTOLIC BLOOD PRESSURE: 59 MMHG

## 2021-12-15 DIAGNOSIS — K06.2 DENTURE IRRITATION: Primary | ICD-10-CM

## 2021-12-15 DIAGNOSIS — I25.10 CORONARY ARTERY DISEASE INVOLVING NATIVE CORONARY ARTERY OF NATIVE HEART WITHOUT ANGINA PECTORIS: ICD-10-CM

## 2021-12-15 PROCEDURE — D9430 OFFICE VISIT FOR OBSERVATION (DURING REGULARLY SCHEDULED HOURS) - NO OTHER SERVICES PERFORMED: HCPCS | Performed by: DENTIST

## 2021-12-20 DIAGNOSIS — J30.9 ALLERGIC RHINITIS, UNSPECIFIED SEASONALITY, UNSPECIFIED TRIGGER: ICD-10-CM

## 2021-12-20 RX ORDER — FLUTICASONE PROPIONATE 50 MCG
SPRAY, SUSPENSION (ML) NASAL
Qty: 16 G | Refills: 2 | Status: SHIPPED | OUTPATIENT
Start: 2021-12-20 | End: 2022-02-08

## 2021-12-30 DIAGNOSIS — E11.9 TYPE 2 DIABETES MELLITUS WITHOUT COMPLICATION, WITHOUT LONG-TERM CURRENT USE OF INSULIN (HCC): ICD-10-CM

## 2021-12-30 RX ORDER — BLOOD-GLUCOSE METER
EACH MISCELLANEOUS
Qty: 100 EACH | Refills: 3 | Status: SHIPPED | OUTPATIENT
Start: 2021-12-30 | End: 2022-04-12

## 2022-01-05 DIAGNOSIS — E11.9 TYPE 2 DIABETES MELLITUS WITHOUT COMPLICATION, WITHOUT LONG-TERM CURRENT USE OF INSULIN (HCC): ICD-10-CM

## 2022-01-05 RX ORDER — BLOOD SUGAR DIAGNOSTIC
STRIP MISCELLANEOUS
Qty: 100 EACH | Refills: 3 | Status: SHIPPED | OUTPATIENT
Start: 2022-01-05

## 2022-01-10 DIAGNOSIS — K21.9 GASTROESOPHAGEAL REFLUX DISEASE, UNSPECIFIED WHETHER ESOPHAGITIS PRESENT: ICD-10-CM

## 2022-01-10 RX ORDER — CALCIUM CARBONATE 200(500)MG
TABLET,CHEWABLE ORAL
Qty: 90 TABLET | Refills: 3 | Status: SHIPPED | OUTPATIENT
Start: 2022-01-10 | End: 2022-03-28

## 2022-01-14 DIAGNOSIS — A04.8 H. PYLORI INFECTION: ICD-10-CM

## 2022-01-17 RX ORDER — DICYCLOMINE HYDROCHLORIDE 10 MG/1
10 CAPSULE ORAL
Qty: 90 CAPSULE | Refills: 1 | Status: SHIPPED | OUTPATIENT
Start: 2022-01-17 | End: 2022-02-04

## 2022-01-18 ENCOUNTER — HOSPITAL ENCOUNTER (OUTPATIENT)
Facility: HOSPITAL | Age: 77
Setting detail: OUTPATIENT SURGERY
Discharge: HOME | End: 2022-01-18
Attending: ORTHOPAEDIC SURGERY | Admitting: ORTHOPAEDIC SURGERY
Payer: MEDICARE

## 2022-01-18 VITALS
OXYGEN SATURATION: 97 % | BODY MASS INDEX: 23.54 KG/M2 | HEIGHT: 67 IN | DIASTOLIC BLOOD PRESSURE: 58 MMHG | HEART RATE: 51 BPM | WEIGHT: 150 LBS | RESPIRATION RATE: 18 BRPM | TEMPERATURE: 98.3 F | SYSTOLIC BLOOD PRESSURE: 128 MMHG

## 2022-01-18 DIAGNOSIS — M65.332 TRIGGER MIDDLE FINGER OF LEFT HAND: Primary | ICD-10-CM

## 2022-01-18 LAB — GLUCOSE SERPL-MCNC: 158 MG/DL (ref 65–140)

## 2022-01-18 PROCEDURE — 82948 REAGENT STRIP/BLOOD GLUCOSE: CPT

## 2022-01-18 PROCEDURE — 26055 INCISE FINGER TENDON SHEATH: CPT | Performed by: ORTHOPAEDIC SURGERY

## 2022-01-18 PROCEDURE — NC001 PR NO CHARGE: Performed by: ORTHOPAEDIC SURGERY

## 2022-01-18 RX ORDER — LIDOCAINE HYDROCHLORIDE 10 MG/ML
INJECTION, SOLUTION EPIDURAL; INFILTRATION; INTRACAUDAL; PERINEURAL AS NEEDED
Status: DISCONTINUED | OUTPATIENT
Start: 2022-01-18 | End: 2022-01-18 | Stop reason: HOSPADM

## 2022-01-18 RX ORDER — LIDOCAINE HYDROCHLORIDE 10 MG/ML
10 INJECTION, SOLUTION EPIDURAL; INFILTRATION; INTRACAUDAL; PERINEURAL ONCE
Status: DISCONTINUED | OUTPATIENT
Start: 2022-01-18 | End: 2022-01-18 | Stop reason: HOSPADM

## 2022-01-18 RX ORDER — NAPROXEN SODIUM 220 MG
220 TABLET ORAL 2 TIMES DAILY WITH MEALS
Qty: 20 TABLET | Refills: 0 | Status: SHIPPED | OUTPATIENT
Start: 2022-01-18 | End: 2022-01-27

## 2022-01-18 RX ORDER — CLINDAMYCIN PHOSPHATE 900 MG/50ML
900 INJECTION INTRAVENOUS ONCE
Status: DISCONTINUED | OUTPATIENT
Start: 2022-01-18 | End: 2022-01-18

## 2022-01-18 RX ORDER — HYDROCODONE BITARTRATE AND ACETAMINOPHEN 5; 325 MG/1; MG/1
1 TABLET ORAL EVERY 6 HOURS PRN
Qty: 5 TABLET | Refills: 0 | Status: SHIPPED | OUTPATIENT
Start: 2022-01-18 | End: 2022-01-23

## 2022-01-18 RX ORDER — SENNOSIDES 8.6 MG
650 CAPSULE ORAL EVERY 8 HOURS PRN
Qty: 30 TABLET | Refills: 0 | Status: SHIPPED | OUTPATIENT
Start: 2022-01-18 | End: 2022-01-26 | Stop reason: ALTCHOICE

## 2022-01-18 NOTE — OP NOTE
OPERATIVE REPORT  PATIENT NAME: Anne Wilson  :  1945  MRN: [id number]  Pt Location: [facility]    SURGERY DATE: 22    Surgeon(s) and Role:     * Talisha Brown MD - Primary     * Paulo Ivan MD - Assisting    Pre-Op Diagnosis:  Trigger middle finger of left hand [M65 332]    Post-Op Diagnosis:   Trigger middle finger of left hand [M65 332]    Procedure(s) (LRB):  left long trigger finger release (Left)    Specimen(s):  * No orders in the log *    Estimated Blood Loss:   Minimal      Anesthesia Type:   Local    Operative Indications: The patient has a history of a left long finger trigger finger that was recalcitrant to conservative management  The decision was made to bring the patient to the operating room for a left long finger trigger finger release  Risks of the procedure were explained which include, but are not limited to bleeding; infection; damage to nerves, arteries,veins, tendons; scar; pain; need for reoperation; failure to give desired result; and risks of anaesthesia  All questions were answered to satisfaction and they were willing to proceed  Operative Findings:  Left hand long finger trigger finger    Complications:   None    Procedure and Technique:  After the patient, site, and procedure were identified, the patient was brought into the operating room in a supine position  Local anaesthesia was adminstered in the preoperative holding area  A tourniquet was not used  The  left upper extremity was then prepped and drapped in a normal, sterile, orthopedic fashion  After the patient, site, and procedure were once again identified, attention was turned to the left long finger  An incision was made over the flexor tendon sheath at the level of the A1 pulley  Dissection was carried out in-line with the flexor tendon sheath and the radial and ulnar digital artery and nerve were protected  The A1 pulley was identified at the base of the incision    Under direct visualization, the A1 pulley was divided along the midline in its entirety with care taken to avoid injury to the underlying tendon  The tendons were examined to ensure that no further catching, popping, clicking or locking occurred with motion of the finger  At the completion of the procedure, hemostasis was obtained with cautery and direct pressure  The wounds were copiously irrigated with sterile solution  The wounds were closed with Prolene  Sterile dressings were applied, including Xeroform, gauze, tweeners, webril, ACE  Please note, all sponge, needle, and instrument counts were correct prior to closure  Loupe magnification was utilized  The patient tolerated the procedure well       I was present for all critical portions of the procedure    Patient Disposition:  PACU  and hemodynamically stable    SIGNATURE: Jearldine Goodell, MD  DATE: 01/18/22  TIME: 8:59 AM

## 2022-01-18 NOTE — H&P
H&P Exam - [facility] Patricia Corbin 78 y o  male MRN: [id number]  Unit/Bed#: P309 B PRE    Assessment/Plan   Assessment:  Left long finger trigger finger  Plan:  Left long finger trigger finger release under local anesthesia    History of Present Illness   HPI:  Bryant Cardoso is a 78 y o  male who presents with popping and locking of the left long finger  Patient has not responded to non operative modalities  He would like to move forward with operative intervention at this time      Historical Information  Review Of Systems:   · Skin: Normal  · Neuro: See HPI  · Musculoskeletal: See HPI  · 14 point review of systems negative except as stated above     Past Medical History:   Past Medical History:   Diagnosis Date    AS (aortic stenosis)     Balanitis     Biceps tendonitis on right     CAD (coronary artery disease)     Cancer (HCC)     skin    Colon polyp     Complete rotator cuff tear or rupture of right shoulder, not specified as traumatic     CPAP (continuous positive airway pressure) dependence     Diabetes mellitus (HCC)     Esophageal reflux     High cholesterol     Hypertension     Hypertriglyceridemia     Hypogonadism in male     Myalgia     Myocardial infarction ([facility] [address] )     Palpitations     Shoulder pain     Sinus problem     Skin cancer of nose     Sleep apnea     Stroke ([facility] [address] ) 1674    Systolic murmur     recently found    Tinea cruris     Tinea pedis        Past Surgical History:   Past Surgical History:   Procedure Laterality Date    CARPAL TUNNEL RELEASE      CATARACT EXTRACTION Bilateral     COLONOSCOPY  2019    CORONARY ANGIOPLASTY WITH STENT PLACEMENT      10-15-19 - pt denies stent implant    CORONARY ARTERY BYPASS GRAFT N/A 12/12/2016    Procedure: INTRAOP CECILLE; BILATERAL LEG EVH; CORONARY ARTERY BYPASS GRAFT X 4 SVG TO PDA, OM1,  AND DIAGONAL1, LIMA TO LAD;  Surgeon: Santos Saravia MD;  Location: [facility];  Service:    [facility] EYE SURGERY      HERNIA REPAIR      VA ARTHROSCOPY SHOULDER SURGICAL BICEPS TENODESIS Right 5/6/2016    Procedure: ARTHROSCOPIC BICEPS TENODESIS;  Surgeon: Alex Barry MD;  [facility];  Service: Orthopedics    WI INCISE FINGER TENDON SHEATH Right 10/15/2019    Procedure: TRIGGER FINGER RELEASE INDEX, LONG, RING, SMALL, THUMB FINGERS;  Surgeon: Talisha Brown MD;  [facility];  Service: Orthopedics    WI INCISE FINGER TENDON SHEATH Left 3/3/2020    Procedure: RELEASE TRIGGER FINGER INDEX;  Surgeon: Talisha Brown MD;  [facility];  Service: Orthopedics    WI SHLDR ARTHROSCOP,SURG,W/ROTAT CUFF REPR Right 5/6/2016    Procedure: REPAIR ROTATOR CUFF  ARTHROSCOPIC; SUBACROMIAL DECOMPRESSION; PARTIAL SYNOVECTOMY;  Surgeon: Alex Barry MD;  [facility];  Service: Orthopedics    WI WRIST Ever Heman LIG Right 10/15/2019    Procedure: ENDOSCOPIC CARPAL TUNNEL RELEASE;  Surgeon: Talisha Brown MD;  [facility];  Service: Orthopedics    WI WRIST Ever Heman LIG Left 3/3/2020    Procedure: RELEASE CARPAL TUNNEL ENDOSCOPIC;  Surgeon: Talisha Brown MD;  [facility]: [facility];  Service: Orthopedics   [facility] [address]      TESTICLE SURGERY      UMBILICAL HERNIA REPAIR  2009    over age 13       Family History:  Family history reviewed and non-contributory  Family History   Problem Relation Age of Onset    Heart disease Mother     Hypertension Mother     Nephrolithiasis Father     Other Father         Renal disease    Hypertension Sister     Nephrolithiasis Brother     Other Brother         Renal disease    Hematuria Family         Microscopic       Social History:  Social History     Socioeconomic History    Marital status: /Civil Union     Spouse name: None    Number of children: None    Years of education: None    Highest education level: None   Occupational History    None   Tobacco Use    Smoking status: Former Smoker    Smokeless tobacco: Former User    Tobacco comment: smoked for 3 years from 19 y/o - 23 yrs old   Vaping Use    Vaping Use: Never used   Substance and Sexual Activity    Alcohol use: No    Drug use: No    Sexual activity: Yes     Partners: Female   Other Topics Concern    None   Social History Narrative    Uses Safety Equipment Seatbelts     Social Determinants of Health     Financial Resource Strain: Not on file   Food Insecurity: Not on file   Transportation Needs: Not on file   Physical Activity: Not on file   Stress: Not on file   Social Connections: Not on file   Intimate Partner Violence: Not on file   Housing Stability: Not on file       Allergies: Allergies   Allergen Reactions    Epinephrine Hives and Anxiety    Penicillins Hives and Anxiety           Labs:  0   Lab Value Date/Time    HCT 44 3 01/05/2021 1756    HCT 45 2 11/11/2020 0819    HCT 44 6 10/27/2020 0907    HCT 40 3 10/21/2015 0728    HCT 39 9 09/22/2014 1304    HGB 14 1 01/05/2021 1756    HGB 14 1 11/11/2020 0819    HGB 14 3 10/27/2020 0907    HGB 13 3 10/21/2015 0728    HGB 14 3 01/01/2015 1632    HGB 12 8 09/22/2014 1304    HGB 13 3 02/18/2014 1127    INR 1 40 (H) 12/12/2016 2137    WBC 8 27 01/05/2021 1756    WBC 6 12 11/11/2020 0819    WBC 7 91 10/27/2020 0907    WBC 6 44 10/21/2015 0728    WBC 7 97 09/22/2014 1304    ESR 7 10/21/2015 0721    CRP <3 0 10/21/2015 0721       Meds:    Current Facility-Administered Medications:     lidocaine (PF) (XYLOCAINE-MPF) 1 % injection 10 mL, 10 mL, Infiltration, Once, Ruel Dutton PA-C    Blood Culture:   No results found for: BLOODCX    Wound Culture:   No results found for: WOUNDCULT    Ins and Outs:  No intake/output data recorded              Physical Exam  /93   Pulse (!) 53   Temp (!) 97 2 °F (36 2 °C) (Temporal)   Resp 16   Ht 5' 7" (1 702 m)   Wt 68 kg (150 lb)   SpO2 99%   BMI 23 49 kg/m²   /93   Pulse (!) 53   Temp (!) 97 2 °F (36 2 °C) (Temporal)   Resp 16   Ht 5' 7" (1 702 m)   Wt 68 kg (150 lb)   SpO2 99%   BMI 23 49 kg/m²   Gen: Alert and oriented to person, place, time  HEENT: EOMI, eyes clear, moist mucus membranes, hearing intact  Respiratory: Bilateral chest rise   No audible wheezing found  Cardiovascular: Regular Rate and Rhythm  Abdomen: soft nontender/nondistended  Ortho Exam: crepitation of tendons  Neuro Exam: sensation intact    Lab Results: Reviewed  Imaging: Reviewed

## 2022-01-24 DIAGNOSIS — M65.332 TRIGGER MIDDLE FINGER OF LEFT HAND: ICD-10-CM

## 2022-01-25 ENCOUNTER — OFFICE VISIT (OUTPATIENT)
Dept: DENTISTRY | Facility: CLINIC | Age: 77
End: 2022-01-25

## 2022-01-25 VITALS — TEMPERATURE: 97.5 F | HEART RATE: 66 BPM | DIASTOLIC BLOOD PRESSURE: 85 MMHG | SYSTOLIC BLOOD PRESSURE: 120 MMHG

## 2022-01-25 DIAGNOSIS — Z01.21 ENCOUNTER FOR DENTAL EXAMINATION AND CLEANING WITH ABNORMAL FINDINGS: Primary | ICD-10-CM

## 2022-01-25 PROCEDURE — WIS2000 CROWN PREP: Performed by: DENTIST

## 2022-01-25 NOTE — PROGRESS NOTES
Patient presents to the clinic for core build up and crown prep on teeth 8 and 9  Triple tray and bite registration material was used to take the bite registration of the patient  Topical was placed, 1 carpule of lidocaine was used to anesthetize the patient  Caries were excavated and teeth were build up using core material    Lela shoulder bur and high speed handpiece were used to prep the teeth  Teeth were prepped for PFZ  Linda Marr was used to fabricate temporary crowns  Crowns were polished and it was made sure that they cover all the margins  Temporary cement was used to cement the crowns  Excess cement was removed using explorer and floss  Occlusion was checked and adjusted  Patient left in stable condition  Nv: refinement as needed and final impression

## 2022-01-26 ENCOUNTER — OFFICE VISIT (OUTPATIENT)
Dept: OBGYN CLINIC | Facility: HOSPITAL | Age: 77
End: 2022-01-26

## 2022-01-26 VITALS
BODY MASS INDEX: 23.54 KG/M2 | HEIGHT: 67 IN | WEIGHT: 150 LBS | SYSTOLIC BLOOD PRESSURE: 121 MMHG | HEART RATE: 73 BPM | DIASTOLIC BLOOD PRESSURE: 82 MMHG

## 2022-01-26 DIAGNOSIS — M65.332 TRIGGER MIDDLE FINGER OF LEFT HAND: Primary | ICD-10-CM

## 2022-01-26 DIAGNOSIS — Z47.89 AFTERCARE FOLLOWING SURGERY OF THE MUSCULOSKELETAL SYSTEM: ICD-10-CM

## 2022-01-26 PROCEDURE — 99024 POSTOP FOLLOW-UP VISIT: CPT | Performed by: ORTHOPAEDIC SURGERY

## 2022-01-26 NOTE — PROGRESS NOTES
Assessment:   S/P left long trigger finger release - Left on 1/18/2022    Plan:   Resume activities as tolerated  Therapy for HEP    Follow Up:  PRN      CHIEF COMPLAINT:  Chief Complaint   Patient presents with    Left Middle Finger - Post-op, Suture / Staple Removal     TFR-1/18/21           SUBJECTIVE:  Jahaira Aguilera is a 78 y o  male who presents for follow up after left long trigger finger release - Left on 1/18/2022  Today patient has no issues  He has no post operative pain  He denies any s/s of an infection  He denies any acute complaints         PHYSICAL EXAMINATION:  Vital signs: /82   Pulse 73   Ht 5' 7" (1 702 m)   Wt 68 kg (150 lb)   BMI 23 49 kg/m²   General: well developed and well nourished, alert, oriented times 3 and appears comfortable  Psychiatric: Normal    MUSCULOSKELETAL EXAMINATION:  Incision: Clean, dry, intact  Range of Motion: Limited due to stiffness  Neurovascular status: Neuro intact, good cap refill  Activity Restrictions: No restrictions  Done today: Sutures out and Steri strips applied      STUDIES REVIEWED:  No Studies to review      PROCEDURES PERFORMED:  Procedures  No Procedures performed today

## 2022-01-27 RX ORDER — NAPROXEN SODIUM 220 MG
220 TABLET ORAL 2 TIMES DAILY WITH MEALS
Qty: 20 TABLET | Refills: 0 | Status: SHIPPED | OUTPATIENT
Start: 2022-01-27

## 2022-01-27 RX ORDER — SENNOSIDES 8.6 MG
650 CAPSULE ORAL EVERY 8 HOURS PRN
Qty: 30 TABLET | Refills: 0 | Status: SHIPPED | OUTPATIENT
Start: 2022-01-27

## 2022-01-31 ENCOUNTER — OFFICE VISIT (OUTPATIENT)
Dept: DENTISTRY | Facility: CLINIC | Age: 77
End: 2022-01-31

## 2022-01-31 VITALS — SYSTOLIC BLOOD PRESSURE: 110 MMHG | TEMPERATURE: 97.8 F | DIASTOLIC BLOOD PRESSURE: 60 MMHG | HEART RATE: 55 BPM

## 2022-01-31 DIAGNOSIS — K08.50 DEFECTIVE DENTAL RESTORATION: Primary | ICD-10-CM

## 2022-01-31 PROCEDURE — D0191 ASSESSMENT OF A PATIENT: HCPCS | Performed by: DENTIST

## 2022-01-31 PROCEDURE — WIS1000 RESIDENT TEACHING CASE: Performed by: DENTIST

## 2022-01-31 NOTE — PROGRESS NOTES
Emergency  S: CC: "My temp crown fell off"  Pt identifies #9 and has temp crown with him at today's visit  O: Temporary crown placed on 1/25/22 on #9 has fallen off  #8 temp crown still in place  Note prep has adequate reduction w/ moderate taper and short incisal height  Pt has anterior incisal wear suggesting heavy occlusion on anteriors  Temporary likely fallen off due to heavy anterior contact  Noted #9 temp crown is thin on lingual area  A: #9: Temp fallen off  P: Etch and bond lingual area of #9 temp crown  Added flowable composite shade A2 to lingual to reinforce temp crown  Cemented provisional crown using Provicell  Removed excess cement, occlusion and contacts verified  Pt left satisfied and ambulatory  NV: Continue crown preps and final impression

## 2022-02-04 DIAGNOSIS — A04.8 H. PYLORI INFECTION: ICD-10-CM

## 2022-02-04 DIAGNOSIS — K64.9 HEMORRHOIDS, UNSPECIFIED HEMORRHOID TYPE: ICD-10-CM

## 2022-02-04 RX ORDER — DICYCLOMINE HYDROCHLORIDE 10 MG/1
10 CAPSULE ORAL
Qty: 90 CAPSULE | Refills: 1 | Status: SHIPPED | OUTPATIENT
Start: 2022-02-04 | End: 2022-04-01

## 2022-02-04 RX ORDER — HYDROCORTISONE 25 MG/G
CREAM TOPICAL
Qty: 28 G | Refills: 3 | Status: SHIPPED | OUTPATIENT
Start: 2022-02-04 | End: 2022-04-26

## 2022-02-07 ENCOUNTER — TELEPHONE (OUTPATIENT)
Dept: FAMILY MEDICINE CLINIC | Facility: CLINIC | Age: 77
End: 2022-02-07

## 2022-02-07 ENCOUNTER — OFFICE VISIT (OUTPATIENT)
Dept: DENTISTRY | Facility: CLINIC | Age: 77
End: 2022-02-07

## 2022-02-07 VITALS — TEMPERATURE: 98.5 F | SYSTOLIC BLOOD PRESSURE: 100 MMHG | HEART RATE: 59 BPM | DIASTOLIC BLOOD PRESSURE: 50 MMHG

## 2022-02-07 DIAGNOSIS — Z98.811 DENTAL RESTORATION STATUS: Primary | ICD-10-CM

## 2022-02-07 PROCEDURE — WIS1000 RESIDENT TEACHING CASE: Performed by: DENTIST

## 2022-02-07 PROCEDURE — D0191 ASSESSMENT OF A PATIENT: HCPCS | Performed by: DENTIST

## 2022-02-07 NOTE — TELEPHONE ENCOUNTER
Patients need an order put in for appt on 2/14/2022    Phys to phys  NOT A STUDY    Diagnosis code   G47 33

## 2022-02-07 NOTE — PROGRESS NOTES
Pt presents for emergency appointment with temps 8+9 missing and does not have them with him  Previous blue bite matrix used to fabricate provisional crowns on 8+9  9 matrix has a positive leading to a thin temporary on lingual  Overall, preparations look adequate  9 lingual provisional crown stabilized thickness with flowable composite and light cured  Provisional crowns cemented with Provisa temporary cement  Occulsion adjusted and margins sealed  Pt confirmed esthetics via mirror      NV: Refine crown preps and final impression

## 2022-02-08 DIAGNOSIS — J30.9 ALLERGIC RHINITIS, UNSPECIFIED SEASONALITY, UNSPECIFIED TRIGGER: ICD-10-CM

## 2022-02-08 RX ORDER — FLUTICASONE PROPIONATE 50 MCG
SPRAY, SUSPENSION (ML) NASAL
Qty: 16 G | Refills: 2 | Status: SHIPPED | OUTPATIENT
Start: 2022-02-08 | End: 2022-04-22

## 2022-02-15 ENCOUNTER — OFFICE VISIT (OUTPATIENT)
Dept: SLEEP CENTER | Facility: CLINIC | Age: 77
End: 2022-02-15
Payer: MEDICARE

## 2022-02-15 ENCOUNTER — OFFICE VISIT (OUTPATIENT)
Dept: FAMILY MEDICINE CLINIC | Facility: CLINIC | Age: 77
End: 2022-02-15

## 2022-02-15 VITALS
BODY MASS INDEX: 24.36 KG/M2 | DIASTOLIC BLOOD PRESSURE: 70 MMHG | RESPIRATION RATE: 16 BRPM | HEART RATE: 52 BPM | TEMPERATURE: 98.2 F | SYSTOLIC BLOOD PRESSURE: 136 MMHG | HEIGHT: 67 IN | OXYGEN SATURATION: 99 % | WEIGHT: 155.2 LBS

## 2022-02-15 VITALS
WEIGHT: 154.8 LBS | DIASTOLIC BLOOD PRESSURE: 60 MMHG | SYSTOLIC BLOOD PRESSURE: 130 MMHG | BODY MASS INDEX: 24.3 KG/M2 | HEIGHT: 67 IN

## 2022-02-15 DIAGNOSIS — Z23 NEED FOR TDAP VACCINATION: ICD-10-CM

## 2022-02-15 DIAGNOSIS — G47.33 OSA (OBSTRUCTIVE SLEEP APNEA): Primary | ICD-10-CM

## 2022-02-15 DIAGNOSIS — E11.9 TYPE 2 DIABETES MELLITUS WITHOUT COMPLICATION, WITHOUT LONG-TERM CURRENT USE OF INSULIN (HCC): ICD-10-CM

## 2022-02-15 DIAGNOSIS — F33.1 MAJOR DEPRESSIVE DISORDER, RECURRENT EPISODE, MODERATE (HCC): ICD-10-CM

## 2022-02-15 PROBLEM — L98.9 SKIN LESION OF FACE: Status: RESOLVED | Noted: 2019-08-28 | Resolved: 2022-02-15

## 2022-02-15 PROBLEM — T14.8XXA SPLINTER IN SKIN: Status: RESOLVED | Noted: 2021-12-02 | Resolved: 2022-02-15

## 2022-02-15 PROBLEM — K02.9 DENTAL CARIES: Status: RESOLVED | Noted: 2019-01-14 | Resolved: 2022-02-15

## 2022-02-15 PROBLEM — Z20.822 ENCOUNTER BY TELEHEALTH FOR SUSPECTED COVID-19: Status: RESOLVED | Noted: 2021-08-04 | Resolved: 2022-02-15

## 2022-02-15 LAB — SL AMB POCT HEMOGLOBIN AIC: 7.1 (ref ?–6.5)

## 2022-02-15 PROCEDURE — 99214 OFFICE O/P EST MOD 30 MIN: CPT | Performed by: PHYSICIAN ASSISTANT

## 2022-02-15 PROCEDURE — 3078F DIAST BP <80 MM HG: CPT | Performed by: PHYSICIAN ASSISTANT

## 2022-02-15 PROCEDURE — 83036 HEMOGLOBIN GLYCOSYLATED A1C: CPT | Performed by: PHYSICIAN ASSISTANT

## 2022-02-15 PROCEDURE — 3075F SYST BP GE 130 - 139MM HG: CPT | Performed by: PHYSICIAN ASSISTANT

## 2022-02-15 PROCEDURE — 99213 OFFICE O/P EST LOW 20 MIN: CPT | Performed by: INTERNAL MEDICINE

## 2022-02-15 NOTE — ASSESSMENT & PLAN NOTE
Patient feels depression is well controlled on current medications  He is followed by Psychiatry q 3 months      PHQ-9 completed today score-1

## 2022-02-15 NOTE — ASSESSMENT & PLAN NOTE
Lab Results   Component Value Date    HGBA1C 7 1 (A) 02/15/2022     Increase from 6 5  Discussed increasing physical activity and limiting carbs  Will continue Metformin 500 mg qd along with Jardiance 25 mg daily  Will recheck A1c at next visit,  if A1c remains elevated will increase metformin to 500 mg twice daily

## 2022-02-15 NOTE — PROGRESS NOTES
Assessment/Plan:    Type 2 diabetes mellitus (HCC)    Lab Results   Component Value Date    HGBA1C 7 1 (A) 02/15/2022     Increase from 6 5  Discussed increasing physical activity and limiting carbs  Will continue Metformin 500 mg qd along with Jardiance 25 mg daily  Will recheck A1c at next visit,  if A1c remains elevated will increase metformin to 500 mg twice daily  Major depressive disorder, recurrent episode, moderate ([address] )  Patient feels depression is well controlled on current medications  He is followed by Psychiatry q 3 months  PHQ-9 completed today score-1      Diagnoses and all orders for this visit:    Type 2 diabetes mellitus without complication, without long-term current use of insulin (HCC)  -     metFORMIN (GLUCOPHAGE) 500 mg tablet; Take 1 tablet (500 mg total) by mouth daily with breakfast  -     POCT hemoglobin A1c    Need for Tdap vaccination  -     tetanus-diphtheria-acellular pertussis (ADACEL) 5-2-15 5 LF-mcg/0 5 injection; Inject 0 5 mL into a muscle once for 1 dose    Major depressive disorder, recurrent episode, moderate (HCC)        Subjective:      Patient ID: Arnaldo De Leon is a 78 y o  male presents today for diabetes f/u  Diabetes  He presents for his follow-up diabetic visit  He has type 2 diabetes mellitus  His disease course has been stable  There are no hypoglycemic associated symptoms  Pertinent negatives for hypoglycemia include no dizziness, headaches or nervousness/anxiousness  There are no diabetic associated symptoms  Pertinent negatives for diabetes include no chest pain, no fatigue and no weakness  There are no hypoglycemic complications  Symptoms are stable  Diabetic complications include heart disease  Risk factors for coronary artery disease include diabetes mellitus, dyslipidemia, family history, male sex and hypertension  Current diabetic treatment includes oral agent (dual therapy)  He is compliant with treatment all of the time   He is following a diabetic diet  He never participates in exercise  (Checks bs QD- BID  AM blood sugar  -160) An ACE inhibitor/angiotensin II receptor blocker is being taken  Sees podiatrist: Foot exam update  Eye exam is current  Followed by Psychiatry q 3 months for depression  Feels depression is well controlled on current medications  Since last visit patient had left middle finger trigger finger release on 1/18/2022  Pt is doing well  No complaints voiced at this time  The following portions of the patient's history were reviewed and updated as appropriate: allergies, current medications, past family history, past medical history, past social history, past surgical history and problem list     Review of Systems   Constitutional: Negative for chills, fatigue and fever  Respiratory: Negative for cough, chest tightness, shortness of breath and wheezing  Cardiovascular: Negative for chest pain and palpitations  Gastrointestinal: Negative for abdominal pain, constipation, diarrhea, nausea and vomiting  Genitourinary: Negative for difficulty urinating  Musculoskeletal: Negative for arthralgias, myalgias, neck pain and neck stiffness  Skin: Negative for rash and wound  Neurological: Negative for dizziness, weakness, light-headedness, numbness and headaches  Psychiatric/Behavioral: Negative for agitation and behavioral problems  The patient is not nervous/anxious  Objective:      /70   Pulse (!) 52   Temp 98 2 °F (36 8 °C) (Temporal)   Resp 16   Ht 5' 7" (1 702 m)   Wt 70 4 kg (155 lb 3 2 oz)   SpO2 99%   BMI 24 31 kg/m²          Physical Exam  Constitutional:       General: He is not in acute distress  Appearance: Normal appearance  He is well-developed  He is not ill-appearing  HENT:      Head: Normocephalic and atraumatic  Eyes:      Conjunctiva/sclera: Conjunctivae normal    Cardiovascular:      Rate and Rhythm: Regular rhythm  Bradycardia present        Heart sounds: Normal heart sounds  Pulmonary:      Effort: Pulmonary effort is normal  No respiratory distress  Breath sounds: Normal breath sounds  No wheezing  Musculoskeletal:         General: No deformity  Cervical back: Normal range of motion and neck supple  Neurological:      Mental Status: He is alert and oriented to person, place, and time  Psychiatric:         Mood and Affect: Mood normal          Behavior: Behavior normal          Thought Content:  Thought content normal          Judgment: Judgment normal

## 2022-02-15 NOTE — PROGRESS NOTES
Progress Note - Sleep Center   [facility] MRN: [id number]      Reason for Visit:  78 y o male here for annual follow-up    Assessment:  Doing well on current therapy of APAP 18/14 cm for moderate LUIS MIGUEL (AHI = 15 8)  Plan:  Continue same    Follow up: One year    History of Present Illness:  History of LUIS MIGUEL on PAP therapy  Fully compliant and deriving benefit      Review of Systems      Genitourinary none   Cardiology palpitations/fluttering feeling in the chest and ankle/leg swelling   Gastrointestinal frequent heartburn/acid reflux and abdominal pain or cramping that disturb sleep    Neurology muscle weakness   Constitutional none   Integumentary itching   Psychiatry anxiety   Musculoskeletal joint pain, muscle aches and sciatica   Pulmonary snoring and difficulty breathing when lying flat    ENT ringing in ears   Endocrine none   Hematological none       I have reviewed and updated the review of systems as necessary      Historical Information    Past Medical History:   Past Medical History:   Diagnosis Date    AS (aortic stenosis)     Balanitis     Biceps tendonitis on right     CAD (coronary artery disease)     Cancer (HCC)     skin    Colon polyp     Complete rotator cuff tear or rupture of right shoulder, not specified as traumatic     CPAP (continuous positive airway pressure) dependence     Diabetes mellitus (HCC)     Esophageal reflux     High cholesterol     Hypertension     Hypertriglyceridemia     Hypogonadism in male     Myalgia     Myocardial infarction ([address] )     Palpitations     Shoulder pain     Sinus problem     Skin cancer of nose     Sleep apnea     Stroke ([facility] [address]79    Systolic murmur     recently found    Tinea cruris     Tinea pedis          Past Surgical History:   Past Surgical History:   Procedure Laterality Date    CARPAL TUNNEL RELEASE      CATARACT EXTRACTION Bilateral     COLONOSCOPY  2019    CORONARY ANGIOPLASTY WITH STENT PLACEMENT 10-15-19 - pt denies stent implant    CORONARY ARTERY BYPASS GRAFT N/A 12/12/2016    Procedure: INTRAOP CECILLE; BILATERAL LEG EVH; CORONARY ARTERY BYPASS GRAFT X 4 SVG TO PDA, OM1,  AND DIAGONAL1, LIMA TO LAD;  Surgeon: Froy Colorado MD;  [facility];  Service:     EYE SURGERY      HERNIA REPAIR      IA ARTHROSCOPY SHOULDER SURGICAL BICEPS TENODESIS Right 5/6/2016    Procedure: ARTHROSCOPIC BICEPS TENODESIS;  Surgeon: Harlene Kanner, MD;  [facility];  Service: Orthopedics    IA INCISE FINGER TENDON SHEATH Right 10/15/2019    Procedure: TRIGGER FINGER RELEASE INDEX, LONG, RING, SMALL, THUMB FINGERS;  Surgeon: Kayla Pak MD;  [facility];  Service: Orthopedics    IA INCISE FINGER TENDON SHEATH Left 3/3/2020    Procedure: RELEASE TRIGGER FINGER INDEX;  Surgeon: Kayla Pak MD;  [facility];  Service: Orthopedics    IA INCISE FINGER TENDON SHEATH Left 1/18/2022    Procedure: left long trigger finger release;  Surgeon: Kayla Pak MD;  [facility];  Service: Orthopedics    IA SHLDR ARTHROSCOP,SURG,W/ROTAT CUFF REPR Right 5/6/2016    Procedure: REPAIR ROTATOR CUFF  ARTHROSCOPIC; SUBACROMIAL DECOMPRESSION; PARTIAL SYNOVECTOMY;  Surgeon: Harlene Kanner, MD;  [facility];  Service: Orthopedics    IA WRIST Alex Greening LIG Right 10/15/2019    Procedure: ENDOSCOPIC CARPAL TUNNEL RELEASE;  Surgeon: Kayla Pak MD;  [facility];  Service: Orthopedics    IA WRIST [place] LIG Left 3/3/2020    Procedure: RELEASE CARPAL TUNNEL ENDOSCOPIC;  Surgeon: Kayla Pak MD;  [facility];  Service: Orthopedics    ROTATOR CUFF REPAIR      TESTICLE SURGERY      UMBILICAL HERNIA REPAIR  2009    over age 13       Social History:   Social History     Socioeconomic History    Marital status: /Civil Union     Spouse name: Not on file    Number of children: Not on file    Years of education: Not on file    Highest education level: Not on file   Occupational History    Not on file   Tobacco Use    Smoking status: Former Smoker    Smokeless tobacco: Former User    Tobacco comment: smoked for 3 years from 17 y/o - 23 yrs old   Vaping Use    Vaping Use: Never used   Substance and Sexual Activity    Alcohol use: No    Drug use: No    Sexual activity: Yes     Partners: Female   Other Topics Concern    Not on file   Social History Narrative    Uses Safety Equipment Seatbelts     Social Determinants of Health     Financial Resource Strain: Not on file   Food Insecurity: Not on file   Transportation Needs: Not on file   Physical Activity: Not on file   Stress: Not on file   Social Connections: Not on file   Intimate Partner Violence: Not on file   Housing Stability: Not on file       Family History:   Family History   Problem Relation Age of Onset    Heart disease Mother     Hypertension Mother     Nephrolithiasis Father     Other Father         Renal disease    Hypertension Sister     Nephrolithiasis Brother     Other Brother         Renal disease    Hematuria Family         Microscopic       Medications/Allergies:      Current Outpatient Medications:     acetaminophen (TYLENOL) 650 mg CR tablet, TAKE 1 TABLET (650 MG TOTAL) BY MOUTH EVERY 8 (EIGHT) HOURS AS NEEDED FOR MILD PAIN, Disp: 30 tablet, Rfl: 0    Alcohol Swabs (Alcohol Pads) 70 % PADS, USE TWICE DAILY, Disp: 100 each, Rfl: 3    Arginine 1000 MG TABS, Take 1 pill per day, Disp: , Rfl:     ARIPiprazole (ABILIFY) 5 mg tablet, Take 5 mg by mouth daily at bedtime, Disp: , Rfl:     aspirin (ECOTRIN LOW STRENGTH) 81 mg EC tablet, TAKE 1 TABLET (81 MG TOTAL) BY MOUTH DAILY, Disp: 90 tablet, Rfl: 2    bisacodyl 5 MG EC tablet, TAKE 1 TABLET (5 MG TOTAL) BY MOUTH DAILY AS NEEDED FOR CONSTIPATION, Disp: 30 tablet, Rfl: 0    calcium carbonate (TUMS) 500 mg chewable tablet, CHEW 1 TABLET (1,250 MG TOTAL) 3 (THREE) TIMES A DAY AS NEEDED FOR HEARTBURN, Disp: 90 tablet, Rfl: 3    cholecalciferol (VITAMIN D3) 25 mcg (1,000 units) tablet, TAKE 1 TABLET BY MOUTH DAILY, Disp: 90 tablet, Rfl: 1    clorazepate (TRANXENE) 7 5 mg tablet, Take 7 5 mg by mouth daily as needed for anxiety , Disp: , Rfl: 0    clotrimazole-betamethasone (LOTRISONE) 1-0 05 % lotion, Apply topically 2 (two) times a day, Disp: 30 mL, Rfl: 0    Cyanocobalamin (Vitamin B 12) 500 MCG TABS, Take by mouth daily, Disp: , Rfl:     dicyclomine (BENTYL) 10 mg capsule, TAKE 1 CAPSULE (10 MG TOTAL) BY MOUTH 4 (FOUR) TIMES A DAY (BEFORE MEALS AND AT BEDTIME), Disp: 90 capsule, Rfl: 1    DULoxetine (CYMBALTA) 60 mg delayed release capsule, Take 60 mg by mouth every morning , Disp: , Rfl:     Easy Comfort Lancets MISC, For checking bs BID, Disp: 100 each, Rfl: 5    Easy Talk Blood Glucose Test test strip, USE AS INSTRUCTED TO CHECK TWICE A DAY AND AS NEEDED FOR SYMPTOMS OF HYPOGLYCEMIA/HYPERGLYCEMIA SYMPTOMS, Disp: 100 each, Rfl: 3    Elastic Bandages & Supports (MEDICAL COMPRESSION STOCKINGS) MISC, by Does not apply route daily Please provide B/L compression stockings, Disp: 2 each, Rfl: 0    fluticasone (FLONASE) 50 mcg/act nasal spray, INHALE 1 SPRAY INTO EACH NOSTRIL DAILY, Disp: 16 g, Rfl: 2    furosemide (LASIX) 20 mg tablet, Take 1 tablet (20 mg total) by mouth daily Patient takes as needed, Disp: 30 tablet, Rfl: 8    glucose monitoring kit (FREESTYLE) monitoring kit, 1 each by Does not apply route 2 (two) times a day Check BS BID PRN for hypoglycemia/hyperglycemia, Disp: 1 each, Rfl: 0    Jardiance 25 MG TABS, TAKE 1 TABLET (25 MG TOTAL) BY MOUTH EVERY MORNING, Disp: 30 tablet, Rfl: 5    lisinopril (ZESTRIL) 20 mg tablet, TAKE 1 TABLET (20 MG TOTAL) BY MOUTH DAILY, Disp: 90 tablet, Rfl: 3    metFORMIN (GLUCOPHAGE) 500 mg tablet, Take 1 tablet (500 mg total) by mouth daily with breakfast, Disp: 90 tablet, Rfl: 2    metoprolol tartrate (LOPRESSOR) 25 mg tablet, TAKE 1 TABLET (25 MG TOTAL) BY MOUTH EVERY 12 (TWELVE) HOURS AS DIRECTED, Disp: 180 tablet, Rfl: 2    naproxen sodium (ALEVE) 220 MG tablet, TAKE 1 TABLET (220 MG TOTAL) BY MOUTH 2 (TWO) TIMES A DAY WITH MEALS, Disp: 20 tablet, Rfl: 0    NIFEdipine (PROCARDIA XL) 30 mg 24 hr tablet, TAKE 1 TABLET (30 MG TOTAL) BY MOUTH 2 (TWO) TIMES A DAY, Disp: 60 tablet, Rfl: 11    nystatin (MYCOSTATIN) cream, Apply topically Three times a day, Disp: , Rfl:     ONETOUCH DELICA LANCETS FINE MISC, Test blood sugar twice daily, or as needed when symptomatic of hypoglycemia or hyperglycemia, Disp: 100 each, Rfl: 5    pantoprazole (PROTONIX) 40 mg tablet, Take 1 tablet (40 mg total) by mouth daily, Disp: 90 tablet, Rfl: 2    Procto-Med HC 2 5 % rectal cream, APPLY TOPICALLY 2 (TWO) TIMES A DAY AS DIRECTED, Disp: 28 g, Rfl: 3    rosuvastatin (CRESTOR) 40 MG tablet, Take 1 tablet (40 mg total) by mouth daily, Disp: 90 tablet, Rfl: 3    tamsulosin (FLOMAX) 0 4 mg, TAKE 1 CAPSULE (0 4 MG TOTAL) BY MOUTH NIGHTLY , Disp: , Rfl:     tetanus-diphtheria-acellular pertussis (ADACEL) 5-2-15 5 LF-mcg/0 5 injection, Inject 0 5 mL into a muscle once for 1 dose, Disp: 0 5 mL, Rfl: 0    tobramycin-dexamethasone (TOBRADEX) ophthalmic ointment, 0 5 inches daily, Disp: , Rfl:           Objective      Vital Signs:   Vitals:    02/15/22 1051   BP: 130/60     [place] Sleepiness Scale: Total score: 2        Physical Exam:    General: Alert, appropriate, cooperative, overweight    Head: NC/AT    Skin: Warm, dry    Neuro: No motor abnormalities, cranial nerves appear intact    Extremity: No clubbing, cyanosis      DME Provider: [employer]        Counseling / Coordination of Care   I have spent 15 minutes with the patient today in which greater than 50% of this time was spent in counseling/coordination of care regarding: equipment and compliance                Board Certified Sleep Specialist    Portions of the record may have been created with voice recognition software  Occasional wrong word or "sound a like" substitutions may have occurred due to the inherent limitations of voice recognition software  Read the chart carefully and recognize, using context, where substitutions have occurred

## 2022-02-16 ENCOUNTER — TELEPHONE (OUTPATIENT)
Dept: SLEEP CENTER | Facility: CLINIC | Age: 77
End: 2022-02-16

## 2022-02-18 ENCOUNTER — OFFICE VISIT (OUTPATIENT)
Dept: DENTISTRY | Facility: CLINIC | Age: 77
End: 2022-02-18

## 2022-02-18 VITALS — DIASTOLIC BLOOD PRESSURE: 53 MMHG | TEMPERATURE: 97.3 F | SYSTOLIC BLOOD PRESSURE: 115 MMHG | HEART RATE: 52 BPM

## 2022-02-18 DIAGNOSIS — K08.50 DEFECTIVE DENTAL RESTORATION: Primary | ICD-10-CM

## 2022-02-18 PROCEDURE — WIS1000 RESIDENT TEACHING CASE: Performed by: DENTIST

## 2022-02-18 PROCEDURE — D0191 ASSESSMENT OF A PATIENT: HCPCS | Performed by: DENTIST

## 2022-02-18 NOTE — PROGRESS NOTES
Emergency  S: CC: "My temp crown fell off"  Pt identifies #9 and has temp crown with him at today's visit      O: Temporary crowns #8 and 9 most recently placed on 2/7/22 has fallen off  Note prep has adequate reduction w/ moderate taper and short incisal height  Pt has anterior incisal wear suggesting heavy occlusion on anteriors  Pt does not have temp with him today      A: #8 and 9: Temp fallen off      P: New temporary fabricated using Sky-print matrix  Matrix deemed to be too thin and L and Facial of temps bulked up with flowable composite to improve strength of temporary  Cemented provisional crown using Provicell  Removed excess cement, occlusion and contacts verified  Pt left satisfied and ambulatory      NV: Continue crown preps and final impression

## 2022-02-22 DIAGNOSIS — I10 ESSENTIAL HYPERTENSION: ICD-10-CM

## 2022-02-22 RX ORDER — NIFEDIPINE 30 MG/1
TABLET, EXTENDED RELEASE ORAL
Qty: 60 TABLET | Refills: 11 | Status: SHIPPED | OUTPATIENT
Start: 2022-02-22

## 2022-02-23 DIAGNOSIS — K21.9 GASTROESOPHAGEAL REFLUX DISEASE, UNSPECIFIED WHETHER ESOPHAGITIS PRESENT: ICD-10-CM

## 2022-02-24 RX ORDER — PANTOPRAZOLE SODIUM 40 MG/1
40 TABLET, DELAYED RELEASE ORAL DAILY
Qty: 30 TABLET | Refills: 0 | Status: SHIPPED | OUTPATIENT
Start: 2022-02-24 | End: 2022-04-27 | Stop reason: SDUPTHER

## 2022-03-15 DIAGNOSIS — E11.9 TYPE 2 DIABETES MELLITUS WITHOUT COMPLICATION, WITHOUT LONG-TERM CURRENT USE OF INSULIN (HCC): ICD-10-CM

## 2022-03-15 RX ORDER — EMPAGLIFLOZIN 25 MG/1
25 TABLET, FILM COATED ORAL EVERY MORNING
Qty: 30 TABLET | Refills: 5 | Status: SHIPPED | OUTPATIENT
Start: 2022-03-15

## 2022-03-18 ENCOUNTER — OFFICE VISIT (OUTPATIENT)
Dept: DENTISTRY | Facility: CLINIC | Age: 77
End: 2022-03-18

## 2022-03-18 VITALS — DIASTOLIC BLOOD PRESSURE: 65 MMHG | SYSTOLIC BLOOD PRESSURE: 105 MMHG | TEMPERATURE: 97.8 F | HEART RATE: 45 BPM

## 2022-03-18 DIAGNOSIS — Z01.21 ENCOUNTER FOR DENTAL EXAMINATION AND CLEANING WITH ABNORMAL FINDINGS: Primary | ICD-10-CM

## 2022-03-18 PROCEDURE — WIS2001 FINAL IMPRESSION - CROWN OR IMPLANT: Performed by: DENTIST

## 2022-03-18 NOTE — PROGRESS NOTES
Crown Prep    Ana Maria Godinez presents for crown  prep refinement and final impression # 8 & 9  His temporary crowns fell off  PMH reviewed, no changes  Applied topical benzocaine, administered 1 carps 2% lido 1:100k epi via local infiltration  Crown preps were refined and prepped with reductions for PFZ  The preps were done 0 5 mm subgingivally for esthetic reason  Placed quick stat and left it for 2 minutes and rinsed it off  Packed size 0 and 1 cords soaked in hemodent  Expacel was also used to make sure it stops the bleeding  Removed cord, air dry  Impression made with Delkit light and heavy body using triple tray  Impression removed after 5 min, confirmed preparation captured with no voids or distortions  Cemented provisional crown using Durelon cement  Removed excess cement, occlusion and contacts verified  Selected porcelain shade 66 C Gm, pt confirmed with mirror  Pt left satisfied and ambulatory    ?    NV: delivery of PFZ crown #8  And 9

## 2022-03-28 DIAGNOSIS — K21.9 GASTROESOPHAGEAL REFLUX DISEASE, UNSPECIFIED WHETHER ESOPHAGITIS PRESENT: ICD-10-CM

## 2022-03-28 RX ORDER — CALCIUM CARBONATE 200(500)MG
TABLET,CHEWABLE ORAL
Qty: 90 TABLET | Refills: 3 | Status: SHIPPED | OUTPATIENT
Start: 2022-03-28 | End: 2022-06-13

## 2022-04-01 DIAGNOSIS — A04.8 H. PYLORI INFECTION: ICD-10-CM

## 2022-04-01 RX ORDER — DICYCLOMINE HYDROCHLORIDE 10 MG/1
10 CAPSULE ORAL
Qty: 90 CAPSULE | Refills: 1 | Status: SHIPPED | OUTPATIENT
Start: 2022-04-01 | End: 2022-04-18

## 2022-04-12 DIAGNOSIS — E11.9 TYPE 2 DIABETES MELLITUS WITHOUT COMPLICATION, WITHOUT LONG-TERM CURRENT USE OF INSULIN (HCC): ICD-10-CM

## 2022-04-12 RX ORDER — BLOOD-GLUCOSE METER
EACH MISCELLANEOUS
Qty: 100 EACH | Refills: 3 | Status: SHIPPED | OUTPATIENT
Start: 2022-04-12

## 2022-04-14 ENCOUNTER — OFFICE VISIT (OUTPATIENT)
Dept: DENTISTRY | Facility: CLINIC | Age: 77
End: 2022-04-14

## 2022-04-14 VITALS — SYSTOLIC BLOOD PRESSURE: 120 MMHG | DIASTOLIC BLOOD PRESSURE: 54 MMHG | HEART RATE: 56 BPM | TEMPERATURE: 87.2 F

## 2022-04-14 DIAGNOSIS — Z01.21 ENCOUNTER FOR DENTAL EXAMINATION AND CLEANING WITH ABNORMAL FINDINGS: Primary | ICD-10-CM

## 2022-04-14 PROCEDURE — D2740 CROWN - PORCELAIN/CERAMIC: HCPCS | Performed by: DENTIST

## 2022-04-15 NOTE — PROGRESS NOTES
[place] Delivery #8 and 9  Pt presents for delivery of PFZ crown  #8 & 9  PMH reviewed, no changes  Pt reports no pain or sensitivity from tooth since last visit  Pt opted for no anesthesia during procedure  Provisional crown removed, cement removed from prep with   Crown tried on die, confirmed accurate seating, margin will adapted and sealed  Teeth 8 and 9 were a bit over extended  [place] tried intraorally, crown contacts were ideal  [place] seating fully with floss snapping through contacts  Used light body wash to seat the crown and verify seating with radiograph  There was some blanching of the gingiva around crowns 8 and 9  Occlusion verified and adjusted with finishing burs and polished with porcelain polishing disc  Pt confirmed satisfaction with shade via pt mirror  Rinse of prep and air dry, isolated with cotton rolls  Cemented with Calibra, excess cement removed with high speed suction  After 3 min, floss through contacts to remove interproximal cement  Remaining cement removal after 6 minute final set  Occlusion and contacts verified  Pt comfortable with bite, left satisfied and ambulatory      NV:  Recall

## 2022-04-18 DIAGNOSIS — A04.8 H. PYLORI INFECTION: ICD-10-CM

## 2022-04-18 RX ORDER — DICYCLOMINE HYDROCHLORIDE 10 MG/1
10 CAPSULE ORAL
Qty: 90 CAPSULE | Refills: 1 | Status: SHIPPED | OUTPATIENT
Start: 2022-04-18 | End: 2022-06-10

## 2022-04-21 ENCOUNTER — TELEPHONE (OUTPATIENT)
Dept: FAMILY MEDICINE CLINIC | Facility: CLINIC | Age: 77
End: 2022-04-21

## 2022-04-22 ENCOUNTER — TELEMEDICINE (OUTPATIENT)
Dept: FAMILY MEDICINE CLINIC | Facility: CLINIC | Age: 77
End: 2022-04-22

## 2022-04-22 DIAGNOSIS — J30.9 ALLERGIC RHINITIS, UNSPECIFIED SEASONALITY, UNSPECIFIED TRIGGER: ICD-10-CM

## 2022-04-22 DIAGNOSIS — R68.89 FLU-LIKE SYMPTOMS: Primary | ICD-10-CM

## 2022-04-22 PROCEDURE — 3074F SYST BP LT 130 MM HG: CPT | Performed by: FAMILY MEDICINE

## 2022-04-22 PROCEDURE — 99213 OFFICE O/P EST LOW 20 MIN: CPT | Performed by: FAMILY MEDICINE

## 2022-04-22 PROCEDURE — 3078F DIAST BP <80 MM HG: CPT | Performed by: FAMILY MEDICINE

## 2022-04-22 RX ORDER — FLUTICASONE PROPIONATE 50 MCG
SPRAY, SUSPENSION (ML) NASAL
Qty: 16 G | Refills: 2 | Status: SHIPPED | OUTPATIENT
Start: 2022-04-22 | End: 2022-08-01

## 2022-04-22 RX ORDER — DEXTROMETHORPHAN HYDROBROMIDE AND PROMETHAZINE HYDROCHLORIDE 15; 6.25 MG/5ML; MG/5ML
2.5 SOLUTION ORAL 4 TIMES DAILY PRN
Qty: 180 ML | Refills: 0 | Status: SHIPPED | OUTPATIENT
Start: 2022-04-22

## 2022-04-22 RX ORDER — GUAIFENESIN 600 MG
600 TABLET, EXTENDED RELEASE 12 HR ORAL EVERY 12 HOURS SCHEDULED
Qty: 10 TABLET | Refills: 0 | Status: SHIPPED | OUTPATIENT
Start: 2022-04-22

## 2022-04-22 NOTE — PROGRESS NOTES
Virtual Regular Visit    Verification of patient location:    Patient is located in the following state in which I hold an active license [place]      Assessment/Plan:    Problem List Items Addressed This Visit        Other    Flu-like symptoms - Primary     Patient with flu-like symptoms x1 week  He states he has had no fevers but has a cold-like feeling and chills towards the night  Patient has had 2- at home test for COVID  Patient states he feels weak with cough and congestion   -  Prescribed promethazine DM to assist in cough  -prescribed Mucinex 600 mg q 12h for 5 days as an expectorant  - advised patient to use Tylenol 500 mg every 4 hours as needed   - educated patient importance of maintaining adequate hydration in the setting of viral symptoms   - lastly due to the patient's comorbidities, I stated the patient may have prolonged course of the flu lasting up to 2 weeks  Relevant Medications    Promethazine-DM (PHENERGAN-DM) 6 25-15 mg/5 mL oral syrup    guaiFENesin (MUCINEX) 600 mg 12 hr tablet               Reason for visit is   Chief Complaint   Patient presents with    Virtual Regular Visit        Encounter provider Bassam Nickerson DO    Provider located at [address] [place] [address]   [phone]      Recent Visits  Date Type Provider Dept   04/21/22 Telephone Farhat Apodaca [address] recent visits within past 7 days and meeting all other requirements  Today's Visits  Date Type Provider Dept   04/22/22 Kim Quinn, [address] today's visits and meeting all other requirements  Future Appointments  No visits were found meeting these conditions  Showing future appointments within next 150 days and meeting all other requirements       The patient was identified by name and date of birth   Antonella Clifford was informed that this is a telemedicine visit and that the visit is being conducted through Telephone  My office door was closed  No one else was in the room  He acknowledged consent and understanding of privacy and security of the video platform  The patient has agreed to participate and understands they can discontinue the visit at any time  Patient is aware this is a billable service  Subjective  Soledad Boyce is a 78 y o  male        Patient is presenting with 1 week of cough, congestion and feeling cold at night with chills  Patient states he has had 2- at home COVID test   He has not been around anyone that has been sick either  He states he has been using Tylenol and over-the-counter expectorant to help with his cough  He states it provides mild to moderate relief with his symptoms  He states he has been feeling very fatigued and it has slightly gotten better but he is making sure that he does not need any other medications or antibiotics today  He has no other concerns besides his symptoms         Past Medical History:   Diagnosis Date    AS (aortic stenosis)     Balanitis     Biceps tendonitis on right     CAD (coronary artery disease)     Cancer (HCC)     skin    Colon polyp     Complete rotator cuff tear or rupture of right shoulder, not specified as traumatic     CPAP (continuous positive airway pressure) dependence     Diabetes mellitus (HCC)     Esophageal reflux     High cholesterol     Hypertension     Hypertriglyceridemia     Hypogonadism in male     Myalgia     Myocardial infarction ([address] )     Palpitations     Shoulder pain     Sinus problem     Skin cancer of nose     Sleep apnea     Stroke ([facility] [address] 4[address]    Systolic murmur     recently found    Tinea cruris     Tinea pedis        Past Surgical History:   Procedure Laterality Date    CARPAL TUNNEL RELEASE      CATARACT EXTRACTION Bilateral     COLONOSCOPY  2019    CORONARY ANGIOPLASTY WITH STENT PLACEMENT      10-15-19 - pt denies stent implant    CORONARY ARTERY BYPASS GRAFT N/A 12/12/2016    Procedure: INTRAOP CECILLE; BILATERAL LEG EVH; CORONARY ARTERY BYPASS GRAFT X 4 SVG TO PDA, OM1,  AND DIAGONAL1, LIMA TO LAD;  Surgeon: Marlin Alvarez MD;  [facility];  Service:     EYE SURGERY      HERNIA REPAIR      AK ARTHROSCOPY SHOULDER SURGICAL BICEPS TENODESIS Right 5/6/2016    Procedure: ARTHROSCOPIC BICEPS TENODESIS;  Surgeon: Sahra Stacy MD;  [facility];  Service: Orthopedics    AK INCISE FINGER TENDON SHEATH Right 10/15/2019    Procedure: TRIGGER FINGER RELEASE INDEX, LONG, RING, SMALL, THUMB FINGERS;  Surgeon: Wesley Kate MD;  [facility];  Service: Orthopedics    AK INCISE FINGER TENDON SHEATH Left 3/3/2020    Procedure: RELEASE TRIGGER FINGER INDEX;  Surgeon: Wesley Kate MD;  [facility];  Service: Orthopedics    AK INCISE FINGER TENDON SHEATH Left 1/18/2022    Procedure: left long trigger finger release;  Surgeon: Wesley Kate MD;  [facility];  Service: Orthopedics    AK SHLDR ARTHROSCOP,SURG,W/ROTAT CUFF REPR Right 5/6/2016    Procedure: REPAIR ROTATOR CUFF  ARTHROSCOPIC; SUBACROMIAL DECOMPRESSION; PARTIAL SYNOVECTOMY;  Surgeon: Sahra Stacy MD;  [facility];  Service: Orthopedics    AK WRIST Payam Alexandra LIG Right 10/15/2019    Procedure: ENDOSCOPIC CARPAL TUNNEL RELEASE;  Surgeon: Wesley Kate MD;  [facility];  Service: Orthopedics    AK WRIST Payam Alexandra LIG Left 3/3/2020    Procedure: RELEASE CARPAL TUNNEL ENDOSCOPIC;  Surgeon: Wesley Kate MD;  [facility];  Service: Orthopedics    ROTATOR CUFF REPAIR      TESTICLE SURGERY      UMBILICAL HERNIA REPAIR  2009    over age 13       Current Outpatient Medications   Medication Sig Dispense Refill    acetaminophen (TYLENOL) 650 mg CR tablet TAKE 1 TABLET (650 MG TOTAL) BY MOUTH EVERY 8 (EIGHT) HOURS AS NEEDED FOR MILD PAIN 30 tablet 0    Alcohol Swabs (Alcohol Pads) 70 % PADS USE TWICE DAILY 100 each 3    Arginine 1000 MG TABS Take 1 pill per day      ARIPiprazole (ABILIFY) 5 mg tablet Take 5 mg by mouth daily at bedtime      aspirin (ECOTRIN LOW STRENGTH) 81 mg EC tablet TAKE 1 TABLET (81 MG TOTAL) BY MOUTH DAILY 90 tablet 2    bisacodyl 5 MG EC tablet TAKE 1 TABLET (5 MG TOTAL) BY MOUTH DAILY AS NEEDED FOR CONSTIPATION 30 tablet 0    calcium carbonate (TUMS) 500 mg chewable tablet CHEW 1 TABLET (1,250 MG TOTAL) 3 (THREE) TIMES A DAY AS NEEDED FOR HEARTBURN 90 tablet 3    cholecalciferol (VITAMIN D3) 25 mcg (1,000 units) tablet TAKE 1 TABLET BY MOUTH DAILY 90 tablet 1    clorazepate (TRANXENE) 7 5 mg tablet Take 7 5 mg by mouth daily as needed for anxiety   0    clotrimazole-betamethasone (LOTRISONE) 1-0 05 % lotion Apply topically 2 (two) times a day 30 mL 0    Cyanocobalamin (Vitamin B 12) 500 MCG TABS Take by mouth daily      dicyclomine (BENTYL) 10 mg capsule TAKE 1 CAPSULE (10 MG TOTAL) BY MOUTH 4 (FOUR) TIMES A DAY (BEFORE MEALS AND AT BEDTIME) 90 capsule 1    DULoxetine (CYMBALTA) 60 mg delayed release capsule Take 60 mg by mouth every morning       Easy Comfort Lancets MISC For checking bs  each 5    Easy Plus II Glucose Test test strip TEST AS DIRECTED TWICE DAILY 100 each 3    Elastic Bandages & Supports (MEDICAL COMPRESSION STOCKINGS) MISC by Does not apply route daily Please provide B/L compression stockings 2 each 0    fluticasone (FLONASE) 50 mcg/act nasal spray INHALE 1 SPRAY INTO EACH NOSTRIL DAILY 16 g 2    furosemide (LASIX) 20 mg tablet Take 1 tablet (20 mg total) by mouth daily Patient takes as needed 30 tablet 8    glucose monitoring kit (FREESTYLE) monitoring kit 1 each by Does not apply route 2 (two) times a day Check BS BID PRN for hypoglycemia/hyperglycemia 1 each 0    guaiFENesin (MUCINEX) 600 mg 12 hr tablet Take 1 tablet (600 mg total) by mouth every 12 (twelve) hours 10 tablet 0    Jardiance 25 MG TABS TAKE 1 TABLET (25 MG TOTAL) BY MOUTH EVERY MORNING 30 tablet 5    lisinopril (ZESTRIL) 20 mg tablet TAKE 1 TABLET (20 MG TOTAL) BY MOUTH DAILY 90 tablet 3    metFORMIN (GLUCOPHAGE) 500 mg tablet Take 1 tablet (500 mg total) by mouth daily with breakfast 90 tablet 2    metoprolol tartrate (LOPRESSOR) 25 mg tablet TAKE 1 TABLET (25 MG TOTAL) BY MOUTH EVERY 12 (TWELVE) HOURS AS DIRECTED 180 tablet 2    naproxen sodium (ALEVE) 220 MG tablet TAKE 1 TABLET (220 MG TOTAL) BY MOUTH 2 (TWO) TIMES A DAY WITH MEALS 20 tablet 0    NIFEdipine (PROCARDIA XL) 30 mg 24 hr tablet TAKE 1 TABLET (30 MG TOTAL) BY MOUTH 2 (TWO) TIMES A DAY 60 tablet 11    nystatin (MYCOSTATIN) cream Apply topically Three times a day      ONETOUCH DELICA LANCETS FINE MISC Test blood sugar twice daily, or as needed when symptomatic of hypoglycemia or hyperglycemia 100 each 5    pantoprazole (PROTONIX) 40 mg tablet TAKE 1 TABLET (40 MG TOTAL) BY MOUTH DAILY 30 tablet 0    Procto-Med HC 2 5 % rectal cream APPLY TOPICALLY 2 (TWO) TIMES A DAY AS DIRECTED 28 g 3    Promethazine-DM (PHENERGAN-DM) 6 25-15 mg/5 mL oral syrup Take 2 5 mL by mouth 4 (four) times a day as needed for cough 180 mL 0    rosuvastatin (CRESTOR) 40 MG tablet Take 1 tablet (40 mg total) by mouth daily 90 tablet 3    tamsulosin (FLOMAX) 0 4 mg TAKE 1 CAPSULE (0 4 MG TOTAL) BY MOUTH NIGHTLY   tobramycin-dexamethasone (TOBRADEX) ophthalmic ointment 0 5 inches daily       No current facility-administered medications for this visit  Allergies   Allergen Reactions    Epinephrine Hives and Anxiety    Penicillins Hives and Anxiety       Review of Systems   Constitutional: Positive for chills and fatigue  Negative for fever  HENT: Positive for congestion and sore throat  Negative for ear pain  Eyes: Negative for pain and visual disturbance  Respiratory: Positive for cough  Negative for shortness of breath  Cardiovascular: Negative for chest pain and palpitations     Gastrointestinal: Negative for abdominal pain and vomiting  Genitourinary: Negative for dysuria and hematuria  Musculoskeletal: Negative for arthralgias and back pain  Skin: Negative for color change and rash  Neurological: Negative for seizures and syncope  All other systems reviewed and are negative  Video Exam    There were no vitals filed for this visit  Physical Exam  Pulmonary:      Effort: Pulmonary effort is normal  No respiratory distress  Comments: No conversational dyspnea  Neurological:      Mental Status: He is alert  I spent 20 minutes directly with the patient during this visit    Hunter Fox2 verbally agrees to participate in [employer]  Pt is aware that [employer] could be limited without vital signs or the ability to perform a full hands-on physical Darrian Chatman understands he or the provider may request at any time to terminate the video visit and request the patient to seek care or treatment in person      Sheba Rothman DO  Family Medicine Resident, PGY2  9:51 AM

## 2022-04-25 DIAGNOSIS — K64.9 HEMORRHOIDS, UNSPECIFIED HEMORRHOID TYPE: ICD-10-CM

## 2022-04-26 RX ORDER — HYDROCORTISONE 25 MG/G
CREAM TOPICAL
Qty: 28 G | Refills: 3 | Status: SHIPPED | OUTPATIENT
Start: 2022-04-26 | End: 2022-08-01

## 2022-04-27 ENCOUNTER — OFFICE VISIT (OUTPATIENT)
Dept: GASTROENTEROLOGY | Facility: CLINIC | Age: 77
End: 2022-04-27
Payer: MEDICARE

## 2022-04-27 VITALS
WEIGHT: 149 LBS | DIASTOLIC BLOOD PRESSURE: 68 MMHG | HEART RATE: 52 BPM | HEIGHT: 67 IN | BODY MASS INDEX: 23.39 KG/M2 | TEMPERATURE: 98.2 F | SYSTOLIC BLOOD PRESSURE: 130 MMHG

## 2022-04-27 DIAGNOSIS — R14.0 BLOATING: ICD-10-CM

## 2022-04-27 DIAGNOSIS — K58.1 IRRITABLE BOWEL SYNDROME WITH CONSTIPATION: Primary | ICD-10-CM

## 2022-04-27 DIAGNOSIS — K21.9 GASTROESOPHAGEAL REFLUX DISEASE, UNSPECIFIED WHETHER ESOPHAGITIS PRESENT: ICD-10-CM

## 2022-04-27 PROCEDURE — 99214 OFFICE O/P EST MOD 30 MIN: CPT | Performed by: INTERNAL MEDICINE

## 2022-04-27 RX ORDER — SIMETHICONE 125 MG
125 TABLET,CHEWABLE ORAL EVERY 6 HOURS PRN
Qty: 30 TABLET | Refills: 3 | Status: SHIPPED | OUTPATIENT
Start: 2022-04-27

## 2022-04-27 RX ORDER — PANTOPRAZOLE SODIUM 40 MG/1
40 TABLET, DELAYED RELEASE ORAL DAILY
Qty: 30 TABLET | Refills: 5 | Status: SHIPPED | OUTPATIENT
Start: 2022-04-27 | End: 2022-10-24

## 2022-04-27 NOTE — PROGRESS NOTES
[facility] - Outpatient Follow-up Note  Amrita Chandler 78 y o  male MRN: [id number]  Encounter: [id number]          ASSESSMENT AND PLAN:      29-year-old with chronic abdominal pain, constipation, hemorrhoids, GERD, H pylori status post treatment with eradication confirmed, diabetes presents for follow-up  1  Irritable bowel syndrome constipation type  2  Bloating    Most of the symptoms are controlled  Continues to have bloating  He had gastritis on EGD  Will refill pantoprazole  Will start on simethicone p r n  for bloating  Continue Bentyl p r n  3  GERD  4  Gastritis    Symptoms controlled mostly  Having bloating  Continue PPI  Started on simethicone p r n  5  History of colon polyps  Repeat colonoscopy in 2024  6  Abnormal CT scan of the urinary bladder  Based on CT scan in 2020  Patient continues to see urology  Defer management to PCP and urology service  Will send epic message to PCP  RTC 6 months  Orlando Edwards MD  Gastroenterology Fellow  [address]  Date: April 27, 2022        ______________________________________________________________________    SUBJECTIVE:  29-year-old with chronic abdominal pain, constipation, hemorrhoids, GERD, H pylori status post treatment with eradication confirmed, diabetes presents for follow-up  Last visit, patient reported intermittent abdominal pain which improves with eating, taking Bentyl as needed, GERD controlled with PPI 40 mg daily  Patient's last EGD in September 2021 showed antral gastritis which was negative for H pylori on biopsy  Colonoscopy in 2019 showed sigmoid diverticulosis with repeat recommended in 5 years due to history of colon polyps  Currently, patient reports that his symptoms are mostly controlled  No abdominal pain  He is having soft consistency bowel movement without use of laxatives  No blood in stools  He took topical treatment for hemorrhoids    He reports bloating on a daily basis  He continues to take pantoprazole 40 mg daily  Weight is stable  REVIEW OF SYSTEMS IS OTHERWISE NEGATIVE        Historical Information   Past Medical History:   Diagnosis Date    AS (aortic stenosis)     Balanitis     Biceps tendonitis on right     CAD (coronary artery disease)     Cancer (HCC)     skin    Colon polyp     Complete rotator cuff tear or rupture of right shoulder, not specified as traumatic     CPAP (continuous positive airway pressure) dependence     Diabetes mellitus (HCC)     Esophageal reflux     High cholesterol     Hypertension     Hypertriglyceridemia     Hypogonadism in male     Myalgia     Myocardial infarction ([facility] [address] )     Palpitations     Shoulder pain     Sinus problem     Skin cancer of nose     Sleep apnea     Stroke ([facility] [address] ) [address]    Systolic murmur     recently found    Tinea cruris     Tinea pedis      Past Surgical History:   Procedure Laterality Date    CARPAL TUNNEL RELEASE      CATARACT EXTRACTION Bilateral     COLONOSCOPY  2019    CORONARY ANGIOPLASTY WITH STENT PLACEMENT      10-15-19 - pt denies stent implant    CORONARY ARTERY BYPASS GRAFT N/A 12/12/2016    Procedure: INTRAOP CECILLE; BILATERAL LEG EVH; CORONARY ARTERY BYPASS GRAFT X 4 SVG TO PDA, OM1,  AND DIAGONAL1, LIMA TO LAD;  Surgeon: Meg Hood MD;  [facility];  Service:     EYE SURGERY      HERNIA REPAIR      IL ARTHROSCOPY SHOULDER SURGICAL BICEPS TENODESIS Right 5/6/2016    Procedure: ARTHROSCOPIC BICEPS TENODESIS;  Surgeon: Chris Muñiz MD;  [facility];  Service: Orthopedics    IL INCISE FINGER TENDON SHEATH Right 10/15/2019    Procedure: TRIGGER FINGER RELEASE INDEX, LONG, RING, SMALL, THUMB FINGERS;  Surgeon: Samantha Whitmore MD;  [facility];  Service: Orthopedics    IL INCISE FINGER TENDON SHEATH Left 3/3/2020    Procedure: RELEASE TRIGGER FINGER INDEX;  Surgeon: Samantha Whitmore MD;  [facility]; Service: Orthopedics    CO INCISE FINGER TENDON SHEATH Left 1/18/2022    Procedure: left long trigger finger release;  Surgeon: Vandy Runner, MD;  [facility];  Service: Orthopedics    CO SHLDR ARTHROSCOP,SURG,W/ROTAT CUFF REPR Right 5/6/2016    Procedure: REPAIR ROTATOR CUFF  ARTHROSCOPIC; SUBACROMIAL DECOMPRESSION; PARTIAL SYNOVECTOMY;  Surgeon: Kath Rod MD;  [facility];  Service: Orthopedics    CO WRIST Gemma Quarry LIG Right 10/15/2019    Procedure: ENDOSCOPIC CARPAL TUNNEL RELEASE;  Surgeon: Vandy Runner, MD;  [facility];  Service: Orthopedics    CO WRIST Gemma Quarry LIG Left 3/3/2020    Procedure: RELEASE CARPAL TUNNEL ENDOSCOPIC;  Surgeon: Vandy Runner, MD;  [facility];  Service: Orthopedics    ROTATOR CUFF REPAIR      TESTICLE SURGERY      UMBILICAL HERNIA REPAIR  2009    over age 13     Social History   Social History     Substance and Sexual Activity   Alcohol Use No     Social History     Substance and Sexual Activity   Drug Use No     Social History     Tobacco Use   Smoking Status Former Smoker   Smokeless Tobacco Former User   Tobacco Comment    smoked for 3 years from 17 y/o - 23 yrs old     Family History   Problem Relation Age of Onset    Heart disease Mother     Hypertension Mother     Nephrolithiasis Father     Other Father         Renal disease    Hypertension Sister     Nephrolithiasis Brother     Other Brother         Renal disease    Hematuria Family         Microscopic       Meds/Allergies       Current Outpatient Medications:     acetaminophen (TYLENOL) 650 mg CR tablet    Alcohol Swabs (Alcohol Pads) 70 % PADS    Arginine 1000 MG TABS    ARIPiprazole (ABILIFY) 5 mg tablet    aspirin (ECOTRIN LOW STRENGTH) 81 mg EC tablet    bisacodyl 5 MG EC tablet    calcium carbonate (TUMS) 500 mg chewable tablet    cholecalciferol (VITAMIN D3) 25 mcg (1,000 units) tablet    clorazepate (TRANXENE) 7 5 mg tablet    clotrimazole-betamethasone (LOTRISONE) 1-0 05 % lotion    Cyanocobalamin (Vitamin B 12) 500 MCG TABS    dicyclomine (BENTYL) 10 mg capsule    DULoxetine (CYMBALTA) 60 mg delayed release capsule    Easy Comfort Lancets MISC    Easy Plus II Glucose Test test strip    Elastic Bandages & Supports (MEDICAL COMPRESSION STOCKINGS) MISC    fluticasone (FLONASE) 50 mcg/act nasal spray    furosemide (LASIX) 20 mg tablet    glucose monitoring kit (FREESTYLE) monitoring kit    guaiFENesin (MUCINEX) 600 mg 12 hr tablet    Jardiance 25 MG TABS    lisinopril (ZESTRIL) 20 mg tablet    metFORMIN (GLUCOPHAGE) 500 mg tablet    metoprolol tartrate (LOPRESSOR) 25 mg tablet    naproxen sodium (ALEVE) 220 MG tablet    NIFEdipine (PROCARDIA XL) 30 mg 24 hr tablet    nystatin (MYCOSTATIN) cream    ONETOUCH DELICA LANCETS FINE MISC    pantoprazole (PROTONIX) 40 mg tablet    Procto-Med HC 2 5 % rectal cream    Promethazine-DM (PHENERGAN-DM) 6 25-15 mg/5 mL oral syrup    rosuvastatin (CRESTOR) 40 MG tablet    tamsulosin (FLOMAX) 0 4 mg    tobramycin-dexamethasone (TOBRADEX) ophthalmic ointment    Allergies   Allergen Reactions    Epinephrine Hives and Anxiety    Penicillins Hives and Anxiety           Objective     There were no vitals taken for this visit  There is no height or weight on file to calculate BMI  PHYSICAL EXAM:      General Appearance:   Alert, cooperative, no distress   HEENT:   Normocephalic, atraumatic, anicteric      Neck:  Supple, symmetrical, trachea midline   Lungs:   Clear to auscultation bilaterally; no rales, rhonchi or wheezing; respirations unlabored    Heart[de-identified]   Regular rate and rhythm; no murmur, rub, or gallop     Abdomen:   Soft, non-tender, non-distended; normal bowel sounds; no masses, no organomegaly    Genitalia:   Deferred    Rectal:   Deferred    Extremities:  No cyanosis, clubbing or edema    Pulses:  2+ and symmetric    Skin:  No jaundice, rashes, or lesions    Lymph nodes:  No palpable cervical lymphadenopathy        Lab Results:   No visits with results within 1 Day(s) from this visit  Latest known visit with results is:   Office Visit on 02/15/2022   Component Date Value    Hemoglobin A1C 02/15/2022 7 1*         Radiology Results:   No results found

## 2022-05-24 ENCOUNTER — OFFICE VISIT (OUTPATIENT)
Dept: DENTISTRY | Facility: CLINIC | Age: 77
End: 2022-05-24

## 2022-05-24 VITALS — DIASTOLIC BLOOD PRESSURE: 65 MMHG | HEART RATE: 53 BPM | SYSTOLIC BLOOD PRESSURE: 104 MMHG | TEMPERATURE: 98.7 F

## 2022-05-24 DIAGNOSIS — Z01.21 ENCOUNTER FOR DENTAL EXAMINATION AND CLEANING WITH ABNORMAL FINDINGS: Primary | ICD-10-CM

## 2022-05-24 PROCEDURE — D1110 PROPHYLAXIS - ADULT: HCPCS

## 2022-05-24 PROCEDURE — D0120 PERIODIC ORAL EVALUATION - ESTABLISHED PATIENT: HCPCS | Performed by: DENTIST

## 2022-05-24 RX ORDER — NIFEDIPINE 30 MG/1
30 TABLET, FILM COATED, EXTENDED RELEASE ORAL 2 TIMES DAILY
COMMUNITY
Start: 2022-04-09

## 2022-05-24 NOTE — PROGRESS NOTES
Prophy    Dental procedures in this visit     - PROPHYLAXIS - ADULT (Completed)     Service provider: Marky Lieberman [address] [facility]     Billing provider: Geronimo Elizabeth DDS     - PERIODIC ORAL EVALUATION - ESTABLISHED PATIENT (Completed)     Service provider: Marky Lieberman [address] [facility]     Billing provider: Geronimo Elizabeth DDS     Rev pts med/dent hist  ASA-II  Pts CC: Would like a crown put on #10  Patients daughter suggested it to be done  Dr Sales explained that #10 is shorter because of the bite  Suggested occl guard and crowns on the skinny ants due to grinding issue  Placed max and skinny RPD in the ultrasonic cleaner  Method Used:  · Prophy Method Used: Hand Scaling  · Polished  · Flossed    Radiographs Taken:  · None    Intra/Extra Oral Cancer Screening:  · Within normal limits      Oral Hygiene:  · Fair    Plaque:  · Generalized  · Moderate    Calculus:  · Localized  · Light  · Lower anteriors    Bleeding:  · Bleeding on probing: No periodontal exam for this visit  · Localized  · Light    Stain:  · None    Periodontal Charting:  · Spot probing    Periodontal Classification:  · Localized  · Mild  · Periodontal Disease   · #6-7 5mm interprox      Nutritional Counseling:  · N/A    OHI: Rev and showed photo of end tufted brush for #20 and #29 D  Reinforced daily flossing  Higinio Vogt    No orders of the defined types were placed in this encounter            Periodic Exam:By Dr Sales  -Pre D #10-Crown  -Discussed why #10 is shorter/past grinding and bite issues  -Rec  #23-#26 followed up with an occl guard  -#22-needs V resin    NV:6 mths prophy,exam and bite wings  NV2:#22-V resin  NV3:#10-[place]  NV4:Crowns on #23-#26  NV5:Occl guard

## 2022-06-10 DIAGNOSIS — A04.8 H. PYLORI INFECTION: ICD-10-CM

## 2022-06-10 RX ORDER — DICYCLOMINE HYDROCHLORIDE 10 MG/1
10 CAPSULE ORAL
Qty: 90 CAPSULE | Refills: 1 | Status: SHIPPED | OUTPATIENT
Start: 2022-06-10 | End: 2022-07-14

## 2022-06-11 DIAGNOSIS — K21.9 GASTROESOPHAGEAL REFLUX DISEASE, UNSPECIFIED WHETHER ESOPHAGITIS PRESENT: ICD-10-CM

## 2022-06-13 RX ORDER — CALCIUM CARBONATE 200(500)MG
TABLET,CHEWABLE ORAL
Qty: 90 TABLET | Refills: 3 | Status: SHIPPED | OUTPATIENT
Start: 2022-06-13

## 2022-06-22 ENCOUNTER — OFFICE VISIT (OUTPATIENT)
Dept: FAMILY MEDICINE CLINIC | Facility: CLINIC | Age: 77
End: 2022-06-22

## 2022-06-22 VITALS
HEIGHT: 67 IN | RESPIRATION RATE: 20 BRPM | DIASTOLIC BLOOD PRESSURE: 68 MMHG | WEIGHT: 153 LBS | HEART RATE: 65 BPM | TEMPERATURE: 97.7 F | BODY MASS INDEX: 24.01 KG/M2 | OXYGEN SATURATION: 98 % | SYSTOLIC BLOOD PRESSURE: 110 MMHG

## 2022-06-22 DIAGNOSIS — H53.9 VISION DISTURBANCE: ICD-10-CM

## 2022-06-22 DIAGNOSIS — F33.1 MAJOR DEPRESSIVE DISORDER, RECURRENT EPISODE, MODERATE (HCC): ICD-10-CM

## 2022-06-22 DIAGNOSIS — E11.9 TYPE 2 DIABETES MELLITUS WITHOUT COMPLICATION, WITHOUT LONG-TERM CURRENT USE OF INSULIN (HCC): Primary | ICD-10-CM

## 2022-06-22 LAB — SL AMB POCT HEMOGLOBIN AIC: 6.6 (ref ?–6.5)

## 2022-06-22 PROCEDURE — 99214 OFFICE O/P EST MOD 30 MIN: CPT | Performed by: PHYSICIAN ASSISTANT

## 2022-06-22 PROCEDURE — 3078F DIAST BP <80 MM HG: CPT | Performed by: PHYSICIAN ASSISTANT

## 2022-06-22 PROCEDURE — 83036 HEMOGLOBIN GLYCOSYLATED A1C: CPT | Performed by: PHYSICIAN ASSISTANT

## 2022-06-22 PROCEDURE — 3074F SYST BP LT 130 MM HG: CPT | Performed by: PHYSICIAN ASSISTANT

## 2022-06-22 NOTE — ASSESSMENT & PLAN NOTE
Lab Results   Component Value Date    HGBA1C 6 6 (A) 06/22/2022     Controlled  Continue Metformin 500 mg qd and Jardiance 25 mg po qd  May decrease blood sugar from BID to QD  Continue following a diabetic diet  Encourage to increase physical activity as tolerated  Recheck hgba1c in 6 months

## 2022-06-22 NOTE — ASSESSMENT & PLAN NOTE
Feels depression is well controlled on current medications  Patient is followed by Psychiatry every 3 months  Advised to keep scheduled follow-up appointments

## 2022-06-22 NOTE — PROGRESS NOTES
Assessment/Plan:    Type 2 diabetes mellitus (HCC)    Lab Results   Component Value Date    HGBA1C 6 6 (A) 06/22/2022     Controlled  Continue Metformin 500 mg qd and Jardiance 25 mg po qd  May decrease blood sugar from BID to QD  Continue following a diabetic diet  Encourage to increase physical activity as tolerated  Recheck hgba1c in 6 months    Major depressive disorder, recurrent episode, moderate ([address] )  Feels depression is well controlled on current medications  Patient is followed by Psychiatry every 3 months  Advised to keep scheduled follow-up appointments  Vision disturbance  Patient seeing a gray spot to left eye  Symptoms occurring 3 times within the last 1-2 months  No associated symptoms  Will refer to Ophthalmology  Diagnoses and all orders for this visit:    Type 2 diabetes mellitus without complication, without long-term current use of insulin (HCC)  -     POCT hemoglobin A1c    Vision disturbance  -     Ambulatory Referral to Ophthalmology; Future    Major depressive disorder, recurrent episode, moderate (HCC)       Subjective:      Patient ID: Tay Ashley is a 78 y o  male presents today for 4 month f/u  Diabetes  He presents for his follow-up diabetic visit  He has type 2 diabetes mellitus  There are no hypoglycemic associated symptoms  Associated symptoms include foot paresthesias  Pertinent negatives for diabetes include no fatigue, no polydipsia, no polyphagia and no polyuria  There are no hypoglycemic complications  Diabetic complications include heart disease  Risk factors for coronary artery disease include dyslipidemia, diabetes mellitus, hypertension, male sex and family history  Current diabetic treatment includes oral agent (dual therapy)  He is compliant with treatment all of the time  He is following a diabetic diet  Meal planning includes avoidance of concentrated sweets  He participates in exercise intermittently  (Checks blood sugar BID  -123 -159 ) An ACE inhibitor/angiotensin II receptor blocker is being taken  He does not see a podiatrist Eye exam is current  C/o seeing a dark gray spot to left eye  Spot tends to cause blurred vision  Sx occurred x 3 withn the last 1-2 months lasting 15-20 mins  Had similar sx in the past  Denies any dizziness, weakness, headache, eye pain, or chest pain  The following portions of the patient's history were reviewed and updated as appropriate: allergies, current medications, past family history, past medical history, past social history, past surgical history and problem list     Review of Systems   Constitutional: Negative for diaphoresis and fatigue  Eyes: Positive for visual disturbance  Negative for photophobia, pain, discharge, redness and itching  Respiratory: Negative  Cardiovascular: Negative  Gastrointestinal: Negative  Endocrine: Negative for polydipsia, polyphagia and polyuria  Objective:      /68 (BP Location: Left arm, Patient Position: Sitting, Cuff Size: Standard)   Pulse 65   Temp 97 7 °F (36 5 °C) (Temporal)   Resp 20   Ht 5' 7" (1 702 m)   Wt 69 4 kg (153 lb)   SpO2 98%   BMI 23 96 kg/m²          Physical Exam  Constitutional:       General: He is not in acute distress  Appearance: Normal appearance  He is well-developed and normal weight  He is not ill-appearing  Eyes:      Conjunctiva/sclera: Conjunctivae normal    Cardiovascular:      Rate and Rhythm: Normal rate and regular rhythm  Heart sounds: Murmur heard  Systolic murmur is present with a grade of 2/6  Pulmonary:      Effort: Pulmonary effort is normal  No respiratory distress  Breath sounds: Normal breath sounds  No wheezing  Musculoskeletal:         General: No deformity  Neurological:      Mental Status: He is alert and oriented to person, place, and time  Psychiatric:         Mood and Affect: Mood normal          Behavior: Behavior normal          Thought Content:  Thought content normal          Judgment: Judgment normal

## 2022-06-22 NOTE — ASSESSMENT & PLAN NOTE
Patient seeing a gray spot to left eye  Symptoms occurring 3 times within the last 1-2 months  No associated symptoms  Will refer to Ophthalmology

## 2022-06-24 DIAGNOSIS — I12.9 HYPERTENSION ASSOCIATED WITH STAGE 2 CHRONIC KIDNEY DISEASE DUE TO TYPE 2 DIABETES MELLITUS (HCC): Chronic | ICD-10-CM

## 2022-06-24 DIAGNOSIS — E11.22 HYPERTENSION ASSOCIATED WITH STAGE 2 CHRONIC KIDNEY DISEASE DUE TO TYPE 2 DIABETES MELLITUS (HCC): Chronic | ICD-10-CM

## 2022-06-24 DIAGNOSIS — N18.2 HYPERTENSION ASSOCIATED WITH STAGE 2 CHRONIC KIDNEY DISEASE DUE TO TYPE 2 DIABETES MELLITUS (HCC): Chronic | ICD-10-CM

## 2022-06-28 RX ORDER — ASPIRIN 81 MG/1
TABLET ORAL
Qty: 90 TABLET | Refills: 2 | Status: SHIPPED | OUTPATIENT
Start: 2022-06-28

## 2022-07-14 DIAGNOSIS — A04.8 H. PYLORI INFECTION: ICD-10-CM

## 2022-07-14 RX ORDER — DICYCLOMINE HYDROCHLORIDE 10 MG/1
10 CAPSULE ORAL
Qty: 90 CAPSULE | Refills: 1 | Status: SHIPPED | OUTPATIENT
Start: 2022-07-14

## 2022-07-19 NOTE — PROGRESS NOTES
Assessment/Plan     This is a 69 y o  male who presents for OMT follow-up for:  1  Somatic dysfunction of sacral region      Patient tolerated OMT well today  received mild relief of symptoms  possibly Iliotibial band syndrome  Streches handout given  follow up in 2 weeks   2  Sciatica of left side         Plan:   1  Patient tolerated OMT well for the above problems,  advised patient to drink fluids and can use NSAID for soreness after treatment     2  OMT Follow up in 2 weeks  Subjective     Brendon Cornelius is a 69 y o  male and is here for a OMT follow up  The patient reports worsening lower back on left  He had PT yesterday on his lower back which he tolerated  After standing patient develops this left lower back pain and radiculopathy that begin after 5-10 minutes with relief when sitting or laying  Denies muscle weakness, bladder or bowel incontinence  No saddle anesthesia  He also had surgery for carpal tunnel 2 days ago and presents with ace bandage on right hand  Is the patient taking Pain medication? yes, tylenol 600mg and percocet PRN  Has the patient completed physical therapy for this condition? yes  Did Patient symptoms improve from last OMT appointment? yes    The following portions of the patient's history were reviewed and updated as appropriate: allergies, current medications, past family history, past medical history, past social history, past surgical history and problem list     Review of Systems  Do you have pain that bothers you in your daily life? yes  Review of Systems   Respiratory: Negative for cough, chest tightness, shortness of breath and wheezing  Cardiovascular: Negative for chest pain, palpitations and leg swelling  Gastrointestinal: Negative for constipation, diarrhea, nausea and vomiting  Genitourinary: Negative for difficulty urinating, dysuria, frequency and hematuria  Musculoskeletal: Positive for back pain   Negative for arthralgias, gait problem, joint swelling, [facility]  Psychology    Patient Name: Tala Rivera   MRN: [id number]     Patient working with critical care team, currently being intubated. Psychology will follow along and return when appropriate. Thank you for the consult.       Sameer Wong, PhD  Dept. of Psychiatry  [facility]     myalgias, neck pain and neck stiffness  Neurological: Negative for dizziness, tremors, syncope, weakness and numbness         Objective     OMT Exam   Lumbar:  Somatic Dysfunction:  Tissue Texture Changes and Restriction  Severity :  1  Osteopathic Findings: hypertonic quadratus lumborum  Treatment Method: ST and MFR  Response: stayed the same  Sacrum:  Somatic Dysfunction:  Tissue Texture Changes, Asymmetry , Restriction and Tenderness  Severity :  3  Osteopathic Findings: Left on Left sacral torsion  Treatment Method: HVLA and sacral rock  Response: improved  Pelvis:  Somatic Dysfunction:  Tissue Texture Changes, Asymmetry , Restriction and Tenderness  Severity :  1  Osteopathic Findings: left superior iliac shear  Treatment Method: HVLA and ME  Response: improved  Left Lower Extremity:  Somatic Dysfunction:  Tissue Texture Changes, Restriction and Tenderness  Severity :  2  Osteopathic Findings: Left piriformis hypertonicity  Treatment Method: ME  Response: resolved

## 2022-07-26 DIAGNOSIS — I25.10 CORONARY ARTERY DISEASE INVOLVING NATIVE CORONARY ARTERY OF NATIVE HEART WITHOUT ANGINA PECTORIS: ICD-10-CM

## 2022-07-29 ENCOUNTER — OFFICE VISIT (OUTPATIENT)
Dept: DENTISTRY | Facility: CLINIC | Age: 77
End: 2022-07-29

## 2022-07-29 VITALS — HEART RATE: 53 BPM | TEMPERATURE: 97.7 F | SYSTOLIC BLOOD PRESSURE: 119 MMHG | DIASTOLIC BLOOD PRESSURE: 62 MMHG

## 2022-07-29 DIAGNOSIS — K02.9 DENTAL CARIES: Primary | ICD-10-CM

## 2022-07-29 NOTE — PROGRESS NOTES
Composite Filling    Winnie Campo presents for composite filling  PMH reviewed, no changes  Patient is allergic to epinephrine  ASA 2  Pain Scale 0/10    Discussed with patient need for RCT if pulp exposure occurs or in future if pulp is inflamed  Pt understands and consents  Applied topical benzocaine, administered 1 carps 4% carbocaine  Prepped tooth #21  with round carbide and tapered ally on high speed  Caries removed with round carbide on slow speed  Isolation with cotton rolls  Size 0 cord with hemostatic agent was packed in gingiva  Etch with 37% H2PO4, rinse, dry  Applied Adhese with 20 second scrub once, gentle air dry and light cured for 10s  Restored with Tetric bulk shelia shade A4 and light cured  Refined with finishing burs, polished with enhance point  Verified occlusion and contacts  Pt left satisfied        NV: [place] Preps #8-10, assess lower anteriors

## 2022-07-30 DIAGNOSIS — K64.9 HEMORRHOIDS, UNSPECIFIED HEMORRHOID TYPE: ICD-10-CM

## 2022-07-30 DIAGNOSIS — J30.9 ALLERGIC RHINITIS, UNSPECIFIED SEASONALITY, UNSPECIFIED TRIGGER: ICD-10-CM

## 2022-08-01 RX ORDER — HYDROCORTISONE 25 MG/G
CREAM TOPICAL
Qty: 28 G | Refills: 3 | Status: SHIPPED | OUTPATIENT
Start: 2022-08-01

## 2022-08-01 RX ORDER — FLUTICASONE PROPIONATE 50 MCG
SPRAY, SUSPENSION (ML) NASAL
Qty: 16 G | Refills: 2 | Status: SHIPPED | OUTPATIENT
Start: 2022-08-01 | End: 2022-10-17

## 2022-08-05 ENCOUNTER — OFFICE VISIT (OUTPATIENT)
Dept: DENTISTRY | Facility: CLINIC | Age: 77
End: 2022-08-05

## 2022-08-05 VITALS — HEART RATE: 49 BPM | DIASTOLIC BLOOD PRESSURE: 60 MMHG | SYSTOLIC BLOOD PRESSURE: 118 MMHG | TEMPERATURE: 97.9 F

## 2022-08-05 DIAGNOSIS — K08.56: Primary | ICD-10-CM

## 2022-08-05 PROCEDURE — WIS2000 CROWN PREP: Performed by: DENTIST

## 2022-08-05 NOTE — PROGRESS NOTES
Crown Prep    Yusra Chandra presents for crown prep #10  PMH reviewed, no changes  Added flowable composite to the incisal of tooth #10 due to previous fracture of the incisal edge and malocclusion causing fracture of the lingual of #10  Fabricated bite matrix with bluprint and triple tray  Pt  Has epinephrine allergy  No septo or lido  Applied topical benzocaine, administered 1 carp 4% articaine 1:100k epi via local infiltration  Due to insufficient occlusal clearance, pt  required an enameloplasty on #22 and #23 for sufficient clearance of prepped tooth prior to beginning crown prep #10  Removed 0 4mm of enamel on incisal aspect and second place of #22-#23  Tooth prepped with reductions for PFZ  Confirmed preparation with Dr Una Runner provisional crown with provisa and matrix, refined with ally on high speed  Confirmed provisional covers margins with no overhangs  Cemented provisional crown using Provicell  Removed excess cement, occlusion and contacts verified  Pt left satisfied and ambulatory    ?    NV: final impression PFZ crown #10

## 2022-08-11 DIAGNOSIS — E11.9 TYPE 2 DIABETES MELLITUS WITHOUT COMPLICATION, WITHOUT LONG-TERM CURRENT USE OF INSULIN (HCC): ICD-10-CM

## 2022-08-11 DIAGNOSIS — I12.9 HYPERTENSION ASSOCIATED WITH STAGE 2 CHRONIC KIDNEY DISEASE DUE TO TYPE 2 DIABETES MELLITUS (HCC): ICD-10-CM

## 2022-08-11 DIAGNOSIS — E11.22 HYPERTENSION ASSOCIATED WITH STAGE 2 CHRONIC KIDNEY DISEASE DUE TO TYPE 2 DIABETES MELLITUS (HCC): ICD-10-CM

## 2022-08-11 DIAGNOSIS — N18.2 HYPERTENSION ASSOCIATED WITH STAGE 2 CHRONIC KIDNEY DISEASE DUE TO TYPE 2 DIABETES MELLITUS (HCC): ICD-10-CM

## 2022-08-11 DIAGNOSIS — E78.00 HIGH CHOLESTEROL: ICD-10-CM

## 2022-08-11 RX ORDER — ROSUVASTATIN CALCIUM 40 MG/1
40 TABLET, COATED ORAL DAILY
Qty: 90 TABLET | Refills: 0 | Status: SHIPPED | OUTPATIENT
Start: 2022-08-11 | End: 2022-10-25

## 2022-08-11 RX ORDER — BLOOD SUGAR DIAGNOSTIC
STRIP MISCELLANEOUS
Qty: 100 EACH | Refills: 3 | Status: SHIPPED | OUTPATIENT
Start: 2022-08-11

## 2022-08-31 ENCOUNTER — APPOINTMENT (OUTPATIENT)
Dept: LAB | Facility: CLINIC | Age: 77
End: 2022-08-31
Payer: MEDICARE

## 2022-08-31 DIAGNOSIS — R94.31 NONSPECIFIC ABNORMAL ELECTROCARDIOGRAM (ECG) (EKG): ICD-10-CM

## 2022-08-31 DIAGNOSIS — Z79.899 ENCOUNTER FOR LONG-TERM (CURRENT) USE OF OTHER MEDICATIONS: ICD-10-CM

## 2022-08-31 LAB
ALBUMIN SERPL BCP-MCNC: 3.6 G/DL (ref 3.5–5)
ALP SERPL-CCNC: 68 U/L (ref 46–116)
ALT SERPL W P-5'-P-CCNC: 25 U/L (ref 12–78)
ANION GAP SERPL CALCULATED.3IONS-SCNC: 5 MMOL/L (ref 4–13)
AST SERPL W P-5'-P-CCNC: 19 U/L (ref 5–45)
ATRIAL RATE: 49 BPM
BASOPHILS # BLD AUTO: 0.04 THOUSANDS/ΜL (ref 0–0.1)
BASOPHILS NFR BLD AUTO: 1 % (ref 0–1)
BILIRUB SERPL-MCNC: 0.87 MG/DL (ref 0.2–1)
BUN SERPL-MCNC: 23 MG/DL (ref 5–25)
CALCIUM SERPL-MCNC: 9.2 MG/DL (ref 8.3–10.1)
CHLORIDE SERPL-SCNC: 107 MMOL/L (ref 96–108)
CHOLEST SERPL-MCNC: 180 MG/DL
CO2 SERPL-SCNC: 25 MMOL/L (ref 21–32)
CREAT SERPL-MCNC: 0.82 MG/DL (ref 0.6–1.3)
EOSINOPHIL # BLD AUTO: 0.2 THOUSAND/ΜL (ref 0–0.61)
EOSINOPHIL NFR BLD AUTO: 3 % (ref 0–6)
ERYTHROCYTE [DISTWIDTH] IN BLOOD BY AUTOMATED COUNT: 12.9 % (ref 11.6–15.1)
EST. AVERAGE GLUCOSE BLD GHB EST-MCNC: 143 MG/DL
GFR SERPL CREATININE-BSD FRML MDRD: 86 ML/MIN/1.73SQ M
GLUCOSE P FAST SERPL-MCNC: 112 MG/DL (ref 65–99)
HBA1C MFR BLD: 6.6 %
HCT VFR BLD AUTO: 44.2 % (ref 36.5–49.3)
HDLC SERPL-MCNC: 75 MG/DL
HGB BLD-MCNC: 13.9 G/DL (ref 12–17)
IMM GRANULOCYTES # BLD AUTO: 0.01 THOUSAND/UL (ref 0–0.2)
IMM GRANULOCYTES NFR BLD AUTO: 0 % (ref 0–2)
LDLC SERPL CALC-MCNC: 90 MG/DL (ref 0–100)
LYMPHOCYTES # BLD AUTO: 2.56 THOUSANDS/ΜL (ref 0.6–4.47)
LYMPHOCYTES NFR BLD AUTO: 36 % (ref 14–44)
MCH RBC QN AUTO: 29 PG (ref 26.8–34.3)
MCHC RBC AUTO-ENTMCNC: 31.4 G/DL (ref 31.4–37.4)
MCV RBC AUTO: 92 FL (ref 82–98)
MONOCYTES # BLD AUTO: 0.75 THOUSAND/ΜL (ref 0.17–1.22)
MONOCYTES NFR BLD AUTO: 11 % (ref 4–12)
NEUTROPHILS # BLD AUTO: 3.47 THOUSANDS/ΜL (ref 1.85–7.62)
NEUTS SEG NFR BLD AUTO: 49 % (ref 43–75)
NONHDLC SERPL-MCNC: 105 MG/DL
NRBC BLD AUTO-RTO: 0 /100 WBCS
P AXIS: 72 DEGREES
PLATELET # BLD AUTO: 200 THOUSANDS/UL (ref 149–390)
PMV BLD AUTO: 10.8 FL (ref 8.9–12.7)
POTASSIUM SERPL-SCNC: 4.2 MMOL/L (ref 3.5–5.3)
PR INTERVAL: 166 MS
PROT SERPL-MCNC: 7.4 G/DL (ref 6.4–8.4)
QRS AXIS: 21 DEGREES
QRSD INTERVAL: 94 MS
QT INTERVAL: 442 MS
QTC INTERVAL: 399 MS
RBC # BLD AUTO: 4.8 MILLION/UL (ref 3.88–5.62)
SODIUM SERPL-SCNC: 137 MMOL/L (ref 135–147)
T WAVE AXIS: 69 DEGREES
TRIGL SERPL-MCNC: 73 MG/DL
TSH SERPL DL<=0.05 MIU/L-ACNC: 1.08 UIU/ML (ref 0.45–4.5)
VENTRICULAR RATE: 49 BPM
WBC # BLD AUTO: 7.03 THOUSAND/UL (ref 4.31–10.16)

## 2022-08-31 PROCEDURE — 84443 ASSAY THYROID STIM HORMONE: CPT

## 2022-08-31 PROCEDURE — 93005 ELECTROCARDIOGRAM TRACING: CPT

## 2022-08-31 PROCEDURE — 93010 ELECTROCARDIOGRAM REPORT: CPT | Performed by: INTERNAL MEDICINE

## 2022-08-31 PROCEDURE — 80053 COMPREHEN METABOLIC PANEL: CPT

## 2022-08-31 PROCEDURE — 85025 COMPLETE CBC W/AUTO DIFF WBC: CPT

## 2022-08-31 PROCEDURE — 83036 HEMOGLOBIN GLYCOSYLATED A1C: CPT

## 2022-08-31 PROCEDURE — 36415 COLL VENOUS BLD VENIPUNCTURE: CPT

## 2022-08-31 PROCEDURE — 80061 LIPID PANEL: CPT

## 2022-09-06 DIAGNOSIS — E11.9 TYPE 2 DIABETES MELLITUS WITHOUT COMPLICATION, WITHOUT LONG-TERM CURRENT USE OF INSULIN (HCC): ICD-10-CM

## 2022-09-07 RX ORDER — EMPAGLIFLOZIN 25 MG/1
25 TABLET, FILM COATED ORAL EVERY MORNING
Qty: 30 TABLET | Refills: 5 | Status: SHIPPED | OUTPATIENT
Start: 2022-09-07 | End: 2022-10-27 | Stop reason: SDUPTHER

## 2022-09-09 ENCOUNTER — OFFICE VISIT (OUTPATIENT)
Dept: CARDIOLOGY CLINIC | Facility: CLINIC | Age: 77
End: 2022-09-09
Payer: MEDICARE

## 2022-09-09 ENCOUNTER — OFFICE VISIT (OUTPATIENT)
Dept: DENTISTRY | Facility: CLINIC | Age: 77
End: 2022-09-09

## 2022-09-09 VITALS
BODY MASS INDEX: 23.86 KG/M2 | WEIGHT: 152 LBS | SYSTOLIC BLOOD PRESSURE: 128 MMHG | HEIGHT: 67 IN | DIASTOLIC BLOOD PRESSURE: 64 MMHG | HEART RATE: 57 BPM | OXYGEN SATURATION: 99 %

## 2022-09-09 VITALS — SYSTOLIC BLOOD PRESSURE: 117 MMHG | DIASTOLIC BLOOD PRESSURE: 67 MMHG | HEART RATE: 57 BPM

## 2022-09-09 DIAGNOSIS — I35.0 MILD AORTIC STENOSIS: ICD-10-CM

## 2022-09-09 DIAGNOSIS — E11.22 HYPERTENSION ASSOCIATED WITH STAGE 2 CHRONIC KIDNEY DISEASE DUE TO TYPE 2 DIABETES MELLITUS (HCC): Chronic | ICD-10-CM

## 2022-09-09 DIAGNOSIS — I10 ESSENTIAL HYPERTENSION: ICD-10-CM

## 2022-09-09 DIAGNOSIS — N18.2 HYPERTENSION ASSOCIATED WITH STAGE 2 CHRONIC KIDNEY DISEASE DUE TO TYPE 2 DIABETES MELLITUS (HCC): Chronic | ICD-10-CM

## 2022-09-09 DIAGNOSIS — I12.9 HYPERTENSION ASSOCIATED WITH STAGE 2 CHRONIC KIDNEY DISEASE DUE TO TYPE 2 DIABETES MELLITUS (HCC): Chronic | ICD-10-CM

## 2022-09-09 DIAGNOSIS — I25.10 CORONARY ARTERY DISEASE INVOLVING NATIVE CORONARY ARTERY OF NATIVE HEART WITHOUT ANGINA PECTORIS: Primary | ICD-10-CM

## 2022-09-09 DIAGNOSIS — Z98.811 DENTAL CROWN PRESENT: Primary | ICD-10-CM

## 2022-09-09 PROCEDURE — WIS2001 FINAL IMPRESSION - CROWN OR IMPLANT: Performed by: DENTIST

## 2022-09-09 PROCEDURE — 99214 OFFICE O/P EST MOD 30 MIN: CPT | Performed by: INTERNAL MEDICINE

## 2022-09-09 NOTE — PROGRESS NOTES
[place] Prep     Moody Duenas presents for final impression #10  PMH reviewed, no changes  ASA2  Pt  Reports no pain      Pt  Has epinephrine allergy  No septo or lido      Applied topical benzocaine, administered 1 carp 3% carbocaine via local infiltration      Removed provisional crown & excess cement  Used Voco retraction paste to isolate margins of prep #10  Used triple tray and light body/heavy body to take final impression for crown #10   Re-cemented provisional crown using Provicell  Removed excess cement, occlusion and contacts verified  Pt left satisfied and ambulatory  [facility] to be [facility] PFZ to [facility]      NV: deliver PFZ crown #10

## 2022-09-09 NOTE — PROGRESS NOTES
Cardiology Follow Up    Tania Crowe  1945  [id number]  [address] [facility]  [address] [place] [address] [address]    1  Coronary artery disease involving native coronary artery of native heart without angina pectoris     2  Essential hypertension     3  Mild aortic stenosis  Echo complete w/ contrast if indicated   4  Hypertension associated with stage 2 chronic kidney disease due to type 2 diabetes mellitus ([address] )         Discussion/Summary    1  CAD:  Stable  Continues without any angina  See #2 for medications  2  Hypertension:  BP is controlled  As noted, he is spacing out the antihypertensives to avoid low numbers, but currently is controlled  If any hypotension, can stop procardia  3  AS: Mild in 6/2019  Murmur heard on auscultation  Repeat echo is ordered  4  Edema: not present currently  Previous History:  History of coronary artery disease with MI in 2006 with a drug-eluting stent to the LAD  Subsequently with an MI in 2012 with stent to the RCA  December 2016, and other NSTEMI at that time multivessel disease and underwent 4 vessel bypass with LIMA to the LAD, vein grafts to the diagonal, OM1, PDA  Low-normal LV function with inferior wall motion abnormality  Mild aortic stenosis in 6/2019    Interval History:    Returns for follow up  No changes from cardiac standpoint  When he takes all three antihypertensives in the AM together, his BP goes low, so he spaces out the metoprolol, lisinopril, and procardia by an hour each  This helps  Had c/o some grey spots in his L eye only  Lasts about 10 minutes, occasionally  Saw ophtho, and reports exam was normal     No chest pain  Some fatigue with activity, some weakness/tired in legs  No claudication  CTA abd/pelvis a few years back without significant PAD noted  Most recent blood work reviewed with patient      Problem List     Biceps tendonitis on right    Complete rotator cuff tear or rupture of right shoulder, not specified as traumatic (Chronic)    NSTEMI (non-ST elevated myocardial infarction) (HCC)    S/P drug eluting coronary stent placement (Chronic)    H/O non-ST elevation myocardial infarction (NSTEMI) (Chronic)    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) (Chronic)    Lab Results   Component Value Date    HGBA1C 6 6 (H) 08/31/2022       No results for input(s): POCGLU in the last 72 hours  Blood Sugar Average: Last 72 hrs:          Polyarthralgia (Chronic)    Hematuria, microscopic (Chronic)    H/O tinea cruris (Chronic)    Erectile dysfunction associated with type 2 diabetes mellitus (HCC) (Chronic)    Lab Results   Component Value Date    HGBA1C 6 6 (H) 08/31/2022       No results for input(s): POCGLU in the last 72 hours  Blood Sugar Average: Last 72 hrs: Anxiety    Type 2 diabetes mellitus (HCC)    Lab Results   Component Value Date    HGBA1C 6 6 (H) 08/31/2022       No results for input(s): POCGLU in the last 72 hours      Blood Sugar Average: Last 72 hrs:          Gastroesophageal reflux disease    Bilateral carpal tunnel syndrome    Non-rheumatic aortic stenosis    Coronary artery disease involving native coronary artery of native heart without angina pectoris        Past Medical History:   Diagnosis Date    AS (aortic stenosis)     Balanitis     Biceps tendonitis on right     CAD (coronary artery disease)     Cancer (HCC)     skin    Colon polyp     Complete rotator cuff tear or rupture of right shoulder, not specified as traumatic     CPAP (continuous positive airway pressure) dependence     Diabetes mellitus (HCC)     Esophageal reflux     High cholesterol     Hypertension     Hypertriglyceridemia     Hypogonadism in male     Myalgia     Myocardial infarction ([facility] )     Palpitations     Shoulder pain     Sinus problem     Skin cancer of nose     Sleep apnea     Stroke [facility]) 2554    Systolic murmur     recently found    Tinea cruris     Tinea pedis      Social History     Tobacco Use    Smoking status: Former Smoker    Smokeless tobacco: Former User    Tobacco comment: smoked for 3 years from 17 y/o - 23 yrs old   Substance Use Topics    Alcohol use: No     Family History   Problem Relation Age of Onset    Heart disease Mother     Hypertension Mother     Nephrolithiasis Father     Other Father         Renal disease    Hypertension Sister     Nephrolithiasis Brother     Other Brother         Renal disease    Hematuria Family         Microscopic     Past Surgical History:   Procedure Laterality Date    CARPAL TUNNEL RELEASE      CATARACT EXTRACTION Bilateral     COLONOSCOPY  2019    CORONARY ANGIOPLASTY WITH STENT PLACEMENT      10-15-19 - pt denies stent implant    CORONARY ARTERY BYPASS GRAFT N/A 12/12/2016    Procedure: INTRAOP CECILLE; BILATERAL LEG EVH; CORONARY ARTERY BYPASS GRAFT X 4 SVG TO PDA, OM1,  AND DIAGONAL1, LIMA TO LAD;  Surgeon: Claus You MD;  [facility];  Service:     EYE SURGERY      HERNIA REPAIR      [place] ARTHROSCOPY SHOULDER SURGICAL BICEPS TENODESIS Right 5/6/2016    Procedure: ARTHROSCOPIC BICEPS TENODESIS;  Surgeon: Rodriguez Burleson MD;  [facility];  Service: Orthopedics    [place] INCISE FINGER TENDON SHEATH Right 10/15/2019    Procedure: TRIGGER FINGER RELEASE INDEX, LONG, RING, SMALL, THUMB FINGERS;  Surgeon: Francisco Pederson MD;  [facility];  Service: Orthopedics    [place] INCISE FINGER TENDON SHEATH Left 3/3/2020    Procedure: RELEASE TRIGGER FINGER INDEX;  Surgeon: Francisco Pederson MD;  [facility];  Service: Orthopedics    [place] INCISE FINGER TENDON SHEATH Left 1/18/2022    Procedure: left long trigger finger release;  Surgeon: Francisco Pederson MD;  [facility];  Service: Orthopedics    [place] [address] ARTHROSCOP,SURG,W/ROTAT CUFF REPR Right 5/6/2016    Procedure: REPAIR ROTATOR CUFF  ARTHROSCOPIC; SUBACROMIAL DECOMPRESSION; PARTIAL SYNOVECTOMY;  Surgeon: Rafy Becker MD;  [facility];  Service: Orthopedics    [place] WRIST Anita Ngueyn LIG Right 10/15/2019    Procedure: ENDOSCOPIC CARPAL TUNNEL RELEASE;  Surgeon: Aleyda Cali MD;  [facility];  Service: Orthopedics    [place] WRIST Anita Nguyen LIG Left 3/3/2020    Procedure: RELEASE CARPAL TUNNEL ENDOSCOPIC;  Surgeon: Aleyda Cali MD;  [facility];  Service: Orthopedics    ROTATOR CUFF REPAIR      TESTICLE SURGERY      UMBILICAL HERNIA REPAIR  2009    over age 13       Current Outpatient Medications:     acetaminophen (TYLENOL) 650 mg CR tablet, TAKE 1 TABLET (650 MG TOTAL) BY MOUTH EVERY 8 (EIGHT) HOURS AS NEEDED FOR MILD PAIN, Disp: 30 tablet, Rfl: 0    Alcohol Swabs (Alcohol Pads) 70 % PADS, USE TWICE DAILY, Disp: 100 each, Rfl: 3    Arginine 1000 MG TABS, Take 1 pill per day, Disp: , Rfl:     ARIPiprazole (ABILIFY) 5 mg tablet, Take 5 mg by mouth daily at bedtime, Disp: , Rfl:     aspirin (ECOTRIN LOW STRENGTH) 81 mg EC tablet, TAKE 1 TABLET (81 MG TOTAL) BY MOUTH DAILY, Disp: 90 tablet, Rfl: 2    bisacodyl 5 MG EC tablet, TAKE 1 TABLET (5 MG TOTAL) BY MOUTH DAILY AS NEEDED FOR CONSTIPATION, Disp: 30 tablet, Rfl: 0    calcium carbonate (TUMS) 500 mg chewable tablet, CHEW 1 TABLET (1,250 MG TOTAL) 3 (THREE) TIMES A DAY AS NEEDED FOR HEARTBURN, Disp: 90 tablet, Rfl: 3    cholecalciferol (VITAMIN D3) 25 mcg (1,000 units) tablet, TAKE 1 TABLET BY MOUTH DAILY, Disp: 90 tablet, Rfl: 1    clorazepate (TRANXENE) 7 5 mg tablet, Take 7 5 mg by mouth daily as needed for anxiety , Disp: , Rfl: 0    Cyanocobalamin (Vitamin B 12) 500 MCG TABS, Take by mouth daily, Disp: , Rfl:     dicyclomine (BENTYL) 10 mg capsule, TAKE 1 CAPSULE (10 MG TOTAL) BY MOUTH 4 (FOUR) TIMES A DAY (BEFORE MEALS AND AT BEDTIME), Disp: 90 capsule, Rfl: 1    DULoxetine (CYMBALTA) 60 mg delayed release capsule, Take 60 mg by mouth every morning , Disp: , Rfl:     Easy Comfort Lancets MISC, For checking bs BID, Disp: 100 each, Rfl: 5    Easy Plus II Glucose Test test strip, TEST AS DIRECTED TWICE DAILY, Disp: 100 each, Rfl: 3    Elastic Bandages & Supports (MEDICAL COMPRESSION STOCKINGS) MISC, by Does not apply route daily Please provide B/L compression stockings, Disp: 2 each, Rfl: 0    fluticasone (FLONASE) 50 mcg/act nasal spray, INHALE 1 SPRAY INTO EACH NOSTRIL DAILY, Disp: 16 g, Rfl: 2    furosemide (LASIX) 20 mg tablet, Take 1 tablet (20 mg total) by mouth daily Patient takes as needed, Disp: 30 tablet, Rfl: 8    glucose monitoring kit (FREESTYLE) monitoring kit, 1 each by Does not apply route 2 (two) times a day Check BS BID PRN for hypoglycemia/hyperglycemia, Disp: 1 each, Rfl: 0    guaiFENesin (MUCINEX) 600 mg 12 hr tablet, Take 1 tablet (600 mg total) by mouth every 12 (twelve) hours, Disp: 10 tablet, Rfl: 0    Jardiance 25 MG TABS, TAKE 1 TABLET (25 MG TOTAL) BY MOUTH EVERY MORNING, Disp: 30 tablet, Rfl: 5    lisinopril (ZESTRIL) 20 mg tablet, TAKE 1 TABLET (20 MG TOTAL) BY MOUTH DAILY, Disp: 90 tablet, Rfl: 3    metFORMIN (GLUCOPHAGE) 500 mg tablet, Take 1 tablet (500 mg total) by mouth daily with breakfast, Disp: 90 tablet, Rfl: 2    metoprolol tartrate (LOPRESSOR) 25 mg tablet, TAKE 1 TABLET (25 MG TOTAL) BY MOUTH EVERY 12 (TWELVE) HOURS AS DIRECTED, Disp: 180 tablet, Rfl: 2    naproxen sodium (ALEVE) 220 MG tablet, TAKE 1 TABLET (220 MG TOTAL) BY MOUTH 2 (TWO) TIMES A DAY WITH MEALS, Disp: 20 tablet, Rfl: 0    NIFEdipine (PROCARDIA XL) 30 mg 24 hr tablet, TAKE 1 TABLET (30 MG TOTAL) BY MOUTH 2 (TWO) TIMES A DAY, Disp: 60 tablet, Rfl: 11    NIFEdipine ER (ADALAT CC) 30 MG 24 hr tablet, Take 30 mg by mouth in the morning and 30 mg in the evening , Disp: , Rfl:     nystatin (MYCOSTATIN) cream, Apply topically Three times a day, Disp: , Rfl:     ONETOUCH DELICA LANCETS FINE MISC, Test blood sugar twice daily, or as needed when symptomatic of hypoglycemia or hyperglycemia, Disp: 100 each, Rfl: 5    pantoprazole (PROTONIX) 40 mg tablet, Take 1 tablet (40 mg total) by mouth daily, Disp: 30 tablet, Rfl: 5    Procto-Med HC 2 5 % rectal cream, APPLY TOPICALLY 2 (TWO) TIMES A DAY AS DIRECTED, Disp: 28 g, Rfl: 3    Promethazine-DM (PHENERGAN-DM) 6 25-15 mg/5 mL oral syrup, Take 2 5 mL by mouth 4 (four) times a day as needed for cough, Disp: 180 mL, Rfl: 0    rosuvastatin (CRESTOR) 40 MG tablet, TAKE 1 TABLET (40 MG TOTAL) BY MOUTH DAILY, Disp: 90 tablet, Rfl: 0    simethicone (MYLICON) 420 MG chewable tablet, Chew 1 tablet (125 mg total) every 6 (six) hours as needed for flatulence, Disp: 30 tablet, Rfl: 3    tamsulosin (FLOMAX) 0 4 mg, TAKE 1 CAPSULE (0 4 MG TOTAL) BY MOUTH NIGHTLY , Disp: , Rfl:     tobramycin-dexamethasone (TOBRADEX) ophthalmic ointment, 0 5 inches daily, Disp: , Rfl:   Allergies   Allergen Reactions    Epinephrine Hives and Anxiety    Penicillins Hives and Anxiety       Vitals:    09/09/22 0941   BP: 128/64   BP Location: Left arm   Patient Position: Sitting   Cuff Size: Standard   Pulse: 57   SpO2: 99%   Weight: 68 9 kg (152 lb)   Height: 5' 7" (1 702 m)     Vitals:    09/09/22 0941   Weight: 68 9 kg (152 lb)      Height: 5' 7" (170 2 cm)   Body mass index is 23 81 kg/m²      Physical Exam:  GEN: Maxwell Deras appears well, alert and oriented x 3, pleasant and cooperative   HEENT: pupils equal, round, and reactive to light; extraocular muscles intact  NECK: supple, no carotid bruits   HEART: regular 2-3/6 MARTHA   LUNGS: clear to auscultation bilaterally; no wheezes, rales, or rhonchi   ABDOMEN: normal bowel sounds, soft, no tenderness, no distention  EXTREMITIES: peripheral pulses normal; no clubbing, cyanosis, or edema  NEURO: no focal findings   SKIN: normal without suspicious lesions on exposed skin    ROS:  Positive for erectile dysfunction,   Except as noted in HPI, is otherwise reviewed in detail and a 12 point review of systems is negative  ROS reviewed and is unchanged    Labs:  Lab Results   Component Value Date     08/01/2017    K 4 2 08/31/2022     08/31/2022    CREATININE 0 82 08/31/2022    BUN 23 08/31/2022    CO2 25 08/31/2022    ALT 25 08/31/2022    AST 19 08/31/2022    INR 1 40 (H) 12/12/2016    GLUF 112 (H) 08/31/2022    HGBA1C 6 6 (H) 08/31/2022    WBC 7 03 08/31/2022    HGB 13 9 08/31/2022    HCT 44 2 08/31/2022     08/31/2022       Lab Results   Component Value Date    CHOL 117 (L) 03/17/2017    CHOL 270 (H) 03/09/2016    CHOL 176 03/10/2014     Lab Results   Component Value Date    LDLCALC 90 08/31/2022    LDLCALC 108 (H) 10/06/2021    LDLCALC 84 10/21/2020     Lab Results   Component Value Date    HDL 75 08/31/2022    HDL 72 10/06/2021    HDL 69 10/21/2020     Lab Results   Component Value Date    TRIG 73 08/31/2022    TRIG 90 10/06/2021    TRIG 120 10/21/2020     Testing:  Echo 6/28/19:  LEFT VENTRICLE:  Systolic function was at the lower limits of normal  Ejection fraction was estimated to be 50 %  There was hypokinesis of the apical wall(s)  Wall thickness was mildly increased  Features were consistent with a pseudonormal left ventricular filling pattern, with concomitant abnormal relaxation and increased filling pressure (grade 2 diastolic dysfunction)      LEFT ATRIUM:  The atrium was mildly dilated      MITRAL VALVE:  There was mild annular calcification  There was mild regurgitation      AORTIC VALVE:  Transaortic velocity was increased due to valvular stenosis  There was mild stenosis  The calculated aortic valve area was 1 8 cm^2  There was mild regurgitation      TRICUSPID VALVE:  There was mild regurgitation  Pulmonary artery systolic pressure was within the normal range  Echo 12/2016:  LEFT VENTRICLE:  Systolic function was at the lower limits of normal  Ejection fraction was  estimated to be 53 %   There was severe hypokinesis of the basal inferior and  basal inferolateral wall(s)  Wall thickness was mildly to moderately increased  There was mild concentric hypertrophy      RIGHT VENTRICLE:  The size was at the upper limits of normal      LEFT ATRIUM:  The atrium was mildly dilated      MITRAL VALVE:  There was mild regurgitation      AORTIC VALVE:  There was mild stenosis  There was mild regurgitation  Systolic area of 1 7 cm squared by planimetry  Estimated aortic valve area (by VTI) was 1 97 cm squared  Aortic valve obstructive index (by Vmax) was 0 5  Estimated aortic valve area (by Vmax) was 1 73 cm squared      TRICUSPID VALVE:  There was trace regurgitation

## 2022-10-04 DIAGNOSIS — I10 ESSENTIAL HYPERTENSION: ICD-10-CM

## 2022-10-04 RX ORDER — LISINOPRIL 20 MG/1
20 TABLET ORAL DAILY
Qty: 90 TABLET | Refills: 3 | Status: SHIPPED | OUTPATIENT
Start: 2022-10-04 | End: 2022-10-27 | Stop reason: SDUPTHER

## 2022-10-11 ENCOUNTER — HOSPITAL ENCOUNTER (OUTPATIENT)
Dept: NON INVASIVE DIAGNOSTICS | Facility: CLINIC | Age: 77
Discharge: HOME | End: 2022-10-11
Payer: MEDICARE

## 2022-10-11 VITALS
HEIGHT: 67 IN | SYSTOLIC BLOOD PRESSURE: 117 MMHG | DIASTOLIC BLOOD PRESSURE: 67 MMHG | HEART RATE: 55 BPM | BODY MASS INDEX: 23.86 KG/M2 | WEIGHT: 152 LBS

## 2022-10-11 DIAGNOSIS — I35.0 MILD AORTIC STENOSIS: ICD-10-CM

## 2022-10-11 LAB
AORTIC ROOT: 3.1 CM
AORTIC VALVE MEAN VELOCITY: 10.9 M/S
APICAL FOUR CHAMBER EJECTION FRACTION: 57 %
ASCENDING AORTA: 3.5 CM
AV AREA BY CONTINUOUS VTI: 1.6 CM2
AV AREA PEAK VELOCITY: 1.5 CM2
AV LVOT MEAN GRADIENT: 2 MMHG
AV LVOT PEAK GRADIENT: 3 MMHG
AV MEAN GRADIENT: 6 MMHG
AV PEAK GRADIENT: 11 MMHG
AV VALVE AREA: 1.63 CM2
AV VELOCITY RATIO: 0.54
DOP CALC AO PEAK VEL: 1.66 M/S
DOP CALC AO VTI: 38.61 CM
DOP CALC LVOT AREA: 2.83 CM2
DOP CALC LVOT DIAMETER: 1.9 CM
DOP CALC LVOT PEAK VEL VTI: 22.18 CM
DOP CALC LVOT PEAK VEL: 0.9 M/S
DOP CALC LVOT STROKE INDEX: 36.1 ML/M2
DOP CALC LVOT STROKE VOLUME: 62.85 CM3
E WAVE DECELERATION TIME: 213 MS
FRACTIONAL SHORTENING: 30 % (ref 28–44)
INTERVENTRICULAR SEPTUM IN DIASTOLE (PARASTERNAL SHORT AXIS VIEW): 1.1 CM
INTERVENTRICULAR SEPTUM: 1.1 CM (ref 0.6–1.1)
LAAS-AP2: 14.1 CM2
LAAS-AP4: 17.4 CM2
LEFT ATRIUM AREA SYSTOLE SINGLE PLANE A4C: 17.3 CM2
LEFT ATRIUM SIZE: 4.6 CM
LEFT INTERNAL DIMENSION IN SYSTOLE: 3.7 CM (ref 2.1–4)
LEFT VENTRICLE DIASTOLIC VOLUME (MOD BIPLANE): 89 ML
LEFT VENTRICLE SYSTOLIC VOLUME (MOD BIPLANE): 43 ML
LEFT VENTRICULAR INTERNAL DIMENSION IN DIASTOLE: 5.3 CM (ref 3.5–6)
LEFT VENTRICULAR POSTERIOR WALL IN END DIASTOLE: 1.1 CM
LEFT VENTRICULAR STROKE VOLUME: 78 ML
LV EF: 52 %
LVSV (TEICH): 78 ML
MV E'TISSUE VEL-SEP: 7 CM/S
MV PEAK A VEL: 0.5 M/S
MV PEAK E VEL: 73 CM/S
MV STENOSIS PRESSURE HALF TIME: 62 MS
MV VALVE AREA P 1/2 METHOD: 3.55 CM2
RA PRESSURE ESTIMATED: 3 MMHG
RIGHT ATRIUM AREA SYSTOLE A4C: 13.8 CM2
RIGHT VENTRICLE ID DIMENSION: 3.7 CM
RV PSP: 27 MMHG
SL CV LEFT ATRIUM LENGTH A2C: 4.1 CM
SL CV LV EF: 50
SL CV PED ECHO LEFT VENTRICLE DIASTOLIC VOLUME (MOD BIPLANE) 2D: 135 ML
SL CV PED ECHO LEFT VENTRICLE SYSTOLIC VOLUME (MOD BIPLANE) 2D: 57 ML
TR MAX PG: 24 MMHG
TR PEAK VELOCITY: 2.5 M/S
TRICUSPID VALVE PEAK REGURGITATION VELOCITY: 2.47 M/S

## 2022-10-11 PROCEDURE — 93306 TTE W/DOPPLER COMPLETE: CPT

## 2022-10-11 PROCEDURE — 93306 TTE W/DOPPLER COMPLETE: CPT | Performed by: INTERNAL MEDICINE

## 2022-10-15 DIAGNOSIS — J30.9 ALLERGIC RHINITIS, UNSPECIFIED SEASONALITY, UNSPECIFIED TRIGGER: ICD-10-CM

## 2022-10-17 ENCOUNTER — OFFICE VISIT (OUTPATIENT)
Dept: DENTISTRY | Facility: CLINIC | Age: 77
End: 2022-10-17

## 2022-10-17 VITALS — HEART RATE: 59 BPM | SYSTOLIC BLOOD PRESSURE: 113 MMHG | DIASTOLIC BLOOD PRESSURE: 52 MMHG

## 2022-10-17 DIAGNOSIS — K06.2 GINGIVAL AND EDENTULOUS ALVEOLAR RIDGE LESIONS ASSOCIATED WITH TRAUMA: Primary | ICD-10-CM

## 2022-10-17 PROCEDURE — D2740 CROWN - PORCELAIN/CERAMIC: HCPCS | Performed by: DENTIST

## 2022-10-17 RX ORDER — FLUTICASONE PROPIONATE 50 MCG
SPRAY, SUSPENSION (ML) NASAL
Qty: 16 G | Refills: 2 | Status: SHIPPED | OUTPATIENT
Start: 2022-10-17

## 2022-10-17 NOTE — PROGRESS NOTES
Carlos Walls presents to clinic as an emergency from mandibular PRDP  Pt  Says when he bites he feels pain on the lingual of his anterior gingiva and feels pressure on #29  RMH, no changes  Pt  Is ASA3  Pt 's crown #10 is returned from the lab and explained after adjusting the metal on the mandibular partial for comfort we can try to do #10 if there is time  Pt  Jamil Baer  Used PIP paste on the mandibular partial and relieved areas where pt  [place] discomfort  Pt  Confirmed comfort following adjustment  Pt opted for no anesthesia during procedure    Provisional crown removed, cement removed from prep with   Crown tried on die, confirmed accurate seating, margin will adapted and sealed  [place] tried intraorally  [place] seating fully with floss snapping through contacts  Verified seating with radiograph  Confirmed seating with Dr Deborha Schirmer via radiograph  Occlusion verified and adjusted with finishing burs  Pt confirmed satisfaction with shade via pt mirror  Rinse of prep and air dry, isolated with cotton rolls  Cemented with [employer], excess cement removed with high speed suction  After 3 min, floss through contacts to remove interproximal cement  Remaining cement removal after 6 minute final set  Occlusion and contacts verified  Pt comfortable with bite, left satisfied and ambulatory      NV:  resin #[id number]

## 2022-10-17 NOTE — TELEPHONE ENCOUNTER
Good Morning   Medication approved  Please assign provider and schedule patient to established with new pcp   Thanks

## 2022-10-24 ENCOUNTER — TELEPHONE (OUTPATIENT)
Dept: GASTROENTEROLOGY | Facility: CLINIC | Age: 77
End: 2022-10-24

## 2022-10-25 DIAGNOSIS — E11.22 HYPERTENSION ASSOCIATED WITH STAGE 2 CHRONIC KIDNEY DISEASE DUE TO TYPE 2 DIABETES MELLITUS (HCC): ICD-10-CM

## 2022-10-25 DIAGNOSIS — I12.9 HYPERTENSION ASSOCIATED WITH STAGE 2 CHRONIC KIDNEY DISEASE DUE TO TYPE 2 DIABETES MELLITUS (HCC): ICD-10-CM

## 2022-10-25 DIAGNOSIS — E78.00 HIGH CHOLESTEROL: ICD-10-CM

## 2022-10-25 DIAGNOSIS — N18.2 HYPERTENSION ASSOCIATED WITH STAGE 2 CHRONIC KIDNEY DISEASE DUE TO TYPE 2 DIABETES MELLITUS (HCC): ICD-10-CM

## 2022-10-25 RX ORDER — ROSUVASTATIN CALCIUM 40 MG/1
40 TABLET, COATED ORAL DAILY
Qty: 90 TABLET | Refills: 0 | Status: SHIPPED | OUTPATIENT
Start: 2022-10-25 | End: 2022-10-27 | Stop reason: SDUPTHER

## 2022-10-27 ENCOUNTER — OFFICE VISIT (OUTPATIENT)
Dept: FAMILY MEDICINE CLINIC | Facility: CLINIC | Age: 77
End: 2022-10-27

## 2022-10-27 VITALS
DIASTOLIC BLOOD PRESSURE: 66 MMHG | WEIGHT: 152.6 LBS | HEART RATE: 56 BPM | BODY MASS INDEX: 23.95 KG/M2 | HEIGHT: 67 IN | SYSTOLIC BLOOD PRESSURE: 129 MMHG | TEMPERATURE: 98.3 F | RESPIRATION RATE: 16 BRPM | OXYGEN SATURATION: 98 %

## 2022-10-27 DIAGNOSIS — I25.10 CORONARY ARTERY DISEASE INVOLVING NATIVE CORONARY ARTERY OF NATIVE HEART WITHOUT ANGINA PECTORIS: ICD-10-CM

## 2022-10-27 DIAGNOSIS — N18.2 HYPERTENSION ASSOCIATED WITH STAGE 2 CHRONIC KIDNEY DISEASE DUE TO TYPE 2 DIABETES MELLITUS (HCC): ICD-10-CM

## 2022-10-27 DIAGNOSIS — K21.9 GASTROESOPHAGEAL REFLUX DISEASE, UNSPECIFIED WHETHER ESOPHAGITIS PRESENT: ICD-10-CM

## 2022-10-27 DIAGNOSIS — Z23 ENCOUNTER FOR IMMUNIZATION: ICD-10-CM

## 2022-10-27 DIAGNOSIS — E11.9 TYPE 2 DIABETES MELLITUS WITHOUT COMPLICATION, WITHOUT LONG-TERM CURRENT USE OF INSULIN (HCC): Primary | ICD-10-CM

## 2022-10-27 DIAGNOSIS — I50.9 CONGESTIVE HEART FAILURE, UNSPECIFIED HF CHRONICITY, UNSPECIFIED HEART FAILURE TYPE (HCC): ICD-10-CM

## 2022-10-27 DIAGNOSIS — I12.9 HYPERTENSION ASSOCIATED WITH STAGE 2 CHRONIC KIDNEY DISEASE DUE TO TYPE 2 DIABETES MELLITUS (HCC): ICD-10-CM

## 2022-10-27 DIAGNOSIS — I10 ESSENTIAL HYPERTENSION: ICD-10-CM

## 2022-10-27 DIAGNOSIS — E11.22 HYPERTENSION ASSOCIATED WITH STAGE 2 CHRONIC KIDNEY DISEASE DUE TO TYPE 2 DIABETES MELLITUS (HCC): ICD-10-CM

## 2022-10-27 PROCEDURE — 99214 OFFICE O/P EST MOD 30 MIN: CPT | Performed by: FAMILY MEDICINE

## 2022-10-27 PROCEDURE — G0008 ADMIN INFLUENZA VIRUS VAC: HCPCS | Performed by: FAMILY MEDICINE

## 2022-10-27 PROCEDURE — 90662 IIV NO PRSV INCREASED AG IM: CPT | Performed by: FAMILY MEDICINE

## 2022-10-27 PROCEDURE — 3074F SYST BP LT 130 MM HG: CPT | Performed by: FAMILY MEDICINE

## 2022-10-27 PROCEDURE — 3078F DIAST BP <80 MM HG: CPT | Performed by: FAMILY MEDICINE

## 2022-10-27 RX ORDER — ASPIRIN 81 MG/1
81 TABLET ORAL DAILY
Qty: 90 TABLET | Refills: 2 | Status: SHIPPED | OUTPATIENT
Start: 2022-10-27

## 2022-10-27 RX ORDER — NIFEDIPINE 30 MG/1
30 TABLET, FILM COATED, EXTENDED RELEASE ORAL 2 TIMES DAILY
Qty: 180 TABLET | Refills: 0 | Status: SHIPPED | OUTPATIENT
Start: 2022-10-27 | End: 2022-10-27

## 2022-10-27 RX ORDER — NIFEDIPINE 30 MG/1
30 TABLET, EXTENDED RELEASE ORAL 2 TIMES DAILY
Qty: 60 TABLET | Refills: 11 | Status: SHIPPED | OUTPATIENT
Start: 2022-10-27

## 2022-10-27 RX ORDER — LISINOPRIL 20 MG/1
20 TABLET ORAL DAILY
Qty: 90 TABLET | Refills: 3 | Status: SHIPPED | OUTPATIENT
Start: 2022-10-27

## 2022-10-27 RX ORDER — ROSUVASTATIN CALCIUM 40 MG/1
40 TABLET, COATED ORAL DAILY
Qty: 90 TABLET | Refills: 0 | Status: SHIPPED | OUTPATIENT
Start: 2022-10-27

## 2022-10-27 RX ORDER — PANTOPRAZOLE SODIUM 40 MG/1
40 TABLET, DELAYED RELEASE ORAL DAILY
Qty: 30 TABLET | Refills: 5 | Status: SHIPPED | OUTPATIENT
Start: 2022-10-27 | End: 2023-04-25

## 2022-10-27 RX ORDER — FUROSEMIDE 20 MG/1
20 TABLET ORAL DAILY
Qty: 30 TABLET | Refills: 8 | Status: SHIPPED | OUTPATIENT
Start: 2022-10-27

## 2022-10-27 NOTE — PROGRESS NOTES
Name: William Montiel      :       MRN: [id number]  Encounter Provider: Darryn Ruiz MD  Encounter Date: 10/27/2022   Encounter department: [address] [facility]     1  Type 2 diabetes mellitus without complication, without long-term current use of insulin ([facility])  Assessment & Plan:    Lab Results   Component Value Date    HGBA1C 6 6 (H) 2022     Stable and controlled, no hypoglycemic episodes  Tolerating statin, lisinopril, medications refilled today  Current regimen: Metformin 500 mg daily and Jardiance 25 mg daily  Follow-up in 3 months for MAW    Orders:  -     rosuvastatin (CRESTOR) 40 MG tablet; Take 1 tablet (40 mg total) by mouth daily  -     Empagliflozin (Jardiance) 25 MG TABS; Take 1 tablet (25 mg total) by mouth every morning  -     metFORMIN (GLUCOPHAGE) 500 mg tablet; Take 1 tablet (500 mg total) by mouth daily with breakfast    2  Encounter for immunization  -     influenza vaccine, high-dose, PF 0 7 mL (FLUZONE HIGH-DOSE)    3  Hypertension associated with stage 2 chronic kidney disease due to type 2 diabetes mellitus [facility])  Assessment & Plan:    Lab Results   Component Value Date    HGBA1C 6 6 (H) 2022     Stable  BP today 129/66 at goal  Reports compliance with regimen and denies hypoglycemia  Continue lisinopril 20 mg daily, Procardia and metoprolol 25 BID  Medications refilled today    Orders:  -     rosuvastatin (CRESTOR) 40 MG tablet; Take 1 tablet (40 mg total) by mouth daily  -     aspirin (ECOTRIN LOW STRENGTH) 81 mg EC tablet; Take 1 tablet (81 mg total) by mouth daily    4  Essential hypertension  -     NIFEdipine (PROCARDIA XL) 30 mg 24 hr tablet; Take 1 tablet (30 mg total) by mouth 2 (two) times a day  -     lisinopril (ZESTRIL) 20 mg tablet; Take 1 tablet (20 mg total) by mouth daily    5  Gastroesophageal reflux disease, unspecified whether esophagitis present  -     pantoprazole (PROTONIX) 40 mg tablet;  Take 1 tablet (40 mg total) by mouth daily    6  Coronary artery disease involving native coronary artery of native heart without angina pectoris  Assessment & Plan:  Follows closely with Cardiology  Recent echo stable  Orders:  -     rosuvastatin (CRESTOR) 40 MG tablet; Take 1 tablet (40 mg total) by mouth daily  -     metoprolol tartrate (LOPRESSOR) 25 mg tablet; Take 1 tablet (25 mg total) by mouth every 12 (twelve) hours    7  Congestive heart failure, unspecified HF chronicity, unspecified heart failure type (HCC)  -     furosemide (LASIX) 20 mg tablet; Take 1 tablet (20 mg total) by mouth daily Patient takes as needed         Subjective      30-year-old male with history of CAD, HTN, type 2 diabetes, hyperlipidemia presenting today for follow-up  Most recent A1c on 8/31 is 6 6  He denies any hypoglycemic episodes  He reports medication compliance  He has at baseline and denies any acute concerns today  He requests refills of all his medications  Review of Systems   Constitutional: Negative for chills and fever  HENT: Negative for ear pain and sore throat  Eyes: Negative for visual disturbance  Respiratory: Negative for cough and shortness of breath  Cardiovascular: Negative for chest pain and palpitations  Gastrointestinal: Negative for abdominal pain, diarrhea, nausea and vomiting  Skin: Negative for rash  Neurological: Negative for dizziness  All other systems reviewed and are negative        Current Outpatient Medications on File Prior to Visit   Medication Sig   • acetaminophen (TYLENOL) 650 mg CR tablet TAKE 1 TABLET (650 MG TOTAL) BY MOUTH EVERY 8 (EIGHT) HOURS AS NEEDED FOR MILD PAIN   • Alcohol Swabs (Alcohol Pads) 70 % PADS USE TWICE DAILY   • Arginine 1000 MG TABS Take 1 pill per day   • ARIPiprazole (ABILIFY) 5 mg tablet Take 5 mg by mouth daily at bedtime   • bisacodyl 5 MG EC tablet TAKE 1 TABLET (5 MG TOTAL) BY MOUTH DAILY AS NEEDED FOR CONSTIPATION   • calcium carbonate (TUMS) 500 mg chewable tablet CHEW 1 TABLET (1,250 MG TOTAL) 3 (THREE) TIMES A DAY AS NEEDED FOR HEARTBURN   • cholecalciferol (VITAMIN D3) 25 mcg (1,000 units) tablet TAKE 1 TABLET BY MOUTH DAILY   • clorazepate (TRANXENE) 7 5 mg tablet Take 7 5 mg by mouth daily as needed for anxiety    • Cyanocobalamin (Vitamin B 12) 500 MCG TABS Take by mouth daily   • dicyclomine (BENTYL) 10 mg capsule TAKE 1 CAPSULE (10 MG TOTAL) BY MOUTH 4 (FOUR) TIMES A DAY (BEFORE MEALS AND AT BEDTIME)   • DULoxetine (CYMBALTA) 60 mg delayed release capsule Take 60 mg by mouth every morning    • Easy Comfort Lancets MISC For checking bs BID   • Easy Plus II Glucose Test test strip TEST AS DIRECTED TWICE DAILY   • Elastic Bandages & Supports (MEDICAL COMPRESSION STOCKINGS) MISC by Does not apply route daily Please provide B/L compression stockings   • fluticasone (FLONASE) 50 mcg/act nasal spray INHALE 1 SPRAY INTO EACH NOSTRIL DAILY   • glucose monitoring kit (FREESTYLE) monitoring kit 1 each by Does not apply route 2 (two) times a day Check BS BID PRN for hypoglycemia/hyperglycemia   • ONETOUCH DELICA LANCETS FINE MISC Test blood sugar twice daily, or as needed when symptomatic of hypoglycemia or hyperglycemia   • Procto-Med HC 2 5 % rectal cream APPLY TOPICALLY 2 (TWO) TIMES A DAY AS DIRECTED   • Promethazine-DM (PHENERGAN-DM) 6 25-15 mg/5 mL oral syrup Take 2 5 mL by mouth 4 (four) times a day as needed for cough   • simethicone (MYLICON) 153 MG chewable tablet Chew 1 tablet (125 mg total) every 6 (six) hours as needed for flatulence   • tamsulosin (FLOMAX) 0 4 mg TAKE 1 CAPSULE (0 4 MG TOTAL) BY MOUTH NIGHTLY     • tobramycin-dexamethasone (TOBRADEX) ophthalmic ointment 0 5 inches daily   • [DISCONTINUED] aspirin (ECOTRIN LOW STRENGTH) 81 mg EC tablet TAKE 1 TABLET (81 MG TOTAL) BY MOUTH DAILY   • [DISCONTINUED] furosemide (LASIX) 20 mg tablet Take 1 tablet (20 mg total) by mouth daily Patient takes as needed   • [DISCONTINUED] guaiFENesin (MUCINEX) 600 mg 12 hr tablet Take 1 tablet (600 mg total) by mouth every 12 (twelve) hours   • [DISCONTINUED] Jardiance 25 MG TABS TAKE 1 TABLET (25 MG TOTAL) BY MOUTH EVERY MORNING   • [DISCONTINUED] lisinopril (ZESTRIL) 20 mg tablet TAKE 1 TABLET (20 MG TOTAL) BY MOUTH DAILY   • [DISCONTINUED] metFORMIN (GLUCOPHAGE) 500 mg tablet Take 1 tablet (500 mg total) by mouth daily with breakfast   • [DISCONTINUED] metoprolol tartrate (LOPRESSOR) 25 mg tablet TAKE 1 TABLET (25 MG TOTAL) BY MOUTH EVERY 12 (TWELVE) HOURS AS DIRECTED   • [DISCONTINUED] naproxen sodium (ALEVE) 220 MG tablet TAKE 1 TABLET (220 MG TOTAL) BY MOUTH 2 (TWO) TIMES A DAY WITH MEALS   • [DISCONTINUED] NIFEdipine (PROCARDIA XL) 30 mg 24 hr tablet TAKE 1 TABLET (30 MG TOTAL) BY MOUTH 2 (TWO) TIMES A DAY   • [DISCONTINUED] NIFEdipine ER (ADALAT CC) 30 MG 24 hr tablet Take 30 mg by mouth in the morning and 30 mg in the evening  • [DISCONTINUED] rosuvastatin (CRESTOR) 40 MG tablet TAKE 1 TABLET (40 MG TOTAL) BY MOUTH DAILY   • nystatin (MYCOSTATIN) cream Apply topically Three times a day   • [DISCONTINUED] pantoprazole (PROTONIX) 40 mg tablet Take 1 tablet (40 mg total) by mouth daily       Objective     /66 (BP Location: Left arm, Patient Position: Sitting, Cuff Size: Standard)   Pulse 56   Temp 98 3 °F (36 8 °C) (Temporal)   Resp 16   Ht 5' 7" (1 702 m)   Wt 69 2 kg (152 lb 9 6 oz)   SpO2 98%   BMI 23 90 kg/m²     Physical Exam  Constitutional:       General: He is not in acute distress  Appearance: He is normal weight  He is not ill-appearing or diaphoretic  HENT:      Head: Normocephalic  Right Ear: External ear normal       Left Ear: External ear normal       Nose: Nose normal    Eyes:      Conjunctiva/sclera: Conjunctivae normal       Pupils: Pupils are equal, round, and reactive to light  Cardiovascular:      Heart sounds: Normal heart sounds     Pulmonary:      Effort: Pulmonary effort is normal  No respiratory distress  Breath sounds: Normal breath sounds  Abdominal:      General: Bowel sounds are normal  There is no distension  Palpations: Abdomen is soft  Tenderness: There is no abdominal tenderness  Musculoskeletal:      Right lower leg: No edema  Left lower leg: No edema  Skin:     General: Skin is warm  Coloration: Skin is not pale  Neurological:      General: No focal deficit present  Mental Status: He is alert and oriented to person, place, and time     Psychiatric:         Behavior: Behavior normal        Rosalba Diaz MD

## 2022-10-27 NOTE — ASSESSMENT & PLAN NOTE
Lab Results   Component Value Date    HGBA1C 6 6 (H) 08/31/2022     Stable  BP today 129/66 at goal  Reports compliance with regimen and denies hypoglycemia    Continue lisinopril 20 mg daily, Procardia and metoprolol 25 BID  Medications refilled today

## 2022-10-27 NOTE — ASSESSMENT & PLAN NOTE
Lab Results   Component Value Date    HGBA1C 6 6 (H) 08/31/2022     Stable and controlled, no hypoglycemic episodes  Tolerating statin, lisinopril, medications refilled today  Current regimen: Metformin 500 mg daily and Jardiance 25 mg daily     Follow-up in 3 months for ALBINO

## 2022-11-02 DIAGNOSIS — E11.9 TYPE 2 DIABETES MELLITUS WITHOUT COMPLICATION, WITHOUT LONG-TERM CURRENT USE OF INSULIN (HCC): ICD-10-CM

## 2022-11-02 DIAGNOSIS — K64.9 HEMORRHOIDS, UNSPECIFIED HEMORRHOID TYPE: ICD-10-CM

## 2022-11-02 RX ORDER — BLOOD-GLUCOSE METER
1 EACH MISCELLANEOUS 2 TIMES DAILY
Qty: 100 EACH | Refills: 3 | Status: SHIPPED | OUTPATIENT
Start: 2022-11-02

## 2022-11-02 RX ORDER — HYDROCORTISONE 25 MG/G
CREAM TOPICAL 2 TIMES DAILY
Qty: 28 G | Refills: 3 | Status: SHIPPED | OUTPATIENT
Start: 2022-11-02

## 2022-11-02 RX ORDER — BLOOD-GLUCOSE METER
EACH MISCELLANEOUS
Qty: 100 EACH | Refills: 5 | Status: SHIPPED | OUTPATIENT
Start: 2022-11-02

## 2022-11-02 RX ORDER — NYSTATIN 100000 U/G
CREAM TOPICAL AS NEEDED
Qty: 30 G | Refills: 1 | Status: SHIPPED | OUTPATIENT
Start: 2022-11-02 | End: 2024-05-24

## 2022-11-03 ENCOUNTER — HOSPITAL ENCOUNTER (EMERGENCY)
Facility: HOSPITAL | Age: 77
Discharge: HOME | End: 2022-11-03
Attending: EMERGENCY MEDICINE

## 2022-11-03 VITALS
HEART RATE: 52 BPM | RESPIRATION RATE: 18 BRPM | TEMPERATURE: 97.7 F | SYSTOLIC BLOOD PRESSURE: 153 MMHG | OXYGEN SATURATION: 99 % | DIASTOLIC BLOOD PRESSURE: 70 MMHG

## 2022-11-03 DIAGNOSIS — R19.7 DIARRHEA: Primary | ICD-10-CM

## 2022-11-03 LAB
ANION GAP SERPL CALCULATED.3IONS-SCNC: 4 MMOL/L (ref 4–13)
BASOPHILS # BLD AUTO: 0.03 THOUSANDS/ÂΜL (ref 0–0.1)
BASOPHILS NFR BLD AUTO: 0 % (ref 0–1)
BUN SERPL-MCNC: 12 MG/DL (ref 5–25)
CALCIUM SERPL-MCNC: 9.3 MG/DL (ref 8.3–10.1)
CHLORIDE SERPL-SCNC: 103 MMOL/L (ref 96–108)
CO2 SERPL-SCNC: 29 MMOL/L (ref 21–32)
CREAT SERPL-MCNC: 0.88 MG/DL (ref 0.6–1.3)
EOSINOPHIL # BLD AUTO: 0.17 THOUSAND/ÂΜL (ref 0–0.61)
EOSINOPHIL NFR BLD AUTO: 3 % (ref 0–6)
ERYTHROCYTE [DISTWIDTH] IN BLOOD BY AUTOMATED COUNT: 12.7 % (ref 11.6–15.1)
GFR SERPL CREATININE-BSD FRML MDRD: 84 ML/MIN/1.73SQ M
GLUCOSE SERPL-MCNC: 161 MG/DL (ref 65–140)
HCT VFR BLD AUTO: 44.4 % (ref 36.5–49.3)
HGB BLD-MCNC: 14.1 G/DL (ref 12–17)
IMM GRANULOCYTES # BLD AUTO: 0.02 THOUSAND/UL (ref 0–0.2)
IMM GRANULOCYTES NFR BLD AUTO: 0 % (ref 0–2)
LYMPHOCYTES # BLD AUTO: 2.59 THOUSANDS/ÂΜL (ref 0.6–4.47)
LYMPHOCYTES NFR BLD AUTO: 39 % (ref 14–44)
MCH RBC QN AUTO: 29.1 PG (ref 26.8–34.3)
MCHC RBC AUTO-ENTMCNC: 31.8 G/DL (ref 31.4–37.4)
MCV RBC AUTO: 92 FL (ref 82–98)
MONOCYTES # BLD AUTO: 0.74 THOUSAND/ÂΜL (ref 0.17–1.22)
MONOCYTES NFR BLD AUTO: 11 % (ref 4–12)
NEUTROPHILS # BLD AUTO: 3.18 THOUSANDS/ÂΜL (ref 1.85–7.62)
NEUTS SEG NFR BLD AUTO: 47 % (ref 43–75)
NRBC BLD AUTO-RTO: 0 /100 WBCS
PLATELET # BLD AUTO: 182 THOUSANDS/UL (ref 149–390)
PMV BLD AUTO: 10.2 FL (ref 8.9–12.7)
POTASSIUM SERPL-SCNC: 4.1 MMOL/L (ref 3.5–5.3)
RBC # BLD AUTO: 4.85 MILLION/UL (ref 3.88–5.62)
SODIUM SERPL-SCNC: 136 MMOL/L (ref 135–147)
WBC # BLD AUTO: 6.73 THOUSAND/UL (ref 4.31–10.16)

## 2022-11-03 RX ORDER — SODIUM CHLORIDE, SODIUM GLUCONATE, SODIUM ACETATE, POTASSIUM CHLORIDE, MAGNESIUM CHLORIDE, SODIUM PHOSPHATE, DIBASIC, AND POTASSIUM PHOSPHATE .53; .5; .37; .037; .03; .012; .00082 G/100ML; G/100ML; G/100ML; G/100ML; G/100ML; G/100ML; G/100ML
500 INJECTION, SOLUTION INTRAVENOUS ONCE
Status: COMPLETED | OUTPATIENT
Start: 2022-11-03 | End: 2022-11-03

## 2022-11-03 RX ORDER — ONDANSETRON 2 MG/ML
4 INJECTION INTRAMUSCULAR; INTRAVENOUS ONCE
Status: COMPLETED | OUTPATIENT
Start: 2022-11-03 | End: 2022-11-03

## 2022-11-03 RX ADMIN — SODIUM CHLORIDE, SODIUM GLUCONATE, SODIUM ACETATE, POTASSIUM CHLORIDE, MAGNESIUM CHLORIDE, SODIUM PHOSPHATE, DIBASIC, AND POTASSIUM PHOSPHATE 500 ML: .53; .5; .37; .037; .03; .012; .00082 INJECTION, SOLUTION INTRAVENOUS at 06:05

## 2022-11-03 RX ADMIN — ONDANSETRON 4 MG: 2 INJECTION INTRAMUSCULAR; INTRAVENOUS at 06:05

## 2022-11-03 NOTE — ED PROVIDER NOTES
History  Chief Complaint   Patient presents with   • Diarrhea     Pt reports 3 days of diarrhea  States it started with lower abd pain and flank pain which has since passed      Patient is a 77-year-old male with past medical history significant for diabetes, hypertension, presenting to the emergency department for evaluation of diarrhea over the past week  Patient states he has been going multiple times today  He denies any recent antibiotic use for any profound smell associated with the stools  He states he goes at least 4 times daily  It is not associated with any blood in his stool  He denies any fevers or chills chest pain or shortness breath he denies any abdominal pain, vomiting, dysuria, hematuria  He does note some nausea  Patient states he has not felt like eating over the past couple days however is trying  Patient states that every time he eats he feels nauseous  Patient denies any sick contacts  Patient denies any abdominal pain or or cramping currently  Prior to Admission Medications   Prescriptions Last Dose Informant Patient Reported? Taking?    ARIPiprazole (ABILIFY) 5 mg tablet  Self Yes No   Sig: Take 5 mg by mouth daily at bedtime   Alcohol Swabs (Alcohol Pads) 70 % PADS  Self No No   Sig: USE TWICE DAILY   Arginine 1000 MG TABS  Self Yes No   Sig: Take 1 pill per day   Cyanocobalamin (Vitamin B 12) 500 MCG TABS  Self Yes No   Sig: Take by mouth daily   DULoxetine (CYMBALTA) 60 mg delayed release capsule  Self Yes No   Sig: Take 60 mg by mouth every morning    Easy Comfort Lancets MISC   No No   Sig: For checking bs BID   Elastic Bandages & Supports (MEDICAL COMPRESSION STOCKINGS) MISC  Self No No   Sig: by Does not apply route daily Please provide B/L compression stockings   Empagliflozin (Jardiance) 25 MG TABS   No No   Sig: Take 1 tablet (25 mg total) by mouth every morning   NIFEdipine (PROCARDIA XL) 30 mg 24 hr tablet   No No   Sig: Take 1 tablet (30 mg total) by mouth 2 (two) times a day   ONETOUCH DELICA LANCETS FINE MISC  Self No No   Sig: Test blood sugar twice daily, or as needed when symptomatic of hypoglycemia or hyperglycemia   Promethazine-DM (PHENERGAN-DM) 6 25-15 mg/5 mL oral syrup  Self No No   Sig: Take 2 5 mL by mouth 4 (four) times a day as needed for cough   acetaminophen (TYLENOL) 650 mg CR tablet  Self No No   Sig: TAKE 1 TABLET (650 MG TOTAL) BY MOUTH EVERY 8 (EIGHT) HOURS AS NEEDED FOR MILD PAIN   aspirin (ECOTRIN LOW STRENGTH) 81 mg EC tablet   No No   Sig: Take 1 tablet (81 mg total) by mouth daily   bisacodyl 5 MG EC tablet  Self No No   Sig: TAKE 1 TABLET (5 MG TOTAL) BY MOUTH DAILY AS NEEDED FOR CONSTIPATION   calcium carbonate (TUMS) 500 mg chewable tablet  Self No No   Sig: CHEW 1 TABLET (1,250 MG TOTAL) 3 (THREE) TIMES A DAY AS NEEDED FOR HEARTBURN   cholecalciferol (VITAMIN D3) 25 mcg (1,000 units) tablet  Self No No   Sig: TAKE 1 TABLET BY MOUTH DAILY   clorazepate (TRANXENE) 7 5 mg tablet  Self Yes No   Sig: Take 7 5 mg by mouth daily as needed for anxiety    dicyclomine (BENTYL) 10 mg capsule  Self No No   Sig: TAKE 1 CAPSULE (10 MG TOTAL) BY MOUTH 4 (FOUR) TIMES A DAY (BEFORE MEALS AND AT BEDTIME)   fluticasone (FLONASE) 50 mcg/act nasal spray  Self No No   Sig: INHALE 1 SPRAY INTO EACH NOSTRIL DAILY   furosemide (LASIX) 20 mg tablet   No No   Sig: Take 1 tablet (20 mg total) by mouth daily Patient takes as needed   glucose blood (Easy Plus II Glucose Test) test strip   No No   Sig: Use 1 each 2 (two) times a day Test as directed   glucose monitoring kit (FREESTYLE) monitoring kit  Self No No   Si each by Does not apply route 2 (two) times a day Check BS BID PRN for hypoglycemia/hyperglycemia   hydrocortisone (Procto-Med HC) 2 5 % rectal cream   No No   Sig: Apply topically 2 (two) times a day   lisinopril (ZESTRIL) 20 mg tablet   No No   Sig: Take 1 tablet (20 mg total) by mouth daily   metFORMIN (GLUCOPHAGE) 500 mg tablet   No No   Sig: Take 1 tablet (500 mg total) by mouth daily with breakfast   metoprolol tartrate (LOPRESSOR) 25 mg tablet   No No   Sig: Take 1 tablet (25 mg total) by mouth every 12 (twelve) hours   nystatin (MYCOSTATIN) cream   No No   Sig: Apply topically as needed (mycotic rash)   pantoprazole (PROTONIX) 40 mg tablet   No No   Sig: Take 1 tablet (40 mg total) by mouth daily   rosuvastatin (CRESTOR) 40 MG tablet   No No   Sig: Take 1 tablet (40 mg total) by mouth daily   simethicone (MYLICON) 253 MG chewable tablet  Self No No   Sig: Chew 1 tablet (125 mg total) every 6 (six) hours as needed for flatulence   tamsulosin (FLOMAX) 0 4 mg  Self Yes No   Sig: TAKE 1 CAPSULE (0 4 MG TOTAL) BY MOUTH NIGHTLY     tobramycin-dexamethasone (TOBRADEX) ophthalmic ointment  Self Yes No   Si 5 inches daily      Facility-Administered Medications: None       Past Medical History:   Diagnosis Date   • AS (aortic stenosis)    • Balanitis    • Biceps tendonitis on right    • CAD (coronary artery disease)    • Cancer (HCC)     skin   • Colon polyp    • Complete rotator cuff tear or rupture of right shoulder, not specified as traumatic    • CPAP (continuous positive airway pressure) dependence    • Diabetes mellitus (HCC)    • Esophageal reflux    • High cholesterol    • Hypertension    • Hypertriglyceridemia    • Hypogonadism in male    • Myalgia    • Myocardial infarction [facility])    • Palpitations    • Shoulder pain    • Sinus problem    • Skin cancer of nose    • Sleep apnea    • Stroke ([facility] [address] ) 2889   • Systolic murmur     recently found   • Tinea cruris    • Tinea pedis        Past Surgical History:   Procedure Laterality Date   • CARPAL TUNNEL RELEASE     • CATARACT EXTRACTION Bilateral    • COLONOSCOPY     • CORONARY ANGIOPLASTY WITH STENT PLACEMENT      10-15-19 - pt denies stent implant   • CORONARY ARTERY BYPASS GRAFT N/A 2016    Procedure: INTRAOP CECILLE; BILATERAL LEG EVH; CORONARY ARTERY BYPASS GRAFT X 4 SVG TO PDA, OM1,  AND DIAGONAL1, MADISON TO LAD;  Surgeon: Paolo Arriaga MD;  [facility];  Service:    • EYE SURGERY     • HERNIA REPAIR     • MN ARTHROSCOPY SHOULDER SURGICAL BICEPS TENODESIS Right 5/6/2016    Procedure: ARTHROSCOPIC BICEPS TENODESIS;  Surgeon: Thanh Edouard MD;  [facility];  Service: Orthopedics   • MN INCISE FINGER TENDON SHEATH Right 10/15/2019    Procedure: TRIGGER FINGER RELEASE INDEX, LONG, RING, SMALL, THUMB FINGERS;  Surgeon: Lexie Oliveira MD;  [facility];  Service: Orthopedics   • MN INCISE FINGER TENDON SHEATH Left 3/3/2020    Procedure: RELEASE TRIGGER FINGER INDEX;  Surgeon: Lexie Oliveira MD;  [facility];  Service: Orthopedics   • MN INCISE FINGER TENDON SHEATH Left 1/18/2022    Procedure: left long trigger finger release;  Surgeon: Lexie Oliveira MD;  [facility];  Service: Orthopedics   • MN SHLDR ARTHROSCOP,SURG,W/ROTAT CUFF REPR Right 5/6/2016    Procedure: REPAIR ROTATOR CUFF  ARTHROSCOPIC; SUBACROMIAL DECOMPRESSION; PARTIAL SYNOVECTOMY;  Surgeon: Thanh Edouard MD;  [facility];  Service: Orthopedics   • MN WRIST Rennis Christopher LIG Right 10/15/2019    Procedure: ENDOSCOPIC CARPAL TUNNEL RELEASE;  Surgeon: Lexie Oliveira MD;  [facility];  Service: Orthopedics   • MN WRIST Rennis Christopher LIG Left 3/3/2020    Procedure: RELEASE CARPAL TUNNEL ENDOSCOPIC;  Surgeon: Lexie Oliveira MD;  [facility];  Service: Orthopedics   • ROTATOR CUFF REPAIR     • TESTICLE SURGERY     • UMBILICAL HERNIA REPAIR  2009    over age 13       Family History   Problem Relation Age of Onset   • Heart disease Mother    • Hypertension Mother    • Nephrolithiasis Father    • Other Father         Renal disease   • Hypertension Sister    • Nephrolithiasis Brother    • Other Brother         Renal disease   • Hematuria Family         Microscopic     I have reviewed and agree with the history as documented      E-Cigarette/Vaping • E-Cigarette Use Never User      E-Cigarette/Vaping Substances   • Nicotine No    • THC No    • CBD No    • Flavoring No    • Other No    • Unknown No      Social History     Tobacco Use   • Smoking status: Former Smoker   • Smokeless tobacco: Former User   • Tobacco comment: smoked for 3 years from 17 y/o - 23 yrs old   Vaping Use   • Vaping Use: Never used   Substance Use Topics   • Alcohol use: No   • Drug use: No        Review of Systems   Constitutional: Positive for activity change and fatigue  Negative for chills and fever  HENT: Negative for congestion and ear pain  Respiratory: Negative for cough, chest tightness and shortness of breath  Cardiovascular: Negative for chest pain and palpitations  Gastrointestinal: Positive for diarrhea and nausea  Negative for abdominal pain, anal bleeding, blood in stool, constipation and vomiting  Genitourinary: Negative for dysuria and hematuria  Musculoskeletal: Negative for arthralgias and back pain  Skin: Negative for color change and rash  Neurological: Negative for dizziness, seizures, syncope, weakness, light-headedness, numbness and headaches  Psychiatric/Behavioral: Negative for agitation and behavioral problems  All other systems reviewed and are negative  Physical Exam  ED Triage Vitals   Temperature Pulse Respirations Blood Pressure SpO2   11/03/22 0545 11/03/22 0537 11/03/22 0537 11/03/22 0537 11/03/22 0537   97 7 °F (36 5 °C) (!) 54 18 153/70 99 %      Temp Source Heart Rate Source Patient Position - Orthostatic VS BP Location FiO2 (%)   11/03/22 0545 11/03/22 0537 11/03/22 0537 11/03/22 0537 --   Oral Monitor Lying Right arm       Pain Score       --                    Orthostatic Vital Signs  Vitals:    11/03/22 0537 11/03/22 0545   BP: 153/70    Pulse: (!) 54 (!) 52   Patient Position - Orthostatic VS: Lying        Physical Exam  Vitals and nursing note reviewed  Constitutional:       Appearance: Normal appearance   He is well-developed and normal weight  HENT:      Head: Normocephalic and atraumatic  Right Ear: External ear normal       Left Ear: External ear normal       Nose: Nose normal       Mouth/Throat:      Mouth: Mucous membranes are dry  Eyes:      Extraocular Movements: Extraocular movements intact  Conjunctiva/sclera: Conjunctivae normal       Pupils: Pupils are equal, round, and reactive to light  Cardiovascular:      Rate and Rhythm: Normal rate and regular rhythm  Heart sounds: Normal heart sounds  No murmur heard  Pulmonary:      Effort: Pulmonary effort is normal  No respiratory distress  Breath sounds: Normal breath sounds  No wheezing  Abdominal:      General: Abdomen is flat  Palpations: Abdomen is soft  Tenderness: There is no abdominal tenderness  There is no right CVA tenderness, left CVA tenderness, guarding or rebound  Musculoskeletal:         General: Normal range of motion  Cervical back: Normal range of motion and neck supple  Right lower leg: No edema  Left lower leg: No edema  Skin:     General: Skin is warm and dry  Capillary Refill: Capillary refill takes more than 3 seconds  Neurological:      General: No focal deficit present  Mental Status: He is alert and oriented to person, place, and time     Psychiatric:         Mood and Affect: Mood normal          Behavior: Behavior normal          ED Medications  Medications   multi-electrolyte (ISOLYTE-S PH 7 4) bolus 500 mL (500 mL Intravenous New Bag 11/3/22 0605)   ondansetron (ZOFRAN) injection 4 mg (4 mg Intravenous Given 11/3/22 0605)       Diagnostic Studies  Results Reviewed     Procedure Component Value Units Date/Time    Basic metabolic panel [[id number]]  (Abnormal) Collected: 11/03/22 0555    Lab Status: Final result Specimen: Blood from Arm, Right Updated: 11/03/22 0626     Sodium 136 mmol/L      Potassium 4 1 mmol/L      Chloride 103 mmol/L      CO2 29 mmol/L      ANION GAP 4 mmol/L      BUN 12 mg/dL      Creatinine 0 88 mg/dL      Glucose 161 mg/dL      Calcium 9 3 mg/dL      eGFR 84 ml/min/1 73sq m     Narrative:      [place] guidelines for Chronic Kidney Disease (CKD):   •  Stage 1 with normal or high GFR (GFR > 90 mL/min/1 73 square meters)  •  Stage 2 Mild CKD (GFR = 60-89 mL/min/1 73 square meters)  •  Stage 3A Moderate CKD (GFR = 45-59 mL/min/1 73 square meters)  •  Stage 3B Moderate CKD (GFR = 30-44 mL/min/1 73 square meters)  •  Stage 4 Severe CKD (GFR = 15-29 mL/min/1 73 square meters)  •  Stage 5 End Stage CKD (GFR <15 mL/min/1 73 square meters)  Note: GFR calculation is accurate only with a steady state creatinine    CBC and differential [[id number]] Collected: 11/03/22 0555    Lab Status: Final result Specimen: Blood from Arm, Right Updated: 11/03/22 0610     WBC 6 73 Thousand/uL      RBC 4 85 Million/uL      Hemoglobin 14 1 g/dL      Hematocrit 44 4 %      MCV 92 fL      MCH 29 1 pg      MCHC 31 8 g/dL      RDW 12 7 %      MPV 10 2 fL      Platelets 177 Thousands/uL      nRBC 0 /100 WBCs      Neutrophils Relative 47 %      Immat GRANS % 0 %      Lymphocytes Relative 39 %      Monocytes Relative 11 %      Eosinophils Relative 3 %      Basophils Relative 0 %      Neutrophils Absolute 3 18 Thousands/µL      Immature Grans Absolute 0 02 Thousand/uL      Lymphocytes Absolute 2 59 Thousands/µL      Monocytes Absolute 0 74 Thousand/µL      Eosinophils Absolute 0 17 Thousand/µL      Basophils Absolute 0 03 Thousands/µL                  No orders to display         Procedures  Procedures      ED Course  ED Course as of 11/03/22 0711   Thu Nov 03, 2022   8254 Blood Pressure: 153/70   0548 Respirations: 18   0548 SpO2: 99 %   0548 Patient is a 44-year-old male presenting to the emergency department with 1 week of nonbloody diarrhea    Patient has no red flag symptoms no fevers associated with it no blood in his stool no recent antibiotic use no abdominal pain currently  Will evaluate patient for electrolyte abnormality secondary to multiple bowel movements with CBC, BMP will also treat patient with a 500 cc bolus of fluids given that he looks dehydrated as well as Zofran  Patient's abdomen was soft nontender nondistended patient questions were answered he has no questions or concerns at this time will frequently re-evaluate  0555 Temperature: 97 7 °F (36 5 °C)   0555 Temp Source: Oral   0555 Pulse(!): 54  Patients previous heart rates all in 50's   0610 Hemoglobin: 14 1   0634 Potassium: 4 1   0634 Anion Gap: 4   0636 Patient re-evaluated resting comfortably at this time  Informed him that his labs are all within normal limits  Informed him that I will be sending him to go see a GI doctor for further evaluation  Advised patient to take Metamucil informed him that it is prescribed and sent to his pharmacy to start to take it tomorrow ordered today for the next 10 days to help alleviate his diarrhea he is also advised to stop using loperamide  Patient is advised to come back to the hospital if develops any new, worse or concerning symptoms patient is aware has no questions or concerns he is stable for discharge  I used a  for the entirety of these encounter  MDM    Disposition  Final diagnoses:   Diarrhea     Time reflects when diagnosis was documented in both MDM as applicable and the Disposition within this note     Time User Action Codes Description Comment    11/3/2022  6:53 AM Amber Fraga Add [R19 7] Diarrhea       ED Disposition     ED Disposition   Discharge    Condition   Stable    Date/Time   Thu Nov 3, 2022  6:52 AM    Comment   Maria Eugenia Segovia discharge to home/self care                 Follow-up Information     Follow up With Specialties Details Why Contact Info Additional [address] Emergency Department Emergency Medicine Go to  As needed, If symptoms worsen [phone] [facility] [phone]  5 [facility] [address] [facility], [address] [facility] [address], [place], [place], [address] Schedule an appointment as soon as possible for a visit  for follow up 7[address] [facility] [address] [facility] [phone]  Dawna Sanchezaquim [address] [facility] [place], [address], [place], [place], [address]          Patient's Medications   Discharge Prescriptions    PSYLLIUM (METAMUCIL SMOOTH TEXTURE) 28 % PACKET    Take 1 packet by mouth 2 (two) times a day for 10 days       Start Date: 11/3/2022 End Date: 11/13/2022       Order Dose: 1 packet       Quantity: 20 packet    Refills: 0     No discharge procedures on file  PDMP Review       Value Time User    PDMP Reviewed  Yes 1/18/2022  7:53 AM Roc Melissa PA-C           ED Provider  Attending physically available and evaluated Priya Iniguez I managed the patient along with the ED Attending      Electronically Signed by         Darya Gonzalez DO  11/03/22 4127

## 2022-11-03 NOTE — ED ATTENDING ATTESTATION
11/3/2022  IDarius MD, saw and evaluated the patient  I have discussed the patient with the resident/non-physician practitioner and agree with the resident's/non-physician practitioner's findings, Plan of Care, and MDM as documented in the resident's/non-physician practitioner's note, except where noted  All available labs and Radiology studies were reviewed  I was present for key portions of any procedure(s) performed by the resident/non-physician practitioner and I was immediately available to provide assistance  At this point I agree with the current assessment done in the Emergency Department  I have conducted an independent evaluation of this patient a history and physical is as follows:    ED Course      Emergency Department Note- Arelis Brumfield 78 y o  male MRN: [id number]    Unit/Bed#: ED 12 Encounter: [id number]    Arelis Brumfield is a 78 y o  male who presents with   Chief Complaint   Patient presents with   • Diarrhea     Pt reports 3 days of diarrhea  States it started with lower abd pain and flank pain which has since passed          History of Present Illness   HPI:  Arelis Brumfield is a 78 y o  male who presents for evaluation of:  Diarrhea over the last 3 days  Denies recent antibiotic use and exposure to anybody with similar symptoms  He denies any hematochezia and melena  He has occasional suprapubic and left lower quadrant abdominal discomfort when he has the diarrheal episodes  He has been having 5-7 episodes per day of diarrhea  Review of Systems   Constitutional: Negative for fatigue and fever  HENT: Negative for congestion and sore throat  Respiratory: Negative for cough and shortness of breath  Cardiovascular: Negative for chest pain and palpitations  Gastrointestinal: Positive for diarrhea  Negative for abdominal pain and nausea  Genitourinary: Negative for flank pain and frequency  Neurological: Negative for light-headedness and headaches  Psychiatric/Behavioral: Negative for dysphoric mood and hallucinations  All other systems reviewed and are negative         Historical Information   Past Medical History:   Diagnosis Date   • AS (aortic stenosis)    • Balanitis    • Biceps tendonitis on right    • CAD (coronary artery disease)    • Cancer (HCC)     skin   • Colon polyp    • Complete rotator cuff tear or rupture of right shoulder, not specified as traumatic    • CPAP (continuous positive airway pressure) dependence    • Diabetes mellitus (HCC)    • Esophageal reflux    • High cholesterol    • Hypertension    • Hypertriglyceridemia    • Hypogonadism in male    • Myalgia    • Myocardial infarction [facility])    • Palpitations    • Shoulder pain    • Sinus problem    • Skin cancer of nose    • Sleep apnea    • Stroke ([facility] [address] ) [phone]   • Systolic murmur     recently found   • Tinea cruris    • Tinea pedis      Past Surgical History:   Procedure Laterality Date   • CARPAL TUNNEL RELEASE     • CATARACT EXTRACTION Bilateral    • COLONOSCOPY  2019   • CORONARY ANGIOPLASTY WITH STENT PLACEMENT      10-15-19 - pt denies stent implant   • CORONARY ARTERY BYPASS GRAFT N/A 12/12/2016    Procedure: INTRAOP CECILLE; BILATERAL LEG EVH; CORONARY ARTERY BYPASS GRAFT X 4 SVG TO PDA, OM1,  AND DIAGONAL1, LIMA TO LAD;  Surgeon: Doug Wilson MD;  [facility];  Service:    • EYE SURGERY     • HERNIA REPAIR     • [place] ARTHROSCOPY SHOULDER SURGICAL BICEPS TENODESIS Right 5/6/2016    Procedure: ARTHROSCOPIC BICEPS TENODESIS;  Surgeon: Nilton Balderas MD;  [facility];  Service: Orthopedics   • [place] INCISE FINGER TENDON SHEATH Right 10/15/2019    Procedure: TRIGGER FINGER RELEASE INDEX, LONG, RING, SMALL, THUMB FINGERS;  Surgeon: Hernán Mercado MD;  [facility];  Service: Orthopedics   • [place] INCISE FINGER TENDON SHEATH Left 3/3/2020    Procedure: RELEASE TRIGGER FINGER INDEX;  Surgeon: Hernán Mercado MD;  [facility];  Service: Orthopedics   • TN INCISE FINGER TENDON SHEATH Left 1/18/2022    Procedure: left long trigger finger release;  Surgeon: Ovidio Closs, MD;  [facility];  Service: Orthopedics   • [place] SHLDR ARTHROSCOP,SURG,W/ROTAT CUFF REPR Right 5/6/2016    Procedure: REPAIR ROTATOR CUFF  ARTHROSCOPIC; SUBACROMIAL DECOMPRESSION; PARTIAL SYNOVECTOMY;  Surgeon: Gabriel Cuello MD;  [facility];  Service: Orthopedics   • TN WRIST Edna Pavan LIG Right 10/15/2019    Procedure: ENDOSCOPIC CARPAL TUNNEL RELEASE;  Surgeon: Ovidio Closs, MD;  [facility];  Service: Orthopedics   • TN WRIST Edna Pavan LIG Left 3/3/2020    Procedure: RELEASE CARPAL TUNNEL ENDOSCOPIC;  Surgeon: Ovidio Closs, MD;  [facility];  Service: Orthopedics   • ROTATOR CUFF REPAIR     • TESTICLE SURGERY     • UMBILICAL HERNIA REPAIR  2009    over age 13     Social History   Social History     Substance and Sexual Activity   Alcohol Use No     Social History     Substance and Sexual Activity   Drug Use No     Social History     Tobacco Use   Smoking Status Former Smoker   Smokeless Tobacco Former User   Tobacco Comment    smoked for 3 years from 19 y/o - 23 yrs old     Family History:   Family History   Problem Relation Age of Onset   • Heart disease Mother    • Hypertension Mother    • Nephrolithiasis Father    • Other Father         Renal disease   • Hypertension Sister    • Nephrolithiasis Brother    • Other Brother         Renal disease   • Hematuria Family         Microscopic       Meds/Allergies   PTA meds:   Prior to Admission Medications   Prescriptions Last Dose Informant Patient Reported? Taking?    ARIPiprazole (ABILIFY) 5 mg tablet  Self Yes No   Sig: Take 5 mg by mouth daily at bedtime   Alcohol Swabs (Alcohol Pads) 70 % PADS  Self No No   Sig: USE TWICE DAILY   Arginine 1000 MG TABS  Self Yes No   Sig: Take 1 pill per day   Cyanocobalamin (Vitamin B 12) 500 MCG TABS  Self Yes No   Sig: Take by mouth daily   DULoxetine (CYMBALTA) 60 mg delayed release capsule  Self Yes No   Sig: Take 60 mg by mouth every morning    Easy Comfort Lancets MISC   No No   Sig: For checking bs BID   Elastic Bandages & Supports (MEDICAL COMPRESSION STOCKINGS) MISC  Self No No   Sig: by Does not apply route daily Please provide B/L compression stockings   Empagliflozin (Jardiance) 25 MG TABS   No No   Sig: Take 1 tablet (25 mg total) by mouth every morning   NIFEdipine (PROCARDIA XL) 30 mg 24 hr tablet   No No   Sig: Take 1 tablet (30 mg total) by mouth 2 (two) times a day   ONETOUCH DELICA LANCETS FINE MISC  Self No No   Sig: Test blood sugar twice daily, or as needed when symptomatic of hypoglycemia or hyperglycemia   Promethazine-DM (PHENERGAN-DM) 6 25-15 mg/5 mL oral syrup  Self No No   Sig: Take 2 5 mL by mouth 4 (four) times a day as needed for cough   acetaminophen (TYLENOL) 650 mg CR tablet  Self No No   Sig: TAKE 1 TABLET (650 MG TOTAL) BY MOUTH EVERY 8 (EIGHT) HOURS AS NEEDED FOR MILD PAIN   aspirin (ECOTRIN LOW STRENGTH) 81 mg EC tablet   No No   Sig: Take 1 tablet (81 mg total) by mouth daily   bisacodyl 5 MG EC tablet  Self No No   Sig: TAKE 1 TABLET (5 MG TOTAL) BY MOUTH DAILY AS NEEDED FOR CONSTIPATION   calcium carbonate (TUMS) 500 mg chewable tablet  Self No No   Sig: CHEW 1 TABLET (1,250 MG TOTAL) 3 (THREE) TIMES A DAY AS NEEDED FOR HEARTBURN   cholecalciferol (VITAMIN D3) 25 mcg (1,000 units) tablet  Self No No   Sig: TAKE 1 TABLET BY MOUTH DAILY   clorazepate (TRANXENE) 7 5 mg tablet  Self Yes No   Sig: Take 7 5 mg by mouth daily as needed for anxiety    dicyclomine (BENTYL) 10 mg capsule  Self No No   Sig: TAKE 1 CAPSULE (10 MG TOTAL) BY MOUTH 4 (FOUR) TIMES A DAY (BEFORE MEALS AND AT BEDTIME)   fluticasone (FLONASE) 50 mcg/act nasal spray  Self No No   Sig: INHALE 1 SPRAY INTO EACH NOSTRIL DAILY   furosemide (LASIX) 20 mg tablet   No No   Sig: Take 1 tablet (20 mg total) by mouth daily Patient takes as needed glucose blood (Easy Plus II Glucose Test) test strip   No No   Sig: Use 1 each 2 (two) times a day Test as directed   glucose monitoring kit (FREESTYLE) monitoring kit  Self No No   Si each by Does not apply route 2 (two) times a day Check BS BID PRN for hypoglycemia/hyperglycemia   hydrocortisone (Procto-Med HC) 2 5 % rectal cream   No No   Sig: Apply topically 2 (two) times a day   lisinopril (ZESTRIL) 20 mg tablet   No No   Sig: Take 1 tablet (20 mg total) by mouth daily   metFORMIN (GLUCOPHAGE) 500 mg tablet   No No   Sig: Take 1 tablet (500 mg total) by mouth daily with breakfast   metoprolol tartrate (LOPRESSOR) 25 mg tablet   No No   Sig: Take 1 tablet (25 mg total) by mouth every 12 (twelve) hours   nystatin (MYCOSTATIN) cream   No No   Sig: Apply topically as needed (mycotic rash)   pantoprazole (PROTONIX) 40 mg tablet   No No   Sig: Take 1 tablet (40 mg total) by mouth daily   rosuvastatin (CRESTOR) 40 MG tablet   No No   Sig: Take 1 tablet (40 mg total) by mouth daily   simethicone (MYLICON) 563 MG chewable tablet  Self No No   Sig: Chew 1 tablet (125 mg total) every 6 (six) hours as needed for flatulence   tamsulosin (FLOMAX) 0 4 mg  Self Yes No   Sig: TAKE 1 CAPSULE (0 4 MG TOTAL) BY MOUTH NIGHTLY     tobramycin-dexamethasone (TOBRADEX) ophthalmic ointment  Self Yes No   Si 5 inches daily      Facility-Administered Medications: None     Allergies   Allergen Reactions   • Epinephrine Hives and Anxiety   • Penicillins Hives and Anxiety       Objective   First Vitals:   Blood Pressure: 153/70 (22)  Pulse: (!) 54 (22)  Temperature: 97 7 °F (36 5 °C) (22)  Temp Source: Oral (22)  Respirations: 18 (22)  SpO2: 99 % (22)    Current Vitals:   Blood Pressure: 153/70 (22)  Pulse: (!) 52 (22)  Temperature: 97 7 °F (36 5 °C) (22)  Temp Source: Oral (22)  Respirations: 18 (22)  SpO2: 99 % (22 0545)    No intake or output data in the 24 hours ending 22 0757    Invasive Devices  Report    None                 Physical Exam  Vitals and nursing note reviewed  Constitutional:       General: He is not in acute distress  Appearance: Normal appearance  He is well-developed  HENT:      Head: Normocephalic and atraumatic  Right Ear: External ear normal       Left Ear: External ear normal       Nose: Nose normal       Mouth/Throat:      Pharynx: No oropharyngeal exudate  Eyes:      Conjunctiva/sclera: Conjunctivae normal       Pupils: Pupils are equal, round, and reactive to light  Cardiovascular:      Rate and Rhythm: Normal rate and regular rhythm  Pulmonary:      Effort: Pulmonary effort is normal  No respiratory distress  Abdominal:      General: Abdomen is flat  There is no distension  Palpations: Abdomen is soft  Musculoskeletal:         General: No deformity  Normal range of motion  Cervical back: Normal range of motion and neck supple  Skin:     General: Skin is warm and dry  Capillary Refill: Capillary refill takes less than 2 seconds  Neurological:      General: No focal deficit present  Mental Status: He is alert and oriented to person, place, and time  Mental status is at baseline  Coordination: Coordination normal    Psychiatric:         Mood and Affect: Mood normal          Behavior: Behavior normal          Thought Content: Thought content normal          Judgment: Judgment normal            Medical Decision Makin   Acute diarrhea:  Metamucil for symptomatic treatment    Recent Results (from the past 36 hour(s))   CBC and differential    Collection Time: 22  5:55 AM   Result Value Ref Range    WBC 6 73 4 31 - 10 16 Thousand/uL    RBC 4 85 3 88 - 5 62 Million/uL    Hemoglobin 14 1 12 0 - 17 0 g/dL    Hematocrit 44 4 36 5 - 49 3 %    MCV 92 82 - 98 fL    MCH 29 1 26 8 - 34 3 pg    MCHC 31 8 31 4 - 37 4 g/dL    RDW 12 7 11 6 - 15 1 %    MPV 10 2 8 9 - 12 7 fL    Platelets 715 473 - 357 Thousands/uL    nRBC 0 /100 WBCs    Neutrophils Relative 47 43 - 75 %    Immat GRANS % 0 0 - 2 %    Lymphocytes Relative 39 14 - 44 %    Monocytes Relative 11 4 - 12 %    Eosinophils Relative 3 0 - 6 %    Basophils Relative 0 0 - 1 %    Neutrophils Absolute 3 18 1 85 - 7 62 Thousands/µL    Immature Grans Absolute 0 02 0 00 - 0 20 Thousand/uL    Lymphocytes Absolute 2 59 0 60 - 4 47 Thousands/µL    Monocytes Absolute 0 74 0 17 - 1 22 Thousand/µL    Eosinophils Absolute 0 17 0 00 - 0 61 Thousand/µL    Basophils Absolute 0 03 0 00 - 0 10 Thousands/µL   Basic metabolic panel    Collection Time: 11/03/22  5:55 AM   Result Value Ref Range    Sodium 136 135 - 147 mmol/L    Potassium 4 1 3 5 - 5 3 mmol/L    Chloride 103 96 - 108 mmol/L    CO2 29 21 - 32 mmol/L    ANION GAP 4 4 - 13 mmol/L    BUN 12 5 - 25 mg/dL    Creatinine 0 88 0 60 - 1 30 mg/dL    Glucose 161 (H) 65 - 140 mg/dL    Calcium 9 3 8 3 - 10 1 mg/dL    eGFR 84 ml/min/1 73sq m     No orders to display         Portions of the record may have been created with voice recognition software  Occasional wrong word or "sound a like" substitutions may have occurred due to the inherent limitations of voice recognition software  Read the chart carefully and recognize, using context, where substitutions have occurred            Critical Care Time  Procedures

## 2022-11-04 ENCOUNTER — OFFICE VISIT (OUTPATIENT)
Dept: GASTROENTEROLOGY | Facility: CLINIC | Age: 77
End: 2022-11-04

## 2022-11-04 VITALS
BODY MASS INDEX: 23.99 KG/M2 | DIASTOLIC BLOOD PRESSURE: 73 MMHG | SYSTOLIC BLOOD PRESSURE: 149 MMHG | WEIGHT: 153.2 LBS | RESPIRATION RATE: 54 BRPM

## 2022-11-04 DIAGNOSIS — Z86.010 PERSONAL HISTORY OF COLONIC POLYPS: ICD-10-CM

## 2022-11-04 DIAGNOSIS — K21.9 GASTROESOPHAGEAL REFLUX DISEASE, UNSPECIFIED WHETHER ESOPHAGITIS PRESENT: Primary | ICD-10-CM

## 2022-11-04 DIAGNOSIS — R19.7 ACUTE DIARRHEA: ICD-10-CM

## 2022-11-04 NOTE — PROGRESS NOTES
[facility] - Outpatient Follow-up Note  William Montiel 78 y o  male MRN: [id number]  Encounter: [id number]      Assessment and Plan    1  Acute diarrhea  The patient states that starting Monday or Tuesday he was having a watery nonbloody bowel movement every 30-45 minutes  His symptoms have now resolved  Yesterday he did not have any bowel movement and today he had one which was loosened but not watery or bloody  His only other complaint is suprapubic abdominal pain which he has chronically  He states that the pain is worse when he is hungry and improves after eating  He does have known IBS and takes Bentyl as needed which helps improve the pain  - discussed with the patient that it sounds as though he had an acute viral or bacterial gastroenteritis which is now resolved, if his symptoms return he should call so we can obtain Infectious stool studies  - continue Bentyl as needed    2  IBS   The patient has a history of irritable bowel syndrome with abdominal bloating and abdominal pain  He currently takes Bentyl and simethicone as needed with improvement in his symptoms  - continue as needed Bentyl  - continued as needed simethicone    3  GERD   Will controlled on pantoprazole 40 mg once daily  Last upper endoscopy 9/3/2021 with mild antral gastritis  Duodenal and stomach biopsies negative for celiac disease and H pylori respectively  - continue pantoprazole 40 mg once daily    4  History of colon polyps  Last colonoscopy 5/30/2019 without polyps but sigmoid diverticulosis  Repeat was recommended 5 years later due to personal history of colon polyps  - repeat colonoscopy 2024 sooner if clinically indicated    Follow-up 6 months or sooner if needed    ______________________________________________________________________    History of Present Illness  William Montiel is a 78 y o  male here for evaluation of acute diarrhea    The patient states that starting Monday or Tuesday he was having a watery nonbloody bowel movement every 30-45 minutes  His symptoms have not resolved  Yesterday he did not have any bowel movement and today he had 1 which is loosened non bloody  His only other complaint is suprapubic abdominal pain which he has had chronically  He does have known IBS and takes Bentyl as needed which helps improve the pain  He states that the pain is worse when he is hungry and improves after eating as well  [employer] interpretor # [id number] utilized       Review of Systems   Constitutional: Negative for activity change, appetite change, chills, fatigue, fever and unexpected weight change  Gastrointestinal: Positive for abdominal pain  Negative for abdominal distention, anal bleeding, blood in stool, constipation, diarrhea, nausea, rectal pain and vomiting  Musculoskeletal: Negative for back pain and gait problem  Psychiatric/Behavioral: Negative for confusion         Past Medical History  Past Medical History:   Diagnosis Date   • AS (aortic stenosis)    • Balanitis    • Biceps tendonitis on right    • CAD (coronary artery disease)    • Cancer (HCC)     skin   • Colon polyp    • Complete rotator cuff tear or rupture of right shoulder, not specified as traumatic    • CPAP (continuous positive airway pressure) dependence    • Diabetes mellitus (HCC)    • Esophageal reflux    • High cholesterol    • Hypertension    • Hypertriglyceridemia    • Hypogonadism in male    • Myalgia    • Myocardial infarction [facility])    • Palpitations    • Shoulder pain    • Sinus problem    • Skin cancer of nose    • Sleep apnea    • [facility] 3306   • Systolic murmur     recently found   • Tinea cruris    • Tinea pedis        Past Social history  Past Surgical History:   Procedure Laterality Date   • CARPAL TUNNEL RELEASE     • CATARACT EXTRACTION Bilateral    • COLONOSCOPY  2019   • CORONARY ANGIOPLASTY WITH STENT PLACEMENT      10-15-19 - pt denies stent implant   • CORONARY ARTERY BYPASS GRAFT N/A 12/12/2016    Procedure: INTRAOP CECILLE; BILATERAL LEG EVH; CORONARY ARTERY BYPASS GRAFT X 4 SVG TO PDA, OM1,  AND DIAGONAL1, LIMA TO LAD;  Surgeon: Diamante Dhillon MD;  [facility];  Service:    • EYE SURGERY     • HERNIA REPAIR     • OR ARTHROSCOPY SHOULDER SURGICAL BICEPS TENODESIS Right 5/6/2016    Procedure: ARTHROSCOPIC BICEPS TENODESIS;  Surgeon: Sumit Camacho MD;  [facility];  Service: Orthopedics   • OR INCISE FINGER TENDON SHEATH Right 10/15/2019    Procedure: TRIGGER FINGER RELEASE INDEX, LONG, RING, SMALL, THUMB FINGERS;  Surgeon: Glen Corbin MD;  [facility];  Service: Orthopedics   • OR INCISE FINGER TENDON SHEATH Left 3/3/2020    Procedure: RELEASE TRIGGER FINGER INDEX;  Surgeon: Glen Corbin MD;  [facility];  Service: Orthopedics   • OR INCISE FINGER TENDON SHEATH Left 1/18/2022    Procedure: left long trigger finger release;  Surgeon: Glen Corbin MD;  [facility];  Service: Orthopedics   • OR SHLDR ARTHROSCOP,SURG,W/ROTAT CUFF REPR Right 5/6/2016    Procedure: REPAIR ROTATOR CUFF  ARTHROSCOPIC; SUBACROMIAL DECOMPRESSION; PARTIAL SYNOVECTOMY;  Surgeon: Sumit Camacho MD;  [facility];  Service: Orthopedics   • OR WRIST Gerri Early LIG Right 10/15/2019    Procedure: ENDOSCOPIC CARPAL TUNNEL RELEASE;  Surgeon: Glen Corbin MD;  [facility];  Service: Orthopedics   • OR WRIST Gerri Early LIG Left 3/3/2020    Procedure: RELEASE CARPAL TUNNEL ENDOSCOPIC;  Surgeon: Glen Corbin MD;  [facility];  Service: Orthopedics   • ROTATOR CUFF REPAIR     • TESTICLE SURGERY     • UMBILICAL HERNIA REPAIR  2009    over age 13     Social History     Socioeconomic History   • Marital status: /Civil Union     Spouse name: Not on file   • Number of children: Not on file   • Years of education: Not on file   • Highest education level: Not on file   Occupational History   • Not on file Tobacco Use   • Smoking status: Former Smoker   • Smokeless tobacco: Former User   • Tobacco comment: smoked for 3 years from 17 y/o - 23 yrs old   Vaping Use   • Vaping Use: Never used   Substance and Sexual Activity   • Alcohol use: No   • Drug use: No   • Sexual activity: Yes     Partners: Female   Other Topics Concern   • Not on file   Social History Narrative    Uses Safety Equipment Seatbelts     Social Determinants of Health     Financial Resource Strain: Low Risk    • Difficulty of Paying Living Expenses: Not hard at all   Food Insecurity: No Food Insecurity   • Worried About [address] [employer] in the Last Year: Never true   • Ran Out of Food in the Last Year: Never true   Transportation Needs: No Transportation Needs   • Lack of Transportation (Medical): No   • Lack of Transportation (Non-Medical): No   Physical Activity: Inactive   • Days of Exercise per Week: 0 days   • Minutes of Exercise per Session: 0 min   Stress: No Stress Concern Present   • Feeling of Stress : Not at all   Social Connections:  Moderately Isolated   • Frequency of Communication with Friends and Family: Twice a week   • Frequency of Social Gatherings with Friends and Family: More than three times a week   • Attends Spiritism Services: Never   • Active Member of Clubs or Organizations: No   • Attends Club or Organization Meetings: Never   • Marital Status:    Intimate Partner Violence: Not At Risk   • Fear of Current or Ex-Partner: No   • Emotionally Abused: No   • Physically Abused: No   • Sexually Abused: No   Housing Stability: Low Risk    • Unable to Pay for Housing in the Last Year: No   • Number of [place] in the Last Year: 1   • Unstable Housing in the Last Year: No     Social History     Substance and Sexual Activity   Alcohol Use No     Social History     Substance and Sexual Activity   Drug Use No     Social History     Tobacco Use   Smoking Status Former Smoker   Smokeless Tobacco Former User   Tobacco Comment smoked for 3 years from 17 y/o - 23 yrs old       Past Family History  Family History   Problem Relation Age of Onset   • Heart disease Mother    • Hypertension Mother    • Nephrolithiasis Father    • Other Father         Renal disease   • Hypertension Sister    • Nephrolithiasis Brother    • Other Brother         Renal disease   • Hematuria Family         Microscopic       Current Medications  Current Outpatient Medications   Medication Sig Dispense Refill   • acetaminophen (TYLENOL) 650 mg CR tablet TAKE 1 TABLET (650 MG TOTAL) BY MOUTH EVERY 8 (EIGHT) HOURS AS NEEDED FOR MILD PAIN 30 tablet 0   • Alcohol Swabs (Alcohol Pads) 70 % PADS USE TWICE DAILY 100 each 3   • Arginine 1000 MG TABS Take 1 pill per day     • ARIPiprazole (ABILIFY) 5 mg tablet Take 5 mg by mouth daily at bedtime     • aspirin (ECOTRIN LOW STRENGTH) 81 mg EC tablet Take 1 tablet (81 mg total) by mouth daily 90 tablet 2   • bisacodyl 5 MG EC tablet TAKE 1 TABLET (5 MG TOTAL) BY MOUTH DAILY AS NEEDED FOR CONSTIPATION 30 tablet 0   • calcium carbonate (TUMS) 500 mg chewable tablet CHEW 1 TABLET (1,250 MG TOTAL) 3 (THREE) TIMES A DAY AS NEEDED FOR HEARTBURN 90 tablet 3   • cholecalciferol (VITAMIN D3) 25 mcg (1,000 units) tablet TAKE 1 TABLET BY MOUTH DAILY 90 tablet 1   • clorazepate (TRANXENE) 7 5 mg tablet Take 7 5 mg by mouth daily as needed for anxiety   0   • Cyanocobalamin (Vitamin B 12) 500 MCG TABS Take by mouth daily     • dicyclomine (BENTYL) 10 mg capsule TAKE 1 CAPSULE (10 MG TOTAL) BY MOUTH 4 (FOUR) TIMES A DAY (BEFORE MEALS AND AT BEDTIME) 90 capsule 1   • DULoxetine (CYMBALTA) 60 mg delayed release capsule Take 60 mg by mouth every morning      • Easy Comfort Lancets MISC For checking bs  each 5   • Elastic Bandages & Supports (MEDICAL COMPRESSION STOCKINGS) MISC by Does not apply route daily Please provide B/L compression stockings 2 each 0   • Empagliflozin (Jardiance) 25 MG TABS Take 1 tablet (25 mg total) by mouth every morning 30 tablet 5   • fluticasone (FLONASE) 50 mcg/act nasal spray INHALE 1 SPRAY INTO EACH NOSTRIL DAILY 16 g 2   • furosemide (LASIX) 20 mg tablet Take 1 tablet (20 mg total) by mouth daily Patient takes as needed 30 tablet 8   • glucose blood (Easy Plus II Glucose Test) test strip Use 1 each 2 (two) times a day Test as directed 100 each 3   • glucose monitoring kit (FREESTYLE) monitoring kit 1 each by Does not apply route 2 (two) times a day Check BS BID PRN for hypoglycemia/hyperglycemia 1 each 0   • hydrocortisone (Procto-Med HC) 2 5 % rectal cream Apply topically 2 (two) times a day 28 g 3   • lisinopril (ZESTRIL) 20 mg tablet Take 1 tablet (20 mg total) by mouth daily 90 tablet 3   • metFORMIN (GLUCOPHAGE) 500 mg tablet Take 1 tablet (500 mg total) by mouth daily with breakfast 90 tablet 2   • metoprolol tartrate (LOPRESSOR) 25 mg tablet Take 1 tablet (25 mg total) by mouth every 12 (twelve) hours 180 tablet 2   • NIFEdipine (PROCARDIA XL) 30 mg 24 hr tablet Take 1 tablet (30 mg total) by mouth 2 (two) times a day 60 tablet 11   • nystatin (MYCOSTATIN) cream Apply topically as needed (mycotic rash) 30 g 1   • ONETOUCH DELICA LANCETS FINE MISC Test blood sugar twice daily, or as needed when symptomatic of hypoglycemia or hyperglycemia 100 each 5   • pantoprazole (PROTONIX) 40 mg tablet Take 1 tablet (40 mg total) by mouth daily 30 tablet 5   • Promethazine-DM (PHENERGAN-DM) 6 25-15 mg/5 mL oral syrup Take 2 5 mL by mouth 4 (four) times a day as needed for cough 180 mL 0   • psyllium (METAMUCIL SMOOTH TEXTURE) 28 % packet Take 1 packet by mouth 2 (two) times a day for 10 days 20 packet 0   • rosuvastatin (CRESTOR) 40 MG tablet Take 1 tablet (40 mg total) by mouth daily 90 tablet 0   • simethicone (MYLICON) 123 MG chewable tablet Chew 1 tablet (125 mg total) every 6 (six) hours as needed for flatulence 30 tablet 3   • tamsulosin (FLOMAX) 0 4 mg TAKE 1 CAPSULE (0 4 MG TOTAL) BY MOUTH NIGHTLY       • tobramycin-dexamethasone (TOBRADEX) ophthalmic ointment 0 5 inches daily       No current facility-administered medications for this visit  Allergies  Allergies   Allergen Reactions   • Epinephrine Hives and Anxiety   • Penicillins Hives and Anxiety         The following portions of the patient's history were reviewed and updated as appropriate: allergies, current medications, past medical history, past social history, past surgical history and problem list       Vitals  Vitals:    11/04/22 1155   BP: 149/73   Resp: (!) 54   Weight: 69 5 kg (153 lb 3 2 oz)         Physical Exam  Constitutional   General appearance: Patient is seated and in no acute distress, well appearing and well nourished  Head and Face   Head and face: Normal     Eyes   Conjunctiva and lids: No erythema, swelling or discharge  Anicteric  Ears, Nose, Mouth, and Throat   Hearing: Normal     Neck: Supple, trachea midline  Pulmonary   Respiratory effort: No increased work of breathing or signs of respiratory distress  Cardiovascular   Examination of extremities for edema and/or varicosities: Normal     Abdomen   Abdomen: Soft, no masses, no organomegaly, lower abdominal pain  Normal bowel sounds  Musculoskeletal   Gait and station: Normal     Skin   Skin and subcutaneous tissue: Warm, dry, and intact  No visible jaundice, lesions or rashes  Psychiatric   Judgment and insight: Normal  Recent and remote memory:  Normal  Mood and affect: Normal      Results  No visits with results within 1 Day(s) from this visit     Latest known visit with results is:   Admission on 11/03/2022, Discharged on 11/03/2022   Component Date Value   • WBC 11/03/2022 6 73    • RBC 11/03/2022 4 85    • Hemoglobin 11/03/2022 14 1    • Hematocrit 11/03/2022 44 4    • MCV 11/03/2022 92    • MCH 11/03/2022 29 1    • MCHC 11/03/2022 31 8    • RDW 11/03/2022 12 7    • MPV 11/03/2022 10 2    • Platelets 71/27/7715 182    • nRBC 11/03/2022 0    • Neutrophils Relative 11/03/2022 47    • Immat GRANS % 11/03/2022 0    • Lymphocytes Relative 11/03/2022 39    • Monocytes Relative 11/03/2022 11    • Eosinophils Relative 11/03/2022 3    • Basophils Relative 11/03/2022 0    • Neutrophils Absolute 11/03/2022 3 18    • Immature Grans Absolute 11/03/2022 0 02    • Lymphocytes Absolute 11/03/2022 2 59    • Monocytes Absolute 11/03/2022 0 74    • Eosinophils Absolute 11/03/2022 0 17    • Basophils Absolute 11/03/2022 0 03    • Sodium 11/03/2022 136    • Potassium 11/03/2022 4 1    • Chloride 11/03/2022 103    • CO2 11/03/2022 29    • ANION GAP 11/03/2022 4    • BUN 11/03/2022 12    • Creatinine 11/03/2022 0 88    • Glucose 11/03/2022 161 (A)   • Calcium 11/03/2022 9 3    • eGFR 11/03/2022 84        Radiology Results  Echo complete w/ contrast if indicated    Result Date: 10/11/2022  Narrative: •  Left Ventricle: Left ventricular cavity size is normal  Wall thickness is normal  The left ventricular ejection fraction is 50%  Systolic function is low normal  Wall motion is normal  Diastolic function is normal for age  •  Right Ventricle: Right ventricular cavity size is normal  Systolic function is low normal  •  Aortic Valve: The aortic valve is trileaflet  The leaflets are mildly thickened  The leaflets are mildly calcified  There is mildly reduced mobility  There is trace regurgitation  There is mild stenosis  The aortic valve mean gradient is 6 mmHg  The aortic valve area is 1 63 cm2  Orders  No orders of the defined types were placed in this encounter

## 2022-11-25 ENCOUNTER — CLINICAL SUPPORT (OUTPATIENT)
Dept: DENTISTRY | Facility: CLINIC | Age: 77
End: 2022-11-25

## 2022-11-25 ENCOUNTER — OFFICE VISIT (OUTPATIENT)
Dept: DENTISTRY | Facility: CLINIC | Age: 77
End: 2022-11-25

## 2022-11-25 VITALS — SYSTOLIC BLOOD PRESSURE: 132 MMHG | HEART RATE: 54 BPM | TEMPERATURE: 97.4 F | DIASTOLIC BLOOD PRESSURE: 53 MMHG

## 2022-11-25 DIAGNOSIS — Z01.21 ENCOUNTER FOR DENTAL EXAMINATION AND CLEANING WITH ABNORMAL FINDINGS: Primary | ICD-10-CM

## 2022-11-25 DIAGNOSIS — Z01.20 ENCOUNTER FOR DENTAL EXAMINATION AND CLEANING WITHOUT ABNORMAL FINDINGS: Primary | ICD-10-CM

## 2022-11-25 RX ORDER — PSYLLIUM HUSK 3.4 G/5.8G
POWDER ORAL
COMMUNITY
Start: 2022-11-03

## 2022-11-25 NOTE — PROGRESS NOTES
RECALL EXAM, ADULT PROPHY    CHIEF CONCERN: Thiago partial denture rubs lingual of #[id number]  PAIN SCALE: 0  ASA CLASS: III  Controlled type 2 diabetes w/ stage 2  Chr kidney disease, controlled hypertension, CAD  PLAQUE: moderate  CALCULUS:  moderate  BLEEDING: light  gen  STAIN : moderate  ORAL HYGIENE:  fair  PERIO: loc 5mm w/ bup  3B    Hand scaled, polished and flossed  Max and Thiago partial dentures cleaned in US w/ tartar and stain solution  Oral Hygiene Instruction :  recommended brushing 2 x daily for 2 minutes MIN, recommended flossing daily, reviewed dietary precautions  soft tissue evaluation    Soft tissue exam:  soft tissue exam was normal  ExtraOral exam:   Extraoral exam was normal    Dr Jamaica Novak exam=   Reviewed with patient clinical and radiographic findings and patient verbalized understanding  All questions and concerns addressed  Thiago  Partial denture adjustment needed linguals of lower anteriors  REFERRALS: no referrals needed    CARIES FINDINGS: no caries noted    Next Visit: Mandibular partial denture adjustment on linguals of lower anteriors    Next Recall: 6 month recall    Last FMX: 11-23-21

## 2022-11-25 NOTE — PROGRESS NOTES
Pt presented for denture adjustment, lower right posterior area causing patient pain  ASA: II    Soreness present on alveolar ridge  PIP used on denture and tried in  Pressured spots removed with acrylic bur  Pt felt more comfortable, left satisfied  Pt has chipped tooth #25 and would like fixed, planned for resin       NV: resin and more denture adjustments as needed any resident

## 2022-12-05 DIAGNOSIS — K21.9 GASTROESOPHAGEAL REFLUX DISEASE, UNSPECIFIED WHETHER ESOPHAGITIS PRESENT: ICD-10-CM

## 2022-12-05 RX ORDER — CALCIUM CARBONATE 200(500)MG
TABLET,CHEWABLE ORAL
Qty: 90 TABLET | Refills: 3 | Status: SHIPPED | OUTPATIENT
Start: 2022-12-05

## 2022-12-06 ENCOUNTER — OFFICE VISIT (OUTPATIENT)
Dept: GASTROENTEROLOGY | Facility: CLINIC | Age: 77
End: 2022-12-06

## 2022-12-06 VITALS
DIASTOLIC BLOOD PRESSURE: 72 MMHG | SYSTOLIC BLOOD PRESSURE: 126 MMHG | HEIGHT: 67 IN | TEMPERATURE: 98.9 F | HEART RATE: 57 BPM | BODY MASS INDEX: 22.91 KG/M2 | WEIGHT: 146 LBS

## 2022-12-06 DIAGNOSIS — R14.0 BLOATING: ICD-10-CM

## 2022-12-06 DIAGNOSIS — K21.9 GASTROESOPHAGEAL REFLUX DISEASE, UNSPECIFIED WHETHER ESOPHAGITIS PRESENT: Primary | ICD-10-CM

## 2022-12-06 DIAGNOSIS — K58.1 IRRITABLE BOWEL SYNDROME WITH CONSTIPATION: ICD-10-CM

## 2022-12-06 DIAGNOSIS — J30.9 ALLERGIC RHINITIS, UNSPECIFIED SEASONALITY, UNSPECIFIED TRIGGER: ICD-10-CM

## 2022-12-06 RX ORDER — SIMETHICONE 125 MG
125 TABLET,CHEWABLE ORAL EVERY 6 HOURS PRN
Qty: 30 TABLET | Refills: 3 | Status: SHIPPED | OUTPATIENT
Start: 2022-12-06

## 2022-12-06 RX ORDER — FLUTICASONE PROPIONATE 50 MCG
SPRAY, SUSPENSION (ML) NASAL
Qty: 16 G | Refills: 2 | Status: SHIPPED | OUTPATIENT
Start: 2022-12-06

## 2022-12-06 NOTE — PROGRESS NOTES
[facility] - Outpatient Follow-up Note  Anne Wilson 78 y o  male MRN: [id number]  Encounter: [id number]    Assessment and Plan    1  IBS   The patient has a history of irritable bowel syndrome with abdominal bloating and abdominal pain  He presented today with complaints of pain  He states he had significant pain starting on Sunday that started in his suprapubic region radiating to his back  He took a bentyl without relief so he then took acetaminophen with relief  He has been doing well since then  Bowel movements are normal  Does have increase bloating   - continue as needed Bentyl  - resume as needed simethicone  - start a low fodmap diet      2  GERD   Will controlled on pantoprazole 40 mg once daily  Last upper endoscopy 9/3/2021 with mild antral gastritis  Duodenal and stomach biopsies negative for celiac disease and H pylori respectively  - continue pantoprazole 40 mg once daily     3  History of colon polyps  Last colonoscopy 5/30/2019 without polyps but sigmoid diverticulosis  Repeat was recommended 5 years later due to personal history of colon polyps  - repeat colonoscopy 2024 sooner if clinically indicated      ______________________________________________________________________    History of Present Illness  Anne Wilson is a 78 y o  male here for follow up evaluation of abdominal pain  He states he had significant pain starting on Sunday that started in his suprapubic region and radiated to his back  He took a bentyl without relief so he then took acetaminophen with relief  He has been doing well since then  Bowel movements are normal  Does have increase bloating  Irish interpretor [id number] utilized       Review of Systems   Constitutional: Negative for activity change, appetite change, chills, fatigue, fever and unexpected weight change  Gastrointestinal: Positive for abdominal pain  Negative for constipation, diarrhea, nausea and vomiting         Past Medical History  Past Medical History:   Diagnosis Date   • AS (aortic stenosis)    • Balanitis    • Biceps tendonitis on right    • CAD (coronary artery disease)    • Cancer (HCC)     skin   • Colon polyp    • Complete rotator cuff tear or rupture of right shoulder, not specified as traumatic    • CPAP (continuous positive airway pressure) dependence    • Diabetes mellitus (HCC)    • Esophageal reflux    • High cholesterol    • Hypertension    • Hypertriglyceridemia    • Hypogonadism in male    • Myalgia    • Myocardial infarction [facility])    • Palpitations    • Shoulder pain    • Sinus problem    • Skin cancer of nose    • Sleep apnea    • Stroke ([facility] [address] ) 9835   • Systolic murmur     recently found   • Tinea cruris    • Tinea pedis        Past Social history  Past Surgical History:   Procedure Laterality Date   • CARPAL TUNNEL RELEASE     • CATARACT EXTRACTION Bilateral    • COLONOSCOPY  2019   • CORONARY ANGIOPLASTY WITH STENT PLACEMENT      10-15-19 - pt denies stent implant   • CORONARY ARTERY BYPASS GRAFT N/A 12/12/2016    Procedure: INTRAOP CECILLE; BILATERAL LEG EVH; CORONARY ARTERY BYPASS GRAFT X 4 SVG TO PDA, OM1,  AND DIAGONAL1, LIMA TO LAD;  Surgeon: Akira Blackwell MD;  [facility];  Service:    • EYE SURGERY     • HERNIA REPAIR     • PA ARTHROSCOPY SHOULDER SURGICAL BICEPS TENODESIS Right 5/6/2016    Procedure: ARTHROSCOPIC BICEPS TENODESIS;  Surgeon: Stephanie Watson MD;  [facility];  Service: Orthopedics   • PA INCISE FINGER TENDON SHEATH Right 10/15/2019    Procedure: TRIGGER FINGER RELEASE INDEX, LONG, RING, SMALL, THUMB FINGERS;  Surgeon: Leah Sabillon MD;  [facility];  Service: Orthopedics   • PA INCISE FINGER TENDON SHEATH Left 3/3/2020    Procedure: RELEASE TRIGGER FINGER INDEX;  Surgeon: Leah Sabillon MD;  [facility];  Service: Orthopedics   • PA INCISE FINGER TENDON SHEATH Left 1/18/2022    Procedure: left long trigger finger release;  Surgeon: Byron Snider Feliz Wright MD;  [facility];  Service: Orthopedics   • NC SHLDR ARTHROSCOP,SURG,W/ROTAT CUFF REPR Right 5/6/2016    Procedure: REPAIR ROTATOR CUFF  ARTHROSCOPIC; SUBACROMIAL DECOMPRESSION; PARTIAL SYNOVECTOMY;  Surgeon: Stephanei Watson MD;  [facility];  Service: Orthopedics   • NC WRIST Eppie Ku LIG Right 10/15/2019    Procedure: ENDOSCOPIC CARPAL TUNNEL RELEASE;  Surgeon: Leah Sabillon MD;  [facility];  Service: Orthopedics   • NC WRIST Eppie Ku LIG Left 3/3/2020    Procedure: RELEASE CARPAL TUNNEL ENDOSCOPIC;  Surgeon: Leah Sabillon MD;  [facility];  Service: Orthopedics   • ROTATOR CUFF REPAIR     • TESTICLE SURGERY     • UMBILICAL HERNIA REPAIR  2009    over age 13     Social History     Socioeconomic History   • Marital status: /Civil Union     Spouse name: Not on file   • Number of children: Not on file   • Years of education: Not on file   • Highest education level: Not on file   Occupational History   • Not on file   Tobacco Use   • Smoking status: Former   • Smokeless tobacco: Former   • Tobacco comments:     smoked for 3 years from 19 y/o - 23 yrs old   Vaping Use   • Vaping Use: Never used   Substance and Sexual Activity   • Alcohol use: No   • Drug use: No   • Sexual activity: Yes     Partners: Female   Other Topics Concern   • Not on file   Social History Narrative    Uses Safety Equipment Seatbelts     Social Determinants of Health     Financial Resource Strain: Low Risk    • Difficulty of Paying Living Expenses: Not hard at all   Food Insecurity: No Food Insecurity   • Worried About Running Out of Food in the Last Year: Never true   • Ran Out of Food in the Last Year: Never true   Transportation Needs: No Transportation Needs   • Lack of Transportation (Medical): No   • Lack of Transportation (Non-Medical):  No   Physical Activity: Inactive   • Days of Exercise per Week: 0 days   • Minutes of Exercise per Session: 0 min   Stress: No Stress Concern Present   • Feeling of Stress : Not at all   Social Connections:  Moderately Isolated   • Frequency of Communication with Friends and Family: Twice a week   • Frequency of Social Gatherings with Friends and Family: More than three times a week   • Attends Christian Services: Never   • Active Member of Clubs or Organizations: No   • Attends Club or Organization Meetings: Never   • Marital Status:    Intimate Partner Violence: Not At Risk   • Fear of Current or Ex-Partner: No   • Emotionally Abused: No   • Physically Abused: No   • Sexually Abused: No   Housing Stability: Low Risk    • Unable to Pay for Housing in the Last Year: No   • Number of [place] in the Last Year: 1   • Unstable Housing in the Last Year: No     Social History     Substance and Sexual Activity   Alcohol Use No     Social History     Substance and Sexual Activity   Drug Use No     Social History     Tobacco Use   Smoking Status Former   Smokeless Tobacco Former   Tobacco Comments    smoked for 3 years from 19 y/o - 23 yrs old       Past Family History  Family History   Problem Relation Age of Onset   • Heart disease Mother    • Hypertension Mother    • Nephrolithiasis Father    • Other Father         Renal disease   • Hypertension Sister    • Nephrolithiasis Brother    • Other Brother         Renal disease   • Hematuria Family         Microscopic       Current Medications  Current Outpatient Medications   Medication Sig Dispense Refill   • acetaminophen (TYLENOL) 650 mg CR tablet TAKE 1 TABLET (650 MG TOTAL) BY MOUTH EVERY 8 (EIGHT) HOURS AS NEEDED FOR MILD PAIN 30 tablet 0   • Alcohol Swabs (Alcohol Pads) 70 % PADS USE TWICE DAILY 100 each 3   • Arginine 1000 MG TABS Take 1 pill per day     • ARIPiprazole (ABILIFY) 5 mg tablet Take 5 mg by mouth daily at bedtime     • aspirin (ECOTRIN LOW STRENGTH) 81 mg EC tablet Take 1 tablet (81 mg total) by mouth daily 90 tablet 2   • bisacodyl 5 MG EC tablet TAKE 1 TABLET (5 MG TOTAL) BY MOUTH DAILY AS NEEDED FOR CONSTIPATION 30 tablet 0   • calcium carbonate (TUMS) 500 mg chewable tablet CHEW 1 TABLET (1,250 MG TOTAL) 3 (THREE) TIMES A DAY AS NEEDED FOR HEARTBURN 90 tablet 3   • cholecalciferol (VITAMIN D3) 25 mcg (1,000 units) tablet TAKE 1 TABLET BY MOUTH DAILY 90 tablet 1   • clorazepate (TRANXENE) 7 5 mg tablet Take 7 5 mg by mouth daily as needed for anxiety   0   • Cyanocobalamin (Vitamin B 12) 500 MCG TABS Take by mouth daily     • dicyclomine (BENTYL) 10 mg capsule TAKE 1 CAPSULE (10 MG TOTAL) BY MOUTH 4 (FOUR) TIMES A DAY (BEFORE MEALS AND AT BEDTIME) 90 capsule 1   • DULoxetine (CYMBALTA) 60 mg delayed release capsule Take 60 mg by mouth every morning      • Easy Comfort Lancets MISC For checking bs  each 5   • Elastic Bandages & Supports (MEDICAL COMPRESSION STOCKINGS) MISC by Does not apply route daily Please provide B/L compression stockings 2 each 0   • Empagliflozin (Jardiance) 25 MG TABS Take 1 tablet (25 mg total) by mouth every morning 30 tablet 5   • fluticasone (FLONASE) 50 mcg/act nasal spray INHALE 1 SPRAY INTO EACH NOSTRIL DAILY 16 g 2   • furosemide (LASIX) 20 mg tablet Take 1 tablet (20 mg total) by mouth daily Patient takes as needed 30 tablet 8   • glucose blood (Easy Plus II Glucose Test) test strip Use 1 each 2 (two) times a day Test as directed 100 each 3   • glucose monitoring kit (FREESTYLE) monitoring kit 1 each by Does not apply route 2 (two) times a day Check BS BID PRN for hypoglycemia/hyperglycemia 1 each 0   • hydrocortisone (Procto-Med HC) 2 5 % rectal cream Apply topically 2 (two) times a day 28 g 3   • lisinopril (ZESTRIL) 20 mg tablet Take 1 tablet (20 mg total) by mouth daily 90 tablet 3   • Metamucil [employer] Fiber 58 12 % PACK TAKE 1 PACKET BY MOUTH 2 (TWO) TIMES A DAY FOR 10 DAYS     • metFORMIN (GLUCOPHAGE) 500 mg tablet Take 1 tablet (500 mg total) by mouth daily with breakfast 90 tablet 2   • metoprolol tartrate (LOPRESSOR) 25 mg tablet Take 1 tablet (25 mg total) by mouth every 12 (twelve) hours 180 tablet 2   • NIFEdipine (PROCARDIA XL) 30 mg 24 hr tablet Take 1 tablet (30 mg total) by mouth 2 (two) times a day 60 tablet 11   • nystatin (MYCOSTATIN) cream Apply topically as needed (mycotic rash) 30 g 1   • ONETOUCH DELICA LANCETS FINE MISC Test blood sugar twice daily, or as needed when symptomatic of hypoglycemia or hyperglycemia 100 each 5   • pantoprazole (PROTONIX) 40 mg tablet Take 1 tablet (40 mg total) by mouth daily 30 tablet 5   • Promethazine-DM (PHENERGAN-DM) 6 25-15 mg/5 mL oral syrup Take 2 5 mL by mouth 4 (four) times a day as needed for cough 180 mL 0   • psyllium (METAMUCIL SMOOTH TEXTURE) 28 % packet Take 1 packet by mouth 2 (two) times a day for 10 days 20 packet 0   • rosuvastatin (CRESTOR) 40 MG tablet Take 1 tablet (40 mg total) by mouth daily 90 tablet 0   • simethicone (MYLICON) 408 MG chewable tablet Chew 1 tablet (125 mg total) every 6 (six) hours as needed for flatulence 30 tablet 3   • tamsulosin (FLOMAX) 0 4 mg TAKE 1 CAPSULE (0 4 MG TOTAL) BY MOUTH NIGHTLY  • tobramycin-dexamethasone (TOBRADEX) ophthalmic ointment 0 5 inches daily       No current facility-administered medications for this visit  Allergies  Allergies   Allergen Reactions   • Epinephrine Hives and Anxiety   • Penicillins Hives and Anxiety         The following portions of the patient's history were reviewed and updated as appropriate: allergies, current medications, past medical history, past social history, past surgical history and problem list       Vitals  Vitals:    12/06/22 1417   BP: 126/72   BP Location: Right arm   Patient Position: Sitting   Cuff Size: Large   Pulse: 57   Temp: 98 9 °F (37 2 °C)   TempSrc: Tympanic   Weight: 66 2 kg (146 lb)   Height: 5' 7" (1 702 m)         Physical Exam  Constitutional   General appearance: Patient is seated and in no acute distress, well appearing and well nourished  Head and Face   Head and face: Normal     Eyes   Conjunctiva and lids: No erythema, swelling or discharge  Anicteric  Ears, Nose, Mouth, and Throat   Hearing: Normal     Neck: Supple, trachea midline  Pulmonary   Respiratory effort: No increased work of breathing or signs of respiratory distress  Cardiovascular    Examination of extremities for edema and/or varicosities: Normal     Abdomen   Abdomen: Soft, non-tender, no masses, no organomegaly  Musculoskeletal   Gait and station: Normal     Skin   Skin and subcutaneous tissue: Warm, dry, and intact  No visible jaundice, lesions or rashes  Psychiatric   Judgment and insight: Normal  Recent and remote memory:  Normal  Mood and affect: Normal      Results  No visits with results within 1 Day(s) from this visit     Latest known visit with results is:   Admission on 11/03/2022, Discharged on 11/03/2022   Component Date Value   • WBC 11/03/2022 6 73    • RBC 11/03/2022 4 85    • Hemoglobin 11/03/2022 14 1    • Hematocrit 11/03/2022 44 4    • MCV 11/03/2022 92    • MCH 11/03/2022 29 1    • MCHC 11/03/2022 31 8    • RDW 11/03/2022 12 7    • MPV 11/03/2022 10 2    • Platelets 79/64/5081 182    • nRBC 11/03/2022 0    • Neutrophils Relative 11/03/2022 47    • Immat GRANS % 11/03/2022 0    • Lymphocytes Relative 11/03/2022 39    • Monocytes Relative 11/03/2022 11    • Eosinophils Relative 11/03/2022 3    • Basophils Relative 11/03/2022 0    • Neutrophils Absolute 11/03/2022 3 18    • Immature Grans Absolute 11/03/2022 0 02    • Lymphocytes Absolute 11/03/2022 2 59    • Monocytes Absolute 11/03/2022 0 74    • Eosinophils Absolute 11/03/2022 0 17    • Basophils Absolute 11/03/2022 0 03    • Sodium 11/03/2022 136    • Potassium 11/03/2022 4 1    • Chloride 11/03/2022 103    • CO2 11/03/2022 29    • ANION GAP 11/03/2022 4    • BUN 11/03/2022 12    • Creatinine 11/03/2022 0 88    • Glucose 11/03/2022 161 (H)    • Calcium 11/03/2022 9 3    • eGFR 11/03/2022 84 Radiology Results  No results found  Orders  No orders of the defined types were placed in this encounter

## 2023-01-16 NOTE — PROGRESS NOTES
"Recommendations from today's MTM visit:                                                         1. Recommend stopping pioglitazone. I will talk with your doctor about this.   2. Finishing out course of antibiotic     Follow-up: with primary doctor as directed    It was great speaking with you today.  I value your experience and would be very thankful for your time in providing feedback in our clinic survey. In the next few days, you may receive an email or text message from [employer] with a link to a survey related to your  clinical pharmacist.\"     To schedule another MTM appointment, please call the MTM scheduling line at [phone].    My Clinical Pharmacist's contact information:                                                      Please feel free to contact me with any questions or concerns you have.      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)  Stable  · Reported wrist BP of 197/82 this am  · Today BP is 138/84 which seems to be his average  · Patient is asymptomatic in the office today but appears anxious  · Counseled that if his BP was above 180/100 that he should go to the ER, especially if he has symptoms  · Hand out on hypertensive urgency given in [place]  · Patient has follow up with cardiology 8/15/19  · Follow up PRN         Problem List Items Addressed This Visit        Endocrine    Hypertension associated with stage 2 chronic kidney disease due to type 2 diabetes mellitus ([facility] [address] ) - Primary (Chronic)     Stable  · Reported wrist BP of 197/82 this am  · Today BP is 138/84 which seems to be his average  · Patient is asymptomatic in the office today but appears anxious  · Counseled that if his BP was above 180/100 that he should go to the ER, especially if he has symptoms  · Hand out on hypertensive urgency given in [place]  · Patient has follow up with cardiology 8/15/19  · Follow up PRN                 Subjective:      Patient ID: Dulcie Goldmann is a 69 y o  male  69year old male presents with an elevated pressure this morning of 197/82 at 10:30am  Patient states that he was feeling dizzy, diaphoretic and had palpitations at time of bp reading  After several minutes the symptoms resolved  His repeat bp's in the afternoon were within normal limits  Patient was feeling anxious today as well  Patient has a history of elevated bp readings and recently increased his nifedipine to 60mg daily  He also has a history of MI in 2006 and follows with Dr Tammi Bryant  He denies chest tightness, pain down his arms or into his jaw  No shortness of breath of MOORE        The following portions of the patient's history were reviewed and updated as appropriate: allergies, current medications, past family history, past medical history, past social history, past surgical history and problem list     Review of Systems   Constitutional: Negative for chills, diaphoresis and fever  Respiratory: Negative for cough, choking, chest tightness, shortness of breath and wheezing  Cardiovascular: Negative for chest pain, palpitations and leg swelling  Gastrointestinal: Negative for abdominal distention, abdominal pain, anal bleeding, blood in stool, constipation, diarrhea and nausea  Musculoskeletal: Negative for arthralgias, gait problem, joint swelling and myalgias  Neurological: Negative for dizziness  Objective:      /84 (BP Location: Left arm, Patient Position: Sitting, Cuff Size: Standard)   Pulse (!) 54   Temp 99 2 °F (37 3 °C) (Tympanic)   Resp 16   Wt 71 9 kg (158 lb 9 6 oz)   BMI 24 84 kg/m²          Physical Exam   Constitutional: He is oriented to person, place, and time  He appears well-developed and well-nourished  No distress  HENT:   Head: Normocephalic and atraumatic  Right Ear: External ear normal    Left Ear: External ear normal    Mouth/Throat: Oropharynx is clear and moist  No oropharyngeal exudate  Eyes: Pupils are equal, round, and reactive to light  EOM are normal  No scleral icterus  Neck: Normal range of motion  Neck supple  No JVD present  No tracheal deviation present  No thyromegaly present  Cardiovascular: Normal rate, regular rhythm, normal heart sounds and intact distal pulses  Exam reveals no friction rub  No murmur heard  Pulmonary/Chest: Effort normal and breath sounds normal  No respiratory distress  He has no wheezes  He has no rales  He exhibits no tenderness  Abdominal: Soft  Bowel sounds are normal  He exhibits no distension  There is no tenderness  There is no rebound and no guarding  Musculoskeletal: Normal range of motion  He exhibits no edema or tenderness  Neurological: He is alert and oriented to person, place, and time  He has normal reflexes  No cranial nerve deficit   Coordination normal    Skin: Skin is warm and dry  He is not diaphoretic  Psychiatric: He has a normal mood and affect  Vitals reviewed

## 2023-09-11 NOTE — PROGRESS NOTES
- Podiatry consulted   -No signs of acute cellulitis, abscess, paronychia. No need for nail avulsion in-patient as there are no signs of acute infection and currently on Eliquis per Podiatry   -outpatient follow up recommended    Daily Note     Today's date: 2019  Patient name: Janet Caceres  :   MRN: [id number]  Referring provider: Alessandro Prabhakar MD  Dx:   Encounter Diagnosis     ICD-10-CM    1   S/P endoscopic carpal tunnel release [id number]                   Subjective: Feels he can make a fist better     Objective: See treatment diary below  Manual  12/6 10/3 10/4 10/7 10/14 10/16  10/24 10/28  10/30 11/4 11/6 11/11 11/13 11/18 11/21 12/4   Ext mob in prone  ALEJANDRO 10' ALEJANDRO 10' ALEJANDRO 10' ALEJANDRO 10' TP 5'  TP x5' ALEJANDRO x5'  ALEJANDRO 5'  ALEJANDRO 5' ALEJANDRO 5' ALEJANDRO 5' ALEJANDRO 5' ALEJANDRO 5' ALEJANDRO 5'    L hip PROM  5' 5' 5' 5' TP x5'  TP x5'  ALEJANDRO 5'  ALEJANDRO 5"           90/90 sciatic nerve glides  ALEJANDRO x20 ALEJANDRO x20 ALEJANDRO x20 ALEJANDRO x20 TP x20  TP x20  ALEJANDRO x20  ALEJANDRO x20  ALEJANDRO x20 ALEJANDRO x20 Sharren Blower x20    L LE long axis distraction           TP 60"x5  TP 60"x5  ALEJANDRO 60"x5  ALEJANDRO 60"x5  ALEJANDRO 60"x5 ALEJANDRO 60 x6 ALEJANDRO 60 x6 ALEJANDRO 60s x5 ALEJANDRO 60s x5 ALEJANDRO 60s x5     R hand PROM -  15' ALEJANDRO             13'  15'  15' 15' 15' 15' 10' 10' ALEJANDRO 10'   Scar mob 10' ALEJANDRO          5'  5' 5' 5' 5' ALEJANDRO         Exercise Diary  12/6 10/3 10/4 10/7 10/14 10/16  10/24  10/28  10/30  11/4 11/6 11/11 11/13 11/18 11/21 12/4   UBE 10' 10' 10' NP   10'  10' NP  10'  10'  10'  10' 10'    Prone press up  2x10 2x10 2x10 2x`15 np  np               Slump sliders  20 20 20 20 20  20  20  20  20  20       LTR  5s x10 5s x20 5s x20 5s x20 5"x20  5"x20  5"x20  5"x20  5" x20  5" x20  5s x20 5s x20    Standing hip abd       2x10 2x15 2x15  2x15  NP  2x10  2x10  2x10  2x10 2x10     bridge                        3s x15 3s x20                                  Treadmill   10' 10' 12' 12' np  np  10'  10'  10' 10' 10' 10'  10' 10'    R tendon gliding PIP and DIP  x30            x20ea    x20  x20 x20 x20 x20  x30 x30    digiflex  3'                   20  20  3' 2'x2                                                                                                                                                                                                                                                                                                                     Modalities                                                                                                      Assessment: Strength improving    Plan: Continue per plan of care

## 2024-03-29 NOTE — ASSESSMENT & PLAN NOTE
Controlled  Blood pressure at goal < 130/80  Pulse today 52  Patient reports his heart rate at home is usually 48  Advised patient to discuss on next visit with cardiologist possibly lowering metoprolol tartrate dose from 25 mg to 12 5 mg (1/2 tablet of 25 mg) b i d  Continue current regimen lisinopril 20 mg daily, metoprolol tartrate 25 mg b i d , nifedipine 30 mg b i d  MED

## 2025-02-28 NOTE — ASSESSMENT & PLAN NOTE
February 28, 2025     Patient: Billy Gerber  YOB: 2015  Date of Visit: 2/28/2025      To Whom it May Concern:    Billy Gerber is under my professional care. Billy was seen in my office on 2/28/2025. Billy may return to school on 2/28/25 and may return to gym class or sports on 3/6/25 . He should wear his arm sling at school until 3/6 but is ok to remove to wash hands.     If you have any questions or concerns, please don't hesitate to call.         Sincerely,          Victor M Hu, DO        CC: No Recipients   Patient with flu-like symptoms x1 week  He states he has had no fevers but has a cold-like feeling and chills towards the night  Patient has had 2- at home test for COVID  Patient states he feels weak with cough and congestion   -  Prescribed promethazine DM to assist in cough  -prescribed Mucinex 600 mg q 12h for 5 days as an expectorant  - advised patient to use Tylenol 500 mg every 4 hours as needed   - educated patient importance of maintaining adequate hydration in the setting of viral symptoms   - lastly due to the patient's comorbidities, I stated the patient may have prolonged course of the flu lasting up to 2 weeks

## (undated) DEVICE — ADHESIVE SKN CLSR HISTOACRYL FLEX 0.5ML LF

## (undated) DEVICE — PADDING CAST 4 IN  COTTON STRL

## (undated) DEVICE — OCCLUSIVE GAUZE STRIP,3% BISMUTH TRIBROMOPHENATE IN PETROLATUM BLEND: Brand: XEROFORM

## (undated) DEVICE — DISPOSABLE EQUIPMENT COVER: Brand: SMALL TOWEL DRAPE

## (undated) DEVICE — RETROGRADE KNIFE BOX OF 6: Brand: ECTRA

## (undated) DEVICE — STRL COTTON TIP APPLCTR 6IN PK: Brand: CARDINAL HEALTH

## (undated) DEVICE — SPONGE PVP SCRUB WING STERILE

## (undated) DEVICE — GAUZE SPONGES,16 PLY: Brand: CURITY

## (undated) DEVICE — INTENDED FOR TISSUE SEPARATION, AND OTHER PROCEDURES THAT REQUIRE A SHARP SURGICAL BLADE TO PUNCTURE OR CUT.: Brand: BARD-PARKER SAFETY BLADES SIZE 15, STERILE

## (undated) DEVICE — CUFF TOURNIQUET 18 X 4 IN QUICK CONNECT DISP 1 BLADDER

## (undated) DEVICE — GLOVE INDICATOR PI UNDERGLOVE SZ 8 BLUE

## (undated) DEVICE — SUT PROLENE 4-0 PS-2 18 IN 8682G

## (undated) DEVICE — GLOVE SRG BIOGEL 7.5

## (undated) DEVICE — ACE WRAP 4 IN UNSTERILE

## (undated) DEVICE — NEEDLE 25G X 1 1/2

## (undated) DEVICE — ACE WRAP 4 IN STERILE

## (undated) DEVICE — STERILE BETHLEHEM PLASTIC HAND: Brand: CARDINAL HEALTH

## (undated) DEVICE — CHLORAPREP HI-LITE 26ML ORANGE